# Patient Record
Sex: MALE | Race: WHITE | NOT HISPANIC OR LATINO | Employment: OTHER | URBAN - METROPOLITAN AREA
[De-identification: names, ages, dates, MRNs, and addresses within clinical notes are randomized per-mention and may not be internally consistent; named-entity substitution may affect disease eponyms.]

---

## 2017-01-01 DIAGNOSIS — I10 ESSENTIAL HYPERTENSION: ICD-10-CM

## 2017-01-03 ENCOUNTER — TRANSCRIBE ORDERS (OUTPATIENT)
Dept: CARDIOLOGY | Facility: CLINIC | Age: 72
End: 2017-01-03

## 2017-01-03 DIAGNOSIS — Z13.9 SCREENING: Primary | ICD-10-CM

## 2017-01-03 RX ORDER — AMLODIPINE BESYLATE 5 MG/1
TABLET ORAL
Qty: 15 TABLET | Refills: 2 | Status: SHIPPED | OUTPATIENT
Start: 2017-01-03 | End: 2017-03-05 | Stop reason: SDUPTHER

## 2017-01-08 ENCOUNTER — HOSPITAL ENCOUNTER (OUTPATIENT)
Dept: CARDIOLOGY | Facility: HOSPITAL | Age: 72
Discharge: HOME | End: 2017-01-08

## 2017-01-08 DIAGNOSIS — Z13.9 SCREENING: ICD-10-CM

## 2017-01-11 LAB
BH CV XLRA MEAS - PAD LEFT ARM: 130 MMHG
BH CV XLRA MEAS - PAD LEFT LEG DP: 1.07 MMHG
BH CV XLRA MEAS - PAD LEFT LEG PT: 139 MMHG
BH CV XLRA MEAS - PAD RIGHT ARM: 105 MMHG
BH CV XLRA MEAS - PAD RIGHT LEG DP: 1.04 MMHG
BH CV XLRA MEAS - PAD RIGHT LEG PT: 135 MMHG
BH CV XLRA MEAS - PROX AO DIAM: 1.7 CM
BH CV XLRA MEAS LEFT ICA/CCA RATIO: 0.9
BH CV XLRA MEAS LEFT MID CCA PSV: NORMAL CM/SEC
BH CV XLRA MEAS LEFT MID ICA PSV: NORMAL CM/SEC
BH CV XLRA MEAS LEFT PROX ECA PSV: 93 CM/SEC
BH CV XLRA MEAS RIGHT ICA/CCA RATIO: 0.48
BH CV XLRA MEAS RIGHT MID CCA PSV: NORMAL CM/SEC
BH CV XLRA MEAS RIGHT MID ICA PSV: NORMAL CM/SEC
BH CV XLRA MEAS RIGHT PROX ECA PSV: 99 CM/SEC

## 2017-02-24 ENCOUNTER — OFFICE VISIT (OUTPATIENT)
Dept: INTERNAL MEDICINE | Age: 72
End: 2017-02-24

## 2017-02-24 VITALS
SYSTOLIC BLOOD PRESSURE: 122 MMHG | HEIGHT: 70 IN | OXYGEN SATURATION: 92 % | TEMPERATURE: 97.9 F | BODY MASS INDEX: 28.77 KG/M2 | HEART RATE: 81 BPM | DIASTOLIC BLOOD PRESSURE: 70 MMHG | WEIGHT: 201 LBS

## 2017-02-24 DIAGNOSIS — M17.0 PRIMARY OSTEOARTHRITIS OF BOTH KNEES: Chronic | ICD-10-CM

## 2017-02-24 DIAGNOSIS — J43.9 PULMONARY EMPHYSEMA, UNSPECIFIED EMPHYSEMA TYPE (HCC): Chronic | ICD-10-CM

## 2017-02-24 DIAGNOSIS — I10 ESSENTIAL HYPERTENSION: Primary | Chronic | ICD-10-CM

## 2017-02-24 LAB
ALBUMIN SERPL-MCNC: 4.2 G/DL (ref 3.5–5.2)
ALBUMIN/GLOB SERPL: 1.8 G/DL
ALP SERPL-CCNC: 75 U/L (ref 39–117)
ALT SERPL-CCNC: 25 U/L (ref 1–41)
AST SERPL-CCNC: 25 U/L (ref 1–40)
BILIRUB SERPL-MCNC: 0.6 MG/DL (ref 0.1–1.2)
BUN SERPL-MCNC: 14 MG/DL (ref 8–23)
BUN/CREAT SERPL: 14.9 (ref 7–25)
CALCIUM SERPL-MCNC: 9.3 MG/DL (ref 8.6–10.5)
CHLORIDE SERPL-SCNC: 97 MMOL/L (ref 98–107)
CHOLEST SERPL-MCNC: 167 MG/DL (ref 0–200)
CHOLEST/HDLC SERPL: 3.98 {RATIO}
CO2 SERPL-SCNC: 24.3 MMOL/L (ref 22–29)
CREAT SERPL-MCNC: 0.94 MG/DL (ref 0.76–1.27)
GLOBULIN SER CALC-MCNC: 2.4 GM/DL
GLUCOSE SERPL-MCNC: 110 MG/DL (ref 65–99)
HDLC SERPL-MCNC: 42 MG/DL (ref 40–60)
LDLC SERPL CALC-MCNC: 101 MG/DL (ref 0–100)
POTASSIUM SERPL-SCNC: 4.4 MMOL/L (ref 3.5–5.2)
PROT SERPL-MCNC: 6.6 G/DL (ref 6–8.5)
SODIUM SERPL-SCNC: 135 MMOL/L (ref 136–145)
TRIGL SERPL-MCNC: 119 MG/DL (ref 0–150)
VLDLC SERPL CALC-MCNC: 23.8 MG/DL (ref 5–40)

## 2017-02-24 PROCEDURE — 99214 OFFICE O/P EST MOD 30 MIN: CPT | Performed by: INTERNAL MEDICINE

## 2017-02-24 NOTE — PROGRESS NOTES
"Aj Pattonb / 71 y.o. / male  02/24/2017    VITALS    Visit Vitals   • /70   • Pulse 81   • Temp 97.9 °F (36.6 °C)   • Ht 70\" (177.8 cm)   • Wt 201 lb (91.2 kg)   • SpO2 92%   • BMI 28.84 kg/m2     BP Readings from Last 3 Encounters:   02/24/17 122/70   12/27/16 138/62   12/08/16 110/64     Wt Readings from Last 3 Encounters:   02/24/17 201 lb (91.2 kg)   12/27/16 200 lb (90.7 kg)   12/08/16 199 lb (90.3 kg)      Body mass index is 28.84 kg/(m^2).    CC:  Main reason(s) for today's visit: Hypertension and COPD      HPI:     Chronic essential hypertension :  Home BP readings: DOES NOT CHECK BP AT HOME. Compliant with medication(s).  Denies significant problems or side effects. Most recent in-office blood pressure readings:   BP Readings from Last 3 Encounters:   02/24/17 122/70   12/27/16 138/62   12/08/16 110/64     COPD :  He Denies increased cough or shortness of breath.   Current therapy: Anoro.     OA of knees. Off meloxicam. Mild to moderate chronic pain. Not taking any medication for pain.    ____________________________________________________________________    ASSESSMENT & PLAN:    Problem List Items Addressed This Visit        High    Hypertension - Primary (Chronic)    Overview     Stable. Continue amlodipine, hctz, lisinopril.          Relevant Medications    lisinopril (PRINIVIL,ZESTRIL) 40 MG tablet    hydrochlorothiazide (MICROZIDE) 12.5 MG capsule    amLODIPine (NORVASC) 5 MG tablet    metoprolol tartrate (LOPRESSOR) 25 MG tablet    Other Relevant Orders    Lipid Panel With / Chol / HDL Ratio    Comprehensive Metabolic Panel       Medium    Chronic obstructive pulmonary disease (Chronic)    Overview     Continue Anoro inhaler daily.          Relevant Medications    levocetirizine (XYZAL) 5 MG tablet    ANORO ELLIPTA 62.5-25 MCG/INH aerosol powder        Low    Osteoarthritis (Chronic)    Overview     Knees         Current Assessment & Plan     meloxicam was dc'ed by cards. Advised tylenol as " needed.              Orders Placed This Encounter   Procedures   • Lipid Panel With / Chol / HDL Ratio   • Comprehensive Metabolic Panel       Summary/Discussion:     ·       Return in about 4 months (around 6/24/2017) for COMBINED AWV and medical f/u (20 min).    Future Appointments  Date Time Provider Department Center   4/7/2017 1:40 PM MD NIKO SilvaK [facility] None   6/23/2017 8:20 AM MD MARK Carrillo [facility] None       ____________________________________________________________________    REVIEW OF SYSTEMS    Review of Systems  As noted per HPI  Constitutional neg   Resp neg   CV neg    Other: As noted per HPI      PHYSICAL EXAMINATION    Physical Exam  Constitutional  No distress   Cardiovascular Rate  normal . Rhythm: regular . Heart sounds:  normal    Pulmonary/Chest  Effort normal. Breath sounds:  normal   Psychiatric  Alert. Judgment and thought content normal. Mood normal      REVIEWED DATA:    Labs:   Lab Results   Component Value Date     (L) 08/31/2016    K 4.3 08/31/2016    AST 17 08/31/2016    ALT 17 08/31/2016    BUN 8 08/31/2016    CREATININE 0.68 (L) 08/31/2016    CREATININE 0.66 (L) 11/06/2015    CREATININE 0.76 10/14/2015    EGFRIFNONA 116 08/31/2016    EGFRIFAFRI  08/31/2016      Comment:      <15 Indicative of kidney failure.       Lab Results   Component Value Date     (H) 04/14/2015     (H) 03/30/2015       Lab Results   Component Value Date    LDL 80 03/30/2015    HDL 47 03/30/2015    TRIG 84 03/30/2015    CHOLHDLRATIO 3.1 03/30/2015       Lab Results   Component Value Date    TSH 2.45 10/14/2015          Lab Results   Component Value Date    WBC 6.29 08/31/2016    HGB 15.7 08/31/2016    HGB 13.3 (L) 11/07/2015    HGB 14.6 11/06/2015     08/31/2016        Imaging:        Medical Tests:        Summary of old records / correspondence / consultant report:        Request outside records:          ALLERGIES:    Review of patient's allergies indicates no  "known allergies.    MEDICATIONS:  Outpatient Medications Prior to Visit   Medication Sig Dispense Refill   • amLODIPine (NORVASC) 5 MG tablet GIVE \"JENNIFER\" 1/2 TABLET BY MOUTH DAILY 15 tablet 2   • ANORO ELLIPTA 62.5-25 MCG/INH aerosol powder  INHALE 1 PUFF BY MOUTH DAILY 60 each 2   • aspirin 81 MG EC tablet Take 81 mg by mouth daily.     • hydrochlorothiazide (MICROZIDE) 12.5 MG capsule Take 1 capsule by mouth daily.     • levocetirizine (XYZAL) 5 MG tablet Take 5 mg by mouth Every Evening.     • lisinopril (PRINIVIL,ZESTRIL) 40 MG tablet Take 1 tablet by mouth daily.     • LUMIGAN 0.01 % ophthalmic drops Administer  to both eyes every night.     • metoprolol tartrate (LOPRESSOR) 25 MG tablet TAKE 1 TABLET BY MOUTH EVERY 12 HOURS 60 tablet 3   • sildenafil (VIAGRA) 100 MG tablet Take 1 tablet by mouth daily as needed.       No facility-administered medications prior to visit.        [facility]    The following portions of the patient's history were reviewed and updated as appropriate: Allergies / Current Medications / Past Medical History / Surgical History / Social History / Family History    PROBLEM LIST:    Patient Active Problem List   Diagnosis   • Chronic obstructive pulmonary disease   • Impotence of organic origin   • Hypertension   • Osteoarthritis   • History of atrial fibrillation       PAST MEDICAL HX:    Past Medical History   Diagnosis Date   • Atrial fibrillation 3/17/2016   • BPH (benign prostatic hypertrophy)      see doctors at [facility]   • COPD (chronic obstructive pulmonary disease)    • Depression    • Emphysema lung    • Hx of colonic polyps      followed by GI (Kyle)   • Hyperlipidemia    • Hypertension    • Low back pain        PAST SURGICAL HX:    Past Surgical History   Procedure Laterality Date   • Appendectomy     • Hernia repair     • Total hip arthroplasty Left 11/06/2015     Dr Ankush Sky   • Total hip arthroplasty  10/2014     Dr Sky   • Joint replacement  11/2015     left hip "       SOCIAL HX:    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[employer]     Social History Main Topics   • Smoking status: Former Smoker     Packs/day: 1.00     Quit date: 1/1/2012   • Smokeless tobacco: Never Used      Comment: after >50 yrs   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 The electronic translation of spoken language may permit erroneous, or at times, nonsensical words or phrases to be inadvertently transcribed. Although I have reviewed the note for such errors, some may still exist.

## 2017-02-27 ENCOUNTER — TELEPHONE (OUTPATIENT)
Dept: INTERNAL MEDICINE | Age: 72
End: 2017-02-27

## 2017-02-28 DIAGNOSIS — J43.9 PULMONARY EMPHYSEMA, UNSPECIFIED EMPHYSEMA TYPE (HCC): ICD-10-CM

## 2017-03-05 DIAGNOSIS — I10 ESSENTIAL HYPERTENSION: ICD-10-CM

## 2017-03-06 RX ORDER — AMLODIPINE BESYLATE 5 MG/1
TABLET ORAL
Qty: 45 TABLET | Refills: 2 | Status: SHIPPED | OUTPATIENT
Start: 2017-03-06 | End: 2017-11-26 | Stop reason: SDUPTHER

## 2017-03-21 ENCOUNTER — TELEPHONE (OUTPATIENT)
Dept: INTERNAL MEDICINE | Age: 72
End: 2017-03-21

## 2017-03-21 NOTE — TELEPHONE ENCOUNTER
Because the last visit was not for prostate cancer screening. If he is interested in having PSA checked it may be done on next visit.

## 2017-03-22 VITALS
SYSTOLIC BLOOD PRESSURE: 143 MMHG | RESPIRATION RATE: 12 BRPM | SYSTOLIC BLOOD PRESSURE: 127 MMHG | HEART RATE: 79 BPM | HEIGHT: 70 IN | SYSTOLIC BLOOD PRESSURE: 109 MMHG | RESPIRATION RATE: 16 BRPM | OXYGEN SATURATION: 92 % | SYSTOLIC BLOOD PRESSURE: 103 MMHG | HEART RATE: 96 BPM | SYSTOLIC BLOOD PRESSURE: 117 MMHG | HEART RATE: 84 BPM | HEART RATE: 89 BPM | DIASTOLIC BLOOD PRESSURE: 73 MMHG | DIASTOLIC BLOOD PRESSURE: 66 MMHG | HEART RATE: 94 BPM | RESPIRATION RATE: 11 BRPM | TEMPERATURE: 98.3 F | HEART RATE: 93 BPM | DIASTOLIC BLOOD PRESSURE: 71 MMHG | OXYGEN SATURATION: 97 % | DIASTOLIC BLOOD PRESSURE: 87 MMHG | WEIGHT: 200 LBS | DIASTOLIC BLOOD PRESSURE: 70 MMHG | OXYGEN SATURATION: 93 % | RESPIRATION RATE: 10 BRPM | OXYGEN SATURATION: 94 % | HEART RATE: 90 BPM | SYSTOLIC BLOOD PRESSURE: 104 MMHG | SYSTOLIC BLOOD PRESSURE: 142 MMHG | DIASTOLIC BLOOD PRESSURE: 54 MMHG | DIASTOLIC BLOOD PRESSURE: 75 MMHG | DIASTOLIC BLOOD PRESSURE: 80 MMHG | DIASTOLIC BLOOD PRESSURE: 62 MMHG | SYSTOLIC BLOOD PRESSURE: 120 MMHG | HEART RATE: 80 BPM | OXYGEN SATURATION: 95 % | HEART RATE: 100 BPM | HEART RATE: 103 BPM | TEMPERATURE: 97.4 F | HEART RATE: 85 BPM | DIASTOLIC BLOOD PRESSURE: 68 MMHG | DIASTOLIC BLOOD PRESSURE: 85 MMHG | SYSTOLIC BLOOD PRESSURE: 153 MMHG | SYSTOLIC BLOOD PRESSURE: 128 MMHG | SYSTOLIC BLOOD PRESSURE: 110 MMHG

## 2017-03-23 ENCOUNTER — AMBULATORY SURGICAL CENTER (AMBULATORY)
Dept: URBAN - METROPOLITAN AREA SURGERY 17 | Facility: SURGERY | Age: 72
End: 2017-03-23
Payer: COMMERCIAL

## 2017-03-23 ENCOUNTER — OFFICE (AMBULATORY)
Dept: URBAN - METROPOLITAN AREA CLINIC 64 | Facility: CLINIC | Age: 72
End: 2017-03-23
Payer: COMMERCIAL

## 2017-03-23 DIAGNOSIS — K62.1 RECTAL POLYP: ICD-10-CM

## 2017-03-23 DIAGNOSIS — D12.0 BENIGN NEOPLASM OF CECUM: ICD-10-CM

## 2017-03-23 DIAGNOSIS — Z80.0 FAMILY HISTORY OF MALIGNANT NEOPLASM OF DIGESTIVE ORGANS: ICD-10-CM

## 2017-03-23 PROBLEM — Z12.11 SURVEILLANCE DUE TO PRIOR COLONIC NEOPLASIA: Status: ACTIVE | Noted: 2017-03-23

## 2017-03-23 LAB
GI HISTOLOGY: A. UNSPECIFIED: (no result)
GI HISTOLOGY: B. SELECT: (no result)
GI HISTOLOGY: PDF REPORT: (no result)

## 2017-03-23 PROCEDURE — 88305 TISSUE EXAM BY PATHOLOGIST: CPT | Performed by: INTERNAL MEDICINE

## 2017-03-23 PROCEDURE — 45380 COLONOSCOPY AND BIOPSY: CPT | Mod: 33 | Performed by: INTERNAL MEDICINE

## 2017-03-23 RX ADMIN — PROPOFOL 25 MG: 10 INJECTION, EMULSION INTRAVENOUS at 08:22

## 2017-03-23 RX ADMIN — PROPOFOL 25 MG: 10 INJECTION, EMULSION INTRAVENOUS at 08:18

## 2017-03-23 RX ADMIN — PROPOFOL 50 MG: 10 INJECTION, EMULSION INTRAVENOUS at 08:30

## 2017-03-23 RX ADMIN — PROPOFOL 100 MG: 10 INJECTION, EMULSION INTRAVENOUS at 08:16

## 2017-03-23 RX ADMIN — LIDOCAINE HYDROCHLORIDE 50 MG: 10 INJECTION, SOLUTION EPIDURAL; INFILTRATION; INTRACAUDAL; PERINEURAL at 08:14

## 2017-04-07 ENCOUNTER — OFFICE VISIT (OUTPATIENT)
Dept: CARDIOLOGY | Facility: CLINIC | Age: 72
End: 2017-04-07

## 2017-04-07 VITALS
WEIGHT: 202 LBS | DIASTOLIC BLOOD PRESSURE: 64 MMHG | BODY MASS INDEX: 28.92 KG/M2 | HEART RATE: 74 BPM | HEIGHT: 70 IN | SYSTOLIC BLOOD PRESSURE: 122 MMHG

## 2017-04-07 DIAGNOSIS — J43.9 PULMONARY EMPHYSEMA, UNSPECIFIED EMPHYSEMA TYPE (HCC): ICD-10-CM

## 2017-04-07 DIAGNOSIS — I48.0 PAROXYSMAL ATRIAL FIBRILLATION (HCC): Primary | ICD-10-CM

## 2017-04-07 DIAGNOSIS — I10 ESSENTIAL HYPERTENSION: ICD-10-CM

## 2017-04-07 PROCEDURE — 99214 OFFICE O/P EST MOD 30 MIN: CPT | Performed by: INTERNAL MEDICINE

## 2017-04-07 PROCEDURE — 93000 ELECTROCARDIOGRAM COMPLETE: CPT | Performed by: INTERNAL MEDICINE

## 2017-04-07 NOTE — PROGRESS NOTES
Date of Office Visit: 17  Encounter Provider: Juan Colby MD  Place of Service: [facility] CARDIOLOGY  Patient Name: Aj Salazar  :1945      No chief complaint on file.    History of Present Illness  HPI Comments: The patient is a 71-year-old gentleman with no real prior history of heart disease but he  presented in 10/2014 with hip replacement surgery and a transient episode of atrial  fibrillation. As part of that evaluation he had an echocardiogram that showed normal LV  systolic function and no significant valvular disease. He also had a perfusion stress  test done that showed no evidence of ischemia. It was decided to not treat him any  further for that.  He then presented to  emergency room in 2016.  Was having atypical chest pain.  He had a perfusion stress test that was normal.  Felt that it was musculoskeletal started on ibuprofen.  We reviewed those records.  He now comes in for follow-up.  He's had no chest pain or pressure.  He can walk his pace 20-25 minutes without any problems.  If he does anything more exertional such as mowing the yard push mowing or if bending over it for increased activity get some shortness of breath but this is unchanged.  He denies orthopnea and PND.  He denies palpitations near syncope or syncope.  Denies any stroke type symptoms.  Overall he feels like he's doing well.    Atrial Fibrillation   Symptoms are negative for dizziness and weakness. Past medical history includes atrial fibrillation.         Past Medical History:   Diagnosis Date   • Atrial fibrillation 3/17/2016   • BPH (benign prostatic hypertrophy)     see doctors at [facility]   • COPD (chronic obstructive pulmonary disease)    • Depression    • Emphysema lung    • Hx of colonic polyps     followed by GI (Kyle)   • Hyperlipidemia    • Hypertension    • Low back pain          Past Surgical History:   Procedure Laterality Date   •  "APPENDECTOMY     • HERNIA REPAIR     • JOINT REPLACEMENT  11/2015    left hip   • TOTAL HIP ARTHROPLASTY Left 11/06/2015    Dr Ankush Sky   • TOTAL HIP ARTHROPLASTY  10/2014    Dr Sky         Current Outpatient Prescriptions on File Prior to Visit   Medication Sig Dispense Refill   • amLODIPine (NORVASC) 5 MG tablet GIVE \"JENNIFER\" 1/2 TABLET BY MOUTH DAILY 45 tablet 2   • aspirin 81 MG EC tablet Take 81 mg by mouth daily.     • hydrochlorothiazide (MICROZIDE) 12.5 MG capsule Take 1 capsule by mouth daily.     • levocetirizine (XYZAL) 5 MG tablet Take 5 mg by mouth Every Evening.     • lisinopril (PRINIVIL,ZESTRIL) 40 MG tablet Take 1 tablet by mouth daily.     • LUMIGAN 0.01 % ophthalmic drops Administer  to both eyes every night.     • metoprolol tartrate (LOPRESSOR) 25 MG tablet TAKE 1 TABLET BY MOUTH EVERY 12 HOURS 60 tablet 2   • sildenafil (VIAGRA) 100 MG tablet Take 1 tablet by mouth daily as needed.     • Umeclidinium-Vilanterol (ANORO ELLIPTA) 62.5-25 MCG/INH aerosol powder  Take 1 puff by mouth Daily. 60 each 2   • [DISCONTINUED] metoprolol tartrate (LOPRESSOR) 25 MG tablet TAKE 1 TABLET BY MOUTH EVERY 12 HOURS 60 tablet 3     No current facility-administered medications on file prior to visit.          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   HENT: Negative for congestion, headaches, hearing loss, nosebleeds and tinnitus.    Eyes: Negative for blurred vision, double vision, vision loss in left eye, vision loss in right eye and visual disturbance.   Cardiovascular:        As noted in HPI   Respiratory:        As noted HPI   Endocrine: Negative for cold intolerance and heat intolerance.   Hematologic/Lymphatic: Negative for bleeding problem. Does not bruise/bleed easily.   Skin: Negative for color change, flushing, itching and rash.   Musculoskeletal: Negative for arthritis, back pain, joint pain, joint swelling, muscle weakness and myalgias.   Gastrointestinal: Negative for bloating, abdominal pain, constipation, diarrhea, dysphagia, heartburn, hematemesis, hematochezia, melena, nausea and vomiting.   Genitourinary: Negative for bladder incontinence, dysuria, frequency, nocturia and urgency.   Neurological: Negative for dizziness, focal weakness, light-headedness, loss of balance, numbness, paresthesias and vertigo.   Psychiatric/Behavioral: Negative for depression, memory loss and substance abuse.       Procedures      ECG 12 Lead  Date/Time: 4/7/2017 1:57 PM  Performed by: SANDRITA BOX  Authorized by: SANDRITA BOX   Comparison: compared with previous ECG   Similar to previous ECG  Rhythm: sinus rhythm  Rate: normal  QRS axis: normal                 Objective:    /64  Pulse 74  Ht 70\" (177.8 cm)  Wt 202 lb (91.6 kg)  BMI 28.98 kg/m2       Physical Exam  Physical Exam   Constitutional: He is oriented to person, place, and time. He appears well-developed and well-nourished. No distress.   HENT:   Head: Normocephalic.   Eyes: Conjunctivae are normal. Pupils are equal, round, and reactive to light. No scleral icterus.   Neck: Normal " carotid pulses, no hepatojugular reflux and no JVD present. Carotid bruit is not present. No tracheal deviation, no edema and no erythema present. No thyromegaly present.   Cardiovascular: Normal rate, regular rhythm, S1 normal, S2 normal, normal heart sounds and intact distal pulses.   No extrasystoles are present. PMI is not displaced.  Exam reveals no gallop, no distant heart sounds and no friction rub.    No murmur heard.  Pulses:       Carotid pulses are 2+ on the right side, and 2+ on the left side.       Radial pulses are 2+ on the right side, and 2+ on the left side.        Femoral pulses are 2+ on the right side, and 2+ on the left side.       Dorsalis pedis pulses are 2+ on the right side, and 2+ on the left side.        Posterior tibial pulses are 2+ on the right side, and 2+ on the left side.   Pulmonary/Chest: Effort normal and breath sounds normal. No respiratory distress. He has no decreased breath sounds. He has no wheezes. He has no rhonchi. He has no rales. He exhibits no tenderness.   Abdominal: Soft. Bowel sounds are normal. He exhibits no distension and no mass. There is no hepatosplenomegaly. There is no tenderness. There is no rebound and no guarding.   Musculoskeletal: He exhibits no edema, tenderness or deformity.   Neurological: He is alert and oriented to person, place, and time.   Skin: Skin is warm and dry. No rash noted. He is not diaphoretic. No cyanosis or erythema. No pallor. Nails show no clubbing.   Psychiatric: He has a normal mood and affect. His speech is normal and behavior is normal. Judgment and thought content normal.           Assessment:   1. This is a 71-year-old gentleman with a history of paroxysmal atrial fibrillation but it was only after a time of stress and surgery.   Atrial Fibrillation and Atrial Flutter  Assessment  • The patient has paroxysmal atrial fibrillation  • This is non-valvular in etiology  • The patient's CHADS2-VASc score is 2  • A [id number] score  of 2 or more is considered a high risk for a thromboembolic event  • Aspirin prescribed    Plan  • Attempt to maintain sinus rhythm  • Continue aspirin for antithrombotic therapy, bleeding issues discussed  He has not had any recurrence. He is to continue the same will see us in follow-up in one year.      2. Hypertension. Stable on current medical regimen.   3.  COPD. Baseline dyspnea unchanged.          Plan:

## 2017-05-08 ENCOUNTER — TELEPHONE (OUTPATIENT)
Dept: INTERNAL MEDICINE | Age: 72
End: 2017-05-08

## 2017-05-09 DIAGNOSIS — K62.9 RECTAL ABNORMALITY: Primary | ICD-10-CM

## 2017-05-27 DIAGNOSIS — J43.9 PULMONARY EMPHYSEMA, UNSPECIFIED EMPHYSEMA TYPE (HCC): ICD-10-CM

## 2017-06-23 ENCOUNTER — OFFICE VISIT (OUTPATIENT)
Dept: INTERNAL MEDICINE | Age: 72
End: 2017-06-23

## 2017-06-23 VITALS
HEIGHT: 70 IN | SYSTOLIC BLOOD PRESSURE: 118 MMHG | HEART RATE: 80 BPM | BODY MASS INDEX: 28.77 KG/M2 | DIASTOLIC BLOOD PRESSURE: 70 MMHG | TEMPERATURE: 97.5 F | WEIGHT: 201 LBS | OXYGEN SATURATION: 93 %

## 2017-06-23 DIAGNOSIS — J43.9 PULMONARY EMPHYSEMA, UNSPECIFIED EMPHYSEMA TYPE (HCC): Chronic | ICD-10-CM

## 2017-06-23 DIAGNOSIS — Z87.891 HISTORY OF SMOKING GREATER THAN 50 PACK YEARS: ICD-10-CM

## 2017-06-23 DIAGNOSIS — I10 ESSENTIAL HYPERTENSION: Chronic | ICD-10-CM

## 2017-06-23 DIAGNOSIS — Z00.00 MEDICARE ANNUAL WELLNESS VISIT, INITIAL: Primary | ICD-10-CM

## 2017-06-23 DIAGNOSIS — Z12.2 ENCOUNTER FOR SCREENING FOR MALIGNANT NEOPLASM OF RESPIRATORY ORGANS: ICD-10-CM

## 2017-06-23 PROCEDURE — G0439 PPPS, SUBSEQ VISIT: HCPCS | Performed by: INTERNAL MEDICINE

## 2017-06-23 PROCEDURE — 99214 OFFICE O/P EST MOD 30 MIN: CPT | Performed by: INTERNAL MEDICINE

## 2017-06-23 NOTE — PATIENT INSTRUCTIONS
Medicare Wellness  Personal Prevention Plan of Service     Date of Office Visit:  2017  Encounter Provider:  Neftali Amezcua MD  Place of Service:  [facility] PR[facility]  Patient Name: Aj Salazar  :  1945    As part of the Medicare Wellness portion of your visit today, we are providing you with this personalized preventive plan of services (PPPS). This plan is based upon recommendations of the [place] Preventive Services Task Force (USPSTF) and the Advisory Committee on Immunization Practices (ACIP).    This lists the preventive care services that should be considered, and provides dates of when you are due. Items listed as completed are up-to-date and do not require any further intervention.      Age-Appropriate Screening Schedule:  (Refer to the list below for future screening recommendations based on patient's age, sex and/or medical conditions. Orders for these recommended tests are listed in the plan section. The patient has been provided with a written plan)    Health Maintenance Topics  Health Maintenance   Topic Date Due   • INFLUENZA VACCINE  2017   • LIPID PANEL  2018   • COLONOSCOPY  2022   • TDAP/TD VACCINES (2 - Td) 2027   • PNEUMOCOCCAL VACCINES (65+ LOW/MEDIUM RISK)  Completed   • ZOSTER VACCINE  Completed       Health Maintenance Topics Due or Over-Due  Health Maintenance Due   Topic Date Due   • HEPATITIS C SCREENING  2016   • AAA SCREEN (ONE-TIME)  2016           ADDITIONAL RESOURCES:    For excellent information on senior health and wellness refer to the [employer] web-site at:    WWW .NIHSENIORHEALTH.GOV

## 2017-06-23 NOTE — ASSESSMENT & PLAN NOTE
Uncontrolled. Change Anoro to Breo inhaler daily and Spiriva Respimate 2 puffs daily. (samples) call in 2 weeks.

## 2017-06-23 NOTE — PROGRESS NOTES
"06/23/2017    MEDICARE ANNUAL WELLNESS VISIT    Aj Salazar is a 71 y.o. male who presents for INITIAL AWV & F/U ON CHRONIC MEDICAL PROBLEMS     Patient's general assessment of his health since a year ago:     - Compared to one year ago, he feels his physical health is about the same without significant change.    - Compared to one year ago, he feels his mental health is about the same without significant change.      HPI for other active medical problems:   H/o copd on Anoro inhaler. Quit smoking after 80 pack yrs in 2012. C/o chronic moderate dyspnea with activities. Denies chest pain or palpitations. H/o pafib on metoprolol started by cards 1 year ago. Donates blood, feels exhausted when he does.    Chronic essential hypertension: Home BP readings: DOES NOT CHECK BP AT HOME. Compliant with medication(s).  metoprolo addition noted above by cardiology. Denies significant problems or side effects. Most recent in-office blood pressure readings:   BP Readings from Last 3 Encounters:   06/23/17 118/70   04/07/17 122/64   02/24/17 122/70          * The required components of Health Risk Assessment (HRA) that were completed by the patient and/or my staff are contained within this note and in the scanned documents titled \"Health Risk Assessment\" within the media section of the patient's chart in [employer].       HISTORY    Recent Hospitalizations:    Recent hospitalization? : No    If YES, location, date, and diagnoses:     · Location:   · Date:   · Principle Discharge Dx:   · Secondary Dx:       Patient Care Team:    Patient Care Team:  Neftali Amezcua MD as PCP - General (Internal Medicine)  Neftali Amezcua MD as PCP - Family Medicine  Juan Colby MD as Consulting Physician (Cardiology)  Ankush Sky MD as Surgeon (Orthopedic Surgery)      Allergies:  Review of patient's allergies indicates no known allergies.    Medications:  Outpatient Medications Prior to Visit   Medication Sig Dispense Refill   • amLODIPine (NORVASC) 5 " "MG tablet GIVE \"JENNIFER\" 1/2 TABLET BY MOUTH DAILY 45 tablet 2   • ANORO ELLIPTA 62.5-25 MCG/INH aerosol powder  INHALE 1 PUFF BY MOUTH DAILY 60 each 0   • aspirin 81 MG EC tablet Take 81 mg by mouth daily.     • hydrochlorothiazide (MICROZIDE) 12.5 MG capsule Take 1 capsule by mouth daily.     • lisinopril (PRINIVIL,ZESTRIL) 40 MG tablet Take 1 tablet by mouth daily.     • LUMIGAN 0.01 % ophthalmic drops Administer  to both eyes every night.     • metoprolol tartrate (LOPRESSOR) 25 MG tablet TAKE 1 TABLET BY MOUTH EVERY 12 HOURS 60 tablet 2   • sildenafil (VIAGRA) 100 MG tablet Take 1 tablet by mouth daily as needed.     • levocetirizine (XYZAL) 5 MG tablet Take 5 mg by mouth Every Evening.       No facility-administered medications prior to visit.        PFSH:     The following portions of the patient's history were reviewed and updated as appropriate: Allergies / Current Medications / Past Medical History / Surgical History / Social History / Family History    Problem List:  Patient Active Problem List   Diagnosis   • Chronic obstructive pulmonary disease   • Impotence of organic origin   • Hypertension   • Osteoarthritis   • History of atrial fibrillation   • History of smoking greater than 50 pack years       Past Medical History:  Past Medical History:   Diagnosis Date   • Atrial fibrillation 3/17/2016   • BPH (benign prostatic hypertrophy)     see doctors at [facility]   • COPD (chronic obstructive pulmonary disease)    • Depression    • Emphysema lung    • Hx of colonic polyps     followed by GI ([facility])   • Hyperlipidemia    • Hypertension    • Low back pain        Past Surgical History:  Past Surgical History:   Procedure Laterality Date   • APPENDECTOMY     • HERNIA REPAIR     • JOINT REPLACEMENT  11/2015    left hip   • TOTAL HIP ARTHROPLASTY Left 11/06/2015    Dr Ankush Sky   • TOTAL HIP ARTHROPLASTY  10/2014    Dr Sky       Social History:  Social History     Social History   • Marital status:      " "Spouse name: Ara   • Number of children: 3   • Years of education: N/A     Occupational History   • 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[employer]     Social History Main Topics   • Smoking status: Former Smoker     Packs/day: 2.00     Years: 40.00     Quit date: 1/1/2012   • Smokeless tobacco: Never Used      Comment: after >50 yrs   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   • Drug use: No   • Sexual activity: Defer     Other Topics Concern   • None     Social History Narrative       Family History:  Family History   Problem Relation Age of Onset   • Colon cancer Mother    • Colon cancer Other    • Stroke Sister      October 2016   • Stroke Brother      September, 2016         PATIENT ASSESSMENT    Vitals:  /70  Pulse 80  Temp 97.5 °F (36.4 °C)  Ht 70\" (177.8 cm)  Wt 201 lb (91.2 kg)  SpO2 93%  BMI 28.84 kg/m2  BP Readings from Last 3 Encounters:   06/23/17 118/70   04/07/17 122/64   02/24/17 122/70     Wt Readings from Last 3 Encounters:   06/23/17 201 lb (91.2 kg)   04/07/17 202 lb (91.6 kg)   02/24/17 201 lb (91.2 kg)      Body mass index is 28.84 kg/(m^2).    Pain Score    06/23/17 0816   PainSc: 0-No pain         Review of Systems:    Review of Systems  As noted per HPI  Constitutional neg   Resp neg   CV neg for chest pain, briceno  Gi neg  Neuro neg    Physical Exam:    Physical Exam  Constitutional  No distress   Cardiovascular Rate  normal . Rhythm: regular . Heart sounds:  normal    Pulmonary/Chest  Effort normal. Breath sounds:  normal   Psychiatric  Alert. Judgment and thought content normal. Mood normal      Reviewed Data:    Labs:   Lab Results   Component Value Date     (L) 02/24/2017    K 4.4 02/24/2017    AST 25 02/24/2017    ALT 25 02/24/2017    BUN 14 02/24/2017    CREATININE 0.94 02/24/2017    CREATININE 0.68 (L) 08/31/2016    CREATININE 0.66 (L) 11/06/2015    EGFRIFNONA 79 02/24/2017    EGFRIFAFRI 96 02/24/2017       Lab Results "   Component Value Date     (H) 02/24/2017     (H) 04/14/2015     (H) 03/30/2015       Lab Results   Component Value Date     (H) 02/24/2017    LDL 80 03/30/2015    HDL 42 02/24/2017    TRIG 119 02/24/2017    CHOLHDLRATIO 3.98 02/24/2017       Lab Results   Component Value Date    TSH 2.45 10/14/2015          Lab Results   Component Value Date    WBC 6.29 08/31/2016    HGB 15.7 08/31/2016    HGB 13.3 (L) 11/07/2015    HGB 14.6 11/06/2015     08/31/2016        Imaging:        Medical Tests:          Screening for Glaucoma:  Previous screening for glaucoma?: Yes  Mild glaucoma      Hearing Loss Screen:  Finger Rub Hearing Test (right ear): passed  Finger Rub Hearing Test (left ear): passed      Urinary Incontinence Screen:  Episodes of urinary incontinence? : No      Depression Screen:  PHQ-9 Depression Screening 6/23/2017   Little interest or pleasure in doing things 0   Feeling down, depressed, or hopeless 0   PHQ-9 Total Score 0        PHQ-2: 0 (Not depressed)    PHQ-9:        FUNCTIONAL, FALL RISK, & COGNITIVE SCREENING (Components below):    DATA:    Functional & Cognitive Status 6/23/2017   Do you have difficulty preparing food and eating? No   Do you have difficulty bathing yourself? No   Do you have difficulty getting dressed? No   Do you have difficulty using the toilet? No   Do you have difficulty moving around from place to place? No   In the past year have you fallen or experienced a near fall? No   Do you need help using the phone?  No   Are you deaf or do you have serious difficulty hearing?  No   Do you need help with transportation? No   Do you need help shopping? No   Do you need help preparing meals?  No   Do you need help with housework?  No   Do you need help with laundry? No   Do you need help taking your medications? No   Do you need help managing money? No       Fall Risk Assessment  Fallen in past 6 months: 0--> No  Mental Status: 0--> no mental status  change  Mobility: 0--> No mobility issues  Medications: 5--> Diuretics  Total Fall Risk Score: 7    A) Assessment of Functional Ability:  (Assessment of ability to perform ADL's (showering/bathing, using toilet, dressing, feeding self, moving self around) and IADL's (use telephone, shop, prepare food, housekeep, do laundry, transport independently, take medications independently, and handle finances)    Degree of functional impairment: NONE (based on assessment noted above)      B) Assessment of Fall Risk:     - Fall within the last year? : No   - Feel unsteady when standing or walking? : No   - Worry about falling? : No    Need for further evaluation of gait, strength, and balance? : No    Timed Up and Go (TUG):   (>= 12 seconds indicates high risk for falling)    Observable abnormalities included:        C. Assessment of Cognitive Function:    Mini-Cog Test:     1) Registration (3 objects): Yes   2) Clock Draw: Passed? : Yes   3) Number of objects recalled: 3    Further evaluation required? : No      COUNSELING    A. Identification of Health Risk Factors:    Risk factors include: cardiovascular risk factors and history of tobacco use      B. Age-Appropriate Screening Schedule:  (Refer to the list below for future screening recommendations based on patient's age, sex and/or medical conditions. Orders for these recommended tests are listed in the plan section. The patient has been provided with a written plan)    Health Maintenance Topics  Health Maintenance   Topic Date Due   • INFLUENZA VACCINE  08/01/2017   • LIPID PANEL  02/24/2018   • COLONOSCOPY  03/23/2022   • TDAP/TD VACCINES (2 - Td) 02/24/2027   • PNEUMOCOCCAL VACCINES (65+ LOW/MEDIUM RISK)  Completed   • ZOSTER VACCINE  Completed       Health Maintenance Topics Due or Over-Due  Health Maintenance Due   Topic Date Due   • HEPATITIS C SCREENING  03/17/2016   • AAA SCREEN (ONE-TIME)  03/17/2016         C. Advanced Care Planning:    power of  for  healthcare AND advanced directive are BOTH on file      D. Patient Self-Management and Personalized Health Advice:    He has been provided with personalized counseling/information (including brochures/handouts) about:     -- optimizing diet/nutrition plans, improving exercise / conditioning, weight management and reducing risk for cardiovascular disease (heart, stroke, vascular)    He has been recommended for the following preventative services which has been performed today, will be ordered today or ordered/performed on upcoming follow-up visit:     -- nutrition counseling provided, exercise counseling provided, counseling for cardiovascular disease risk reduction, fall risk assessment / plan of care completed, urinary incontinence assessment done, screening for diabetes performed (current/reviewed labs/lab ordered), screening for prostate cancer (discussed and followed/managed by urologist)      E. Miscellaneous Items:    -Aspirin use counseling: Taking ASA appropriately as indicated    -Discussed BMI with him. The BMI is above average; BMI management plan is completed (discussed plans for weight loss)    -Reviewed use of high risk medication in the elderly: YES    -Reviewed for potential of harmful drug interactions in the elderly: YES      IV. WRAP-UP    Assessment & Plan:    1) MEDICARE ANNUAL WELLNESS VISIT    2) OTHER MEDICAL CONDITIONS ADDRESSED TODAY:              Problem List Items Addressed This Visit     Chronic obstructive pulmonary disease (Chronic)    Current Assessment & Plan     Uncontrolled. Change Anoro to Breo inhaler daily and Spiriva Respimate 2 puffs daily. (samples) call in 2 weeks.           Relevant Medications    levocetirizine (XYZAL) 5 MG tablet    ANORO ELLIPTA 62.5-25 MCG/INH aerosol powder     Hypertension (Chronic)    Overview     Stable. Continue amlodipine, hctz, lisinopril. On metoprolol for afib.          Relevant Medications    lisinopril (PRINIVIL,ZESTRIL) 40 MG tablet     "hydrochlorothiazide (MICROZIDE) 12.5 MG capsule    amLODIPine (NORVASC) 5 MG tablet    metoprolol tartrate (LOPRESSOR) 25 MG tablet    History of smoking greater than 50 pack years (Chronic)    Current Assessment & Plan     Enroll for annual low CT scan for screening.   Quit smoking in 2012 after 80 pack years.          Relevant Orders    CT Chest Low Dose Wo      Other Visit Diagnoses     Medicare annual wellness visit, initial    -  Primary    Encounter for screening for malignant neoplasm of respiratory organs        Relevant Orders    CT Chest Low Dose Wo                    Orders Placed This Encounter   Procedures   • CT Chest Low Dose Wo       Discussion / Summary:  ·         Medications as of TODAY:              Current Outpatient Prescriptions   Medication Sig Dispense Refill   • amLODIPine (NORVASC) 5 MG tablet GIVE \"JENNIFER\" 1/2 TABLET BY MOUTH DAILY 45 tablet 2   • ANORO ELLIPTA 62.5-25 MCG/INH aerosol powder  INHALE 1 PUFF BY MOUTH DAILY 60 each 0   • aspirin 81 MG EC tablet Take 81 mg by mouth daily.     • hydrochlorothiazide (MICROZIDE) 12.5 MG capsule Take 1 capsule by mouth daily.     • lisinopril (PRINIVIL,ZESTRIL) 40 MG tablet Take 1 tablet by mouth daily.     • LUMIGAN 0.01 % ophthalmic drops Administer  to both eyes every night.     • metoprolol tartrate (LOPRESSOR) 25 MG tablet TAKE 1 TABLET BY MOUTH EVERY 12 HOURS 60 tablet 2   • sildenafil (VIAGRA) 100 MG tablet Take 1 tablet by mouth daily as needed.     • levocetirizine (XYZAL) 5 MG tablet Take 5 mg by mouth Every Evening.       No current facility-administered medications for this visit.          FOLLOW-UP:            Return in about 3 months (around 9/23/2017) for COPD.              Future Appointments  Date Time Provider Department Center   7/5/2017 2:20 PM Daniela Rios MD [facility] None   10/6/2017 7:40 AM Neftali Amezcua MD [facility] None   4/13/2018 9:40 AM Juan Colby MD [facility] None "         ______________________________________________________________________      A printed After Visit Summary (AVS) including the Personalized Prevention  Plan Services (PPPS) was given to the patient at check-out today.         **Dragon Disclaimer:   Much of this encounter note is an electronic transcription/translation of spoken language to printed text. The electronic translation of spoken language may permit erroneous, or at times, nonsensical words or phrases to be inadvertently transcribed. Although I have reviewed the note for such errors, some may still exist.

## 2017-07-05 ENCOUNTER — OFFICE VISIT (OUTPATIENT)
Dept: SURGERY | Facility: CLINIC | Age: 72
End: 2017-07-05

## 2017-07-05 VITALS
HEIGHT: 70 IN | HEART RATE: 91 BPM | DIASTOLIC BLOOD PRESSURE: 82 MMHG | OXYGEN SATURATION: 98 % | BODY MASS INDEX: 28.82 KG/M2 | RESPIRATION RATE: 20 BRPM | WEIGHT: 201.3 LBS | TEMPERATURE: 98.1 F | SYSTOLIC BLOOD PRESSURE: 140 MMHG

## 2017-07-05 DIAGNOSIS — K64.4 EXTERNAL HEMORRHOIDS WITHOUT COMPLICATION: ICD-10-CM

## 2017-07-05 DIAGNOSIS — K64.8 INTERNAL HEMORRHOIDS WITH COMPLICATION: Primary | ICD-10-CM

## 2017-07-05 PROCEDURE — 46600 DIAGNOSTIC ANOSCOPY SPX: CPT | Performed by: COLON & RECTAL SURGERY

## 2017-07-05 PROCEDURE — 99204 OFFICE O/P NEW MOD 45 MIN: CPT | Performed by: COLON & RECTAL SURGERY

## 2017-07-05 RX ORDER — CALCIUM POLYCARBOPHIL 625 MG 625 MG/1
625 TABLET ORAL DAILY
Qty: 200 TABLET | Refills: 2 | Status: SHIPPED | OUTPATIENT
Start: 2017-07-05 | End: 2018-04-13

## 2017-07-05 RX ORDER — CALCIUM POLYCARBOPHIL 625 MG 625 MG/1
625 TABLET ORAL DAILY
Qty: 200 TABLET | Refills: 2 | Status: SHIPPED | OUTPATIENT
Start: 2017-07-05 | End: 2017-07-05

## 2017-07-05 NOTE — PROGRESS NOTES
Aj Salazar is a 71 y.o. male who is seen as a consult at the request of Neftali Amezcua MD for Hemorrhoids.      HPI:  C/o itch, occas rb at start of 2017  Se[facility] and given hem cream but per pt, md said that he did not see any  Uses cream  intermittantly  Cream - not used internally just externally  bm - runs on the loose side  No fiber  no ss  No otc hemmorhoid remedies  Cy this Feb - ok. Polyps in the past    Past Medical History:   Diagnosis Date   • Atrial fibrillation 03/17/2016    SEES DR. BOX   • BPH (benign prostatic hypertrophy)     see doctors at [facility]   • COPD (chronic obstructive pulmonary disease)    • Depression    • DJD (degenerative joint disease) of cervical spine    • ED (erectile dysfunction)     SEES DOCTOR AT [facility]   • Emphysema lung    • Hx of colonic polyps     followed by GI ([facility])   • Hyperlipidemia    • Hypertension    • Influenza B 02/14/2013       • Low back pain    • Osteoarthritis     HIPS, HANDS, MULTIPLE SITES   • Rectal bleeding 04/26/2017   • Shoulder pain, left 12/04/2007    SEEN AT [facility] ER   • Substance abuse     HX OF ALCOHOL ABUSE-QUIT OVER 20 YEARS AGO   • Tendonitis of shoulder 11/07/2007    RIGHT SHOULDER/DELTOID INSERTION       Past Surgical History:   Procedure Laterality Date   • APPENDECTOMY     • CARDIOVASCULAR STRESS TEST N/A 10/20/2015    NML LEXISCAN CARDIOLITE PERFUSION STUDY, NO EVIDENCE OF ISCHEMIA, AREA OF HYPOPERFUSION OF THE INFERIOR WALL W/NORMAL WALL PERFUSION AND NML LEFT VENTRICULAR EJECTION FRACTION, MOST LIKELY ATTENUATION. DR.MICHAEL BOX   • COLONOSCOPY N/A 02/2017   • COLONOSCOPY N/A 02/23/2011    4 POLYPS REMOVED, RESCOPE IN 5 YRS, DR. EAGLE   • COLONOSCOPY N/A 03/08/2006    ERYTHEMOUS AND GRANULAR MUCOSA IN ILEOCECAL VALVE, NORMAL COLON, REPEAT IN 5 YRS,    • HERNIA REPAIR Bilateral     INGUINAL   • JOINT REPLACEMENT  11/2015    left hip   • TOTAL HIP ARTHROPLASTY Left 11/06/2015    Dr Ankush Sky   • TOTAL HIP  "ARTHROPLASTY Right 10/27/2014    DR.RIED GRAY   • VASECTOMY         Social History:   reports that he quit smoking about 5 years ago. He has a 80.00 pack-year smoking history. He has never used smokeless tobacco. He reports that he does not drink alcohol or use illicit drugs.      Marriage status:     Family History   Problem Relation Age of Onset   • Colon cancer Mother    • Heart disease Mother    • Stroke Sister      October 2016   • Stroke Brother      September, 2016   • Coronary artery disease Father    • Hypertension Father    • Heart disease Father    • Colon cancer Maternal Grandmother    • Coronary artery disease Brother    • Heart disease Brother    • Coronary artery disease Brother          Current Outpatient Prescriptions:   •  amLODIPine (NORVASC) 5 MG tablet, GIVE \"JENNIFER\" 1/2 TABLET BY MOUTH DAILY, Disp: 45 tablet, Rfl: 2  •  aspirin 81 MG EC tablet, Take 81 mg by mouth daily., Disp: , Rfl:   •  hydrochlorothiazide (MICROZIDE) 12.5 MG capsule, Take 1 capsule by mouth daily., Disp: , Rfl:   •  levocetirizine (XYZAL) 5 MG tablet, Take 5 mg by mouth Every Evening., Disp: , Rfl:   •  lisinopril (PRINIVIL,ZESTRIL) 40 MG tablet, Take 1 tablet by mouth daily., Disp: , Rfl:   •  LUMIGAN 0.01 % ophthalmic drops, Administer  to both eyes every night., Disp: , Rfl:   •  metoprolol tartrate (LOPRESSOR) 25 MG tablet, TAKE 1 TABLET BY MOUTH EVERY 12 HOURS, Disp: 60 tablet, Rfl: 2  •  sildenafil (VIAGRA) 100 MG tablet, Take 1 tablet by mouth daily as needed., Disp: , Rfl:   •  hydrocortisone (ANUSOL-HC) 2.5 % rectal cream, Apply rectally 3 times daily.  Include applicator., Disp: 30 g, Rfl: 1  •  polycarbophil (FIBERCON) 625 MG tablet, Take 1 tablet by mouth Daily., Disp: 200 tablet, Rfl: 2  •  Umeclidinium-Vilanterol (ANORO ELLIPTA) 62.5-25 MCG/INH aerosol powder , 1 puff daily, Disp: 60 each, Rfl: 3    Allergy  Review of patient's allergies indicates no known allergies.    Review of Systems   Constitution: " Positive for weight gain.   Respiratory: Positive for cough and shortness of breath.    Hematologic/Lymphatic: Bruises/bleeds easily.   Skin: Positive for skin cancer.   Musculoskeletal: Positive for back pain.   Gastrointestinal: Positive for change in bowel habit.   All other systems reviewed and are negative.      Vitals:    07/05/17 1430   BP: 140/82   Pulse: 91   Resp: 20   Temp: 98.1 °F (36.7 °C)   SpO2: 98%     Body mass index is 28.88 kg/(m^2).    Physical Exam   Constitutional: He is oriented to person, place, and time. He appears well-developed and well-nourished. No distress.   HENT:   Head: Normocephalic and atraumatic.   Nose: Nose normal.   Mouth/Throat: Oropharynx is clear and moist.   Eyes: Conjunctivae and EOM are normal. Pupils are equal, round, and reactive to light.   Neck: Normal range of motion. No tracheal deviation present.   Pulmonary/Chest: Effort normal and breath sounds normal. No respiratory distress.   Abdominal: Soft. Bowel sounds are normal. He exhibits no distension.   Genitourinary:   Genitourinary Comments: Perianal exam: external hem - wnl  AIMEE- good tone, no masses  Anoscopy performed:  Grade 2 x 3 internal hem     Musculoskeletal: Normal range of motion. He exhibits no edema or deformity.   Neurological: He is alert and oriented to person, place, and time. No cranial nerve deficit. Coordination and gait normal.   Skin: Skin is warm and dry.   Psychiatric: He has a normal mood and affect. His behavior is normal. Judgment normal.       Review of Medical Record:  I reviewed Dr. Amezcua's office note from 6/23/17.  I reviewed DR. Mckeon's cy report from 2/17    Assessment:  1. Internal hemorrhoids with complication    2. External hemorrhoids without complication        Plan: For the hemorrhoids, they're nonsurgical at this point.  I recommended for patient to treat conservatively with MiraLAX, fiber, and the hemorrhoid cream.  I wrote patient a prescription for hydrocortisone 2.5%  cream and gave patient instructions.  RX for fiber also.        EMR Dragon/Transcription disclaimer:   Much of this encounter note is an electronic transcription/translation of spoken language to printed text. The electronic translation of spoken language may permit erroneous, or at times, nonsensical words or phrases to be inadvertently transcribed; Although I have reviewed the note for such errors, some may still exist.

## 2017-07-06 ENCOUNTER — TELEPHONE (OUTPATIENT)
Dept: INTERNAL MEDICINE | Age: 72
End: 2017-07-06

## 2017-07-06 DIAGNOSIS — J43.9 PULMONARY EMPHYSEMA, UNSPECIFIED EMPHYSEMA TYPE (HCC): ICD-10-CM

## 2017-07-06 NOTE — TELEPHONE ENCOUNTER
Pt called stating that you recently gave him a sample of Breo and Spiriva to try he said neither one is helping him and the only one he does well with is the Anoro. He is wanting to know if we can go ahead and refill the Anoro?

## 2017-07-12 ENCOUNTER — CLINICAL SUPPORT (OUTPATIENT)
Dept: OTHER | Facility: HOSPITAL | Age: 72
End: 2017-07-12

## 2017-07-12 ENCOUNTER — HOSPITAL ENCOUNTER (OUTPATIENT)
Dept: PET IMAGING | Facility: HOSPITAL | Age: 72
Discharge: HOME | End: 2017-07-12
Admitting: CLINICAL NURSE SPECIALIST

## 2017-07-12 VITALS
RESPIRATION RATE: 20 BRPM | SYSTOLIC BLOOD PRESSURE: 150 MMHG | WEIGHT: 202.5 LBS | HEIGHT: 70 IN | DIASTOLIC BLOOD PRESSURE: 82 MMHG | TEMPERATURE: 97 F | BODY MASS INDEX: 28.99 KG/M2 | HEART RATE: 82 BPM

## 2017-07-12 DIAGNOSIS — Z87.891 HISTORY OF SMOKING GREATER THAN 50 PACK YEARS: ICD-10-CM

## 2017-07-12 DIAGNOSIS — Z12.2 ENCOUNTER FOR SCREENING FOR LUNG CANCER: Primary | ICD-10-CM

## 2017-07-12 DIAGNOSIS — Z12.2 ENCOUNTER FOR SCREENING FOR LUNG CANCER: ICD-10-CM

## 2017-07-12 PROCEDURE — G0297 LDCT FOR LUNG CA SCREEN: HCPCS

## 2017-07-12 PROCEDURE — G0296 VISIT TO DETERM LDCT ELIG: HCPCS | Performed by: CLINICAL NURSE SPECIALIST

## 2017-07-18 ENCOUNTER — TELEPHONE (OUTPATIENT)
Dept: INTERNAL MEDICINE | Age: 72
End: 2017-07-18

## 2017-07-18 NOTE — TELEPHONE ENCOUNTER
----- Message from Neftali Amezcua MD sent at 7/18/2017 12:47 PM EDT -----  Call patient with his test result(s) and mail the results to him if MyChart is NOT active.    CT chest shows no suspicious lung nodule/mass. As expected has extensive emphysema and atherosclerosis of the aorta.   Will discuss report in detail on next visit but also discuss w/ cardiologist next visit.   Continue all current medications.

## 2017-07-18 NOTE — PROGRESS NOTES
Call patient with his test result(s) and mail the results to him if MyChart is NOT active.    CT chest shows no suspicious lung nodule/mass. As expected has extensive emphysema and atherosclerosis of the aorta.   Will discuss report in detail on next visit but also discuss w/ cardiologist next visit.   Continue all current medications.

## 2017-10-06 ENCOUNTER — OFFICE VISIT (OUTPATIENT)
Dept: INTERNAL MEDICINE | Age: 72
End: 2017-10-06

## 2017-10-06 VITALS
WEIGHT: 200 LBS | TEMPERATURE: 96.9 F | OXYGEN SATURATION: 97 % | BODY MASS INDEX: 28.63 KG/M2 | HEART RATE: 71 BPM | HEIGHT: 70 IN | DIASTOLIC BLOOD PRESSURE: 74 MMHG | SYSTOLIC BLOOD PRESSURE: 122 MMHG

## 2017-10-06 DIAGNOSIS — I48.91 ATRIAL FIBRILLATION, UNSPECIFIED TYPE (HCC): Chronic | ICD-10-CM

## 2017-10-06 DIAGNOSIS — J43.9 PULMONARY EMPHYSEMA, UNSPECIFIED EMPHYSEMA TYPE (HCC): Primary | Chronic | ICD-10-CM

## 2017-10-06 DIAGNOSIS — I10 ESSENTIAL HYPERTENSION: Chronic | ICD-10-CM

## 2017-10-06 PROCEDURE — 99214 OFFICE O/P EST MOD 30 MIN: CPT | Performed by: INTERNAL MEDICINE

## 2017-10-06 NOTE — PROGRESS NOTES
"[facility] [place]  ROSA AMEZCUA M.D.      Aj ANALIA Wohlleb / 71 y.o. / male  10/06/2017    VITALS:    /74  Pulse 71  Temp 96.9 °F (36.1 °C)  Ht 69.75\" (177.2 cm)  Wt 200 lb (90.7 kg)  SpO2 97%  BMI 28.9 kg/m2    BP Readings from Last 3 Encounters:   10/06/17 122/74   07/12/17 150/82   07/05/17 140/82     Wt Readings from Last 3 Encounters:   10/06/17 200 lb (90.7 kg)   07/12/17 202 lb 8 oz (91.9 kg)   07/05/17 201 lb 4.8 oz (91.3 kg)      Body mass index is 28.9 kg/(m^2).    CC:  Main reason(s) for today's visit: COPD      HPI:     COPD:  He is a former smoker. He denies change in breathing symptoms. Current therapy: Anoro. He tried Breo w/ Spiriiva but did not notice significant difference. Had flu shot.     Chronic essential hypertension:  Since prior visit: compliant with medication(s), checks blood pressure regularly, denies significant problems with medication(s) and SBP < 130.  Most recent in-office blood pressure readings:   BP Readings from Last 3 Encounters:   10/06/17 122/74   07/12/17 150/82   07/05/17 140/82         Patient Care Team:  Neftali Amezcua MD as PCP - General (Internal Medicine)  Neftali Amezcua MD as PCP - Family Medicine  Juan Colby MD as Consulting Physician (Cardiology)  Ankush Sky MD as Surgeon (Orthopedic Surgery)  SHANIQUA Siddiqui as Nurse Practitioner (Oncology)  ____________________________________________________________________    ASSESSMENT & PLAN:    Problem List Items Addressed This Visit     Chronic obstructive pulmonary disease - Primary (Chronic)    Relevant Medications    levocetirizine (XYZAL) 5 MG tablet    Umeclidinium-Vilanterol (ANORO ELLIPTA) 62.5-25 MCG/INH aerosol powder     Hypertension (Chronic)    Overview     Stable. Continue amlodipine, hctz, lisinopril. On metoprolol for afib.          Relevant Medications    lisinopril (PRINIVIL,ZESTRIL) 40 MG tablet    hydrochlorothiazide (MICROZIDE) 12.5 MG capsule    amLODIPine (NORVASC) 5 MG " tablet    metoprolol tartrate (LOPRESSOR) 25 MG tablet    metoprolol tartrate (LOPRESSOR) 25 MG tablet        No orders of the defined types were placed in this encounter.      Summary/Discussion:     ·     No Follow-up on file.    Future Appointments  Date Time Provider Department Center   4/13/2018 9:40 AM Juan Colby MD [facility] None       ____________________________________________________________________    REVIEW OF SYSTEMS    Review of Systems   Constitutional: Negative.    Respiratory: Negative.  Negative for wheezing. Cough: not worse. Shortness of breath: not worse.    Cardiovascular: Negative.  Negative for chest pain and palpitations.   Neurological: Negative.      PHYSICAL EXAMINATION    Physical Exam  Constitutional  No distress  Cardiovascular Rate  normal . Rhythm: regular . Heart sounds:  Normal  Pulmonary/Chest  Effort normal. Breath sounds:  Decreased BS w/o wheezing  Psychiatric  Alert. Judgment and thought content normal. Mood normal    REVIEWED DATA:    Labs:     Lab Results   Component Value Date     (L) 02/24/2017    K 4.4 02/24/2017    AST 25 02/24/2017    ALT 25 02/24/2017    BUN 14 02/24/2017    CREATININE 0.94 02/24/2017    CREATININE 0.68 (L) 08/31/2016    CREATININE 0.66 (L) 11/06/2015    EGFRIFNONA 79 02/24/2017    EGFRIFAFRI 96 02/24/2017       Lab Results   Component Value Date     (H) 02/24/2017     (H) 04/14/2015     (H) 03/30/2015       Lab Results   Component Value Date     (H) 02/24/2017    LDL 80 03/30/2015    HDL 42 02/24/2017    TRIG 119 02/24/2017    CHOLHDLRATIO 3.98 02/24/2017       Lab Results   Component Value Date    TSH 2.45 10/14/2015       Lab Results   Component Value Date    WBC 6.29 08/31/2016    HGB 15.7 08/31/2016    HGB 13.3 (L) 11/07/2015    HGB 14.6 11/06/2015     08/31/2016       Imaging:   LOW-DOSE CHEST CT WITHOUT IV CONTRAST      HISTORY: 71-year-old male. Lung cancer screening.      TECHNIQUE: Low-dose  "chest CT protocol with 2 mm intervals. Compared with  chest CTA from 08/31/2016.      FINDINGS: There are no suspicious pulmonary opacities or nodules. There  are no pleural or pericardial effusions. There is no lymphadenopathy  within the chest. Calcified right hilar nodes are noted. There is a  calcified granuloma in the right lower lobe. There are fairly extensive  emphysematous changes diffusely. Moderately extensive atherosclerotic  change at the thoracic aorta without aneurysmal dilatation.      IMPRESSION:  1. There are no suspicious pulmonary opacities or nodules. LungRADS  Category 1. Annual screening low-dose chest CT is recommended in 12  months.  2. CTDI 2.1 mGy. DLP 74 mGycm.      This report was finalized on 7/14/2017       Medical Tests:         Summary of old records / correspondence / consultant report:         Request outside records:         ALLERGIES  No Known Allergies     MEDICATIONS  Current Outpatient Prescriptions   Medication Sig Dispense Refill   • amLODIPine (NORVASC) 5 MG tablet GIVE \"JENNIFER\" 1/2 TABLET BY MOUTH DAILY 45 tablet 2   • aspirin 81 MG EC tablet Take 81 mg by mouth daily.     • hydrochlorothiazide (MICROZIDE) 12.5 MG capsule Take 1 capsule by mouth daily.     • hydrocortisone (ANUSOL-HC) 2.5 % rectal cream Apply rectally 3 times daily.  Include applicator. 30 g 1   • levocetirizine (XYZAL) 5 MG tablet Take 5 mg by mouth Every Evening.     • lisinopril (PRINIVIL,ZESTRIL) 40 MG tablet Take 1 tablet by mouth daily.     • LUMIGAN 0.01 % ophthalmic drops Administer  to both eyes every night.     • metoprolol tartrate (LOPRESSOR) 25 MG tablet TAKE 1 TABLET BY MOUTH EVERY 12 HOURS 60 tablet 2   • metoprolol tartrate (LOPRESSOR) 25 MG tablet TAKE 1 TABLET BY MOUTH EVERY 12 HOURS 180 tablet 1   • polycarbophil (FIBERCON) 625 MG tablet Take 1 tablet by mouth Daily. 200 tablet 2   • sildenafil (VIAGRA) 100 MG tablet Take 1 tablet by mouth daily as needed.     • Umeclidinium-Vilanterol " (ANORO ELLIPTA) 62.5-25 MCG/INH aerosol powder  1 puff daily 60 each 3     No current facility-administered medications for this visit.        PFSH:     The following portions of the patient's history were reviewed and updated as appropriate: Allergies / Current Medications / Past Medical History / Surgical History / Social History / Family History    PROBLEM LIST   Patient Active Problem List   Diagnosis   • Chronic obstructive pulmonary disease   • Impotence of organic origin   • Hypertension   • Osteoarthritis   • History of atrial fibrillation   • History of smoking greater than 50 pack years       PAST MEDICAL HISTORY  Past Medical History:   Diagnosis Date   • Atrial fibrillation 03/17/2016    SEES DR. BOX   • BPH (benign prostatic hypertrophy)     see doctors at [facility]   • COPD (chronic obstructive pulmonary disease)    • Depression    • DJD (degenerative joint disease) of cervical spine    • ED (erectile dysfunction)     [facility]   • Emphysema lung    • Hx of colonic polyps     followed by GI (K[facility])   • Hyperlipidemia    • Hypertension    • Influenza B 02/14/2013       • Low back pain    • Osteoarthritis     HIPS, HANDS, MULTIPLE SITES   • Rectal bleeding 04/26/2017   • Shoulder pain, left 12/04/2007    SEEN AT [facility] ER   • Substance abuse     HX OF ALCOHOL ABUSE-QUIT OVER 20 YEARS AGO   • Tendonitis of shoulder 11/07/2007    RIGHT SHOULDER/DELTOID INSERTION       SURGICAL HISTORY  Past Surgical History:   Procedure Laterality Date   • APPENDECTOMY     • CARDIOVASCULAR STRESS TEST N/A 10/20/2015    NML LEXISCAN CARDIOLITE PERFUSION STUDY, NO EVIDENCE OF ISCHEMIA, AREA OF HYPOPERFUSION OF THE INFERIOR WALL W/NORMAL WALL PERFUSION AND NML LEFT VENTRICULAR EJECTION FRACTION, MOST LIKELY ATTENUATION. DR.MICHAEL BOX   • COLONOSCOPY N/A 02/2017   • COLONOSCOPY N/A 02/23/2011    4 POLYPS REMOVED, RESCOPE IN 5 YRS, DR. 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  Smoked 2 ppd for 49 years until he quit smoking 7 years ago for a 98 pack year history.   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   • Drug use: No   • Sexual activity: Defer     Other Topics Concern   • None     Social History Narrative       FAMILY HISTORY  Family History   Problem Relation Age of Onset   • Colon cancer Mother    • Heart disease Mother    • Stroke Sister      October 2016   • Stroke Brother      September, 2016   • Coronary artery disease Father    • Hypertension Father    • Heart disease Father    • Colon cancer Maternal Grandmother    • Coronary artery disease Brother    • Heart disease Brother    • Coronary artery disease Brother          **Dragon Disclaimer:   Much of this encounter note is an electronic transcription/translation of spoken language to printed text. The electronic translation of spoken language may permit erroneous, or at times, nonsensical words or phrases to be inadvertently transcribed. Although I have reviewed the note for such errors, some may still exist.

## 2017-10-27 ENCOUNTER — TELEPHONE (OUTPATIENT)
Dept: INTERNAL MEDICINE | Age: 72
End: 2017-10-27

## 2017-10-27 NOTE — TELEPHONE ENCOUNTER
"Pt asking if he is to get the \"new updated shingle injection\" or just the regular one?  Pt's # [phone]  Thanks SP  "

## 2017-11-07 DIAGNOSIS — J43.9 PULMONARY EMPHYSEMA, UNSPECIFIED EMPHYSEMA TYPE (HCC): ICD-10-CM

## 2017-11-09 ENCOUNTER — OFFICE VISIT (OUTPATIENT)
Dept: INTERNAL MEDICINE | Age: 72
End: 2017-11-09

## 2017-11-09 VITALS
HEIGHT: 69 IN | OXYGEN SATURATION: 92 % | WEIGHT: 203 LBS | DIASTOLIC BLOOD PRESSURE: 62 MMHG | TEMPERATURE: 97.9 F | SYSTOLIC BLOOD PRESSURE: 120 MMHG | BODY MASS INDEX: 30.07 KG/M2 | HEART RATE: 92 BPM

## 2017-11-09 DIAGNOSIS — J20.9 ACUTE BRONCHITIS, UNSPECIFIED ORGANISM: Primary | ICD-10-CM

## 2017-11-09 DIAGNOSIS — J43.9 PULMONARY EMPHYSEMA, UNSPECIFIED EMPHYSEMA TYPE (HCC): Chronic | ICD-10-CM

## 2017-11-09 PROCEDURE — 99213 OFFICE O/P EST LOW 20 MIN: CPT | Performed by: NURSE PRACTITIONER

## 2017-11-09 RX ORDER — GUAIFENESIN 600 MG/1
1200 TABLET, EXTENDED RELEASE ORAL 2 TIMES DAILY
COMMUNITY
Start: 2017-11-09 | End: 2018-03-27

## 2017-11-09 RX ORDER — AZITHROMYCIN 250 MG/1
TABLET, FILM COATED ORAL
Qty: 6 TABLET | Refills: 0 | Status: SHIPPED | OUTPATIENT
Start: 2017-11-09 | End: 2018-03-27

## 2017-11-09 NOTE — PROGRESS NOTES
Subjective   Aj Salazar is a 71 y.o. male.     Cough   This is a new problem. Episode onset: 4 days ago. The problem has been gradually worsening. The cough is non-productive. Associated symptoms include chills, myalgias, a sore throat, shortness of breath and sweats. Pertinent negatives include no chest pain, ear congestion, ear pain, fever, headaches, nasal congestion, postnasal drip, rhinorrhea or wheezing. Associated symptoms comments: Upper back and chest soreness from coughing. Nothing aggravates the symptoms. Treatments tried: Anoro inhaler. The treatment provided no relief. His past medical history is significant for bronchitis and COPD. There is no history of asthma or pneumonia.        The following portions of the patient's history were reviewed and updated as appropriate: allergies, current medications, past family history, past medical history, past social history, past surgical history and problem list.    Review of Systems   Constitutional: Positive for chills. Negative for fever.   HENT: Positive for sore throat. Negative for congestion, ear pain, postnasal drip and rhinorrhea.    Respiratory: Positive for cough, chest tightness and shortness of breath. Negative for wheezing.    Cardiovascular: Negative for chest pain and leg swelling.   Gastrointestinal: Negative for diarrhea, nausea and vomiting.   Musculoskeletal: Positive for arthralgias and myalgias.   Neurological: Negative for headaches.       Objective   Physical Exam   Constitutional: He is oriented to person, place, and time. Vital signs are normal. He appears well-developed and well-nourished. He is cooperative. He does not appear ill. No distress.   Cardiovascular: Normal rate, regular rhythm, S1 normal, S2 normal and normal heart sounds.    No murmur heard.  No peripheral edema noted    Pulmonary/Chest: Effort normal. He has decreased breath sounds (throughout). He has no wheezes. He has no rhonchi. He has no rales.   Neurological:  He is alert and oriented to person, place, and time.   Skin: Skin is warm, dry and intact.   Psychiatric: He has a normal mood and affect. His speech is normal and behavior is normal. Judgment and thought content normal. Cognition and memory are normal.   Nursing note and vitals reviewed.      Assessment/Plan   Problems Addressed this Visit        Respiratory    Chronic obstructive pulmonary disease (Chronic)    Relevant Medications    guaiFENesin (MUCINEX) 600 MG 12 hr tablet      Other Visit Diagnoses     Acute bronchitis, unspecified organism    -  Primary    Relevant Medications    azithromycin (ZITHROMAX Z-JEROMY) 250 MG tablet    guaiFENesin (MUCINEX) 600 MG 12 hr tablet        1. Acute bronchitis, unspecified organism  Discussed with patient that likely viral syndrome/ viral bronchitis. However, with his history of COPD and lower than baseline O2 sats, will treat for possible bacterial bronchitis. No active wheezing on exam. Discussed use of mucolytic in combination with antibiotic, follow up if no improvement Or worsening of symptoms, including productive cough, fever, worsening chest or back pain.    - azithromycin (ZITHROMAX Z-JEROMY) 250 MG tablet; Take 2 tablets the first day, then 1 tablet daily for 4 days.  Dispense: 6 tablet; Refill: 0  - guaiFENesin (MUCINEX) 600 MG 12 hr tablet; Take 2 tablets by mouth 2 (Two) Times a Day.    2. Pulmonary emphysema, unspecified emphysema type

## 2017-11-09 NOTE — PATIENT INSTRUCTIONS
Acute Bronchitis  Bronchitis is inflammation of the airways that extend from the windpipe into the lungs (bronchi). The inflammation often causes mucus to develop. This leads to a cough, which is the most common symptom of bronchitis.   In acute bronchitis, the condition usually develops suddenly and goes away over time, usually in a couple weeks. Smoking, allergies, and asthma can make bronchitis worse. Repeated episodes of bronchitis may cause further lung problems.   CAUSES  Acute bronchitis is most often caused by the same virus that causes a cold. The virus can spread from person to person (contagious) through coughing, sneezing, and touching contaminated objects.  SIGNS AND SYMPTOMS   · Cough.    · Fever.    · Coughing up mucus.    · Body aches.    · Chest congestion.    · Chills.    · Shortness of breath.    · Sore throat.    DIAGNOSIS   Acute bronchitis is usually diagnosed through a physical exam. Your health care provider will also ask you questions about your medical history. Tests, such as chest X-rays, are sometimes done to rule out other conditions.   TREATMENT   Acute bronchitis usually goes away in a couple weeks. Oftentimes, no medical treatment is necessary. Medicines are sometimes given for relief of fever or cough. Antibiotic medicines are usually not needed but may be prescribed in certain situations. In some cases, an inhaler may be recommended to help reduce shortness of breath and control the cough. A cool mist vaporizer may also be used to help thin bronchial secretions and make it easier to clear the chest.   HOME CARE INSTRUCTIONS  · Get plenty of rest.    · Drink enough fluids to keep your urine clear or pale yellow (unless you have a medical condition that requires fluid restriction). Increasing fluids may help thin your respiratory secretions (sputum) and reduce chest congestion, and it will prevent dehydration.    · Take medicines only as directed by your health care provider.  · If  you were prescribed an antibiotic medicine, finish it all even if you start to feel better.  · Avoid smoking and secondhand smoke. Exposure to cigarette smoke or irritating chemicals will make bronchitis worse. If you are a smoker, consider using nicotine gum or skin patches to help control withdrawal symptoms. Quitting smoking will help your lungs heal faster.    · Reduce the chances of another bout of acute bronchitis by washing your hands frequently, avoiding people with cold symptoms, and trying not to touch your hands to your mouth, nose, or eyes.    · Keep all follow-up visits as directed by your health care provider.    SEEK MEDICAL CARE IF:  Your symptoms do not improve after 1 week of treatment.   SEEK IMMEDIATE MEDICAL CARE IF:  · You develop an increased fever or chills.    · You have chest pain.    · You have severe shortness of breath.  · You have bloody sputum.    · You develop dehydration.  · You faint or repeatedly feel like you are going to pass out.  · You develop repeated vomiting.  · You develop a severe headache.  MAKE SURE YOU:   · Understand these instructions.  · Will watch your condition.  · Will get help right away if you are not doing well or get worse.     This information is not intended to replace advice given to you by your health care provider. Make sure you discuss any questions you have with your health care provider.     Document Released: 01/25/2006 Document Revised: 01/08/2016 Document Reviewed: 06/10/2014  [employer] Interactive Patient Education ©2017 [employer]

## 2017-11-26 DIAGNOSIS — I10 ESSENTIAL HYPERTENSION: ICD-10-CM

## 2017-11-27 RX ORDER — AMLODIPINE BESYLATE 5 MG/1
2.5 TABLET ORAL DAILY
Qty: 45 TABLET | Refills: 1 | Status: SHIPPED | OUTPATIENT
Start: 2017-11-27 | End: 2018-05-16 | Stop reason: SDUPTHER

## 2017-12-28 ENCOUNTER — TELEPHONE (OUTPATIENT)
Dept: INTERNAL MEDICINE | Age: 72
End: 2017-12-28

## 2017-12-28 DIAGNOSIS — J43.9 PULMONARY EMPHYSEMA, UNSPECIFIED EMPHYSEMA TYPE (HCC): Primary | Chronic | ICD-10-CM

## 2017-12-28 RX ORDER — ALBUTEROL SULFATE 90 UG/1
2 AEROSOL, METERED RESPIRATORY (INHALATION) EVERY 6 HOURS PRN
Qty: 18 G | Refills: 2 | Status: SHIPPED | OUTPATIENT
Start: 2017-12-28 | End: 2018-07-24 | Stop reason: ALTCHOICE

## 2017-12-28 NOTE — TELEPHONE ENCOUNTER
Pt called requesting a new script to be sent to his [facility] pharmacy for his Ventolin HFA Inhaler.  [facility] pharmacy  Pt's # [phone]  Thanks SP

## 2018-01-03 DIAGNOSIS — J43.9 PULMONARY EMPHYSEMA, UNSPECIFIED EMPHYSEMA TYPE (HCC): ICD-10-CM

## 2018-02-06 DIAGNOSIS — J43.9 PULMONARY EMPHYSEMA, UNSPECIFIED EMPHYSEMA TYPE (HCC): ICD-10-CM

## 2018-03-08 DIAGNOSIS — J43.9 PULMONARY EMPHYSEMA, UNSPECIFIED EMPHYSEMA TYPE (HCC): ICD-10-CM

## 2018-03-27 ENCOUNTER — OFFICE VISIT (OUTPATIENT)
Dept: INTERNAL MEDICINE | Age: 73
End: 2018-03-27

## 2018-03-27 VITALS
TEMPERATURE: 97.8 F | DIASTOLIC BLOOD PRESSURE: 74 MMHG | OXYGEN SATURATION: 96 % | SYSTOLIC BLOOD PRESSURE: 128 MMHG | WEIGHT: 199 LBS | BODY MASS INDEX: 29.47 KG/M2 | HEIGHT: 69 IN | HEART RATE: 64 BPM

## 2018-03-27 DIAGNOSIS — N41.9 PROSTATITIS, UNSPECIFIED PROSTATITIS TYPE: ICD-10-CM

## 2018-03-27 DIAGNOSIS — J43.9 PULMONARY EMPHYSEMA, UNSPECIFIED EMPHYSEMA TYPE (HCC): Chronic | ICD-10-CM

## 2018-03-27 DIAGNOSIS — N41.9 PROSTATITIS, UNSPECIFIED PROSTATITIS TYPE: Primary | ICD-10-CM

## 2018-03-27 DIAGNOSIS — Z12.5 ENCOUNTER FOR SCREENING FOR MALIGNANT NEOPLASM OF PROSTATE: ICD-10-CM

## 2018-03-27 PROCEDURE — 99214 OFFICE O/P EST MOD 30 MIN: CPT | Performed by: INTERNAL MEDICINE

## 2018-03-27 RX ORDER — CIPROFLOXACIN 500 MG/1
500 TABLET, FILM COATED ORAL EVERY 12 HOURS SCHEDULED
Qty: 20 TABLET | Refills: 0 | Status: SHIPPED | OUTPATIENT
Start: 2018-03-27 | End: 2018-04-06

## 2018-03-27 RX ORDER — TAMSULOSIN HYDROCHLORIDE 0.4 MG/1
1 CAPSULE ORAL NIGHTLY
Qty: 90 CAPSULE | Refills: 1 | Status: SHIPPED | OUTPATIENT
Start: 2018-03-27 | End: 2018-07-24

## 2018-03-27 RX ORDER — TAMSULOSIN HYDROCHLORIDE 0.4 MG/1
1 CAPSULE ORAL NIGHTLY
Qty: 30 CAPSULE | Refills: 1 | Status: SHIPPED | OUTPATIENT
Start: 2018-03-27 | End: 2018-03-27 | Stop reason: SDUPTHER

## 2018-03-27 NOTE — ASSESSMENT & PLAN NOTE
Check UA with culture, PSA level. Cipro 500 mg BID x 10 day, tamsulosin 0.4 mg qHS. Instructed Aj to go to the emergency department if acutely worse/cannot urinate.  He expressed understanding.

## 2018-03-27 NOTE — PROGRESS NOTES
"[facility] INTERNAL MEDICINE  ROSA LOPEZ M.D.      Aj Pattonb / 72 y.o. / male  03/27/2018      MEDICATIONS  Current Outpatient Prescriptions   Medication Sig Dispense Refill   • albuterol (PROVENTIL HFA;VENTOLIN HFA) 108 (90 Base) MCG/ACT inhaler Inhale 2 puffs Every 6 (Six) Hours As Needed for Wheezing or Shortness of Air. 18 g 2   • amLODIPine (NORVASC) 5 MG tablet Take 0.5 tablets by mouth Daily. 45 tablet 1   • ANORO ELLIPTA 62.5-25 MCG/INH aerosol powder  inhaler INHALE 1 PUFF BY MOUTH DAILY 60 each 2   • aspirin 81 MG EC tablet Take 81 mg by mouth daily.     • hydrochlorothiazide (MICROZIDE) 12.5 MG capsule Take 1 capsule by mouth daily.     • hydrocortisone (ANUSOL-HC) 2.5 % rectal cream Apply rectally 3 times daily.  Include applicator. 30 g 1   • levocetirizine (XYZAL) 5 MG tablet Take 5 mg by mouth Every Evening.     • LUMIGAN 0.01 % ophthalmic drops Administer  to both eyes every night.     • metoprolol tartrate (LOPRESSOR) 25 MG tablet TAKE 1 TABLET BY MOUTH EVERY 12 HOURS 180 tablet 0   • polycarbophil (FIBERCON) 625 MG tablet Take 1 tablet by mouth Daily. 200 tablet 2         VITALS    Visit Vitals  /74   Pulse 64   Temp 97.8 °F (36.6 °C)   Ht 175.3 cm (69.02\")   Wt 90.3 kg (199 lb)   SpO2 96%   BMI 29.37 kg/m²       BP Readings from Last 3 Encounters:   03/27/18 128/74   11/09/17 120/62   10/06/17 122/74     Wt Readings from Last 3 Encounters:   03/27/18 90.3 kg (199 lb)   11/09/17 92.1 kg (203 lb)   10/06/17 90.7 kg (200 lb)      Body mass index is 29.37 kg/m².      CC:  Main reason(s) for today's visit: COPD; Hypertension; Difficulty Urinating; and Shoulder Injury (x 3 months ago)      HPI:     Complains of 2 weeks history of significant difficulty urinating.  He has to wake up in the middle night to go to the restroom multiple times but has difficulty with hesitancy and incomplete voidance of his bladder.  Denies any dysuria or gross hematuria, fever or chills, flank pain.  Denies prior " history of urinary difficulties.    COPD: Complains of ongoing dyspnea in spite of using Anoro inhaler.  Patient is a former smoker and does not smoke currently.  He has albuterol inhaler which helps when used.  Denies increasing cough, sputum    Patient Care Team:  Neftali Amezcua MD as PCP - General (Internal Medicine)  Juan Colby MD as Consulting Physician (Cardiology)  Ankush Sky MD as Surgeon (Orthopedic Surgery)  SHANIQUA Siddiqui as Nurse Practitioner (Oncology)  ____________________________________________________________________    ASSESSMENT & PLAN:    Problem List Items Addressed This Visit        High    Prostatitis - Primary    Current Assessment & Plan     Check UA with culture, PSA level. Cipro 500 mg BID x 10 day, tamsulosin 0.4 mg qHS. Instructed Aj to go to the emergency department if acutely worse/cannot urinate.  He expressed understanding.          Relevant Medications    ciprofloxacin (CIPRO) 500 MG tablet    tamsulosin (FLOMAX) 0.4 MG capsule 24 hr capsule    Other Relevant Orders    PSA Screen    Urinalysis With / Culture If Indicated - Urine, Clean Catch       Medium    Chronic obstructive pulmonary disease (Chronic)    Current Assessment & Plan     Uncontrolled. Sample Bevespi 1 puff BID. Continue albuterol HFA inhaler prn.          Relevant Medications    levocetirizine (XYZAL) 5 MG tablet    albuterol (PROVENTIL HFA;VENTOLIN HFA) 108 (90 Base) MCG/ACT inhaler    ANORO ELLIPTA 62.5-25 MCG/INH aerosol powder  inhaler    Glycopyrrolate-Formoterol (BEVESPI AEROSPHERE) 9-4.8 MCG/ACT aerosol      Other Visit Diagnoses     Encounter for screening for malignant neoplasm of prostate         Relevant Orders    PSA Screen           Summary/Discussion:      Return in about 1 month (around 4/27/2018) for prostatitis.    Future Appointments  Date Time Provider Department Center   4/13/2018 9:40 AM MD MARK Silva CD [facility] None   4/30/2018 3:40 PM MD MARK Carrillo [facility]  None     ____________________________________________________________________    REVIEW OF SYSTEMS    Review of Systems   Constitutional: Negative for chills and fever.   Respiratory: Positive for shortness of breath. Negative for cough, chest tightness and stridor.    Gastrointestinal:        Fecal seepage (previously seen by colorectal)   Genitourinary: Positive for decreased urine volume, difficulty urinating and frequency. Negative for discharge, dysuria, flank pain and hematuria.   Musculoskeletal: Negative for back pain.         PHYSICAL EXAMINATION    Physical Exam   Constitutional: No distress.   Pulmonary/Chest: Effort normal. No respiratory distress. He has no wheezes. He has no rales.   Abdominal: He exhibits no distension.   Genitourinary: Prostate is enlarged (mildly boggy and tender).         REVIEWED DATA:    Labs:   No results found for: PSA     Imaging:        Medical Tests:        Summary of old records / correspondence / consultant report:        Request outside records:       ALLERGIES  No Known Allergies     PFSH:     The following portions of the patient's history were reviewed and updated as appropriate: Allergies / Current Medications / Past Medical History / Surgical History / Social History / Family History    PROBLEM LIST   Patient Active Problem List   Diagnosis   • Chronic obstructive pulmonary disease   • Impotence of organic origin   • Hypertension   • Osteoarthritis   • Atrial fibrillation   • History of smoking greater than 50 pack years   • Prostatitis       PAST MEDICAL HISTORY  Past Medical History:   Diagnosis Date   • Atrial fibrillation 03/17/2016    SEES DR. BOX   • BPH (benign prostatic hypertrophy)     see doctors at [facility]   • COPD (chronic obstructive pulmonary disease)    • Depression    • DJD (degenerative joint disease) of cervical spine    • ED (erectile dysfunction)     [facility]   • Emphysema lung    • Hx of colonic polyps     followed by GI ([facility])   •  Hyperlipidemia    • Hypertension    • Influenza B 02/14/2013       • Low back pain    • Osteoarthritis     HIPS, HANDS, MULTIPLE SITES   • Rectal bleeding 04/26/2017   • Shoulder pain, left 12/04/2007    SEEN AT [facility] ER   • Substance abuse     HX OF ALCOHOL ABUSE-QUIT OVER 20 YEARS AGO   • Tendonitis of shoulder 11/07/2007    RIGHT SHOULDER/DELTOID INSERTION       SURGICAL HISTORY  Past Surgical History:   Procedure Laterality Date   • APPENDECTOMY     • CARDIOVASCULAR STRESS TEST N/A 10/20/2015    NML LEXISCAN CARDIOLITE PERFUSION STUDY, NO EVIDENCE OF ISCHEMIA, AREA OF HYPOPERFUSION OF THE INFERIOR WALL W/NORMAL WALL PERFUSION AND NML LEFT VENTRICULAR EJECTION FRACTION, MOST LIKELY ATTENUATION. DR.MICHAEL BOX   • COLONOSCOPY N/A 02/2017   • COLONOSCOPY N/A 02/23/2011    4 POLYPS REMOVED, RESCOPE IN 5 YRS, DR. 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  Smoked 2 ppd for 49 years until he quit smoking 7 years ago for a 98 pack year history.   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   • Drug use: No   • Sexual activity: Defer     Other Topics Concern   • Not on file       FAMILY HISTORY  Family History   Problem Relation Age of Onset   • Colon  cancer Mother    • Heart disease Mother    • Stroke Sister      October 2016   • Stroke Brother      September, 2016   • Coronary artery disease Father    • Hypertension Father    • Heart disease Father    • Colon cancer Maternal Grandmother    • Coronary artery disease Brother    • Heart disease Brother    • Coronary artery disease Brother          **Nelsonon Disclaimer:   Much of this encounter note is an electronic transcription/translation of spoken language to printed text. The electronic translation of spoken language may permit erroneous, or at times, nonsensical words or phrases to be inadvertently transcribed. Although I have reviewed the note for such errors, some may still exist.

## 2018-03-30 LAB
APPEARANCE UR: ABNORMAL
BACTERIA #/AREA URNS HPF: ABNORMAL /HPF
BACTERIA UR CULT: ABNORMAL
BACTERIA UR CULT: ABNORMAL
BILIRUB UR QL STRIP: NEGATIVE
COLOR UR: YELLOW
EPI CELLS #/AREA URNS HPF: ABNORMAL /HPF
GLUCOSE UR QL: NEGATIVE
HGB UR QL STRIP: ABNORMAL
KETONES UR QL STRIP: NEGATIVE
LEUKOCYTE ESTERASE UR QL STRIP: ABNORMAL
MICRO URNS: ABNORMAL
NITRITE UR QL STRIP: POSITIVE
OTHER ANTIBIOTIC SUSC ISLT: ABNORMAL
PH UR STRIP: 5.5 [PH] (ref 5–7.5)
PROT UR QL STRIP: ABNORMAL
PSA SERPL-MCNC: 9.65 NG/ML (ref 0–4)
RBC #/AREA URNS HPF: ABNORMAL /HPF
SP GR UR: 1.02 (ref 1–1.03)
URINALYSIS REFLEX: ABNORMAL
UROBILINOGEN UR STRIP-MCNC: 0.2 MG/DL (ref 0.2–1)
WBC #/AREA URNS HPF: >30 /HPF

## 2018-03-31 NOTE — PROGRESS NOTES
[facility] Low-Dose Lung Cancer CT Screening Visit    Aj Salazar is a pleasant 72 y.o.  male seen today at the request of Dr. Amezcua in our Multidisciplinary Clinic, being seen for Lung Cancer Screening Counseling and a Shared Decision Making Visit prior to Low-Dose Lung Cancer Screening CT exam.    SMOKING HISTORY:   History   Smoking Status   • Former Smoker   • Packs/day: 2.00   • Years: 49.00   • Quit date: 1/1/2010   Smokeless Tobacco   • Never Used     Comment: Began Smoking age 15.  Smoked 2 ppd for 49 years until he quit smoking 7 years ago for a 98 pack year history.       Aj Salazar is a former smoker quitting 7 years ago with a 98 pack year history.  Reports no use of alternate forms of tobacco, electronic cigarettes, marijuana or other substances.  Based on the recommendation of the [place] Preventive Services Task Force, this patient is at high risk for lung cancer and a low-dose CT screening scan is recommended.     We discussed the connection between Radon and Lung Cancer and the availability of free test kits.  The patient reports NO known occupational exposure to asbestos or other chemical hazards.  No current second-hand exposures to smoke.    The patient has had no hemoptysis, unintentional weight loss or increasing shortness of breath. The patient is asymptomatic and has no signs or symptoms of lung cancer.   He does have COPD.  The patient reports NO personal history of cancer.  Additionally, he reports NO family history of lung cancer.    Together we discussed the potential benefits and potential harms of being screened for lung cancer including the potential for follow-up diagnostic testing, risk for over diagnosis, false positive rate and total radiation exposure using the [facility] standardized decision aid.  We reviewed the patient's smoking history and counseled on the importance and health benefits of maintaining cigarette smoking abstinence.       Smoking Abstinence  DISCUSSION HELD TODAY:     We discussed that there are a number of resources and tobacco cessation interventions to assist with smoking cessation including the 1-800-Quit Now line, Health Department programs, [place] [facility] resources, and use of the U.S. Department of Health and Human Services five keys for quitting and quit plan worksheet. On a scale of zero to ten, the patient rates their motivation to stay quit at a 10 out of 10 today.  The patient is confident that he will never smoke in the future.      Recommendations for continued lung cancer screening:      We discussed the NCCN guidelines for lung cancer screening and the patient verbalized understanding that annual screening is recommended until fifteen years beyond smoking as long as they have no other disease or comorbidity that would prevent them from receiving cancer treatments such as surgery should a lung cancer be detected. The [facility] Lung Cancer Screening Shared Decision-Making Tool was utilized as an aid in discussing whether or not screening was right. The patient has decided to proceed with a Low Dose Lung Cancer Screening CT today. The patient is aware that the results of his screening will be shared with the referring provider or PCP as well.       The patient verbalizes understanding that results of this low dose lung CT exam will be called and that assistance will be provided in arranging any necessary follow-up.    After review of the NCCN guidelines and recommendations for ongoing screening, the patient verbalized understanding of recommendations for follow-up. We discussed the importance of adherence to continued annual screening until 15 years beyond smoking or until other life-limiting comorbidities are present. We discussed the impact of comorbidities and ability and willing to undergo diagnosis and treatment.    Vale Del Rosario, MSN, APRN, ACNS-BC, AOCN, CHPN  Clinical Nurse  Specialist  [facility]

## 2018-04-13 ENCOUNTER — OFFICE VISIT (OUTPATIENT)
Dept: CARDIOLOGY | Facility: CLINIC | Age: 73
End: 2018-04-13

## 2018-04-13 VITALS
DIASTOLIC BLOOD PRESSURE: 74 MMHG | HEART RATE: 84 BPM | BODY MASS INDEX: 28.92 KG/M2 | SYSTOLIC BLOOD PRESSURE: 120 MMHG | WEIGHT: 202 LBS | HEIGHT: 70 IN

## 2018-04-13 DIAGNOSIS — J43.9 PULMONARY EMPHYSEMA, UNSPECIFIED EMPHYSEMA TYPE (HCC): ICD-10-CM

## 2018-04-13 DIAGNOSIS — I48.0 PAROXYSMAL ATRIAL FIBRILLATION (HCC): Primary | ICD-10-CM

## 2018-04-13 DIAGNOSIS — I10 ESSENTIAL HYPERTENSION: ICD-10-CM

## 2018-04-13 PROCEDURE — 93000 ELECTROCARDIOGRAM COMPLETE: CPT | Performed by: INTERNAL MEDICINE

## 2018-04-13 PROCEDURE — 99214 OFFICE O/P EST MOD 30 MIN: CPT | Performed by: INTERNAL MEDICINE

## 2018-04-13 RX ORDER — DABIGATRAN ETEXILATE 150 MG/1
150 CAPSULE ORAL EVERY 12 HOURS SCHEDULED
Qty: 60 CAPSULE | Refills: 11 | Status: SHIPPED | OUTPATIENT
Start: 2018-04-13 | End: 2018-10-29 | Stop reason: SDUPTHER

## 2018-04-13 NOTE — PROGRESS NOTES
Date of Office Visit: 18  Encounter Provider: Juan Colby MD  Place of Service: [facility] CARDIOLOGY  Patient Name: Aj Salazar  :1945  Referral Provider:Neftali Amezcua MD      Chief Complaint   Patient presents with   • Atrial Fibrillation     History of Present Illness  The patient is a 72-year-old gentleman with no real prior history of heart disease but he  presented in 10/2014 with hip replacement surgery and a transient episode of atrial  fibrillation. As part of that evaluation he had an echocardiogram that showed normal LV  systolic function and no significant valvular disease. He also had a perfusion stress  test done that showed no evidence of ischemia. It was decided to not treat him any  further for that.  He then presented to [facility] emergency room in 2016.  Was having atypical chest pain.  He had a perfusion stress test that was normal.  Felt that it was musculoskeletal started on ibuprofen.  We reviewed those records.  He now comes in for follow-up. The patient denies chest pain, pressure and heaviness.  He does have shortness of breath gradually getting worse with activity, however he walks 25 minutes 4-5 days a week, no othopnea or PND. No palpitations, near syncope or syncope. No stroke type symptoms like paralysis, paresthesia, abrupt vision loss and dysarthria. No bleeding like blood in the stool or dark stools.   Overall he feels like his lung disease is getting worse but he's still trying to exercise and be active.      Hypertension   This is a recurrent problem. The current episode started more than 1 year ago. The problem has been gradually improving since onset. The problem is controlled. Associated symptoms include malaise/fatigue and neck pain. Pertinent negatives include no anxiety, blurred vision, chest pain, headaches, orthopnea, palpitations, peripheral edema, PND or sweats. Agents associated with  hypertension include NSAIDs. Compliance problems include exercise.    Atrial Fibrillation   Symptoms are negative for chest pain, dizziness, palpitations and weakness. Past medical history includes atrial fibrillation.         Past Medical History:   Diagnosis Date   • Atrial fibrillation 03/17/2016    SEES DR. BOX   • BPH (benign prostatic hypertrophy)     see doctors at [facility]   • COPD (chronic obstructive pulmonary disease)    • Depression    • DJD (degenerative joint disease) of cervical spine    • ED (erectile dysfunction)     [facility]   • Emphysema lung    • Hx of colonic polyps     followed by GI ([facility])   • Hyperlipidemia    • Hypertension    • Influenza B 02/14/2013       • Low back pain    • Osteoarthritis     HIPS, HANDS, MULTIPLE SITES   • Rectal bleeding 04/26/2017   • Shoulder pain, left 12/04/2007    SEEN AT [facility] ER   • Substance abuse     HX OF ALCOHOL ABUSE-QUIT OVER 20 YEARS AGO   • Tendonitis of shoulder 11/07/2007    RIGHT SHOULDER/DELTOID INSERTION         Past Surgical History:   Procedure Laterality Date   • APPENDECTOMY     • CARDIOVASCULAR STRESS TEST N/A 10/20/2015    NML LEXISCAN CARDIOLITE PERFUSION STUDY, NO EVIDENCE OF ISCHEMIA, AREA OF HYPOPERFUSION OF THE INFERIOR WALL W/NORMAL WALL PERFUSION AND NML LEFT VENTRICULAR EJECTION FRACTION, MOST LIKELY ATTENUATION. DR.MICHAEL BOX   • COLONOSCOPY N/A 02/2017   • COLONOSCOPY N/A 02/23/2011    4 POLYPS REMOVED, RESCOPE IN 5 YRS, DR. EAGLE   • COLONOSCOPY N/A 03/08/2006    ERYTHEMOUS AND GRANULAR MUCOSA IN ILEOCECAL VALVE, NORMAL COLON, REPEAT IN 5 YRS,    • HERNIA REPAIR Bilateral     INGUINAL   • JOINT REPLACEMENT  11/2015    left hip   • TOTAL HIP ARTHROPLASTY Left 11/06/2015    Dr Ankush Sky   • TOTAL HIP ARTHROPLASTY Right 10/27/2014    DR.RIED SKY   • VASECTOMY           Current Outpatient Prescriptions on File Prior to Visit   Medication Sig Dispense Refill   • albuterol (PROVENTIL  HFA;VENTOLIN HFA) 108 (90 Base) MCG/ACT inhaler Inhale 2 puffs Every 6 (Six) Hours As Needed for Wheezing or Shortness of Air. 18 g 2   • amLODIPine (NORVASC) 5 MG tablet Take 0.5 tablets by mouth Daily. 45 tablet 1   • ANORO ELLIPTA 62.5-25 MCG/INH aerosol powder  inhaler INHALE 1 PUFF BY MOUTH DAILY 60 each 2   • aspirin 81 MG EC tablet Take 81 mg by mouth daily.     • Glycopyrrolate-Formoterol (BEVESPI AEROSPHERE) 9-4.8 MCG/ACT aerosol Inhale 1 spray 2 (Two) Times a Day. 2 inhaler 0   • hydrochlorothiazide (MICROZIDE) 12.5 MG capsule Take 1 capsule by mouth daily.     • hydrocortisone (ANUSOL-HC) 2.5 % rectal cream Apply rectally 3 times daily.  Include applicator. 30 g 1   • levocetirizine (XYZAL) 5 MG tablet Take 5 mg by mouth Every Evening.     • LUMIGAN 0.01 % ophthalmic drops Administer  to both eyes every night.     • metoprolol tartrate (LOPRESSOR) 25 MG tablet TAKE 1 TABLET BY MOUTH EVERY 12 HOURS 180 tablet 0   • polycarbophil (FIBERCON) 625 MG tablet Take 1 tablet by mouth Daily. 200 tablet 2   • tamsulosin (FLOMAX) 0.4 MG capsule 24 hr capsule TAKE 1 CAPSULE BY MOUTH EVERY NIGHT 90 capsule 1     No current facility-administered medications on file prior to visit.          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  Smoked 2 ppd for 49 years until he quit smoking 7 years ago for a 98 pack year history.   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   • Drug use: No   • Sexual activity: Defer     Other Topics Concern   • Not on file     Social History Narrative   • No narrative on file       Family  History   Problem Relation Age of Onset   • Colon cancer Mother    • Heart disease Mother    • Stroke Sister      October 2016   • Stroke Brother      September, 2016   • Coronary artery disease Father    • Hypertension Father    • Heart disease Father    • Colon cancer Maternal Grandmother    • Coronary artery disease Brother    • Heart disease Brother    • Coronary artery disease Brother    • Prostate cancer Neg Hx          Review of Systems   Constitution: Positive for malaise/fatigue. Negative for decreased appetite, diaphoresis, fever, weakness, weight gain and weight loss.   HENT: Negative for congestion, hearing loss, nosebleeds and tinnitus.    Eyes: Negative for blurred vision, double vision, vision loss in left eye, vision loss in right eye and visual disturbance.   Cardiovascular: Negative for chest pain, orthopnea, palpitations and paroxysmal nocturnal dyspnea.        As noted in HPI   Respiratory:        As noted HPI   Endocrine: Negative for cold intolerance and heat intolerance.   Hematologic/Lymphatic: Negative for bleeding problem. Does not bruise/bleed easily.   Skin: Negative for color change, flushing, itching and rash.   Musculoskeletal: Positive for neck pain. Negative for arthritis, back pain, joint pain, joint swelling, muscle weakness and myalgias.   Gastrointestinal: Negative for bloating, abdominal pain, constipation, diarrhea, dysphagia, heartburn, hematemesis, hematochezia, melena, nausea and vomiting.   Genitourinary: Negative for bladder incontinence, dysuria, frequency, nocturia and urgency.   Neurological: Negative for dizziness, focal weakness, headaches, light-headedness, loss of balance, numbness, paresthesias and vertigo.   Psychiatric/Behavioral: Negative for depression, memory loss and substance abuse.       Procedures      ECG 12 Lead  Date/Time: 4/13/2018 10:12 AM  Performed by: SANDRITA BOX  Authorized by: SANDRITA BOX   Comparison: compared with previous ECG  "  Comparison to previous ECG: Afib is new  Rhythm: atrial fibrillation  Rate: normal  QRS axis: normal                  Objective:    Ht 177.8 cm (70\")        Physical Exam  Physical Exam   Constitutional: He is oriented to person, place, and time. He appears well-developed and well-nourished. No distress.   HENT:   Head: Normocephalic.   Eyes: Conjunctivae are normal. Pupils are equal, round, and reactive to light. No scleral icterus.   Neck: Normal carotid pulses, no hepatojugular reflux and no JVD present. Carotid bruit is not present. No tracheal deviation, no edema and no erythema present. No thyromegaly present.   Cardiovascular: Normal rate, S1 normal, S2 normal, normal heart sounds and intact distal pulses.  An irregularly irregular rhythm present.  No extrasystoles are present. PMI is not displaced.  Exam reveals no gallop, no distant heart sounds and no friction rub.    No murmur heard.  Pulses:       Carotid pulses are 2+ on the right side, and 2+ on the left side.       Radial pulses are 2+ on the right side, and 2+ on the left side.        Femoral pulses are 2+ on the right side, and 2+ on the left side.       Dorsalis pedis pulses are 2+ on the right side, and 2+ on the left side.        Posterior tibial pulses are 2+ on the right side, and 2+ on the left side.   Pulmonary/Chest: Effort normal. No respiratory distress. He has decreased breath sounds in the right lower field and the left lower field. He has no wheezes. He has no rhonchi. He has no rales. He exhibits no tenderness.   Abdominal: Soft. Bowel sounds are normal. He exhibits no distension and no mass. There is no hepatosplenomegaly. There is no tenderness. There is no rebound and no guarding.   Musculoskeletal: He exhibits no edema, tenderness or deformity.   Neurological: He is alert and oriented to person, place, and time.   Skin: Skin is warm and dry. No rash noted. He is not diaphoretic. No cyanosis or erythema. No pallor. Nails show no " clubbing.   Psychiatric: He has a normal mood and affect. His speech is normal and behavior is normal. Judgment and thought content normal.           Assessment:   1. This is a 71-year-old gentleman with a history of paroxysmal atrial fibrillation but it was only after a time of stress and surgery.   Atrial Fibrillation and Atrial Flutter  Assessment  • The patient has persistent atrial fibrillation  • This is non-valvular in etiology  • The patient's CHADS2-VASc score is 2  • A MIN1UY2-LAHi score of 2 or more is considered a high risk for a thromboembolic event  • Aspirin prescribed    Plan  • Attempt to maintain sinus rhythm  • Add dabigatran for antithrombotic therapy  • Continue beta blocker for rate control  He is back in atrial fibrillation now however his rate is controlled.  He is high risk for embolic event were starting him on Pradaxa 150 mg twice a day.  He's to have a BMP performed today to make sure his renal function is okay.  He'll see our nurse practitioner in a couple months we'll see me in one year if he is stable.      2. Hypertension. Stable on current medical regimen.   3.  COPD. he still has his dyspnea which I believe is due to his lung disease or could be from his atrial fibrillation however his rate is controlled and he had no other symptoms as outlined above.       Plan:

## 2018-04-30 ENCOUNTER — OFFICE VISIT (OUTPATIENT)
Dept: INTERNAL MEDICINE | Age: 73
End: 2018-04-30

## 2018-04-30 VITALS
BODY MASS INDEX: 28.66 KG/M2 | TEMPERATURE: 98.2 F | OXYGEN SATURATION: 98 % | SYSTOLIC BLOOD PRESSURE: 130 MMHG | WEIGHT: 200.2 LBS | HEART RATE: 75 BPM | HEIGHT: 70 IN | DIASTOLIC BLOOD PRESSURE: 78 MMHG

## 2018-04-30 DIAGNOSIS — J43.9 PULMONARY EMPHYSEMA, UNSPECIFIED EMPHYSEMA TYPE (HCC): Chronic | ICD-10-CM

## 2018-04-30 DIAGNOSIS — N41.9 PROSTATITIS, UNSPECIFIED PROSTATITIS TYPE: Primary | ICD-10-CM

## 2018-04-30 DIAGNOSIS — R97.20 ELEVATED PSA: ICD-10-CM

## 2018-04-30 PROCEDURE — 99214 OFFICE O/P EST MOD 30 MIN: CPT | Performed by: INTERNAL MEDICINE

## 2018-04-30 RX ORDER — BRIMONIDINE TARTRATE 0.1 %
DROPS OPHTHALMIC (EYE)
Refills: 6 | COMMUNITY
Start: 2018-04-19 | End: 2018-05-22

## 2018-04-30 NOTE — ASSESSMENT & PLAN NOTE
Urinary symptoms have resolved/improved after antibiotic course and taking tamsulosin. Continue tamsulosin daily, check PSA levels today.

## 2018-04-30 NOTE — PROGRESS NOTES
[facility] [place] [facility]  ROSA LOPEZ M.D.      Aj HELMS Martin / 72 y.o. / male  04/30/2018      ASSESSMENT & PLAN:    Problem List Items Addressed This Visit        High    Prostatitis - Primary    Current Assessment & Plan     Urinary symptoms have resolved/improved after antibiotic course and taking tamsulosin. Continue tamsulosin daily, check PSA levels today.          Relevant Medications    tamsulosin (FLOMAX) 0.4 MG capsule 24 hr capsule    Other Relevant Orders    PSA, Total & Free       Medium    Chronic obstructive pulmonary disease (Chronic)    Current Assessment & Plan     Increase Bevespi to 2 puffs BID.          Relevant Medications    albuterol (PROVENTIL HFA;VENTOLIN HFA) 108 (90 Base) MCG/ACT inhaler    ANORO ELLIPTA 62.5-25 MCG/INH aerosol powder  inhaler    Glycopyrrolate-Formoterol (BEVESPI AEROSPHERE) 9-4.8 MCG/ACT aerosol    Other Relevant Orders    Pulmonary Function Test      Other Visit Diagnoses     Elevated PSA        Relevant Orders    PSA, Total & Free        Orders Placed This Encounter   Procedures   • PSA, Total & Free   • Pulmonary Function Test       Summary/Discussion:      Return in about 3 months (around 7/30/2018) for Reassess today's problem(s).    ____________________________________________________________________    MEDICATIONS  Current Outpatient Prescriptions   Medication Sig Dispense Refill   • albuterol (PROVENTIL HFA;VENTOLIN HFA) 108 (90 Base) MCG/ACT inhaler Inhale 2 puffs Every 6 (Six) Hours As Needed for Wheezing or Shortness of Air. 18 g 2   • ALPHAGAN P 0.1 % solution ophthalmic solution APPLY 1 DROP IN BOTH EYES BID  6   • amLODIPine (NORVASC) 5 MG tablet Take 0.5 tablets by mouth Daily. 45 tablet 1   • ANORO ELLIPTA 62.5-25 MCG/INH aerosol powder  inhaler INHALE 1 PUFF BY MOUTH DAILY 60 each 2   • dabigatran etexilate (PRADAXA) 150 MG capsu Take 1 capsule by mouth Every 12 (Twelve) Hours. 60 capsule 11   • Glycopyrrolate-Formoterol (BEVESPI AEROSPHERE) 9-4.8  "MCG/ACT aerosol Inhale 1 spray 2 (Two) Times a Day. 2 inhaler 0   • hydrochlorothiazide (MICROZIDE) 12.5 MG capsule Take 1 capsule by mouth daily.     • hydrocortisone (ANUSOL-HC) 2.5 % rectal cream Apply rectally 3 times daily.  Include applicator. 30 g 1   • LUMIGAN 0.01 % ophthalmic drops Administer  to both eyes every night.     • metoprolol tartrate (LOPRESSOR) 25 MG tablet TAKE 1 TABLET BY MOUTH EVERY 12 HOURS 180 tablet 3   • tamsulosin (FLOMAX) 0.4 MG capsule 24 hr capsule TAKE 1 CAPSULE BY MOUTH EVERY NIGHT 90 capsule 1     No current facility-administered medications for this visit.          VITALS:    Visit Vitals  /78   Pulse 75   Temp 98.2 °F (36.8 °C)   Ht 177.8 cm (70\")   Wt 90.8 kg (200 lb 3.2 oz)   SpO2 98%   BMI 28.73 kg/m²       BP Readings from Last 3 Encounters:   04/30/18 130/78   04/13/18 120/74   03/27/18 128/74     Wt Readings from Last 3 Encounters:   04/30/18 90.8 kg (200 lb 3.2 oz)   04/13/18 91.6 kg (202 lb)   03/27/18 90.3 kg (199 lb)      Body mass index is 28.73 kg/m².    CC:  Main reason(s) for today's visit: Follow-up (Prostatisis )      HPI:     After completion of antibiotic and with continuation of tamsulosin daily patient reports resolution of prior urinary tract symptoms.  PSA was noted to be greater than 9 previously.  Patient reports improved urinary flow and decreased hesitancy and nocturia.  Denies dysuria or fever.    Previously patient was started on Bevespi (changed from Anoro) 1 puff twice daily without notice of significant improvement.  Patient has extensive smoking history and prior CT scan showed severe emphysematous changes.  He has not had a pulmonary function test in many years.    Patient Care Team:  Neftali Amezcua MD as PCP - General (Internal Medicine)  Juan Colby MD as Consulting Physician (Cardiology)  Ankush Sky MD as Surgeon (Orthopedic Surgery)  SHANIQUA Siddiqui as Nurse Practitioner " (Oncology)    ____________________________________________________________________    REVIEW OF SYSTEMS    Review of Systems  Constitutional neg  Resp persistent dyspnea with physical activity  CV neg for chest pain  GI neg      PHYSICAL EXAMINATION    Physical Exam  Constitutional  No distress  Cardiovascular Rate  normal . Rhythm: regular . Heart sounds:  Normal  Pulmonary/Chest  Effort normal. Breath sounds: Diminished without wheezing  Psychiatric  Alert. Judgment and thought content normal. Mood normal    REVIEWED DATA:    Labs:     Lab Results   Component Value Date     (L) 02/24/2017    K 4.4 02/24/2017    AST 25 02/24/2017    ALT 25 02/24/2017    BUN 14 02/24/2017    CREATININE 0.94 02/24/2017    CREATININE 0.68 (L) 08/31/2016    CREATININE 0.66 (L) 11/06/2015    EGFRIFNONA 79 02/24/2017    EGFRIFAFRI 96 02/24/2017       Lab Results   Component Value Date    GLUCOSE 134 (H) 08/31/2016    GLUCOSE 112 (H) 11/06/2015    GLUCOSE 120 (H) 10/14/2015       Lab Results   Component Value Date     (H) 02/24/2017    LDL 80 03/30/2015    HDL 42 02/24/2017    HDL 47 03/30/2015    TRIG 119 02/24/2017    TRIG 84 03/30/2015    CHOLHDLRATIO 3.98 02/24/2017    CHOLHDLRATIO 3.1 03/30/2015       Lab Results   Component Value Date    TSH 2.45 10/14/2015       Lab Results   Component Value Date    WBC 6.29 08/31/2016    HGB 15.7 08/31/2016    HGB 13.3 (L) 11/07/2015    HGB 14.6 11/06/2015     08/31/2016       Imaging:   LOW-DOSE CHEST CT WITHOUT IV CONTRAST     HISTORY: 71-year-old male. Lung cancer screening.     TECHNIQUE: Low-dose chest CT protocol with 2 mm intervals. Compared with  chest CTA from 08/31/2016.     FINDINGS: There are no suspicious pulmonary opacities or nodules. There  are no pleural or pericardial effusions. There is no lymphadenopathy  within the chest. Calcified right hilar nodes are noted. There is a  calcified granuloma in the right lower lobe. There are fairly extensive  emphysematous  changes diffusely. Moderately extensive atherosclerotic  change at the thoracic aorta without aneurysmal dilatation.     IMPRESSION:  1. There are no suspicious pulmonary opacities or nodules. LungRADS  Category 1. Annual screening low-dose chest CT is recommended in 12  months.  2. CTDI 2.1 mGy. DLP 74 mGycm.     This report was finalized on 7/14/2017      Medical Tests:   Nuclear stress: 2016  · Myocardial perfusion imaging indicates a normal myocardial perfusion study with no evidence of ischemia.  · Left ventricular ejection fraction is normal (Calculated EF = 60%).  · Impressions are consistent with a low risk study.  · Diaphragmatic attenuation artifact is present.       Summary of old records / correspondence / consultant report:         Request outside records:         ALLERGIES  No Known Allergies     PFSH:     The following portions of the patient's history were reviewed and updated as appropriate: Allergies / Current Medications / Past Medical History / Surgical History / Social History / Family History    PROBLEM LIST   Patient Active Problem List   Diagnosis   • Chronic obstructive pulmonary disease   • Impotence of organic origin   • Hypertension   • Osteoarthritis   • Atrial fibrillation   • History of smoking greater than 50 pack years   • Prostatitis       PAST MEDICAL HISTORY  Past Medical History:   Diagnosis Date   • Atrial fibrillation 03/17/2016    SEES DR. BOX   • BPH (benign prostatic hypertrophy)     see doctors at [facility]   • COPD (chronic obstructive pulmonary disease)    • Depression    • DJD (degenerative joint disease) of cervical spine    • ED (erectile dysfunction)     [facility]   • Emphysema lung    • Hx of colonic polyps     followed by GI ([facility])   • Hyperlipidemia    • Hypertension    • Influenza B 02/14/2013       • Low back pain    • Osteoarthritis     HIPS, HANDS, MULTIPLE SITES   • Rectal bleeding 04/26/2017   • Shoulder pain, left 12/04/2007    SEEN  AT [facility] ER   • Substance abuse     HX OF ALCOHOL ABUSE-QUIT OVER 20 YEARS AGO   • Tendonitis of shoulder 11/07/2007    RIGHT SHOULDER/DELTOID INSERTION       SURGICAL HISTORY  Past Surgical History:   Procedure Laterality Date   • APPENDECTOMY     • CARDIOVASCULAR STRESS TEST N/A 10/20/2015    NML LEXISCAN CARDIOLITE PERFUSION STUDY, NO EVIDENCE OF ISCHEMIA, AREA OF HYPOPERFUSION OF THE INFERIOR WALL W/NORMAL WALL PERFUSION AND NML LEFT VENTRICULAR EJECTION FRACTION, MOST LIKELY ATTENUATION. DR.MICHAEL BOX   • COLONOSCOPY N/A 02/2017   • COLONOSCOPY N/A 02/23/2011    4 POLYPS REMOVED, RESCOPE IN 5 YRS, DR. 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  Smoked 2 ppd for 49 years until he quit smoking 7 years ago for a 98 pack year history.   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   • Drug use: No   • Sexual activity: Defer     Other Topics Concern   • Not on file       FAMILY HISTORY  Family History   Problem Relation Age of Onset   • Colon cancer Mother    • Heart disease Mother    • Stroke Sister      October 2016   • Stroke Brother      September, 2016   • Coronary artery disease Father    • Hypertension Father    • Heart disease Father    • Colon cancer  Maternal Grandmother    • Coronary artery disease Brother    • Heart disease Brother    • Coronary artery disease Brother    • Prostate cancer Neg Hx          **Nelsonon Disclaimer:   Much of this encounter note is an electronic transcription/translation of spoken language to printed text. The electronic translation of spoken language may permit erroneous, or at times, nonsensical words or phrases to be inadvertently transcribed. Although I have reviewed the note for such errors, some may still exist.

## 2018-05-02 ENCOUNTER — TELEPHONE (OUTPATIENT)
Dept: INTERNAL MEDICINE | Age: 73
End: 2018-05-02

## 2018-05-02 DIAGNOSIS — R97.20 ELEVATED PSA: Primary | ICD-10-CM

## 2018-05-02 LAB
PSA FREE MFR SERPL: 15.3 %
PSA FREE SERPL-MCNC: 1.19 NG/ML
PSA SERPL-MCNC: 7.8 NG/ML (ref 0–4)

## 2018-05-02 NOTE — TELEPHONE ENCOUNTER
----- Message from Neftali Amezcua MD sent at 5/2/2018  7:53 AM EDT -----  Call patient with his test result(s) and mail the results to him if MyChart is NOT active.    PSA remains elevated although little lower than last time.   Recommend seeing urologist regarding this. Place referral (dx: elevated PSA)

## 2018-05-09 ENCOUNTER — HOSPITAL ENCOUNTER (OUTPATIENT)
Dept: RESPIRATORY THERAPY | Facility: HOSPITAL | Age: 73
Discharge: HOME | End: 2018-05-09
Admitting: INTERNAL MEDICINE

## 2018-05-09 LAB
BDY SITE: NORMAL
HGB BLDA-MCNC: 19.1 G/DL

## 2018-05-09 PROCEDURE — 94726 PLETHYSMOGRAPHY LUNG VOLUMES: CPT

## 2018-05-09 PROCEDURE — 94010 BREATHING CAPACITY TEST: CPT

## 2018-05-09 PROCEDURE — 94729 DIFFUSING CAPACITY: CPT

## 2018-05-09 PROCEDURE — 82820 HEMOGLOBIN-OXYGEN AFFINITY: CPT | Performed by: INTERNAL MEDICINE

## 2018-05-16 DIAGNOSIS — I10 ESSENTIAL HYPERTENSION: ICD-10-CM

## 2018-05-16 RX ORDER — AMLODIPINE BESYLATE 5 MG/1
TABLET ORAL
Qty: 45 TABLET | Refills: 0 | Status: SHIPPED | OUTPATIENT
Start: 2018-05-16 | End: 2018-08-30 | Stop reason: SDUPTHER

## 2018-05-22 ENCOUNTER — OFFICE VISIT (OUTPATIENT)
Dept: ORTHOPEDIC SURGERY | Facility: CLINIC | Age: 73
End: 2018-05-22

## 2018-05-22 VITALS — WEIGHT: 199 LBS | BODY MASS INDEX: 28.49 KG/M2 | HEIGHT: 70 IN | TEMPERATURE: 97.8 F

## 2018-05-22 DIAGNOSIS — M70.61 TROCHANTERIC BURSITIS OF RIGHT HIP: Primary | ICD-10-CM

## 2018-05-22 PROCEDURE — 99213 OFFICE O/P EST LOW 20 MIN: CPT | Performed by: ORTHOPAEDIC SURGERY

## 2018-05-22 PROCEDURE — 73502 X-RAY EXAM HIP UNI 2-3 VIEWS: CPT | Performed by: ORTHOPAEDIC SURGERY

## 2018-05-22 NOTE — PROGRESS NOTES
Patient: Aj Salazar  YOB: 1945 72 y.o. male  Medical Record Number: [id number]    Chief Complaint:   Chief Complaint   Patient presents with   • Right Hip - Follow-up, Pain       History of Present Illness:Aj Salazar is a 72 y.o. male who presents for follow-up of  Right hip pain.  He has some lateral hip soreness which she describes as an ache.  It's worse with certain positions or activities.  I saw him back in about a year and half ago he had a hip cortisone injection into the bursa and that did work temporarily.  His pain is now back he rates it as a 4 out of 10.    Allergies: No Known Allergies    Medications:   Current Outpatient Prescriptions   Medication Sig Dispense Refill   • albuterol (PROVENTIL HFA;VENTOLIN HFA) 108 (90 Base) MCG/ACT inhaler Inhale 2 puffs Every 6 (Six) Hours As Needed for Wheezing or Shortness of Air. 18 g 2   • amLODIPine (NORVASC) 5 MG tablet TAKE 1/2 TABLET BY MOUTH DAILY 45 tablet 0   • ANORO ELLIPTA 62.5-25 MCG/INH aerosol powder  inhaler INHALE 1 PUFF BY MOUTH DAILY 60 each 2   • dabigatran etexilate (PRADAXA) 150 MG capsu Take 1 capsule by mouth Every 12 (Twelve) Hours. 60 capsule 11   • Glycopyrrolate-Formoterol (BEVESPI AEROSPHERE) 9-4.8 MCG/ACT aerosol Inhale 2 sprays 2 (Two) Times a Day. 2 inhaler 0   • hydrochlorothiazide (MICROZIDE) 12.5 MG capsule Take 1 capsule by mouth daily.     • hydrocortisone (ANUSOL-HC) 2.5 % rectal cream Apply rectally 3 times daily.  Include applicator. 30 g 1   • tamsulosin (FLOMAX) 0.4 MG capsule 24 hr capsule TAKE 1 CAPSULE BY MOUTH EVERY NIGHT 90 capsule 1     No current facility-administered medications for this visit.          The following portions of the patient's history were reviewed and updated as appropriate: allergies, current medications, past family history, past medical history, past social history, past surgical history and problem list.    Review of Systems:   A 14 point review of systems was performed.  "All systems negative except pertinent positives/negative listed in HPI above    Physical Exam:   Vitals:    05/22/18 1044   Temp: 97.8 °F (36.6 °C)   Weight: 90.3 kg (199 lb)   Height: 177.8 cm (70\")   PainSc:   5       General: A and O x 3, ASA, NAD    SCLERA:    Normal    DENTITION:   Normal  Hip:  right    LEG ALIGNMENT:     Neutral   ,    equal leg lengths    GAIT:     Nonantalgic    SKIN:     No abnormality    RANGE OF MOTION:      Full without joint irritability    STRENGTH:     5 / 5    hip flexion and abduction    DISTAL PULSES:    Paplable    DISTAL SENSATION :   Intact    LYMPHATICS:     No   lymphadenopathy    OTHER:          - Negative Stinchfeld test      - Negative log roll      +Tenderness to palpation trochanteric bursa     Radiology:    Xrays 2views (AP bilateral hips and lateral hip) were ordered and reviewed for evaluation of hip pain demonstrating a well positioned total hip without evidence of wear, loosening, or osteoarthritis  Comparison views: todays xrays were compared to previous xrays and demonstrate no change    Assessment/Plan:  Right hip trochanteric bursitis.  I went over 3 different stretching exercises that I want him to work on daily.  He is not better in a month or so he can call back and we will would consider another bursa injection.  I'm hopeful that through regular routine daily maintenance stretching exercises we can get rid of this pain for good.      Ankush Sky MD  5/22/2018  "

## 2018-06-13 ENCOUNTER — OFFICE VISIT (OUTPATIENT)
Dept: CARDIOLOGY | Facility: CLINIC | Age: 73
End: 2018-06-13

## 2018-06-13 VITALS
BODY MASS INDEX: 28.63 KG/M2 | SYSTOLIC BLOOD PRESSURE: 124 MMHG | HEART RATE: 95 BPM | DIASTOLIC BLOOD PRESSURE: 72 MMHG | HEIGHT: 70 IN | WEIGHT: 200 LBS

## 2018-06-13 DIAGNOSIS — J44.9 CHRONIC OBSTRUCTIVE PULMONARY DISEASE, UNSPECIFIED COPD TYPE (HCC): ICD-10-CM

## 2018-06-13 DIAGNOSIS — I48.19 PERSISTENT ATRIAL FIBRILLATION (HCC): Primary | ICD-10-CM

## 2018-06-13 DIAGNOSIS — I10 ESSENTIAL HYPERTENSION: ICD-10-CM

## 2018-06-13 DIAGNOSIS — R06.02 SHORTNESS OF BREATH ON EXERTION: ICD-10-CM

## 2018-06-13 PROCEDURE — 93000 ELECTROCARDIOGRAM COMPLETE: CPT | Performed by: NURSE PRACTITIONER

## 2018-06-13 PROCEDURE — 99214 OFFICE O/P EST MOD 30 MIN: CPT | Performed by: NURSE PRACTITIONER

## 2018-06-13 NOTE — PROGRESS NOTES
Date of Office Visit: 2018  Encounter Provider: SHANIQUA Benavides  Place of Service: [facility] CARDIOLOGY  Patient Name: Aj Salazar  :1945    Chief Complaint   Patient presents with   • Hypertension   • Atrial Fibrillation   :     HPI: Aj Salazar is a 72 y.o. male is a patient of Dr. Colby. I am seeing him today and have reviewed the records.     His past medical history is significant of Atrial fibrillation, hypertension, COPD and emphysema, benign prostatic hypertrophy, hypertension, hyperlipidemia, history of rectal bleeding, and history of alcohol abuse.    n 2014 he presentedfor hip replacement surgery and had a transient episode of atrial fibrillation.  He had an echocardiogram which revealed normal LV systolic function and no significant valvular disease.  He had a perfusion stress test performed which showed no evidence of ischemia.    Then in 2016 he presented with atypical chest pain.  He had a perfusion stress test which was normal.  This was felt to be musculoskeletal and he was started on ibuprofen.    He was last seen in the office on 2018. He expressed shortness of breath that was gradually worse with activity. He felt as though his lung disease is getting worse that he was continued to be active and exercise. He was noted to be back in atrial fibrillation and was started on Pradaxa 150 mg twice a day. He was instructed to stop taking aspirin once he started to Pradaxa.    He had pulmonary function testing on 2018 which revealed moderate COPD.    He presents today for 2 month follow-up.  He denies palpitations, edema, lightheadedness, chest pain, fatigue, or blood in the urine or stool.  He continues to have shortness of breath upon exertion.  He tells me since his last visit Dr. Amezcua switched his inhaler which hasn't made a difference.  He walks for 20 minutes most days of the week.  He does physical therapy for  bursitis of the hip and has no limiting symptoms with these exercises.  He tells me that he misunderstood and stopped taking aspirin and metoprolol was started Pradaxa.  He has been taking Pradaxa 150 mg twice daily.  He tells me that his wife says that he snores.  They sleep in separate rooms he is unsure if he has any apneic episodes.  He denies daytime sleepiness.    No Known Allergies    Past Medical History:   Diagnosis Date   • Atrial fibrillation 03/17/2016    SEES DR. BOX   • BPH (benign prostatic hypertrophy)     see doctors at [facility]   • COPD (chronic obstructive pulmonary disease)    • Depression    • DJD (degenerative joint disease) of cervical spine    • ED (erectile dysfunction)     [facility]   • Emphysema lung    • Hx of colonic polyps     followed by GI ([facility])   • Hyperlipidemia    • Hypertension    • Influenza B 02/14/2013       • Low back pain    • Osteoarthritis     HIPS, HANDS, MULTIPLE SITES   • Rectal bleeding 04/26/2017   • Shoulder pain, left 12/04/2007    SEEN AT [facility]   • Substance abuse     HX OF ALCOHOL ABUSE-QUIT OVER 20 YEARS AGO   • Tendonitis of shoulder 11/07/2007    RIGHT SHOULDER/DELTOID INSERTION       Past Surgical History:   Procedure Laterality Date   • APPENDECTOMY     • CARDIOVASCULAR STRESS TEST N/A 10/20/2015    NML LEXISCAN CARDIOLITE PERFUSION STUDY, NO EVIDENCE OF ISCHEMIA, AREA OF HYPOPERFUSION OF THE INFERIOR WALL W/NORMAL WALL PERFUSION AND NML LEFT VENTRICULAR EJECTION FRACTION, MOST LIKELY ATTENUATION. DR.MICHAEL BOX   • COLONOSCOPY N/A 02/2017   • COLONOSCOPY N/A 02/23/2011    4 POLYPS REMOVED, RESCOPE IN 5 YRS, DR. EAGLE   • COLONOSCOPY N/A 03/08/2006    ERYTHEMOUS AND GRANULAR MUCOSA IN ILEOCECAL VALVE, NORMAL COLON, REPEAT IN 5 YRS,    • HERNIA REPAIR Bilateral     INGUINAL   • JOINT REPLACEMENT  11/2015    left hip   • TOTAL HIP ARTHROPLASTY Left 11/06/2015    Dr Ankush Sky   • TOTAL HIP ARTHROPLASTY Right  "10/27/2014    DR.RIED GRAY   • VASECTOMY       Family and social history reviewed.     Review of Systems   Cardiovascular: Positive for dyspnea on exertion.   Respiratory: Positive for shortness of breath (COPD) and snoring.    Neurological: Negative for excessive daytime sleepiness.     All other systems were reviewed and are negative        Objective:     Vitals:    06/13/18 0954   BP: 124/72   BP Location: Right arm   Patient Position: Sitting   Pulse: 95   Weight: 90.7 kg (200 lb)   Height: 177.8 cm (70\")     Body mass index is 28.7 kg/m².    PHYSICAL EXAM:  Physical Exam   Constitutional: He is oriented to person, place, and time. He appears well-developed and well-nourished. No distress.   HENT:   Head: Normocephalic.   Eyes: Conjunctivae are normal.   glasses   Neck: Normal range of motion. No JVD present.   Cardiovascular: Normal rate, normal heart sounds and intact distal pulses.  An irregular rhythm present.   No murmur heard.  Pulses:       Carotid pulses are 2+ on the right side, and 2+ on the left side.       Radial pulses are 2+ on the right side, and 2+ on the left side.        Posterior tibial pulses are 2+ on the right side, and 2+ on the left side.   Pulmonary/Chest: Effort normal and breath sounds normal. No respiratory distress. He has no wheezes. He has no rhonchi. He has no rales. He exhibits no tenderness.   Abdominal: Soft. Bowel sounds are normal. He exhibits no distension.   Musculoskeletal: Normal range of motion. He exhibits no edema.   Neurological: He is alert and oriented to person, place, and time.   Skin: Skin is warm, dry and intact. No rash noted. He is not diaphoretic. No cyanosis.   Psychiatric: He has a normal mood and affect. His behavior is normal. Judgment and thought content normal.         ECG 12 Lead  Date/Time: 6/13/2018 10:11 AM  Performed by: LEAH SANABRIA  Authorized by: LEAH SANABRIA   Comparison: compared with previous ECG from 4/13/2018  Similar to previous " ECG  Rhythm: atrial fibrillation  Rate: normal  BPM: 95  Clinical impression: abnormal ECG            Current Outpatient Prescriptions   Medication Sig Dispense Refill   • albuterol (PROVENTIL HFA;VENTOLIN HFA) 108 (90 Base) MCG/ACT inhaler Inhale 2 puffs Every 6 (Six) Hours As Needed for Wheezing or Shortness of Air. 18 g 2   • amLODIPine (NORVASC) 5 MG tablet TAKE 1/2 TABLET BY MOUTH DAILY 45 tablet 0   • ANORO ELLIPTA 62.5-25 MCG/INH aerosol powder  inhaler INHALE 1 PUFF BY MOUTH DAILY 60 each 2   • dabigatran etexilate (PRADAXA) 150 MG capsu Take 1 capsule by mouth Every 12 (Twelve) Hours. 60 capsule 11   • Glycopyrrolate-Formoterol (BEVESPI AEROSPHERE) 9-4.8 MCG/ACT aerosol Inhale 2 sprays 2 (Two) Times a Day. 2 inhaler 0   • hydrochlorothiazide (MICROZIDE) 12.5 MG capsule Take 1 capsule by mouth daily.     • hydrocortisone (ANUSOL-HC) 2.5 % rectal cream Apply rectally 3 times daily.  Include applicator. 30 g 1   • tamsulosin (FLOMAX) 0.4 MG capsule 24 hr capsule TAKE 1 CAPSULE BY MOUTH EVERY NIGHT 90 capsule 1   • metoprolol tartrate (LOPRESSOR) 25 MG tablet Take 1 tablet by mouth 2 (Two) Times a Day. 60 tablet 11     No current facility-administered medications for this visit.      Assessment:       Diagnosis Plan   1. Persistent atrial fibrillation  ECG 12 Lead   2. Chronic obstructive pulmonary disease, unspecified COPD type  Ambulatory Referral to Pulmonary Rehab   3. Shortness of breath on exertion  Ambulatory Referral to Pulmonary Rehab   4. Essential hypertension          Orders Placed This Encounter   Procedures   • Ambulatory Referral to Pulmonary Rehab     Referral Type:   Rehabilitation - Outpatient     Referral Reason:   Specialty Services Required     Number of Visits Requested:   1   • ECG 12 Lead     This order was created via procedure documentation         Plan:    1. In 2014 he had an episode of atrial fibrillation with hip surgery.  He then presented in April 2018 and was found to be back in  atrial fibrillation and was started on Pradaxa 150 mg BID. He misunderstood and stopped taking metoprolol and aspirin when he started Pradaxa.  He is to resume metoprolol 25 mg twice daily.  He still has a BMP pending to assess renal function. Last normal GFR in 02/2017.  He was reminded to have this completed after the visit today and verbalized understanding.   Atrial Fibrillation and Atrial Flutter  Assessment  • The patient has persistent atrial fibrillation  • This is non-valvular in etiology  • The patient's CHADS2-VASc score is 2  • A [id number] score of 2 or more is considered a high risk for a thromboembolic event  • Aspirin prescribed    Plan  • Attempt to maintain sinus rhythm  • Add dabigatran for antithrombotic therapy  • Continue beta blocker for rate control      He verbalized understanding to resume metoprolol 25 mg twice a day.  His heart rate today is 95 at rest so she could be having elevated heart rate with exertion which could be exacerbating shortness of breath.     2.Hypertension-blood pressure under good control.  Continue the same    3. COPD- Folowed by Dr. Amezcua. He continues to have shortness of breath despite last change of inhaler. May need referral to pulmonologist if he continues to have issues.    4. Shortness of breath with exertion-  request Pulm rehab referral. Referral ordered.    5. Suspected sleep apnea- We also discussed testing for sleep apnea and untreated apnea risk for a fib considering the report of snoring.  He is to consider this and notify the office if he would like to be tested/treated.    Follow up in 6 months with Dr. Colby    Patient was instructed to call the office if new symptoms develop or report to nearest ER if heart attack or stroke is suspected.        It has been a pleasure to participate in this patient's care.      Thank you,  SHANIQUA Benavides      **Tomás Disclaimer:**  Much of this encounter note is an electronic transcription/translation of  spoken language to printed text. The electronic translation of spoken language may permit erroneous, or at times, nonsensical words or phrases to be inadvertently transcribed. Although I have reviewed the note for such errors, some may still exist.

## 2018-07-14 ENCOUNTER — APPOINTMENT (OUTPATIENT)
Dept: GENERAL RADIOLOGY | Facility: HOSPITAL | Age: 73
End: 2018-07-14

## 2018-07-14 ENCOUNTER — HOSPITAL ENCOUNTER (EMERGENCY)
Facility: HOSPITAL | Age: 73
Discharge: HOME | End: 2018-07-14
Attending: EMERGENCY MEDICINE | Admitting: EMERGENCY MEDICINE

## 2018-07-14 VITALS
TEMPERATURE: 97.9 F | SYSTOLIC BLOOD PRESSURE: 110 MMHG | HEART RATE: 81 BPM | DIASTOLIC BLOOD PRESSURE: 84 MMHG | OXYGEN SATURATION: 95 % | HEIGHT: 70 IN | WEIGHT: 201.28 LBS | BODY MASS INDEX: 28.82 KG/M2 | RESPIRATION RATE: 16 BRPM

## 2018-07-14 DIAGNOSIS — M54.41 ACUTE RIGHT-SIDED BACK PAIN WITH SCIATICA: Primary | ICD-10-CM

## 2018-07-14 PROCEDURE — 99284 EMERGENCY DEPT VISIT MOD MDM: CPT

## 2018-07-14 PROCEDURE — 63710000001 PREDNISONE PER 1 MG: Performed by: NURSE PRACTITIONER

## 2018-07-14 PROCEDURE — 72110 X-RAY EXAM L-2 SPINE 4/>VWS: CPT

## 2018-07-14 RX ORDER — PREDNISONE 20 MG/1
20 TABLET ORAL 2 TIMES DAILY
Qty: 10 TABLET | Refills: 0 | Status: SHIPPED | OUTPATIENT
Start: 2018-07-14 | End: 2018-07-24

## 2018-07-14 RX ORDER — BACLOFEN 10 MG/1
10 TABLET ORAL ONCE
Status: COMPLETED | OUTPATIENT
Start: 2018-07-14 | End: 2018-07-14

## 2018-07-14 RX ORDER — HYDROCODONE BITARTRATE AND ACETAMINOPHEN 5; 325 MG/1; MG/1
1 TABLET ORAL EVERY 6 HOURS PRN
Qty: 15 TABLET | Refills: 0 | Status: SHIPPED | OUTPATIENT
Start: 2018-07-14 | End: 2018-07-31

## 2018-07-14 RX ORDER — PREDNISONE 20 MG/1
60 TABLET ORAL ONCE
Status: COMPLETED | OUTPATIENT
Start: 2018-07-14 | End: 2018-07-14

## 2018-07-14 RX ORDER — HYDROCODONE BITARTRATE AND ACETAMINOPHEN 7.5; 325 MG/1; MG/1
1 TABLET ORAL ONCE
Status: COMPLETED | OUTPATIENT
Start: 2018-07-14 | End: 2018-07-14

## 2018-07-14 RX ORDER — BACLOFEN 10 MG/1
10 TABLET ORAL 3 TIMES DAILY
Qty: 21 TABLET | Refills: 0 | Status: SHIPPED | OUTPATIENT
Start: 2018-07-14 | End: 2018-07-31

## 2018-07-14 RX ADMIN — BACLOFEN 10 MG: 10 TABLET ORAL at 11:25

## 2018-07-14 RX ADMIN — HYDROCODONE BITARTRATE AND ACETAMINOPHEN 1 TABLET: 7.5; 325 TABLET ORAL at 11:24

## 2018-07-14 RX ADMIN — PREDNISONE 60 MG: 20 TABLET ORAL at 11:25

## 2018-07-14 NOTE — DISCHARGE INSTRUCTIONS
Use medications as directed and follow up with Dr. Sky as scheduled. Please return to the ED if symptoms worsen with fever or increasing pain.

## 2018-07-14 NOTE — ED PROVIDER NOTES
EMERGENCY DEPARTMENT ENCOUNTER    CHIEF COMPLAINT  Chief Complaint: R lower back pain  History given by: Patient  History limited by: N/A  Room Number: 19/19  PMD: Neftali Amezcua MD      HPI:  Pt is a 72 y.o. male who presents complaining of R lower back pain which radiates down his RLE to his R foot for the past 2 weeks.  Pt reports the pain has worsened over the past 24 hours, but denies any recent injury, trauma, or heavy lifting.  Pt describes the pain as 'dull' with occasional 'sharp' pains in his R hip.  Pt denies fever, chills, urinary sx, incontinence.  Pt reports hx of chronic back pain, but his last pain shot was a couple years ago.  Pt reports hx of bilateral hip replacement.    Duration:  2 weeks, worse 24 hours  Onset: gradual  Timing: constant  Location: R lower back  Radiation: RLE to foot  Quality: 'dull', 'sharp' in hip  Intensity/Severity: moderate  Progression: unchanged  Associated Symptoms: none  Aggravating Factors: none  Alleviating Factors: none  Previous Episodes: Pt reports hx of chronic back pain, but his last pain shot was a couple years ago.  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/2016   • Hypertension 03/17/2016   • Osteoarthritis 03/17/2016   • Atrial fibrillation (CMS/HCC) 03/17/2016   • History of smoking greater than 50 pack years 06/23/2017   • Prostatitis 03/27/2018     Resolved Ambulatory Problems     Diagnosis Date Noted   • No Resolved Ambulatory Problems     Past Medical History:   Diagnosis Date   • Atrial fibrillation (CMS/HCC) 03/17/2016   • BPH (benign prostatic hypertrophy)    • COPD (chronic obstructive pulmonary disease) (CMS/[facility])    • Depression    • DJD (degenerative joint disease) of cervical spine    • ED (erectile dysfunction)    • Emphysema lung (CMS/HCC)    • Hx of colonic polyps    •  Hyperlipidemia    • Hypertension    • Influenza B 02/14/2013   • Low back pain    • Osteoarthritis    • Rectal bleeding 04/26/2017   • Shoulder pain, left 12/04/2007   • Substance abuse    • Tendonitis of shoulder 11/07/2007       PAST SURGICAL HISTORY  Past Surgical History:   Procedure Laterality Date   • APPENDECTOMY     • CARDIOVASCULAR STRESS TEST N/A 10/20/2015    NML LEXISCAN CARDIOLITE PERFUSION STUDY, NO EVIDENCE OF ISCHEMIA, AREA OF HYPOPERFUSION OF THE INFERIOR WALL W/NORMAL WALL PERFUSION AND NML LEFT VENTRICULAR EJECTION FRACTION, MOST LIKELY ATTENUATION. DR.MICHAEL BOX   • COLONOSCOPY N/A 02/2017   • COLONOSCOPY N/A 02/23/2011    4 POLYPS REMOVED, RESCOPE IN 5 YRS, DR. EAGLE   • COLONOSCOPY N/A 03/08/2006    ERYTHEMOUS AND GRANULAR MUCOSA IN ILEOCECAL VALVE, NORMAL COLON, REPEAT IN 5 YRS,    • HERNIA REPAIR Bilateral     INGUINAL   • JOINT REPLACEMENT  11/2015    left hip   • TOTAL HIP ARTHROPLASTY Left 11/06/2015    Dr Ankush Sky   • TOTAL HIP ARTHROPLASTY Right 10/27/2014    DR.RIED SKY   • VASECTOMY         FAMILY HISTORY  Family History   Problem Relation Age of Onset   • Colon cancer Mother    • Heart disease Mother    • Stroke Sister         October 2016   • Stroke Brother         September, 2016   • Coronary artery disease Father    • Hypertension Father    • Heart disease Father    • Colon cancer Maternal Grandmother    • Coronary artery disease Brother    • Heart disease Brother    • Coronary artery disease Brother    • Prostate cancer Neg Hx        SOCIAL HISTORY  Social History     Social History   • Marital status:      Spouse name: Ara   • Number of children: 3   • Years of education: N/A     Occupational History   • 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   Smoked 2 ppd for 49 years until he quit smoking 7 years ago for a 98 pack year history.   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    REVIEW OF SYSTEMS  Review of Systems   Constitutional: Negative for activity change, appetite change and fever.   HENT: Negative for congestion and sore throat.    Eyes: Negative.    Respiratory: Negative for cough and shortness of breath.    Cardiovascular: Negative for chest pain and leg swelling.   Gastrointestinal: Negative for abdominal pain, diarrhea and vomiting.   Endocrine: Negative.    Genitourinary: Negative for decreased urine volume and dysuria.   Musculoskeletal: Positive for back pain (right sided, radiates to RLE to foot, x2 weeks, worse past 24 hours). Negative for neck pain.   Skin: Negative for rash and wound.   Allergic/Immunologic: Negative.    Neurological: Negative for weakness, numbness and headaches.   Hematological: Negative.    Psychiatric/Behavioral: Negative.    All other systems reviewed and are negative.      PHYSICAL EXAM  ED Triage Vitals   Temp Heart Rate Resp BP SpO2   07/14/18 0915 07/14/18 0915 07/14/18 0918 07/14/18 0918 07/14/18 0915   97.9 °F (36.6 °C) 113 18 103/82 94 %      Temp src Heart Rate Source Patient Position BP Location FiO2 (%)   07/14/18 0915 07/14/18 0915 -- -- --   Tympanic Monitor          Physical Exam   Constitutional: He is oriented to person, place, and time. He appears distressed (mild).   HENT:   Head: Normocephalic and atraumatic.   Eyes: EOM are normal. Pupils are equal, round, and reactive to light.   Neck: Normal range of motion. Neck supple.   Cardiovascular: Normal rate, regular rhythm and normal heart sounds.    No murmur heard.  Pulmonary/Chest: Effort normal and breath sounds normal. No  respiratory distress.   Abdominal: Soft. Bowel sounds are normal. He exhibits no distension. There is no tenderness. There is no rebound and no guarding.   Musculoskeletal: He exhibits no edema.        Lumbar back: He exhibits decreased range of motion, tenderness (R paralumbar muscles) and pain (with flexion of back).   Neurological: He is alert and oriented to person, place, and time. He has normal sensation and normal strength. He displays no weakness. No sensory deficit (intact in bilateral feet).   Reflex Scores:       Patellar reflexes are 2+ on the right side and 2+ on the left side.  Skin: Skin is warm and dry.   Psychiatric: Mood and affect normal.   Nursing note and vitals reviewed.      RADIOLOGY  XR Spine Lumbar 4+ View   Final Result       XR L Spine shows There is very slight disc space narrowing and spurring at L1-L2 and there is slight anterior spurring throughout the remainder of the lumbar spine. There is mild hypertrophy of the posterior facets throughout the lumbar spine. No acute abnormality is seen.    I ordered the above noted radiological studies. Interpreted by radiologist. Reviewed by me in PACS.       PROCEDURES  Procedures      PROGRESS AND CONSULTS  ED Course as of Jul 14 1310   Sat Jul 14, 2018   0956 Pt c/o right sided low back pain that radiates down right leg.  Pt has chronic back issue, however, this worsened yesterday.  Pt denies injury, bowel/bladder incontinence.  Exam: right sided paraspinal lumbar & SI tenderness. Normal sensation.  [MS]      ED Course User Index  [MS] Elisha Prieto, APRN     1253  Rechecked pt, who is resting comfortably.  Pt states his pain is relieved from the meds he received in the ER.  Discussed with pt his XR L spine shows NAD and plan to discharge the pt with meds for pain control until the pt can receive outpatient f/u. Pt understands and agrees with the plan, all questions answered.      MEDICAL DECISION MAKING  Results were  reviewed/discussed with the patient and they were also made aware of online access. Pt also made aware that some labs, such as cultures, will not be resulted during ER visit and follow up with PMD is necessary.     MDM  Number of Diagnoses or Management Options     Amount and/or Complexity of Data Reviewed  Tests in the radiology section of CPT®: ordered and reviewed (XR L Spine shows NAD.)  Independent visualization of images, tracings, or specimens: yes    Patient Progress  Patient progress: stable         DIAGNOSIS  Final diagnoses:   Acute right-sided back pain with sciatica       DISPOSITION  DISCHARGE    Patient discharged in stable condition.    Reviewed implications of results, diagnosis, meds, responsibility to follow up, warning signs and symptoms of possible worsening, potential complications and reasons to return to ER.    Patient/Family voiced understanding of above instructions.    Discussed plan for discharge, as there is no emergent indication for admission. Patient referred to primary care provider for BP management due to today's BP. Pt/family is agreeable and understands need for follow up and repeat testing.  Pt is aware that discharge does not mean that nothing is wrong but it indicates no emergency is present that requires admission and they must continue care with follow-up as given below or physician of their choice.     FOLLOW-UP  Neftali Amezcua MD  [address] [facility] [address]  [place] [address]  [phone]    Schedule an appointment as soon as possible for a visit       Ankush Sky MD  [address] [facility] [address]  [place] [address]  [phone]    Go to   As scheduled         Medication List      New Prescriptions    baclofen 10 MG tablet  Commonly known as:  LIORESAL  Take 1 tablet by mouth 3 (Three) Times a Day.     HYDROcodone-acetaminophen 5-325 MG per tablet  Commonly known as:  NORCO  Take 1 tablet by mouth Every 6 (Six) Hours As Needed for Moderate Pain .     predniSONE 20 MG  tablet  Commonly known as:  DELTASONE  Take 1 tablet by mouth 2 (Two) Times a Day.              Latest Documented Vital Signs:  As of 1:10 PM  BP- 103/82 HR- 113 Temp- 97.9 °F (36.6 °C) (Tympanic) O2 sat- 94%    --  Documentation assistance provided by federico Davila for Dr. Valles.  Information recorded by the scribe was done at my direction and has been verified and validated by me.     Ligia Davila  07/14/18 1311       Garrett Valles MD  07/14/18 [id number]

## 2018-07-18 ENCOUNTER — TELEPHONE (OUTPATIENT)
Dept: SOCIAL WORK | Facility: HOSPITAL | Age: 73
End: 2018-07-18

## 2018-07-24 ENCOUNTER — OFFICE VISIT (OUTPATIENT)
Dept: ORTHOPEDIC SURGERY | Facility: CLINIC | Age: 73
End: 2018-07-24

## 2018-07-24 VITALS — BODY MASS INDEX: 28.52 KG/M2 | WEIGHT: 199.2 LBS | TEMPERATURE: 98.3 F | HEIGHT: 70 IN

## 2018-07-24 DIAGNOSIS — M79.604 LEG PAIN, DIFFUSE, RIGHT: ICD-10-CM

## 2018-07-24 DIAGNOSIS — Z96.641 STATUS POST TOTAL REPLACEMENT OF RIGHT HIP: Primary | ICD-10-CM

## 2018-07-24 PROCEDURE — 99213 OFFICE O/P EST LOW 20 MIN: CPT | Performed by: ORTHOPAEDIC SURGERY

## 2018-07-24 PROCEDURE — 73502 X-RAY EXAM HIP UNI 2-3 VIEWS: CPT | Performed by: ORTHOPAEDIC SURGERY

## 2018-07-24 RX ORDER — BIMATOPROST 0.01 %
0.01 DROPS OPHTHALMIC (EYE) ONCE
Refills: 6 | COMMUNITY
Start: 2018-07-09 | End: 2020-02-13 | Stop reason: ALTCHOICE

## 2018-07-24 RX ORDER — BRIMONIDINE TARTRATE 0.1 %
1 DROPS OPHTHALMIC (EYE) 2 TIMES DAILY
Refills: 6 | COMMUNITY
Start: 2018-07-05

## 2018-07-24 NOTE — PROGRESS NOTES
Patient: Aj Salazar  YOB: 1945 72 y.o. male  Medical Record Number: [id number]    Chief Complaint: right hip[ and leg pain    History of Present Illness:Aj Salazar is a 72 y.o. male who presents for follow-up of  Right hip pain.  As a moderate ache about the posterior hip and lateral aspect of the right hip and leg radiating down to the lateral aspect of the knee.  He was recently seen in the emergency department placed on anti-inflammatories and muscle relaxant and that is helped quite a bit.  He denies any groin or anterior thigh pain.    Allergies: No Known Allergies    Medications:   Current Outpatient Prescriptions   Medication Sig Dispense Refill   • ALPHAGAN P 0.1 % solution ophthalmic solution Administer 0.1 bottles to both eyes 1 (One) Time.  6   • amLODIPine (NORVASC) 5 MG tablet TAKE 1/2 TABLET BY MOUTH DAILY 45 tablet 0   • ANORO ELLIPTA 62.5-25 MCG/INH aerosol powder  inhaler INHALE 1 PUFF BY MOUTH DAILY 60 each 2   • baclofen (LIORESAL) 10 MG tablet Take 1 tablet by mouth 3 (Three) Times a Day. 21 tablet 0   • dabigatran etexilate (PRADAXA) 150 MG capsu Take 1 capsule by mouth Every 12 (Twelve) Hours. 60 capsule 11   • Glycopyrrolate-Formoterol (BEVESPI AEROSPHERE) 9-4.8 MCG/ACT aerosol Inhale 2 sprays 2 (Two) Times a Day. 2 inhaler 0   • hydrochlorothiazide (MICROZIDE) 12.5 MG capsule Take 1 capsule by mouth daily.     • HYDROcodone-acetaminophen (NORCO) 5-325 MG per tablet Take 1 tablet by mouth Every 6 (Six) Hours As Needed for Moderate Pain . 15 tablet 0   • hydrocortisone (ANUSOL-HC) 2.5 % rectal cream Apply rectally 3 times daily.  Include applicator. 30 g 1   • LUMIGAN 0.01 % ophthalmic drops Administer 0.01 bottles to both eyes 1 (One) Time.  6   • metoprolol tartrate (LOPRESSOR) 25 MG tablet Take 1 tablet by mouth 2 (Two) Times a Day. 60 tablet 11     No current facility-administered medications for this visit.          The following portions of the patient's history  "were reviewed and updated as appropriate: allergies, current medications, past family history, past medical history, past social history, past surgical history and problem list.    Review of Systems:   A 14 point review of systems was performed. All systems negative except pertinent positives/negative listed in HPI above    Physical Exam:   Vitals:    07/24/18 1536   Temp: 98.3 °F (36.8 °C)   Weight: 90.4 kg (199 lb 3.2 oz)   Height: 177.8 cm (70\")       General: A and O x 3, ASA, NAD    SCLERA:    Normal    DENTITION:   Normal  Hip:  right    LEG ALIGNMENT:     Neutral   ,    equal leg lengths    GAIT:     Nonantalgic    SKIN:     No abnormality    RANGE OF MOTION:      Full without joint irritability    STRENGTH:     5 / 5    hip flexion and abduction    DISTAL PULSES:    Paplable    DISTAL SENSATION :   Intact    LYMPHATICS:     No   lymphadenopathy    OTHER:          - Negative Stinchfeld test      - Negative log roll      - No Tenderness to palpation trochanteric bursa      - Neg FADIR      - Neg ROSE      - No SI tenderness     Radiology:    Xrays 2views (AP bilateral hips and lateral hip) were ordered and reviewed for evaluation of hip pain demonstrating a well positioned total hip without evidence of wear, loosening, or osteoarthritis  Comparison views: todays xrays were compared to previous xrays and demonstrate no change    I reviewed x-rays of the lumbar spine from the emergency department and there is some evidence of mild diffuse lumbar degenerative change    Assessment/Plan:  Right-sided hip and leg pain it has improved since medical treatment from the ER.  He has a history of lumbar spinal stenosis and has had epidurals in the past which have reportedly help his symptoms.  Clearly the symptoms appear to be emanating from the lumbar spine.  I'm going to get an MRI he will call back for results      Ankush Sky MD  7/24/2018  "

## 2018-07-31 ENCOUNTER — TELEPHONE (OUTPATIENT)
Dept: ORTHOPEDIC SURGERY | Facility: CLINIC | Age: 73
End: 2018-07-31

## 2018-07-31 ENCOUNTER — HOSPITAL ENCOUNTER (OUTPATIENT)
Dept: MRI IMAGING | Facility: HOSPITAL | Age: 73
Discharge: HOME | End: 2018-07-31
Attending: ORTHOPAEDIC SURGERY | Admitting: ORTHOPAEDIC SURGERY

## 2018-07-31 ENCOUNTER — OFFICE VISIT (OUTPATIENT)
Dept: INTERNAL MEDICINE | Age: 73
End: 2018-07-31

## 2018-07-31 VITALS
DIASTOLIC BLOOD PRESSURE: 72 MMHG | TEMPERATURE: 98.1 F | SYSTOLIC BLOOD PRESSURE: 114 MMHG | HEART RATE: 84 BPM | HEIGHT: 70 IN | WEIGHT: 199 LBS | OXYGEN SATURATION: 98 % | BODY MASS INDEX: 28.49 KG/M2

## 2018-07-31 DIAGNOSIS — I10 ESSENTIAL HYPERTENSION: Chronic | ICD-10-CM

## 2018-07-31 DIAGNOSIS — Z11.59 NEED FOR HEPATITIS C SCREENING TEST: ICD-10-CM

## 2018-07-31 DIAGNOSIS — N40.1 BENIGN PROSTATIC HYPERPLASIA WITH URINARY OBSTRUCTION: Primary | Chronic | ICD-10-CM

## 2018-07-31 DIAGNOSIS — N13.8 BENIGN PROSTATIC HYPERPLASIA WITH URINARY OBSTRUCTION: Primary | Chronic | ICD-10-CM

## 2018-07-31 DIAGNOSIS — R73.9 HYPERGLYCEMIA: ICD-10-CM

## 2018-07-31 DIAGNOSIS — M79.604 LEG PAIN, DIFFUSE, RIGHT: ICD-10-CM

## 2018-07-31 DIAGNOSIS — Z12.2 ENCOUNTER FOR SCREENING FOR MALIGNANT NEOPLASM OF RESPIRATORY ORGANS: ICD-10-CM

## 2018-07-31 DIAGNOSIS — J43.9 PULMONARY EMPHYSEMA, UNSPECIFIED EMPHYSEMA TYPE (HCC): Chronic | ICD-10-CM

## 2018-07-31 DIAGNOSIS — Z87.891 HISTORY OF SMOKING 25-50 PACK YEARS: ICD-10-CM

## 2018-07-31 PROCEDURE — 99214 OFFICE O/P EST MOD 30 MIN: CPT | Performed by: INTERNAL MEDICINE

## 2018-07-31 PROCEDURE — 72148 MRI LUMBAR SPINE W/O DYE: CPT

## 2018-07-31 RX ORDER — TAMSULOSIN HYDROCHLORIDE 0.4 MG/1
2 CAPSULE ORAL NIGHTLY
Qty: 30 CAPSULE | COMMUNITY
Start: 2018-07-31

## 2018-07-31 NOTE — ASSESSMENT & PLAN NOTE
He went back to Anoro inhaler daily. Has pulmonary rehab appointment.   Referral to pulmonary. Schedule for annual low dose CT.

## 2018-07-31 NOTE — PROGRESS NOTES
[facility]  ROSA LOPEZ M.D.      Aj HELMS Woruib / 72 y.o. / male  07/31/2018      ASSESSMENT & PLAN:    Problem List Items Addressed This Visit        High    Benign prostatic hyperplasia with urinary obstruction - Primary (Chronic)    Current Assessment & Plan     Continue tamsulosin and follow-up with urologist.          Relevant Medications    tamsulosin (FLOMAX) 0.4 MG capsule 24 hr capsule       Medium    Chronic obstructive pulmonary disease (CMS/HCC) (Chronic)    Overview     PFT (5/2018): moderate obstruction with decreased diffusing capacity.          Current Assessment & Plan     He went back to Anoro inhaler daily. Has pulmonary rehab appointment.   Referral to pulmonary. Schedule for annual low dose CT.          Relevant Medications    ANORO ELLIPTA 62.5-25 MCG/INH aerosol powder  inhaler    Other Relevant Orders    Ambulatory Referral to Pulmonology    Hypertension (Chronic)    Overview     Stable. Continue amlodipine, hctz, lisinopril. On metoprolol for afib.          Relevant Medications    hydrochlorothiazide (MICROZIDE) 12.5 MG capsule    amLODIPine (NORVASC) 5 MG tablet    metoprolol tartrate (LOPRESSOR) 25 MG tablet    Other Relevant Orders    Lipid Panel With / Chol / HDL Ratio    Comprehensive Metabolic Panel    CBC & Differential      Other Visit Diagnoses     History of smoking 25-50 pack years        Relevant Orders    CT Chest Low Dose Wo    Encounter for screening for malignant neoplasm of respiratory organs        Relevant Orders    CT Chest Low Dose Wo    Hyperglycemia        Relevant Orders    Hemoglobin A1c    Need for hepatitis C screening test        Relevant Orders    Hepatitis C Antibody        Orders Placed This Encounter   Procedures   • CT Chest Low Dose Wo   • Lipid Panel With / Chol / HDL Ratio   • Comprehensive Metabolic Panel   • Hemoglobin A1c   • Hepatitis C Antibody   • Ambulatory Referral to Pulmonology   • CBC & Differential     No orders of the defined types  "were placed in this encounter.      Summary/Discussion:      Return in about 6 months (around 1/31/2019) for Hypertension, COPD.    ____________________________________________________________________    MEDICATIONS  Current Outpatient Prescriptions   Medication Sig Dispense Refill   • ALPHAGAN P 0.1 % solution ophthalmic solution Administer 0.1 bottles to both eyes 1 (One) Time.  6   • amLODIPine (NORVASC) 5 MG tablet TAKE 1/2 TABLET BY MOUTH DAILY 45 tablet 0   • ANORO ELLIPTA 62.5-25 MCG/INH aerosol powder  inhaler INHALE 1 PUFF BY MOUTH DAILY 60 each 2   • dabigatran etexilate (PRADAXA) 150 MG capsu Take 1 capsule by mouth Every 12 (Twelve) Hours. 60 capsule 11   • hydrochlorothiazide (MICROZIDE) 12.5 MG capsule Take 1 capsule by mouth daily.     • LUMIGAN 0.01 % ophthalmic drops Administer 0.01 bottles to both eyes 1 (One) Time.  6   • metoprolol tartrate (LOPRESSOR) 25 MG tablet Take 1 tablet by mouth 2 (Two) Times a Day. 60 tablet 11   • tamsulosin (FLOMAX) 0.4 MG capsule 24 hr capsule Take 1 capsule by mouth Every Night. 30 capsule      No current facility-administered medications for this visit.          VITALS:    Visit Vitals  /72   Pulse 84   Temp 98.1 °F (36.7 °C)   Ht 177.8 cm (70\")   Wt 90.3 kg (199 lb)   SpO2 98%   BMI 28.55 kg/m²       BP Readings from Last 3 Encounters:   07/31/18 114/72   07/14/18 110/84   06/13/18 124/72     Wt Readings from Last 3 Encounters:   07/31/18 90.3 kg (199 lb)   07/24/18 90.4 kg (199 lb 3.2 oz)   07/14/18 91.3 kg (201 lb 4.5 oz)      Body mass index is 28.55 kg/m².    CC:  Main reason(s) for today's visit: prostatitis      HPI:     COPD: did not notice significant difference with Bivespi so went back on Anoro. Still has shortness of breath with activities. Scheduled for pulmonary rehab.     Saw urologist for BPH with [facility]SEY and is still on tamsulosin without change in urination. Has follow-up with urologist.     Chronic essential hypertension:  Since prior visit: " compliant with medication(s) and denies significant problems with medication(s).  Most recent in-office blood pressure readings:   BP Readings from Last 3 Encounters:   07/31/18 114/72   07/14/18 110/84   06/13/18 124/72         Patient Care Team:  Neftali Amezcua MD as PCP - General (Internal Medicine)  Juan Colby MD as Consulting Physician (Cardiology)  Ankush Sky MD as Surgeon (Orthopedic Surgery)  SHANIQUA Siddiqui as Nurse Practitioner (Oncology)    ____________________________________________________________________    REVIEW OF SYSTEMS    Review of Systems  Constitutional neg  Resp neg  CV neg   neg for change      PHYSICAL EXAMINATION    Physical Exam  Constitutional  No distress  Cardiovascular Rate  normal . Rhythm: regular . Heart sounds:  Normal  Pulmonary/Chest  Effort normal. Breath sounds:  Decreased BS without wheezing  Psychiatric  Alert. Judgment and thought content normal. Mood normal    REVIEWED DATA:    Labs:     Lab Results   Component Value Date     (L) 02/24/2017    K 4.4 02/24/2017    AST 25 02/24/2017    ALT 25 02/24/2017    BUN 14 02/24/2017    CREATININE 0.94 02/24/2017    CREATININE 0.68 (L) 08/31/2016    CREATININE 0.66 (L) 11/06/2015    EGFRIFNONA 79 02/24/2017    EGFRIFAFRI 96 02/24/2017       Lab Results   Component Value Date    GLUCOSE 134 (H) 08/31/2016    GLUCOSE 112 (H) 11/06/2015    GLUCOSE 120 (H) 10/14/2015       Lab Results   Component Value Date     (H) 02/24/2017    LDL 80 03/30/2015    HDL 42 02/24/2017    HDL 47 03/30/2015    TRIG 119 02/24/2017    TRIG 84 03/30/2015    CHOLHDLRATIO 3.98 02/24/2017    CHOLHDLRATIO 3.1 03/30/2015       Lab Results   Component Value Date    TSH 2.45 10/14/2015       Lab Results   Component Value Date    WBC 6.29 08/31/2016    HGB 15.7 08/31/2016    HGB 13.3 (L) 11/07/2015    HGB 14.6 11/06/2015     08/31/2016       Imaging:   LOW-DOSE CHEST CT WITHOUT IV CONTRAST     HISTORY: 71-year-old male. Lung  cancer screening.     TECHNIQUE: Low-dose chest CT protocol with 2 mm intervals. Compared with  chest CTA from 08/31/2016.     FINDINGS: There are no suspicious pulmonary opacities or nodules. There  are no pleural or pericardial effusions. There is no lymphadenopathy  within the chest. Calcified right hilar nodes are noted. There is a  calcified granuloma in the right lower lobe. There are fairly extensive  emphysematous changes diffusely. Moderately extensive atherosclerotic  change at the thoracic aorta without aneurysmal dilatation.     IMPRESSION:  1. There are no suspicious pulmonary opacities or nodules. LungRADS  Category 1. Annual screening low-dose chest CT is recommended in 12  months.  2. CTDI 2.1 mGy. DLP 74 mGycm.     This report was finalized on 7/14/2017       Medical Tests:         Summary of old records / correspondence / consultant report:         Request outside records:         ALLERGIES  No Known Allergies     PFSH:     The following portions of the patient's history were reviewed and updated as appropriate: Allergies / Current Medications / Past Medical History / Surgical History / Social History / Family History    PROBLEM LIST   Patient Active Problem List   Diagnosis   • Chronic obstructive pulmonary disease (CMS/HCC)   • Impotence of organic origin   • Hypertension   • Osteoarthritis   • Atrial fibrillation (CMS/HCC)   • History of smoking greater than 50 pack years   • Benign prostatic hyperplasia with urinary obstruction       PAST MEDICAL HISTORY  Past Medical History:   Diagnosis Date   • Atrial fibrillation (CMS/HCC) 03/17/2016    SEES DR. BOX   • BPH (benign prostatic hypertrophy)     see doctors at [facility]   • COPD (chronic obstructive pulmonary disease) (CMS/HCC)    • Depression    • DJD (degenerative joint disease) of cervical spine    • ED (erectile dysfunction)     [facility]   • Emphysema lung (CMS/HCC)    • Hx of colonic polyps     followed by GI ([facility])   •  Hyperlipidemia    • Hypertension    • Influenza B 02/14/2013       • Low back pain    • Osteoarthritis     HIPS, HANDS, MULTIPLE SITES   • Rectal bleeding 04/26/2017   • Shoulder pain, left 12/04/2007    SEEN AT [facility] ER   • Substance abuse     HX OF ALCOHOL ABUSE-QUIT OVER 20 YEARS AGO   • Tendonitis of shoulder 11/07/2007    RIGHT SHOULDER/DELTOID INSERTION       SURGICAL HISTORY  Past Surgical History:   Procedure Laterality Date   • APPENDECTOMY     • CARDIOVASCULAR STRESS TEST N/A 10/20/2015    NML LEXISCAN CARDIOLITE PERFUSION STUDY, NO EVIDENCE OF ISCHEMIA, AREA OF HYPOPERFUSION OF THE INFERIOR WALL W/NORMAL WALL PERFUSION AND NML LEFT VENTRICULAR EJECTION FRACTION, MOST LIKELY ATTENUATION. DR.MICHAEL BOX   • COLONOSCOPY N/A 02/2017   • COLONOSCOPY N/A 02/23/2011    4 POLYPS REMOVED, RESCOPE IN 5 YRS, DR. 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  Smoked 2 ppd for 49 years until he quit smoking 7 years ago for a 98 pack year history.   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   • Drug use: No   • Sexual activity: Defer     Other Topics Concern   • Not on file       FAMILY HISTORY  Family History   Problem Relation Age of Onset   • Colon  cancer Mother    • Heart disease Mother    • Stroke Sister         October 2016   • Stroke Brother         September, 2016   • Coronary artery disease Father    • Hypertension Father    • Heart disease Father    • Colon cancer Maternal Grandmother    • Coronary artery disease Brother    • Heart disease Brother    • Coronary artery disease Brother    • Prostate cancer Neg Hx          **Nelsonon Disclaimer:   Much of this encounter note is an electronic transcription/translation of spoken language to printed text. The electronic translation of spoken language may permit erroneous, or at times, nonsensical words or phrases to be inadvertently transcribed. Although I have reviewed the note for such errors, some may still exist.

## 2018-08-01 LAB
ALBUMIN SERPL-MCNC: 4 G/DL (ref 3.5–5.2)
ALBUMIN/GLOB SERPL: 1.8 G/DL
ALP SERPL-CCNC: 61 U/L (ref 39–117)
ALT SERPL-CCNC: 25 U/L (ref 1–41)
AST SERPL-CCNC: 20 U/L (ref 1–40)
BASOPHILS # BLD AUTO: 0.04 10*3/MM3 (ref 0–0.2)
BASOPHILS NFR BLD AUTO: 0.5 % (ref 0–1.5)
BILIRUB SERPL-MCNC: 0.8 MG/DL (ref 0.1–1.2)
BUN SERPL-MCNC: 17 MG/DL (ref 8–23)
BUN/CREAT SERPL: 16.5 (ref 7–25)
CALCIUM SERPL-MCNC: 9.6 MG/DL (ref 8.6–10.5)
CHLORIDE SERPL-SCNC: 99 MMOL/L (ref 98–107)
CHOLEST SERPL-MCNC: 155 MG/DL (ref 0–200)
CHOLEST/HDLC SERPL: 3.88 {RATIO}
CO2 SERPL-SCNC: 25.3 MMOL/L (ref 22–29)
CREAT SERPL-MCNC: 1.03 MG/DL (ref 0.76–1.27)
EOSINOPHIL # BLD AUTO: 0.13 10*3/MM3 (ref 0–0.7)
EOSINOPHIL NFR BLD AUTO: 1.7 % (ref 0.3–6.2)
ERYTHROCYTE [DISTWIDTH] IN BLOOD BY AUTOMATED COUNT: 13.4 % (ref 11.5–14.5)
GLOBULIN SER CALC-MCNC: 2.2 GM/DL
GLUCOSE SERPL-MCNC: 128 MG/DL (ref 65–99)
HBA1C MFR BLD: 6.16 % (ref 4.8–5.6)
HCT VFR BLD AUTO: 48 % (ref 40.4–52.2)
HCV AB S/CO SERPL IA: 0.1 S/CO RATIO (ref 0–0.9)
HDLC SERPL-MCNC: 40 MG/DL (ref 40–60)
HGB BLD-MCNC: 16.2 G/DL (ref 13.7–17.6)
IMM GRANULOCYTES # BLD: 0.02 10*3/MM3 (ref 0–0.03)
IMM GRANULOCYTES NFR BLD: 0.3 % (ref 0–0.5)
LDLC SERPL CALC-MCNC: 89 MG/DL (ref 0–100)
LYMPHOCYTES # BLD AUTO: 1.83 10*3/MM3 (ref 0.9–4.8)
LYMPHOCYTES NFR BLD AUTO: 23.9 % (ref 19.6–45.3)
MCH RBC QN AUTO: 30.5 PG (ref 27–32.7)
MCHC RBC AUTO-ENTMCNC: 33.8 G/DL (ref 32.6–36.4)
MCV RBC AUTO: 90.4 FL (ref 79.8–96.2)
MONOCYTES # BLD AUTO: 0.69 10*3/MM3 (ref 0.2–1.2)
MONOCYTES NFR BLD AUTO: 9 % (ref 5–12)
NEUTROPHILS # BLD AUTO: 4.98 10*3/MM3 (ref 1.9–8.1)
NEUTROPHILS NFR BLD AUTO: 64.9 % (ref 42.7–76)
PLATELET # BLD AUTO: 235 10*3/MM3 (ref 140–500)
POTASSIUM SERPL-SCNC: 4.3 MMOL/L (ref 3.5–5.2)
PROT SERPL-MCNC: 6.2 G/DL (ref 6–8.5)
RBC # BLD AUTO: 5.31 10*6/MM3 (ref 4.6–6)
SODIUM SERPL-SCNC: 138 MMOL/L (ref 136–145)
TRIGL SERPL-MCNC: 131 MG/DL (ref 0–150)
VLDLC SERPL CALC-MCNC: 26.2 MG/DL (ref 5–40)
WBC # BLD AUTO: 7.67 10*3/MM3 (ref 4.5–10.7)

## 2018-08-02 NOTE — TELEPHONE ENCOUNTER
Patient does have arthritic changes noted as well as a couple of disks that are extruding and affecting nerves.  For that reason would recommend that he see Dr. Doherty within the next week for further evaluation and treatment.  Please schedule.

## 2018-08-03 NOTE — TELEPHONE ENCOUNTER
Called patient with MRI results per MLL and that she is wanting him to see LISSETH for further eval.

## 2018-08-06 ENCOUNTER — HOSPITAL ENCOUNTER (OUTPATIENT)
Dept: PET IMAGING | Facility: HOSPITAL | Age: 73
Discharge: HOME | End: 2018-08-06
Admitting: INTERNAL MEDICINE

## 2018-08-06 ENCOUNTER — TELEPHONE (OUTPATIENT)
Dept: CARDIOLOGY | Facility: CLINIC | Age: 73
End: 2018-08-06

## 2018-08-06 DIAGNOSIS — M54.50 LUMBAR SPINE PAIN: Primary | ICD-10-CM

## 2018-08-06 PROCEDURE — G0297 LDCT FOR LUNG CA SCREEN: HCPCS

## 2018-08-06 NOTE — TELEPHONE ENCOUNTER
PLEASE SEE PREV MESSAGES. MLL WOULD LIKE PATIENT SEEN WITHIN THE NEXT WEEK. WHEN WOULD JGW LIKE TO WORK IN?

## 2018-08-06 NOTE — TELEPHONE ENCOUNTER
Send him for epidural steroid injections ASA and tell him this is likely to help.  Make an appointment for next available date for comprehensive evaluation of his back and leg pain.

## 2018-08-06 NOTE — TELEPHONE ENCOUNTER
[phone]    Pt called leonarda he is scheduled for an epidural on 8/13 and needs orders to stop pradaxa.     LMOM for pt to call with who is doing the procedure, where, what type of epidural and length of recommended discontinue of pradaxa.  [facility]

## 2018-08-06 NOTE — TELEPHONE ENCOUNTER
Pt called back stating he is having the epidural at [facility] pain clinic and it is a lumbar epidural and they would like him to discontinue Pradaxa starting tomorrow until after the procedure on 8/14/18.  Can you advise?    Thanks, [facility] JILLIAN

## 2018-08-08 DIAGNOSIS — J43.9 PULMONARY EMPHYSEMA, UNSPECIFIED EMPHYSEMA TYPE (HCC): ICD-10-CM

## 2018-08-14 ENCOUNTER — ANESTHESIA EVENT (OUTPATIENT)
Dept: PAIN MEDICINE | Facility: HOSPITAL | Age: 73
End: 2018-08-14

## 2018-08-14 ENCOUNTER — HOSPITAL ENCOUNTER (OUTPATIENT)
Dept: GENERAL RADIOLOGY | Facility: HOSPITAL | Age: 73
Discharge: HOME | End: 2018-08-14

## 2018-08-14 ENCOUNTER — HOSPITAL ENCOUNTER (OUTPATIENT)
Dept: PAIN MEDICINE | Facility: HOSPITAL | Age: 73
Discharge: HOME | End: 2018-08-14
Attending: ORTHOPAEDIC SURGERY | Admitting: ANESTHESIOLOGY

## 2018-08-14 ENCOUNTER — ANESTHESIA (OUTPATIENT)
Dept: PAIN MEDICINE | Facility: HOSPITAL | Age: 73
End: 2018-08-14

## 2018-08-14 VITALS
HEART RATE: 82 BPM | DIASTOLIC BLOOD PRESSURE: 72 MMHG | WEIGHT: 190 LBS | OXYGEN SATURATION: 95 % | HEIGHT: 70 IN | BODY MASS INDEX: 27.2 KG/M2 | RESPIRATION RATE: 16 BRPM | TEMPERATURE: 97.8 F | SYSTOLIC BLOOD PRESSURE: 119 MMHG

## 2018-08-14 DIAGNOSIS — M54.50 LUMBAR SPINE PAIN: ICD-10-CM

## 2018-08-14 DIAGNOSIS — R52 PAIN: ICD-10-CM

## 2018-08-14 PROCEDURE — 0 IOPAMIDOL 41 % SOLUTION: Performed by: ANESTHESIOLOGY

## 2018-08-14 PROCEDURE — 77003 FLUOROGUIDE FOR SPINE INJECT: CPT

## 2018-08-14 PROCEDURE — 25010000002 METHYLPREDNISOLONE PER 80 MG: Performed by: ANESTHESIOLOGY

## 2018-08-14 PROCEDURE — C1755 CATHETER, INTRASPINAL: HCPCS

## 2018-08-14 RX ORDER — MIDAZOLAM HYDROCHLORIDE 1 MG/ML
1 INJECTION INTRAMUSCULAR; INTRAVENOUS
Status: DISCONTINUED | OUTPATIENT
Start: 2018-08-14 | End: 2018-08-15 | Stop reason: HOSPADM

## 2018-08-14 RX ORDER — METHYLPREDNISOLONE ACETATE 80 MG/ML
80 INJECTION, SUSPENSION INTRA-ARTICULAR; INTRALESIONAL; INTRAMUSCULAR; SOFT TISSUE ONCE
Status: COMPLETED | OUTPATIENT
Start: 2018-08-14 | End: 2018-08-14

## 2018-08-14 RX ORDER — FENTANYL CITRATE 50 UG/ML
50 INJECTION, SOLUTION INTRAMUSCULAR; INTRAVENOUS AS NEEDED
Status: DISCONTINUED | OUTPATIENT
Start: 2018-08-14 | End: 2018-08-15 | Stop reason: HOSPADM

## 2018-08-14 RX ORDER — LIDOCAINE HYDROCHLORIDE 10 MG/ML
1 INJECTION, SOLUTION INFILTRATION; PERINEURAL ONCE
Status: DISCONTINUED | OUTPATIENT
Start: 2018-08-14 | End: 2018-08-15 | Stop reason: HOSPADM

## 2018-08-14 RX ADMIN — IOPAMIDOL 10 ML: 408 INJECTION, SOLUTION INTRATHECAL at 10:48

## 2018-08-14 RX ADMIN — METHYLPREDNISOLONE ACETATE 80 MG: 80 INJECTION, SUSPENSION INTRA-ARTICULAR; INTRALESIONAL; INTRAMUSCULAR; SOFT TISSUE at 10:48

## 2018-08-14 NOTE — H&P
[facility]    History and Physical    Patient Name: Aj Salazar  :  1945  MRN:  [id number]  Date of Admission: 2018    Subjective     Patient is a 72 y.o. male presents with chief complaint of chronic, constant, moderate, 4-9/10, sharp, numb, ? etiology low back and hips: right pain.  Onset of symptoms was gradual starting several years ago.  Symptoms are associated/aggravated by activity. Symptoms improve with rest    The following portions of the patients history were reviewed and updated as appropriate: current medications, allergies, past medical history, past surgical history, past family history, past social history and problem list                Objective     Past Medical History:   Past Medical History:   Diagnosis Date   • Atrial fibrillation (CMS/HCC) 2016    SEES DR. BOX   • BPH (benign prostatic hypertrophy)     see doctors at [facility]   • COPD (chronic obstructive pulmonary disease) (CMS/[facility])    • Depression    • DJD (degenerative joint disease) of cervical spine    • ED (erectile dysfunction)     [facility]   • Emphysema lung (CMS/[facility])    • Hx of colonic polyps     followed by GI ([facility])   • Hyperlipidemia    • Hypertension    • Influenza B 2013       • Low back pain    • Osteoarthritis     HIPS, HANDS, MULTIPLE SITES   • Rectal bleeding 2017   • Shoulder pain, left 2007    SEEN AT [facility] ER   • Substance abuse     HX OF ALCOHOL ABUSE-QUIT OVER 20 YEARS AGO   • Tendonitis of shoulder 2007    RIGHT SHOULDER/DELTOID INSERTION     Past Surgical History:   Past Surgical History:   Procedure Laterality Date   • APPENDECTOMY     • CARDIOVASCULAR STRESS TEST N/A 10/20/2015    NML LEXISCAN CARDIOLITE PERFUSION STUDY, NO EVIDENCE OF ISCHEMIA, AREA OF HYPOPERFUSION OF THE INFERIOR WALL W/NORMAL WALL PERFUSION AND NML LEFT VENTRICULAR EJECTION FRACTION, MOST LIKELY ATTENUATION. DR.MICHAEL BOX   • COLONOSCOPY N/A 2017   •  COLONOSCOPY N/A 02/23/2011    4 POLYPS REMOVED, RESCOPE IN 5 YRS, DR. EAGLE   • COLONOSCOPY N/A 03/08/2006    ERYTHEMOUS AND GRANULAR MUCOSA IN ILEOCECAL VALVE, NORMAL COLON, REPEAT IN 5 YRS,    • HERNIA REPAIR Bilateral     INGUINAL   • JOINT REPLACEMENT  11/2015    left hip   • TOTAL HIP ARTHROPLASTY Left 11/06/2015    Dr Ankush Sky   • TOTAL HIP ARTHROPLASTY Right 10/27/2014    DR.RIED SKY   • VASECTOMY       Family History:   Family History   Problem Relation Age of Onset   • Colon cancer Mother    • Heart disease Mother    • Stroke Sister         October 2016   • Stroke Brother         September, 2016   • Coronary artery disease Father    • Hypertension Father    • Heart disease Father    • Colon cancer Maternal Grandmother    • Coronary artery disease Brother    • Heart disease Brother    • Coronary artery disease Brother    • Prostate cancer Neg Hx      Social History:   Social History   Substance Use Topics   • Smoking status: Former Smoker     Packs/day: 2.00     Years: 49.00     Quit date: 1/1/2010   • Smokeless tobacco: Never Used      Comment: Began Smoking age 15.  Smoked 2 ppd for 49 years until he quit smoking 7 years ago for a 98 pack year history.   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  6   • amLODIPine (NORVASC) 5 MG tablet TAKE 1/2 TABLET BY MOUTH DAILY 45 tablet 0   • ANORO ELLIPTA 62.5-25 MCG/INH aerosol powder  inhaler INHALE 1 PUFF BY MOUTH DAILY 14 each 1   • dabigatran etexilate (PRADAXA) 150  MG capsu Take 1 capsule by mouth Every 12 (Twelve) Hours. 60 capsule 11   • hydrochlorothiazide (MICROZIDE) 12.5 MG capsule Take 1 capsule by mouth daily.     • LUMIGAN 0.01 % ophthalmic drops Administer 0.01 bottles to both eyes 1 (One) Time.  6   • metoprolol tartrate (LOPRESSOR) 25 MG tablet Take 1 tablet by mouth 2 (Two) Times a Day. 60 tablet 11   • tamsulosin (FLOMAX) 0.4 MG capsule 24 hr capsule Take 1 capsule by mouth Every Night. 30 capsule      Current Facility-Administered Medications   Medication Dose Route Frequency Provider Last Rate Last Dose   • fentaNYL citrate (PF) (SUBLIMAZE) injection 50 mcg  50 mcg Intravenous PRN Aj Francis MD       • iopamidol (ISOVUE-M 200) injection 41%  3 mL Epidural Once in imaging Aj Francis MD       • lidocaine (XYLOCAINE) 1 % injection 1 mL  1 mL Intradermal Once Aj Francis MD       • methylPREDNISolone acetate (DEPO-medrol) injection 80 mg  80 mg Intramuscular Once Aj Francis MD       • midazolam (VERSED) injection 1 mg  1 mg Intravenous Q5 Min PRN Aj Francis MD           --------------------------------------------------------------------------------  Assessment/Plan      Anesthesia Evaluation     Patient summary reviewed and Nursing notes reviewed         Pain impairs ability to perform ADLs: Sleeping  Modalities previously tried to control pain with limited effectiveness: Rest     Airway   Dental - normal exam     Pulmonary - normal exam   (+) COPD,   Cardiovascular - normal exam    (+) hypertension, dysrhythmias,       Neuro/Psych- negative ROS and neuro exam normal  GI/Hepatic/Renal/Endo - negative ROS     Musculoskeletal (-) negative ROS and normal exam    Abdominal  - normal exam   Substance History - negative use     OB/GYN negative ob/gyn ROS         Other                 Diagnosis and Plan    Treatment Plan  ASA 3      Procedures: Lumbar Epidural Steroid Injection(LESI), With fluoroscopy,       Anesthetic plan and risks discussed  with patient.          Diagnosis     * Lumbar spinal stenosis [M48.061]     * Lumbar neuritis [M54.16]

## 2018-08-14 NOTE — ANESTHESIA PROCEDURE NOTES
PAIN Epidural block    Patient location during procedure: pain clinic  Start Time: 8/14/2018 10:42 AM  Stop Time: 8/14/2018 10:51 AM  Indication:at surgeon's request and procedure for pain  Performed By  Anesthesiologist: JENNIFER CASTILLO  Preanesthetic Checklist  Completed: patient identified, site marked, surgical consent, pre-op evaluation, timeout performed, IV checked, risks and benefits discussed and monitors and equipment checked  Additional Notes  Dx:  Post-Op Diagnosis Codes:     * Lumbar spinal stenosis (M48.061)     * Lumbar neuritis (M54.16)  80 mg depomedrol in epid    Plan : return to clinic as needed  Prep:  Pt Position:prone (prone)  Sterile Tech:cap, gloves, mask and sterile barrier  Prep:chlorhexidine gluconate and isopropyl alcohol  Monitoring:blood pressure monitoring, EKG and continuous pulse oximetry  Procedure:  Sedation: no   Approach:right paramedian  Guidance: fluoroscopy and c arm pa and lat and loss of resistance  Location:lumbar  Level:4-5 (interlaminar)  Needle Type:Concurrent Inctead  Needle Gauge:20  Aspiration:negative  Medications:  Depomedrol:80 mg  Preservative Free Saline:3mL  Isovue:2mL    Post Assessment:  Post-procedure: bandaid.  Pt Tolerance:patient tolerated the procedure well with no apparent complications  Complications:no

## 2018-08-30 DIAGNOSIS — I10 ESSENTIAL HYPERTENSION: ICD-10-CM

## 2018-08-30 RX ORDER — AMLODIPINE BESYLATE 5 MG/1
TABLET ORAL
Qty: 45 TABLET | Refills: 3 | Status: SHIPPED | OUTPATIENT
Start: 2018-08-30 | End: 2018-09-28 | Stop reason: HOSPADM

## 2018-09-11 ENCOUNTER — HOSPITAL ENCOUNTER (OUTPATIENT)
Dept: PAIN MEDICINE | Facility: HOSPITAL | Age: 73
Discharge: HOME | End: 2018-09-11
Admitting: ANESTHESIOLOGY

## 2018-09-11 ENCOUNTER — ANESTHESIA (OUTPATIENT)
Dept: PAIN MEDICINE | Facility: HOSPITAL | Age: 73
End: 2018-09-11

## 2018-09-11 ENCOUNTER — HOSPITAL ENCOUNTER (OUTPATIENT)
Dept: GENERAL RADIOLOGY | Facility: HOSPITAL | Age: 73
Discharge: HOME | End: 2018-09-11

## 2018-09-11 ENCOUNTER — ANESTHESIA EVENT (OUTPATIENT)
Dept: PAIN MEDICINE | Facility: HOSPITAL | Age: 73
End: 2018-09-11

## 2018-09-11 VITALS
HEIGHT: 70 IN | DIASTOLIC BLOOD PRESSURE: 68 MMHG | SYSTOLIC BLOOD PRESSURE: 116 MMHG | WEIGHT: 200 LBS | BODY MASS INDEX: 28.63 KG/M2 | TEMPERATURE: 97.7 F | HEART RATE: 88 BPM | RESPIRATION RATE: 16 BRPM | OXYGEN SATURATION: 93 %

## 2018-09-11 DIAGNOSIS — N13.8 BENIGN PROSTATIC HYPERPLASIA WITH URINARY OBSTRUCTION: Chronic | ICD-10-CM

## 2018-09-11 DIAGNOSIS — N40.1 BENIGN PROSTATIC HYPERPLASIA WITH URINARY OBSTRUCTION: Chronic | ICD-10-CM

## 2018-09-11 DIAGNOSIS — Z87.891 HISTORY OF SMOKING GREATER THAN 50 PACK YEARS: Chronic | ICD-10-CM

## 2018-09-11 DIAGNOSIS — N52.9 IMPOTENCE OF ORGANIC ORIGIN: ICD-10-CM

## 2018-09-11 DIAGNOSIS — R52 PAIN: ICD-10-CM

## 2018-09-11 DIAGNOSIS — M54.16 LUMBAR RADICULOPATHY: Primary | ICD-10-CM

## 2018-09-11 PROCEDURE — 25010000002 METHYLPREDNISOLONE PER 80 MG: Performed by: ANESTHESIOLOGY

## 2018-09-11 PROCEDURE — C1755 CATHETER, INTRASPINAL: HCPCS

## 2018-09-11 PROCEDURE — 77003 FLUOROGUIDE FOR SPINE INJECT: CPT

## 2018-09-11 PROCEDURE — 0 IOPAMIDOL 41 % SOLUTION: Performed by: ANESTHESIOLOGY

## 2018-09-11 RX ORDER — METHYLPREDNISOLONE ACETATE 80 MG/ML
80 INJECTION, SUSPENSION INTRA-ARTICULAR; INTRALESIONAL; INTRAMUSCULAR; SOFT TISSUE ONCE
Status: COMPLETED | OUTPATIENT
Start: 2018-09-11 | End: 2018-09-11

## 2018-09-11 RX ORDER — FENTANYL CITRATE 50 UG/ML
50 INJECTION, SOLUTION INTRAMUSCULAR; INTRAVENOUS AS NEEDED
Status: DISCONTINUED | OUTPATIENT
Start: 2018-09-11 | End: 2018-09-12 | Stop reason: HOSPADM

## 2018-09-11 RX ORDER — MIDAZOLAM HYDROCHLORIDE 1 MG/ML
1 INJECTION INTRAMUSCULAR; INTRAVENOUS
Status: DISCONTINUED | OUTPATIENT
Start: 2018-09-11 | End: 2018-09-12 | Stop reason: HOSPADM

## 2018-09-11 RX ORDER — LIDOCAINE HYDROCHLORIDE 10 MG/ML
1 INJECTION, SOLUTION INFILTRATION; PERINEURAL ONCE
Status: DISCONTINUED | OUTPATIENT
Start: 2018-09-11 | End: 2018-09-12 | Stop reason: HOSPADM

## 2018-09-11 RX ADMIN — IOPAMIDOL 10 ML: 408 INJECTION, SOLUTION INTRATHECAL at 09:54

## 2018-09-11 RX ADMIN — METHYLPREDNISOLONE ACETATE 80 MG: 80 INJECTION, SUSPENSION INTRA-ARTICULAR; INTRALESIONAL; INTRAMUSCULAR; SOFT TISSUE at 09:54

## 2018-09-11 NOTE — H&P
INTERVAL HISTORY:    The patient returns for another Lumbar epidural steroid injection 2 today.  They have received 70 % improvement since their last injection with a pain level of 4-6 /10 at its worst recently.    Conservative measures tried in the interim. Daily activities are still affected by the pain.    Radiology reports and/or previous notes have been reviewed and are consistent with their diagnosis of Post-Op Diagnosis Codes:     * Lumbar neuritis [M54.16]     * Lumbar spinal stenosis [M48.061]    Alert and oriented  MP - 2  Lungs - clear  CV - rrr    Diagnosis:  Post-Op Diagnosis Codes:     * Lumbar neuritis [M54.16]     * Lumbar spinal stenosis [M48.061]      Plan:  Lumbar epidural steroid injection under fluoroscopic guidance    I have encouraged them to continue:  1.  Physical therapy exercises at home as prescribed by physical therapy or from the pain clinic handout (given to the patient).  Continuation of these exercises every day, or multiple times per week, even when the patient has good pain relief, was stressed to the patient as a preventative measure to decrease the frequency and severity of future pain episodes.  2.  Continue pain medicines as already prescribed.  If patient not currently taking any, it is recommended to begin Acetaminophen 1000 mg po q 8 hours.  If other medicines containing Acetaminophen are currently prescribed, maintain daily dose at 3000mg.    3.  If they can tolerate NSAIDS, it is recommended to take Ibuprofen 600 mg po q 6 hours for 7 days during pain exacerbations.   Alternatively, they may substitute an NSAID of their choice (e.g. Aleve)  4.  Heat and ice to the affected area as tolerated for pain control.  It was discussed that heating pads can cause burns.  5.  Low impact exercise such as walking or water exercise was recommended to maintain overall health and aid in weight control.   6.  Follow up as needed for subsequent injections.  7.  Patient was counseled to  abstain from tobacco products.

## 2018-09-11 NOTE — ANESTHESIA PROCEDURE NOTES
PAIN Epidural block    Patient location during procedure: pain clinic  Start Time: 9/11/2018 9:49 AM  Stop Time: 9/11/2018 9:56 AM  Indication:at surgeon's request and procedure for pain  Performed By  Anesthesiologist: JENNIFER CASTILLO  Preanesthetic Checklist  Completed: patient identified, site marked, surgical consent, pre-op evaluation, timeout performed, IV checked, risks and benefits discussed and monitors and equipment checked  Additional Notes  Dx:  Post-Op Diagnosis Codes:     * Lumbar neuritis (M54.16)     * Lumbar spinal stenosis (M48.061)  80 mg depomedrol in epid    Plan : return to clinic as needed  Prep:  Pt Position:prone (prone)  Sterile Tech:cap, gloves, mask and sterile barrier  Prep:chlorhexidine gluconate and isopropyl alcohol  Monitoring:blood pressure monitoring, EKG and continuous pulse oximetry  Procedure:  Sedation: no   Approach:right paramedian  Guidance: fluoroscopy and c arm pa and lat and loss of resistance  Location:lumbar  Level:4-5 (interlaminar)  Needle Type:Blink Bookingtead  Needle Gauge:20  Aspiration:negative  Medications:  Depomedrol:80 mg  Preservative Free Saline:3mL  Isovue:2mL    Post Assessment:  Post-procedure: bandaid.  Pt Tolerance:patient tolerated the procedure well with no apparent complications  Complications:no

## 2018-09-25 ENCOUNTER — OFFICE VISIT (OUTPATIENT)
Dept: SURGERY | Facility: CLINIC | Age: 73
End: 2018-09-25

## 2018-09-25 ENCOUNTER — APPOINTMENT (OUTPATIENT)
Dept: CT IMAGING | Facility: HOSPITAL | Age: 73
End: 2018-09-25

## 2018-09-25 ENCOUNTER — APPOINTMENT (OUTPATIENT)
Dept: GENERAL RADIOLOGY | Facility: HOSPITAL | Age: 73
End: 2018-09-25

## 2018-09-25 ENCOUNTER — TELEPHONE (OUTPATIENT)
Dept: INTERNAL MEDICINE | Age: 73
End: 2018-09-25

## 2018-09-25 ENCOUNTER — HOSPITAL ENCOUNTER (INPATIENT)
Facility: HOSPITAL | Age: 73
LOS: 3 days | Discharge: HOME | End: 2018-09-28
Attending: EMERGENCY MEDICINE | Admitting: INTERNAL MEDICINE

## 2018-09-25 VITALS
DIASTOLIC BLOOD PRESSURE: 60 MMHG | TEMPERATURE: 96.7 F | OXYGEN SATURATION: 93 % | SYSTOLIC BLOOD PRESSURE: 110 MMHG | HEART RATE: 50 BPM | BODY MASS INDEX: 27.73 KG/M2 | WEIGHT: 193.7 LBS | HEIGHT: 70 IN

## 2018-09-25 DIAGNOSIS — I95.9 HYPOTENSION, UNSPECIFIED HYPOTENSION TYPE: ICD-10-CM

## 2018-09-25 DIAGNOSIS — G47.34 NOCTURNAL HYPOXIA: ICD-10-CM

## 2018-09-25 DIAGNOSIS — R10.9 ABDOMINAL PAIN, UNSPECIFIED ABDOMINAL LOCATION: ICD-10-CM

## 2018-09-25 DIAGNOSIS — K92.1 MELENA: ICD-10-CM

## 2018-09-25 DIAGNOSIS — E86.0 DEHYDRATION: ICD-10-CM

## 2018-09-25 DIAGNOSIS — K92.2 GASTROINTESTINAL HEMORRHAGE, UNSPECIFIED GASTROINTESTINAL HEMORRHAGE TYPE: Primary | ICD-10-CM

## 2018-09-25 DIAGNOSIS — N17.9 AKI (ACUTE KIDNEY INJURY) (HCC): ICD-10-CM

## 2018-09-25 DIAGNOSIS — K92.2 UGIB (UPPER GASTROINTESTINAL BLEED): ICD-10-CM

## 2018-09-25 DIAGNOSIS — R10.13 EPIGASTRIC PAIN: Primary | ICD-10-CM

## 2018-09-25 DIAGNOSIS — Z79.01 CHRONIC ANTICOAGULATION: ICD-10-CM

## 2018-09-25 DIAGNOSIS — R06.09 DOE (DYSPNEA ON EXERTION): ICD-10-CM

## 2018-09-25 PROBLEM — E87.20 LACTIC ACIDOSIS: Status: ACTIVE | Noted: 2018-09-25

## 2018-09-25 PROBLEM — E87.1 HYPONATREMIA: Status: ACTIVE | Noted: 2018-09-25

## 2018-09-25 LAB
ABO GROUP BLD: NORMAL
ALBUMIN SERPL-MCNC: 3.7 G/DL (ref 3.5–5.2)
ALBUMIN/GLOB SERPL: 1.2 G/DL
ALP SERPL-CCNC: 69 U/L (ref 39–117)
ALT SERPL W P-5'-P-CCNC: 28 U/L (ref 1–41)
ANION GAP SERPL CALCULATED.3IONS-SCNC: 13 MMOL/L
AST SERPL-CCNC: 25 U/L (ref 1–40)
BASOPHILS # BLD AUTO: 0.02 10*3/MM3 (ref 0–0.2)
BASOPHILS NFR BLD AUTO: 0.2 % (ref 0–1.5)
BILIRUB SERPL-MCNC: 1.3 MG/DL (ref 0.1–1.2)
BILIRUB UR QL STRIP: NEGATIVE
BLD GP AB SCN SERPL QL: NEGATIVE
BUN BLD-MCNC: 35 MG/DL (ref 8–23)
BUN/CREAT SERPL: 22.4 (ref 7–25)
CALCIUM SPEC-SCNC: 9 MG/DL (ref 8.6–10.5)
CHLORIDE SERPL-SCNC: 90 MMOL/L (ref 98–107)
CLARITY UR: CLEAR
CO2 SERPL-SCNC: 22 MMOL/L (ref 22–29)
COLOR UR: YELLOW
CREAT BLD-MCNC: 1.56 MG/DL (ref 0.76–1.27)
CREAT UR-MCNC: 51 MG/DL
D-LACTATE SERPL-SCNC: 1.8 MMOL/L (ref 0.5–2)
D-LACTATE SERPL-SCNC: 2.1 MMOL/L (ref 0.5–2)
DEPRECATED RDW RBC AUTO: 42.7 FL (ref 37–54)
EOSINOPHIL # BLD AUTO: 0.16 10*3/MM3 (ref 0–0.7)
EOSINOPHIL NFR BLD AUTO: 1.3 % (ref 0.3–6.2)
ERYTHROCYTE [DISTWIDTH] IN BLOOD BY AUTOMATED COUNT: 13.3 % (ref 11.5–14.5)
GFR SERPL CREATININE-BSD FRML MDRD: 44 ML/MIN/1.73
GLOBULIN UR ELPH-MCNC: 3.1 GM/DL
GLUCOSE BLD-MCNC: 142 MG/DL (ref 65–99)
GLUCOSE UR STRIP-MCNC: NEGATIVE MG/DL
HCT VFR BLD AUTO: 43.3 % (ref 40.4–52.2)
HCT VFR BLD AUTO: 45 % (ref 40.4–52.2)
HGB BLD-MCNC: 15.4 G/DL (ref 13.7–17.6)
HGB BLD-MCNC: 16 G/DL (ref 13.7–17.6)
HGB UR QL STRIP.AUTO: NEGATIVE
HOLD SPECIMEN: NORMAL
IMM GRANULOCYTES # BLD: 0.06 10*3/MM3 (ref 0–0.03)
IMM GRANULOCYTES NFR BLD: 0.5 % (ref 0–0.5)
INR PPP: 1.76 (ref 0.9–1.1)
KETONES UR QL STRIP: ABNORMAL
LEUKOCYTE ESTERASE UR QL STRIP.AUTO: NEGATIVE
LIPASE SERPL-CCNC: 23 U/L (ref 13–60)
LYMPHOCYTES # BLD AUTO: 1.33 10*3/MM3 (ref 0.9–4.8)
LYMPHOCYTES NFR BLD AUTO: 10.7 % (ref 19.6–45.3)
MAGNESIUM SERPL-MCNC: 2.2 MG/DL (ref 1.6–2.4)
MCH RBC QN AUTO: 30.9 PG (ref 27–32.7)
MCHC RBC AUTO-ENTMCNC: 35.6 G/DL (ref 32.6–36.4)
MCV RBC AUTO: 87 FL (ref 79.8–96.2)
MONOCYTES # BLD AUTO: 0.52 10*3/MM3 (ref 0.2–1.2)
MONOCYTES NFR BLD AUTO: 4.2 % (ref 5–12)
NEUTROPHILS # BLD AUTO: 10.38 10*3/MM3 (ref 1.9–8.1)
NEUTROPHILS NFR BLD AUTO: 83.6 % (ref 42.7–76)
NITRITE UR QL STRIP: NEGATIVE
NT-PROBNP SERPL-MCNC: 2899 PG/ML (ref 0–900)
PH UR STRIP.AUTO: 6.5 [PH] (ref 5–8)
PLATELET # BLD AUTO: 208 10*3/MM3 (ref 140–500)
PMV BLD AUTO: 9.5 FL (ref 6–12)
POTASSIUM BLD-SCNC: 4.7 MMOL/L (ref 3.5–5.2)
PROT SERPL-MCNC: 6.8 G/DL (ref 6–8.5)
PROT UR QL STRIP: NEGATIVE
PROTHROMBIN TIME: 20.2 SECONDS (ref 11.7–14.2)
RBC # BLD AUTO: 5.17 10*6/MM3 (ref 4.6–6)
RH BLD: NEGATIVE
SODIUM BLD-SCNC: 125 MMOL/L (ref 136–145)
SODIUM UR-SCNC: 94 MMOL/L
SP GR UR STRIP: >=1.03 (ref 1–1.03)
T&S EXPIRATION DATE: NORMAL
TROPONIN T SERPL-MCNC: <0.01 NG/ML (ref 0–0.03)
UROBILINOGEN UR QL STRIP: ABNORMAL
WBC NRBC COR # BLD: 12.41 10*3/MM3 (ref 4.5–10.7)

## 2018-09-25 PROCEDURE — 86900 BLOOD TYPING SEROLOGIC ABO: CPT | Performed by: EMERGENCY MEDICINE

## 2018-09-25 PROCEDURE — 85610 PROTHROMBIN TIME: CPT | Performed by: EMERGENCY MEDICINE

## 2018-09-25 PROCEDURE — 81003 URINALYSIS AUTO W/O SCOPE: CPT | Performed by: EMERGENCY MEDICINE

## 2018-09-25 PROCEDURE — 25010000002 IOPAMIDOL 61 % SOLUTION: Performed by: EMERGENCY MEDICINE

## 2018-09-25 PROCEDURE — 86850 RBC ANTIBODY SCREEN: CPT | Performed by: EMERGENCY MEDICINE

## 2018-09-25 PROCEDURE — 99213 OFFICE O/P EST LOW 20 MIN: CPT | Performed by: COLON & RECTAL SURGERY

## 2018-09-25 PROCEDURE — 94799 UNLISTED PULMONARY SVC/PX: CPT

## 2018-09-25 PROCEDURE — 86901 BLOOD TYPING SEROLOGIC RH(D): CPT | Performed by: EMERGENCY MEDICINE

## 2018-09-25 PROCEDURE — 85025 COMPLETE CBC W/AUTO DIFF WBC: CPT | Performed by: EMERGENCY MEDICINE

## 2018-09-25 PROCEDURE — 84300 ASSAY OF URINE SODIUM: CPT | Performed by: EMERGENCY MEDICINE

## 2018-09-25 PROCEDURE — 74177 CT ABD & PELVIS W/CONTRAST: CPT

## 2018-09-25 PROCEDURE — 94640 AIRWAY INHALATION TREATMENT: CPT

## 2018-09-25 PROCEDURE — 82570 ASSAY OF URINE CREATININE: CPT | Performed by: EMERGENCY MEDICINE

## 2018-09-25 PROCEDURE — 99284 EMERGENCY DEPT VISIT MOD MDM: CPT

## 2018-09-25 PROCEDURE — 80053 COMPREHEN METABOLIC PANEL: CPT | Performed by: EMERGENCY MEDICINE

## 2018-09-25 PROCEDURE — 85018 HEMOGLOBIN: CPT | Performed by: INTERNAL MEDICINE

## 2018-09-25 PROCEDURE — 71046 X-RAY EXAM CHEST 2 VIEWS: CPT

## 2018-09-25 PROCEDURE — 93010 ELECTROCARDIOGRAM REPORT: CPT | Performed by: INTERNAL MEDICINE

## 2018-09-25 PROCEDURE — 83735 ASSAY OF MAGNESIUM: CPT | Performed by: INTERNAL MEDICINE

## 2018-09-25 PROCEDURE — 87040 BLOOD CULTURE FOR BACTERIA: CPT | Performed by: EMERGENCY MEDICINE

## 2018-09-25 PROCEDURE — 85014 HEMATOCRIT: CPT | Performed by: INTERNAL MEDICINE

## 2018-09-25 PROCEDURE — 99222 1ST HOSP IP/OBS MODERATE 55: CPT | Performed by: INTERNAL MEDICINE

## 2018-09-25 PROCEDURE — 83880 ASSAY OF NATRIURETIC PEPTIDE: CPT | Performed by: EMERGENCY MEDICINE

## 2018-09-25 PROCEDURE — 93005 ELECTROCARDIOGRAM TRACING: CPT | Performed by: EMERGENCY MEDICINE

## 2018-09-25 PROCEDURE — 83690 ASSAY OF LIPASE: CPT | Performed by: EMERGENCY MEDICINE

## 2018-09-25 PROCEDURE — 83605 ASSAY OF LACTIC ACID: CPT | Performed by: EMERGENCY MEDICINE

## 2018-09-25 PROCEDURE — 84484 ASSAY OF TROPONIN QUANT: CPT | Performed by: EMERGENCY MEDICINE

## 2018-09-25 RX ORDER — SODIUM CHLORIDE 9 MG/ML
50 INJECTION, SOLUTION INTRAVENOUS CONTINUOUS
Status: DISCONTINUED | OUTPATIENT
Start: 2018-09-25 | End: 2018-09-28 | Stop reason: HOSPADM

## 2018-09-25 RX ORDER — SODIUM CHLORIDE 0.9 % (FLUSH) 0.9 %
10 SYRINGE (ML) INJECTION AS NEEDED
Status: DISCONTINUED | OUTPATIENT
Start: 2018-09-25 | End: 2018-09-28 | Stop reason: HOSPADM

## 2018-09-25 RX ORDER — CEFTRIAXONE SODIUM 1 G/50ML
1 INJECTION, SOLUTION INTRAVENOUS ONCE
Status: DISCONTINUED | OUTPATIENT
Start: 2018-09-25 | End: 2018-09-25

## 2018-09-25 RX ORDER — IPRATROPIUM BROMIDE AND ALBUTEROL SULFATE 2.5; .5 MG/3ML; MG/3ML
3 SOLUTION RESPIRATORY (INHALATION)
Status: DISCONTINUED | OUTPATIENT
Start: 2018-09-25 | End: 2018-09-28 | Stop reason: HOSPADM

## 2018-09-25 RX ORDER — PANTOPRAZOLE SODIUM 40 MG/10ML
80 INJECTION, POWDER, LYOPHILIZED, FOR SOLUTION INTRAVENOUS ONCE
Status: COMPLETED | OUTPATIENT
Start: 2018-09-25 | End: 2018-09-25

## 2018-09-25 RX ORDER — TAMSULOSIN HYDROCHLORIDE 0.4 MG/1
0.4 CAPSULE ORAL NIGHTLY
Status: DISCONTINUED | OUTPATIENT
Start: 2018-09-25 | End: 2018-09-28 | Stop reason: HOSPADM

## 2018-09-25 RX ORDER — SULFAMETHOXAZOLE AND TRIMETHOPRIM 800; 160 MG/1; MG/1
TABLET ORAL
Refills: 0 | COMMUNITY
Start: 2018-09-14 | End: 2018-09-28 | Stop reason: HOSPADM

## 2018-09-25 RX ORDER — ACETAMINOPHEN 325 MG/1
650 TABLET ORAL EVERY 4 HOURS PRN
Status: DISCONTINUED | OUTPATIENT
Start: 2018-09-25 | End: 2018-09-28 | Stop reason: HOSPADM

## 2018-09-25 RX ORDER — ONDANSETRON 2 MG/ML
4 INJECTION INTRAMUSCULAR; INTRAVENOUS EVERY 6 HOURS PRN
Status: DISCONTINUED | OUTPATIENT
Start: 2018-09-25 | End: 2018-09-28 | Stop reason: HOSPADM

## 2018-09-25 RX ORDER — LATANOPROST 50 UG/ML
1 SOLUTION/ DROPS OPHTHALMIC NIGHTLY
Status: DISCONTINUED | OUTPATIENT
Start: 2018-09-25 | End: 2018-09-28 | Stop reason: HOSPADM

## 2018-09-25 RX ORDER — SODIUM CHLORIDE 0.9 % (FLUSH) 0.9 %
1-10 SYRINGE (ML) INJECTION AS NEEDED
Status: DISCONTINUED | OUTPATIENT
Start: 2018-09-25 | End: 2018-09-28 | Stop reason: HOSPADM

## 2018-09-25 RX ADMIN — PANTOPRAZOLE SODIUM 80 MG: 40 INJECTION, POWDER, FOR SOLUTION INTRAVENOUS at 17:24

## 2018-09-25 RX ADMIN — SODIUM CHLORIDE 125 ML/HR: 9 INJECTION, SOLUTION INTRAVENOUS at 23:58

## 2018-09-25 RX ADMIN — SODIUM CHLORIDE 125 ML/HR: 9 INJECTION, SOLUTION INTRAVENOUS at 15:49

## 2018-09-25 RX ADMIN — SODIUM CHLORIDE, POTASSIUM CHLORIDE, SODIUM LACTATE AND CALCIUM CHLORIDE 1000 ML: 600; 310; 30; 20 INJECTION, SOLUTION INTRAVENOUS at 10:41

## 2018-09-25 RX ADMIN — IPRATROPIUM BROMIDE AND ALBUTEROL SULFATE 3 ML: .5; 3 SOLUTION RESPIRATORY (INHALATION) at 16:27

## 2018-09-25 RX ADMIN — SODIUM CHLORIDE 8 MG/HR: 900 INJECTION INTRAVENOUS at 21:26

## 2018-09-25 RX ADMIN — SODIUM CHLORIDE 8 MG/HR: 900 INJECTION INTRAVENOUS at 17:24

## 2018-09-25 RX ADMIN — TAMSULOSIN HYDROCHLORIDE 0.4 MG: 0.4 CAPSULE ORAL at 21:14

## 2018-09-25 RX ADMIN — METOPROLOL TARTRATE 12.5 MG: 25 TABLET ORAL at 16:58

## 2018-09-25 RX ADMIN — LATANOPROST 1 DROP: 50 SOLUTION OPHTHALMIC at 21:14

## 2018-09-25 RX ADMIN — IOPAMIDOL 85 ML: 612 INJECTION, SOLUTION INTRAVENOUS at 12:09

## 2018-09-25 RX ADMIN — IPRATROPIUM BROMIDE AND ALBUTEROL SULFATE 3 ML: .5; 3 SOLUTION RESPIRATORY (INHALATION) at 22:58

## 2018-09-25 RX ADMIN — BRIMONIDINE TARTRATE 1 DROP: 1 SOLUTION/ DROPS OPHTHALMIC at 17:24

## 2018-09-25 RX ADMIN — SODIUM CHLORIDE, POTASSIUM CHLORIDE, SODIUM LACTATE AND CALCIUM CHLORIDE 1000 ML: 600; 310; 30; 20 INJECTION, SOLUTION INTRAVENOUS at 13:01

## 2018-09-25 NOTE — PROGRESS NOTES
"Aj Salazar is a 72 y.o. male in for follow up of internal hemorroids    GI [facility]-last seen over yr  Last week , noticed dark stool worsened over the week  Feeling bad  Several bm loose   Dark blood  Last 2 day no blood     Have uti - ct soon with Dr. Mcgarry  On abx  Cystoscope sched for next month    Last week abd pain in epigastric    No appetite  Copd and soa worse  Using inhaler a lot without improvement      /60   Pulse 50   Temp 96.7 °F (35.9 °C) (Oral)   Ht 177.8 cm (70\")   Wt 87.9 kg (193 lb 11.2 oz)   SpO2 93%   BMI 27.79 kg/m²   Body mass index is 27.79 kg/m².      PE:  Physical Exam   Constitutional: He appears well-developed. No distress.   HENT:   Head: Normocephalic and atraumatic.   Abdominal: Soft. He exhibits no distension. There is no tenderness.   Musculoskeletal: Normal range of motion.   Neurological: He is alert.   Psychiatric: Thought content normal.         Assessment:   1. Epigastric pain    2. LAWS (dyspnea on exertion)    3. UGIB (upper gastrointestinal bleed)         Plan:  Worry that he had ulcer that bleed and now anemic.  Advised to go to ED ASAP.  Offered to wheel over in wheelchair but pt declined.        "

## 2018-09-25 NOTE — TELEPHONE ENCOUNTER
Pt called wanting to be seen today, feeling cold and hot, off and on with no appetite for couple days.  Pt's # [phone]  Thanks SP

## 2018-09-26 ENCOUNTER — ANESTHESIA EVENT (OUTPATIENT)
Dept: GASTROENTEROLOGY | Facility: HOSPITAL | Age: 73
End: 2018-09-26

## 2018-09-26 ENCOUNTER — ANESTHESIA (OUTPATIENT)
Dept: GASTROENTEROLOGY | Facility: HOSPITAL | Age: 73
End: 2018-09-26

## 2018-09-26 PROBLEM — Z88.1 DRUG ALLERGY, ANTIBIOTIC: Status: ACTIVE | Noted: 2018-09-26

## 2018-09-26 LAB
ANION GAP SERPL CALCULATED.3IONS-SCNC: 10.3 MMOL/L
BASOPHILS # BLD AUTO: 0.03 10*3/MM3 (ref 0–0.2)
BASOPHILS NFR BLD AUTO: 0.3 % (ref 0–1.5)
BUN BLD-MCNC: 20 MG/DL (ref 8–23)
BUN/CREAT SERPL: 22.2 (ref 7–25)
CALCIUM SPEC-SCNC: 7.7 MG/DL (ref 8.6–10.5)
CHLORIDE SERPL-SCNC: 94 MMOL/L (ref 98–107)
CO2 SERPL-SCNC: 19.7 MMOL/L (ref 22–29)
CREAT BLD-MCNC: 0.9 MG/DL (ref 0.76–1.27)
DEPRECATED RDW RBC AUTO: 42.7 FL (ref 37–54)
EOSINOPHIL # BLD AUTO: 0.2 10*3/MM3 (ref 0–0.7)
EOSINOPHIL NFR BLD AUTO: 2 % (ref 0.3–6.2)
ERYTHROCYTE [DISTWIDTH] IN BLOOD BY AUTOMATED COUNT: 13.4 % (ref 11.5–14.5)
GFR SERPL CREATININE-BSD FRML MDRD: 83 ML/MIN/1.73
GLUCOSE BLD-MCNC: 95 MG/DL (ref 65–99)
HCT VFR BLD AUTO: 39.7 % (ref 40.4–52.2)
HCT VFR BLD AUTO: 41.6 % (ref 40.4–52.2)
HGB BLD-MCNC: 13.4 G/DL (ref 13.7–17.6)
HGB BLD-MCNC: 14 G/DL (ref 13.7–17.6)
IMM GRANULOCYTES # BLD: 0.03 10*3/MM3 (ref 0–0.03)
IMM GRANULOCYTES NFR BLD: 0.3 % (ref 0–0.5)
LYMPHOCYTES # BLD AUTO: 0.66 10*3/MM3 (ref 0.9–4.8)
LYMPHOCYTES NFR BLD AUTO: 6.5 % (ref 19.6–45.3)
MCH RBC QN AUTO: 29.4 PG (ref 27–32.7)
MCHC RBC AUTO-ENTMCNC: 33.7 G/DL (ref 32.6–36.4)
MCV RBC AUTO: 87.2 FL (ref 79.8–96.2)
MONOCYTES # BLD AUTO: 1.19 10*3/MM3 (ref 0.2–1.2)
MONOCYTES NFR BLD AUTO: 11.6 % (ref 5–12)
NEUTROPHILS # BLD AUTO: 8.11 10*3/MM3 (ref 1.9–8.1)
NEUTROPHILS NFR BLD AUTO: 79.3 % (ref 42.7–76)
PLATELET # BLD AUTO: 132 10*3/MM3 (ref 140–500)
PMV BLD AUTO: 9.4 FL (ref 6–12)
POTASSIUM BLD-SCNC: 4.1 MMOL/L (ref 3.5–5.2)
RBC # BLD AUTO: 4.77 10*6/MM3 (ref 4.6–6)
SODIUM BLD-SCNC: 124 MMOL/L (ref 136–145)
WBC NRBC COR # BLD: 10.22 10*3/MM3 (ref 4.5–10.7)

## 2018-09-26 PROCEDURE — 36415 COLL VENOUS BLD VENIPUNCTURE: CPT | Performed by: INTERNAL MEDICINE

## 2018-09-26 PROCEDURE — 85025 COMPLETE CBC W/AUTO DIFF WBC: CPT | Performed by: INTERNAL MEDICINE

## 2018-09-26 PROCEDURE — 94799 UNLISTED PULMONARY SVC/PX: CPT

## 2018-09-26 PROCEDURE — 43235 EGD DIAGNOSTIC BRUSH WASH: CPT | Performed by: INTERNAL MEDICINE

## 2018-09-26 PROCEDURE — 85014 HEMATOCRIT: CPT | Performed by: INTERNAL MEDICINE

## 2018-09-26 PROCEDURE — 25010000002 PROPOFOL 10 MG/ML EMULSION: Performed by: ANESTHESIOLOGY

## 2018-09-26 PROCEDURE — 99222 1ST HOSP IP/OBS MODERATE 55: CPT | Performed by: INTERNAL MEDICINE

## 2018-09-26 PROCEDURE — 25010000002 HYDROCORTISONE SODIUM SUCCINATE 100 MG RECONSTITUTED SOLUTION: Performed by: INTERNAL MEDICINE

## 2018-09-26 PROCEDURE — 80048 BASIC METABOLIC PNL TOTAL CA: CPT | Performed by: INTERNAL MEDICINE

## 2018-09-26 PROCEDURE — 0DJ08ZZ INSPECTION OF UPPER INTESTINAL TRACT, VIA NATURAL OR ARTIFICIAL OPENING ENDOSCOPIC: ICD-10-PCS | Performed by: INTERNAL MEDICINE

## 2018-09-26 PROCEDURE — 85018 HEMOGLOBIN: CPT | Performed by: INTERNAL MEDICINE

## 2018-09-26 PROCEDURE — 25010000002 DIGOXIN PER 500 MCG: Performed by: INTERNAL MEDICINE

## 2018-09-26 PROCEDURE — 94664 DEMO&/EVAL PT USE INHALER: CPT

## 2018-09-26 RX ORDER — DIPHENHYDRAMINE HCL 12.5MG/5ML
12.5 LIQUID (ML) ORAL EVERY 6 HOURS PRN
Status: DISCONTINUED | OUTPATIENT
Start: 2018-09-26 | End: 2018-09-28

## 2018-09-26 RX ORDER — PROPOFOL 10 MG/ML
VIAL (ML) INTRAVENOUS AS NEEDED
Status: DISCONTINUED | OUTPATIENT
Start: 2018-09-26 | End: 2018-09-26 | Stop reason: SURG

## 2018-09-26 RX ORDER — BUDESONIDE AND FORMOTEROL FUMARATE DIHYDRATE 160; 4.5 UG/1; UG/1
2 AEROSOL RESPIRATORY (INHALATION)
Status: DISCONTINUED | OUTPATIENT
Start: 2018-09-26 | End: 2018-09-28 | Stop reason: HOSPADM

## 2018-09-26 RX ORDER — SODIUM CHLORIDE 9 MG/ML
30 INJECTION, SOLUTION INTRAVENOUS CONTINUOUS PRN
Status: DISCONTINUED | OUTPATIENT
Start: 2018-09-26 | End: 2018-09-28 | Stop reason: HOSPADM

## 2018-09-26 RX ORDER — SODIUM CHLORIDE, SODIUM LACTATE, POTASSIUM CHLORIDE, CALCIUM CHLORIDE 600; 310; 30; 20 MG/100ML; MG/100ML; MG/100ML; MG/100ML
INJECTION, SOLUTION INTRAVENOUS CONTINUOUS PRN
Status: DISCONTINUED | OUTPATIENT
Start: 2018-09-26 | End: 2018-09-26 | Stop reason: SURG

## 2018-09-26 RX ORDER — DIGOXIN 0.25 MG/ML
500 INJECTION INTRAMUSCULAR; INTRAVENOUS ONCE
Status: COMPLETED | OUTPATIENT
Start: 2018-09-26 | End: 2018-09-26

## 2018-09-26 RX ORDER — LIDOCAINE HYDROCHLORIDE 20 MG/ML
INJECTION, SOLUTION INFILTRATION; PERINEURAL AS NEEDED
Status: DISCONTINUED | OUTPATIENT
Start: 2018-09-26 | End: 2018-09-26 | Stop reason: SURG

## 2018-09-26 RX ORDER — SUCRALFATE 1 G/1
1 TABLET ORAL
Status: DISCONTINUED | OUTPATIENT
Start: 2018-09-26 | End: 2018-09-28 | Stop reason: HOSPADM

## 2018-09-26 RX ORDER — PANTOPRAZOLE SODIUM 40 MG/10ML
40 INJECTION, POWDER, LYOPHILIZED, FOR SOLUTION INTRAVENOUS 2 TIMES DAILY
Status: DISCONTINUED | OUTPATIENT
Start: 2018-09-27 | End: 2018-09-27

## 2018-09-26 RX ADMIN — LIDOCAINE HYDROCHLORIDE 100 MG: 20 INJECTION, SOLUTION INFILTRATION; PERINEURAL at 13:15

## 2018-09-26 RX ADMIN — PROPOFOL 200 MG: 10 INJECTION, EMULSION INTRAVENOUS at 13:15

## 2018-09-26 RX ADMIN — DIPHENHYDRAMINE HYDROCHLORIDE 12.5 MG: 12.5 SOLUTION ORAL at 22:20

## 2018-09-26 RX ADMIN — SODIUM CHLORIDE 125 ML/HR: 9 INJECTION, SOLUTION INTRAVENOUS at 07:03

## 2018-09-26 RX ADMIN — SODIUM CHLORIDE 125 ML/HR: 9 INJECTION, SOLUTION INTRAVENOUS at 18:40

## 2018-09-26 RX ADMIN — HYDROCORTISONE SODIUM SUCCINATE 100 MG: 100 INJECTION, POWDER, FOR SOLUTION INTRAMUSCULAR; INTRAVENOUS at 23:33

## 2018-09-26 RX ADMIN — SODIUM CHLORIDE 125 ML/HR: 9 INJECTION, SOLUTION INTRAVENOUS at 13:00

## 2018-09-26 RX ADMIN — TAMSULOSIN HYDROCHLORIDE 0.4 MG: 0.4 CAPSULE ORAL at 22:20

## 2018-09-26 RX ADMIN — IPRATROPIUM BROMIDE AND ALBUTEROL SULFATE 3 ML: .5; 3 SOLUTION RESPIRATORY (INHALATION) at 23:21

## 2018-09-26 RX ADMIN — HYDROCORTISONE SODIUM SUCCINATE 100 MG: 100 INJECTION, POWDER, FOR SOLUTION INTRAMUSCULAR; INTRAVENOUS at 11:29

## 2018-09-26 RX ADMIN — IPRATROPIUM BROMIDE AND ALBUTEROL SULFATE 3 ML: .5; 3 SOLUTION RESPIRATORY (INHALATION) at 08:17

## 2018-09-26 RX ADMIN — METOPROLOL TARTRATE 25 MG: 25 TABLET ORAL at 10:12

## 2018-09-26 RX ADMIN — LATANOPROST 1 DROP: 50 SOLUTION OPHTHALMIC at 22:20

## 2018-09-26 RX ADMIN — BUDESONIDE AND FORMOTEROL FUMARATE DIHYDRATE 2 PUFF: 160; 4.5 AEROSOL RESPIRATORY (INHALATION) at 23:22

## 2018-09-26 RX ADMIN — METOPROLOL TARTRATE 25 MG: 25 TABLET ORAL at 22:20

## 2018-09-26 RX ADMIN — SODIUM CHLORIDE 8 MG/HR: 900 INJECTION INTRAVENOUS at 03:08

## 2018-09-26 RX ADMIN — SUCRALFATE 1 G: 1 TABLET ORAL at 16:35

## 2018-09-26 RX ADMIN — SODIUM CHLORIDE 8 MG/HR: 900 INJECTION INTRAVENOUS at 07:03

## 2018-09-26 RX ADMIN — DIGOXIN 500 MCG: 0.25 INJECTION INTRAMUSCULAR; INTRAVENOUS at 17:10

## 2018-09-26 RX ADMIN — SODIUM CHLORIDE, POTASSIUM CHLORIDE, SODIUM LACTATE AND CALCIUM CHLORIDE: 600; 310; 30; 20 INJECTION, SOLUTION INTRAVENOUS at 13:15

## 2018-09-26 NOTE — ANESTHESIA PREPROCEDURE EVALUATION
Anesthesia Evaluation     Patient summary reviewed and Nursing notes reviewed   NPO Solid Status: > 8 hours  NPO Liquid Status: > 8 hours           Airway   Mallampati: II  TM distance: >3 FB  Neck ROM: full  no difficulty expected  Dental - normal exam     Pulmonary - normal exam   (+) a smoker Former, COPD,   Cardiovascular - normal exam    (+) hypertension, dysrhythmias Atrial Fib, hyperlipidemia,       Neuro/Psych  (+) numbness, psychiatric history Depression,     GI/Hepatic/Renal/Endo    (+)  GI bleeding,     Musculoskeletal     Abdominal  - normal exam   Substance History      OB/GYN          Other                        Anesthesia Plan    ASA 3     MAC     Anesthetic plan, all risks, benefits, and alternatives have been provided, discussed and informed consent has been obtained with: patient.

## 2018-09-26 NOTE — ANESTHESIA POSTPROCEDURE EVALUATION
"Patient: Aj Salazar    Procedure Summary     Date:  09/26/18 Room / Location:   [facility]    Anesthesia Start:  1319 Anesthesia Stop:  1331    Procedure:  ESOPHAGOGASTRODUODENOSCOPY (N/A Esophagus) Diagnosis:       Melena      Abdominal pain, unspecified abdominal location      (Melena [K92.1])      (Abdominal pain, unspecified abdominal location [R10.9])    Surgeon:  Noman Moseley MD Provider:  Aj Sanders MD    Anesthesia Type:  MAC ASA Status:  3          Anesthesia Type: MAC  Last vitals  BP   107/73 (09/26/18 1255)   Temp   36.9 °C (98.4 °F) (09/26/18 1255)   Pulse   118 (09/26/18 1255)   Resp   18 (09/26/18 1255)     SpO2   93 % (09/26/18 1255)     Post Anesthesia Care and Evaluation    Patient location during evaluation: bedside  Patient participation: complete - patient participated  Level of consciousness: awake and alert  Pain management: adequate  Airway patency: patent  Anesthetic complications: No anesthetic complications    Cardiovascular status: acceptable  Respiratory status: acceptable  Hydration status: acceptable    Comments: /73 (BP Location: Left arm, Patient Position: Lying)   Pulse 118   Temp 36.9 °C (98.4 °F) (Oral)   Resp 18   Ht 177.8 cm (70\")   Wt 87.5 kg (193 lb)   SpO2 93%   BMI 27.69 kg/m²       "

## 2018-09-27 PROBLEM — N17.9 AKI (ACUTE KIDNEY INJURY): Status: RESOLVED | Noted: 2018-09-25 | Resolved: 2018-09-27

## 2018-09-27 LAB
HCT VFR BLD AUTO: 39.6 % (ref 40.4–52.2)
HCT VFR BLD AUTO: 40.3 % (ref 40.4–52.2)
HGB BLD-MCNC: 13.8 G/DL (ref 13.7–17.6)
HGB BLD-MCNC: 14 G/DL (ref 13.7–17.6)

## 2018-09-27 PROCEDURE — 99233 SBSQ HOSP IP/OBS HIGH 50: CPT | Performed by: INTERNAL MEDICINE

## 2018-09-27 PROCEDURE — 85014 HEMATOCRIT: CPT | Performed by: INTERNAL MEDICINE

## 2018-09-27 PROCEDURE — 85018 HEMOGLOBIN: CPT | Performed by: INTERNAL MEDICINE

## 2018-09-27 PROCEDURE — 94799 UNLISTED PULMONARY SVC/PX: CPT

## 2018-09-27 PROCEDURE — 99232 SBSQ HOSP IP/OBS MODERATE 35: CPT | Performed by: INTERNAL MEDICINE

## 2018-09-27 PROCEDURE — 25010000002 HYDROCORTISONE SODIUM SUCCINATE 100 MG RECONSTITUTED SOLUTION: Performed by: INTERNAL MEDICINE

## 2018-09-27 PROCEDURE — 94762 N-INVAS EAR/PLS OXIMTRY CONT: CPT

## 2018-09-27 RX ORDER — PANTOPRAZOLE SODIUM 40 MG/1
40 TABLET, DELAYED RELEASE ORAL
Status: DISCONTINUED | OUTPATIENT
Start: 2018-09-27 | End: 2018-09-28 | Stop reason: HOSPADM

## 2018-09-27 RX ORDER — METOPROLOL TARTRATE 50 MG/1
50 TABLET, FILM COATED ORAL EVERY 12 HOURS SCHEDULED
Status: DISCONTINUED | OUTPATIENT
Start: 2018-09-27 | End: 2018-09-28

## 2018-09-27 RX ADMIN — BUDESONIDE AND FORMOTEROL FUMARATE DIHYDRATE 2 PUFF: 160; 4.5 AEROSOL RESPIRATORY (INHALATION) at 06:43

## 2018-09-27 RX ADMIN — HYDROCORTISONE SODIUM SUCCINATE 100 MG: 100 INJECTION, POWDER, FOR SOLUTION INTRAMUSCULAR; INTRAVENOUS at 11:43

## 2018-09-27 RX ADMIN — HYDROCORTISONE SODIUM SUCCINATE 100 MG: 100 INJECTION, POWDER, FOR SOLUTION INTRAMUSCULAR; INTRAVENOUS at 22:00

## 2018-09-27 RX ADMIN — DIPHENHYDRAMINE HYDROCHLORIDE 12.5 MG: 12.5 SOLUTION ORAL at 11:43

## 2018-09-27 RX ADMIN — SUCRALFATE 1 G: 1 TABLET ORAL at 08:18

## 2018-09-27 RX ADMIN — LATANOPROST 1 DROP: 50 SOLUTION OPHTHALMIC at 21:58

## 2018-09-27 RX ADMIN — SUCRALFATE 1 G: 1 TABLET ORAL at 17:13

## 2018-09-27 RX ADMIN — BUDESONIDE AND FORMOTEROL FUMARATE DIHYDRATE 2 PUFF: 160; 4.5 AEROSOL RESPIRATORY (INHALATION) at 20:53

## 2018-09-27 RX ADMIN — METOPROLOL TARTRATE 50 MG: 25 TABLET ORAL at 21:55

## 2018-09-27 RX ADMIN — METOPROLOL TARTRATE 50 MG: 25 TABLET ORAL at 08:18

## 2018-09-27 RX ADMIN — METOPROLOL TARTRATE 5 MG: 1 INJECTION, SOLUTION INTRAVENOUS at 08:02

## 2018-09-27 RX ADMIN — PANTOPRAZOLE SODIUM 40 MG: 40 INJECTION, POWDER, FOR SOLUTION INTRAVENOUS at 08:18

## 2018-09-27 RX ADMIN — SODIUM CHLORIDE 125 ML/HR: 9 INJECTION, SOLUTION INTRAVENOUS at 03:04

## 2018-09-27 RX ADMIN — PANTOPRAZOLE SODIUM 40 MG: 40 TABLET, DELAYED RELEASE ORAL at 17:13

## 2018-09-27 RX ADMIN — SODIUM CHLORIDE 50 ML/HR: 9 INJECTION, SOLUTION INTRAVENOUS at 14:56

## 2018-09-27 RX ADMIN — IPRATROPIUM BROMIDE AND ALBUTEROL SULFATE 3 ML: .5; 3 SOLUTION RESPIRATORY (INHALATION) at 15:00

## 2018-09-27 RX ADMIN — IPRATROPIUM BROMIDE AND ALBUTEROL SULFATE 3 ML: .5; 3 SOLUTION RESPIRATORY (INHALATION) at 20:53

## 2018-09-27 RX ADMIN — IPRATROPIUM BROMIDE AND ALBUTEROL SULFATE 3 ML: .5; 3 SOLUTION RESPIRATORY (INHALATION) at 06:43

## 2018-09-27 RX ADMIN — DIPHENHYDRAMINE HYDROCHLORIDE 12.5 MG: 12.5 SOLUTION ORAL at 04:58

## 2018-09-27 RX ADMIN — DIPHENHYDRAMINE HYDROCHLORIDE 12.5 MG: 12.5 SOLUTION ORAL at 21:55

## 2018-09-27 RX ADMIN — TAMSULOSIN HYDROCHLORIDE 0.4 MG: 0.4 CAPSULE ORAL at 21:56

## 2018-09-27 RX ADMIN — METOPROLOL TARTRATE 5 MG: 1 INJECTION, SOLUTION INTRAVENOUS at 14:56

## 2018-09-28 VITALS
RESPIRATION RATE: 20 BRPM | OXYGEN SATURATION: 96 % | SYSTOLIC BLOOD PRESSURE: 126 MMHG | DIASTOLIC BLOOD PRESSURE: 79 MMHG | WEIGHT: 213.41 LBS | BODY MASS INDEX: 30.55 KG/M2 | HEART RATE: 97 BPM | HEIGHT: 70 IN | TEMPERATURE: 97.5 F

## 2018-09-28 PROBLEM — I95.9 HYPOTENSION: Status: RESOLVED | Noted: 2018-09-25 | Resolved: 2018-09-28

## 2018-09-28 PROBLEM — E87.20 LACTIC ACIDOSIS: Status: RESOLVED | Noted: 2018-09-25 | Resolved: 2018-09-28

## 2018-09-28 PROBLEM — E86.0 DEHYDRATION: Status: RESOLVED | Noted: 2018-09-25 | Resolved: 2018-09-28

## 2018-09-28 PROBLEM — G47.34 NOCTURNAL HYPOXIA: Status: ACTIVE | Noted: 2018-09-28

## 2018-09-28 PROBLEM — E87.1 HYPONATREMIA: Status: RESOLVED | Noted: 2018-09-25 | Resolved: 2018-09-28

## 2018-09-28 PROBLEM — K92.1 MELENA: Status: RESOLVED | Noted: 2018-09-25 | Resolved: 2018-09-28

## 2018-09-28 LAB
HCT VFR BLD AUTO: 37.9 % (ref 40.4–52.2)
HGB BLD-MCNC: 12.7 G/DL (ref 13.7–17.6)

## 2018-09-28 PROCEDURE — 99233 SBSQ HOSP IP/OBS HIGH 50: CPT | Performed by: INTERNAL MEDICINE

## 2018-09-28 PROCEDURE — 99232 SBSQ HOSP IP/OBS MODERATE 35: CPT | Performed by: INTERNAL MEDICINE

## 2018-09-28 PROCEDURE — 85014 HEMATOCRIT: CPT | Performed by: INTERNAL MEDICINE

## 2018-09-28 PROCEDURE — 85018 HEMOGLOBIN: CPT | Performed by: INTERNAL MEDICINE

## 2018-09-28 PROCEDURE — 86677 HELICOBACTER PYLORI ANTIBODY: CPT | Performed by: INTERNAL MEDICINE

## 2018-09-28 PROCEDURE — 94799 UNLISTED PULMONARY SVC/PX: CPT

## 2018-09-28 RX ORDER — SUCRALFATE 1 G/1
1 TABLET ORAL
Qty: 60 TABLET | Refills: 0 | Status: SHIPPED | OUTPATIENT
Start: 2018-09-28 | End: 2018-11-19 | Stop reason: SDUPTHER

## 2018-09-28 RX ORDER — PANTOPRAZOLE SODIUM 40 MG/1
40 TABLET, DELAYED RELEASE ORAL
Qty: 60 TABLET | Refills: 1 | Status: SHIPPED | OUTPATIENT
Start: 2018-09-28 | End: 2018-11-28 | Stop reason: SDUPTHER

## 2018-09-28 RX ORDER — METOPROLOL TARTRATE 100 MG/1
100 TABLET ORAL EVERY 12 HOURS SCHEDULED
Qty: 60 TABLET | Refills: 1 | Status: SHIPPED | OUTPATIENT
Start: 2018-09-28 | End: 2018-11-28 | Stop reason: SDUPTHER

## 2018-09-28 RX ORDER — METOPROLOL TARTRATE 100 MG/1
100 TABLET ORAL EVERY 12 HOURS SCHEDULED
Status: DISCONTINUED | OUTPATIENT
Start: 2018-09-28 | End: 2018-09-28 | Stop reason: HOSPADM

## 2018-09-28 RX ORDER — ACETAMINOPHEN 325 MG/1
650 TABLET ORAL EVERY 4 HOURS PRN
COMMUNITY
Start: 2018-09-28 | End: 2020-10-22

## 2018-09-28 RX ADMIN — DIPHENHYDRAMINE HYDROCHLORIDE 12.5 MG: 12.5 SOLUTION ORAL at 10:37

## 2018-09-28 RX ADMIN — IPRATROPIUM BROMIDE AND ALBUTEROL SULFATE 3 ML: .5; 3 SOLUTION RESPIRATORY (INHALATION) at 07:57

## 2018-09-28 RX ADMIN — SUCRALFATE 1 G: 1 TABLET ORAL at 06:59

## 2018-09-28 RX ADMIN — METOPROLOL TARTRATE 100 MG: 50 TABLET, FILM COATED ORAL at 08:39

## 2018-09-28 RX ADMIN — BUDESONIDE AND FORMOTEROL FUMARATE DIHYDRATE 2 PUFF: 160; 4.5 AEROSOL RESPIRATORY (INHALATION) at 07:54

## 2018-09-28 RX ADMIN — PANTOPRAZOLE SODIUM 40 MG: 40 TABLET, DELAYED RELEASE ORAL at 06:59

## 2018-09-29 ENCOUNTER — READMISSION MANAGEMENT (OUTPATIENT)
Dept: CALL CENTER | Facility: HOSPITAL | Age: 73
End: 2018-09-29

## 2018-09-29 NOTE — OUTREACH NOTE
Prep Survey      Responses   Facility patient discharged from?  [facility]   Is patient eligible?  Yes   Discharge diagnosis  Rectal bleeding, nocturnal hypoxia, permanent AFib, abdominal pain, COPD   Does the patient have one of the following disease processes/diagnoses(primary or secondary)?  Other   Does the patient have Home health ordered?  No   Is there a DME ordered?  Yes   What DME was ordered?  Home O2 with [facility]    Comments regarding appointments  Pt. to schedule follow up appointment.    Prep survey completed?  Yes          Gloria Lopez RN

## 2018-09-30 LAB
BACTERIA SPEC AEROBE CULT: NORMAL
BACTERIA SPEC AEROBE CULT: NORMAL

## 2018-10-01 ENCOUNTER — TELEPHONE (OUTPATIENT)
Dept: INTERNAL MEDICINE | Age: 73
End: 2018-10-01

## 2018-10-01 LAB
H PYLORI IGA SER IA-ACNC: <9 UNITS (ref 0–8.9)
H PYLORI IGG SER IA-ACNC: 0.92 INDEX VALUE (ref 0–0.79)
H PYLORI IGM SER-ACNC: <9 UNITS (ref 0–8.9)

## 2018-10-01 NOTE — TELEPHONE ENCOUNTER
Patient was in [facility] for a bleeding ulcer and low blood pressure and afib.  Pt was told to call us to see if we needed him to do a hospital follow up with us or not.  Please call patient and advise [phone]

## 2018-10-01 NOTE — TELEPHONE ENCOUNTER
Follow-up next week for hospital DC follow-up.   With me if schedule allows otherwise okay with Kamini.

## 2018-10-02 ENCOUNTER — OFFICE VISIT (OUTPATIENT)
Dept: INTERNAL MEDICINE | Age: 73
End: 2018-10-02

## 2018-10-02 VITALS
TEMPERATURE: 97.9 F | BODY MASS INDEX: 28.43 KG/M2 | WEIGHT: 198.6 LBS | OXYGEN SATURATION: 93 % | HEIGHT: 70 IN | DIASTOLIC BLOOD PRESSURE: 74 MMHG | HEART RATE: 92 BPM | SYSTOLIC BLOOD PRESSURE: 120 MMHG

## 2018-10-02 DIAGNOSIS — K26.4 GASTROINTESTINAL HEMORRHAGE ASSOCIATED WITH DUODENAL ULCER: Primary | ICD-10-CM

## 2018-10-02 DIAGNOSIS — K26.9 MULTIPLE DUODENAL ULCERS: ICD-10-CM

## 2018-10-02 PROCEDURE — 99213 OFFICE O/P EST LOW 20 MIN: CPT | Performed by: NURSE PRACTITIONER

## 2018-10-02 NOTE — PROGRESS NOTES
"[facility] INTERNAL MEDICINE      Aj Pattonb / 72 y.o. / male  10/02/2018    VITALS    Visit Vitals  /74   Pulse 92   Temp 97.9 °F (36.6 °C)   Ht 177.8 cm (70\")   Wt 90.1 kg (198 lb 9.6 oz)   SpO2 93%   BMI 28.50 kg/m²       BP Readings from Last 3 Encounters:   10/02/18 120/74   09/28/18 126/79   09/25/18 110/60     Wt Readings from Last 3 Encounters:   10/02/18 90.1 kg (198 lb 9.6 oz)   09/28/18 96.8 kg (213 lb 6.5 oz)   09/25/18 87.9 kg (193 lb 11.2 oz)      Body mass index is 28.5 kg/m².    CC:  Main reason(s) for today's visit: GI Bleeding (hosp f/u 9/25/18-9/28/18 GI bleeding); Hospital Follow Up; and Atrial Fibrillation      HPI:     Date of admission/discharge: 9/25/18- 9/28/18   Hospital: [facility]  Principle Dx: Melena   Secondary Dx:   Nocturnal hypoxia [G47.34] 09/28/2018    • Drug allergy, antibiotic [Z88.1] 09/26/2018   • Permanent atrial fibrillation (CMS/HCC) [I48.2]     • Abdominal pain [R10.9] 09/25/2018   • Chronic obstructive pulmonary disease (CMS/HCC) [J44.9] 03/17/2016       Resolved Hospital Problems     Diagnosis Date Noted Date Resolved   • **Melena [K92.1] 09/25/2018 09/28/2018   • Dehydration [E86.0] 09/25/2018 09/28/2018   • Hypotension [I95.9] 09/25/2018 09/28/2018   • EMPERATRIZ (acute kidney injury) (CMS/HCC) [N17.9] 09/25/2018 09/27/2018   • Hyponatremia [E87.1] 09/25/2018 09/28/2018   • Lactic acidosis [E87.2]         History prior to hospitalization: Patient presented to ER with 3 days of decreased appetite, fatigue, and bloody stools.   Evaluation/Treatment: The patient underwent EGD during hospitalization and found to have multiple nonbleeding duodenal ulcers.  His anticoagulation for atrial fib was held at that time, and he was started on pantoprazole and Carafate.  He also had some acute kidney injury, medications were adjusted. His heart rate was increased during hospitalization and cardiology increased his Metoprolol. Also of note, he was receiving Bactrim for " chronic prostatitis per urology before his admission.  He subsequently has developed significant skin allergic reaction to medication, was given IV steroids during hospitalization and has been on by mouth Benadryl.   Course: Patient feels better, no further GI bleeding, bowel movements are normal. He reports extreme fatigue, but upon questioning has continued to take Benedryl every 6 hours since discharge.       Patient Care Team:  Neftali Amezcua MD as PCP - General (Internal Medicine)  Juan Colby MD as Consulting Physician (Cardiology)  Ankush Sky MD as Surgeon (Orthopedic Surgery)  Vale Del Rosario APRN as Nurse Practitioner (Oncology)  ____________________________________________________________________    ASSESSMENT & PLAN:    1. Gastrointestinal hemorrhage associated with duodenal ulcer    2. Multiple duodenal ulcers      Orders Placed This Encounter   Procedures   • Ambulatory Referral to Gastroenterology       Summary/Discussion:    1. Gastrointestinal hemorrhage associated with duodenal ulcer  Patient improved since discharge, no further evidence of bleeding. Will place referral to Dr. Moseley for follow up. Instructed to continue medications as prescribed at discharge, stop benedryl as this is likely contributing to his fatigue since discharge. He has follow up appointment with cardiology NP in the next couple of days. Continue regular scheduled follow up for chronic medical conditions with Dr. Amezcua in January.     2. Multiple duodenal ulcers    - Ambulatory Referral to Gastroenterology    Current outpatient and discharge medications have been reconciled for the patient.  Reviewed by: SHANIQUA Perry        No Follow-up on file.    ____________________________________________________________________    REVIEW OF SYSTEMS    Review of Systems   Gastrointestinal: Negative for abdominal pain, blood in stool, constipation and diarrhea.         PHYSICAL EXAMINATION    Physical Exam    Constitutional: He is oriented to person, place, and time. Vital signs are normal. He appears well-developed and well-nourished. He is cooperative. He does not appear ill. No distress.   Cardiovascular: Normal rate, regular rhythm, S1 normal, S2 normal and normal heart sounds.    No murmur heard.  Pulmonary/Chest: Effort normal and breath sounds normal. He has no decreased breath sounds. He has no wheezes. He has no rhonchi. He has no rales.   Neurological: He is alert and oriented to person, place, and time.   Skin: Skin is warm, dry and intact.   Psychiatric: He has a normal mood and affect. His speech is normal and behavior is normal. Judgment and thought content normal. Cognition and memory are normal.   Nursing note and vitals reviewed.        REVIEWED DATA:    Labs:   Admission on 09/25/2018, Discharged on 09/28/2018   Component Date Value Ref Range Status   • Glucose 09/25/2018 142* 65 - 99 mg/dL Final   • BUN 09/25/2018 35* 8 - 23 mg/dL Final   • Creatinine 09/25/2018 1.56* 0.76 - 1.27 mg/dL Final   • Sodium 09/25/2018 125* 136 - 145 mmol/L Final   • Potassium 09/25/2018 4.7  3.5 - 5.2 mmol/L Final   • Chloride 09/25/2018 90* 98 - 107 mmol/L Final   • CO2 09/25/2018 22.0  22.0 - 29.0 mmol/L Final   • Calcium 09/25/2018 9.0  8.6 - 10.5 mg/dL Final   • Total Protein 09/25/2018 6.8  6.0 - 8.5 g/dL Final   • Albumin 09/25/2018 3.70  3.50 - 5.20 g/dL Final   • ALT (SGPT) 09/25/2018 28  1 - 41 U/L Final   • AST (SGOT) 09/25/2018 25  1 - 40 U/L Final   • Alkaline Phosphatase 09/25/2018 69  39 - 117 U/L Final   • Total Bilirubin 09/25/2018 1.3* 0.1 - 1.2 mg/dL Final   • eGFR Non African Amer 09/25/2018 44* >60 mL/min/1.73 Final   • Globulin 09/25/2018 3.1  gm/dL Final   • A/G Ratio 09/25/2018 1.2  g/dL Final   • BUN/Creatinine Ratio 09/25/2018 22.4  7.0 - 25.0 Final   • Anion Gap 09/25/2018 13.0  mmol/L Final   • Protime 09/25/2018 20.2* 11.7 - 14.2 Seconds Final   • INR 09/25/2018 1.76* 0.90 - 1.10 Final   •  Lipase 09/25/2018 23  13 - 60 U/L Final   • Color, UA 09/25/2018 Yellow  Yellow, Straw Final   • Appearance, UA 09/25/2018 Clear  Clear Final   • pH, UA 09/25/2018 6.5  5.0 - 8.0 Final   • Specific Gravity, UA 09/25/2018 >=1.030  1.005 - 1.030 Final   • Glucose, UA 09/25/2018 Negative  Negative Final   • Ketones, UA 09/25/2018 15 mg/dL (1+)* Negative Final   • Bilirubin, UA 09/25/2018 Negative  Negative Final   • Blood, UA 09/25/2018 Negative  Negative Final   • Protein, UA 09/25/2018 Negative  Negative Final   • Leuk Esterase, UA 09/25/2018 Negative  Negative Final   • Nitrite, UA 09/25/2018 Negative  Negative Final   • Urobilinogen, UA 09/25/2018 0.2 E.U./dL  0.2 - 1.0 E.U./dL Final   • proBNP 09/25/2018 2899.0* 0.0 - 900.0 pg/mL Final   • Troponin T 09/25/2018 <0.010  0.000 - 0.030 ng/mL Final   • ABO Type 09/25/2018 A   Final   • RH type 09/25/2018 Negative   Final   • Antibody Screen 09/25/2018 Negative   Final   • T&S Expiration Date 09/25/2018 9/28/2018 11:59:59 PM   Final   • WBC 09/25/2018 12.41* 4.50 - 10.70 10*3/mm3 Final   • RBC 09/25/2018 5.17  4.60 - 6.00 10*6/mm3 Final   • Hemoglobin 09/25/2018 16.0  13.7 - 17.6 g/dL Final   • Hematocrit 09/25/2018 45.0  40.4 - 52.2 % Final   • MCV 09/25/2018 87.0  79.8 - 96.2 fL Final   • MCH 09/25/2018 30.9  27.0 - 32.7 pg Final   • MCHC 09/25/2018 35.6  32.6 - 36.4 g/dL Final   • RDW 09/25/2018 13.3  11.5 - 14.5 % Final   • RDW-SD 09/25/2018 42.7  37.0 - 54.0 fl Final   • MPV 09/25/2018 9.5  6.0 - 12.0 fL Final   • Platelets 09/25/2018 208  140 - 500 10*3/mm3 Final   • Neutrophil % 09/25/2018 83.6* 42.7 - 76.0 % Final   • Lymphocyte % 09/25/2018 10.7* 19.6 - 45.3 % Final   • Monocyte % 09/25/2018 4.2* 5.0 - 12.0 % Final   • Eosinophil % 09/25/2018 1.3  0.3 - 6.2 % Final   • Basophil % 09/25/2018 0.2  0.0 - 1.5 % Final   • Immature Grans % 09/25/2018 0.5  0.0 - 0.5 % Final   • Neutrophils, Absolute 09/25/2018 10.38* 1.90 - 8.10 10*3/mm3 Final   • Lymphocytes,  Absolute 09/25/2018 1.33  0.90 - 4.80 10*3/mm3 Final   • Monocytes, Absolute 09/25/2018 0.52  0.20 - 1.20 10*3/mm3 Final   • Eosinophils, Absolute 09/25/2018 0.16  0.00 - 0.70 10*3/mm3 Final   • Basophils, Absolute 09/25/2018 0.02  0.00 - 0.20 10*3/mm3 Final   • Immature Grans, Absolute 09/25/2018 0.06* 0.00 - 0.03 10*3/mm3 Final   • Blood Culture 09/25/2018 No growth at 5 days   Final   • Blood Culture 09/25/2018 No growth at 5 days   Final   • Lactate 09/25/2018 2.1* 0.5 - 2.0 mmol/L Final   • Extra Tube 09/25/2018 Hold for add-ons.   Final    Auto resulted.   • Sodium, Urine 09/25/2018 94  mmol/L Final   • Creatinine, Urine 09/25/2018 51.0  mg/dL Final   • Hemoglobin 09/25/2018 15.4  13.7 - 17.6 g/dL Final   • Hematocrit 09/25/2018 43.3  40.4 - 52.2 % Final   • Magnesium 09/25/2018 2.2  1.6 - 2.4 mg/dL Final   • Lactate 09/25/2018 1.8  0.5 - 2.0 mmol/L Final   • Glucose 09/26/2018 95  65 - 99 mg/dL Final   • BUN 09/26/2018 20  8 - 23 mg/dL Final   • Creatinine 09/26/2018 0.90  0.76 - 1.27 mg/dL Final   • Sodium 09/26/2018 124* 136 - 145 mmol/L Final   • Potassium 09/26/2018 4.1  3.5 - 5.2 mmol/L Final   • Chloride 09/26/2018 94* 98 - 107 mmol/L Final   • CO2 09/26/2018 19.7* 22.0 - 29.0 mmol/L Final   • Calcium 09/26/2018 7.7* 8.6 - 10.5 mg/dL Final   • eGFR Non  Amer 09/26/2018 83  >60 mL/min/1.73 Final   • BUN/Creatinine Ratio 09/26/2018 22.2  7.0 - 25.0 Final   • Anion Gap 09/26/2018 10.3  mmol/L Final   • WBC 09/26/2018 10.22  4.50 - 10.70 10*3/mm3 Final   • RBC 09/26/2018 4.77  4.60 - 6.00 10*6/mm3 Final   • Hemoglobin 09/26/2018 14.0  13.7 - 17.6 g/dL Final   • Hematocrit 09/26/2018 41.6  40.4 - 52.2 % Final   • MCV 09/26/2018 87.2  79.8 - 96.2 fL Final   • MCH 09/26/2018 29.4  27.0 - 32.7 pg Final   • MCHC 09/26/2018 33.7  32.6 - 36.4 g/dL Final   • RDW 09/26/2018 13.4  11.5 - 14.5 % Final   • RDW-SD 09/26/2018 42.7  37.0 - 54.0 fl Final   • MPV 09/26/2018 9.4  6.0 - 12.0 fL Final   • Platelets  09/26/2018 132* 140 - 500 10*3/mm3 Final   • Neutrophil % 09/26/2018 79.3* 42.7 - 76.0 % Final   • Lymphocyte % 09/26/2018 6.5* 19.6 - 45.3 % Final   • Monocyte % 09/26/2018 11.6  5.0 - 12.0 % Final   • Eosinophil % 09/26/2018 2.0  0.3 - 6.2 % Final   • Basophil % 09/26/2018 0.3  0.0 - 1.5 % Final   • Immature Grans % 09/26/2018 0.3  0.0 - 0.5 % Final   • Neutrophils, Absolute 09/26/2018 8.11* 1.90 - 8.10 10*3/mm3 Final   • Lymphocytes, Absolute 09/26/2018 0.66* 0.90 - 4.80 10*3/mm3 Final   • Monocytes, Absolute 09/26/2018 1.19  0.20 - 1.20 10*3/mm3 Final   • Eosinophils, Absolute 09/26/2018 0.20  0.00 - 0.70 10*3/mm3 Final   • Basophils, Absolute 09/26/2018 0.03  0.00 - 0.20 10*3/mm3 Final   • Immature Grans, Absolute 09/26/2018 0.03  0.00 - 0.03 10*3/mm3 Final   • Hemoglobin 09/26/2018 13.4* 13.7 - 17.6 g/dL Final   • Hematocrit 09/26/2018 39.7* 40.4 - 52.2 % Final   • Hemoglobin 09/26/2018 13.8  13.7 - 17.6 g/dL Final   • Hematocrit 09/26/2018 40.3* 40.4 - 52.2 % Final   • Hemoglobin 09/27/2018 14.0  13.7 - 17.6 g/dL Final   • Hematocrit 09/27/2018 39.6* 40.4 - 52.2 % Final   • Hemoglobin 09/28/2018 12.7* 13.7 - 17.6 g/dL Final   • Hematocrit 09/28/2018 37.9* 40.4 - 52.2 % Final   • H. pylori IgG 09/28/2018 0.92* 0.00 - 0.79 Index Value Final                                 Negative           <0.80                               Equivocal    0.80 - 0.89                               Positive           >0.89   • H. pylori, IgA ABS 09/28/2018 <9.0  0.0 - 8.9 units Final                                    Negative          <9.0                                  Equivocal   9.0 - 11.0                                  Positive         >11.0   • H. Pylori, IgM 09/28/2018 <9.0  0.0 - 8.9 units Final                                    Negative          <9.0                                  Equivocal   9.0 - 11.0                                  Positive         >11.0  This test was developed and its performance  characteristics  determined by [facility]. It has not been cleared or approved  by the Food and Drug Administration.       Imaging:   Xr Chest 2 View    Result Date: 9/25/2018  Narrative: PA AND LATERAL RADIOGRAPHIC VIEWS OF THE CHEST  HISTORY:  Rectal bleeding and weakness. COPD.  FINDINGS:  PA and lateral radiographic views of the chest demonstrate hyperinflated lungs compatible with the stated history of COPD. The cardiomediastinal silhouette is enlarged. Degenerative phenomena are appreciated within the thoracic spine.  This report was finalized on 9/25/2018 1:29 PM by Dr. Sammy Melissa M.D.      Ct Abdomen Pelvis With Contrast    Result Date: 9/25/2018  Narrative: CT ABDOMEN AND PELVIS WITH IV CONTRAST  HISTORY: 72-year-old male with right lower quadrant abdominal pain. Bloody stool 2 days ago.  TECHNIQUE CT abdomen and pelvis with intravenous contrast.  COMPARISON: There are no previous abdomen CTs for comparison.  FINDINGS: There is basilar pulmonary emphysema. Calcified granuloma is present in the right lower lobe. Mild diffuse fat infiltration is present in the liver. Hepatic and splenic calcifications are present associated with old granulomatous disease. There is a phrygian cap at the gallbladder fundus. Spleen, adrenal glands, kidneys, and pancreas appear within normal limits. There is no hydronephrosis. There is no bowel dilatation or evidence for bowel obstruction. There has been previous appendectomy. There is no ascites. Bilateral total hip arthroplasties are present. Diffuse atherosclerotic disease is present involving the abdominal aorta and iliac vasculature. There is a shared origin of the celiac and superior mesenteric arteries.      Impression: 1. No evidence for acute intraabdominal process. 2. Diffuse atherosclerotic disease. 3. Previous appendectomy, bilateral total hip arthroplasties. 4. Pulmonary emphysema.  Radiation dose reduction techniques were utilized, including automated exposure  control and exposure modulation based on body size.  This report was finalized on 9/25/2018 12:53 PM by Dr. Dante Waters M.D.      Fl Guided Pain Management Spine    Result Date: 9/11/2018  Narrative: This procedure was auto-finalized with no dictation required.       Medical Tests:        Summary of old records / correspondence / consultant report:   DC summary re: issues addressed on HPI    Request outside records:       ALLERGIES  Allergies   Allergen Reactions   • Bactrim [Sulfamethoxazole-Trimethoprim] Rash        MEDICATIONS   Current Outpatient Prescriptions   Medication Sig Dispense Refill   • acetaminophen (TYLENOL) 325 MG tablet Take 2 tablets by mouth Every 4 (Four) Hours As Needed for Mild Pain .     • ALPHAGAN P 0.1 % solution ophthalmic solution Administer 0.1 bottles to both eyes 1 (One) Time.  6   • ANORO ELLIPTA 62.5-25 MCG/INH aerosol powder  inhaler INHALE 1 PUFF BY MOUTH DAILY 14 each 1   • dabigatran etexilate (PRADAXA) 150 MG capsu Take 1 capsule by mouth Every 12 (Twelve) Hours. 60 capsule 11   • hydrochlorothiazide (MICROZIDE) 12.5 MG capsule Take 1 capsule by mouth daily.     • LUMIGAN 0.01 % ophthalmic drops Administer 0.01 bottles to both eyes 1 (One) Time.  6   • metoprolol tartrate (LOPRESSOR) 100 MG tablet Take 1 tablet by mouth Every 12 (Twelve) Hours. 60 tablet 1   • pantoprazole (PROTONIX) 40 MG EC tablet Take 1 tablet by mouth 2 (Two) Times a Day Before Meals. 60 tablet 1   • sucralfate (CARAFATE) 1 g tablet Take 1 tablet by mouth 2 (Two) Times a Day Before Meals. 60 tablet 0   • tamsulosin (FLOMAX) 0.4 MG capsule 24 hr capsule Take 1 capsule by mouth Every Night. 30 capsule    • TRELEGY ELLIPTA 100-62.5-25 MCG/INH aerosol powder         No current facility-administered medications for this visit.        Current outpatient and discharge medications have been reconciled for the patient.  Reviewed by: SHANIQUA Perry       Critical access hospital:     The following portions of the patient's  history were reviewed and updated as appropriate: Allergies / Current Medications / Past Medical History / Surgical History / Social History / Family History    PROBLEM LIST   Patient Active Problem List   Diagnosis   • Chronic obstructive pulmonary disease (CMS/HCC)   • Impotence of organic origin   • Hypertension   • Osteoarthritis   • History of smoking greater than 50 pack years   • Benign prostatic hyperplasia with urinary obstruction   • Abdominal pain   • Drug allergy, antibiotic   • Permanent atrial fibrillation (CMS/HCC)   • Nocturnal hypoxia       PAST MEDICAL HISTORY  Past Medical History:   Diagnosis Date   • EMPERATRIZ (acute kidney injury) (CMS/HCC)    • Alcohol abuse, in remission    • BPH (benign prostatic hypertrophy)     see doctors at [facility]   • COPD (chronic obstructive pulmonary disease) (CMS/HCC)    • Depression    • DJD (degenerative joint disease) of cervical spine    • ED (erectile dysfunction)     [facility]   • Hx of colonic polyps     followed by GI ([facility])   • Hyperlipidemia    • Hypertension    • Hypotension    • Influenza B 02/14/2013       • Low back pain    • Nocturnal hypoxia    • Osteoarthritis     HIPS, HANDS, MULTIPLE SITES   • Peripheral neuropathy    • Permanent atrial fibrillation (CMS/HCC)    • Rectal bleeding 04/26/2017   • Shortness of breath on exertion    • Shoulder pain, left 12/04/2007    SEEN AT [facility] ER   • Tendonitis of shoulder 11/07/2007    RIGHT SHOULDER/DELTOID INSERTION       SURGICAL HISTORY  Past Surgical History:   Procedure Laterality Date   • APPENDECTOMY     • CARDIOVASCULAR STRESS TEST N/A 10/20/2015    NML LEXISCAN CARDIOLITE PERFUSION STUDY, NO EVIDENCE OF ISCHEMIA, AREA OF HYPOPERFUSION OF THE INFERIOR WALL W/NORMAL WALL PERFUSION AND NML LEFT VENTRICULAR EJECTION FRACTION, MOST LIKELY ATTENUATION. DR.MICHAEL BOX   • COLONOSCOPY N/A 02/2017   • COLONOSCOPY N/A 02/23/2011    4 POLYPS REMOVED, RESCOPE IN 5 YRS, DR. EAGLE   •  COLONOSCOPY N/A 03/08/2006    ERYTHEMOUS AND GRANULAR MUCOSA IN ILEOCECAL VALVE, NORMAL COLON, REPEAT IN 5 YRS,    • ENDOSCOPY N/A 9/26/2018    Procedure: ESOPHAGOGASTRODUODENOSCOPY;  Surgeon: Noman Moseley MD;  Location: [facility] ENDOSCOPY;  Service: Gastroenterology   • HERNIA REPAIR Bilateral     INGUINAL   • JOINT REPLACEMENT  11/2015    left hip   • TOTAL HIP ARTHROPLASTY Left 11/06/2015    Dr Ankush Sky   • TOTAL HIP ARTHROPLASTY Right 10/27/2014    DR.RIED SKY   • VASECTOMY         SOCIAL HISTORY  Social History     Social History   • Marital status:      Spouse name: Ara   • Number of children: 3     Occupational History   • 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  Smoked 2 ppd for 49 years until he quit smoking 7 years ago for a 98 pack year history.   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   • Drug use: No   • Sexual activity: Defer     Other Topics Concern   • Not on file       FAMILY HISTORY  Family History   Problem Relation Age of Onset   • Colon cancer Mother    • Heart disease Mother    • Stroke Sister         October 2016   • Stroke Brother         September, 2016   • Coronary artery disease Father    • Hypertension Father    • Heart disease Father    • Colon cancer Maternal Grandmother    • Coronary artery disease Brother    • Heart disease Brother    • Coronary artery disease Brother    • Prostate cancer Neg Hx          **Dragon Disclaimer:   Much of this encounter note is an electronic transcription/translation of spoken language to printed text. The electronic translation of spoken language may permit erroneous, or at times, nonsensical words or phrases to be inadvertently transcribed. Although I have reviewed the note for such  errors, some may still exist.

## 2018-10-02 NOTE — PROGRESS NOTES
Call and Biaxin 500 mg by mouth twice a day for 14 days  Amoxicillin 1 g by mouth twice a day for 14 days  Continue twice a day PPI

## 2018-10-03 ENCOUNTER — READMISSION MANAGEMENT (OUTPATIENT)
Dept: CALL CENTER | Facility: HOSPITAL | Age: 73
End: 2018-10-03

## 2018-10-03 NOTE — OUTREACH NOTE
Medical Week 1 Survey      Responses   Facility patient discharged from?  [facility]   Does the patient have one of the following disease processes/diagnoses(primary or secondary)?  Other   Is there a successful TCM telephone encounter documented?  No   Week 1 attempt successful?  Yes   Call start time  1645   Call end time  1648   Discharge diagnosis  Rectal bleeding, nocturnal hypoxia, permanent AFib, abdominal pain, COPD   Is patient permission given to speak with other caregiver?  Yes   List who call center can speak with  nicole Bullock   Meds reviewed with patient/caregiver?  Yes   Is the patient having any side effects they believe may be caused by any medication additions or changes?  No   Does the patient have all medications ordered at discharge?  Yes   Is the patient taking all medications as directed (includes completed medication regime)?  Yes   Medication comments  doing well with meds   Comments regarding appointments  10/4 Cardiology   Does the patient have an appointment with their PCP within 7 days of discharge?  Yes   Comments regarding PCP  Dr. Amezcua seen 10/2   Has the patient kept scheduled appointments due by today?  Yes   Has home health visited the patient within 72 hours of discharge?  N/A   What DME was ordered?  Home O2 with [facility]    Has all DME been delivered?  Yes   Comments  oxygen at night   Did the patient receive a copy of their discharge instructions?  Yes   Nursing interventions  Reviewed instructions with patient, Educated on MyChart   What is the patient's perception of their health status since discharge?  Improving   Is the patient/caregiver able to teach back signs and symptoms related to disease process for when to call PCP?  Yes   Is the patient/caregiver able to teach back signs and symptoms related to disease process for when to call 911?  Yes   Is the patient/caregiver able to teach back the hierarchy of who to call/visit for symptoms/problems? PCP, Specialist, Home  health nurse, Urgent Care, ED, 911  Yes   Additional teach back comments  non smoker   Week 1 call completed?  Yes   Wrap up additional comments  getting better just slowly          Alejandra Corcoran, RN

## 2018-10-04 ENCOUNTER — OFFICE VISIT (OUTPATIENT)
Dept: CARDIOLOGY | Facility: CLINIC | Age: 73
End: 2018-10-04

## 2018-10-04 VITALS
WEIGHT: 196 LBS | SYSTOLIC BLOOD PRESSURE: 104 MMHG | HEIGHT: 70 IN | HEART RATE: 91 BPM | BODY MASS INDEX: 28.06 KG/M2 | DIASTOLIC BLOOD PRESSURE: 70 MMHG

## 2018-10-04 DIAGNOSIS — I48.19 PERSISTENT ATRIAL FIBRILLATION (HCC): Primary | ICD-10-CM

## 2018-10-04 DIAGNOSIS — J44.9 CHRONIC OBSTRUCTIVE PULMONARY DISEASE, UNSPECIFIED COPD TYPE (HCC): ICD-10-CM

## 2018-10-04 DIAGNOSIS — I10 ESSENTIAL HYPERTENSION: ICD-10-CM

## 2018-10-04 PROCEDURE — 99214 OFFICE O/P EST MOD 30 MIN: CPT | Performed by: NURSE PRACTITIONER

## 2018-10-04 PROCEDURE — 93000 ELECTROCARDIOGRAM COMPLETE: CPT | Performed by: NURSE PRACTITIONER

## 2018-10-04 RX ORDER — HYDROCHLOROTHIAZIDE 25 MG/1
25 TABLET ORAL DAILY
COMMUNITY
End: 2018-10-04 | Stop reason: SDUPTHER

## 2018-10-04 RX ORDER — HYDROCHLOROTHIAZIDE 25 MG/1
25 TABLET ORAL DAILY
Qty: 30 TABLET | Refills: 11
Start: 2018-10-04 | End: 2020-03-05 | Stop reason: SDUPTHER

## 2018-10-04 NOTE — PROGRESS NOTES
Date of Office Visit: 10/04/2018  Encounter Provider: SHANIQUA Benavides  Place of Service: [facility] CARDIOLOGY  Patient Name: Aj Salazar  :1945    Chief Complaint   Patient presents with   • Atrial Fibrillation   • Hypertension   • Follow-up   :     HPI: Aj Salazar is a 72 y.o. male is a patient of Dr. Colby. I am seeing him and have reviewed his record.     His past medical history is significant of Atrial fibrillation, hypertension, COPD and emphysema, benign prostatic hypertrophy, hypertension, hyperlipidemia, history of rectal bleeding, and history of alcohol abuse.     n 2014 he presentedfor hip replacement surgery and had a transient episode of atrial fibrillation.  He had an echocardiogram which revealed normal LV systolic function and no significant valvular disease.  He had a perfusion stress test performed which showed no evidence of ischemia.     Then in 2016 he presented with atypical chest pain.  He had a perfusion stress test which was normal.  This was felt to be musculoskeletal and he was started on ibuprofen.     He was last seen in the office on 2018. He expressed shortness of breath that was gradually worse with activity. He felt as though his lung disease is getting worse that he was continued to be active and exercise. He was noted to be back in atrial fibrillation and was started on Pradaxa 150 mg twice a day. He was instructed to stop taking aspirin once he started to Pradaxa.     He had pulmonary function testing on 2018 which revealed moderate COPD.    He presented in 2018 and was taken Pradaxa 150 mg twice daily without issues.  He stopped taking aspirin and metoprolol.  He was instructed to restart the metoprolol 25 mg twice daily.  His heart rate was 95 he was having some shortness of breath with exertion.  Inhaler have been switched by his PCP but he still was having issues with that.  He requested a  "pulmonary rehabilitation referral for COPD and that was ordered.  He reported snoring but denied any witnessed apnea.  He was to notify the office if he wanted to be tested.    Date and was admitted in September 2018 with gastrointestinal hemorrhage.  EGD showed evidence of ulcer disease and nonbleeding.  His hemoglobin was stable and melena resolved.  He was advised to stop anticoagulation for week and continue Protonix and Carafate.    Patient presents today for hospital follow-up.  He resumed his Pradaxa 2 days ago.  He has not had any further melena.  He denies chest pain, palpitation, dizziness, lightheadedness, edema.  He has some fatigue that is waxing and waning.  He continues to have shortness of breath with exertion which he reports is \"a little better\".  He has been seen by pulmonology and will follow-up with them.  His inhaler was changed.  He is now on nighttime oxygen due to overnight oximetry study in the hospital which revealed extensive and saturation 72-98%.  He is still in the process of starting pulmonary rehabilitation of which pulmonary will be following.  His blood pressure at home is normally 110-120/70.      Allergies   Allergen Reactions   • Bactrim [Sulfamethoxazole-Trimethoprim] Rash       Past Medical History:   Diagnosis Date   • EMPERATRIZ (acute kidney injury) (CMS/HCC)    • Alcohol abuse, in remission    • BPH (benign prostatic hypertrophy)     see doctors at [facility]   • COPD (chronic obstructive pulmonary disease) (CMS/HCC)    • Depression    • DJD (degenerative joint disease) of cervical spine    • ED (erectile dysfunction)     [facility]   • Hx of colonic polyps     followed by GI ([facility])   • Hyperlipidemia    • Hypertension    • Hypotension    • Influenza B 02/14/2013       • Low back pain    • Nocturnal hypoxia    • Osteoarthritis     HIPS, HANDS, MULTIPLE SITES   • Peripheral neuropathy    • Permanent atrial fibrillation (CMS/HCC)    • Rectal bleeding 04/26/2017 " "  • Shortness of breath on exertion    • Shoulder pain, left 12/04/2007    SEEN AT [facility] ER   • Tendonitis of shoulder 11/07/2007    RIGHT SHOULDER/DELTOID INSERTION   • Ulcer of the stomach and intestine        Past Surgical History:   Procedure Laterality Date   • APPENDECTOMY     • CARDIOVASCULAR STRESS TEST N/A 10/20/2015    NML LEXISCAN CARDIOLITE PERFUSION STUDY, NO EVIDENCE OF ISCHEMIA, AREA OF HYPOPERFUSION OF THE INFERIOR WALL W/NORMAL WALL PERFUSION AND NML LEFT VENTRICULAR EJECTION FRACTION, MOST LIKELY ATTENUATION. DR.MICHAEL BOX   • COLONOSCOPY N/A 02/2017   • COLONOSCOPY N/A 02/23/2011    4 POLYPS REMOVED, RESCOPE IN 5 YRS, DR. EAGLE   • COLONOSCOPY N/A 03/08/2006    ERYTHEMOUS AND GRANULAR MUCOSA IN ILEOCECAL VALVE, NORMAL COLON, REPEAT IN 5 YRS,    • ENDOSCOPY N/A 9/26/2018    Procedure: ESOPHAGOGASTRODUODENOSCOPY;  Surgeon: Noman Moseley MD;  Location: [facility] ENDOSCOPY;  Service: Gastroenterology   • HERNIA REPAIR Bilateral     INGUINAL   • JOINT REPLACEMENT  11/2015    left hip   • TOTAL HIP ARTHROPLASTY Left 11/06/2015    Dr Ankush Sky   • TOTAL HIP ARTHROPLASTY Right 10/27/2014    DR.RIED SKY   • VASECTOMY           Family and social history reviewed.     Review of Systems   Constitution: Positive for malaise/fatigue.   Cardiovascular: Positive for dyspnea on exertion.   Respiratory: Positive for snoring.      All other systems were reviewed and are negative          Objective:     Vitals:    10/04/18 1039   BP: 104/70   BP Location: Left arm   Patient Position: Sitting   Pulse: 91   Weight: 88.9 kg (196 lb)   Height: 177.8 cm (70\")     Body mass index is 28.12 kg/m².    PHYSICAL EXAM:  Physical Exam   Constitutional: He is oriented to person, place, and time. He appears well-developed and well-nourished. No distress.   HENT:   Head: Normocephalic.   Eyes: Conjunctivae are normal.   glasses   Neck: Normal range of motion. No JVD present.   Cardiovascular: Normal rate, " normal heart sounds and intact distal pulses.  An irregular rhythm present.   No murmur heard.  Pulses:       Carotid pulses are 2+ on the right side, and 2+ on the left side.       Radial pulses are 2+ on the right side, and 2+ on the left side.        Posterior tibial pulses are 2+ on the right side, and 2+ on the left side.   Pulmonary/Chest: Effort normal. No respiratory distress. He has decreased breath sounds in the right lower field and the left lower field. He has no wheezes. He has no rhonchi. He has no rales. He exhibits no tenderness.   Abdominal: Soft. Bowel sounds are normal. He exhibits no distension.   Musculoskeletal: Normal range of motion. He exhibits no edema.   Neurological: He is alert and oriented to person, place, and time.   Skin: Skin is warm, dry and intact. No rash noted. He is not diaphoretic. No cyanosis.   Psychiatric: He has a normal mood and affect. His behavior is normal. Judgment and thought content normal.         ECG 12 Lead  Date/Time: 10/4/2018 10:48 AM  Performed by: LEAH SANABRIA  Authorized by: LEAH SANABRIA   Comparison: compared with previous ECG from 9/25/2018  Similar to previous ECG  Rhythm: atrial fibrillation  Rate: normal  QRS axis: normal  Clinical impression: abnormal ECG            Current Outpatient Prescriptions   Medication Sig Dispense Refill   • acetaminophen (TYLENOL) 325 MG tablet Take 2 tablets by mouth Every 4 (Four) Hours As Needed for Mild Pain .     • ALPHAGAN P 0.1 % solution ophthalmic solution Administer 0.1 bottles to both eyes 1 (One) Time.  6   • ANORO ELLIPTA 62.5-25 MCG/INH aerosol powder  inhaler INHALE 1 PUFF BY MOUTH DAILY 14 each 1   • dabigatran etexilate (PRADAXA) 150 MG capsu Take 1 capsule by mouth Every 12 (Twelve) Hours. 60 capsule 11   • hydrochlorothiazide (HYDRODIURIL) 25 MG tablet Take 1 tablet by mouth Daily. 30 tablet 11   • LUMIGAN 0.01 % ophthalmic drops Administer 0.01 bottles to both eyes 1 (One) Time.  6   • metoprolol  tartrate (LOPRESSOR) 100 MG tablet Take 1 tablet by mouth Every 12 (Twelve) Hours. 60 tablet 1   • pantoprazole (PROTONIX) 40 MG EC tablet Take 1 tablet by mouth 2 (Two) Times a Day Before Meals. 60 tablet 1   • sucralfate (CARAFATE) 1 g tablet Take 1 tablet by mouth 2 (Two) Times a Day Before Meals. 60 tablet 0   • tamsulosin (FLOMAX) 0.4 MG capsule 24 hr capsule Take 1 capsule by mouth Every Night. 30 capsule      No current facility-administered medications for this visit.      Assessment:       Diagnosis Plan   1. Persistent atrial fibrillation (CMS/HCC)  ECG 12 Lead   2. Chronic obstructive pulmonary disease, unspecified COPD type (CMS/HCC)     3. Essential hypertension          Orders Placed This Encounter   Procedures   • ECG 12 Lead     This order was created via procedure documentation         Plan:         1.  Paroxysmal atrial fibrillation on metoprolol tartrate 100 BID and Pradaxa 150 BID without bleeding issues  Atrial Fibrillation and Atrial Flutter  Assessment  • The patient has persistent atrial fibrillation  • This is non-valvular in etiology  • The patient's CHADS2-VASc score is 2  • A [id number] score of 2 or more is considered a high risk for a thromboembolic event  • Dabigatran prescribed    Plan  • Attempt to maintain sinus rhythm  • Continue dabigatran for antithrombotic therapy, bleeding issues discussed  • Continue beta blocker for rate control    2. Hypertension controlled a little low but asymptomatic   3. History of GI bleed recurrent, last episode in 09/2018.  EGD showed nonbleeding ulcer disease.  He stopped Pradaxa for one week and has been on Carafate and Protonix.  4. COPD and chronic shortness of breath with exertion- some better now following with Pulmonary and planning to start pulmonary rehabilitation  5. Suspected sleep apnea- he does not wish to pursue testing      Follow up in 2 months as scheduled with Dr. Colby    Patient was instructed to call the office if new  symptoms develop or report to nearest ER if heart attack or stroke is suspected.        It has been a pleasure to participate in this patient's care.      Thank you,  SHANIQUA Benavides      **Tomás Disclaimer:**  Much of this encounter note is an electronic transcription/translation of spoken language to printed text. The electronic translation of spoken language may permit erroneous, or at times, nonsensical words or phrases to be inadvertently transcribed. Although I have reviewed the note for such errors, some may still exist.

## 2018-10-12 ENCOUNTER — READMISSION MANAGEMENT (OUTPATIENT)
Dept: CALL CENTER | Facility: HOSPITAL | Age: 73
End: 2018-10-12

## 2018-10-12 NOTE — OUTREACH NOTE
Medical Week 2 Survey      Responses   Facility patient discharged from?  [facility]   Does the patient have one of the following disease processes/diagnoses(primary or secondary)?  Other   Week 2 attempt successful?  Yes   Call start time  1433   Discharge diagnosis  Rectal bleeding, nocturnal hypoxia, permanent AFib, abdominal pain, COPD   Call end time  1442   Meds reviewed with patient/caregiver?  Yes   Is the patient having any side effects they believe may be caused by any medication additions or changes?  No   Does the patient have all medications ordered at discharge?  Yes   Is the patient taking all medications as directed (includes completed medication regime)?  Yes   Comments regarding appointments  Seen by cardiology on 10/04/2018   Does the patient have a primary care provider?   Yes   Does the patient have an appointment with their PCP within 7 days of discharge?  Yes   Comments regarding PCP  Seen by PCP on 10/02/2018    Has the patient kept scheduled appointments due by today?  Yes   Has home health visited the patient within 72 hours of discharge?  N/A   What DME was ordered?  Home O2 with [facility]    Has all DME been delivered?  Yes   Psychosocial issues?  No   Comments  oxygen at night   Did the patient receive a copy of their discharge instructions?  Yes   Nursing interventions  Reviewed instructions with patient, Educated on MyChart   What is the patient's perception of their health status since discharge?  Improving   Is the patient/caregiver able to teach back signs and symptoms related to disease process for when to call PCP?  Yes   Is the patient/caregiver able to teach back signs and symptoms related to disease process for when to call 911?  Yes   Is the patient/caregiver able to teach back the hierarchy of who to call/visit for symptoms/problems? PCP, Specialist, Home health nurse, Urgent Care, ED, 911  Yes   Week 2 Call Completed?  Yes          Sorin Singh RN

## 2018-10-15 ENCOUNTER — NURSE TRIAGE (OUTPATIENT)
Dept: CALL CENTER | Facility: HOSPITAL | Age: 73
End: 2018-10-15

## 2018-10-15 NOTE — TELEPHONE ENCOUNTER
"    Reason for Disposition  • Caller has NON-URGENT medication question about med that PCP prescribed and triager unable to answer question    Additional Information  • Negative: Drug overdose and nurse unable to answer question  • Negative: Caller requesting information not related to medicine  • Negative: Caller requesting a prescription for Strep throat and has a positive culture result  • Negative: Rash while taking a medication or within 3 days of stopping it  • Negative: Immunization reaction suspected  • Negative: [1] Asthma and [2] having symptoms of asthma (cough, wheezing, etc)  • Negative: MORE THAN A DOUBLE DOSE of a prescription or over-the-counter (OTC) drug  • Negative: [1] DOUBLE DOSE (an extra dose or lesser amount) of over-the-counter (OTC) drug AND [2] any symptoms (e.g., dizziness, nausea, pain, sleepiness)  • Negative: [1] DOUBLE DOSE (an extra dose or lesser amount) of prescription drug AND [2] any symptoms (e.g., dizziness, nausea, pain, sleepiness)  • Negative: Took another person's prescription drug  • Negative: [1] DOUBLE DOSE (an extra dose or lesser amount) of prescription drug AND [2] NO symptoms (Exception: a double dose of antibiotics)  • Negative: Diabetes drug error or overdose (e.g., insulin or extra dose)  • Negative: [1] Request for URGENT new prescription or refill of \"essential\" medication (i.e., likelihood of harm to patient if not taken) AND [2] triager unable to fill per unit policy  • Negative: [1] Prescription not at pharmacy AND [2] was prescribed today by PCP  • Negative: Pharmacy calling with prescription questions and triager unable to answer question  • Negative: Caller has URGENT medication question about med that PCP prescribed and triager unable to answer question    Answer Assessment - Initial Assessment Questions  1. SYMPTOMS: \"Do you have any symptoms?\"      no  2. SEVERITY: If symptoms are present, ask \"Are they mild, moderate or severe?\"     Information " only.    Protocols used: MEDICATION QUESTION CALL-ADULT-AH

## 2018-10-16 ENCOUNTER — TRANSCRIBE ORDERS (OUTPATIENT)
Dept: CARDIAC REHAB | Facility: HOSPITAL | Age: 73
End: 2018-10-16

## 2018-10-16 DIAGNOSIS — J44.9 COPD, MODERATE (HCC): Primary | ICD-10-CM

## 2018-10-17 ENCOUNTER — TELEPHONE (OUTPATIENT)
Dept: GASTROENTEROLOGY | Facility: CLINIC | Age: 73
End: 2018-10-17

## 2018-10-17 ENCOUNTER — OFFICE VISIT (OUTPATIENT)
Dept: CARDIAC REHAB | Facility: HOSPITAL | Age: 73
End: 2018-10-17

## 2018-10-17 VITALS
SYSTOLIC BLOOD PRESSURE: 112 MMHG | HEART RATE: 98 BPM | BODY MASS INDEX: 27.63 KG/M2 | DIASTOLIC BLOOD PRESSURE: 64 MMHG | OXYGEN SATURATION: 95 % | HEIGHT: 70 IN | WEIGHT: 193 LBS

## 2018-10-17 DIAGNOSIS — J44.9 COPD, MODERATE (HCC): Primary | ICD-10-CM

## 2018-10-17 PROCEDURE — G0424 PULMONARY REHAB W EXER: HCPCS

## 2018-10-17 NOTE — TELEPHONE ENCOUNTER
----- Message from Noman Moseley MD sent at 10/2/2018  8:24 AM EDT -----  Call and Biaxin 500 mg by mouth twice a day for 14 days  Amoxicillin 1 g by mouth twice a day for 14 days  Continue twice a day PPI

## 2018-10-17 NOTE — PROGRESS NOTES
Pulmonary Rehab Initial Assessment      Name: Aj Salazar  :1945 Allergies:Bactrim [sulfamethoxazole-trimethoprim]   MRN: [id number] 72 y.o. Physician: Neftali Amezcua MD   Primary Diagnosis:    Diagnosis Plan   1. COPD, moderate (CMS/HCC)      Event Date: 10/17/18 Specialist: Naz   Secondary Diagnosis: n/a  Note Author: Arin Adorno, CRT     Cardiovascular History: atrial fibrillation     EXERCISE AT HOME  no  n/a  N/A          Ambulatory Status:Independent  Ambulatory Fall Risk Assessed on Initial Visit: yes 6 Minute Walk Pre- Pulmonary Rehab:  Distance:1182ft      RPE:3        RPD: 3              MPH: 2.2  Max. HR: 102        SPO2:90    MET: 2.7  Resting BP: 1121/64    Peak BP: 120/64  Recovery BP: 116/62  Comments: N/A      NUTRITION  Lipids:yes If yes, labs as follows;  Total: No components found for: CHOLESTEROL  HDL:   HDL Cholesterol   Date Value Ref Range Status   2018 40 40 - 60 mg/dL Final    Lipids continued:  LDL:  LDL Cholesterol    Date Value Ref Range Status   2018 89 0 - 100 mg/dL Final     Triglyceride: No components found for: TRIGLYCERIDE   Weight Management:                 Weight: 193  Height: 70                                   BMI: There is no height or weight on file to calculate BMI.  Waist Circumference: n/a inches   Alcohol Use: n/a Diabetes:No    Last HGBA1C with date if applicable:No components found for: A1C         SOCIAL HISTORY  Social History     Social History   • Marital status:      Spouse name: Ara   • Number of children: 3     Occupational History   • 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[employer]     Social History Main Topics   • Smoking status: Former Smoker     Packs/day: 2.00     Years: 49.00     Quit date: 2010   • Smokeless tobacco: Never Used      Comment: Began Smoking age 15.  Smoked 2 ppd for 49 years until he quit smoking 7 years ago for a 98 pack year history.   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;  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   • Drug use: No      Comment: caffeine use    • Sexual activity: Defer     Other Topics Concern   • Not on file    Learning Barriers:Ready to Learn  Family Support:yes  Living Arrangement: lives with their spouse Tobacco Adjunct:not applicable  Do you live with a smoker: no     PSYCHOSOCIAL  Clinical Depression: no    Stress: no     Assess presence or absence of depression using a valid screening tool: yes      Assessment: Pt is alert and oriented.            Are you being hurt, hit, or freightened by anyone at home or in your life? no    Are you being neglected by a caregiver? N/A Shoulder flexibility/Range of motion: Average     Recommended arm activity: Any    Chair sit and reach within: 0 inches   Leg flexibility: Average    Leg Strength/Balance/Five times sit to stand: 14 seconds.   No pain or balance issues observed or reported.  Slight SOA.      Recommended stretching: Standing   Balance: Average Assessment: Pts flexibility is appropriate in both upper and lower body.      Family attends [place]: no      COMORBIDITIES  Sleep Apnea: no If yes, Choose: N/A    Cancer: no    Stroke: no   Pneumonia: no If yes, how many times n/a    Osteoporosis: no    GI Problems: no Frequent colds/allergies: no    Other illnesses, surgeries, or comments n/a   PAIN:  Are you having pain? no  If yes where is pain? n/a If yes, pain scale: n/a      PULMONARY:  Do you use a nebulizer?: no  If yes, n/a    Do you use oxygen at home?: yes  If yes, amount 2lpm    With rest: yes  With activity: no Do you have a daily cough?: yes  If yes, choose: dry    Do you every notice yourself wheezing?: yes  If yes, when: pt will notice occasionally Other pulmonary/breathing problems?: yes   OTHER:  Do you have physical limitations?: yes  If yes, walking, endurance, mopping, bringing groceries in    Do you need assistance with ADLs?: yes  If yes, refer to above Do you climb stairs at home?:no  If yes, how many times n/a    Have you  ever attended a pulmonary rehab?: no  If yes, n/a [facility] Dyspnea Scale: 0 - 4:  3 = stops for breath after walking about 100 yards or after a few minutes on the level     Patient Goals: MET goal of 3.0.  Pt wants to learn how to control breathing and build endurance.  He wants to be able to start walking the neighborhood again at least an hour daily.       DISCHARGE PLANNING:  Do you have any home exercise equipment?: no    What are you plans for continuing exercise after completion of pulmonary rehab? Exercising at home.       EDUCATION:  Pursed - lip breathing, Diaphragmatic breathing, Relaxation techniques and Program information folder       PRE-PROGRAM ASSESSMENT:  PFT Date: 08/31/2018    FEV1/FVC: 55% FEV1: 64%    FVC: 86% DLCO: N/A     Time of arrival: 13:30    Time of departure: 14:35           10/17/2018  1:27 PM  Arin Adorno, CRT

## 2018-10-23 ENCOUNTER — TREATMENT (OUTPATIENT)
Dept: CARDIAC REHAB | Facility: HOSPITAL | Age: 73
End: 2018-10-23

## 2018-10-23 DIAGNOSIS — J44.9 COPD, MODERATE (HCC): Primary | ICD-10-CM

## 2018-10-23 PROCEDURE — G0424 PULMONARY REHAB W EXER: HCPCS

## 2018-10-24 ENCOUNTER — TELEPHONE (OUTPATIENT)
Dept: GASTROENTEROLOGY | Facility: CLINIC | Age: 73
End: 2018-10-24

## 2018-10-24 RX ORDER — CLARITHROMYCIN 500 MG/1
500 TABLET, COATED ORAL 2 TIMES DAILY
Qty: 28 TABLET | Refills: 0 | Status: SHIPPED | OUTPATIENT
Start: 2018-10-24 | End: 2018-11-07

## 2018-10-24 RX ORDER — AMOXICILLIN 500 MG/1
1000 CAPSULE ORAL 2 TIMES DAILY
Qty: 56 CAPSULE | Refills: 0 | Status: SHIPPED | OUTPATIENT
Start: 2018-10-24 | End: 2018-11-07

## 2018-10-24 RX ORDER — SUCRALFATE 1 G/1
1 TABLET ORAL 2 TIMES DAILY
Qty: 60 TABLET | Refills: 5 | Status: SHIPPED | OUTPATIENT
Start: 2018-10-24 | End: 2019-11-07

## 2018-10-24 NOTE — TELEPHONE ENCOUNTER
Pt returned call per a staff message.     *see other telephone encounter dated for 10/17.     Called pt back. Pt states he had called the drug store to get his prescriptions refilled from when he was discharged from the hospital and they have a hold on the med: sucralfate. Advised will refill his med now for him. Pt verb understanding.   Med e-scribed.

## 2018-10-24 NOTE — TELEPHONE ENCOUNTER
----- Message from Bert Stevenson sent at 10/24/2018 10:30 AM EDT -----  Regarding: pt called with medication questions   Contact: [phone]  Asking for a nurse to call him back.

## 2018-10-24 NOTE — TELEPHONE ENCOUNTER
**See other task from today. Pt was not given this result note previously. Meds were not called in to pharmacy.     Called pt and advised of the note from Dr Moseley. Explained diagnosis of H pylori to pt and how/why we treat it. Advised will call his antibiotics in to this pharmacy and he should continue the twice daily pantoprazole. Pt verb understanding.   Meds e-scribed.

## 2018-10-25 ENCOUNTER — TREATMENT (OUTPATIENT)
Dept: CARDIAC REHAB | Facility: HOSPITAL | Age: 73
End: 2018-10-25

## 2018-10-25 DIAGNOSIS — J44.9 COPD, MODERATE (HCC): Primary | ICD-10-CM

## 2018-10-25 PROCEDURE — G0424 PULMONARY REHAB W EXER: HCPCS

## 2018-10-27 ENCOUNTER — TREATMENT (OUTPATIENT)
Dept: CARDIAC REHAB | Facility: HOSPITAL | Age: 73
End: 2018-10-27

## 2018-10-27 DIAGNOSIS — J44.9 COPD, MODERATE (HCC): Primary | ICD-10-CM

## 2018-10-27 PROCEDURE — G0424 PULMONARY REHAB W EXER: HCPCS

## 2018-10-29 RX ORDER — DABIGATRAN ETEXILATE 150 MG/1
150 CAPSULE ORAL EVERY 12 HOURS SCHEDULED
Qty: 60 CAPSULE | Refills: 0 | COMMUNITY
Start: 2018-10-29 | End: 2019-01-30 | Stop reason: SDUPTHER

## 2018-10-30 ENCOUNTER — TREATMENT (OUTPATIENT)
Dept: CARDIAC REHAB | Facility: HOSPITAL | Age: 73
End: 2018-10-30

## 2018-10-30 DIAGNOSIS — J44.9 COPD, MODERATE (HCC): Primary | ICD-10-CM

## 2018-10-30 PROCEDURE — G0424 PULMONARY REHAB W EXER: HCPCS

## 2018-11-01 ENCOUNTER — TREATMENT (OUTPATIENT)
Dept: CARDIAC REHAB | Facility: HOSPITAL | Age: 73
End: 2018-11-01

## 2018-11-01 DIAGNOSIS — J44.9 COPD, MODERATE (HCC): Primary | ICD-10-CM

## 2018-11-01 PROCEDURE — G0424 PULMONARY REHAB W EXER: HCPCS

## 2018-11-03 ENCOUNTER — TREATMENT (OUTPATIENT)
Dept: CARDIAC REHAB | Facility: HOSPITAL | Age: 73
End: 2018-11-03

## 2018-11-03 DIAGNOSIS — J44.9 COPD, MODERATE (HCC): Primary | ICD-10-CM

## 2018-11-03 PROCEDURE — G0424 PULMONARY REHAB W EXER: HCPCS

## 2018-11-06 ENCOUNTER — TREATMENT (OUTPATIENT)
Dept: CARDIAC REHAB | Facility: HOSPITAL | Age: 73
End: 2018-11-06

## 2018-11-06 DIAGNOSIS — J44.9 COPD, MODERATE (HCC): Primary | ICD-10-CM

## 2018-11-06 PROCEDURE — G0424 PULMONARY REHAB W EXER: HCPCS

## 2018-11-06 NOTE — PROGRESS NOTES
CARDIAC/PULMONARY REHAB NUTRITION EDUCATION/ASSESSMENT      72 y.o.         Height: 70 in    Weight: 196 lb     BMI: 28 IBW: 166 lb +/- 10%                                                           Time seen: 9:30 AM   Diet Survey Score: 55   Weight Assessment: Overweight  Weight Change:  unchanged  Usual Weight: 196 lb  Desired Weight: 180 lb     Current Diet: Regular  Appetite: good   Factors limiting PO intake: N/A  Taste/smell changes:  No Food records reviewed? Yes     Occupation: Retired  Job Activity Level: N/A    Routine Exercise: mild   Who does the patient live with: wife  Who does the cooking at home: patient  Spouse/significant other present for diet instruction today? No  Patient actively receiving lifestyle support from others at home? Yes       Pertinent Lab Values:   Total: No components found for: CHOLESTEROL  HDL:   HDL Cholesterol   Date Value Ref Range Status   07/31/2018 40 40 - 60 mg/dL Final     LDL:  LDL Cholesterol    Date Value Ref Range Status   07/31/2018 89 0 - 100 mg/dL Final     Triglyceride: No components found for: TRIGLYCERIDE  Last HGBA1C with date if applicable:No components found for: A1C  Glucose:   Glucose   Date Value Ref Range Status   09/26/2018 95 65 - 99 mg/dL Final    Nutritional Supplements: N/A             Stated Problem Areas / Concerns: Sanjeev states that he would like to lose about 15 pounds. He feels that losing weight would help his breathing. He states that he watches his salt intake.       Assessment / Recommendations: Obtained Sanjeev's usual meal pattern and food preferences. Discussed his weight loss goals, strategies, and behavior modification tips. Discussed the [employer]'s nutrition tips and food labels. Encouraged nutrient-dense, fiber-rich food choices.   Motivation level toward diet compliance: strong         Instructed on: [employer]'s nutrition tips, weight loss strategies, food labels  Written materials given: [employer]'s nutrition tips,  weight loss, food labels       Goals: Follow the [employer]'s nutrition tips             11:48 AM  11/6/2018  Marielena Cherry RD

## 2018-11-08 ENCOUNTER — TREATMENT (OUTPATIENT)
Dept: CARDIAC REHAB | Facility: HOSPITAL | Age: 73
End: 2018-11-08

## 2018-11-08 DIAGNOSIS — J44.9 COPD, MODERATE (HCC): Primary | ICD-10-CM

## 2018-11-08 PROCEDURE — G0424 PULMONARY REHAB W EXER: HCPCS

## 2018-11-10 ENCOUNTER — TREATMENT (OUTPATIENT)
Dept: CARDIAC REHAB | Facility: HOSPITAL | Age: 73
End: 2018-11-10

## 2018-11-10 DIAGNOSIS — J44.9 COPD, MODERATE (HCC): Primary | ICD-10-CM

## 2018-11-10 PROCEDURE — G0424 PULMONARY REHAB W EXER: HCPCS

## 2018-11-13 ENCOUNTER — TREATMENT (OUTPATIENT)
Dept: CARDIAC REHAB | Facility: HOSPITAL | Age: 73
End: 2018-11-13

## 2018-11-13 DIAGNOSIS — J44.9 COPD, MODERATE (HCC): Primary | ICD-10-CM

## 2018-11-13 PROCEDURE — G0424 PULMONARY REHAB W EXER: HCPCS

## 2018-11-15 ENCOUNTER — TREATMENT (OUTPATIENT)
Dept: CARDIAC REHAB | Facility: HOSPITAL | Age: 73
End: 2018-11-15

## 2018-11-15 DIAGNOSIS — J44.9 COPD, MODERATE (HCC): Primary | ICD-10-CM

## 2018-11-15 PROCEDURE — G0424 PULMONARY REHAB W EXER: HCPCS

## 2018-11-16 ENCOUNTER — HOSPITAL ENCOUNTER (EMERGENCY)
Facility: HOSPITAL | Age: 73
Discharge: HOME | End: 2018-11-16
Attending: EMERGENCY MEDICINE | Admitting: EMERGENCY MEDICINE

## 2018-11-16 VITALS
WEIGHT: 197 LBS | HEIGHT: 70 IN | SYSTOLIC BLOOD PRESSURE: 118 MMHG | TEMPERATURE: 96.1 F | OXYGEN SATURATION: 94 % | BODY MASS INDEX: 28.2 KG/M2 | DIASTOLIC BLOOD PRESSURE: 76 MMHG | RESPIRATION RATE: 16 BRPM | HEART RATE: 83 BPM

## 2018-11-16 DIAGNOSIS — M54.31 RIGHT SCIATIC NERVE PAIN: Primary | ICD-10-CM

## 2018-11-16 PROCEDURE — 96375 TX/PRO/DX INJ NEW DRUG ADDON: CPT

## 2018-11-16 PROCEDURE — 99283 EMERGENCY DEPT VISIT LOW MDM: CPT

## 2018-11-16 PROCEDURE — 96374 THER/PROPH/DIAG INJ IV PUSH: CPT

## 2018-11-16 PROCEDURE — 25010000002 MORPHINE PER 10 MG: Performed by: EMERGENCY MEDICINE

## 2018-11-16 PROCEDURE — 25010000002 ONDANSETRON PER 1 MG: Performed by: EMERGENCY MEDICINE

## 2018-11-16 RX ORDER — HYDROCODONE BITARTRATE AND ACETAMINOPHEN 7.5; 325 MG/1; MG/1
1 TABLET ORAL EVERY 6 HOURS PRN
Qty: 15 TABLET | Refills: 0 | Status: SHIPPED | OUTPATIENT
Start: 2018-11-16 | End: 2018-12-11

## 2018-11-16 RX ORDER — SODIUM CHLORIDE 0.9 % (FLUSH) 0.9 %
10 SYRINGE (ML) INJECTION AS NEEDED
Status: DISCONTINUED | OUTPATIENT
Start: 2018-11-16 | End: 2018-11-16 | Stop reason: HOSPADM

## 2018-11-16 RX ORDER — MORPHINE SULFATE 2 MG/ML
4 INJECTION, SOLUTION INTRAMUSCULAR; INTRAVENOUS ONCE
Status: COMPLETED | OUTPATIENT
Start: 2018-11-16 | End: 2018-11-16

## 2018-11-16 RX ORDER — ONDANSETRON 4 MG/1
4 TABLET, FILM COATED ORAL EVERY 8 HOURS PRN
Qty: 15 TABLET | Refills: 0 | Status: SHIPPED | OUTPATIENT
Start: 2018-11-16 | End: 2018-12-14

## 2018-11-16 RX ORDER — ONDANSETRON 2 MG/ML
4 INJECTION INTRAMUSCULAR; INTRAVENOUS ONCE
Status: COMPLETED | OUTPATIENT
Start: 2018-11-16 | End: 2018-11-16

## 2018-11-16 RX ORDER — PREDNISONE 20 MG/1
20 TABLET ORAL 2 TIMES DAILY
Qty: 10 TABLET | Refills: 0 | Status: SHIPPED | OUTPATIENT
Start: 2018-11-16 | End: 2018-12-11

## 2018-11-16 RX ADMIN — MORPHINE SULFATE 4 MG: 2 INJECTION, SOLUTION INTRAMUSCULAR; INTRAVENOUS at 11:13

## 2018-11-16 RX ADMIN — ONDANSETRON 4 MG: 2 INJECTION INTRAMUSCULAR; INTRAVENOUS at 11:13

## 2018-11-16 NOTE — ED PROVIDER NOTES
EMERGENCY DEPARTMENT ENCOUNTER    Room Number:  18/18  Date seen:  11/16/2018  Time seen: 10:44 AM  PCP: Neftali Amezcua MD  Historian: Patient/Spouse      HPI:  Chief complaint: Back pain  Context: Aj Salazar is a 73 y.o. male who presents to the ED c/o 2 day history of atraumatic right-sided low back pain, which radiates to the posterior right knee.  Patient has had similar episodes of this in the past.  He has a history of sciatica and has seen Dr. Ankush Sky.  He had an MRI from 7/31/18, which showed degenerative disc disease and nerve irritation on the right.  He has had 2 epidurals with good relief.  He denies any weakness or numbness of the right leg.  He denies any urinary incontinence.    MEDICAL RECORD REVIEW     Pt had MRI on 7/31/18 shows: At L4-5, there is a right subarticular disc extrusion which extends  inferiorly from the level of the L4-5 disc space and likely results in  some mass effect upon the descending right L5 nerve root sleeve.    Pt admitted 9/25/18 for bleeding ulcer.        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/17/2016   • Hypertension 03/17/2016   • Osteoarthritis 03/17/2016   • History of smoking greater than 50 pack years 06/23/2017   • Benign prostatic hyperplasia with urinary obstruction 03/27/2018   • Abdominal pain 09/25/2018   • Drug allergy, antibiotic 09/26/2018   • Permanent atrial fibrillation (CMS/[facility])    • Nocturnal hypoxia 09/28/2018     Resolved Ambulatory Problems     Diagnosis Date Noted   • Dehydration 09/25/2018   • Hypotension 09/25/2018   • Melena 09/25/2018   • EMPERATRIZ (acute kidney injury) (CMS/HCC) 09/25/2018   • Hyponatremia 09/25/2018   • Lactic acidosis 09/25/2018     Past Medical History:   Diagnosis Date   • EMPERATRIZ (acute kidney injury) (CMS/HCC)    • Alcohol abuse, in remission     • BPH (benign prostatic hypertrophy)    • COPD (chronic obstructive pulmonary disease) (CMS/HCC)    • Depression    • DJD (degenerative joint disease) of cervical spine    • ED (erectile dysfunction)    • Hx of colonic polyps    • Hyperlipidemia    • Hypertension    • Hypotension    • Influenza B 02/14/2013   • Low back pain    • Nocturnal hypoxia    • Osteoarthritis    • Peripheral neuropathy    • Permanent atrial fibrillation (CMS/HCC)    • Rectal bleeding 04/26/2017   • Shortness of breath on exertion    • Shoulder pain, left 12/04/2007   • Tendonitis of shoulder 11/07/2007   • Ulcer of the stomach and intestine        PAST SURGICAL HISTORY  Past Surgical History:   Procedure Laterality Date   • APPENDECTOMY     • CARDIOVASCULAR STRESS TEST N/A 10/20/2015    NML LEXISCAN CARDIOLITE PERFUSION STUDY, NO EVIDENCE OF ISCHEMIA, AREA OF HYPOPERFUSION OF THE INFERIOR WALL W/NORMAL WALL PERFUSION AND NML LEFT VENTRICULAR EJECTION FRACTION, MOST LIKELY ATTENUATION. DR.MICHAEL BOX   • COLONOSCOPY N/A 02/2017   • COLONOSCOPY N/A 02/23/2011    4 POLYPS REMOVED, RESCOPE IN 5 YRS, DR. EAGLE   • COLONOSCOPY N/A 03/08/2006    ERYTHEMOUS AND GRANULAR MUCOSA IN ILEOCECAL VALVE, NORMAL COLON, REPEAT IN 5 YRS,    • HERNIA REPAIR Bilateral     INGUINAL   • JOINT REPLACEMENT  11/2015    left hip   • TOTAL HIP ARTHROPLASTY Left 11/06/2015    Dr Ankush Sky   • TOTAL HIP ARTHROPLASTY Right 10/27/2014    DR.RIED SKY   • VASECTOMY         FAMILY HISTORY  Family History   Problem Relation Age of Onset   • Colon cancer Mother    • Stroke Sister         October 2016   • Heart disease Brother    • Coronary artery disease Father    • Hypertension Father    • Heart disease Father    • Colon cancer Maternal Grandmother    • Heart disease Brother    • Coronary artery disease Brother    • Hypertension Brother    • Stroke Brother    • Prostate cancer Neg Hx        SOCIAL HISTORY  Social History     Socioeconomic History   •  Marital status:      Spouse name: Ara   • Number of children: 3   • Years of education: Not on file   • Highest education level: Not on file   Social Needs   • Financial resource strain: Not on file   • Food insecurity - worry: Not on file   • Food insecurity - inability: Not on file   • Transportation needs - medical: Not on file   • Transportation needs - non-medical: Not on file   Occupational History   • 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  Smoked 2 ppd for 49 years until he quit smoking 7 years ago for a 98 pack year history.   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   • Drug use: No     Comment: caffeine use    • Sexual activity: Defer   Other Topics Concern   • Not on file   Social History Narrative   • Not on file           REVIEW OF SYSTEMS  Review of Systems   Constitutional: Negative for chills and fever.   Respiratory: Negative for chest tightness.    Cardiovascular: Negative for chest pain.   Gastrointestinal: Negative for abdominal pain.   Genitourinary: Negative for difficulty urinating.   Musculoskeletal: Positive for back pain and gait problem.   Neurological: Negative for weakness and numbness.           PHYSICAL EXAM  ED Triage Vitals [18 1025]   Temp Heart Rate Resp BP SpO2   96.1 °F (35.6 °C) 115 -- -- 92 %      Temp src Heart Rate Source Patient Position BP Location FiO2 (%)   Tympanic Monitor -- -- --     Physical Exam   Constitutional: He is oriented to person, place, and time and well-developed, well-nourished, and in no distress.   HENT:   Head: Normocephalic.   Eyes: EOM are normal.   Neck: Normal range of motion. Neck  supple.   Cardiovascular: Normal rate, regular rhythm and normal heart sounds.   Pulmonary/Chest: Effort normal.   Abdominal: Soft. There is no tenderness.   Musculoskeletal:   Decreased ROM of lumbar spine, no tenderness over lumbar spine.    Neurological: He is oriented to person, place, and time. He displays normal reflexes. He exhibits normal muscle tone.   Skin: Skin is warm and dry.   Psychiatric: Affect and judgment normal.   Nursing note and vitals reviewed.          RADIOLOGY  No orders to display   Not clinically indicated      MEDICATIONS GIVEN IN ER  Medications   ondansetron (ZOFRAN) injection 4 mg (not administered)   morphine injection 4 mg (not administered)   sodium chloride 0.9 % flush 10 mL (not administered)       PROCEDURES  Procedures        COURSE & MEDICAL DECISION MAKING  Pertinent Labs and Imaging studies that were ordered and reviewed are noted above.  Results were reviewed/discussed with the patient and they were also made aware of online access.  Pt also made aware that some labs, such as cultures, will not be resulted during ER visit and follow up with PMD is necessary.     Pt has no neuro deficits and has had recent MRI, will focus on controlling pain.     PROGRESS AND CONSULTS    Progress Notes:    1100 Reviewed pt's history and workup with Dr. Conway (ER physician).  After a bedside evaluation, Dr. Conway agrees with the plan of care    1149  Pt's pain is improved.  Able to sit up and ambulate well.      1200 The patient's history, physical exam, and lab findings were discussed with the physician, who also performed a face to face history and physical exam.  I discussed all results and noted any abnormalities with patient.  Discussed absoute need to recheck abnormalities with their family physician.  I answered any of the patient's questions.  Discussed plan for discharge, as there is no emergent indication for admission.  Pt is agreeable and understands need for follow up and  "repeat testing.  Pt is aware that discharge does not mean that nothing is wrong but it indicates no emergency is present and they must continue care with their family physician.  Pt is discharged with instructions to follow up with primary care doctor to have their blood pressure rechecked.     Disposition vitals:  Pulse 115   Temp 96.1 °F (35.6 °C) (Tympanic)   Ht 177.8 cm (70\")   SpO2 92%   BMI 27.69 kg/m²         DIAGNOSIS  Final diagnoses:   None         DISPOSITION  Discharged      FOLLOW UP   No follow-up provider specified.        RX     Medication List      No changes were made to your prescriptions during this visit.          Rayshawn Govea PA  11/16/18 1514    "

## 2018-11-19 ENCOUNTER — OFFICE VISIT (OUTPATIENT)
Dept: GASTROENTEROLOGY | Facility: CLINIC | Age: 73
End: 2018-11-19

## 2018-11-19 VITALS
HEIGHT: 70 IN | BODY MASS INDEX: 28.46 KG/M2 | SYSTOLIC BLOOD PRESSURE: 120 MMHG | WEIGHT: 198.8 LBS | TEMPERATURE: 97.5 F | DIASTOLIC BLOOD PRESSURE: 82 MMHG

## 2018-11-19 DIAGNOSIS — A04.8 H. PYLORI INFECTION: ICD-10-CM

## 2018-11-19 DIAGNOSIS — K92.1 MELENA: ICD-10-CM

## 2018-11-19 DIAGNOSIS — K27.9 PUD (PEPTIC ULCER DISEASE): Primary | ICD-10-CM

## 2018-11-19 LAB
BASOPHILS # BLD AUTO: 0.01 10*3/MM3 (ref 0–0.2)
BASOPHILS NFR BLD AUTO: 0.1 % (ref 0–1.5)
EOSINOPHIL # BLD AUTO: 0 10*3/MM3 (ref 0–0.7)
EOSINOPHIL NFR BLD AUTO: 0 % (ref 0.3–6.2)
ERYTHROCYTE [DISTWIDTH] IN BLOOD BY AUTOMATED COUNT: 13.4 % (ref 11.5–14.5)
HCT VFR BLD AUTO: 49.8 % (ref 40.4–52.2)
HGB BLD-MCNC: 16.1 G/DL (ref 13.7–17.6)
IMM GRANULOCYTES # BLD: 0.04 10*3/MM3 (ref 0–0.03)
IMM GRANULOCYTES NFR BLD: 0.4 % (ref 0–0.5)
LYMPHOCYTES # BLD AUTO: 1.34 10*3/MM3 (ref 0.9–4.8)
LYMPHOCYTES NFR BLD AUTO: 12.1 % (ref 19.6–45.3)
MCH RBC QN AUTO: 30.5 PG (ref 27–32.7)
MCHC RBC AUTO-ENTMCNC: 32.3 G/DL (ref 32.6–36.4)
MCV RBC AUTO: 94.3 FL (ref 79.8–96.2)
MONOCYTES # BLD AUTO: 0.59 10*3/MM3 (ref 0.2–1.2)
MONOCYTES NFR BLD AUTO: 5.3 % (ref 5–12)
NEUTROPHILS # BLD AUTO: 9.1 10*3/MM3 (ref 1.9–8.1)
NEUTROPHILS NFR BLD AUTO: 82.1 % (ref 42.7–76)
PLATELET # BLD AUTO: 235 10*3/MM3 (ref 140–500)
RBC # BLD AUTO: 5.28 10*6/MM3 (ref 4.6–6)
WBC # BLD AUTO: 11.08 10*3/MM3 (ref 4.5–10.7)

## 2018-11-19 PROCEDURE — 99213 OFFICE O/P EST LOW 20 MIN: CPT | Performed by: INTERNAL MEDICINE

## 2018-11-19 NOTE — PROGRESS NOTES
Chief Complaint   Patient presents with   • Follow-up     hospital follow up   • GI Bleeding   • Abdominal Pain       Aj Salazar is a  73 y.o. male here for a follow up visit for peptic ulcer disease, new-onset dysphagia    73-year-old on anticoagulation admitted 2 months ago for GI bleed.  EGD showed gastritis and duodenal ulcers, pigmented material, no stigmata, no treatment endoscopically  H. pylori serologies were abnormal so we treated him with Biaxin and amoxicillin for 2 weeks  He is symptom-free from bleeding at this time with no melena or other GI bleeding  He does have new-onset dysphagia for the last month to pills.  It does sound more like a transfer dysphagia.        Past Medical History:   Diagnosis Date   • EMPERATRIZ (acute kidney injury) (CMS/[facility])    • Alcohol abuse, in remission    • BPH (benign prostatic hypertrophy)     see doctors at [facility]   • COPD (chronic obstructive pulmonary disease) (CMS/[facility])    • Depression    • DJD (degenerative joint disease) of cervical spine    • ED (erectile dysfunction)     [facility]   • Hx of colonic polyps     followed by GI (Kyle)   • Hyperlipidemia    • Hypertension    • Hypotension    • Influenza B 02/14/2013       • Low back pain    • Nocturnal hypoxia    • Osteoarthritis     HIPS, HANDS, MULTIPLE SITES   • Peripheral neuropathy    • Permanent atrial fibrillation (CMS/[facility])    • Rectal bleeding 04/26/2017   • Shortness of breath on exertion    • Shoulder pain, left 12/04/2007    SEEN AT [facility] ER   • Tendonitis of shoulder 11/07/2007    RIGHT SHOULDER/DELTOID INSERTION   • Ulcer of the stomach and intestine        Past Surgical History:   Procedure Laterality Date   • APPENDECTOMY     • CARDIOVASCULAR STRESS TEST N/A 10/20/2015    NML LEXISCAN CARDIOLITE PERFUSION STUDY, NO EVIDENCE OF ISCHEMIA, AREA OF HYPOPERFUSION OF THE INFERIOR WALL W/NORMAL WALL PERFUSION AND NML LEFT VENTRICULAR EJECTION FRACTION, MOST LIKELY ATTENUATION.   CHARU   • COLONOSCOPY N/A 2017   • COLONOSCOPY N/A 2011    4 POLYPS REMOVED, RESCOPE IN 5 YRS, DR. EAGLE   • COLONOSCOPY N/A 2006    ERYTHEMOUS AND GRANULAR MUCOSA IN ILEOCECAL VALVE, NORMAL COLON, REPEAT IN 5 YRS,    • HERNIA REPAIR Bilateral     INGUINAL   • JOINT REPLACEMENT  2015    left hip   • TOTAL HIP ARTHROPLASTY Left 2015    Dr Ankush Sky   • TOTAL HIP ARTHROPLASTY Right 10/27/2014    DR.RIED SKY   • VASECTOMY         Scheduled Meds:    Continuous Infusions:  No current facility-administered medications for this visit.     PRN Meds:.    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  Smoked 2 ppd for 49 years until he quit smoking 7 years ago for a 98 pack year history.   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   • Drug use: No     Comment: caffeine use    • Sexual activity: Defer   Other Topics Concern   • Not on file   Social History Narrative   • Not on file       Family History   Problem Relation Age of Onset   • Colon cancer Mother    • Stroke Sister         2016   •  Heart disease Brother    • Coronary artery disease Father    • Hypertension Father    • Heart disease Father    • Colon cancer Maternal Grandmother    • Heart disease Brother    • Coronary artery disease Brother    • Hypertension Brother    • Stroke Brother    • Prostate cancer Neg Hx        Review of Systems   Gastrointestinal: Negative for abdominal distention, abdominal pain, anal bleeding, constipation, nausea and rectal pain.   Musculoskeletal: Negative for myalgias.   All other systems reviewed and are negative.      Vitals:    11/19/18 1059   BP: 120/82   Temp: 97.5 °F (36.4 °C)       Physical Exam   Constitutional: He is oriented to person, place, and time. He appears well-developed and well-nourished.   HENT:   Head: Normocephalic and atraumatic.   Eyes: Conjunctivae and EOM are normal.   Neck: Normal range of motion. No tracheal deviation present.   Cardiovascular: Normal rate and regular rhythm.   Pulmonary/Chest: Effort normal and breath sounds normal. No respiratory distress.   Abdominal: Soft. Bowel sounds are normal. He exhibits no distension and no mass. There is no tenderness. There is no rebound and no guarding.   Musculoskeletal: Normal range of motion.   Neurological: He is alert and oriented to person, place, and time.   Skin: Skin is warm and dry.   Psychiatric: He has a normal mood and affect. Judgment normal.   Nursing note and vitals reviewed.      No images are attached to the encounter.    Problem list    GI bleeding with melena, anemia and peptic ulcer disease  H. pylori serology positive, treated  New-onset dysphagia      Assessment/Plan    We will check a CBC to ensure that it is normalized  We will order a barium esophagram  He may need a referral to ENT or speech therapy  Stop Carafate  Continue Protonix once a day  Continue Pradaxa

## 2018-11-20 ENCOUNTER — TREATMENT (OUTPATIENT)
Dept: CARDIAC REHAB | Facility: HOSPITAL | Age: 73
End: 2018-11-20

## 2018-11-20 DIAGNOSIS — J44.9 COPD, MODERATE (HCC): Primary | ICD-10-CM

## 2018-11-20 PROCEDURE — G0424 PULMONARY REHAB W EXER: HCPCS

## 2018-11-24 ENCOUNTER — TREATMENT (OUTPATIENT)
Dept: CARDIAC REHAB | Facility: HOSPITAL | Age: 73
End: 2018-11-24

## 2018-11-24 DIAGNOSIS — J44.9 COPD, MODERATE (HCC): Primary | ICD-10-CM

## 2018-11-24 PROCEDURE — G0424 PULMONARY REHAB W EXER: HCPCS

## 2018-11-27 ENCOUNTER — TREATMENT (OUTPATIENT)
Dept: CARDIAC REHAB | Facility: HOSPITAL | Age: 73
End: 2018-11-27

## 2018-11-27 DIAGNOSIS — J44.9 COPD, MODERATE (HCC): Primary | ICD-10-CM

## 2018-11-27 PROCEDURE — G0424 PULMONARY REHAB W EXER: HCPCS

## 2018-11-28 DIAGNOSIS — R10.9 ABDOMINAL PAIN, UNSPECIFIED ABDOMINAL LOCATION: ICD-10-CM

## 2018-11-28 DIAGNOSIS — I10 ESSENTIAL HYPERTENSION: Primary | Chronic | ICD-10-CM

## 2018-11-28 RX ORDER — METOPROLOL TARTRATE 100 MG/1
100 TABLET ORAL EVERY 12 HOURS SCHEDULED
Qty: 60 TABLET | Refills: 5 | Status: SHIPPED | OUTPATIENT
Start: 2018-11-28 | End: 2019-05-19 | Stop reason: SDUPTHER

## 2018-11-28 RX ORDER — PANTOPRAZOLE SODIUM 40 MG/1
40 TABLET, DELAYED RELEASE ORAL
Qty: 60 TABLET | Refills: 5 | Status: SHIPPED | OUTPATIENT
Start: 2018-11-28 | End: 2018-12-11

## 2018-11-28 NOTE — TELEPHONE ENCOUNTER
SHANIQUA Suárez seen pt for hosp f/u on DOS 10/2/18; however Dr. Greene prescribed metoprolol tartrate 100mg & pantoprazole 40mg EC on 9/28/18.    Pt states the meds have ran out & is he still to continue taking them?    Pls advise.    If so, pls refill to [facility] #[phone].

## 2018-11-28 NOTE — TELEPHONE ENCOUNTER
Pt was advised that he is to remain on medications.    Rx's sent to pharmacy as requested.    MAURA

## 2018-11-29 ENCOUNTER — TREATMENT (OUTPATIENT)
Dept: CARDIAC REHAB | Facility: HOSPITAL | Age: 73
End: 2018-11-29

## 2018-11-29 DIAGNOSIS — J44.9 COPD, MODERATE (HCC): Primary | ICD-10-CM

## 2018-11-29 PROCEDURE — G0424 PULMONARY REHAB W EXER: HCPCS

## 2018-12-01 ENCOUNTER — TREATMENT (OUTPATIENT)
Dept: CARDIAC REHAB | Facility: HOSPITAL | Age: 73
End: 2018-12-01

## 2018-12-01 DIAGNOSIS — J44.9 COPD, MODERATE (HCC): Primary | ICD-10-CM

## 2018-12-01 PROCEDURE — G0424 PULMONARY REHAB W EXER: HCPCS

## 2018-12-04 ENCOUNTER — TREATMENT (OUTPATIENT)
Dept: CARDIAC REHAB | Facility: HOSPITAL | Age: 73
End: 2018-12-04

## 2018-12-04 ENCOUNTER — APPOINTMENT (OUTPATIENT)
Dept: PAIN MEDICINE | Facility: HOSPITAL | Age: 73
End: 2018-12-04

## 2018-12-04 DIAGNOSIS — J44.9 COPD, MODERATE (HCC): Primary | ICD-10-CM

## 2018-12-04 PROCEDURE — G0424 PULMONARY REHAB W EXER: HCPCS

## 2018-12-06 ENCOUNTER — TREATMENT (OUTPATIENT)
Dept: CARDIAC REHAB | Facility: HOSPITAL | Age: 73
End: 2018-12-06

## 2018-12-06 DIAGNOSIS — J44.9 COPD, MODERATE (HCC): Primary | ICD-10-CM

## 2018-12-06 PROCEDURE — G0424 PULMONARY REHAB W EXER: HCPCS

## 2018-12-11 ENCOUNTER — TREATMENT (OUTPATIENT)
Dept: CARDIAC REHAB | Facility: HOSPITAL | Age: 73
End: 2018-12-11

## 2018-12-11 ENCOUNTER — ANESTHESIA (OUTPATIENT)
Dept: PAIN MEDICINE | Facility: HOSPITAL | Age: 73
End: 2018-12-11

## 2018-12-11 ENCOUNTER — HOSPITAL ENCOUNTER (OUTPATIENT)
Dept: GENERAL RADIOLOGY | Facility: HOSPITAL | Age: 73
Discharge: HOME | End: 2018-12-11

## 2018-12-11 ENCOUNTER — HOSPITAL ENCOUNTER (OUTPATIENT)
Dept: PAIN MEDICINE | Facility: HOSPITAL | Age: 73
Discharge: HOME | End: 2018-12-11

## 2018-12-11 ENCOUNTER — HOSPITAL ENCOUNTER (OUTPATIENT)
Dept: GENERAL RADIOLOGY | Facility: HOSPITAL | Age: 73
Discharge: HOME | End: 2018-12-11
Attending: INTERNAL MEDICINE | Admitting: ANESTHESIOLOGY

## 2018-12-11 ENCOUNTER — ANESTHESIA EVENT (OUTPATIENT)
Dept: PAIN MEDICINE | Facility: HOSPITAL | Age: 73
End: 2018-12-11

## 2018-12-11 VITALS
SYSTOLIC BLOOD PRESSURE: 118 MMHG | RESPIRATION RATE: 16 BRPM | OXYGEN SATURATION: 95 % | TEMPERATURE: 97.7 F | HEART RATE: 105 BPM | DIASTOLIC BLOOD PRESSURE: 90 MMHG

## 2018-12-11 DIAGNOSIS — M54.16 LUMBAR NEURITIS: ICD-10-CM

## 2018-12-11 DIAGNOSIS — M51.36 DDD (DEGENERATIVE DISC DISEASE), LUMBAR: Primary | ICD-10-CM

## 2018-12-11 DIAGNOSIS — R52 PAIN: ICD-10-CM

## 2018-12-11 DIAGNOSIS — J44.9 COPD, MODERATE (HCC): Primary | ICD-10-CM

## 2018-12-11 PROCEDURE — 63710000001 SOD BICARB-CITRIC ACID-SIMETHICONE 2.21-1.53-0.04 G PACK: Performed by: INTERNAL MEDICINE

## 2018-12-11 PROCEDURE — 63710000001 BARIUM SULFATE 96 % RECONSTITUTED SUSPENSION: Performed by: INTERNAL MEDICINE

## 2018-12-11 PROCEDURE — 0 IOPAMIDOL 41 % SOLUTION: Performed by: ANESTHESIOLOGY

## 2018-12-11 PROCEDURE — A9270 NON-COVERED ITEM OR SERVICE: HCPCS | Performed by: INTERNAL MEDICINE

## 2018-12-11 PROCEDURE — 63710000001 BARIUM SULFATE 98 % RECONSTITUTED SUSPENSION: Performed by: INTERNAL MEDICINE

## 2018-12-11 PROCEDURE — G0424 PULMONARY REHAB W EXER: HCPCS

## 2018-12-11 PROCEDURE — 63710000001 BARIUM SULFATE 700 MG TABLET: Performed by: INTERNAL MEDICINE

## 2018-12-11 PROCEDURE — 25010000002 METHYLPREDNISOLONE PER 80 MG: Performed by: ANESTHESIOLOGY

## 2018-12-11 PROCEDURE — 77003 FLUOROGUIDE FOR SPINE INJECT: CPT

## 2018-12-11 PROCEDURE — C1755 CATHETER, INTRASPINAL: HCPCS

## 2018-12-11 PROCEDURE — 74220 X-RAY XM ESOPHAGUS 1CNTRST: CPT

## 2018-12-11 RX ORDER — MIDAZOLAM HYDROCHLORIDE 1 MG/ML
1 INJECTION INTRAMUSCULAR; INTRAVENOUS AS NEEDED
Status: DISCONTINUED | OUTPATIENT
Start: 2018-12-11 | End: 2018-12-12 | Stop reason: HOSPADM

## 2018-12-11 RX ORDER — METHYLPREDNISOLONE ACETATE 80 MG/ML
80 INJECTION, SUSPENSION INTRA-ARTICULAR; INTRALESIONAL; INTRAMUSCULAR; SOFT TISSUE ONCE
Status: COMPLETED | OUTPATIENT
Start: 2018-12-11 | End: 2018-12-11

## 2018-12-11 RX ORDER — LIDOCAINE HYDROCHLORIDE 10 MG/ML
1 INJECTION, SOLUTION INFILTRATION; PERINEURAL ONCE AS NEEDED
Status: DISCONTINUED | OUTPATIENT
Start: 2018-12-11 | End: 2018-12-12 | Stop reason: HOSPADM

## 2018-12-11 RX ORDER — FENTANYL CITRATE 50 UG/ML
50 INJECTION, SOLUTION INTRAMUSCULAR; INTRAVENOUS AS NEEDED
Status: DISCONTINUED | OUTPATIENT
Start: 2018-12-11 | End: 2018-12-12 | Stop reason: HOSPADM

## 2018-12-11 RX ORDER — SODIUM CHLORIDE 0.9 % (FLUSH) 0.9 %
1-10 SYRINGE (ML) INJECTION AS NEEDED
Status: DISCONTINUED | OUTPATIENT
Start: 2018-12-11 | End: 2018-12-12 | Stop reason: HOSPADM

## 2018-12-11 RX ADMIN — BARIUM SULFATE 183 ML: 960 POWDER, FOR SUSPENSION ORAL at 10:50

## 2018-12-11 RX ADMIN — ANTACID/ANTIFLATULENT 0.5 TABLET: 380; 550; 10; 10 GRANULE, EFFERVESCENT ORAL at 10:45

## 2018-12-11 RX ADMIN — IOPAMIDOL 3 ML: 408 INJECTION, SOLUTION INTRATHECAL at 12:55

## 2018-12-11 RX ADMIN — BARIUM SULFATE 700 MG: 700 TABLET ORAL at 10:48

## 2018-12-11 RX ADMIN — BARIUM SULFATE 135 ML: 980 POWDER, FOR SUSPENSION ORAL at 10:47

## 2018-12-11 RX ADMIN — METHYLPREDNISOLONE ACETATE 80 MG: 80 INJECTION, SUSPENSION INTRA-ARTICULAR; INTRALESIONAL; INTRAMUSCULAR; SOFT TISSUE at 12:55

## 2018-12-11 NOTE — H&P
[facility]    History and Physical    Patient Name: Aj Salazar  :  1945  MRN:  [id number]  Date of Admission: 2018    Subjective     Patient is a 73 y.o. male presents with chief complaint of chronic, moderate, severe low back, hips: right, buttock and leg: right pain.  Onset of symptoms was gradual starting several years ago.  Symptoms are associated/aggravated by activity. Symptoms improve with injection - 75% relief w/ last LESI.      The following portions of the patients history were reviewed and updated as appropriate: current medications, allergies, past medical history, past surgical history, past family history, past social history and problem list                Objective     Past Medical History:   Past Medical History:   Diagnosis Date   • EMPERATRIZ (acute kidney injury) (CMS/[facility])    • Alcohol abuse, in remission    • BPH (benign prostatic hypertrophy)     see doctors at [facility]   • COPD (chronic obstructive pulmonary disease) (CMS/[facility])    • Depression    • DJD (degenerative joint disease) of cervical spine    • ED (erectile dysfunction)     [facility]   • Hx of colonic polyps     followed by GI (Kyel)   • Hyperlipidemia    • Hypertension    • Hypotension    • Influenza B 2013       • Low back pain    • Nocturnal hypoxia    • Osteoarthritis     HIPS, HANDS, MULTIPLE SITES   • Peripheral neuropathy    • Permanent atrial fibrillation (CMS/[facility])    • Rectal bleeding 2017   • Shortness of breath on exertion    • Shoulder pain, left 2007    SEEN AT [facility] ER   • Tendonitis of shoulder 2007    RIGHT SHOULDER/DELTOID INSERTION   • Ulcer of the stomach and intestine      Past Surgical History:   Past Surgical History:   Procedure Laterality Date   • APPENDECTOMY     • CARDIOVASCULAR STRESS TEST N/A 10/20/2015    NML LEXISCAN CARDIOLITE PERFUSION STUDY, NO EVIDENCE OF ISCHEMIA, AREA OF HYPOPERFUSION OF THE INFERIOR WALL W/NORMAL WALL PERFUSION AND NML  LEFT VENTRICULAR EJECTION FRACTION, MOST LIKELY ATTENUATION. DR.MICHAEL BOX   • COLONOSCOPY N/A 2017   • COLONOSCOPY N/A 2011    4 POLYPS REMOVED, RESCOPE IN 5 YRS, DR. EAGLE   • COLONOSCOPY N/A 2006    ERYTHEMOUS AND GRANULAR MUCOSA IN ILEOCECAL VALVE, NORMAL COLON, REPEAT IN 5 YRS,    • HERNIA REPAIR Bilateral     INGUINAL   • JOINT REPLACEMENT  2015    left hip   • TOTAL HIP ARTHROPLASTY Left 2015    Dr Ankush Sky   • TOTAL HIP ARTHROPLASTY Right 10/27/2014    DR.RIED SKY   • VASECTOMY       Family History:   Family History   Problem Relation Age of Onset   • Colon cancer Mother    • Stroke Sister         2016   • Heart disease Brother    • Coronary artery disease Father    • Hypertension Father    • Heart disease Father    • Colon cancer Maternal Grandmother    • Heart disease Brother    • Coronary artery disease Brother    • Hypertension Brother    • Stroke Brother    • Prostate cancer Neg Hx      Social History:   Social History     Tobacco Use   • Smoking status: Former Smoker     Packs/day: 2.00     Years: 49.00     Pack years: 98.00     Last attempt to quit: 2010     Years since quittin.9   • Smokeless tobacco: Never Used   • Tobacco comment: Began Smoking age 15.  Smoked 2 ppd for 49 years until he quit smoking 7 years ago for a 98 pack year history.   Substance Use Topics   • Alcohol use: No     Comment: 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; 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   • Drug use: No     Comment: caffeine use        Vital Signs Range for the last 24 hours  Temperature: Temp:  [36.5 °C (97.7 °F)] 36.5 °C (97.7 °F)   Temp Source: Temp src: Oral   BP: BP: (141)/(92) 141/92   Pulse: Heart Rate:  [98] 98   Respirations: Resp:  [16] 16   SPO2: SpO2:  [98 %] 98 %   O2 Amount (l/min):     O2 Devices Device (Oxygen Therapy): room air   Weight:           --------------------------------------------------------------------------------    Current Outpatient  Medications   Medication Sig Dispense Refill   • acetaminophen (TYLENOL) 325 MG tablet Take 2 tablets by mouth Every 4 (Four) Hours As Needed for Mild Pain .     • ALPHAGAN P 0.1 % solution ophthalmic solution Administer 0.1 bottles to both eyes 1 (One) Time.  6   • hydrochlorothiazide (HYDRODIURIL) 25 MG tablet Take 1 tablet by mouth Daily. 30 tablet 11   • LUMIGAN 0.01 % ophthalmic drops Administer 0.01 bottles to both eyes 1 (One) Time.  6   • metoprolol tartrate (LOPRESSOR) 100 MG tablet Take 1 tablet by mouth Every 12 (Twelve) Hours. 60 tablet 5   • sucralfate (CARAFATE) 1 g tablet Take 1 tablet by mouth 2 (Two) Times a Day. 60 tablet 5   • tamsulosin (FLOMAX) 0.4 MG capsule 24 hr capsule Take 1 capsule by mouth Every Night. 30 capsule    • TRELEGY ELLIPTA 100-62.5-25 MCG/INH aerosol powder  Inhale Daily.  3   • dabigatran etexilate (PRADAXA) 150 MG capsu Take 1 capsule by mouth Every 12 (Twelve) Hours. 60 capsule 0   • ondansetron (ZOFRAN) 4 MG tablet Take 1 tablet by mouth Every 8 (Eight) Hours As Needed for Nausea or Vomiting. 15 tablet 0     No current facility-administered medications for this encounter.        --------------------------------------------------------------------------------  Assessment/Plan      Anesthesia Evaluation     Patient summary reviewed and Nursing notes reviewed   no history of anesthetic complications:               Airway   Mallampati: II  TM distance: >3 FB  Dental - normal exam     Pulmonary - normal exam   (+) a smoker, COPD, shortness of breath, wheezes,   Cardiovascular - normal exam  Exercise tolerance: good (4-7 METS)    (+) hypertension, dysrhythmias Atrial Fib, hyperlipidemia,       Neuro/Psych- neuro exam normal  (+) numbness, psychiatric history Depression,     GI/Hepatic/Renal/Endo    (+)  GI bleeding,     Musculoskeletal (-) normal exam    Abdominal  - normal exam   Substance History   (+) alcohol use,      OB/GYN negative ob/gyn ROS         Other   (+) arthritis                 Diagnosis and Plan    Treatment Plan  ASA 3   Patient has had previous injection/procedure with % improvement.   Procedures: Lumbar Epidural Steroid Injection(LESI), With fluoroscopy,       Anesthetic plan and risks discussed with patient.          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

## 2018-12-11 NOTE — ANESTHESIA PROCEDURE NOTES
PAIN Epidural block    Pre-sedation assessment completed: 12/11/2018 12:46 PM    Patient reassessed immediately prior to procedure    Patient location during procedure: pain clinic  Start Time: 12/11/2018 12:50 PM  Indication:procedure for pain  Performed By  Anesthesiologist: Pilar Rinaldi MD  Preanesthetic Checklist  Completed: patient identified, site marked, surgical consent, pre-op evaluation, timeout performed, IV checked, risks and benefits discussed and monitors and equipment checked  Additional Notes  Lumbar: degenerative disc dz, neuritis  Fluoro -- able to identify L4/5, loss of resistance techniqure w/o issues, imaging limited d/t previous barium swallow study.    Prep:  Pt Position:prone  Sterile Tech:cap, gloves, mask and sterile barrier  Prep:chlorhexidine gluconate and isopropyl alcohol  Monitoring:blood pressure monitoring, continuous pulse oximetry and EKG  Procedure:  Sedation: no   Approach:midline  Guidance: fluoroscopy  Location:lumbar  Level:4-5  Needle Type:Tuohy  Needle Gauge:20  Aspiration:negative  Medications:  Depomedrol:80  Preservative Free Saline:3mL  Isovue:3mL  Comments:Spread difficult to assess d/t previous barium swallow study  Post Assessment:  Dressing:occlusive dressing applied  Pt Tolerance:patient tolerated the procedure well with no apparent complications  Complications:no

## 2018-12-13 ENCOUNTER — TREATMENT (OUTPATIENT)
Dept: CARDIAC REHAB | Facility: HOSPITAL | Age: 73
End: 2018-12-13

## 2018-12-13 DIAGNOSIS — J44.9 COPD, MODERATE (HCC): Primary | ICD-10-CM

## 2018-12-13 PROCEDURE — G0424 PULMONARY REHAB W EXER: HCPCS

## 2018-12-14 ENCOUNTER — OFFICE VISIT (OUTPATIENT)
Dept: CARDIOLOGY | Facility: CLINIC | Age: 73
End: 2018-12-14

## 2018-12-14 VITALS
SYSTOLIC BLOOD PRESSURE: 120 MMHG | DIASTOLIC BLOOD PRESSURE: 76 MMHG | HEART RATE: 87 BPM | WEIGHT: 198 LBS | BODY MASS INDEX: 28.35 KG/M2 | HEIGHT: 70 IN

## 2018-12-14 DIAGNOSIS — J44.9 CHRONIC OBSTRUCTIVE PULMONARY DISEASE, UNSPECIFIED COPD TYPE (HCC): ICD-10-CM

## 2018-12-14 DIAGNOSIS — I48.19 PERSISTENT ATRIAL FIBRILLATION (HCC): Primary | ICD-10-CM

## 2018-12-14 DIAGNOSIS — I10 ESSENTIAL HYPERTENSION: ICD-10-CM

## 2018-12-14 PROCEDURE — 99214 OFFICE O/P EST MOD 30 MIN: CPT | Performed by: INTERNAL MEDICINE

## 2018-12-14 PROCEDURE — 93000 ELECTROCARDIOGRAM COMPLETE: CPT | Performed by: INTERNAL MEDICINE

## 2018-12-14 NOTE — PROGRESS NOTES
Date of Office Visit: 18  Encounter Provider: Juan Colby MD  Place of Service: [facility] CARDIOLOGY  Patient Name: Aj Salazar  :1945  Referral Provider:No ref. provider found      Chief Complaint   Patient presents with   • Atrial Fibrillation     History of Present Illness  The patient is a 73-year-old gentleman with no real prior history of heart disease but he  presented in 10/2014 with hip replacement surgery and a transient episode of atrial  fibrillation. As part of that evaluation he had an echocardiogram that showed normal LV  systolic function and no significant valvular disease. He also had a perfusion stress  test done that showed no evidence of ischemia. It was decided to not treat him any  further for that.  He then presented to [facility] emergency room in 2016.  Was having atypical chest pain.  He had a perfusion stress test that was normal.  Felt that it was musculoskeletal started on ibuprofen.  We reviewed those records.    He was admitted in 2018 with gastrointestinal hemorrhage.  EGD showed evidence of ulcer disease and nonbleeding.  His hemoglobin was stable and melena resolved.  He was advised to stop anticoagulation for week and continue Protonix and Carafate.  He comes in for follow-up.  He overall feels much better he's breathing better now that he's been in pulmonary rehabilitation.  He denies chest pain and pressure.  Denies any orthopnea or PND.  Denies any palpitations, near-syncope or syncope.  No blood in his stool or black tarry stools.  No abrupt loss of vision, paralysis, paresthesia, or dysarthria.      Hypertension   This is a recurrent problem. The current episode started more than 1 year ago. The problem has been gradually improving since onset. The problem is controlled. Pertinent negatives include no anxiety, blurred vision, chest pain, headaches, malaise/fatigue, orthopnea,  palpitations, peripheral edema, PND or sweats. Agents associated with hypertension include NSAIDs. Compliance problems include exercise.    Atrial Fibrillation   Symptoms are negative for chest pain, dizziness, palpitations and weakness. Past medical history includes atrial fibrillation.         Past Medical History:   Diagnosis Date   • EMPERATRIZ (acute kidney injury) (CMS/HCC)    • Alcohol abuse, in remission    • BPH (benign prostatic hypertrophy)     see doctors at [facility]   • COPD (chronic obstructive pulmonary disease) (CMS/HCC)    • Depression    • DJD (degenerative joint disease) of cervical spine    • ED (erectile dysfunction)     [facility]   • Hx of colonic polyps     followed by GI ([facility])   • Hyperlipidemia    • Hypertension    • Hypotension    • Influenza B 02/14/2013       • Low back pain    • Nocturnal hypoxia    • Osteoarthritis     HIPS, HANDS, MULTIPLE SITES   • Peripheral neuropathy    • Permanent atrial fibrillation (CMS/HCC)    • Rectal bleeding 04/26/2017   • Shortness of breath on exertion    • Shoulder pain, left 12/04/2007    SEEN AT [facility] ER   • Tendonitis of shoulder 11/07/2007    RIGHT SHOULDER/DELTOID INSERTION   • Ulcer of the stomach and intestine          Past Surgical History:   Procedure Laterality Date   • APPENDECTOMY     • CARDIOVASCULAR STRESS TEST N/A 10/20/2015    NML LEXISCAN CARDIOLITE PERFUSION STUDY, NO EVIDENCE OF ISCHEMIA, AREA OF HYPOPERFUSION OF THE INFERIOR WALL W/NORMAL WALL PERFUSION AND NML LEFT VENTRICULAR EJECTION FRACTION, MOST LIKELY ATTENUATION. DR.MICHAEL BOX   • COLONOSCOPY N/A 02/2017   • COLONOSCOPY N/A 02/23/2011    4 POLYPS REMOVED, RESCOPE IN 5 YRS, DR. EAGLE   • COLONOSCOPY N/A 03/08/2006    ERYTHEMOUS AND GRANULAR MUCOSA IN ILEOCECAL VALVE, NORMAL COLON, REPEAT IN 5 YRS,    • HERNIA REPAIR Bilateral     INGUINAL   • JOINT REPLACEMENT  11/2015    left hip   • TOTAL HIP ARTHROPLASTY Left 11/06/2015    Dr Ankush Sky    • TOTAL HIP ARTHROPLASTY Right 10/27/2014    DR.RIED GRAY   • VASECTOMY           Current Outpatient Medications on File Prior to Visit   Medication Sig Dispense Refill   • acetaminophen (TYLENOL) 325 MG tablet Take 2 tablets by mouth Every 4 (Four) Hours As Needed for Mild Pain .     • ALPHAGAN P 0.1 % solution ophthalmic solution Administer 0.1 bottles to both eyes 1 (One) Time.  6   • dabigatran etexilate (PRADAXA) 150 MG capsu Take 1 capsule by mouth Every 12 (Twelve) Hours. 60 capsule 0   • hydrochlorothiazide (HYDRODIURIL) 25 MG tablet Take 1 tablet by mouth Daily. 30 tablet 11   • LUMIGAN 0.01 % ophthalmic drops Administer 0.01 bottles to both eyes 1 (One) Time.  6   • metoprolol tartrate (LOPRESSOR) 100 MG tablet Take 1 tablet by mouth Every 12 (Twelve) Hours. 60 tablet 5   • sucralfate (CARAFATE) 1 g tablet Take 1 tablet by mouth 2 (Two) Times a Day. 60 tablet 5   • tamsulosin (FLOMAX) 0.4 MG capsule 24 hr capsule Take 1 capsule by mouth Every Night. 30 capsule    • TRELEGY ELLIPTA 100-62.5-25 MCG/INH aerosol powder  Inhale Daily.  3   • [DISCONTINUED] ondansetron (ZOFRAN) 4 MG tablet Take 1 tablet by mouth Every 8 (Eight) Hours As Needed for Nausea or Vomiting. 15 tablet 0     No current facility-administered medications on file prior to visit.          Social History     Socioeconomic History   • Marital status:      Spouse name: Ara   • Number of children: 3   • Years of education: Not on file   • Highest education level: Not on file   Social Needs   • Financial resource strain: Not on file   • Food insecurity - worry: Not on file   • Food insecurity - inability: Not on file   • Transportation needs - medical: Not on file   • Transportation needs - non-medical: Not on file   Occupational History   • Occupation: Retired     Comment: Supervisor/manager [employer]   Tobacco Use   • Smoking status: Former Smoker     Packs/day: 2.00     Years: 49.00     Pack years: 98.00     Last attempt to  quit: 2010     Years since quittin.9   • Smokeless tobacco: Never Used   • Tobacco comment: Began Smoking age 15.  Smoked 2 ppd for 49 years until he quit smoking 7 years ago for a 98 pack year history.   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   • Drug use: No     Comment: caffeine use    • Sexual activity: Defer   Other Topics Concern   • Not on file   Social History Narrative   • Not on file       Family History   Problem Relation Age of Onset   • Colon cancer Mother    • Stroke Sister         2016   • Heart disease Brother    • Coronary artery disease Father    • Hypertension Father    • Heart disease Father    • Colon cancer Maternal Grandmother    • Heart disease Brother    • Coronary artery disease Brother    • Hypertension Brother    • Stroke Brother    • Prostate cancer Neg Hx          Review of Systems   Constitution: Negative for decreased appetite, diaphoresis, fever, weakness, malaise/fatigue, weight gain and weight loss.   HENT: Negative for congestion, hearing loss, nosebleeds and tinnitus.    Eyes: Negative for blurred vision, double vision, vision loss in left eye, vision loss in right eye and visual disturbance.   Cardiovascular: Negative for chest pain, orthopnea, palpitations and paroxysmal nocturnal dyspnea.        As noted in HPI   Respiratory:        As noted HPI   Endocrine: Negative for cold intolerance and heat intolerance.   Hematologic/Lymphatic: Negative for bleeding problem. Does not bruise/bleed easily.   Skin: Negative for color change, flushing, itching and rash.   Musculoskeletal: Negative for arthritis, back pain, joint pain, joint swelling, muscle weakness and myalgias.   Gastrointestinal: Negative for bloating, abdominal pain, constipation, diarrhea, dysphagia, heartburn, hematemesis, hematochezia, melena, nausea and vomiting.   Genitourinary: Negative for bladder incontinence, dysuria,  "frequency, nocturia and urgency.   Neurological: Negative for dizziness, focal weakness, headaches, light-headedness, loss of balance, numbness, paresthesias and vertigo.   Psychiatric/Behavioral: Negative for depression, memory loss and substance abuse.       Procedures      ECG 12 Lead  Date/Time: 12/14/2018 10:35 AM  Performed by: Juan Colby MD  Authorized by: Juan Colby MD   Comparison: compared with previous ECG   Similar to previous ECG  Rhythm: atrial fibrillation  Rate: normal  QRS axis: normal                  Objective:    /76 (BP Location: Left arm)   Pulse 87   Ht 177.8 cm (70\")   Wt 89.8 kg (198 lb)   BMI 28.41 kg/m²        Physical Exam  Physical Exam   Constitutional: He is oriented to person, place, and time. He appears well-developed and well-nourished. No distress.   HENT:   Head: Normocephalic.   Eyes: Conjunctivae are normal. Pupils are equal, round, and reactive to light. No scleral icterus.   Neck: Normal carotid pulses, no hepatojugular reflux and no JVD present. Carotid bruit is not present. No tracheal deviation, no edema and no erythema present. No thyromegaly present.   Cardiovascular: Normal rate, S1 normal, S2 normal, normal heart sounds and intact distal pulses. An irregularly irregular rhythm present.  No extrasystoles are present. PMI is not displaced. Exam reveals no gallop, no distant heart sounds and no friction rub.   No murmur heard.  Pulses:       Carotid pulses are 2+ on the right side, and 2+ on the left side.       Radial pulses are 2+ on the right side, and 2+ on the left side.        Femoral pulses are 2+ on the right side, and 2+ on the left side.       Dorsalis pedis pulses are 2+ on the right side, and 2+ on the left side.        Posterior tibial pulses are 2+ on the right side, and 2+ on the left side.   Pulmonary/Chest: Effort normal. No respiratory distress. He has decreased breath sounds in the right lower field and the left lower field. He " has no wheezes. He has no rhonchi. He has no rales. He exhibits no tenderness.   Abdominal: Soft. Bowel sounds are normal. He exhibits no distension and no mass. There is no hepatosplenomegaly. There is no tenderness. There is no rebound and no guarding.   Musculoskeletal: He exhibits no edema, tenderness or deformity.   Neurological: He is alert and oriented to person, place, and time.   Skin: Skin is warm and dry. No rash noted. He is not diaphoretic. No cyanosis or erythema. No pallor. Nails show no clubbing.   Psychiatric: He has a normal mood and affect. His speech is normal and behavior is normal. Judgment and thought content normal.           Assessment:    1. This is a 73-year-old gentleman with a history of paroxysmal atrial fibrillation but it was only after a time of stress and surgery.   Atrial Fibrillation and Atrial Flutter  Assessment  • The patient has persistent atrial fibrillation  • This is non-valvular in etiology  • The patient's CHADS2-VASc score is 2  • A [id number] score of 2 or more is considered a high risk for a thromboembolic event  • Dabigatran prescribed    Plan  • Attempt to maintain sinus rhythm  • Continue dabigatran for antithrombotic therapy, bleeding issues discussed  • Continue beta blocker for rate control  Asymptomatic.  He will continue the same see us again in follow-up in a year.    2. Hypertension. Stable on current medical regimen.  Blood pressure is at goal.  3.  COPD. much improved to pulmonary rehabilitation.  4.  GI bleeding due to ulcer disease.  No recurrence continue the same.         Plan:

## 2018-12-15 ENCOUNTER — TREATMENT (OUTPATIENT)
Dept: CARDIAC REHAB | Facility: HOSPITAL | Age: 73
End: 2018-12-15

## 2018-12-15 DIAGNOSIS — J44.9 COPD, MODERATE (HCC): Primary | ICD-10-CM

## 2018-12-15 PROCEDURE — G0424 PULMONARY REHAB W EXER: HCPCS

## 2018-12-18 ENCOUNTER — TELEPHONE (OUTPATIENT)
Dept: GASTROENTEROLOGY | Facility: CLINIC | Age: 73
End: 2018-12-18

## 2018-12-18 ENCOUNTER — TREATMENT (OUTPATIENT)
Dept: CARDIAC REHAB | Facility: HOSPITAL | Age: 73
End: 2018-12-18

## 2018-12-18 DIAGNOSIS — J44.9 COPD, MODERATE (HCC): Primary | ICD-10-CM

## 2018-12-18 PROCEDURE — G0424 PULMONARY REHAB W EXER: HCPCS

## 2018-12-18 NOTE — TELEPHONE ENCOUNTER
----- Message from Noman Moseley MD sent at 12/12/2018 12:03 PM EST -----  His esophagram shows no fixed narrowing, nothing to treat endoscopically, he has GERD and spasticity  Please continue Protonix 40 mg by mouth twice a day, #60, 12 refills  Office visits with me January third or fourth to discuss other treatments for spasticity

## 2018-12-18 NOTE — TELEPHONE ENCOUNTER
Called pt and advised of the notes from Dr Moseley. Pt verb understanding. Verified pt taking patoprazole 40mg BID. Appt made for 1/3 at 10AM.

## 2018-12-20 ENCOUNTER — TREATMENT (OUTPATIENT)
Dept: CARDIAC REHAB | Facility: HOSPITAL | Age: 73
End: 2018-12-20

## 2018-12-20 DIAGNOSIS — J44.9 COPD, MODERATE (HCC): Primary | ICD-10-CM

## 2018-12-20 PROCEDURE — G0424 PULMONARY REHAB W EXER: HCPCS

## 2018-12-22 ENCOUNTER — TREATMENT (OUTPATIENT)
Dept: CARDIAC REHAB | Facility: HOSPITAL | Age: 73
End: 2018-12-22

## 2018-12-22 DIAGNOSIS — J44.9 COPD, MODERATE (HCC): Primary | ICD-10-CM

## 2018-12-22 PROCEDURE — G0424 PULMONARY REHAB W EXER: HCPCS

## 2018-12-27 ENCOUNTER — TREATMENT (OUTPATIENT)
Dept: CARDIAC REHAB | Facility: HOSPITAL | Age: 73
End: 2018-12-27

## 2018-12-27 DIAGNOSIS — J44.9 COPD, MODERATE (HCC): Primary | ICD-10-CM

## 2018-12-27 PROCEDURE — G0424 PULMONARY REHAB W EXER: HCPCS

## 2018-12-29 ENCOUNTER — TREATMENT (OUTPATIENT)
Dept: CARDIAC REHAB | Facility: HOSPITAL | Age: 73
End: 2018-12-29

## 2018-12-29 DIAGNOSIS — J44.9 COPD, MODERATE (HCC): Primary | ICD-10-CM

## 2018-12-29 PROCEDURE — G0424 PULMONARY REHAB W EXER: HCPCS

## 2019-01-03 ENCOUNTER — OFFICE VISIT (OUTPATIENT)
Dept: GASTROENTEROLOGY | Facility: CLINIC | Age: 74
End: 2019-01-03

## 2019-01-03 ENCOUNTER — TREATMENT (OUTPATIENT)
Dept: CARDIAC REHAB | Facility: HOSPITAL | Age: 74
End: 2019-01-03

## 2019-01-03 VITALS
TEMPERATURE: 97.4 F | HEIGHT: 70 IN | BODY MASS INDEX: 28.69 KG/M2 | SYSTOLIC BLOOD PRESSURE: 114 MMHG | DIASTOLIC BLOOD PRESSURE: 74 MMHG | WEIGHT: 200.4 LBS

## 2019-01-03 DIAGNOSIS — R13.19 OTHER DYSPHAGIA: ICD-10-CM

## 2019-01-03 DIAGNOSIS — K21.9 GASTROESOPHAGEAL REFLUX DISEASE WITHOUT ESOPHAGITIS: ICD-10-CM

## 2019-01-03 DIAGNOSIS — J44.9 COPD, MODERATE (HCC): Primary | ICD-10-CM

## 2019-01-03 DIAGNOSIS — K27.9 PUD (PEPTIC ULCER DISEASE): Primary | ICD-10-CM

## 2019-01-03 PROCEDURE — G0424 PULMONARY REHAB W EXER: HCPCS

## 2019-01-03 PROCEDURE — 99213 OFFICE O/P EST LOW 20 MIN: CPT | Performed by: INTERNAL MEDICINE

## 2019-01-03 NOTE — PROGRESS NOTES
Chief Complaint   Patient presents with   • Follow-up   • GI Problem     peptic ulcer disease       Aj Salazar is a  73 y.o. male here for a follow up visit for dysphagia and history of peptic ulcer disease    D1 ulcers found last year, h pylori neg, he completed PPI treatment then re-presented with occasional dysphagia, esophagram showed reflux but no fixed narrowing or malignancy.  I increased his PPI to twice a day and he's done quite well.  No dysphagia or pain with swallowing.  No weight loss.  No nausea or vomiting        Past Medical History:   Diagnosis Date   • EMPERATRIZ (acute kidney injury) (CMS/[facility])    • Alcohol abuse, in remission    • BPH (benign prostatic hypertrophy)     see doctors at [facility]   • COPD (chronic obstructive pulmonary disease) (CMS/[facility])    • Depression    • DJD (degenerative joint disease) of cervical spine    • ED (erectile dysfunction)     [facility]   • Hx of colonic polyps     followed by GI ([facility])   • Hyperlipidemia    • Hypertension    • Hypotension    • Influenza B 02/14/2013       • Low back pain    • Nocturnal hypoxia    • Osteoarthritis     HIPS, HANDS, MULTIPLE SITES   • Peripheral neuropathy    • Permanent atrial fibrillation (CMS/[facility])    • Rectal bleeding 04/26/2017   • Shortness of breath on exertion    • Shoulder pain, left 12/04/2007    SEEN AT [facility] ER   • Tendonitis of shoulder 11/07/2007    RIGHT SHOULDER/DELTOID INSERTION   • Ulcer of the stomach and intestine        Past Surgical History:   Procedure Laterality Date   • APPENDECTOMY     • CARDIOVASCULAR STRESS TEST N/A 10/20/2015    NML LEXISCAN CARDIOLITE PERFUSION STUDY, NO EVIDENCE OF ISCHEMIA, AREA OF HYPOPERFUSION OF THE INFERIOR WALL W/NORMAL WALL PERFUSION AND NML LEFT VENTRICULAR EJECTION FRACTION, MOST LIKELY ATTENUATION. DR.MICHAEL BOX   • COLONOSCOPY N/A 02/2017   • COLONOSCOPY N/A 02/23/2011    4 POLYPS REMOVED, RESCOPE IN 5 YRS, DR. EAGLE   • COLONOSCOPY N/A 03/08/2006     ERYTHEMOUS AND GRANULAR MUCOSA IN ILEOCECAL VALVE, NORMAL COLON, REPEAT IN 5 YRS,    • ENDOSCOPY N/A 2018    normal esophagus, acute gastritis, multiple non-bleeding duodenal ulcers with pigmented material, H Pylori positive   • HERNIA REPAIR Bilateral     INGUINAL   • JOINT REPLACEMENT  2015    left hip   • TOTAL HIP ARTHROPLASTY Left 2015    Dr Ankush Sky   • TOTAL HIP ARTHROPLASTY Right 10/27/2014    DR.RIED SKY   • VASECTOMY         Scheduled Meds:    Continuous Infusions:  No current facility-administered medications for this visit.     PRN Meds:.    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  Smoked 2 ppd for 49 years until he quit smoking 7 years ago for a 98 pack year history.   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   • Drug use: No     Comment: caffeine use    • Sexual activity: Defer   Other Topics Concern   • Not on file   Social History Narrative   • Not on file       Family History   Problem Relation Age of Onset   • Colon cancer Mother    • Stroke Sister         2016   • Heart  disease Brother    • Coronary artery disease Father    • Hypertension Father    • Heart disease Father    • Colon cancer Maternal Grandmother    • Heart disease Brother    • Coronary artery disease Brother    • Hypertension Brother    • Stroke Brother    • Prostate cancer Neg Hx        Review of Systems   Gastrointestinal: Negative for abdominal distention, abdominal pain, anal bleeding, blood in stool, diarrhea and nausea.   All other systems reviewed and are negative.      Vitals:    01/03/19 1009   BP: 114/74   Temp: 97.4 °F (36.3 °C)       Physical Exam   Constitutional: He is oriented to person, place, and time. He appears well-developed and well-nourished.   HENT:   Head: Normocephalic and atraumatic.   Eyes: Conjunctivae and EOM are normal.   Neck: Normal range of motion. No tracheal deviation present.   Cardiovascular: Normal rate and regular rhythm.   Pulmonary/Chest: Effort normal and breath sounds normal. No respiratory distress.   Abdominal: Soft. Bowel sounds are normal. He exhibits no distension and no mass. There is no tenderness. There is no rebound and no guarding.   Musculoskeletal: Normal range of motion.   Neurological: He is alert and oriented to person, place, and time.   Skin: Skin is warm and dry.   Psychiatric: He has a normal mood and affect. Judgment normal.   Nursing note and vitals reviewed.        Problem list    Occasional dysphagia from reflux and esophageal spasm suspected  History of peptic ulcer disease    Assessment/Plan      GERD lifestyle modifications discussed in great detail for 15 minutes  Continue twice a day PPI for 4 more months then go back to once a day PPI  Return to clinic 1 year

## 2019-01-05 ENCOUNTER — TREATMENT (OUTPATIENT)
Dept: CARDIAC REHAB | Facility: HOSPITAL | Age: 74
End: 2019-01-05

## 2019-01-05 DIAGNOSIS — J44.9 COPD, MODERATE (HCC): Primary | ICD-10-CM

## 2019-01-05 PROCEDURE — G0424 PULMONARY REHAB W EXER: HCPCS

## 2019-01-08 ENCOUNTER — TREATMENT (OUTPATIENT)
Dept: CARDIAC REHAB | Facility: HOSPITAL | Age: 74
End: 2019-01-08

## 2019-01-08 DIAGNOSIS — J44.9 COPD, MODERATE (HCC): Primary | ICD-10-CM

## 2019-01-08 PROCEDURE — G0424 PULMONARY REHAB W EXER: HCPCS

## 2019-01-12 ENCOUNTER — TREATMENT (OUTPATIENT)
Dept: CARDIAC REHAB | Facility: HOSPITAL | Age: 74
End: 2019-01-12

## 2019-01-12 DIAGNOSIS — J44.9 COPD, MODERATE (HCC): Primary | ICD-10-CM

## 2019-01-12 PROCEDURE — G0424 PULMONARY REHAB W EXER: HCPCS

## 2019-01-15 ENCOUNTER — TREATMENT (OUTPATIENT)
Dept: CARDIAC REHAB | Facility: HOSPITAL | Age: 74
End: 2019-01-15

## 2019-01-15 DIAGNOSIS — J44.9 COPD, MODERATE (HCC): Primary | ICD-10-CM

## 2019-01-15 PROCEDURE — G0424 PULMONARY REHAB W EXER: HCPCS

## 2019-01-17 ENCOUNTER — TREATMENT (OUTPATIENT)
Dept: CARDIAC REHAB | Facility: HOSPITAL | Age: 74
End: 2019-01-17

## 2019-01-17 DIAGNOSIS — J44.9 COPD, MODERATE (HCC): Primary | ICD-10-CM

## 2019-01-17 PROCEDURE — G0424 PULMONARY REHAB W EXER: HCPCS

## 2019-01-19 ENCOUNTER — TREATMENT (OUTPATIENT)
Dept: CARDIAC REHAB | Facility: HOSPITAL | Age: 74
End: 2019-01-19

## 2019-01-19 DIAGNOSIS — J44.9 COPD, MODERATE (HCC): Primary | ICD-10-CM

## 2019-01-19 PROCEDURE — G0424 PULMONARY REHAB W EXER: HCPCS

## 2019-01-22 ENCOUNTER — TREATMENT (OUTPATIENT)
Dept: CARDIAC REHAB | Facility: HOSPITAL | Age: 74
End: 2019-01-22

## 2019-01-22 DIAGNOSIS — J44.9 COPD, MODERATE (HCC): Primary | ICD-10-CM

## 2019-01-22 PROCEDURE — G0424 PULMONARY REHAB W EXER: HCPCS

## 2019-01-24 ENCOUNTER — TREATMENT (OUTPATIENT)
Dept: CARDIAC REHAB | Facility: HOSPITAL | Age: 74
End: 2019-01-24

## 2019-01-24 DIAGNOSIS — J44.9 COPD, MODERATE (HCC): Primary | ICD-10-CM

## 2019-01-24 PROCEDURE — G0424 PULMONARY REHAB W EXER: HCPCS

## 2019-01-30 RX ORDER — DABIGATRAN ETEXILATE 150 MG/1
150 CAPSULE ORAL EVERY 12 HOURS SCHEDULED
Qty: 48 CAPSULE | Refills: 0 | COMMUNITY
Start: 2019-01-30 | End: 2019-02-27 | Stop reason: SDUPTHER

## 2019-01-31 ENCOUNTER — OFFICE VISIT (OUTPATIENT)
Dept: INTERNAL MEDICINE | Age: 74
End: 2019-01-31

## 2019-01-31 VITALS
HEART RATE: 98 BPM | DIASTOLIC BLOOD PRESSURE: 64 MMHG | OXYGEN SATURATION: 96 % | TEMPERATURE: 98.2 F | WEIGHT: 198 LBS | BODY MASS INDEX: 28.35 KG/M2 | HEIGHT: 70 IN | SYSTOLIC BLOOD PRESSURE: 126 MMHG

## 2019-01-31 DIAGNOSIS — J43.9 PULMONARY EMPHYSEMA, UNSPECIFIED EMPHYSEMA TYPE (HCC): Chronic | ICD-10-CM

## 2019-01-31 DIAGNOSIS — I10 ESSENTIAL HYPERTENSION: Chronic | ICD-10-CM

## 2019-01-31 DIAGNOSIS — I48.21 PERMANENT ATRIAL FIBRILLATION (HCC): Chronic | ICD-10-CM

## 2019-01-31 DIAGNOSIS — R73.03 PREDIABETES: Primary | ICD-10-CM

## 2019-01-31 LAB
ALBUMIN SERPL-MCNC: 3.7 G/DL (ref 3.5–5.2)
ALBUMIN/GLOB SERPL: 1.3 G/DL
ALP SERPL-CCNC: 70 U/L (ref 39–117)
ALT SERPL-CCNC: 19 U/L (ref 1–41)
AST SERPL-CCNC: 21 U/L (ref 1–40)
BILIRUB SERPL-MCNC: 1.1 MG/DL (ref 0.1–1.2)
BUN SERPL-MCNC: 16 MG/DL (ref 8–23)
BUN/CREAT SERPL: 15.7 (ref 7–25)
CALCIUM SERPL-MCNC: 9.3 MG/DL (ref 8.6–10.5)
CHLORIDE SERPL-SCNC: 98 MMOL/L (ref 98–107)
CO2 SERPL-SCNC: 27.8 MMOL/L (ref 22–29)
CREAT SERPL-MCNC: 1.02 MG/DL (ref 0.76–1.27)
GLOBULIN SER CALC-MCNC: 2.9 GM/DL
GLUCOSE SERPL-MCNC: 130 MG/DL (ref 65–99)
HBA1C MFR BLD: 6.41 % (ref 4.8–5.6)
POTASSIUM SERPL-SCNC: 4.2 MMOL/L (ref 3.5–5.2)
PROT SERPL-MCNC: 6.6 G/DL (ref 6–8.5)
SODIUM SERPL-SCNC: 139 MMOL/L (ref 136–145)

## 2019-01-31 PROCEDURE — 99214 OFFICE O/P EST MOD 30 MIN: CPT | Performed by: INTERNAL MEDICINE

## 2019-01-31 RX ORDER — PANTOPRAZOLE SODIUM 40 MG/1
40 TABLET, DELAYED RELEASE ORAL DAILY
COMMUNITY
End: 2019-06-03 | Stop reason: SDUPTHER

## 2019-01-31 NOTE — PROGRESS NOTES
[facility] [place] INTERNAL MEDICINE  ROSA LOPEZ M.D.      Aj HELMS Poojab / 73 y.o. / male  01/31/2019      ASSESSMENT & PLAN:    Problem List Items Addressed This Visit        High    Prediabetes - Primary    Current Assessment & Plan     Maintain a low sugar/starch/carbohydrate diet and exercise regularly. Wt loss advised.    Educational handout given on prediabetes. Check A1c today. Consider metformin if still >6.2.          Relevant Orders    Hemoglobin A1c       Medium    Chronic obstructive pulmonary disease (CMS/HCC) (Chronic)    Overview     PFT (5/2018): moderate obstruction with decreased diffusing capacity.     Stable. Continue Trelegy inhaler.          Relevant Medications    TRELEGY ELLIPTA 100-62.5-25 MCG/INH aerosol powder     Hypertension (Chronic)    Overview     Stable. Continue metoprolol tartrate 100 mg BID and HCTZ 25 mg qd.          Relevant Medications    hydrochlorothiazide (HYDRODIURIL) 25 MG tablet    metoprolol tartrate (LOPRESSOR) 100 MG tablet    Other Relevant Orders    Comprehensive Metabolic Panel       Low    Permanent atrial fibrillation (CMS/HCC) (Chronic)    Overview     *Imburgia    Continue Pradaxa and metoprolol.          Relevant Medications    metoprolol tartrate (LOPRESSOR) 100 MG tablet        Orders Placed This Encounter   Procedures   • Hemoglobin A1c   • Comprehensive Metabolic Panel     No orders of the defined types were placed in this encounter.      Summary/Discussion:  ·     Return in about 6 months (around 7/31/2019) for Reassess chronic medical problems, Schedule AWV with Emili 3 months.    ____________________________________________________________________    MEDICATIONS  Current Outpatient Medications on File Prior to Visit   Medication Sig   • acetaminophen (TYLENOL) 325 MG tablet Take 2 tablets by mouth Every 4 (Four) Hours As Needed for Mild Pain .   • ALPHAGAN P 0.1 % solution ophthalmic solution Administer 0.1 bottles to both eyes 1 (One) Time.   • dabigatran etexilate  "(PRADAXA) 150 MG capsu Take 1 capsule by mouth Every 12 (Twelve) Hours.   • hydrochlorothiazide (HYDRODIURIL) 25 MG tablet Take 1 tablet by mouth Daily.   • LUMIGAN 0.01 % ophthalmic drops Administer 0.01 bottles to both eyes 1 (One) Time.   • metoprolol tartrate (LOPRESSOR) 100 MG tablet Take 1 tablet by mouth Every 12 (Twelve) Hours.   • pantoprazole (PROTONIX) 40 MG EC tablet Take 40 mg by mouth Daily.   • sucralfate (CARAFATE) 1 g tablet Take 1 tablet by mouth 2 (Two) Times a Day.   • tamsulosin (FLOMAX) 0.4 MG capsule 24 hr capsule Take 1 capsule by mouth Every Night.   • TRELEGY ELLIPTA 100-62.5-25 MCG/INH aerosol powder  Inhale Daily.     No current facility-administered medications on file prior to visit.           VITALS:    Visit Vitals  /64   Pulse 98   Temp 98.2 °F (36.8 °C)   Ht 177.8 cm (70\")   Wt 89.8 kg (198 lb)   SpO2 96%   BMI 28.41 kg/m²       BP Readings from Last 3 Encounters:   01/31/19 126/64   01/03/19 114/74   12/14/18 120/76     Wt Readings from Last 3 Encounters:   01/31/19 89.8 kg (198 lb)   01/03/19 90.9 kg (200 lb 6.4 oz)   12/14/18 89.8 kg (198 lb)      Body mass index is 28.41 kg/m².    CC:  Main reason(s) for today's visit: Hypertension and COPD      HPI:     Previously A1c was 6.1. Denies family history of diabetes. No alcohol.     Chronic essential hypertension:  Since prior visit: compliant with medication(s) and denies significant problems with medication(s).  Most recent in-office blood pressure readings:   BP Readings from Last 3 Encounters:   01/31/19 126/64   01/03/19 114/74   12/14/18 120/76     COPD:  He is a former smoker. He denies change in breathing symptoms. Current therapy: Trelegy.    Chronic atrial fibrillation on Pradaxa, metoprolol. Denies bleeding, palpitations, chest pain.     Patient Care Team:  Neftali Amezcua MD as PCP - General (Internal Medicine)  Juan Colby MD as Consulting Physician (Cardiology)  Ankush Sky MD as Surgeon (Orthopedic " Surgery)  Vale Del Rosario APRN as Nurse Practitioner (Oncology)  Darrion Peña MD as Consulting Physician (Urology)    ____________________________________________________________________    REVIEW OF SYSTEMS    Review of Systems  Constitutional neg  Resp neg for worsening sob  CV neg for chest pain  GI neg for bleeding, melena  Neuro neg      PHYSICAL EXAMINATION    Physical Exam  Constitutional  No distress  Cardiovascular Rate  normal . Rhythm: regular . Heart sounds:  Normal  Pulmonary/Chest  Effort normal. Breath sounds:  Normal  Psychiatric  Alert. Judgment and thought content normal. Mood normal    REVIEWED DATA:    Labs:     Lab Results   Component Value Date     (L) 09/26/2018    K 4.1 09/26/2018    AST 25 09/25/2018    ALT 28 09/25/2018    BUN 20 09/26/2018    CREATININE 0.90 09/26/2018    CREATININE 1.56 (H) 09/25/2018    CREATININE 1.03 07/31/2018    EGFRIFNONA 83 09/26/2018    EGFRIFAFRI 86 07/31/2018       Lab Results   Component Value Date    HGBA1C 6.16 (H) 07/31/2018    GLUCOSE 95 09/26/2018    GLUCOSE 142 (H) 09/25/2018    GLUCOSE 134 (H) 08/31/2016       Lab Results   Component Value Date    LDL 89 07/31/2018     (H) 02/24/2017    LDL 80 03/30/2015    HDL 40 07/31/2018    HDL 42 02/24/2017    HDL 47 03/30/2015    TRIG 131 07/31/2018    TRIG 119 02/24/2017    TRIG 84 03/30/2015    CHOLHDLRATIO 3.88 07/31/2018    CHOLHDLRATIO 3.98 02/24/2017    CHOLHDLRATIO 3.1 03/30/2015       Lab Results   Component Value Date    TSH 2.45 10/14/2015       Lab Results   Component Value Date    WBC 11.08 (H) 11/19/2018    HGB 16.1 11/19/2018    HGB 12.7 (L) 09/28/2018    HGB 14.0 09/27/2018     11/19/2018       Lab Results   Component Value Date    PROTEIN 2+ (A) 03/27/2018    GLUCOSEU Negative 09/25/2018    BLOODU Negative 09/25/2018    NITRITEU Negative 09/25/2018    LEUKOCYTESUR Negative 09/25/2018       Imaging:         Medical Tests:         Summary of old records /  correspondence / consultant report:         Request outside records:         ALLERGIES  Allergies   Allergen Reactions   • Bactrim [Sulfamethoxazole-Trimethoprim] Rash        [facility]:     The following portions of the patient's history were reviewed and updated as appropriate: Allergies / Current Medications / Past Medical History / Surgical History / Social History / Family History    PROBLEM LIST   Patient Active Problem List   Diagnosis   • Chronic obstructive pulmonary disease (CMS/HCC)   • Impotence of organic origin   • Hypertension   • Osteoarthritis   • History of smoking greater than 50 pack years   • Benign prostatic hyperplasia with urinary obstruction   • Permanent atrial fibrillation (CMS/HCC)   • Prediabetes       PAST MEDICAL HISTORY  Past Medical History:   Diagnosis Date   • EMPERATRIZ (acute kidney injury) (CMS/HCC)    • Alcohol abuse, in remission    • BPH (benign prostatic hypertrophy)     [facility] doctors [facility]   • COPD (chronic obstructive pulmonary disease) (CMS/[facility])    • Depression    • DJD (degenerative joint disease) of cervical spine    • ED (erectile dysfunction)     [facility]   • Hx of colonic polyps     followed by GI (K[facility])   • Hyperlipidemia    • Hypertension    • Hypotension    • Influenza B 02/14/2013       • Low back pain    • Nocturnal hypoxia    • Osteoarthritis     HIPS, HANDS, MULTIPLE SITES   • Peripheral neuropathy    • Permanent atrial fibrillation (CMS/HCC)    • Rectal bleeding 04/26/2017   • Shortness of breath on exertion    • Shoulder pain, left 12/04/2007    SEEN AT [facility] ER   • Tendonitis of shoulder 11/07/2007    RIGHT SHOULDER/DELTOID INSERTION   • Ulcer of the stomach and intestine        SURGICAL HISTORY  Past Surgical History:   Procedure Laterality Date   • APPENDECTOMY     • CARDIOVASCULAR STRESS TEST N/A 10/20/2015    NML LEXISCAN CARDIOLITE PERFUSION STUDY, NO EVIDENCE OF ISCHEMIA, AREA OF HYPOPERFUSION OF THE INFERIOR WALL W/NORMAL WALL PERFUSION  AND NML LEFT VENTRICULAR EJECTION FRACTION, MOST LIKELY ATTENUATION. DR.MICHAEL BOX   • COLONOSCOPY N/A 2017   • COLONOSCOPY N/A 2011    4 POLYPS REMOVED, RESCOPE IN 5 YRS, DR. EAGLE   • COLONOSCOPY N/A 2006    ERYTHEMOUS AND GRANULAR MUCOSA IN ILEOCECAL VALVE, NORMAL COLON, REPEAT IN 5 YRS,    • ENDOSCOPY N/A 2018    normal esophagus, acute gastritis, multiple non-bleeding duodenal ulcers with pigmented material, H Pylori positive   • HERNIA REPAIR Bilateral     INGUINAL   • JOINT REPLACEMENT  2015    left hip   • TOTAL HIP ARTHROPLASTY Left 2015    Dr Ankush Sky   • TOTAL HIP ARTHROPLASTY Right 10/27/2014    DR.RIED SKY   • VASECTOMY         SOCIAL HISTORY  Social History     Socioeconomic History   • Marital status:      Spouse name: Ara   • Number of children: 3   • Years of education: Not on file   • Highest education level: Not on file   Occupational History   • 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  Smoked 2 ppd for 49 years until he quit smoking 7 years ago for a 98 pack year history.   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   • Drug use: No     Comment: caffeine use    • Sexual activity: Defer       FAMILY HISTORY  Family History   Problem Relation Age of Onset   • Colon cancer Mother    • Stroke Sister         2016   • Heart disease Brother    • Coronary artery disease Father    • Hypertension Father    • Heart disease Father    • Colon cancer Maternal Grandmother    • Heart disease Brother    • Coronary artery disease Brother    • Hypertension Brother    • Stroke Brother    • Prostate  cancer Neg Hx          **Nelsonon Disclaimer:   Much of this encounter note is an electronic transcription/translation of spoken language to printed text. The electronic translation of spoken language may permit erroneous, or at times, nonsensical words or phrases to be inadvertently transcribed. Although I have reviewed the note for such errors, some may still exist.

## 2019-01-31 NOTE — ASSESSMENT & PLAN NOTE
Maintain a low sugar/starch/carbohydrate diet and exercise regularly. Wt loss advised.    Educational handout given on prediabetes. Check A1c today. Consider metformin if still >6.2.

## 2019-02-01 ENCOUNTER — TELEPHONE (OUTPATIENT)
Dept: INTERNAL MEDICINE | Age: 74
End: 2019-02-01

## 2019-02-01 NOTE — TELEPHONE ENCOUNTER
LM with pt results will call back to schedule apt for 4 mon instead of 6. Will let us know about the referral per Dr Amezcua. My chart message sent as well

## 2019-02-01 NOTE — TELEPHONE ENCOUNTER
----- Message from Neftali Amezcua MD sent at 2/1/2019  7:54 AM EST -----  Call patient with his test result(s) and mail the results to him if MyChart is NOT active.    A1c is higher at 6.41 and fasting glucose is 130 (previously 142, 128) this is consistent with type 2 diabetes.   Maintain a low sugar/starch/carbohydrate diet and exercise regularly. Wt loss advised.    Consider starting metformin if not improving.   See if he is interested in attending diabetes education (place referral if he does).     Change next follow-up to 4 months for NEW DIABETES.

## 2019-03-25 ENCOUNTER — OFFICE VISIT (OUTPATIENT)
Dept: INTERNAL MEDICINE | Age: 74
End: 2019-03-25

## 2019-03-25 VITALS
HEIGHT: 70 IN | SYSTOLIC BLOOD PRESSURE: 118 MMHG | WEIGHT: 197 LBS | HEART RATE: 100 BPM | OXYGEN SATURATION: 96 % | DIASTOLIC BLOOD PRESSURE: 60 MMHG | TEMPERATURE: 97.9 F | RESPIRATION RATE: 18 BRPM | BODY MASS INDEX: 28.2 KG/M2

## 2019-03-25 DIAGNOSIS — J44.9 CHRONIC OBSTRUCTIVE PULMONARY DISEASE, UNSPECIFIED COPD TYPE (HCC): Chronic | ICD-10-CM

## 2019-03-25 DIAGNOSIS — I48.21 PERMANENT ATRIAL FIBRILLATION (HCC): Chronic | ICD-10-CM

## 2019-03-25 DIAGNOSIS — J20.9 ACUTE BRONCHITIS, UNSPECIFIED ORGANISM: Primary | ICD-10-CM

## 2019-03-25 PROCEDURE — 99214 OFFICE O/P EST MOD 30 MIN: CPT | Performed by: INTERNAL MEDICINE

## 2019-03-25 RX ORDER — DOXYCYCLINE HYCLATE 100 MG
100 TABLET ORAL 2 TIMES DAILY
Qty: 14 TABLET | Refills: 0 | Status: SHIPPED | OUTPATIENT
Start: 2019-03-25 | End: 2019-04-01

## 2019-03-25 NOTE — PROGRESS NOTES
"[facility] [place]  ROSA LOPEZ M.D.      Aj HELMS Sidrahlleb / 73 y.o. / male  03/25/2019      ASSESSMENT & PLAN:    1. Acute bronchitis, unspecified organism    2. Chronic obstructive pulmonary disease, unspecified COPD type (CMS/HCC)    3. Permanent atrial fibrillation (CMS/HCC)      No orders of the defined types were placed in this encounter.    New Medications Ordered This Visit   Medications   • doxycycline (VIBRAMYICN) 100 MG tablet     Sig: Take 1 tablet by mouth 2 (Two) Times a Day for 7 days.     Dispense:  14 tablet     Refill:  0       Summary/Discussion:  · If not improving by tomorrow start doxy 100 mg BID x 7 days  · Continue Mucinex DM BID, maintain hydration, rest  · He was instructed to call or return if the condition is not improving or worsening. He expressed understanding and agreed to follow above instructions.    · Continue Trelegy for COPD  · Watch for rapid heart rate, lightheadedness, chest pain.     Return for worsening problem or if not improving, Next scheduled follow up.    ____________________________________________________________________    VITALS    Visit Vitals  /60   Pulse 100   Temp 97.9 °F (36.6 °C)   Resp 18   Ht 177.8 cm (70\")   Wt 89.4 kg (197 lb)   SpO2 96%   BMI 28.27 kg/m²       BP Readings from Last 3 Encounters:   03/25/19 118/60   01/31/19 126/64   01/03/19 114/74     Wt Readings from Last 3 Encounters:   03/25/19 89.4 kg (197 lb)   01/31/19 89.8 kg (198 lb)   01/03/19 90.9 kg (200 lb 6.4 oz)      Body mass index is 28.27 kg/m².    CC:  Main reason(s) for today's visit: URI (x 3 days); Cough (hurts to cough and breath); and Shortness of Breath      HPI:     Moderately Productive cough x 3 days, green  Shortness of breath, fatigued, no fever  No chest pain, palpitations; has atrial fibrillation on Pradaxa.   Lower chest wall pain with deep cough  COPD on Trelegy, denies worsening wheezing      Patient Care Team:  Neftali Lopez MD as PCP - General (Internal " Medicine)  Juan Colby MD as Consulting Physician (Cardiology)  Ankush Sky MD as Surgeon (Orthopedic Surgery)  Vale Del Rosario APRN as Nurse Practitioner (Oncology)  Darrion Peña MD as Consulting Physician (Urology)  ____________________________________________________________________    REVIEW OF SYSTEMS    Review of Systems  Constitutional neg for fever; +fatigue  Resp increased shortness of breath with cough and sputum  CV neg  GI neg nausea/vomiting/diarrhea     PHYSICAL EXAMINATION    Physical Exam  Constitutional  No distress, looks somewhat sick, not septic   Cardiovascular Rate  tachycardic. Rhythm: irregular .  Pulmonary/Chest  Effort normal. Breath sounds:  Decreased throughout without wheezing or rales/rhonchi   Psychiatric  Alert. Judgment and thought content normal. Mood normal    REVIEWED DATA:    Labs:     Lab Results   Component Value Date     01/31/2019    K 4.2 01/31/2019    AST 21 01/31/2019    ALT 19 01/31/2019    BUN 16 01/31/2019    CREATININE 1.02 01/31/2019    CREATININE 0.90 09/26/2018    CREATININE 1.56 (H) 09/25/2018    EGFRIFNONA 72 01/31/2019    EGFRIFAFRI 87 01/31/2019       Lab Results   Component Value Date    HGBA1C 6.41 (H) 01/31/2019    HGBA1C 6.16 (H) 07/31/2018    GLUCOSE 95 09/26/2018    GLUCOSE 142 (H) 09/25/2018    GLUCOSE 134 (H) 08/31/2016       Lab Results   Component Value Date    LDL 89 07/31/2018     (H) 02/24/2017    LDL 80 03/30/2015    HDL 40 07/31/2018    TRIG 131 07/31/2018    CHOLHDLRATIO 3.88 07/31/2018       Lab Results   Component Value Date    TSH 2.45 10/14/2015       Lab Results   Component Value Date    WBC 11.08 (H) 11/19/2018    HGB 16.1 11/19/2018    HGB 12.7 (L) 09/28/2018    HGB 14.0 09/27/2018     11/19/2018       Lab Results   Component Value Date    PROTEIN 2+ (A) 03/27/2018    GLUCOSEU Negative 09/25/2018    BLOODU Negative 09/25/2018    NITRITEU Negative 09/25/2018    LEUKOCYTESUR Negative 09/25/2018        Imaging:   PA AND LATERAL RADIOGRAPHIC VIEWS OF THE CHEST     HISTORY:  Rectal bleeding and weakness. COPD.     FINDINGS:  PA and lateral radiographic views of the chest demonstrate  hyperinflated lungs compatible with the stated history of COPD. The  cardiomediastinal silhouette is enlarged. Degenerative phenomena are  appreciated within the thoracic spine.     This report was finalized on 9/25/2018       Medical Tests:   ECG 12 Lead   Order: [id number]   Status:  Final result   Visible to patient:  No (Inaccessible in MyChart) Next appt:  05/02/2019 at 08:00 AM in Internal Medicine (Kamini Patel, SHANIQUA) Dx:  Persistent atrial fibrillation (CMS/HCC)      Narrative     Juan Colby MD     12/14/2018 10:43 AM    ECG 12 Lead  Date/Time: 12/14/2018 10:35 AM  Performed by: Juan Colby MD  Authorized by: Juan Colby MD   Comparison: compared with previous ECG   Similar to previous ECG  Rhythm: atrial fibrillation  Rate: normal  QRS axis: normal                 Summary of old records / correspondence / consultant report:         Request outside records:         ALLERGIES  Allergies   Allergen Reactions   • Bactrim [Sulfamethoxazole-Trimethoprim] Rash        MEDICATIONS  Current Outpatient Medications   Medication Sig Dispense Refill   • acetaminophen (TYLENOL) 325 MG tablet Take 2 tablets by mouth Every 4 (Four) Hours As Needed for Mild Pain .     • ALPHAGAN P 0.1 % solution ophthalmic solution Administer 0.1 bottles to both eyes 1 (One) Time.  6   • dabigatran etexilate (PRADAXA) 150 MG capsu Take 1 capsule by mouth Every 12 (Twelve) Hours. 48 capsule 0   • hydrochlorothiazide (HYDRODIURIL) 25 MG tablet Take 1 tablet by mouth Daily. 30 tablet 11   • LUMIGAN 0.01 % ophthalmic drops Administer 0.01 bottles to both eyes 1 (One) Time.  6   • metoprolol tartrate (LOPRESSOR) 100 MG tablet Take 1 tablet by mouth Every 12 (Twelve) Hours. 60 tablet 5   • pantoprazole (PROTONIX) 40 MG EC tablet Take 40  mg by mouth Daily.     • sucralfate (CARAFATE) 1 g tablet Take 1 tablet by mouth 2 (Two) Times a Day. 60 tablet 5   • tamsulosin (FLOMAX) 0.4 MG capsule 24 hr capsule Take 1 capsule by mouth Every Night. 30 capsule    • TRELEGY ELLIPTA 100-62.5-25 MCG/INH aerosol powder  Inhale Daily.  3     No current facility-administered medications for this visit.        PFSH:     The following portions of the patient's history were reviewed and updated as appropriate: Allergies / Current Medications / Past Medical History / Surgical History / Social History / Family History    PROBLEM LIST   Patient Active Problem List   Diagnosis   • Chronic obstructive pulmonary disease (CMS/HCC)   • Impotence of organic origin   • Hypertension   • Osteoarthritis   • History of smoking greater than 50 pack years   • Benign prostatic hyperplasia with urinary obstruction   • Permanent atrial fibrillation (CMS/HCC)   • Prediabetes       PAST MEDICAL HISTORY  Past Medical History:   Diagnosis Date   • EMPERATRIZ (acute kidney injury) (CMS/HCC)    • Alcohol abuse, in remission    • BPH (benign prostatic hypertrophy)     see doctors at [facility]   • COPD (chronic obstructive pulmonary disease) (CMS/HCC)    • Depression    • DJD (degenerative joint disease) of cervical spine    • ED (erectile dysfunction)     [facility]   • Hx of colonic polyps     followed by GI ([facility])   • Hyperlipidemia    • Hypertension    • Hypotension    • Influenza B 02/14/2013       • Low back pain    • Nocturnal hypoxia    • Osteoarthritis     HIPS, HANDS, MULTIPLE SITES   • Peripheral neuropathy    • Permanent atrial fibrillation (CMS/HCC)    • Rectal bleeding 04/26/2017   • Shortness of breath on exertion    • Shoulder pain, left 12/04/2007    SEEN AT [facility] ER   • Tendonitis of shoulder 11/07/2007    RIGHT SHOULDER/DELTOID INSERTION   • Ulcer of the stomach and intestine        SURGICAL HISTORY  Past Surgical History:   Procedure Laterality Date   •  APPENDECTOMY     • CARDIOVASCULAR STRESS TEST N/A 10/20/2015    NML LEXISCAN CARDIOLITE PERFUSION STUDY, NO EVIDENCE OF ISCHEMIA, AREA OF HYPOPERFUSION OF THE INFERIOR WALL W/NORMAL WALL PERFUSION AND NML LEFT VENTRICULAR EJECTION FRACTION, MOST LIKELY ATTENUATION. DR.MICHAEL BOX   • COLONOSCOPY N/A 2017   • COLONOSCOPY N/A 2011    4 POLYPS REMOVED, RESCOPE IN 5 YRS, DR. EAGLE   • COLONOSCOPY N/A 2006    ERYTHEMOUS AND GRANULAR MUCOSA IN ILEOCECAL VALVE, NORMAL COLON, REPEAT IN 5 YRS,    • ENDOSCOPY N/A 2018    normal esophagus, acute gastritis, multiple non-bleeding duodenal ulcers with pigmented material, H Pylori positive   • HERNIA REPAIR Bilateral     INGUINAL   • JOINT REPLACEMENT  2015    left hip   • TOTAL HIP ARTHROPLASTY Left 2015    Dr Ankush Sky   • TOTAL HIP ARTHROPLASTY Right 10/27/2014    DR.RIED SKY   • VASECTOMY         SOCIAL HISTORY  Social History     Socioeconomic History   • Marital status:      Spouse name: Ara   • Number of children: 3   • Years of education: Not on file   • Highest education level: Not on file   Occupational History   • 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  Smoked 2 ppd for 49 years until he quit smoking 7 years ago for a 98 pack year history.   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   • Drug use: No     Comment: caffeine use    • Sexual activity: Defer       FAMILY HISTORY  Family History   Problem Relation Age of Onset   • Colon cancer Mother    • Stroke Sister         2016   • Heart disease Brother    • Coronary artery disease Father    • Hypertension  Father    • Heart disease Father    • Colon cancer Maternal Grandmother    • Heart disease Brother    • Coronary artery disease Brother    • Hypertension Brother    • Stroke Brother    • Prostate cancer Neg Hx          **Dragon Disclaimer:   Much of this encounter note is an electronic transcription/translation of spoken language to printed text. The electronic translation of spoken language may permit erroneous, or at times, nonsensical words or phrases to be inadvertently transcribed. Although I have reviewed the note for such errors, some may still exist.       Template created by Adelso Amezcua MD

## 2019-04-09 RX ORDER — DABIGATRAN ETEXILATE 150 MG/1
150 CAPSULE ORAL EVERY 12 HOURS SCHEDULED
Qty: 180 CAPSULE | Refills: 2 | Status: SHIPPED | OUTPATIENT
Start: 2019-04-09 | End: 2020-01-13

## 2019-05-02 ENCOUNTER — OFFICE VISIT (OUTPATIENT)
Dept: INTERNAL MEDICINE | Age: 74
End: 2019-05-02

## 2019-05-02 VITALS
SYSTOLIC BLOOD PRESSURE: 124 MMHG | BODY MASS INDEX: 28.29 KG/M2 | DIASTOLIC BLOOD PRESSURE: 78 MMHG | HEIGHT: 70 IN | WEIGHT: 197.6 LBS | TEMPERATURE: 97.9 F | HEART RATE: 91 BPM | OXYGEN SATURATION: 96 %

## 2019-05-02 DIAGNOSIS — Z00.00 MEDICARE ANNUAL WELLNESS VISIT, SUBSEQUENT: Primary | ICD-10-CM

## 2019-05-02 PROCEDURE — G0439 PPPS, SUBSEQ VISIT: HCPCS | Performed by: NURSE PRACTITIONER

## 2019-05-02 PROCEDURE — 96160 PT-FOCUSED HLTH RISK ASSMT: CPT | Performed by: NURSE PRACTITIONER

## 2019-05-02 NOTE — PROGRESS NOTES
"05/02/2019      MEDICARE ANNUAL WELLNESS VISIT     Aj Salazar is a 73 y.o. male who presents for SUBSEQUENT AWV    Patient's general assessment of his health since a year ago:     - Compared to one year ago, he feels his physical health is about the same without significant change.    - Compared to one year ago, he feels his mental health is about the same without significant change.      HPI for other active medical problems:         * The required components of Health Risk Assessment (HRA) that were completed by the patient and/or my staff are contained within this note and in the scanned documents titled \"Health Risk Assessment\" within the media section of the patient's chart in [facility].       HISTORY    Recent Hospitalizations:    Recent hospitalization?: 9/25/18     If YES, location, date, and diagnoses:     · Location: [facility]   · Date: 9/25/18   · Principle Discharge Dx: Gastrointestinal hemorrhage  · Secondary Dx:       Patient Care Team:    Patient Care Team:  Neftali Amezcua MD as PCP - General (Internal Medicine)  Juan Colby MD as Consulting Physician (Cardiology)  Ankush Sky MD as Surgeon (Orthopedic Surgery)  Vale Del Rosario APRN as Nurse Practitioner (Oncology)  Darrion Peña MD as Consulting Physician (Urology)  Tahir Childs MD as Consulting Physician (Pulmonary Disease)  Noman Moseley MD as Consulting Physician (Gastroenterology)  Aura Perry, OD (Optometry)      Allergies:  Bactrim [sulfamethoxazole-trimethoprim]    Medications:  Current Outpatient Medications   Medication Sig Dispense Refill   • ALPHAGAN P 0.1 % solution ophthalmic solution Administer 0.1 bottles to both eyes 1 (One) Time.  6   • dabigatran etexilate (PRADAXA) 150 MG capsu Take 1 capsule by mouth Every 12 (Twelve) Hours. 180 capsule 2   • hydrochlorothiazide (HYDRODIURIL) 25 MG tablet Take 1 tablet by mouth Daily. 30 tablet 11   • LUMIGAN 0.01 % ophthalmic drops Administer 0.01 bottles to both " eyes 1 (One) Time.  6   • metoprolol tartrate (LOPRESSOR) 100 MG tablet Take 1 tablet by mouth Every 12 (Twelve) Hours. 60 tablet 5   • pantoprazole (PROTONIX) 40 MG EC tablet Take 40 mg by mouth Daily.     • sucralfate (CARAFATE) 1 g tablet Take 1 tablet by mouth 2 (Two) Times a Day. 60 tablet 5   • tamsulosin (FLOMAX) 0.4 MG capsule 24 hr capsule Take 1 capsule by mouth Every Night. 30 capsule    • TRELEGY ELLIPTA 100-62.5-25 MCG/INH aerosol powder  Inhale Daily.  3   • acetaminophen (TYLENOL) 325 MG tablet Take 2 tablets by mouth Every 4 (Four) Hours As Needed for Mild Pain .       No current facility-administered medications for this visit.         PFSH:     The following portions of the patient's history were reviewed and updated as appropriate: Allergies / Current Medications / Past Medical History / Surgical History / Social History / Family History    Problem List:  Patient Active Problem List   Diagnosis   • Chronic obstructive pulmonary disease (CMS/HCC)   • Impotence of organic origin   • Hypertension   • Osteoarthritis   • History of smoking greater than 50 pack years   • Benign prostatic hyperplasia with urinary obstruction   • Permanent atrial fibrillation (CMS/HCC)   • Prediabetes       Past Medical History:  Past Medical History:   Diagnosis Date   • EMPERATRIZ (acute kidney injury) (CMS/HCC)    • Alcohol abuse, in remission    • BPH (benign prostatic hypertrophy)     see doctors at [facility]   • COPD (chronic obstructive pulmonary disease) (CMS/HCC)    • Depression    • DJD (degenerative joint disease) of cervical spine    • ED (erectile dysfunction)     [facility]   • Hx of colonic polyps     followed by GI ([facility])   • Hyperlipidemia    • Hypertension    • Hypotension    • Influenza B 02/14/2013       • Low back pain    • Nocturnal hypoxia    • Osteoarthritis     HIPS, HANDS, MULTIPLE SITES   • Peripheral neuropathy    • Permanent atrial fibrillation (CMS/HCC)    • Rectal bleeding  2017   • Shortness of breath on exertion    • Shoulder pain, left 2007    SEEN AT [facility] ER   • Tendonitis of shoulder 2007    RIGHT SHOULDER/DELTOID INSERTION   • Ulcer of the stomach and intestine        Past Surgical History:  Past Surgical History:   Procedure Laterality Date   • APPENDECTOMY     • CARDIOVASCULAR STRESS TEST N/A 10/20/2015    NML LEXISCAN CARDIOLITE PERFUSION STUDY, NO EVIDENCE OF ISCHEMIA, AREA OF HYPOPERFUSION OF THE INFERIOR WALL W/NORMAL WALL PERFUSION AND NML LEFT VENTRICULAR EJECTION FRACTION, MOST LIKELY ATTENUATION. DR.MICHAEL BOX   • COLONOSCOPY N/A 2017   • COLONOSCOPY N/A 2011    4 POLYPS REMOVED, RESCOPE IN 5 YRS, DR. EAGLE   • COLONOSCOPY N/A 2006    ERYTHEMOUS AND GRANULAR MUCOSA IN ILEOCECAL VALVE, NORMAL COLON, REPEAT IN 5 YRS,    • ENDOSCOPY N/A 2018    normal esophagus, acute gastritis, multiple non-bleeding duodenal ulcers with pigmented material, H Pylori positive   • HERNIA REPAIR Bilateral     INGUINAL   • JOINT REPLACEMENT  2015    left hip   • TOTAL HIP ARTHROPLASTY Left 2015    Dr Ankush Sky   • TOTAL HIP ARTHROPLASTY Right 10/27/2014    DR.RIED SKY   • VASECTOMY         Social History:  Social History     Socioeconomic History   • Marital status:      Spouse name: Ara   • Number of children: 3   • Years of education: Not on file   • Highest education level: Not on file   Occupational History   • Occupation: Retired     Comment: Supervisor/manager [employer]   Tobacco Use   • Smoking status: Former Smoker     Packs/day: 2.00     Years: 49.00     Pack years: 98.00     Last attempt to quit: 2010     Years since quittin.3   • Smokeless tobacco: Never Used   • Tobacco comment: Began Smoking age 15.  Smoked 2 ppd for 49 years until he quit smoking 7 years ago for a 98 pack year history.   Substance and Sexual Activity   • Alcohol use: No     Comment: 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;  "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   • Drug use: No     Comment: caffeine use    • Sexual activity: Defer       Family History:  Family History   Problem Relation Age of Onset   • Colon cancer Mother    • Ovarian cancer Mother    • Dementia Mother    • Stroke Sister         October 2016   • Dementia Sister    • Heart disease Brother    • Alcohol abuse Brother    • Coronary artery disease Father    • Hypertension Father    • Heart disease Father    • Colon cancer Maternal Grandmother    • Heart disease Brother    • Coronary artery disease Brother    • Hypertension Brother    • Stroke Brother    • Arthritis Brother    • Prostate cancer Neg Hx          PATIENT ASSESSMENT    Vitals:  /78   Pulse 91   Temp 97.9 °F (36.6 °C)   Ht 177.8 cm (70\")   Wt 89.6 kg (197 lb 9.6 oz)   SpO2 96%   BMI 28.35 kg/m²   BP Readings from Last 3 Encounters:   05/02/19 124/78   03/25/19 118/60   01/31/19 126/64     Wt Readings from Last 3 Encounters:   05/02/19 89.6 kg (197 lb 9.6 oz)   03/25/19 89.4 kg (197 lb)   01/31/19 89.8 kg (198 lb)      Body mass index is 28.35 kg/m².    Pain Score    05/02/19 0746   PainSc: 0-No pain         Review of Systems:    Review of Systems   Constitutional: Negative for activity change and appetite change.   HENT: Negative for hearing loss.    Respiratory: Negative for shortness of breath.    Cardiovascular: Negative for chest pain.       Physical Exam:    Physical Exam   Constitutional: He is oriented to person, place, and time. Vital signs are normal. He appears well-developed and well-nourished. He is cooperative. He does not appear ill. No distress.   HENT:   Right Ear: Hearing, tympanic membrane, external ear and ear canal normal.   Left Ear: Hearing, tympanic membrane, external ear and ear canal normal.   Cardiovascular: Normal rate, regular rhythm, S1 normal, S2 normal and normal heart sounds.   No murmur heard.  Pulmonary/Chest: Effort normal and breath sounds normal. He has no decreased breath " sounds. He has no wheezes. He has no rhonchi. He has no rales.   Neurological: He is alert and oriented to person, place, and time.   Skin: Skin is warm, dry and intact.   Psychiatric: He has a normal mood and affect. His speech is normal and behavior is normal. Judgment and thought content normal. Cognition and memory are normal.   Nursing note and vitals reviewed.      Reviewed Data:    Labs:   Lab Results   Component Value Date     01/31/2019    K 4.2 01/31/2019    CALCIUM 9.3 01/31/2019    AST 21 01/31/2019    ALT 19 01/31/2019    BUN 16 01/31/2019    CREATININE 1.02 01/31/2019    CREATININE 0.90 09/26/2018    CREATININE 1.56 (H) 09/25/2018    EGFRIFNONA 72 01/31/2019    EGFRIFAFRI 87 01/31/2019       Lab Results   Component Value Date     (H) 01/31/2019     (H) 07/31/2018     (H) 02/24/2017    HGBA1C 6.41 (H) 01/31/2019    HGBA1C 6.16 (H) 07/31/2018       Lab Results   Component Value Date    LDL 89 07/31/2018     (H) 02/24/2017    LDL 80 03/30/2015    HDL 40 07/31/2018    TRIG 131 07/31/2018    CHOLHDLRATIO 3.88 07/31/2018       Lab Results   Component Value Date    TSH 2.45 10/14/2015          Lab Results   Component Value Date    WBC 11.08 (H) 11/19/2018    HGB 16.1 11/19/2018    HGB 12.7 (L) 09/28/2018    HGB 14.0 09/27/2018     11/19/2018                   Lab Results   Component Value Date    PSA 7.8 (H) 04/30/2018    PSA 9.650 (H) 03/27/2018       Imaging:          Medical Tests:          Screening for Glaucoma:  Previous screening for glaucoma?: Yes      Hearing Loss Screen:  Finger Rub Hearing Test (right ear): passed  Finger Rub Hearing Test (left ear): passed      Urinary Incontinence Screen:  Episodes of urinary incontinence? : No      Depression Screen:  PHQ-2/PHQ-9 Depression Screening 5/2/2019   Little interest or pleasure in doing things 0   Feeling down, depressed, or hopeless 0   Total Score 0        PHQ-2: 0 (Not depressed)    PHQ-9: 0 (Negative  screening for depression)       FUNCTIONAL, FALL RISK, & COGNITIVE SCREENING (Components below):    DATA:    Functional & Cognitive Status 5/1/2019   Do you have difficulty preparing food and eating? No   Do you have difficulty bathing yourself, getting dressed or grooming yourself? No   Do you have difficulty using the toilet? No   Do you have difficulty moving around from place to place? No   Do you have trouble with steps or getting out of a bed or a chair? No   In the past year have you fallen or experienced a near fall? No   Current Diet Limited Junk Food   Dental Exam Up to date   Eye Exam Up to date   Exercise (times per week) 3 times per week   Current Exercise Activities Include Walking   Do you need help using the phone?  No   Are you deaf or do you have serious difficulty hearing?  No   Do you need help with transportation? No   Do you need help shopping? No   Do you need help preparing meals?  No   Do you need help with housework?  No   Do you need help with laundry? No   Do you need help taking your medications? No   Do you need help managing money? No   Do you ever drive or ride in a car without wearing a seat belt? No   Do you have difficulty concentrating, remembering or making decisions? No         A) Assessment of Functional Ability:  (Assessment of ability to perform ADL's (showering/bathing, using toilet, dressing, feeding self, moving self around) and IADL's (use telephone, shop, prepare food, housekeep, do laundry, transport independently, take medications independently, and handle finances)    Degree of functional impairment: NONE (based on assessment noted above)      B) Assessment of Fall Risk:       AMB Fall Risk Assessment 1/31/2019   Fallen in past 6 months 0--> No   Mental Status 0--> no mental status change   Mobility 0--> No mobility issues   Medications 0--> No meds   Total Fall Risk Score 2       Need for further evaluation of gait, strength, and balance? : No    Timed Up and Go  (TUG):   (>= 12 seconds indicates high risk for falling)    Observable abnormalities included: Normal gait pattern       C. Assessment of Cognitive Function:    Mini-Cog Test:     1) Registration (3 objects): N/A   2) Number of objects recalled: 1   3) Clock Draw: Passed? : Yes       Further evaluation required? : No, but will continue to monitor for any noticable acute changes. recommended wife watch for acute or new changes in memory or cognition.       COUNSELING    A. Identification of Health Risk Factors:    Risk factors include: cardiovascular risk factors, polypharmacy, chronic pain and history of tobacco use      B. Age-Appropriate Screening Schedule:  (Refer to the list below for future screening recommendations based on patient's age, sex and/or medical conditions. Orders for these recommended tests are listed in the plan section. The patient has been provided with a written plan)    Health Maintenance Topics  Health Maintenance   Topic Date Due   • ZOSTER VACCINE (2 of 3) 01/25/2015   • LIPID PANEL  07/31/2019   • INFLUENZA VACCINE  08/01/2019   • COLONOSCOPY  03/23/2022   • TDAP/TD VACCINES (2 - Td) 02/24/2027   • PNEUMOCOCCAL VACCINES (65+ LOW/MEDIUM RISK)  Completed       Health Maintenance Topics Due or Over-Due  Health Maintenance Due   Topic Date Due   • ZOSTER VACCINE (2 of 3) 01/25/2015         C. Advanced Care Planning:    has an advanced directive which is on file and has a medical power of       D. Patient Self-Management and Personalized Health Advice:    He has been provided with personalized counseling/information (including brochures/handouts) about:     -- possible evidence of cognitive problems and need for ongoing surveillance for progressive changes      He has been recommended for the following preventative services which has been performed today, will be ordered today or ordered/performed on upcoming follow-up visit:     -- exercise counseling provided, vaccination for  Shingrix administered  (or recommended at pharmacy)      E. Miscellaneous Items:    -Aspirin use counseling: Does not need ASA (and currently is not on it)    -Discussed BMI with him. The BMI is above average; BMI management plan will be discussed on follow-up visit    -Reviewed use of high risk medication in the elderly: YES    -Reviewed for potential of harmful drug interactions in the elderly: YES      IV. WRAP-UP    Assessment & Plan:    1) MEDICARE ANNUAL WELLNESS VISIT    2) OTHER MEDICAL CONDITIONS ADDRESSED TODAY:              Problem List Items Addressed This Visit     None      Visit Diagnoses     Medicare annual wellness visit, subsequent    -  Primary                  No orders of the defined types were placed in this encounter.      Discussion / Summary:    1. Medicare annual wellness visit, subsequent      Medications as of TODAY:              Current Outpatient Medications   Medication Sig Dispense Refill   • ALPHAGAN P 0.1 % solution ophthalmic solution Administer 0.1 bottles to both eyes 1 (One) Time.  6   • dabigatran etexilate (PRADAXA) 150 MG capsu Take 1 capsule by mouth Every 12 (Twelve) Hours. 180 capsule 2   • hydrochlorothiazide (HYDRODIURIL) 25 MG tablet Take 1 tablet by mouth Daily. 30 tablet 11   • LUMIGAN 0.01 % ophthalmic drops Administer 0.01 bottles to both eyes 1 (One) Time.  6   • metoprolol tartrate (LOPRESSOR) 100 MG tablet Take 1 tablet by mouth Every 12 (Twelve) Hours. 60 tablet 5   • pantoprazole (PROTONIX) 40 MG EC tablet Take 40 mg by mouth Daily.     • sucralfate (CARAFATE) 1 g tablet Take 1 tablet by mouth 2 (Two) Times a Day. 60 tablet 5   • tamsulosin (FLOMAX) 0.4 MG capsule 24 hr capsule Take 1 capsule by mouth Every Night. 30 capsule    • TRELEGY ELLIPTA 100-62.5-25 MCG/INH aerosol powder  Inhale Daily.  3   • acetaminophen (TYLENOL) 325 MG tablet Take 2 tablets by mouth Every 4 (Four) Hours As Needed for Mild Pain .       No current facility-administered medications  for this visit.          FOLLOW-UP:            No Follow-up on file.              Future Appointments   Date Time Provider Department Center   8/2/2019  7:45 AM Neftali Amezcua MD [facility] None   12/16/2019 12:40 PM Juan Colby MD [facility] None         ______________________________________________________________________      A printed After Visit Summary (AVS) including the Personalized Prevention  Plan Services (PPPS) was given to the patient at check-out today.     Educational Handouts    Strong & Stable / Balance / Physical Activity / Pain / Depression /  Forgetfulness / Healthy Eating / Alcohol / Smoking /  Medicine / Sexuality / Scams     **Dragon Disclaimer:   Much of this encounter note is an electronic transcription/translation of spoken language to printed text. The electronic translation of spoken language may permit erroneous, or at times, nonsensical words or phrases to be inadvertently transcribed. Although I have reviewed the note for such errors, some may still exist.     This template was created by Adelso Amezcua MD.

## 2019-05-02 NOTE — PATIENT INSTRUCTIONS
Medicare Wellness  Personal Prevention Plan of Service     Date of Office Visit:  2019  Encounter Provider:  SHANIQUA Perry  Place of Service:  [facility]  Patient Name: Aj Salazar  :  1945    As part of the Medicare Wellness portion of your visit today, we are providing you with this personalized preventive plan of services (PPPS). This plan is based upon recommendations of the [place] Preventive Services Task Force (USPSTF) and the Advisory Committee on Immunization Practices (ACIP).    This lists the preventive care services that should be considered, and provides dates of when you are due. Items listed as completed are up-to-date and do not require any further intervention.      Age-Appropriate Screening Schedule:  (Refer to the list below for future screening recommendations based on patient's age, sex and/or medical conditions. Orders for these recommended tests are listed in the plan section. The patient has been provided with a written plan)    Health Maintenance Topics  Health Maintenance   Topic Date Due   • ZOSTER VACCINE (2 of 3) 2015   • LIPID PANEL  2019   • INFLUENZA VACCINE  2019   • LUNG CANCER SCREENING  2019   • MEDICARE ANNUAL WELLNESS  2020   • COLONOSCOPY  2022   • TDAP/TD VACCINES (2 - Td) 2027   • HEPATITIS C SCREENING  Completed   • PNEUMOCOCCAL VACCINES (65+ LOW/MEDIUM RISK)  Completed   • AAA SCREEN (ONE-TIME)  Completed       Health Maintenance Topics Due or Over-Due  Health Maintenance Due   Topic Date Due   • ZOSTER VACCINE (2 of 3) 2015         ADDITIONAL RESOURCES:    For excellent information on senior health and wellness refer to the [employer] [place] [employer] web-site at:    WWW.NIHSENIORHEALTH.GOV

## 2019-05-19 DIAGNOSIS — I10 ESSENTIAL HYPERTENSION: Chronic | ICD-10-CM

## 2019-05-20 RX ORDER — METOPROLOL TARTRATE 100 MG/1
TABLET ORAL
Qty: 60 TABLET | Refills: 5 | Status: SHIPPED | OUTPATIENT
Start: 2019-05-20 | End: 2019-11-14 | Stop reason: SDUPTHER

## 2019-05-29 DIAGNOSIS — R10.9 ABDOMINAL PAIN, UNSPECIFIED ABDOMINAL LOCATION: ICD-10-CM

## 2019-05-30 RX ORDER — PANTOPRAZOLE SODIUM 40 MG/1
TABLET, DELAYED RELEASE ORAL
Qty: 60 TABLET | Refills: 0 | OUTPATIENT
Start: 2019-05-30

## 2019-06-03 ENCOUNTER — TELEPHONE (OUTPATIENT)
Dept: INTERNAL MEDICINE | Age: 74
End: 2019-06-03

## 2019-06-03 RX ORDER — PANTOPRAZOLE SODIUM 40 MG/1
40 TABLET, DELAYED RELEASE ORAL DAILY
Qty: 90 TABLET | Refills: 1 | Status: SHIPPED | OUTPATIENT
Start: 2019-06-03 | End: 2019-12-13 | Stop reason: SDUPTHER

## 2019-07-25 ENCOUNTER — TELEPHONE (OUTPATIENT)
Dept: CARDIOLOGY | Facility: CLINIC | Age: 74
End: 2019-07-25

## 2019-07-25 NOTE — TELEPHONE ENCOUNTER
Mr. Valentejacindasamuelta called.  He needs to have a prostate biopsy.  Are you okay with him holding his Pradaxa for one week?  Please advise/[facility]    # [phone]

## 2019-08-02 ENCOUNTER — APPOINTMENT (OUTPATIENT)
Dept: LAB | Facility: HOSPITAL | Age: 74
End: 2019-08-02

## 2019-08-02 ENCOUNTER — OFFICE VISIT (OUTPATIENT)
Dept: INTERNAL MEDICINE | Age: 74
End: 2019-08-02

## 2019-08-02 VITALS
HEIGHT: 70 IN | SYSTOLIC BLOOD PRESSURE: 128 MMHG | BODY MASS INDEX: 28 KG/M2 | DIASTOLIC BLOOD PRESSURE: 82 MMHG | OXYGEN SATURATION: 97 % | WEIGHT: 195.6 LBS | TEMPERATURE: 98.1 F | HEART RATE: 94 BPM

## 2019-08-02 DIAGNOSIS — N40.1 BENIGN PROSTATIC HYPERPLASIA WITH URINARY OBSTRUCTION: Chronic | ICD-10-CM

## 2019-08-02 DIAGNOSIS — I10 ESSENTIAL HYPERTENSION: Chronic | ICD-10-CM

## 2019-08-02 DIAGNOSIS — J43.9 PULMONARY EMPHYSEMA, UNSPECIFIED EMPHYSEMA TYPE (HCC): Chronic | ICD-10-CM

## 2019-08-02 DIAGNOSIS — N13.8 BENIGN PROSTATIC HYPERPLASIA WITH URINARY OBSTRUCTION: Chronic | ICD-10-CM

## 2019-08-02 DIAGNOSIS — R73.03 PREDIABETES: Primary | ICD-10-CM

## 2019-08-02 DIAGNOSIS — I48.21 PERMANENT ATRIAL FIBRILLATION (HCC): Chronic | ICD-10-CM

## 2019-08-02 LAB
ALBUMIN SERPL-MCNC: 4.1 G/DL (ref 3.5–5.2)
ALBUMIN/GLOB SERPL: 1.4 G/DL
ALP SERPL-CCNC: 78 U/L (ref 39–117)
ALT SERPL W P-5'-P-CCNC: 20 U/L (ref 1–41)
ANION GAP SERPL CALCULATED.3IONS-SCNC: 12.5 MMOL/L (ref 5–15)
AST SERPL-CCNC: 24 U/L (ref 1–40)
BILIRUB SERPL-MCNC: 0.8 MG/DL (ref 0.2–1.2)
BUN BLD-MCNC: 12 MG/DL (ref 8–23)
BUN/CREAT SERPL: 12.4 (ref 7–25)
CALCIUM SPEC-SCNC: 9.2 MG/DL (ref 8.6–10.5)
CHLORIDE SERPL-SCNC: 103 MMOL/L (ref 98–107)
CHOLEST SERPL-MCNC: 154 MG/DL (ref 0–200)
CO2 SERPL-SCNC: 27.5 MMOL/L (ref 22–29)
CREAT BLD-MCNC: 0.97 MG/DL (ref 0.76–1.27)
GFR SERPL CREATININE-BSD FRML MDRD: 76 ML/MIN/1.73
GLOBULIN UR ELPH-MCNC: 2.9 GM/DL
GLUCOSE BLD-MCNC: 131 MG/DL (ref 65–99)
HBA1C MFR BLD: 6.2 % (ref 4.8–5.6)
HDLC SERPL QL: 3.58
HDLC SERPL-MCNC: 43 MG/DL (ref 40–60)
LDLC SERPL CALC-MCNC: 84 MG/DL (ref 0–100)
POTASSIUM BLD-SCNC: 3.6 MMOL/L (ref 3.5–5.2)
PROT SERPL-MCNC: 7 G/DL (ref 6–8.5)
SODIUM BLD-SCNC: 143 MMOL/L (ref 136–145)
TRIGL SERPL-MCNC: 136 MG/DL (ref 0–150)
VLDLC SERPL-MCNC: 27.2 MG/DL (ref 5–40)

## 2019-08-02 PROCEDURE — 83036 HEMOGLOBIN GLYCOSYLATED A1C: CPT | Performed by: INTERNAL MEDICINE

## 2019-08-02 PROCEDURE — 80061 LIPID PANEL: CPT | Performed by: INTERNAL MEDICINE

## 2019-08-02 PROCEDURE — 36415 COLL VENOUS BLD VENIPUNCTURE: CPT | Performed by: INTERNAL MEDICINE

## 2019-08-02 PROCEDURE — 80053 COMPREHEN METABOLIC PANEL: CPT | Performed by: INTERNAL MEDICINE

## 2019-08-02 PROCEDURE — 99214 OFFICE O/P EST MOD 30 MIN: CPT | Performed by: INTERNAL MEDICINE

## 2019-08-02 NOTE — PROGRESS NOTES
[place]:    Aj, here are the result(s) of your test(s):     1. A1c for average glucose level is little lower. Maintain a low sugar/starch/carbohydrate diet and exercise regularly. Wt loss advised. Will follow closely for now.   2. Cholesterol is stable.     Please do not hesitate to contact me if you have questions.

## 2019-08-02 NOTE — PROGRESS NOTES
[facility] – [place] INTERNAL MEDICINE  ROSA LOPEZ M.D.      Aj HELMS Wohlleb / 73 y.o. / male  08/02/2019      CHIEF COMPLAINT     Prediabetes (6 mo f/u) and Hypertension      ASSESSMENT & PLAN     Problem List Items Addressed This Visit        High    Prediabetes - Primary (Chronic)    Current Assessment & Plan     Lab Results   Component Value Date    HGBA1C 6.41 (H) 01/31/2019    HGBA1C 6.16 (H) 07/31/2018      Check A1c. Will likely need to start medication. Maintain a low sugar/starch/carbohydrate diet and exercise regularly. Wt loss advised.  Dietary information provided.          Relevant Orders    Hemoglobin A1c    Comprehensive Metabolic Panel       Medium    Hypertension (Chronic)    Overview     Stable. Continue metoprolol tartrate 100 mg BID and HCTZ 25 mg qd.          Relevant Medications    hydrochlorothiazide (HYDRODIURIL) 25 MG tablet    metoprolol tartrate (LOPRESSOR) 100 MG tablet    Other Relevant Orders    Lipid Panel With / Chol / HDL Ratio       Low    Chronic obstructive pulmonary disease (CMS/HCC) (Chronic)    Overview     PFT (5/2018): moderate obstruction with decreased diffusing capacity.     Stable. Continue Trelegy inhaler.          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         Relevant Medications    tamsulosin (FLOMAX) 0.4 MG capsule 24 hr capsule    Permanent atrial fibrillation (CMS/HCC) (Chronic)    Overview     *Imburgia    Continue Pradaxa and metoprolol.          Relevant Medications    metoprolol tartrate (LOPRESSOR) 100 MG tablet        Orders Placed This Encounter   Procedures   • Hemoglobin A1c   • Lipid Panel With / Chol / HDL Ratio   • Comprehensive Metabolic Panel     No orders of the defined types were placed in this encounter.      Summary/Discussion:  ·       Next Appointment with me: Visit date not found    Return  "in about 3 months (around 11/2/2019) for Prediabetes.      MEDICATIONS     Current Outpatient Medications   Medication Sig Dispense Refill   • dabigatran etexilate (PRADAXA) 150 MG capsu Take 1 capsule by mouth Every 12 (Twelve) Hours. 180 capsule 2   • hydrochlorothiazide (HYDRODIURIL) 25 MG tablet Take 1 tablet by mouth Daily. 30 tablet 11   • LUMIGAN 0.01 % ophthalmic drops Administer 0.01 bottles to both eyes 1 (One) Time.  6   • metoprolol tartrate (LOPRESSOR) 100 MG tablet TAKE 1 TABLET BY MOUTH EVERY 12 HOURS 60 tablet 5   • pantoprazole (PROTONIX) 40 MG EC tablet Take 1 tablet by mouth Daily. 90 tablet 1   • tamsulosin (FLOMAX) 0.4 MG capsule 24 hr capsule Take 1 capsule by mouth Every Night. 30 capsule    • TRELEGY ELLIPTA 100-62.5-25 MCG/INH aerosol powder  Inhale Daily.  3   • acetaminophen (TYLENOL) 325 MG tablet Take 2 tablets by mouth Every 4 (Four) Hours As Needed for Mild Pain .     • ALPHAGAN P 0.1 % solution ophthalmic solution Administer 0.1 bottles to both eyes 1 (One) Time.  6   • sucralfate (CARAFATE) 1 g tablet Take 1 tablet by mouth 2 (Two) Times a Day. 60 tablet 5     No current facility-administered medications for this visit.           VITAL SIGNS     Visit Vitals  /82   Pulse 94   Temp 98.1 °F (36.7 °C)   Ht 177.8 cm (70\")   Wt 88.7 kg (195 lb 9.6 oz)   SpO2 97%   BMI 28.07 kg/m²       BP Readings from Last 3 Encounters:   08/02/19 128/82   05/02/19 124/78   03/25/19 118/60     Wt Readings from Last 3 Encounters:   08/02/19 88.7 kg (195 lb 9.6 oz)   05/02/19 89.6 kg (197 lb 9.6 oz)   03/25/19 89.4 kg (197 lb)      Body mass index is 28.07 kg/m².      HISTORY OF PRESENT ILLNESS     Prediabetes:  Previously A1c level was increasing, weight has not changed significantly, not on metformin and has not been compliant with low carb diet. Most recent A1c level(s):   Lab Results   Component Value Date    HGBA1C 6.41 (H) 01/31/2019    HGBA1C 6.16 (H) 07/31/2018     Chronic essential " hypertension:  Since prior visit: compliant with medication(s) and denies significant problems with medication(s).  Most recent in-office blood pressure readings:   BP Readings from Last 3 Encounters:   08/02/19 128/82   05/02/19 124/78   03/25/19 118/60     Chronic atrial fibrillation without palpitaitons or chest pain. On Pradaxa without bleeding problems.     BPH: to undergo prostate biopsy next week.       Patient Care Team:  Neftali Amezcua MD as PCP - General (Internal Medicine)  Juan Colby MD as Consulting Physician (Cardiology)  Ankush Sky MD as Surgeon (Orthopedic Surgery)  Vale Del Rosario APRN as Nurse Practitioner (Oncology)  Darrion Peña MD as Consulting Physician (Urology)  Tahir Childs MD as Consulting Physician (Pulmonary Disease)  Noman Moseley MD as Consulting Physician (Gastroenterology)  Aura Perry, OD (Optometry)      REVIEW OF SYSTEMS     Review of Systems  Constitutional neg  Resp neg  CV neg  GI neg   BPH   Neuro neg      PHYSICAL EXAMINATION     Physical Exam  Constitutional  No distress  Cardiovascular Rate  normal . Rhythm: Irregular . Heart sounds:  Normal  Pulmonary/Chest: Effort normal and breath sounds normal.    Psychiatric  Alert. Judgment and thought content normal. Mood normal      REVIEWED DATA     Labs:     Lab Results   Component Value Date     01/31/2019    K 4.2 01/31/2019    CALCIUM 9.3 01/31/2019    AST 21 01/31/2019    ALT 19 01/31/2019    BUN 16 01/31/2019    CREATININE 1.02 01/31/2019    CREATININE 0.90 09/26/2018    CREATININE 1.56 (H) 09/25/2018    EGFRIFNONA 72 01/31/2019    EGFRIFAFRI 87 01/31/2019       Lab Results   Component Value Date    HGBA1C 6.41 (H) 01/31/2019    HGBA1C 6.16 (H) 07/31/2018     (H) 01/31/2019     (H) 07/31/2018     (H) 02/24/2017       Lab Results   Component Value Date    LDL 89 07/31/2018     (H) 02/24/2017    LDL 80 03/30/2015    HDL 40 07/31/2018    HDL 42  02/24/2017    HDL 47 03/30/2015    TRIG 131 07/31/2018    TRIG 119 02/24/2017    TRIG 84 03/30/2015    CHOLHDLRATIO 3.88 07/31/2018    CHOLHDLRATIO 3.98 02/24/2017    CHOLHDLRATIO 3.1 03/30/2015       Lab Results   Component Value Date    TSH 2.45 10/14/2015       Lab Results   Component Value Date    WBC 11.08 (H) 11/19/2018    HGB 16.1 11/19/2018    HGB 12.7 (L) 09/28/2018    HGB 14.0 09/27/2018     11/19/2018       Lab Results   Component Value Date    PROTEIN 2+ (A) 03/27/2018    GLUCOSEU Negative 09/25/2018    BLOODU Negative 09/25/2018    NITRITEU Negative 09/25/2018    LEUKOCYTESUR Negative 09/25/2018       Imaging:         Medical Tests:         Summary of old records / correspondence / consultant report:         Request outside records:         ALLERGIES  Allergies   Allergen Reactions   • Bactrim [Sulfamethoxazole-Trimethoprim] Rash        PFSH:     The following portions of the patient's history were reviewed and updated as appropriate: Allergies / Current Medications / Past Medical History / Surgical History / Social History / Family History    PROBLEM LIST   Patient Active Problem List   Diagnosis   • Chronic obstructive pulmonary disease (CMS/HCC)   • Impotence of organic origin   • Hypertension   • Osteoarthritis   • History of smoking greater than 50 pack years   • Benign prostatic hyperplasia with urinary obstruction   • Permanent atrial fibrillation (CMS/HCC)   • Prediabetes       PAST MEDICAL HISTORY  Past Medical History:   Diagnosis Date   • EMPERATRIZ (acute kidney injury) (CMS/HCC)    • Alcohol abuse, in remission    • BPH (benign prostatic hypertrophy)     see doctors at [facility]   • COPD (chronic obstructive pulmonary disease) (CMS/HCC)    • Depression    • DJD (degenerative joint disease) of cervical spine    • ED (erectile dysfunction)     [facility]   • Hx of colonic polyps     followed by GI ([facility])   • Hyperlipidemia    • Hypertension    • Hypotension    • Influenza B 02/14/2013        • Low back pain    • Nocturnal hypoxia    • Osteoarthritis     HIPS, HANDS, MULTIPLE SITES   • Peripheral neuropathy    • Permanent atrial fibrillation (CMS/HCC)    • Rectal bleeding 2017   • Shortness of breath on exertion    • Shoulder pain, left 2007    SEEN AT [facility] ER   • Tendonitis of shoulder 2007    RIGHT SHOULDER/DELTOID INSERTION   • Ulcer of the stomach and intestine        SURGICAL HISTORY  Past Surgical History:   Procedure Laterality Date   • APPENDECTOMY     • CARDIOVASCULAR STRESS TEST N/A 10/20/2015    NML LEXISCAN CARDIOLITE PERFUSION STUDY, NO EVIDENCE OF ISCHEMIA, AREA OF HYPOPERFUSION OF THE INFERIOR WALL W/NORMAL WALL PERFUSION AND NML LEFT VENTRICULAR EJECTION FRACTION, MOST LIKELY ATTENUATION. DR.MICHAEL BOX   • COLONOSCOPY N/A 2017   • COLONOSCOPY N/A 2011    4 POLYPS REMOVED, RESCOPE IN 5 YRS, DR. EAGLE   • COLONOSCOPY N/A 2006    ERYTHEMOUS AND GRANULAR MUCOSA IN ILEOCECAL VALVE, NORMAL COLON, REPEAT IN 5 YRS,    • ENDOSCOPY N/A 2018    normal esophagus, acute gastritis, multiple non-bleeding duodenal ulcers with pigmented material, H Pylori positive   • HERNIA REPAIR Bilateral     INGUINAL   • JOINT REPLACEMENT  2015    left hip   • TOTAL HIP ARTHROPLASTY Left 2015    Dr Ankush Sky   • TOTAL HIP ARTHROPLASTY Right 10/27/2014    DR.RIED SKY   • VASECTOMY         SOCIAL HISTORY  Social History     Socioeconomic History   • Marital status:      Spouse name: Ara   • Number of children: 3   • Years of education: Not on file   • Highest education level: Not on file   Occupational History   • 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  Smoked 2 ppd for 49 years until he quit smoking 7 years ago for a 98 pack year history.   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   • Drug use: No     Comment: caffeine use    • Sexual activity: Defer       FAMILY HISTORY  Family History   Problem Relation Age of Onset   • Colon cancer Mother    • Ovarian cancer Mother    • Dementia Mother    • Stroke Sister         October 2016   • Dementia Sister    • Heart disease Brother    • Alcohol abuse Brother    • Coronary artery disease Father    • Hypertension Father    • Heart disease Father    • Colon cancer Maternal Grandmother    • Heart disease Brother    • Coronary artery disease Brother    • Hypertension Brother    • Stroke Brother    • Arthritis Brother    • Prostate cancer Neg Hx          **Dragon Disclaimer:   Much of this encounter note is an electronic transcription/translation of spoken language to printed text. The electronic translation of spoken language may permit erroneous, or at times, nonsensical words or phrases to be inadvertently transcribed. Although I have reviewed the note for such errors, some may still exist.       Template created by Adelso Amezcua MD

## 2019-08-02 NOTE — ASSESSMENT & PLAN NOTE
Lab Results   Component Value Date    HGBA1C 6.41 (H) 01/31/2019    HGBA1C 6.16 (H) 07/31/2018      Check A1c. Will likely need to start medication. Maintain a low sugar/starch/carbohydrate diet and exercise regularly. Wt loss advised.  Dietary information provided.

## 2019-10-19 DIAGNOSIS — J43.9 PULMONARY EMPHYSEMA, UNSPECIFIED EMPHYSEMA TYPE (HCC): ICD-10-CM

## 2019-11-07 ENCOUNTER — OFFICE VISIT (OUTPATIENT)
Dept: INTERNAL MEDICINE | Age: 74
End: 2019-11-07

## 2019-11-07 VITALS
TEMPERATURE: 97.7 F | OXYGEN SATURATION: 98 % | SYSTOLIC BLOOD PRESSURE: 110 MMHG | HEART RATE: 79 BPM | HEIGHT: 70 IN | WEIGHT: 197 LBS | BODY MASS INDEX: 28.2 KG/M2 | DIASTOLIC BLOOD PRESSURE: 68 MMHG

## 2019-11-07 DIAGNOSIS — R73.03 PREDIABETES: Primary | Chronic | ICD-10-CM

## 2019-11-07 DIAGNOSIS — I10 ESSENTIAL HYPERTENSION: Chronic | ICD-10-CM

## 2019-11-07 PROCEDURE — 99213 OFFICE O/P EST LOW 20 MIN: CPT | Performed by: INTERNAL MEDICINE

## 2019-11-07 RX ORDER — NETARSUDIL 0.2 MG/ML
SOLUTION/ DROPS OPHTHALMIC; TOPICAL
Refills: 6 | COMMUNITY
Start: 2019-10-02 | End: 2020-02-13

## 2019-11-07 NOTE — ASSESSMENT & PLAN NOTE
Lab Results   Component Value Date    HGBA1C 6.20 (H) 08/02/2019    HGBA1C 6.41 (H) 01/31/2019    HGBA1C 6.16 (H) 07/31/2018     BMI Readings from Last 3 Encounters:   11/07/19 28.27 kg/m²   08/02/19 28.07 kg/m²   05/02/19 28.35 kg/m²      Wt Readings from Last 3 Encounters:   11/07/19 89.4 kg (197 lb)   08/02/19 88.7 kg (195 lb 9.6 oz)   05/02/19 89.6 kg (197 lb 9.6 oz)

## 2019-11-07 NOTE — PROGRESS NOTES
[facility] – [place] INTERNAL MEDICINE  ROSA LOPEZ M.D.      Aj HELMS Wohlleb / 73 y.o. / male  11/07/2019      CHIEF COMPLAINT     Prediabetes (3 month f/u)      ASSESSMENT & PLAN     Problem List Items Addressed This Visit        High    Prediabetes - Primary (Chronic)    Current Assessment & Plan     Lab Results   Component Value Date    HGBA1C 6.20 (H) 08/02/2019    HGBA1C 6.41 (H) 01/31/2019    HGBA1C 6.16 (H) 07/31/2018     BMI Readings from Last 3 Encounters:   11/07/19 28.27 kg/m²   08/02/19 28.07 kg/m²   05/02/19 28.35 kg/m²      Wt Readings from Last 3 Encounters:   11/07/19 89.4 kg (197 lb)   08/02/19 88.7 kg (195 lb 9.6 oz)   05/02/19 89.6 kg (197 lb 9.6 oz)                Medium    Hypertension (Chronic)    Overview     Stable. Continue metoprolol tartrate 100 mg BID and HCTZ 25 mg qd.          Current Assessment & Plan     BP Readings from Last 3 Encounters:   11/07/19 110/68   08/02/19 128/82   05/02/19 124/78             Relevant Medications    hydrochlorothiazide (HYDRODIURIL) 25 MG tablet    metoprolol tartrate (LOPRESSOR) 100 MG tablet        No orders of the defined types were placed in this encounter.    No orders of the defined types were placed in this encounter.      Summary/Discussion:  ·       Next Appointment with me: Visit date not found    Return in about 6 months (around 5/7/2020) for PRE-DIABETES, HYPERTENSION.      MEDICATIONS     Current Outpatient Medications   Medication Sig Dispense Refill   • ALPHAGAN P 0.1 % solution ophthalmic solution Administer 0.1 bottles to both eyes 1 (One) Time.  6   • dabigatran etexilate (PRADAXA) 150 MG capsu Take 1 capsule by mouth Every 12 (Twelve) Hours. 180 capsule 2   • hydrochlorothiazide (HYDRODIURIL) 25 MG tablet Take 1 tablet by mouth Daily. 30 tablet 11   • LUMIGAN 0.01 % ophthalmic drops Administer 0.01 bottles to both eyes 1 (One) Time.  6   • metoprolol tartrate (LOPRESSOR) 100 MG tablet TAKE 1 TABLET BY MOUTH EVERY 12 HOURS 60 tablet 5   • pantoprazole  "(PROTONIX) 40 MG EC tablet Take 1 tablet by mouth Daily. 90 tablet 1   • RHOPRESSA 0.02 % solution INT 1 GTT IN OU QPM  6   • tamsulosin (FLOMAX) 0.4 MG capsule 24 hr capsule Take 1 capsule by mouth Every Night. 30 capsule    • TRELEGY ELLIPTA 100-62.5-25 MCG/INH aerosol powder  Inhale Daily.  3   • acetaminophen (TYLENOL) 325 MG tablet Take 2 tablets by mouth Every 4 (Four) Hours As Needed for Mild Pain .       No current facility-administered medications for this visit.           VITAL SIGNS     Visit Vitals  /68   Pulse 79   Temp 97.7 °F (36.5 °C)   Ht 177.8 cm (70\")   Wt 89.4 kg (197 lb)   SpO2 98%   BMI 28.27 kg/m²       BP Readings from Last 3 Encounters:   11/07/19 110/68   08/02/19 128/82   05/02/19 124/78     Wt Readings from Last 3 Encounters:   11/07/19 89.4 kg (197 lb)   08/02/19 88.7 kg (195 lb 9.6 oz)   05/02/19 89.6 kg (197 lb 9.6 oz)      Body mass index is 28.27 kg/m².      HISTORY OF PRESENT ILLNESS     Hypertension remains controlled with metoprolol and hctz.     Prediabetes:  Previously A1c level was decreasing, weight has not changed significantly and not on metformin. Most recent A1c level(s):   Lab Results   Component Value Date    HGBA1C 6.20 (H) 08/02/2019    HGBA1C 6.41 (H) 01/31/2019    HGBA1C 6.16 (H) 07/31/2018          Patient Care Team:  Neftali Amezcua MD as PCP - General (Internal Medicine)  Juan Colby MD as Consulting Physician (Cardiology)  Ankush Sky MD as Surgeon (Orthopedic Surgery)  Vale Del Rosario APRN as Nurse Practitioner (Oncology)  Darrion Peña MD as Consulting Physician (Urology)  Tahir Childs MD as Consulting Physician (Pulmonary Disease)  Noman Moseley MD as Consulting Physician (Gastroenterology)  Aura Perry, FERNY (Optometry)      REVIEW OF SYSTEMS     Review of Systems  Constitutional neg  Resp neg  CV neg      PHYSICAL EXAMINATION     Physical Exam  Constitutional  No distress  Cardiovascular Rate  normal . Rhythm: " regular . Heart sounds:  Normal  Psychiatric  Alert. Judgment intact. Thought content normal. Mood normal      REVIEWED DATA     Labs:     Lab Results   Component Value Date     08/02/2019    K 3.6 08/02/2019    CALCIUM 9.2 08/02/2019    AST 24 08/02/2019    ALT 20 08/02/2019    BUN 12 08/02/2019    CREATININE 0.97 08/02/2019    CREATININE 1.02 01/31/2019    CREATININE 0.90 09/26/2018    EGFRIFNONA 76 08/02/2019    EGFRIFAFRI 87 01/31/2019       Lab Results   Component Value Date    HGBA1C 6.20 (H) 08/02/2019    HGBA1C 6.41 (H) 01/31/2019    HGBA1C 6.16 (H) 07/31/2018     (H) 01/31/2019     (H) 07/31/2018     (H) 02/24/2017       Lab Results   Component Value Date    LDL 84 08/02/2019    LDL 89 07/31/2018     (H) 02/24/2017    HDL 43 08/02/2019    HDL 40 07/31/2018    HDL 42 02/24/2017    TRIG 136 08/02/2019    TRIG 131 07/31/2018    TRIG 119 02/24/2017    CHOLHDLRATIO 3.58 08/02/2019    CHOLHDLRATIO 3.88 07/31/2018    CHOLHDLRATIO 3.98 02/24/2017       Lab Results   Component Value Date    TSH 2.45 10/14/2015       Lab Results   Component Value Date    WBC 11.08 (H) 11/19/2018    HGB 16.1 11/19/2018    HGB 12.7 (L) 09/28/2018    HGB 14.0 09/27/2018     11/19/2018       Lab Results   Component Value Date    PROTEIN 2+ (A) 03/27/2018    GLUCOSEU Negative 09/25/2018    BLOODU Negative 09/25/2018    NITRITEU Negative 09/25/2018    LEUKOCYTESUR Negative 09/25/2018       Imaging:         Medical Tests:         Summary of old records / correspondence / consultant report:         Request outside records:         ALLERGIES  Allergies   Allergen Reactions   • Bactrim [Sulfamethoxazole-Trimethoprim] Rash        PFSH:     The following portions of the patient's history were reviewed and updated as appropriate: Allergies / Current Medications / Past Medical History / Surgical History / Social History / Family History    PROBLEM LIST   Patient Active Problem List   Diagnosis   • Chronic  obstructive pulmonary disease (CMS/HCC)   • Impotence of organic origin   • Hypertension   • Osteoarthritis   • History of smoking greater than 50 pack years   • Benign prostatic hyperplasia with urinary obstruction   • Permanent atrial fibrillation   • Prediabetes       PAST MEDICAL HISTORY  Past Medical History:   Diagnosis Date   • EMPERATRIZ (acute kidney injury) (CMS/HCC)    • Alcohol abuse, in remission    • BPH (benign prostatic hypertrophy)     see doctors at [facility]   • COPD (chronic obstructive pulmonary disease) (CMS/HCC)    • Depression    • DJD (degenerative joint disease) of cervical spine    • ED (erectile dysfunction)     [facility]   • Hx of colonic polyps     followed by GI ([facility])   • Hyperlipidemia    • Hypertension    • Hypotension    • Influenza B 02/14/2013       • Low back pain    • Nocturnal hypoxia    • Osteoarthritis     HIPS, HANDS, MULTIPLE SITES   • Peripheral neuropathy    • Permanent atrial fibrillation    • Rectal bleeding 04/26/2017   • Shortness of breath on exertion    • Shoulder pain, left 12/04/2007    SEEN AT [facility] ER   • Tendonitis of shoulder 11/07/2007    RIGHT SHOULDER/DELTOID INSERTION   • Ulcer of the stomach and intestine        SURGICAL HISTORY  Past Surgical History:   Procedure Laterality Date   • APPENDECTOMY     • CARDIOVASCULAR STRESS TEST N/A 10/20/2015    NML LEXISCAN CARDIOLITE PERFUSION STUDY, NO EVIDENCE OF ISCHEMIA, AREA OF HYPOPERFUSION OF THE INFERIOR WALL W/NORMAL WALL PERFUSION AND NML LEFT VENTRICULAR EJECTION FRACTION, MOST LIKELY ATTENUATION. DR.MICHAEL BOX   • COLONOSCOPY N/A 02/2017   • COLONOSCOPY N/A 02/23/2011    4 POLYPS REMOVED, RESCOPE IN 5 YRS, DR. EAGLE   • COLONOSCOPY N/A 03/08/2006    ERYTHEMOUS AND GRANULAR MUCOSA IN ILEOCECAL VALVE, NORMAL COLON, REPEAT IN 5 YRS,    • ENDOSCOPY N/A 9/26/2018    normal esophagus, acute gastritis, multiple non-bleeding duodenal ulcers with pigmented material, H Pylori  positive   • HERNIA REPAIR Bilateral     INGUINAL   • JOINT REPLACEMENT  2015    left hip   • TOTAL HIP ARTHROPLASTY Left 2015    Dr Ankush Sky   • TOTAL HIP ARTHROPLASTY Right 10/27/2014    DR.RIED SKY   • VASECTOMY         SOCIAL HISTORY  Social History     Socioeconomic History   • Marital status:      Spouse name: Ara   • Number of children: 3   • Years of education: Not on file   • Highest education level: Not on file   Occupational History   • 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  Smoked 2 ppd for 49 years until he quit smoking 7 years ago for a 98 pack year history.   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   • Drug use: No     Comment: caffeine use    • Sexual activity: Defer       FAMILY HISTORY  Family History   Problem Relation Age of Onset   • Colon cancer Mother    • Ovarian cancer Mother    • Dementia Mother    • Stroke Sister         2016   • Dementia Sister    • Heart disease Brother    • Alcohol abuse Brother    • Coronary artery disease Father    • Hypertension Father    • Heart disease Father    • Colon cancer Maternal Grandmother    • Heart disease Brother    • Coronary artery disease Brother    • Hypertension Brother    • Stroke Brother    • Arthritis Brother    • Prostate cancer Neg Hx          **Dragon Disclaimer:   Much of this encounter note is an electronic transcription/translation of spoken language to printed text. The electronic translation of spoken language may permit erroneous, or at times, nonsensical words or phrases to be inadvertently transcribed. Although I have reviewed the note for such errors, some  may still exist.       Template created by Adelso Amezcua MD

## 2019-11-07 NOTE — ASSESSMENT & PLAN NOTE
S/p biopsy earlier this year with PSA > 20, negative, thought to be related to infection, PSA normalized < 7.    Continue follow-up with urology.

## 2019-11-14 DIAGNOSIS — I10 ESSENTIAL HYPERTENSION: Chronic | ICD-10-CM

## 2019-11-14 RX ORDER — METOPROLOL TARTRATE 100 MG/1
TABLET ORAL
Qty: 60 TABLET | Refills: 11 | Status: SHIPPED | OUTPATIENT
Start: 2019-11-14 | End: 2019-12-16 | Stop reason: DRUGHIGH

## 2019-12-13 RX ORDER — PANTOPRAZOLE SODIUM 40 MG/1
40 TABLET, DELAYED RELEASE ORAL DAILY
Qty: 90 TABLET | Refills: 3 | Status: SHIPPED | OUTPATIENT
Start: 2019-12-13 | End: 2020-12-04

## 2019-12-14 DIAGNOSIS — I10 ESSENTIAL HYPERTENSION: Chronic | ICD-10-CM

## 2019-12-16 ENCOUNTER — OFFICE VISIT (OUTPATIENT)
Dept: CARDIOLOGY | Facility: CLINIC | Age: 74
End: 2019-12-16

## 2019-12-16 VITALS
HEIGHT: 70 IN | WEIGHT: 196.6 LBS | HEART RATE: 102 BPM | SYSTOLIC BLOOD PRESSURE: 118 MMHG | DIASTOLIC BLOOD PRESSURE: 80 MMHG | BODY MASS INDEX: 28.15 KG/M2

## 2019-12-16 DIAGNOSIS — J44.9 CHRONIC OBSTRUCTIVE PULMONARY DISEASE, UNSPECIFIED COPD TYPE (HCC): ICD-10-CM

## 2019-12-16 DIAGNOSIS — J43.9 PULMONARY EMPHYSEMA, UNSPECIFIED EMPHYSEMA TYPE (HCC): ICD-10-CM

## 2019-12-16 DIAGNOSIS — I48.19 PERSISTENT ATRIAL FIBRILLATION (HCC): Primary | ICD-10-CM

## 2019-12-16 DIAGNOSIS — I10 ESSENTIAL HYPERTENSION: ICD-10-CM

## 2019-12-16 PROCEDURE — 93000 ELECTROCARDIOGRAM COMPLETE: CPT | Performed by: INTERNAL MEDICINE

## 2019-12-16 PROCEDURE — 99214 OFFICE O/P EST MOD 30 MIN: CPT | Performed by: INTERNAL MEDICINE

## 2019-12-16 RX ORDER — METOPROLOL TARTRATE 100 MG/1
TABLET ORAL
Qty: 60 TABLET | Refills: 0 | OUTPATIENT
Start: 2019-12-16

## 2019-12-16 NOTE — PROGRESS NOTES
Date of Office Visit: 19  Encounter Provider: Juan Colby MD  Place of Service: [facility] CARDIOLOGY  Patient Name: Aj Salazar  :1945  Referral Provider:Juan Colby MD      No chief complaint on file.    History of Present Illness  The patient is a 74-year-old gentleman with no real prior history of heart disease but he  presented in 10/2014 with hip replacement surgery and a transient episode of atrial  fibrillation. As part of that evaluation he had an echocardiogram that showed normal LV  systolic function and no significant valvular disease. He also had a perfusion stress  test done that showed no evidence of ischemia. It was decided to not treat him any  further for that.  He then presented to [facility] emergency room in 2016.  Was having atypical chest pain.  He had a perfusion stress test that was normal.  Felt that it was musculoskeletal started on ibuprofen.  We reviewed those records.    He was admitted in 2018 with gastrointestinal hemorrhage.  EGD showed evidence of ulcer disease and nonbleeding.  His hemoglobin was stable and melena resolved.  He was advised to stop anticoagulation for week and continue Protonix and Carafate.  He comes in for follow-up. The patient denies chest pain, pressure and heaviness.  He does have shortness of breath after walking 15 to 20 minutes from his lung disease which is unchanged he uses oxygen at night.  No othopnea or PND. No palpitations, near syncope or syncope. No stroke type symptoms like paralysis, paresthesia, abrupt vision loss and dysarthria. No bleeding like blood in the stool or dark stools.  The biggest problem he has had is recurrent urinary tract infections he has been treated with a couple transurethral procedures to try to help with that but still having some difficulty.    Hypertension   This is a recurrent problem. The current episode started more than 1  year ago. The problem has been gradually improving since onset. The problem is controlled. Pertinent negatives include no anxiety, blurred vision, chest pain, headaches, malaise/fatigue, orthopnea, palpitations, peripheral edema, PND or sweats. Agents associated with hypertension include NSAIDs. Compliance problems include exercise.    Atrial Fibrillation   Symptoms are negative for chest pain, dizziness, palpitations and weakness. Past medical history includes atrial fibrillation.         Past Medical History:   Diagnosis Date   • EMPERATRIZ (acute kidney injury) (CMS/[facility])    • Alcohol abuse, in remission    • BPH (benign prostatic hypertrophy)     see doctors at [facility]   • COPD (chronic obstructive pulmonary disease) (CMS/[facility])    • Depression    • DJD (degenerative joint disease) of cervical spine    • ED (erectile dysfunction)     [facility]   • Hx of colonic polyps     followed by GI (Kyle)   • Hyperlipidemia    • Hypertension    • Hypotension    • Influenza B 02/14/2013       • Low back pain    • Nocturnal hypoxia    • Osteoarthritis     HIPS, HANDS, MULTIPLE SITES   • Peripheral neuropathy    • Permanent atrial fibrillation    • Rectal bleeding 04/26/2017   • Shortness of breath on exertion    • Shoulder pain, left 12/04/2007    SEEN AT [facility] ER   • Tendonitis of shoulder 11/07/2007    RIGHT SHOULDER/DELTOID INSERTION   • Ulcer of the stomach and intestine          Past Surgical History:   Procedure Laterality Date   • APPENDECTOMY     • CARDIOVASCULAR STRESS TEST N/A 10/20/2015    NML LEXISCAN CARDIOLITE PERFUSION STUDY, NO EVIDENCE OF ISCHEMIA, AREA OF HYPOPERFUSION OF THE INFERIOR WALL W/NORMAL WALL PERFUSION AND NML LEFT VENTRICULAR EJECTION FRACTION, MOST LIKELY ATTENUATION. DR.MICHAEL BOX   • COLONOSCOPY N/A 02/2017   • COLONOSCOPY N/A 02/23/2011    4 POLYPS REMOVED, RESCOPE IN 5 YRS, DR. EAGLE   • COLONOSCOPY N/A 03/08/2006    ERYTHEMOUS AND GRANULAR MUCOSA IN ILEOCECAL VALVE,  NORMAL COLON, REPEAT IN 5 YRS,    • ENDOSCOPY N/A 9/26/2018    normal esophagus, acute gastritis, multiple non-bleeding duodenal ulcers with pigmented material, H Pylori positive   • HERNIA REPAIR Bilateral     INGUINAL   • JOINT REPLACEMENT  11/2015    left hip   • TOTAL HIP ARTHROPLASTY Left 11/06/2015    Dr Ankush Sky   • TOTAL HIP ARTHROPLASTY Right 10/27/2014    DR.RIED SKY   • VASECTOMY           Current Outpatient Medications on File Prior to Visit   Medication Sig Dispense Refill   • acetaminophen (TYLENOL) 325 MG tablet Take 2 tablets by mouth Every 4 (Four) Hours As Needed for Mild Pain .     • ALPHAGAN P 0.1 % solution ophthalmic solution Administer 0.1 bottles to both eyes 1 (One) Time.  6   • dabigatran etexilate (PRADAXA) 150 MG capsu Take 1 capsule by mouth Every 12 (Twelve) Hours. 180 capsule 2   • hydrochlorothiazide (HYDRODIURIL) 25 MG tablet Take 1 tablet by mouth Daily. 30 tablet 11   • LUMIGAN 0.01 % ophthalmic drops Administer 0.01 bottles to both eyes 1 (One) Time.  6   • metoprolol tartrate (LOPRESSOR) 100 MG tablet TAKE 1 TABLET BY MOUTH EVERY 12 HOURS 60 tablet 11   • pantoprazole (PROTONIX) 40 MG EC tablet TAKE 1 TABLET BY MOUTH DAILY 90 tablet 3   • RHOPRESSA 0.02 % solution INT 1 GTT IN OU QPM  6   • tamsulosin (FLOMAX) 0.4 MG capsule 24 hr capsule Take 1 capsule by mouth Every Night. 30 capsule    • TRELEGY ELLIPTA 100-62.5-25 MCG/INH aerosol powder  Inhale Daily.  3     No current facility-administered medications on file prior to visit.          Social History     Socioeconomic History   • Marital status:      Spouse name: Ara   • Number of children: 3   • Years of education: Not on file   • Highest education level: Not on file   Occupational History   • Occupation: Retired     Comment: Supervisor/manager [employer]   Tobacco Use   • Smoking status: Former Smoker     Packs/day: 2.00     Years: 49.00     Pack years: 98.00     Last attempt to quit: 1/1/2010      Years since quittin.9   • Smokeless tobacco: Never Used   • Tobacco comment: Began Smoking age 15.  Smoked 2 ppd for 49 years until he quit smoking 7 years ago for a 98 pack year history.   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   • Drug use: No     Comment: caffeine use    • Sexual activity: Defer       Family History   Problem Relation Age of Onset   • Colon cancer Mother    • Ovarian cancer Mother    • Dementia Mother    • Stroke Sister         2016   • Dementia Sister    • Heart disease Brother    • Alcohol abuse Brother    • Coronary artery disease Father    • Hypertension Father    • Heart disease Father    • Colon cancer Maternal Grandmother    • Heart disease Brother    • Coronary artery disease Brother    • Hypertension Brother    • Stroke Brother    • Arthritis Brother    • Prostate cancer Neg Hx          Review of Systems   Constitution: Negative for decreased appetite, diaphoresis, fever, malaise/fatigue, weight gain and weight loss.   HENT: Negative for congestion, hearing loss, nosebleeds and tinnitus.    Eyes: Negative for blurred vision, double vision, vision loss in left eye, vision loss in right eye and visual disturbance.   Cardiovascular: Negative for chest pain, orthopnea, palpitations and paroxysmal nocturnal dyspnea.        As noted in HPI   Respiratory:        As noted HPI   Endocrine: Negative for cold intolerance and heat intolerance.   Hematologic/Lymphatic: Negative for bleeding problem. Does not bruise/bleed easily.   Skin: Negative for color change, flushing, itching and rash.   Musculoskeletal: Negative for arthritis, back pain, joint pain, joint swelling, muscle weakness and myalgias.   Gastrointestinal: Negative for bloating, abdominal pain, constipation, diarrhea, dysphagia, heartburn, hematemesis, hematochezia, melena, nausea and vomiting.   Genitourinary: Positive for hesitancy. Negative  for bladder incontinence, dysuria, frequency, nocturia and urgency.   Neurological: Negative for dizziness, focal weakness, headaches, light-headedness, loss of balance, numbness, paresthesias, vertigo and weakness.   Psychiatric/Behavioral: Negative for depression, memory loss and substance abuse.       Procedures      ECG 12 Lead  Date/Time: 12/16/2019 1:12 PM  Performed by: Juan Colby MD  Authorized by: Juan Colby MD   Comparison: compared with previous ECG   Similar to previous ECG  Rhythm: atrial fibrillation  Rate: normal  QRS axis: normal                  Objective:    There were no vitals taken for this visit.       Physical Exam  Physical Exam   Constitutional: He is oriented to person, place, and time. He appears well-developed and well-nourished. No distress.   HENT:   Head: Normocephalic.   Eyes: Pupils are equal, round, and reactive to light. Conjunctivae are normal. No scleral icterus.   Neck: Normal carotid pulses, no hepatojugular reflux and no JVD present. Carotid bruit is not present. No tracheal deviation, no edema and no erythema present. No thyromegaly present.   Cardiovascular: Normal rate, S1 normal, S2 normal, normal heart sounds and intact distal pulses. An irregularly irregular rhythm present.  No extrasystoles are present. PMI is not displaced. Exam reveals no gallop, no distant heart sounds and no friction rub.   No murmur heard.  Pulses:       Carotid pulses are 2+ on the right side, and 2+ on the left side.       Radial pulses are 2+ on the right side, and 2+ on the left side.        Femoral pulses are 2+ on the right side, and 2+ on the left side.       Dorsalis pedis pulses are 2+ on the right side, and 2+ on the left side.        Posterior tibial pulses are 2+ on the right side, and 2+ on the left side.   Pulmonary/Chest: Effort normal. No respiratory distress. He has decreased breath sounds in the right lower field and the left lower field. He has no wheezes. He has  no rhonchi. He has no rales. He exhibits no tenderness.   Abdominal: Soft. Bowel sounds are normal. He exhibits no distension and no mass. There is no hepatosplenomegaly. There is no tenderness. There is no rebound and no guarding.   Musculoskeletal: He exhibits no edema, tenderness or deformity.   Neurological: He is alert and oriented to person, place, and time.   Skin: Skin is warm and dry. No rash noted. He is not diaphoretic. No cyanosis or erythema. No pallor. Nails show no clubbing.   Psychiatric: He has a normal mood and affect. His speech is normal and behavior is normal. Judgment and thought content normal.           Assessment:    1. This is a 73-year-old gentleman with a history of paroxysmal atrial fibrillation but it was only after a time of stress and surgery. The patient's CHADS2-VASc score is 2.  He is stable on Pradaxa and rate controlled.  He will continue the same she is getting follow-up in a year.  2. Hypertension. Stable on current medical regimen.  Blood pressure is at goal.  3.  COPD.   Is the most frustrating thing for him.  We suggested that maybe step up his exercise as he was when he was in pulmonary rehab and that would probably help.  4.  GI bleeding due to ulcer disease.  No recurrence continue the same.   5.  Recurrent urinary tract infection being treated by urology.       Plan:

## 2020-01-13 RX ORDER — ATENOLOL 100 MG/1
TABLET ORAL
Qty: 180 CAPSULE | Refills: 2 | Status: SHIPPED | OUTPATIENT
Start: 2020-01-13 | End: 2020-10-09

## 2020-02-06 ENCOUNTER — OFFICE VISIT (OUTPATIENT)
Dept: INTERNAL MEDICINE | Age: 75
End: 2020-02-06

## 2020-02-06 ENCOUNTER — HOSPITAL ENCOUNTER (OUTPATIENT)
Dept: GENERAL RADIOLOGY | Facility: HOSPITAL | Age: 75
Discharge: HOME | End: 2020-02-06
Admitting: NURSE PRACTITIONER

## 2020-02-06 VITALS
DIASTOLIC BLOOD PRESSURE: 64 MMHG | HEIGHT: 70 IN | HEART RATE: 105 BPM | WEIGHT: 196.2 LBS | BODY MASS INDEX: 28.09 KG/M2 | TEMPERATURE: 97.9 F | SYSTOLIC BLOOD PRESSURE: 114 MMHG | OXYGEN SATURATION: 98 %

## 2020-02-06 DIAGNOSIS — R52 BODY ACHES: ICD-10-CM

## 2020-02-06 DIAGNOSIS — R05.9 COUGH: ICD-10-CM

## 2020-02-06 DIAGNOSIS — J11.1 INFLUENZA-LIKE ILLNESS: Primary | ICD-10-CM

## 2020-02-06 DIAGNOSIS — J44.9 CHRONIC OBSTRUCTIVE PULMONARY DISEASE, UNSPECIFIED COPD TYPE (HCC): ICD-10-CM

## 2020-02-06 LAB
EXPIRATION DATE: NORMAL
FLUAV AG NPH QL: NEGATIVE
FLUBV AG NPH QL: NEGATIVE
INTERNAL CONTROL: NORMAL
Lab: NORMAL

## 2020-02-06 PROCEDURE — 99214 OFFICE O/P EST MOD 30 MIN: CPT | Performed by: NURSE PRACTITIONER

## 2020-02-06 PROCEDURE — 87804 INFLUENZA ASSAY W/OPTIC: CPT | Performed by: NURSE PRACTITIONER

## 2020-02-06 PROCEDURE — 71046 X-RAY EXAM CHEST 2 VIEWS: CPT

## 2020-02-06 RX ORDER — SULFAMETHOXAZOLE AND TRIMETHOPRIM 800; 160 MG/1; MG/1
1 TABLET ORAL 2 TIMES DAILY
COMMUNITY
End: 2020-02-13

## 2020-02-06 RX ORDER — AMOXICILLIN AND CLAVULANATE POTASSIUM 875; 125 MG/1; MG/1
1 TABLET, FILM COATED ORAL 2 TIMES DAILY
COMMUNITY
End: 2020-02-13

## 2020-02-06 RX ORDER — GUAIFENESIN AND DEXTROMETHORPHAN HYDROBROMIDE 600; 30 MG/1; MG/1
1 TABLET, EXTENDED RELEASE ORAL 2 TIMES DAILY
COMMUNITY
Start: 2020-02-06 | End: 2020-06-16

## 2020-02-06 RX ORDER — OSELTAMIVIR PHOSPHATE 75 MG/1
75 CAPSULE ORAL 2 TIMES DAILY
Qty: 10 CAPSULE | Refills: 0 | Status: SHIPPED | OUTPATIENT
Start: 2020-02-06 | End: 2020-02-11

## 2020-02-06 NOTE — PROGRESS NOTES
Subjective   Aj Salazar is a 74 y.o. male.     URI    This is a new problem. Episode onset: 4-5 days ago. The problem has been waxing and waning. The maximum temperature recorded prior to his arrival was 101 - 101.9 F. The fever has been present for 1 to 2 days. Associated symptoms include headaches, joint pain and a sore throat. Pertinent negatives include no coughing or diarrhea. Associated symptoms comments: Shortness of breath. He has tried nothing for the symptoms.   He is also currently being treated for acute UTI per urology with Augmentin and Bactrim, started yesterday.    He has had flu vaccination this season. He has had no known sick contacts.     The following portions of the patient's history were reviewed and updated as appropriate: allergies, current medications, past family history, past medical history, past social history, past surgical history and problem list.    Review of Systems   Constitutional: Positive for chills, fatigue and fever. Negative for activity change and appetite change.   HENT: Positive for sore throat.    Respiratory: Positive for shortness of breath. Negative for cough and chest tightness.    Gastrointestinal: Negative for diarrhea.   Musculoskeletal: Positive for joint pain.       Objective   Physical Exam   Constitutional: He is oriented to person, place, and time. Vital signs are normal. He appears well-developed and well-nourished. He is active. He does not appear ill. No distress.   HENT:   Head: Normocephalic and atraumatic.   Right Ear: Hearing, tympanic membrane, external ear and ear canal normal.   Left Ear: Hearing, tympanic membrane, external ear and ear canal normal.   Nose: Nose normal. Right sinus exhibits no maxillary sinus tenderness and no frontal sinus tenderness. Left sinus exhibits no maxillary sinus tenderness and no frontal sinus tenderness.   Mouth/Throat: Uvula is midline, oropharynx is clear and moist and mucous membranes are normal. No tonsillar  exudate.   Cardiovascular: Normal rate, regular rhythm and normal heart sounds.   No murmur heard.  Pulmonary/Chest: He has rales in the left lower field.   Lymphadenopathy:        Head (right side): No submental, no submandibular, no tonsillar, no preauricular, no posterior auricular and no occipital adenopathy present.        Head (left side): No submental, no submandibular, no tonsillar, no preauricular, no posterior auricular and no occipital adenopathy present.     He has no cervical adenopathy.   Neurological: He is alert and oriented to person, place, and time.   Psychiatric: He has a normal mood and affect. His speech is normal and behavior is normal. Thought content normal.   Nursing note and vitals reviewed.        Assessment/Plan   Problems Addressed this Visit        Respiratory    Chronic obstructive pulmonary disease (CMS/HCC) (Chronic)    Relevant Orders    XR Chest PA & Lateral (Completed)      Other Visit Diagnoses     Influenza-like illness    -  Primary    Cough        Relevant Orders    XR Chest PA & Lateral (Completed)    POC Influenza A / B (Completed)    Body aches        Relevant Orders    XR Chest PA & Lateral (Completed)    POC Influenza A / B (Completed)        1. Influenza-like illness  Patient sent for stat chest x-ray at time of office visit.  Chest x-ray was negative for any acute process.  Lorenzo with patient and family member suspect likely false negative flu testing as has classic symptoms of flu.   Recommend that due to comorbid COPD, recommend treatment with Tamiflu although he is outside of 48 hour optimal window for treatment.   Recommend Mucinex DM for congestion.   Given strict instructions to continue use of albuterol every 4-6 hours, continue antibiotics as prescribed by urologist.   If no better or worse in the next 24 hours, please follow up/ contact office for further recommendations. Reluctant to treat with steroids at this time as no active wheezing but may need to  consider if patient worsening.     2. Chronic obstructive pulmonary disease, unspecified COPD type (CMS/HCC)    - XR Chest PA & Lateral    3. Cough    - XR Chest PA & Lateral  - POC Influenza A / B    4. Body aches    - XR Chest PA & Lateral  - POC Influenza A / B

## 2020-02-06 NOTE — NURSING NOTE
CXR Impression report read by Dr. Waters called to SHANIQUA Perez. She spoke with patient and then ambulated out with wife.

## 2020-02-08 ENCOUNTER — APPOINTMENT (OUTPATIENT)
Dept: GENERAL RADIOLOGY | Facility: HOSPITAL | Age: 75
End: 2020-02-08

## 2020-02-08 ENCOUNTER — HOSPITAL ENCOUNTER (EMERGENCY)
Facility: HOSPITAL | Age: 75
Discharge: HOME | End: 2020-02-08
Attending: EMERGENCY MEDICINE | Admitting: EMERGENCY MEDICINE

## 2020-02-08 ENCOUNTER — APPOINTMENT (OUTPATIENT)
Dept: CT IMAGING | Facility: HOSPITAL | Age: 75
End: 2020-02-08

## 2020-02-08 VITALS
OXYGEN SATURATION: 91 % | HEART RATE: 100 BPM | WEIGHT: 195.8 LBS | TEMPERATURE: 96.8 F | RESPIRATION RATE: 18 BRPM | SYSTOLIC BLOOD PRESSURE: 110 MMHG | BODY MASS INDEX: 28.03 KG/M2 | DIASTOLIC BLOOD PRESSURE: 78 MMHG | HEIGHT: 70 IN

## 2020-02-08 DIAGNOSIS — M62.838 NECK MUSCLE SPASM: ICD-10-CM

## 2020-02-08 DIAGNOSIS — M10.9 ACUTE GOUTY ARTHRITIS: Primary | ICD-10-CM

## 2020-02-08 DIAGNOSIS — R51.9 NONINTRACTABLE HEADACHE, UNSPECIFIED CHRONICITY PATTERN, UNSPECIFIED HEADACHE TYPE: ICD-10-CM

## 2020-02-08 LAB
ALBUMIN SERPL-MCNC: 3.3 G/DL (ref 3.5–5.2)
ALBUMIN/GLOB SERPL: 0.9 G/DL
ALP SERPL-CCNC: 66 U/L (ref 39–117)
ALT SERPL W P-5'-P-CCNC: 26 U/L (ref 1–41)
ANION GAP SERPL CALCULATED.3IONS-SCNC: 14.4 MMOL/L (ref 5–15)
AST SERPL-CCNC: 24 U/L (ref 1–40)
BASOPHILS # BLD AUTO: 0.04 10*3/MM3 (ref 0–0.2)
BASOPHILS NFR BLD AUTO: 0.3 % (ref 0–1.5)
BILIRUB SERPL-MCNC: 0.6 MG/DL (ref 0.2–1.2)
BUN BLD-MCNC: 17 MG/DL (ref 8–23)
BUN/CREAT SERPL: 16.7 (ref 7–25)
CALCIUM SPEC-SCNC: 8.9 MG/DL (ref 8.6–10.5)
CHLORIDE SERPL-SCNC: 98 MMOL/L (ref 98–107)
CO2 SERPL-SCNC: 21.6 MMOL/L (ref 22–29)
CREAT BLD-MCNC: 1.02 MG/DL (ref 0.76–1.27)
DEPRECATED RDW RBC AUTO: 41.4 FL (ref 37–54)
EOSINOPHIL # BLD AUTO: 0.66 10*3/MM3 (ref 0–0.4)
EOSINOPHIL NFR BLD AUTO: 5.5 % (ref 0.3–6.2)
ERYTHROCYTE [DISTWIDTH] IN BLOOD BY AUTOMATED COUNT: 13 % (ref 12.3–15.4)
GFR SERPL CREATININE-BSD FRML MDRD: 72 ML/MIN/1.73
GLOBULIN UR ELPH-MCNC: 3.6 GM/DL
GLUCOSE BLD-MCNC: 150 MG/DL (ref 65–99)
HCT VFR BLD AUTO: 46.7 % (ref 37.5–51)
HGB BLD-MCNC: 15.8 G/DL (ref 13–17.7)
IMM GRANULOCYTES # BLD AUTO: 0.2 10*3/MM3 (ref 0–0.05)
IMM GRANULOCYTES NFR BLD AUTO: 1.7 % (ref 0–0.5)
LYMPHOCYTES # BLD AUTO: 1.44 10*3/MM3 (ref 0.7–3.1)
LYMPHOCYTES NFR BLD AUTO: 11.9 % (ref 19.6–45.3)
MCH RBC QN AUTO: 29.6 PG (ref 26.6–33)
MCHC RBC AUTO-ENTMCNC: 33.8 G/DL (ref 31.5–35.7)
MCV RBC AUTO: 87.6 FL (ref 79–97)
MONOCYTES # BLD AUTO: 1.03 10*3/MM3 (ref 0.1–0.9)
MONOCYTES NFR BLD AUTO: 8.5 % (ref 5–12)
NEUTROPHILS # BLD AUTO: 8.74 10*3/MM3 (ref 1.7–7)
NEUTROPHILS NFR BLD AUTO: 72.1 % (ref 42.7–76)
NRBC BLD AUTO-RTO: 0 /100 WBC (ref 0–0.2)
PLATELET # BLD AUTO: 228 10*3/MM3 (ref 140–450)
PMV BLD AUTO: 9.5 FL (ref 6–12)
POTASSIUM BLD-SCNC: 3.3 MMOL/L (ref 3.5–5.2)
PROT SERPL-MCNC: 6.9 G/DL (ref 6–8.5)
RBC # BLD AUTO: 5.33 10*6/MM3 (ref 4.14–5.8)
SODIUM BLD-SCNC: 134 MMOL/L (ref 136–145)
TROPONIN T SERPL-MCNC: <0.01 NG/ML (ref 0–0.03)
TROPONIN T SERPL-MCNC: <0.01 NG/ML (ref 0–0.03)
URATE SERPL-MCNC: 5.1 MG/DL (ref 3.4–7)
WBC NRBC COR # BLD: 12.11 10*3/MM3 (ref 3.4–10.8)

## 2020-02-08 PROCEDURE — 80053 COMPREHEN METABOLIC PANEL: CPT | Performed by: PHYSICIAN ASSISTANT

## 2020-02-08 PROCEDURE — 85025 COMPLETE CBC W/AUTO DIFF WBC: CPT | Performed by: PHYSICIAN ASSISTANT

## 2020-02-08 PROCEDURE — 93010 ELECTROCARDIOGRAM REPORT: CPT | Performed by: INTERNAL MEDICINE

## 2020-02-08 PROCEDURE — 71045 X-RAY EXAM CHEST 1 VIEW: CPT

## 2020-02-08 PROCEDURE — 84550 ASSAY OF BLOOD/URIC ACID: CPT | Performed by: PHYSICIAN ASSISTANT

## 2020-02-08 PROCEDURE — 70450 CT HEAD/BRAIN W/O DYE: CPT

## 2020-02-08 PROCEDURE — 73130 X-RAY EXAM OF HAND: CPT

## 2020-02-08 PROCEDURE — 84484 ASSAY OF TROPONIN QUANT: CPT | Performed by: PHYSICIAN ASSISTANT

## 2020-02-08 PROCEDURE — 93005 ELECTROCARDIOGRAM TRACING: CPT | Performed by: PHYSICIAN ASSISTANT

## 2020-02-08 PROCEDURE — 99284 EMERGENCY DEPT VISIT MOD MDM: CPT

## 2020-02-08 RX ORDER — HYDROCODONE BITARTRATE AND ACETAMINOPHEN 5; 325 MG/1; MG/1
1 TABLET ORAL ONCE
Status: COMPLETED | OUTPATIENT
Start: 2020-02-08 | End: 2020-02-08

## 2020-02-08 RX ORDER — INDOMETHACIN 25 MG/1
25 CAPSULE ORAL
Qty: 21 CAPSULE | Refills: 0 | Status: SHIPPED | OUTPATIENT
Start: 2020-02-08 | End: 2020-02-13

## 2020-02-08 RX ADMIN — HYDROCODONE BITARTRATE AND ACETAMINOPHEN 1 TABLET: 5; 325 TABLET ORAL at 04:31

## 2020-02-08 NOTE — DISCHARGE INSTRUCTIONS
Rest, stay hydrated.  Activities as tolerated.  Be sure to eat with the indocin. If you have black or bloody stool or stomach pain, stop the indocin immediately. You do not have to finish the prescription, stop the medicine 1-2 days after symptom resolution.   Return to the ER as needed.

## 2020-02-08 NOTE — ED TRIAGE NOTES
To ER via [facility].  C/o pain in left arm all day, pain in neck started earlier today as well and headache.  Denies CP.

## 2020-02-08 NOTE — ED PROVIDER NOTES
"Pt is a 74 y.o. male who presents to the ED complaining of L wrist and hand pain that started earlier today with associated R neck pain. Pt states pain is not aggravated with movement but \"hurts worse to squeeze his hand.\" Pt denies cp. No hx of arthritis. No hx of gout. Family at bedside.        On exam,  Constitutional: awake, NAD  HENT: unremarkable  Cardiovascular: RRR  Pulmonary: CTAB  Abdomen: benign  Musculoskeletal: moderate erythema and tenderness localized to the dorsum of left hand at the base of thumb, FROM of L hand/wrist, weakness of L hand  with increased pain, neck is supple with FROM, no deformity  Neurological: alert, oriented    Labs (WBC-12.11, troponin<0.010, potassium-3.3) reviewed.     EKG          EKG time: 0105  Rhythm/Rate: aFib with  BPM  P waves and MT: absent  QRS, axis: no IVCD   ST and T waves: no acute ischemic changes     Interpreted Contemporaneously by me, independently viewed  unchanged compared to prior 12/16/19    Plan: Review CT head, XR L hand, and CXR      MD ATTESTATION NOTE    The [facility] and I have discussed this patient's history, physical exam, and treatment plan.  I have reviewed the documentation and personally had a face to face interaction with the patient. I affirm the documentation and agree with the treatment and plan.  The attached note describes my personal findings.      Documentation assistance provided by federico Arevalo for MD Gus. Information recorded by the scribe was done at my direction and has been verified and validated by me.             Mary nAn Arevalo  02/08/20 0154       Tahir Sofia MD  02/08/20 0524    "

## 2020-02-08 NOTE — ED PROVIDER NOTES
EMERGENCY DEPARTMENT ENCOUNTER    Room Number:  18/18  Date seen:  2/8/2020  Time seen: 1:02 AM  PCP: Neftali Amezcua MD    HPI:  Chief complaint: Wrist pain  Context:Aj Salazar is a 74 y.o. male who presents to the ED with c/o constant, progressively worsening left wrist pain and weakness that began earlier yesterday morning. He reports numbness in his left hand, right-sided neck pain, and an 8/10 headache. Patient was diagnosed with influenza yesterday, and he states he has been running a fever and he has been fatigued. However, he denies fevers or chills today.    Onset: gradual  Location: left arm  Radiation: none  Duration: yesterday  Timing: constant  Character: weakness  Aggravating Factors: none  Alleviating Factors: none  Severity: moderate    ALLERGIES  Bactrim [sulfamethoxazole-trimethoprim]    PAST MEDICAL HISTORY  Active Ambulatory Problems     Diagnosis Date Noted   • Chronic obstructive pulmonary disease (CMS/Formerly [facility]) 03/17/2016   • Impotence of organic origin 03/17/2016   • Hypertension 03/17/2016   • Osteoarthritis 03/17/2016   • History of smoking greater than 50 pack years 06/23/2017   • Benign prostatic hyperplasia with urinary obstruction 03/27/2018   • Permanent atrial fibrillation    • Prediabetes 01/31/2019     Resolved Ambulatory Problems     Diagnosis Date Noted   • Dehydration 09/25/2018   • Hypotension 09/25/2018   • Melena 09/25/2018   • EMPERATRIZ (acute kidney injury) (CMS/[facility]ly [facility]) 09/25/2018   • Hyponatremia 09/25/2018   • Lactic acidosis 09/25/2018     Past Medical History:   Diagnosis Date   • Alcohol abuse, in remission    • BPH (benign prostatic hypertrophy)    • COPD (chronic obstructive pulmonary disease) (CMS/[facility])    • Depression    • DJD (degenerative joint disease) of cervical spine    • ED (erectile dysfunction)    • Hx of colonic polyps    • Hyperlipidemia    • Influenza B 02/14/2013   • Low back pain    • Nocturnal hypoxia    • Peripheral neuropathy    • Rectal bleeding 04/26/2017   •  Shortness of breath on exertion    • Shoulder pain, left 12/04/2007   • Tendonitis of shoulder 11/07/2007   • Ulcer of the stomach and intestine        PAST SURGICAL HISTORY  Past Surgical History:   Procedure Laterality Date   • APPENDECTOMY     • CARDIOVASCULAR STRESS TEST N/A 10/20/2015    NML LEXISCAN CARDIOLITE PERFUSION STUDY, NO EVIDENCE OF ISCHEMIA, AREA OF HYPOPERFUSION OF THE INFERIOR WALL W/NORMAL WALL PERFUSION AND NML LEFT VENTRICULAR EJECTION FRACTION, MOST LIKELY ATTENUATION. DR.MICHAEL BOX   • COLONOSCOPY N/A 02/2017   • COLONOSCOPY N/A 02/23/2011    4 POLYPS REMOVED, RESCOPE IN 5 YRS, DR. EAGLE   • COLONOSCOPY N/A 03/08/2006    ERYTHEMOUS AND GRANULAR MUCOSA IN ILEOCECAL VALVE, NORMAL COLON, REPEAT IN 5 YRS,    • ENDOSCOPY N/A 9/26/2018    normal esophagus, acute gastritis, multiple non-bleeding duodenal ulcers with pigmented material, H Pylori positive   • HERNIA REPAIR Bilateral     INGUINAL   • JOINT REPLACEMENT  11/2015    left hip   • TOTAL HIP ARTHROPLASTY Left 11/06/2015    Dr Ankush Sky   • TOTAL HIP ARTHROPLASTY Right 10/27/2014    DR.RIED SKY   • VASECTOMY         FAMILY HISTORY  Family History   Problem Relation Age of Onset   • Colon cancer Mother    • Ovarian cancer Mother    • Dementia Mother    • Stroke Sister         October 2016   • Dementia Sister    • Heart disease Brother    • Alcohol abuse Brother    • Coronary artery disease Father    • Hypertension Father    • Heart disease Father    • Colon cancer Maternal Grandmother    • Heart disease Brother    • Coronary artery disease Brother    • Hypertension Brother    • Stroke Brother    • Arthritis Brother    • Prostate cancer Neg Hx        SOCIAL HISTORY  Social History     Socioeconomic History   • Marital status:      Spouse name: Ara   • Number of children: 3   • Years of education: Not on file   • Highest education level: Not on file   Occupational History   • Occupation: Retired     Comment:  Supervisor/manager [employer]   Tobacco Use   • Smoking status: Former Smoker     Packs/day: 2.00     Years: 49.00     Pack years: 98.00     Last attempt to quit: 1/1/2010     Years since quitting: 10.1   • Smokeless tobacco: Never Used   • Tobacco comment: Began Smoking age 15.  Smoked 2 ppd for 49 years until he quit smoking 7 years ago for a 98 pack year history.   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   • Drug use: No     Comment: caffeine use    • Sexual activity: Defer   Lifestyle   • Physical activity:     Days per week: 2 days     Minutes per session: 20 min   • Stress: Not on file       REVIEW OF SYSTEMS  Review of Systems   Constitutional: Positive for fatigue and fever. Negative for chills and diaphoresis.   HENT: Negative.    Eyes: Negative.    Respiratory: Positive for cough. Negative for shortness of breath.    Cardiovascular: Negative for chest pain and palpitations.   Gastrointestinal: Negative for abdominal pain, nausea and vomiting.   Genitourinary: Negative.    Musculoskeletal: Positive for neck pain.        + left wrist pain   Skin: Negative.    Neurological: Positive for weakness and numbness.   Psychiatric/Behavioral: Negative.        PHYSICAL EXAM  ED Triage Vitals [02/08/20 0059]   Temp Heart Rate Resp BP SpO2   96.8 °F (36 °C) (!) 126 18 -- 93 %      Temp src Heart Rate Source Patient Position BP Location FiO2 (%)   Tympanic Monitor -- -- --     Physical Exam   Constitutional: He is oriented to person, place, and time and well-developed, well-nourished, and in no distress.   HENT:   Head: Normocephalic and atraumatic.   Right Ear: Tympanic membrane and external ear normal.   Left Ear: Tympanic membrane and external ear normal.   Nose: Nose normal.   Eyes: Pupils are equal, round, and reactive to light. Conjunctivae and EOM are normal.   Neck: Normal range of motion. Neck supple.   Tenderness along the right  SCM and pain with right forward rotation of the head. No meningismus   Cardiovascular: An irregularly irregular rhythm present. Tachycardia present.   Pulmonary/Chest: Effort normal and breath sounds normal. He has no wheezes. He has no rhonchi. He has no rales.   Abdominal: Soft. There is no tenderness.   Musculoskeletal: Normal range of motion. He exhibits no edema.   Mild edema and tenderness to the left first MCP and base of the first metacarpal, but sensation and motor function are intact.    Neurological: He is alert and oriented to person, place, and time.   GCS is 15  Cranial nerves II-XII are intact.  No facial droop. Uvula is midline. No tongue deviation. PERRL and EOMI. There is no dysarthria, aphasia or slurred speech.  Sensation is intact to light touch bilaterally and is symmetrical in the face, BUE and BLE.  Strength is 5/5 and symmetrical in the BUE and BLE.  Finger to nose, heel to shin, and rapid alternating movements are intact.     Skin: Skin is warm and dry.   Psychiatric: Affect normal.   Nursing note and vitals reviewed.      LAB RESULTS  Recent Results (from the past 24 hour(s))   Comprehensive Metabolic Panel    Collection Time: 02/08/20  1:16 AM   Result Value Ref Range    Glucose 150 (H) 65 - 99 mg/dL    BUN 17 8 - 23 mg/dL    Creatinine 1.02 0.76 - 1.27 mg/dL    Sodium 134 (L) 136 - 145 mmol/L    Potassium 3.3 (L) 3.5 - 5.2 mmol/L    Chloride 98 98 - 107 mmol/L    CO2 21.6 (L) 22.0 - 29.0 mmol/L    Calcium 8.9 8.6 - 10.5 mg/dL    Total Protein 6.9 6.0 - 8.5 g/dL    Albumin 3.30 (L) 3.50 - 5.20 g/dL    ALT (SGPT) 26 1 - 41 U/L    AST (SGOT) 24 1 - 40 U/L    Alkaline Phosphatase 66 39 - 117 U/L    Total Bilirubin 0.6 0.2 - 1.2 mg/dL    eGFR Non African Amer 72 >60 mL/min/1.73    Globulin 3.6 gm/dL    A/G Ratio 0.9 g/dL    BUN/Creatinine Ratio 16.7 7.0 - 25.0    Anion Gap 14.4 5.0 - 15.0 mmol/L   Troponin    Collection Time: 02/08/20  1:16 AM   Result Value Ref Range    Troponin T <0.010  0.000 - 0.030 ng/mL   CBC Auto Differential    Collection Time: 02/08/20  1:16 AM   Result Value Ref Range    WBC 12.11 (H) 3.40 - 10.80 10*3/mm3    RBC 5.33 4.14 - 5.80 10*6/mm3    Hemoglobin 15.8 13.0 - 17.7 g/dL    Hematocrit 46.7 37.5 - 51.0 %    MCV 87.6 79.0 - 97.0 fL    MCH 29.6 26.6 - 33.0 pg    MCHC 33.8 31.5 - 35.7 g/dL    RDW 13.0 12.3 - 15.4 %    RDW-SD 41.4 37.0 - 54.0 fl    MPV 9.5 6.0 - 12.0 fL    Platelets 228 140 - 450 10*3/mm3    Neutrophil % 72.1 42.7 - 76.0 %    Lymphocyte % 11.9 (L) 19.6 - 45.3 %    Monocyte % 8.5 5.0 - 12.0 %    Eosinophil % 5.5 0.3 - 6.2 %    Basophil % 0.3 0.0 - 1.5 %    Immature Grans % 1.7 (H) 0.0 - 0.5 %    Neutrophils, Absolute 8.74 (H) 1.70 - 7.00 10*3/mm3    Lymphocytes, Absolute 1.44 0.70 - 3.10 10*3/mm3    Monocytes, Absolute 1.03 (H) 0.10 - 0.90 10*3/mm3    Eosinophils, Absolute 0.66 (H) 0.00 - 0.40 10*3/mm3    Basophils, Absolute 0.04 0.00 - 0.20 10*3/mm3    Immature Grans, Absolute 0.20 (H) 0.00 - 0.05 10*3/mm3    nRBC 0.0 0.0 - 0.2 /100 WBC   Uric Acid    Collection Time: 02/08/20  1:17 AM   Result Value Ref Range    Uric Acid 5.1 3.4 - 7.0 mg/dL   Troponin    Collection Time: 02/08/20  3:10 AM   Result Value Ref Range    Troponin T <0.010 0.000 - 0.030 ng/mL       I ordered the above labs and reviewed the results    RADIOLOGY  XR Hand 3+ View Left   Preliminary Result   1. No acute osseous abnormality.              CT Head Without Contrast   Final Result   1. No acute intracranial abnormality.                       This report was finalized on 2/8/2020 3:14 AM by Pasha Doyle M.D.          XR Chest 1 View   Preliminary Result       Stable appearance of the chest by portable radiography.                          I ordered the above noted radiological studies and reviewed the images on the PACS system.    MEDICATIONS GIVEN IN ER  Medications   HYDROcodone-acetaminophen (NORCO) 5-325 MG per tablet 1 tablet (1 tablet Oral Given 2/8/20 0431)       EKG  Interpreted  "by ED Physician    PROCEDURES  Procedures      PROGRESS AND CONSULTS    Progress Notes:    ED Course as of Feb 08 0443   Sat Feb 08, 2020   0354 Potassium(!): 3.3 [KA]      ED Course User Index  [KA] Micaela Gaytan PA     0140 Reviewed pt's history and workup with Dr. Sofia.  After a bedside evaluation; Dr Sofia agrees with the plan of care.    Patient denies history of gout though it does appear that he likely has acute gouty arthritis in the left MCP.  Indocin prescribed.  His neck pain appears unrelated, he has localized tenderness to the right SCM and pain with range of motion that engages his muscle, likely from acute spasm or strain.  Additionally, he has a headache with an unremarkable head CT.  He appears comfortable.  Headache improved while in the ER he has no neurologic symptoms or deficit.  He is to follow-up with his PCP in a couple days for recheck and is agreeable with this plan.      Disposition vitals:  /78   Pulse 100   Temp 96.8 °F (36 °C) (Tympanic)   Resp 18   Ht 177.8 cm (70\")   Wt 88.8 kg (195 lb 12.8 oz)   SpO2 91%   BMI 28.09 kg/m²       DIAGNOSIS  Final diagnoses:   Acute gouty arthritis   Neck muscle spasm   Nonintractable headache, unspecified chronicity pattern, unspecified headache type       FOLLOW UP   Neftali Amezcua MD  [address] [address]  [phone]    Schedule an appointment as soon as possible for a visit in 2 days        RX     Medication List      New Prescriptions    indomethacin 25 MG capsule  Commonly known as:  INDOCIN  Take 1 capsule by mouth 3 (Three) Times a Day With Meals. Stop taking 2   days after resolution of symptoms              Documentation assistance provided by [employer] for Micaela Gaytan PA-C.  Information recorded by the [employer] was done at my direction and has been verified and validated by me.                Miriam Eagle  02/08/20 0444       Micaela Gaytan PA  02/11/20 0701    "

## 2020-02-10 ENCOUNTER — TELEPHONE (OUTPATIENT)
Dept: INTERNAL MEDICINE | Age: 75
End: 2020-02-10

## 2020-02-10 NOTE — TELEPHONE ENCOUNTER
Pt was in 2/6/20 and Dx with the flu. Pt hasn't had no  Fever and feels better. Can he be around people now. Per  pt is ok

## 2020-02-13 ENCOUNTER — OFFICE VISIT (OUTPATIENT)
Dept: INTERNAL MEDICINE | Age: 75
End: 2020-02-13

## 2020-02-13 VITALS
TEMPERATURE: 96.9 F | WEIGHT: 197 LBS | HEIGHT: 70 IN | SYSTOLIC BLOOD PRESSURE: 122 MMHG | BODY MASS INDEX: 28.2 KG/M2 | HEART RATE: 86 BPM | OXYGEN SATURATION: 94 % | DIASTOLIC BLOOD PRESSURE: 70 MMHG

## 2020-02-13 DIAGNOSIS — I48.21 PERMANENT ATRIAL FIBRILLATION (HCC): Chronic | ICD-10-CM

## 2020-02-13 DIAGNOSIS — M18.12 ARTHRITIS OF CARPOMETACARPAL (CMC) JOINT OF LEFT THUMB: ICD-10-CM

## 2020-02-13 DIAGNOSIS — J44.1 CHRONIC OBSTRUCTIVE PULMONARY DISEASE WITH ACUTE EXACERBATION (HCC): Primary | Chronic | ICD-10-CM

## 2020-02-13 DIAGNOSIS — R06.02 SHORTNESS OF BREATH: ICD-10-CM

## 2020-02-13 PROCEDURE — 99215 OFFICE O/P EST HI 40 MIN: CPT | Performed by: INTERNAL MEDICINE

## 2020-02-13 RX ORDER — METHYLPREDNISOLONE 4 MG/1
TABLET ORAL
Qty: 1 EACH | Refills: 0 | Status: SHIPPED | OUTPATIENT
Start: 2020-02-13 | End: 2020-03-03

## 2020-02-13 NOTE — PATIENT INSTRUCTIONS
** IMPORTANT MESSAGE FROM DR. LOPEZ **    In our office, your satisfaction is VERY important to us.     You may receive a survey from [facility] by mail or E-mail for you to provide feedback about your visit. This information is invaluable for me to know what we can do to improve our services.     I ask that you please take a few minutes to complete the survey and let us know how we are doing in serving your needs. (You may receive the survey more than once for multiple visits)    Thank You !    Dr. Lopez & Staff    _________________________________________________________________________________________________________________________      ** ADDITIONAL INSTRUCTION / REMINDERS FROM DR. LOPEZ **    Discuss with pulmonologist re: Xopenex for nebulizer  Get a home pulse oximetry machine  Use O2 2L/min during the day

## 2020-02-13 NOTE — PROGRESS NOTES
[facility] INTERNAL MEDICINE  ROSA AMEZCUA M.D.      Aj HELMS Wohlleb / 74 y.o. / male  02/13/2020      CC:  Main reason(s) for today's visit: Shortness of Breath      HPI:     Was treated for possible flu earlier this month 2/6/20 by APRN. He then ended up in the ED 2/8/20 for shortness of breath and left 1st MCP joints joint pain/swelling and dx'ed with gout and placed on indomethacin which has reduced the swelling and pain. Xray showed primarily degenerative arthritis of the 1st MCP joint. Denies prior history of gout. Uric acid was 5.1. However he complains of ongoing dyspnea that has not returned to baseline. He does have moderately severe COPD and uses Trelegy everyday. Using albuterol HFA inhaler does help with shortness of breath. Denies worsening cough. He has atrial fibrillation but denies palpitations, chest pain, PND, orthopnea, or lower extremity edema. No melena or bleeding but takes Pradaxa. Uses O2 at night but not during day. He does see a pulmonologist but does not have a nebulizer.       Patient Care Team:  Neftali Amezcua MD as PCP - General (Internal Medicine)  Juan Colby MD as Consulting Physician (Cardiology)  Ankush Sky MD as Surgeon (Orthopedic Surgery)  Vale Del Rosario APRN as Nurse Practitioner (Oncology)  Darrion Peña MD as Consulting Physician (Urology)  Tahir Childs MD as Consulting Physician (Pulmonary Disease)  Noman Moseley MD as Consulting Physician (Gastroenterology)  Aura Perry, FERNY (Optometry)  Gus Lubin RN as Ambulatory  (Population Health)    ASSESSMENT & PLAN:    1. Chronic obstructive pulmonary disease with acute exacerbation (CMS/[facility])    2. Arthritis of carpometacarpal (CMC) joint of left thumb    3. Shortness of breath    4. Permanent atrial fibrillation      Orders Placed This Encounter   Procedures   • Uric Acid     New Medications Ordered This Visit   Medications   • methylPREDNISolone (MEDROL) 4 MG tablet     Sig: Take  as directed on package instructions.     Dispense:  1 each     Refill:  0        Summary/Discussion:  · Start Medrol Eb for COPD exacerbation (related to recent upper respiratory tract infection)  · Use O2 at 2 L/min continuously for now.   · Get a home pulse oximeter  · Discuss with pulmonologist about nebulizer (use levalbuterol due to atrial fibrillation)  · Follow-up in 2 weeks with me for COPD   · Discontinue indomethacin (high risk drug)  · Recheck uric acid level and start allopurinol if high      Next Appointment with me: 2/26/2020    Return in about 2 weeks (around 2/27/2020) for Reassess today's problem(s).    ____________________________________________________________________    MEDICATIONS  Current Outpatient Medications   Medication Sig Dispense Refill   • acetaminophen (TYLENOL) 325 MG tablet Take 2 tablets by mouth Every 4 (Four) Hours As Needed for Mild Pain .     • ALPHAGAN P 0.1 % solution ophthalmic solution Administer 0.1 bottles to both eyes 1 (One) Time.  6   • hydrochlorothiazide (HYDRODIURIL) 25 MG tablet Take 1 tablet by mouth Daily. 30 tablet 11   • metoprolol tartrate (LOPRESSOR) 25 MG tablet Take 1 tablet by mouth 2 (Two) Times a Day. 180 tablet 3   • pantoprazole (PROTONIX) 40 MG EC tablet TAKE 1 TABLET BY MOUTH DAILY 90 tablet 3   • PRADAXA 150 MG capsu TAKE 1 CAPSULE BY MOUTH EVERY 12 HOURS 180 capsule 2   • tamsulosin (FLOMAX) 0.4 MG capsule 24 hr capsule Take 1 capsule by mouth Every Night. 30 capsule    • TRELEGY ELLIPTA 100-62.5-25 MCG/INH aerosol powder  Inhale Daily.  3   • guaifenesin-dextromethorphan (MUCINEX DM)  MG tablet sustained-release 12 hour tablet Take 1 tablet by mouth 2 (Two) Times a Day.         ____________________________________________________________________      REVIEW OF SYSTEMS    Review of Systems   Constitutional: Positive for fatigue. Negative for chills, fever and unexpected weight change.   Respiratory: Positive for shortness of breath.   "  Cardiovascular: Negative for chest pain, palpitations and leg swelling.   Gastrointestinal: Negative for blood in stool.   Hematological: Does not bruise/bleed easily.   Psychiatric/Behavioral: Positive for dysphoric mood.   All other systems reviewed and are negative.        VITALS    Visit Vitals  /70   Pulse 86   Temp 96.9 °F (36.1 °C)   Ht 177.8 cm (70\")   Wt 89.4 kg (197 lb)   SpO2 94%   BMI 28.27 kg/m²       BP Readings from Last 3 Encounters:   02/13/20 122/70   02/08/20 110/78   02/06/20 114/64     Wt Readings from Last 3 Encounters:   02/13/20 89.4 kg (197 lb)   02/08/20 88.8 kg (195 lb 12.8 oz)   02/06/20 89 kg (196 lb 3.2 oz)      Body mass index is 28.27 kg/m².    PHYSICAL EXAMINATION    Physical Exam   Constitutional:  Non-toxic appearance. No distress.   Cardiovascular: Normal rate.   Pulmonary/Chest: No stridor. Respiratory distress: mildly labored breathing. He has no wheezes (coarse bs bilaterally). He has no rhonchi. He has no rales.   Neurological: He is alert.         REVIEWED DATA:    Labs:   Lab Results   Component Value Date    URICACID 5.1 02/08/2020      Lab Results   Component Value Date    WBC 12.11 (H) 02/08/2020    HGB 15.8 02/08/2020     02/08/2020     Lab Results   Component Value Date     (L) 02/08/2020    K 3.3 (L) 02/08/2020    AST 24 02/08/2020    ALT 26 02/08/2020    BUN 17 02/08/2020    CREATININE 1.02 02/08/2020    CREATININE 0.97 08/02/2019    CREATININE 1.02 01/31/2019          Imaging:   Xr Hand 3+ View Left    Result Date: 2/8/2020  Narrative: THREE-VIEW LEFT HAND  HISTORY: left hand pain/swelling, first mcp  FINDINGS: Three views of the left hand were submitted. There is no fracture or dislocation. There are scattered arthritic changes. These are particularly pronounced in the first CMC and second DIP. There are some faint vascular type calculi.        Impression: 1. No acute osseous abnormality.  This report was finalized on 2/8/2020 5:38 AM by Pasha" LAURA Doyle      Ct Head Without Contrast    Result Date: 2/8/2020  Narrative: CRANIAL CT SCAN WITHOUT CONTRAST  CLINICAL HISTORY: headache  COMPARISON: None.  TECHNIQUE: Radiation dose reduction techniques were utilized, including automated exposure control and exposure modulation based on body size. Multiple axial images of the head were obtained without contrast.  FINDINGS:  There are no abnormal areas of increased density or mass effect. There are moderately extensive scattered areas of decreased density in the white matter likely related to chronic ischemic gliotic changes.    Ventricles, sulci, and cisterns appear normal. Bone window images are unremarkable.         Impression: 1. No acute intracranial abnormality.      This report was finalized on 2/8/2020 3:14 AM by Pasha Doyle M.D.      Xr Chest 1 View    Result Date: 2/8/2020  Narrative: PORTABLE CHEST X-RAY  CLINICAL HISTORY: left arm and neck pain  COMPARISON: 02/06/2020.  FINDINGS: Portable AP view of the chest was obtained with overlying monitor leads in place. Lungs well inflated. Azygous fissure again noted. Stable biapical pleural thickening which is likely postinfectious. There are calcified residua of granulomatous disease present. No active airspace disease, edema, or pleural fluid. Stable cardiomegaly.        Impression:  Stable appearance of the chest by portable radiography.   This report was finalized on 2/8/2020 5:38 AM by Pasha Doyle M.D.      Xr Chest Pa & Lateral    Result Date: 2/6/2020  Narrative: PA AND LATERAL CHEST  HISTORY: COPD. Influenza and cough.  COMPARISON: Two-view chest 09/25/2018.  FINDINGS: The heart size is enlarged, similar to the previous exam. Aortic vascular calcifications are present. There are increased interstitial markings consistent with pulmonary emphysema. There is a calcified granuloma in the right lung base. Lungs appear clear of focal airspace disease and there is no evidence of pulmonary edema or  pleural effusion. An azygos fissure is noted.      Impression: Cardiomegaly. Pulmonary emphysema. No interval change or evidence for active disease in the chest.  This report was finalized on 2/6/2020 2:19 PM by Dr. Dante Waters M.D.           Medical Tests:         Summary of old records / correspondence / consultant report:          Request outside records:         ALLERGIES  Allergies   Allergen Reactions   • Bactrim [Sulfamethoxazole-Trimethoprim] Rash        PFSH:     The following portions of the patient's history were reviewed and updated as appropriate: Allergies / Current Medications / Past Medical History / Surgical History / Social History / Family History    PROBLEM LIST   Patient Active Problem List   Diagnosis   • Chronic obstructive pulmonary disease with acute exacerbation (CMS/HCC)   • Impotence of organic origin   • Hypertension   • Osteoarthritis   • History of smoking greater than 50 pack years   • Benign prostatic hyperplasia with urinary obstruction   • Permanent atrial fibrillation   • Prediabetes       PAST MEDICAL HISTORY  Past Medical History:   Diagnosis Date   • Alcohol abuse, in remission    • BPH (benign prostatic hypertrophy)     see doctors at [facility]   • COPD (chronic obstructive pulmonary disease) (CMS/HCC)    • Depression    • DJD (degenerative joint disease) of cervical spine    • ED (erectile dysfunction)     [facility]   • Hx of colonic polyps     followed by GI (Kyle)   • Hyperlipidemia    • Hypertension    • Influenza B 02/14/2013       • Nocturnal hypoxia    • Osteoarthritis     HIPS, HANDS, MULTIPLE SITES   • Peripheral neuropathy    • Permanent atrial fibrillation    • Rectal bleeding 04/26/2017   • Tendonitis of shoulder 11/07/2007    RIGHT SHOULDER/DELTOID INSERTION   • Ulcer of the stomach and intestine        SURGICAL HISTORY  Past Surgical History:   Procedure Laterality Date   • APPENDECTOMY     • CARDIOVASCULAR STRESS TEST N/A 10/20/2015     NML LEXISCAN CARDIOLITE PERFUSION STUDY, NO EVIDENCE OF ISCHEMIA, AREA OF HYPOPERFUSION OF THE INFERIOR WALL W/NORMAL WALL PERFUSION AND NML LEFT VENTRICULAR EJECTION FRACTION, MOST LIKELY ATTENUATION. DR.MICHAEL BOX   • COLONOSCOPY N/A 02/2017   • COLONOSCOPY N/A 02/23/2011    4 POLYPS REMOVED, RESCOPE IN 5 YRS, DR. EAGLE   • COLONOSCOPY N/A 03/08/2006    ERYTHEMOUS AND GRANULAR MUCOSA IN ILEOCECAL VALVE, NORMAL COLON, REPEAT IN 5 YRS,    • ENDOSCOPY N/A 9/26/2018    normal esophagus, acute gastritis, multiple non-bleeding duodenal ulcers with pigmented material, H Pylori positive   • HERNIA REPAIR Bilateral     INGUINAL   • JOINT REPLACEMENT  11/2015    left hip   • TOTAL HIP ARTHROPLASTY Left 11/06/2015    Dr Ankush Sky   • TOTAL HIP ARTHROPLASTY Right 10/27/2014    DR.RIED SKY   • VASECTOMY         SOCIAL HISTORY  Social History     Socioeconomic History   • Marital status:      Spouse name: Ara   • Number of children: 3   • Years of education: Not on file   • Highest education level: Not on file   Occupational History   • Occupation: Retired     Comment: Supervisor/manager [employer]   Tobacco Use   • Smoking status: Former Smoker     Packs/day: 2.00     Years: 49.00     Pack years: 98.00     Last attempt to quit: 1/1/2010     Years since quitting: 10.1   • Smokeless tobacco: Never Used   • Tobacco comment: Began Smoking age 15.  Smoked 2 ppd for 49 years until he quit smoking 7 years ago for a 98 pack year history.   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   • Drug use: No     Comment: caffeine use    • Sexual activity: Defer   Lifestyle   • Physical activity:     Days per week: 2 days     Minutes per session: 20 min   • Stress: Not on file       FAMILY HISTORY  Family History   Problem Relation Age of Onset   • Colon cancer Mother    • Ovarian cancer Mother    • Dementia Mother    • Stroke Sister          October 2016   • Dementia Sister    • Heart disease Brother    • Alcohol abuse Brother    • Coronary artery disease Father    • Hypertension Father    • Heart disease Father    • Colon cancer Maternal Grandmother    • Heart disease Brother    • Coronary artery disease Brother    • Hypertension Brother    • Stroke Brother    • Arthritis Brother    • Prostate cancer Neg Hx          **Dragon Disclaimer:   Much of this encounter note is an electronic transcription/translation of spoken language to printed text. The electronic translation of spoken language may permit erroneous, or at times, nonsensical words or phrases to be inadvertently transcribed. Although I have reviewed the note for such errors, some may still exist.       Template created by Adelso Amezcua MD

## 2020-02-14 ENCOUNTER — PATIENT OUTREACH (OUTPATIENT)
Dept: CASE MANAGEMENT | Facility: OTHER | Age: 75
End: 2020-02-14

## 2020-02-14 LAB — URATE SERPL-MCNC: 6.9 MG/DL (ref 3.7–8.6)

## 2020-02-14 NOTE — PROGRESS NOTES
Mail lab result along with handout for gout diet.     Uric acid level is 6.9. Maintain low purine diet for gout (handout included). Will consider allopurinol if we determine later that is definitely gout.

## 2020-02-14 NOTE — OUTREACH NOTE
Care Plan Note      Responses   Annual Wellness Visit:   Patient Has Completed   Care Gaps Addressed  Colon Cancer Screening, Flu Shot, Pneumonia Vaccine   Colon Cancer Screening Type  Colonoscopy   Colonoscopy Status  Up to Date (< 10 yrs)   Flu Shot Status  Up to Date   Pneumonia Vaccine Status  Up to Date   Specific Disease Process Teaching  COPD   Other Patient Education/Resources   24/7 [facility] Nurse Call Line   24/7 Nurse Call Line Education Method  Verbal   Does patient have depression diagnosis?  No   Advanced Directives:  Patient Has [pt states he has at home]   Medication Adherence  Medications understood        The main concerns and/or symptoms the patient would like to address are: pt discharged from [facility] ED on 2/8/20, seen for left wrist, hand, and neck pain. RN-ACM outreach call made to pt. Pt states he is feeling better. Pt reports he started and completed the indocin prescription. Pt reports minimal pain to left thumb, denies pain elsewhere. Pt reports his PCP started him on medrol dose pack for his breathing and states it will also help with thumb pain. Pt has history of COPD. Pt states PCP recommended he follow up with pulmonologist and he plans on calling today to schedule an appointment. Pt complains of shortness of breath with activity, denies chest pain. Pt reports to be compliant with medications, states he takes trelegy daily and has rescue inhaler if needed. Pt lives with spouse, is independent with ADL's. Pt does not use any assistive devices with ambulation, denies falls. Pt confirmed follow up appointment with PCP scheduled on 2/26/20. Pt reports to be compliant with medical appointments and blood pressure monitoring.     Education/instruction provided by Care Coordinator: reviewed with pt: ED AVS; disease education regarding COPD; importance of competing MDP, when to seek medical attention, pt reports he has educational handouts for gout; medical appointments; RN-ACM contact  information; [facility] 24 hour nurse line number; [facility] Care Coordination Services; health maintenance gaps; and advance directive. Pt is up to date on AWV, colonoscopy, and vaccines. Pt reports he is up to date on shingles vaccine. Pt had lung CA screening in 2018. Pt reports he has advance directive at home, states he needs to bring a copy to PCP. No questions, concerns, or needs per pt. Pt thanks RN-ACM for the call. Advised pt to call RN-ACM with any needs. No future outreach scheduled at this time.     Follow Up Outreach Due: as needed    Gus Lubin RN  Ambulatory     2/14/2020, 1:47 PM

## 2020-03-03 ENCOUNTER — OFFICE VISIT (OUTPATIENT)
Dept: INTERNAL MEDICINE | Age: 75
End: 2020-03-03

## 2020-03-03 VITALS
DIASTOLIC BLOOD PRESSURE: 62 MMHG | OXYGEN SATURATION: 97 % | TEMPERATURE: 97.4 F | BODY MASS INDEX: 27.63 KG/M2 | SYSTOLIC BLOOD PRESSURE: 122 MMHG | HEART RATE: 70 BPM | WEIGHT: 193 LBS | HEIGHT: 70 IN

## 2020-03-03 DIAGNOSIS — R73.03 PREDIABETES: Chronic | ICD-10-CM

## 2020-03-03 DIAGNOSIS — J44.1 CHRONIC OBSTRUCTIVE PULMONARY DISEASE WITH ACUTE EXACERBATION (HCC): Primary | Chronic | ICD-10-CM

## 2020-03-03 DIAGNOSIS — M18.12 ARTHRITIS OF CARPOMETACARPAL (CMC) JOINT OF LEFT THUMB: ICD-10-CM

## 2020-03-03 DIAGNOSIS — I10 ESSENTIAL HYPERTENSION: Chronic | ICD-10-CM

## 2020-03-03 LAB
ALBUMIN SERPL-MCNC: 3.7 G/DL (ref 3.5–5.2)
ALBUMIN/GLOB SERPL: 1.4 G/DL
ALP SERPL-CCNC: 85 U/L (ref 39–117)
ALT SERPL-CCNC: 20 U/L (ref 1–41)
AST SERPL-CCNC: 20 U/L (ref 1–40)
BILIRUB SERPL-MCNC: 0.6 MG/DL (ref 0.2–1.2)
BUN SERPL-MCNC: 12 MG/DL (ref 8–23)
BUN/CREAT SERPL: 12.4 (ref 7–25)
CALCIUM SERPL-MCNC: 9.2 MG/DL (ref 8.6–10.5)
CHLORIDE SERPL-SCNC: 102 MMOL/L (ref 98–107)
CO2 SERPL-SCNC: 27.5 MMOL/L (ref 22–29)
CREAT SERPL-MCNC: 0.97 MG/DL (ref 0.76–1.27)
GLOBULIN SER CALC-MCNC: 2.7 GM/DL
GLUCOSE SERPL-MCNC: 120 MG/DL (ref 65–99)
HBA1C MFR BLD: 6.5 % (ref 4.8–5.6)
POTASSIUM SERPL-SCNC: 4.1 MMOL/L (ref 3.5–5.2)
PROT SERPL-MCNC: 6.4 G/DL (ref 6–8.5)
SODIUM SERPL-SCNC: 142 MMOL/L (ref 136–145)
URATE SERPL-MCNC: 6.9 MG/DL (ref 3.4–7)

## 2020-03-03 PROCEDURE — 99214 OFFICE O/P EST MOD 30 MIN: CPT | Performed by: INTERNAL MEDICINE

## 2020-03-03 NOTE — ASSESSMENT & PLAN NOTE
Lab Results   Component Value Date    HGBA1C 6.20 (H) 08/02/2019    HGBA1C 6.41 (H) 01/31/2019    HGBA1C 6.16 (H) 07/31/2018      Check A1c today. Maintain a low sugar/starch/carbohydrate diet and exercise regularly. Wt loss advised.

## 2020-03-03 NOTE — PROGRESS NOTES
[facility] [place] [facility]  ROSA LOPEZ M.D.      Aj HELMS Wohlleb / 74 y.o. / male  03/03/2020      CHIEF COMPLAINT     Chronic obstructive pulmonary disease with acute exacerbatio and Arthritis of left 1st MCP joint      ASSESSMENT & PLAN     Problem List Items Addressed This Visit        High    Chronic obstructive pulmonary disease with acute exacerbation (CMS/HCC) - Primary (Chronic)    Overview     *Esterle    PFT (5/2018): moderate obstruction with decreased diffusing capacity.          Current Assessment & Plan     Has nebulizer at home now. Breathing is stable currently. Continue Trelegy.          Relevant Medications    TRELEGY ELLIPTA 100-62.5-25 MCG/INH aerosol powder     guaifenesin-dextromethorphan (MUCINEX DM)  MG tablet sustained-release 12 hour tablet       Medium    Hypertension (Chronic)    Current Assessment & Plan     Hypertension is stable. However, he should discuss with his cardiologist regarding metoprolol in relation to his severe COPD.          Relevant Medications    hydrochlorothiazide (HYDRODIURIL) 25 MG tablet    metoprolol tartrate (LOPRESSOR) 25 MG tablet    Other Relevant Orders    Comprehensive Metabolic Panel    Prediabetes (Chronic)    Current Assessment & Plan     Lab Results   Component Value Date    HGBA1C 6.20 (H) 08/02/2019    HGBA1C 6.41 (H) 01/31/2019    HGBA1C 6.16 (H) 07/31/2018      Check A1c today. Maintain a low sugar/starch/carbohydrate diet and exercise regularly. Wt loss advised.           Relevant Orders    Hemoglobin A1c    Arthritis of carpometacarpal (CMC) joint of left thumb (Chronic)    Current Assessment & Plan     Referral to hand specialist. Recheck uric acid. If high will switch him off HCTZ.          Relevant Orders    Ambulatory Referral to Hand Surgery    Uric Acid        Orders Placed This Encounter   Procedures   • Uric Acid   • Hemoglobin A1c   • Comprehensive Metabolic Panel   • Ambulatory Referral to Hand Surgery     No orders of the defined types  "were placed in this encounter.      Summary/Discussion:  Advance Care Planning   ACP discussion was held with the patient during this visit. Patient has an advance directive (not in EMR), copy requested.        Next Appointment with me: 5/7/2020    Return in about 4 months (around 7/3/2020) for Reassess chronic medical problems.      MEDICATIONS     Current Outpatient Medications   Medication Sig Dispense Refill   • acetaminophen (TYLENOL) 325 MG tablet Take 2 tablets by mouth Every 4 (Four) Hours As Needed for Mild Pain .     • ALPHAGAN P 0.1 % solution ophthalmic solution Administer 0.1 bottles to both eyes 1 (One) Time.  6   • guaifenesin-dextromethorphan (MUCINEX DM)  MG tablet sustained-release 12 hour tablet Take 1 tablet by mouth 2 (Two) Times a Day.     • hydrochlorothiazide (HYDRODIURIL) 25 MG tablet Take 1 tablet by mouth Daily. 30 tablet 11   • metoprolol tartrate (LOPRESSOR) 25 MG tablet Take 1 tablet by mouth 2 (Two) Times a Day. 180 tablet 3   • pantoprazole (PROTONIX) 40 MG EC tablet TAKE 1 TABLET BY MOUTH DAILY 90 tablet 3   • PRADAXA 150 MG capsu TAKE 1 CAPSULE BY MOUTH EVERY 12 HOURS 180 capsule 2   • tamsulosin (FLOMAX) 0.4 MG capsule 24 hr capsule Take 1 capsule by mouth Every Night. 30 capsule    • TRELEGY ELLIPTA 100-62.5-25 MCG/INH aerosol powder  Inhale Daily.  3     No current facility-administered medications for this visit.           VITAL SIGNS     Visit Vitals  /62   Pulse 70   Temp 97.4 °F (36.3 °C)   Ht 177.8 cm (70\")   Wt 87.5 kg (193 lb)   SpO2 97%   BMI 27.69 kg/m²       BP Readings from Last 3 Encounters:   03/03/20 122/62   02/13/20 122/70   02/08/20 110/78     Wt Readings from Last 3 Encounters:   03/03/20 87.5 kg (193 lb)   02/13/20 89.4 kg (197 lb)   02/08/20 88.8 kg (195 lb 12.8 oz)      Body mass index is 27.69 kg/m².      HISTORY OF PRESENT ILLNESS     COPD: has nebulizer available now. On Trelegy. Previous round of steroids helped with COPD exacerbation.   He is " on metoprolol for atrial fibrillation and hypertension.     Blood pressure remains stable.     Left 1st MCP joint arthritis still bothers him and is interested in seeing a hand specialist. Takes acetaminophen as needed.     Prediabetes: mild weight loss noted, watches diet.   Lab Results   Component Value Date    HGBA1C 6.20 (H) 08/02/2019    HGBA1C 6.41 (H) 01/31/2019    HGBA1C 6.16 (H) 07/31/2018          Patient Care Team:  Neftali Amezcua MD as PCP - General (Internal Medicine)  Juan Colby MD as Consulting Physician (Cardiology)  Ankush Sky MD as Surgeon (Orthopedic Surgery)  Vale Del Rosario APRN as Nurse Practitioner (Oncology)  Darrion Peña MD as Consulting Physician (Urology)  Tahir Childs MD as Consulting Physician (Pulmonary Disease)  Noman Moseley MD as Consulting Physician (Gastroenterology)  Aura Perry, FERNY (Optometry)      REVIEW OF SYSTEMS     Review of Systems  Constitutional neg  Resp neg for worsening cough, wheezing or shortness of breath   CV neg for chest pain, palpitations; has chronic atrial fibrillation   GI: Neg abdominal pain, melena, blood in stool, or change in bowels   Neuro neg     PHYSICAL EXAMINATION     Physical Exam  Constitutional  No distress  Cardiovascular Rate  normal . Rhythm: Irregular . Heart sounds:  Normal  Pulmonary/Chest: Effort normal and breath sounds clear without wheezing     Psychiatric  Alert. Judgment intact. Thought content normal. Mood normal    REVIEWED DATA     Labs:     Lab Results   Component Value Date     (L) 02/08/2020    K 3.3 (L) 02/08/2020    CALCIUM 8.9 02/08/2020    AST 24 02/08/2020    ALT 26 02/08/2020    BUN 17 02/08/2020    CREATININE 1.02 02/08/2020    CREATININE 0.97 08/02/2019    CREATININE 1.02 01/31/2019    EGFRIFNONA 72 02/08/2020    EGFRIFAFRI 87 01/31/2019       Lab Results   Component Value Date    HGBA1C 6.20 (H) 08/02/2019    HGBA1C 6.41 (H) 01/31/2019    HGBA1C 6.16 (H) 07/31/2018    GLU  130 (H) 01/31/2019     (H) 07/31/2018     (H) 02/24/2017       Lab Results   Component Value Date    LDL 84 08/02/2019    LDL 89 07/31/2018     (H) 02/24/2017    HDL 43 08/02/2019    HDL 40 07/31/2018    HDL 42 02/24/2017    TRIG 136 08/02/2019    TRIG 131 07/31/2018    TRIG 119 02/24/2017    CHOLHDLRATIO 3.58 08/02/2019    CHOLHDLRATIO 3.88 07/31/2018    CHOLHDLRATIO 3.98 02/24/2017       Lab Results   Component Value Date    TSH 2.45 10/14/2015       Lab Results   Component Value Date    WBC 12.11 (H) 02/08/2020    HGB 15.8 02/08/2020    HGB 16.1 11/19/2018    HGB 12.7 (L) 09/28/2018     02/08/2020       Lab Results   Component Value Date    PROTEIN 2+ (A) 03/27/2018    GLUCOSEU Negative 09/25/2018    BLOODU Negative 09/25/2018    NITRITEU Negative 09/25/2018    LEUKOCYTESUR Negative 09/25/2018       Imaging:         Medical Tests:         Summary of old records / correspondence / consultant report:         Request outside records:         ALLERGIES  Allergies   Allergen Reactions   • Bactrim [Sulfamethoxazole-Trimethoprim] Rash        PFSH:     The following portions of the patient's history were reviewed and updated as appropriate: Allergies / Current Medications / Past Medical History / Surgical History / Social History / Family History    PROBLEM LIST   Patient Active Problem List   Diagnosis   • Chronic obstructive pulmonary disease with acute exacerbation (CMS/HCC)   • Impotence of organic origin   • Hypertension   • Osteoarthritis   • History of smoking greater than 50 pack years   • Benign prostatic hyperplasia with urinary obstruction   • Permanent atrial fibrillation   • Prediabetes   • Arthritis of carpometacarpal (CMC) joint of left thumb       PAST MEDICAL HISTORY  Past Medical History:   Diagnosis Date   • Alcohol abuse, in remission    • BPH (benign prostatic hypertrophy)     see doctors at [facility]   • COPD (chronic obstructive pulmonary disease) (CMS/HCC)    • Depression     • DJD (degenerative joint disease) of cervical spine    • ED (erectile dysfunction)     [facility]   • Hx of colonic polyps     followed by GI ([facility])   • Hyperlipidemia    • Hypertension    • Influenza B 02/14/2013       • Nocturnal hypoxia    • Osteoarthritis     HIPS, HANDS, MULTIPLE SITES   • Peripheral neuropathy    • Permanent atrial fibrillation    • Rectal bleeding 04/26/2017   • Tendonitis of shoulder 11/07/2007    RIGHT SHOULDER/DELTOID INSERTION   • Ulcer of the stomach and intestine        SURGICAL HISTORY  Past Surgical History:   Procedure Laterality Date   • APPENDECTOMY     • CARDIOVASCULAR STRESS TEST N/A 10/20/2015    NML LEXISCAN CARDIOLITE PERFUSION STUDY, NO EVIDENCE OF ISCHEMIA, AREA OF HYPOPERFUSION OF THE INFERIOR WALL W/NORMAL WALL PERFUSION AND NML LEFT VENTRICULAR EJECTION FRACTION, MOST LIKELY ATTENUATION. DR.MICHAEL OBX   • COLONOSCOPY N/A 02/2017   • COLONOSCOPY N/A 02/23/2011    4 POLYPS REMOVED, RESCOPE IN 5 YRS, DR. EAGLE   • COLONOSCOPY N/A 03/08/2006    ERYTHEMOUS AND GRANULAR MUCOSA IN ILEOCECAL VALVE, NORMAL COLON, REPEAT IN 5 YRS,    • ENDOSCOPY N/A 9/26/2018    normal esophagus, acute gastritis, multiple non-bleeding duodenal ulcers with pigmented material, H Pylori positive   • HERNIA REPAIR Bilateral     INGUINAL   • JOINT REPLACEMENT  11/2015    left hip   • TOTAL HIP ARTHROPLASTY Left 11/06/2015    Dr Ankush Sky   • TOTAL HIP ARTHROPLASTY Right 10/27/2014    DR.RIED SKY   • VASECTOMY         SOCIAL HISTORY  Social History     Socioeconomic History   • Marital status:      Spouse name: Ara*   • Number of children: 3   • Years of education: Not on file   • Highest education level: Not on file   Occupational History   • 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  Smoked 2 ppd for 49 years until he quit smoking 7 years ago for a 98 pack year history.   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   • Drug use: No     Comment: caffeine use    • Sexual activity: Defer   Lifestyle   • Physical activity:     Days per week: 2 days     Minutes per session: 20 min   • Stress: Not on file       FAMILY HISTORY  Family History   Problem Relation Age of Onset   • Colon cancer Mother    • Ovarian cancer Mother    • Dementia Mother    • Stroke Sister         October 2016   • Dementia Sister    • Heart disease Brother    • Alcohol abuse Brother    • Coronary artery disease Father    • Hypertension Father    • Heart disease Father    • Colon cancer Maternal Grandmother    • Heart disease Brother    • Coronary artery disease Brother    • Hypertension Brother    • Stroke Brother    • Arthritis Brother    • Prostate cancer Neg Hx          **Dragon Disclaimer:   Much of this encounter note is an electronic transcription/translation of spoken language to printed text. The electronic translation of spoken language may permit erroneous, or at times, nonsensical words or phrases to be inadvertently transcribed. Although I have reviewed the note for such errors, some may still exist.       Template created by Adelso Amezcua MD

## 2020-03-03 NOTE — ASSESSMENT & PLAN NOTE
Hypertension is stable. However, he should discuss with his cardiologist regarding metoprolol in relation to his severe COPD.

## 2020-03-04 ENCOUNTER — TELEPHONE (OUTPATIENT)
Dept: INTERNAL MEDICINE | Age: 75
End: 2020-03-04

## 2020-03-04 NOTE — PROGRESS NOTES
Call with results:    1. A1c level for average blood sugar level is higher now consistent with type 2 diabetes.   - start metformin  mg daily with dinner (dm 2)  - send order for glucometer. Check fasting glucose every other day.   - see me in 3 months for diabetes     2. Uric acid is above 6. Decrease HCTZ to 12.5 mg daily. Monitor blood pressure.

## 2020-03-04 NOTE — TELEPHONE ENCOUNTER
----- Message from Neftali Amezcua MD sent at 3/4/2020  7:00 AM EST -----  Call with results:    1. A1c level for average blood sugar level is higher now consistent with type 2 diabetes.   - start metformin  mg daily with dinner (dm 2)  - send order for glucometer. Check fasting glucose every other day.   - see me in 3 months for diabetes     2. Uric acid is above 6. Decrease HCTZ to 12.5 mg daily. Monitor blood pressure.

## 2020-03-05 DIAGNOSIS — I10 ESSENTIAL HYPERTENSION: Chronic | ICD-10-CM

## 2020-03-05 DIAGNOSIS — E11.9 TYPE 2 DIABETES MELLITUS WITHOUT COMPLICATION, WITHOUT LONG-TERM CURRENT USE OF INSULIN (HCC): Primary | ICD-10-CM

## 2020-03-09 RX ORDER — HYDROCHLOROTHIAZIDE 25 MG/1
12.5 TABLET ORAL DAILY
Qty: 45 TABLET | Refills: 1 | Status: SHIPPED | OUTPATIENT
Start: 2020-03-09 | End: 2020-09-02

## 2020-03-09 RX ORDER — BLOOD-GLUCOSE METER
EACH MISCELLANEOUS
Qty: 1 EACH | Refills: 0 | Status: SHIPPED | OUTPATIENT
Start: 2020-03-09 | End: 2020-10-22

## 2020-03-09 RX ORDER — LANCETS
EACH MISCELLANEOUS
Qty: 100 EACH | Refills: 11 | Status: SHIPPED | OUTPATIENT
Start: 2020-03-09 | End: 2020-10-22

## 2020-03-09 RX ORDER — METFORMIN HYDROCHLORIDE 500 MG/1
500 TABLET, EXTENDED RELEASE ORAL
Qty: 30 TABLET | Refills: 3 | Status: SHIPPED | OUTPATIENT
Start: 2020-03-09 | End: 2020-07-06

## 2020-05-07 ENCOUNTER — TELEMEDICINE (OUTPATIENT)
Dept: INTERNAL MEDICINE | Age: 75
End: 2020-05-07

## 2020-05-07 DIAGNOSIS — J44.1 CHRONIC OBSTRUCTIVE PULMONARY DISEASE WITH ACUTE EXACERBATION (HCC): Primary | Chronic | ICD-10-CM

## 2020-05-07 DIAGNOSIS — I10 ESSENTIAL HYPERTENSION: Chronic | ICD-10-CM

## 2020-05-07 PROBLEM — E11.65 TYPE 2 DIABETES MELLITUS WITH HYPERGLYCEMIA, WITHOUT LONG-TERM CURRENT USE OF INSULIN: Chronic | Status: ACTIVE | Noted: 2019-01-31

## 2020-05-07 PROCEDURE — 99214 OFFICE O/P EST MOD 30 MIN: CPT | Performed by: INTERNAL MEDICINE

## 2020-05-07 RX ORDER — LISINOPRIL 5 MG/1
5 TABLET ORAL DAILY
Qty: 30 TABLET | Refills: 3 | Status: SHIPPED | OUTPATIENT
Start: 2020-05-07 | End: 2020-06-16

## 2020-05-07 NOTE — ASSESSMENT & PLAN NOTE
Telehealth  Home blood pressure > 140's. On HCTZ 12.5 mg and metoprolol tartrate 25 mg BID.   Start lisinopril 5 mg qd.   Send blood pressure readings 1 month.   Follow-up 3 months.

## 2020-05-07 NOTE — ASSESSMENT & PLAN NOTE
New onset DM 2. Previously started metformin  mg one daily.   Start monitoring sugars at home. Has glucometer.   Send readings 1 month.   Check A1c on follow-up 3 months.     Lab Results   Component Value Date     (H) 03/03/2020     (H) 01/31/2019     (H) 07/31/2018     Lab Results   Component Value Date    HGBA1C 6.50 (H) 03/03/2020    HGBA1C 6.20 (H) 08/02/2019    HGBA1C 6.41 (H) 01/31/2019

## 2020-05-07 NOTE — PROGRESS NOTES
[facility] [place] [facility]  ROSA AMEZCUA M.D.      Aj HELMS Wohlleb / 74 y.o. / male  05/07/2020      CC:  Main reason(s) for today's visit: TELEHEALTH ENCOUNTER: Diabetes (New onset ) and Hypertension      HPI:     THIS IS A TELEHEALTH ENCOUNTER. DUE TO TECHNICAL DIFFICULTIES THE PATIENT WAS NOT ABLE TO INTERFACE THROUGH [facility]. THIS ENCOUNTER WAS VIA FACETIME AS NECESSITATED BY CURRENT COVID-19 CRISIS.      You have chosen to receive care through a telehealth visit.  Do you consent to use a video/audio connection for your medical care today? Yes      DM 2: new onset with A1c 6.5 started metformin without any side effects. Not checking sugars but has glucometer.     Hypertension: SBP > 140's consistently at home. On HCTZ and metoprolol tartrate.    COPD stable on Trelegy. Followed by pulmonologist.      Patient Care Team:  Neftali Amezcua MD as PCP - General (Internal Medicine)  Juan Colby MD as Consulting Physician (Cardiology)  Ankush Sky MD as Surgeon (Orthopedic Surgery)  Vale Del Rosario APRN as Nurse Practitioner (Oncology)  Darrion Peña MD as Consulting Physician (Urology)  Tahir Childs MD as Consulting Physician (Pulmonary Disease)  Noman Moseley MD as Consulting Physician (Gastroenterology)  Aura Perry OD (Optometry)    ASSESSMENT & PLAN:    Problem List Items Addressed This Visit        Medium    Chronic obstructive pulmonary disease with acute exacerbation (CMS/HCC) - Primary (Chronic)    Overview     *Naz    PFT (5/2018): moderate obstruction with decreased diffusing capacity.          Current Assessment & Plan     Telehealth   Stable. Continue Trelegy. Follow-up with pulmonologist as instructed.            Relevant Medications    TRELEGY ELLIPTA 100-62.5-25 MCG/INH aerosol powder     guaifenesin-dextromethorphan (MUCINEX DM)  MG tablet sustained-release 12 hour tablet    Hypertension (Chronic)    Current Assessment & Plan     Telehealth  Home blood pressure  > 140's. On HCTZ 12.5 mg and metoprolol tartrate 25 mg BID.   Start lisinopril 5 mg qd.   Send blood pressure readings 1 month.   Follow-up 3 months.          Relevant Medications    metoprolol tartrate (LOPRESSOR) 25 MG tablet    hydroCHLOROthiazide (HYDRODIURIL) 25 MG tablet    lisinopril (PRINIVIL,ZESTRIL) 5 MG tablet           Summary/Discussion:  •       Time spent: 15 minutes    Next Appointment with me: 6/15/2020    Return in about 3 months (around 8/7/2020) for Diabetes, Hypertension.    ____________________________________________________________________    MEDICATIONS  Current Outpatient Medications   Medication Sig Dispense Refill   • hydroCHLOROthiazide (HYDRODIURIL) 25 MG tablet Take 0.5 tablets by mouth Daily. 45 tablet 1   • metFORMIN ER (GLUCOPHAGE-XR) 500 MG 24 hr tablet Take 1 tablet by mouth Daily With Dinner. 30 tablet 3   • metoprolol tartrate (LOPRESSOR) 25 MG tablet Take 1 tablet by mouth 2 (Two) Times a Day. 180 tablet 3   • TRELEGY ELLIPTA 100-62.5-25 MCG/INH aerosol powder  Inhale Daily.  3   • acetaminophen (TYLENOL) 325 MG tablet Take 2 tablets by mouth Every 4 (Four) Hours As Needed for Mild Pain .     • ALPHAGAN P 0.1 % solution ophthalmic solution Administer 0.1 bottles to both eyes 1 (One) Time.  6   • Blood Glucose Monitoring Suppl (ONE TOUCH ULTRA 2) w/Device kit Use to check glucose every other day. dm2 /e 11.9 1 each 0   • glucose blood test strip Use to check glucose every other day . DM 2/E11.9 100 each 11   • guaifenesin-dextromethorphan (MUCINEX DM)  MG tablet sustained-release 12 hour tablet Take 1 tablet by mouth 2 (Two) Times a Day.     • Lancets (ONETOUCH ULTRASOFT) lancets Use to test glucose every other day.  Dx DM2  E11.9 100 each 11   • lisinopril (PRINIVIL,ZESTRIL) 5 MG tablet Take 1 tablet by mouth Daily. 30 tablet 3   • pantoprazole (PROTONIX) 40 MG EC tablet TAKE 1 TABLET BY MOUTH DAILY 90 tablet 3   • PRADAXA 150 MG capsu TAKE 1 CAPSULE BY MOUTH EVERY 12  HOURS 180 capsule 2   • tamsulosin (FLOMAX) 0.4 MG capsule 24 hr capsule Take 1 capsule by mouth Every Night. 30 capsule      No current facility-administered medications for this visit.           ____________________________________________________________________      REVIEW OF SYSTEMS    Review of Systems  ROS   Constitutional neg  Resp neg for worsening shortness of breath   CV neg   Neuro neg     PHYSICAL EXAMINATION  There were no vitals taken for this visit.   No acute distress.  Alert with normal thought and judgment.       REVIEWED DATA:    Labs:   Lab Results   Component Value Date    HGBA1C 6.50 (H) 03/03/2020    HGBA1C 6.20 (H) 08/02/2019    HGBA1C 6.41 (H) 01/31/2019    CREATININE 0.97 03/03/2020    LDL 84 08/02/2019          Imaging:         Medical Tests:         Summary of old records / correspondence / consultant report:          Request outside records:         ALLERGIES  Allergies   Allergen Reactions   • Bactrim [Sulfamethoxazole-Trimethoprim] Rash        PFSH:     The following portions of the patient's history were reviewed and updated as appropriate: Allergies / Current Medications / Past Medical History / Surgical History / Social History / Family History    PROBLEM LIST   Patient Active Problem List   Diagnosis   • Chronic obstructive pulmonary disease with acute exacerbation (CMS/HCC)   • Impotence of organic origin   • Hypertension   • Osteoarthritis   • History of smoking greater than 50 pack years   • Benign prostatic hyperplasia with urinary obstruction   • Permanent atrial fibrillation   • Type 2 diabetes mellitus with hyperglycemia, without long-term current use of insulin (CMS/HCC)   • Arthritis of carpometacarpal (CMC) joint of left thumb       PAST MEDICAL HISTORY  Past Medical History:   Diagnosis Date   • Alcohol abuse, in remission    • BPH (benign prostatic hypertrophy)     see doctors at [facility]   • COPD (chronic obstructive pulmonary disease) (CMS/HCC)    • Depression    • DJD  (degenerative joint disease) of cervical spine    • ED (erectile dysfunction)     [facility]   • Hx of colonic polyps     followed by GI ([facility])   • Hyperlipidemia    • Hypertension    • Influenza B 02/14/2013       • Nocturnal hypoxia    • Osteoarthritis     HIPS, HANDS, MULTIPLE SITES   • Peripheral neuropathy    • Permanent atrial fibrillation    • Rectal bleeding 04/26/2017   • Tendonitis of shoulder 11/07/2007    RIGHT SHOULDER/DELTOID INSERTION   • Ulcer of the stomach and intestine        SURGICAL HISTORY  Past Surgical History:   Procedure Laterality Date   • APPENDECTOMY     • CARDIOVASCULAR STRESS TEST N/A 10/20/2015    NML LEXISCAN CARDIOLITE PERFUSION STUDY, NO EVIDENCE OF ISCHEMIA, AREA OF HYPOPERFUSION OF THE INFERIOR WALL W/NORMAL WALL PERFUSION AND NML LEFT VENTRICULAR EJECTION FRACTION, MOST LIKELY ATTENUATION. DR.MICHAEL BOX   • COLONOSCOPY N/A 02/2017   • COLONOSCOPY N/A 02/23/2011    4 POLYPS REMOVED, RESCOPE IN 5 YRS, DR. EAGLE   • COLONOSCOPY N/A 03/08/2006    ERYTHEMOUS AND GRANULAR MUCOSA IN ILEOCECAL VALVE, NORMAL COLON, REPEAT IN 5 YRS,    • ENDOSCOPY N/A 9/26/2018    normal esophagus, acute gastritis, multiple non-bleeding duodenal ulcers with pigmented material, H Pylori positive   • HERNIA REPAIR Bilateral     INGUINAL   • JOINT REPLACEMENT  11/2015    left hip   • TOTAL HIP ARTHROPLASTY Left 11/06/2015    Dr Ankush Sky   • TOTAL HIP ARTHROPLASTY Right 10/27/2014    DR.RIED SKY   • VASECTOMY         SOCIAL HISTORY  Social History     Socioeconomic History   • Marital status:      Spouse name: Ara*   • Number of children: 3   • Years of education: Not on file   • Highest education level: Not on file   Occupational History   • 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  Smoked 2 ppd for 49 years until he quit smoking 7 years ago for a 98 pack year history.   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   • Drug use: No     Comment: caffeine use    • Sexual activity: Defer   Lifestyle   • Physical activity:     Days per week: 2 days     Minutes per session: 20 min   • Stress: Not on file           **Dragon Disclaimer:   Much of this encounter note is an electronic transcription/translation of spoken language to printed text. The electronic translation of spoken language may permit erroneous, or at times, nonsensical words or phrases to be inadvertently transcribed. Although I have reviewed the note for such errors, some may still exist.     Template created by Adelso Amezcua MD

## 2020-06-16 ENCOUNTER — OFFICE VISIT (OUTPATIENT)
Dept: INTERNAL MEDICINE | Age: 75
End: 2020-06-16

## 2020-06-16 VITALS
DIASTOLIC BLOOD PRESSURE: 70 MMHG | HEART RATE: 105 BPM | OXYGEN SATURATION: 98 % | WEIGHT: 196 LBS | HEIGHT: 70 IN | SYSTOLIC BLOOD PRESSURE: 120 MMHG | TEMPERATURE: 97.4 F | BODY MASS INDEX: 28.06 KG/M2

## 2020-06-16 DIAGNOSIS — L85.3 DRY SKIN DERMATITIS: Primary | ICD-10-CM

## 2020-06-16 DIAGNOSIS — I10 ESSENTIAL HYPERTENSION: Chronic | ICD-10-CM

## 2020-06-16 PROCEDURE — 99214 OFFICE O/P EST MOD 30 MIN: CPT | Performed by: INTERNAL MEDICINE

## 2020-06-16 RX ORDER — CLOBETASOL PROPIONATE 0.46 MG/ML
SOLUTION TOPICAL
COMMUNITY
Start: 2020-05-13 | End: 2020-10-22

## 2020-06-16 RX ORDER — HYDROXYZINE HYDROCHLORIDE 25 MG/1
25 TABLET, FILM COATED ORAL EVERY 8 HOURS PRN
Qty: 42 TABLET | Refills: 0 | Status: SHIPPED | OUTPATIENT
Start: 2020-06-16 | End: 2020-10-22

## 2020-06-16 RX ORDER — CICLOPIROX 1 G/100ML
SHAMPOO TOPICAL
COMMUNITY
Start: 2020-06-05 | End: 2020-10-22

## 2020-06-16 RX ORDER — DESONIDE 0.5 MG/G
CREAM TOPICAL
COMMUNITY
Start: 2020-05-06 | End: 2020-09-02

## 2020-06-16 RX ORDER — LATANOPROST 50 UG/ML
SOLUTION/ DROPS OPHTHALMIC
COMMUNITY
Start: 2020-04-07 | End: 2020-10-22

## 2020-06-16 RX ORDER — NETARSUDIL 0.2 MG/ML
SOLUTION/ DROPS OPHTHALMIC; TOPICAL
COMMUNITY
Start: 2020-04-08 | End: 2020-09-02

## 2020-06-16 NOTE — PATIENT INSTRUCTIONS
** IMPORTANT MESSAGE FROM DR. LOPEZ **    In our office, your satisfaction is VERY important to us.     You may receive a survey from Verónica Nguyen by mail or E-mail for you to provide feedback about your visit. This information is invaluable for me to know what we can do to improve our services.     I ask that you please take a few minutes to complete the survey and let us know how we are doing in serving your needs. (You may receive the survey more than once for multiple visits)    Thank You !    Dr. Lopez & Staff    _________________________________________________________________________________________________________________________      ** ADDITIONAL INSTRUCTION / REMINDERS FROM DR. LOPEZ **    Gold Lund skin lotion for diabetes (after shower and before bedtime)

## 2020-06-17 NOTE — PROGRESS NOTES
[facility] [place] INTERNAL MEDICINE  ROSA AMEZCUA M.D.      Aj HELMS Wohlleb / 74 y.o. / male  06/16/2020      CC:  Main reason(s) for today's visit: Rash (ITCHING ,more than 5 days)      HPI:     Persistent pruritic rash all over. Sees dermatology and steroid creams have not been helpful.   Started lisinopril about 1 month ago and has a dry cough.   No fever or shortness of breath.   Has very dry skin.   Receiving light treatment on his arms for another skin condition.       Patient Care Team:  Neftali Amezcua MD as PCP - General (Internal Medicine)  Juan Colby MD as Consulting Physician (Cardiology)  Ankush Sky MD as Surgeon (Orthopedic Surgery)  Vale Del Rosario APRN as Nurse Practitioner (Oncology)  Darrion Peña MD as Consulting Physician (Urology)  Tahir Childs MD as Consulting Physician (Pulmonary Disease)  Noman Moseley MD as Consulting Physician (Gastroenterology)  Aura Perry, FERNY (Optometry)    ASSESSMENT & PLAN:    1. Dry skin dermatitis    2. Essential hypertension      No orders of the defined types were placed in this encounter.    New Medications Ordered This Visit   Medications   • hydrOXYzine (ATARAX) 25 MG tablet     Sig: Take 1 tablet by mouth Every 8 (Eight) Hours As Needed for Itching.     Dispense:  42 tablet     Refill:  0   • Crisaborole (Eucrisa) 2 % ointment     Sig: Apply 1 application topically Daily.     Dispense:  100 g     Refill:  0        Summary/Discussion:  Recommended using Gold Bond lotion for diabetes. Use along with Eucrisa.   Hydroxyzine PRN for pruritus.   Discontinue lisinopril and monitor blood pressure carefully. Call if > 130/80.     Next Appointment with me: 9/2/2020    Return for worsening or lack of improvement, Next scheduled follow up.    ____________________________________________________________________    MEDICATIONS  Current Outpatient Medications   Medication Sig Dispense Refill   • acetaminophen (TYLENOL) 325 MG tablet Take 2 tablets by  mouth Every 4 (Four) Hours As Needed for Mild Pain .     • ALPHAGAN P 0.1 % solution ophthalmic solution Administer 0.1 bottles to both eyes 1 (One) Time.  6   • betamethasone valerate (VALISONE) 0.1 % cream      • Blood Glucose Monitoring Suppl (ONE TOUCH ULTRA 2) w/Device kit Use to check glucose every other day. dm2 /e 11.9 1 each 0   • ciclopirox (LOPROX) 1 % shampoo      • clobetasol (TEMOVATE) 0.05 % external solution APPLY 3 TO 5 DROPS TO SCALP QD PRF ITCH     • desonide (DESOWEN) 0.05 % cream APPLY TOPICALLY AA OF FACE DAILY PRN     • glucose blood test strip Use to check glucose every other day . DM 2/E11.9 100 each 11   • hydroCHLOROthiazide (HYDRODIURIL) 25 MG tablet Take 0.5 tablets by mouth Daily. 45 tablet 1   • Lancets (ONETOUCH ULTRASOFT) lancets Use to test glucose every other day.  Dx DM2  E11.9 100 each 11   • latanoprost (XALATAN) 0.005 % ophthalmic solution INT 1 GTT IN OU QPM     • metFORMIN ER (GLUCOPHAGE-XR) 500 MG 24 hr tablet Take 1 tablet by mouth Daily With Dinner. 30 tablet 3   • metoprolol tartrate (LOPRESSOR) 25 MG tablet Take 1 tablet by mouth 2 (Two) Times a Day. 180 tablet 3   • mupirocin (BACTROBAN) 2 % ointment      • pantoprazole (PROTONIX) 40 MG EC tablet TAKE 1 TABLET BY MOUTH DAILY 90 tablet 3   • PRADAXA 150 MG capsu TAKE 1 CAPSULE BY MOUTH EVERY 12 HOURS 180 capsule 2   • RHOPRESSA 0.02 % solution INT 1 GTT IN OU QPM     • tamsulosin (FLOMAX) 0.4 MG capsule 24 hr capsule Take 1 capsule by mouth Every Night. 30 capsule    • TRELEGY ELLIPTA 100-62.5-25 MCG/INH aerosol powder  Inhale Daily.  3   • Crisaborole (Eucrisa) 2 % ointment Apply 1 application topically Daily. 100 g 0     Lisinopril 5 mg qd    No current facility-administered medications for this visit.           ____________________________________________________________________      REVIEW OF SYSTEMS    Review of Systems  No fever  Dry skin with pruritus   Dry cough without shortness of breath     VITALS    Visit  "Vitals  /70   Pulse 105   Temp 97.4 °F (36.3 °C)   Ht 177.8 cm (70\")   Wt 88.9 kg (196 lb)   SpO2 98%   BMI 28.12 kg/m²       BP Readings from Last 3 Encounters:   06/16/20 120/70   03/03/20 122/62   02/13/20 122/70     Wt Readings from Last 3 Encounters:   06/16/20 88.9 kg (196 lb)   03/03/20 87.5 kg (193 lb)   02/13/20 89.4 kg (197 lb)      Body mass index is 28.12 kg/m².    PHYSICAL EXAMINATION    Physical Exam  No acute distress  Dry skin dermatitis, rash on upper arms/back; exfoliation of bilateral forearms from light treatment    REVIEWED DATA:    Labs:         Imaging:         Medical Tests:         Summary of old records / correspondence / consultant report:          Request outside records:         ALLERGIES  Allergies   Allergen Reactions   • Bactrim [Sulfamethoxazole-Trimethoprim] Rash        PFSH:     The following portions of the patient's history were reviewed and updated as appropriate: Allergies / Current Medications / Past Medical History / Surgical History / Social History / Family History    PROBLEM LIST   Patient Active Problem List   Diagnosis   • Chronic obstructive pulmonary disease with acute exacerbation (CMS/HCC)   • Impotence of organic origin   • Hypertension   • Osteoarthritis   • History of smoking greater than 50 pack years   • Benign prostatic hyperplasia with urinary obstruction   • Permanent atrial fibrillation (CMS/HCC)   • Type 2 diabetes mellitus with hyperglycemia, without long-term current use of insulin (CMS/HCC)   • Arthritis of carpometacarpal (CMC) joint of left thumb       PAST MEDICAL HISTORY  Past Medical History:   Diagnosis Date   • Alcohol abuse, in remission    • BPH (benign prostatic hypertrophy)     see doctors at [facility]   • COPD (chronic obstructive pulmonary disease) (CMS/HCC)    • Depression    • DJD (degenerative joint disease) of cervical spine    • ED (erectile dysfunction)     [facility]   • Hx of colonic polyps     followed by GI ([facility])   • " Hyperlipidemia    • Hypertension    • Influenza B 02/14/2013       • Nocturnal hypoxia    • Osteoarthritis     HIPS, HANDS, MULTIPLE SITES   • Peripheral neuropathy    • Permanent atrial fibrillation    • Rectal bleeding 04/26/2017   • Tendonitis of shoulder 11/07/2007    RIGHT SHOULDER/DELTOID INSERTION   • Ulcer of the stomach and intestine        SURGICAL HISTORY  Past Surgical History:   Procedure Laterality Date   • APPENDECTOMY     • CARDIOVASCULAR STRESS TEST N/A 10/20/2015    NML LEXISCAN CARDIOLITE PERFUSION STUDY, NO EVIDENCE OF ISCHEMIA, AREA OF HYPOPERFUSION OF THE INFERIOR WALL W/NORMAL WALL PERFUSION AND NML LEFT VENTRICULAR EJECTION FRACTION, MOST LIKELY ATTENUATION. DR.MICHAEL BOX   • COLONOSCOPY N/A 02/2017   • COLONOSCOPY N/A 02/23/2011    4 POLYPS REMOVED, RESCOPE IN 5 YRS, DR. EAGLE   • COLONOSCOPY N/A 03/08/2006    ERYTHEMOUS AND GRANULAR MUCOSA IN ILEOCECAL VALVE, NORMAL COLON, REPEAT IN 5 YRS,    • ENDOSCOPY N/A 9/26/2018    normal esophagus, acute gastritis, multiple non-bleeding duodenal ulcers with pigmented material, H Pylori positive   • HERNIA REPAIR Bilateral     INGUINAL   • JOINT REPLACEMENT  11/2015    left hip   • TOTAL HIP ARTHROPLASTY Left 11/06/2015    Dr Ankush Sky   • TOTAL HIP ARTHROPLASTY Right 10/27/2014    DR.RIED SKY   • VASECTOMY         SOCIAL HISTORY  Social History     Socioeconomic History   • Marital status:      Spouse name: Ara*   • Number of children: 3   • Years of education: Not on file   • Highest education level: Not on file   Occupational History   • 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  Smoked 2 ppd for 49 years until he quit smoking 7  years ago for a 98 pack year history.   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   • Drug use: No     Comment: caffeine use    • Sexual activity: Defer   Lifestyle   • Physical activity:     Days per week: 2 days     Minutes per session: 20 min   • Stress: Not on file       FAMILY HISTORY  Family History   Problem Relation Age of Onset   • Colon cancer Mother    • Ovarian cancer Mother    • Dementia Mother    • Stroke Sister         October 2016   • Dementia Sister    • Heart disease Brother    • Alcohol abuse Brother    • Coronary artery disease Father    • Hypertension Father    • Heart disease Father    • Colon cancer Maternal Grandmother    • Heart disease Brother    • Coronary artery disease Brother    • Hypertension Brother    • Stroke Brother    • Arthritis Brother    • Prostate cancer Neg Hx          **Dragon Disclaimer:   Much of this encounter note is an electronic transcription/translation of spoken language to printed text. The electronic translation of spoken language may permit erroneous, or at times, nonsensical words or phrases to be inadvertently transcribed. Although I have reviewed the note for such errors, some may still exist.     Template created by Adelso Amezcua MD

## 2020-06-17 NOTE — ASSESSMENT & PLAN NOTE
Due to new rash, cough hold lisinopril. Monitor blood pressure at home. Call if > 130/80. Continue HCTZ 25 mg 1/2 daily.

## 2020-06-27 NOTE — TELEPHONE ENCOUNTER
Pt's pharm sent over via escribe on Pantoprazole 40mg on 5/29/19. The rx refill was refused.    Pt would like a call back explaining why the rx was refused.    Pt can be reached #[phone].  
Refill for Pantoprazole was filled. Unsure why it was refused. Unable to reach patient when called.   
atenolol 50 mg oral tablet: 1 tab(s) orally once a day  atorvastatin 10 mg oral tablet: 1 tab(s) orally once a day (at bedtime)  Bactrim  mg-160 mg oral tablet: 1 tab(s) orally every 12 hours MDD:2  lisinopril 20 mg oral tablet: 1 tab(s) orally once a day

## 2020-06-28 DIAGNOSIS — I10 ESSENTIAL HYPERTENSION: Primary | Chronic | ICD-10-CM

## 2020-07-03 DIAGNOSIS — E11.9 TYPE 2 DIABETES MELLITUS WITHOUT COMPLICATION, WITHOUT LONG-TERM CURRENT USE OF INSULIN (HCC): ICD-10-CM

## 2020-07-06 RX ORDER — METFORMIN HYDROCHLORIDE 500 MG/1
500 TABLET, EXTENDED RELEASE ORAL
Qty: 30 TABLET | Refills: 11 | Status: SHIPPED | OUTPATIENT
Start: 2020-07-06 | End: 2021-07-01

## 2020-09-02 ENCOUNTER — OFFICE VISIT (OUTPATIENT)
Dept: INTERNAL MEDICINE | Age: 75
End: 2020-09-02

## 2020-09-02 VITALS
BODY MASS INDEX: 27.92 KG/M2 | TEMPERATURE: 97.5 F | HEIGHT: 70 IN | WEIGHT: 195 LBS | SYSTOLIC BLOOD PRESSURE: 130 MMHG | OXYGEN SATURATION: 94 % | HEART RATE: 69 BPM | DIASTOLIC BLOOD PRESSURE: 80 MMHG

## 2020-09-02 DIAGNOSIS — Z12.2 ENCOUNTER FOR SCREENING FOR MALIGNANT NEOPLASM OF RESPIRATORY ORGANS: ICD-10-CM

## 2020-09-02 DIAGNOSIS — E11.65 TYPE 2 DIABETES MELLITUS WITH HYPERGLYCEMIA, WITHOUT LONG-TERM CURRENT USE OF INSULIN (HCC): Primary | Chronic | ICD-10-CM

## 2020-09-02 DIAGNOSIS — R21 RASH AND NONSPECIFIC SKIN ERUPTION: ICD-10-CM

## 2020-09-02 DIAGNOSIS — Z87.891 HISTORY OF SMOKING 25-50 PACK YEARS: ICD-10-CM

## 2020-09-02 DIAGNOSIS — Z00.00 MEDICARE ANNUAL WELLNESS VISIT, SUBSEQUENT: ICD-10-CM

## 2020-09-02 DIAGNOSIS — Z00.00 MEDICARE ANNUAL WELLNESS VISIT, SUBSEQUENT: Primary | ICD-10-CM

## 2020-09-02 DIAGNOSIS — I10 ESSENTIAL HYPERTENSION: Chronic | ICD-10-CM

## 2020-09-02 PROCEDURE — G0008 ADMIN INFLUENZA VIRUS VAC: HCPCS | Performed by: INTERNAL MEDICINE

## 2020-09-02 PROCEDURE — 96160 PT-FOCUSED HLTH RISK ASSMT: CPT | Performed by: INTERNAL MEDICINE

## 2020-09-02 PROCEDURE — 90653 IIV ADJUVANT VACCINE IM: CPT | Performed by: INTERNAL MEDICINE

## 2020-09-02 PROCEDURE — G0439 PPPS, SUBSEQ VISIT: HCPCS | Performed by: INTERNAL MEDICINE

## 2020-09-02 PROCEDURE — 99214 OFFICE O/P EST MOD 30 MIN: CPT | Performed by: INTERNAL MEDICINE

## 2020-09-02 RX ORDER — LISINOPRIL 5 MG/1
5 TABLET ORAL DAILY
COMMUNITY
Start: 2020-08-08 | End: 2020-09-02

## 2020-09-02 NOTE — PATIENT INSTRUCTIONS
** IMPORTANT MESSAGE FROM DR. LOPEZ **    In our office, your satisfaction is VERY important to us.     You may receive a survey from [employer] by mail or E-mail for you to provide feedback about your visit. This information is invaluable for me to know what we can do to improve our services.     I ask that you please take a few minutes to complete the survey and let us know how we are doing in serving your needs. (You may receive the survey more than once for multiple visits)    Thank You !    Dr. Lopez & Staff    _________________________________________________________________________________________________________________________      ** ADDITIONAL INSTRUCTION / REMINDERS FROM DR. LOPEZ **      Medicare Wellness  Personal Prevention Plan of Service     Date of Office Visit:  2020  Encounter Provider:  Neftali Lopez MD  Place of Service:  [facility] PRIMARY CARE  Patient Name: Aj Salazar  :  1945    As part of the Medicare Wellness portion of your visit today, we are providing you with this personalized preventive plan of services (PPPS). This plan is based upon recommendations of the [place] Preventive Services Task Force (USPSTF) and the Advisory Committee on Immunization Practices (ACIP).    This lists the preventive care services that should be considered, and provides dates of when you are due. Items listed as completed are up-to-date and do not require any further intervention.      Age-Appropriate Screening Schedule:  (Refer to the list below for future screening recommendations based on patient's age, sex and/or medical conditions. Orders for these recommended tests are listed in the plan section. The patient has been provided with a written plan)    Health Maintenance Topics  Health Maintenance   Topic Date Due   • URINE MICROALBUMIN  1945   • DIABETIC EYE EXAM  2016   • LUNG CANCER SCREENING  2019   • ZOSTER VACCINE (3 of 3) 2020   • LIPID  PANEL  08/02/2020   • HEMOGLOBIN A1C  09/03/2020   • MEDICARE ANNUAL WELLNESS  09/02/2021   • DIABETIC FOOT EXAM  09/02/2021   • COLONOSCOPY  03/23/2022   • TDAP/TD VACCINES (2 - Td) 02/24/2027   • HEPATITIS C SCREENING  Completed   • Pneumococcal Vaccine Once at 65 Years Old  Completed   • INFLUENZA VACCINE  Completed   • AAA SCREEN (ONE-TIME)  Completed       Health Maintenance Topics Due or Over-Due  Health Maintenance Due   Topic Date Due   • DIABETIC EYE EXAM  03/17/2016   • LUNG CANCER SCREENING  08/06/2019         ADDITIONAL RESOURCES:    For excellent information on senior health and wellness refer to the National Grant of Health web-site at:    WWW.[email or web address].GOV

## 2020-09-02 NOTE — PROGRESS NOTES
"[facility] [place] INTERNAL MEDICINE  ROSA LOPEZ M.D.      Aj Valentehlleb / 74 y.o. / male  09/02/2020      VITAL SIGNS     Visit Vitals  /80   Pulse 69   Temp 97.5 °F (36.4 °C)   Ht 177.8 cm (70\")   Wt 88.5 kg (195 lb)   SpO2 94%   BMI 27.98 kg/m²       BP Readings from Last 3 Encounters:   09/02/20 130/80   06/16/20 120/70   03/03/20 122/62     Wt Readings from Last 3 Encounters:   09/02/20 88.5 kg (195 lb)   06/16/20 88.9 kg (196 lb)   03/03/20 87.5 kg (193 lb)     Body mass index is 27.98 kg/m².      MEDICATIONS     Current Outpatient Medications   Medication Sig Dispense Refill   • acetaminophen (TYLENOL) 325 MG tablet Take 2 tablets by mouth Every 4 (Four) Hours As Needed for Mild Pain .     • ALPHAGAN P 0.1 % solution ophthalmic solution Administer 0.1 bottles to both eyes 1 (One) Time.  6   • Blood Glucose Monitoring Suppl (ONE TOUCH ULTRA 2) w/Device kit Use to check glucose every other day. dm2 /e 11.9 1 each 0   • ciclopirox (LOPROX) 1 % shampoo      • clobetasol (TEMOVATE) 0.05 % external solution APPLY 3 TO 5 DROPS TO SCALP QD PRF ITCH     • glucose blood test strip Use to check glucose every other day . DM 2/E11.9 100 each 11   • hydrOXYzine (ATARAX) 25 MG tablet Take 1 tablet by mouth Every 8 (Eight) Hours As Needed for Itching. 42 tablet 0   • Lancets (ONETOUCH ULTRASOFT) lancets Use to test glucose every other day.  Dx DM2  E11.9 100 each 11   • latanoprost (XALATAN) 0.005 % ophthalmic solution INT 1 GTT IN OU QPM     • metFORMIN ER (GLUCOPHAGE-XR) 500 MG 24 hr tablet TAKE 1 TABLET BY MOUTH DAILY WITH DINNER 30 tablet 11   • metoprolol tartrate (LOPRESSOR) 25 MG tablet Take 1 tablet by mouth 2 (Two) Times a Day. 180 tablet 3   • mupirocin (BACTROBAN) 2 % ointment      • pantoprazole (PROTONIX) 40 MG EC tablet TAKE 1 TABLET BY MOUTH DAILY 90 tablet 3   • PRADAXA 150 MG capsu TAKE 1 CAPSULE BY MOUTH EVERY 12 HOURS 180 capsule 2   • tamsulosin (FLOMAX) 0.4 MG capsule 24 hr capsule Take 1 capsule by mouth Every " Night. 30 capsule    • TRELEGY ELLIPTA 100-62.5-25 MCG/INH aerosol powder  Inhale Daily.  3   • betamethasone valerate (VALISONE) 0.1 % cream        No current facility-administered medications for this visit.        CHIEF COMPLAINT     Diabetes and Hypertension      ASSESSMENT & PLAN     Problem List Items Addressed This Visit        High    Type 2 diabetes mellitus with hyperglycemia, without long-term current use of insulin (CMS/[facility]) - Primary (Chronic)    Current Assessment & Plan     Check A1c today. Home sugars are in good range.   Continue metformin  mg daily.          Relevant Medications    metFORMIN ER (GLUCOPHAGE-XR) 500 MG 24 hr tablet    Other Relevant Orders    Hemoglobin A1c    Lipid Panel With / Chol / HDL Ratio    Urinalysis With Microscopic If Indicated (No Culture) - Urine, Clean Catch    Microalbumin / Creatinine Urine Ratio - Urine, Clean Catch       Medium    Hypertension (Chronic)    Current Assessment & Plan     Has been off lisinopril and HCTZ due to skin problems but without significant change.   Blood pressure remains stable on metoprolol at this time continue same.   BP Readings from Last 3 Encounters:   09/02/20 130/80   06/16/20 120/70   03/03/20 122/62             Relevant Medications    metoprolol tartrate (LOPRESSOR) 25 MG tablet    Rash and nonspecific skin eruption (Chronic)    Current Assessment & Plan     No difference off lisinopril and HCTZ. Follow-up dermatologist          Relevant Medications    betamethasone valerate (VALISONE) 0.1 % cream    ciclopirox (LOPROX) 1 % shampoo    clobetasol (TEMOVATE) 0.05 % external solution    mupirocin (BACTROBAN) 2 % ointment        Orders Placed This Encounter   Procedures   • Fluad Quad 65+ yrs (3797-8785)   • Hemoglobin A1c   • Lipid Panel With / Chol / HDL Ratio   • Urinalysis With Microscopic If Indicated (No Culture) - Urine, Clean Catch   • Microalbumin / Creatinine Urine Ratio - Urine, Clean Catch     No orders of the defined  types were placed in this encounter.      Summary/Discussion:  •     Next Appointment with me: Visit date not found    Return in about 6 months (around 3/2/2021) for Reassess chronic medical problems.    _____________________________________________________________________________________    HISTORY OF PRESENT ILLNESS     Persistent nonspecific rash, followed by dermatology.  No significant change after holding lisinopril and hydrochlorothiazide.  Hypertension remains relatively stable on metoprolol.  Previously diagnosed with new onset type 2 diabetes on metformin  mg daily.  Fasting blood sugars are generally less than 120 without significant side effects from medication.      Patient Care Team:  Neftali Amezcua MD as PCP - General (Internal Medicine)  Juan Colby MD as Consulting Physician (Cardiology)  Ankush Sky MD as Surgeon (Orthopedic Surgery)  Vale Del Rosario APRN as Nurse Practitioner (Oncology)  Darrion Peña MD as Consulting Physician (Urology)  Tahir Childs MD as Consulting Physician (Pulmonary Disease)  Nmoan Mosleey MD as Consulting Physician (Gastroenterology)  Aura Perry OD (Optometry)  Juwan Diane MD as Consulting Physician (Ophthalmology)      REVIEW OF SYSTEMS     Review of Systems  Constitutional neg except per HPI   Resp neg for worsening shortness of breath   CV neg   GI neg      PHYSICAL EXAMINATION     Physical Exam  Alert with normal thought and judgment    Rash on arms  Cardiovascular: Normal rate, regular rhythm and normal heart sounds.   Pulmonary/Chest: Effort normal and breath sounds normal.     REVIEWED DATA     Labs:     Lab Results   Component Value Date     03/03/2020    K 4.1 03/03/2020    CALCIUM 9.2 03/03/2020    AST 20 03/03/2020    ALT 20 03/03/2020    BUN 12 03/03/2020    CREATININE 0.97 03/03/2020    CREATININE 1.02 02/08/2020    CREATININE 0.97 08/02/2019    EGFRIFNONA 76 03/03/2020    EGFRIFAFRI 92 03/03/2020        Lab Results   Component Value Date    HGBA1C 6.50 (H) 03/03/2020    HGBA1C 6.20 (H) 08/02/2019    HGBA1C 6.41 (H) 01/31/2019     (H) 03/03/2020     (H) 01/31/2019     (H) 07/31/2018       Lab Results   Component Value Date    LDL 84 08/02/2019    LDL 89 07/31/2018     (H) 02/24/2017    HDL 43 08/02/2019    HDL 40 07/31/2018    HDL 42 02/24/2017    TRIG 136 08/02/2019    TRIG 131 07/31/2018    TRIG 119 02/24/2017    CHOLHDLRATIO 3.58 08/02/2019    CHOLHDLRATIO 3.88 07/31/2018    CHOLHDLRATIO 3.98 02/24/2017       Lab Results   Component Value Date    TSH 2.45 10/14/2015       Lab Results   Component Value Date    WBC 12.11 (H) 02/08/2020    HGB 15.8 02/08/2020    HGB 16.1 11/19/2018    HGB 12.7 (L) 09/28/2018     02/08/2020       Lab Results   Component Value Date    PROTEIN 2+ (A) 03/27/2018    GLUCOSEU Negative 09/25/2018    BLOODU Negative 09/25/2018    NITRITEU Negative 09/25/2018    LEUKOCYTESUR Negative 09/25/2018       Imaging:         Medical Tests:         Summary of old records / correspondence / consultant report:         Request outside records:         ALLERGIES  Allergies   Allergen Reactions   • Bactrim [Sulfamethoxazole-Trimethoprim] Rash        PFSH:     The following portions of the patient's history were reviewed and updated as appropriate: Allergies / Current Medications / Past Medical History / Surgical History / Social History / Family History    PROBLEM LIST   Patient Active Problem List   Diagnosis   • Chronic obstructive pulmonary disease with acute exacerbation (CMS/HCC)   • Impotence of organic origin   • Hypertension   • Osteoarthritis   • History of smoking greater than 50 pack years   • Benign prostatic hyperplasia with urinary obstruction   • Permanent atrial fibrillation (CMS/HCC)   • Type 2 diabetes mellitus with hyperglycemia, without long-term current use of insulin (CMS/HCC)   • Arthritis of carpometacarpal (CMC) joint of left thumb   • Rash  and nonspecific skin eruption       PAST MEDICAL HISTORY  Past Medical History:   Diagnosis Date   • Alcohol abuse, in remission    • BPH (benign prostatic hypertrophy)     see doctors at [facility]   • COPD (chronic obstructive pulmonary disease) (CMS/HCC)    • Depression    • DJD (degenerative joint disease) of cervical spine    • ED (erectile dysfunction)     [facility]   • Hx of colonic polyps     followed by GI ([facility])   • Hyperlipidemia    • Hypertension    • Influenza B 02/14/2013       • Nocturnal hypoxia    • Osteoarthritis     HIPS, HANDS, MULTIPLE SITES   • Peripheral neuropathy    • Permanent atrial fibrillation (CMS/HCC)    • Rectal bleeding 04/26/2017   • Tendonitis of shoulder 11/07/2007    RIGHT SHOULDER/DELTOID INSERTION   • Ulcer of the stomach and intestine        SURGICAL HISTORY  Past Surgical History:   Procedure Laterality Date   • APPENDECTOMY     • CARDIOVASCULAR STRESS TEST N/A 10/20/2015    NML LEXISCAN CARDIOLITE PERFUSION STUDY, NO EVIDENCE OF ISCHEMIA, AREA OF HYPOPERFUSION OF THE INFERIOR WALL W/NORMAL WALL PERFUSION AND NML LEFT VENTRICULAR EJECTION FRACTION, MOST LIKELY ATTENUATION. DR.MICHAEL BOX   • COLONOSCOPY N/A 02/2017   • COLONOSCOPY N/A 02/23/2011    4 POLYPS REMOVED, RESCOPE IN 5 YRS, DR. EAGLE   • COLONOSCOPY N/A 03/08/2006    ERYTHEMOUS AND GRANULAR MUCOSA IN ILEOCECAL VALVE, NORMAL COLON, REPEAT IN 5 YRS,    • ENDOSCOPY N/A 9/26/2018    normal esophagus, acute gastritis, multiple non-bleeding duodenal ulcers with pigmented material, H Pylori positive   • HERNIA REPAIR Bilateral     INGUINAL   • JOINT REPLACEMENT  11/2015    left hip   • TOTAL HIP ARTHROPLASTY Left 11/06/2015    Dr Ankush Sky   • TOTAL HIP ARTHROPLASTY Right 10/27/2014    DR.RIED SKY   • VASECTOMY         SOCIAL HISTORY  Social History     Socioeconomic History   • Marital status:      Spouse name: Ara*   • Number of children: 3   • Years of education: Not  on file   • Highest education level: Not on file   Occupational History   • 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  Smoked 2 ppd for 49 years until he quit smoking 7 years ago for a 98 pack year history.   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   • Drug use: No     Comment: caffeine use    • Sexual activity: Defer   Lifestyle   • Physical activity:     Days per week: 2 days     Minutes per session: 20 min   • Stress: Not on file       FAMILY HISTORY  Family History   Problem Relation Age of Onset   • Colon cancer Mother    • Ovarian cancer Mother    • Dementia Mother    • Stroke Sister         October 2016   • Dementia Sister    • Heart disease Brother    • Alcohol abuse Brother    • Coronary artery disease Father    • Hypertension Father    • Heart disease Father    • Colon cancer Maternal Grandmother    • Heart disease Brother    • Coronary artery disease Brother    • Hypertension Brother    • Stroke Brother    • Arthritis Brother    • Prostate cancer Neg Hx          **Dragon Disclaimer:   Much of this encounter note is an electronic transcription/translation of spoken language to printed text. The electronic translation of spoken language may permit erroneous, or at times, nonsensical words or phrases to be inadvertently transcribed. Although I have reviewed the note for such errors, some may still exist.     Template created by Adelso Amezcua MD

## 2020-09-02 NOTE — PROGRESS NOTES
"09/02/2020      MEDICARE ANNUAL WELLNESS VISIT     Aj Salazar is a 74 y.o. male who presents for No chief complaint on file.      Patient's general assessment of his health since a year ago:     - Compared to one year ago, he feels his physical health is about the same without significant change.    - Compared to one year ago, he feels his mental health is about the same without significant change.      HPI for other active medical problems:           * The required components of Health Risk Assessment (HRA) that were completed by the patient and/or my staff are contained within this note and in the scanned documents titled \"Health Risk Assessment\" within the media section of the patient's chart in [employer].       HISTORY    Recent Hospitalizations:    Recent hospitalization?:     If YES, location, date, and diagnoses:     · Location:   · Date:   · Principle Discharge Dx:   · Secondary Dx:       Patient Care Team:    Patient Care Team:  Neftali Amezcua MD as PCP - General (Internal Medicine)  Juan Colby MD as Consulting Physician (Cardiology)  Ankush Sky MD as Surgeon (Orthopedic Surgery)  Vale Del Rosario APRN as Nurse Practitioner (Oncology)  Darrion Peña MD as Consulting Physician (Urology)  Tahir Childs MD as Consulting Physician (Pulmonary Disease)  Noman Moseley MD as Consulting Physician (Gastroenterology)  Aura Perry OD (Optometry)  Juwan Diane MD as Consulting Physician (Ophthalmology)      Allergies:  Bactrim [sulfamethoxazole-trimethoprim]    Medications:  Current Outpatient Medications   Medication Sig Dispense Refill   • acetaminophen (TYLENOL) 325 MG tablet Take 2 tablets by mouth Every 4 (Four) Hours As Needed for Mild Pain .     • ALPHAGAN P 0.1 % solution ophthalmic solution Administer 0.1 bottles to both eyes 1 (One) Time.  6   • betamethasone valerate (VALISONE) 0.1 % cream      • Blood Glucose Monitoring Suppl (ONE TOUCH ULTRA 2) w/Device kit Use to " check glucose every other day. dm2 /e 11.9 1 each 0   • ciclopirox (LOPROX) 1 % shampoo      • clobetasol (TEMOVATE) 0.05 % external solution APPLY 3 TO 5 DROPS TO SCALP QD PRF ITCH     • glucose blood test strip Use to check glucose every other day . DM 2/E11.9 100 each 11   • hydrOXYzine (ATARAX) 25 MG tablet Take 1 tablet by mouth Every 8 (Eight) Hours As Needed for Itching. 42 tablet 0   • Lancets (ONETOUCH ULTRASOFT) lancets Use to test glucose every other day.  Dx DM2  E11.9 100 each 11   • latanoprost (XALATAN) 0.005 % ophthalmic solution INT 1 GTT IN OU QPM     • metFORMIN ER (GLUCOPHAGE-XR) 500 MG 24 hr tablet TAKE 1 TABLET BY MOUTH DAILY WITH DINNER 30 tablet 11   • metoprolol tartrate (LOPRESSOR) 25 MG tablet Take 1 tablet by mouth 2 (Two) Times a Day. 180 tablet 3   • mupirocin (BACTROBAN) 2 % ointment      • pantoprazole (PROTONIX) 40 MG EC tablet TAKE 1 TABLET BY MOUTH DAILY 90 tablet 3   • PRADAXA 150 MG capsu TAKE 1 CAPSULE BY MOUTH EVERY 12 HOURS 180 capsule 2   • tamsulosin (FLOMAX) 0.4 MG capsule 24 hr capsule Take 1 capsule by mouth Every Night. 30 capsule    • TRELEGY ELLIPTA 100-62.5-25 MCG/INH aerosol powder  Inhale Daily.  3     No current facility-administered medications for this visit.         PFSH:     The following portions of the patient's history were reviewed and updated as appropriate: Allergies / Current Medications / Past Medical History / Surgical History / Social History / Family History    Problem List:  Patient Active Problem List   Diagnosis   • Chronic obstructive pulmonary disease with acute exacerbation (CMS/HCC)   • Impotence of organic origin   • Hypertension   • Osteoarthritis   • History of smoking greater than 50 pack years   • Benign prostatic hyperplasia with urinary obstruction   • Permanent atrial fibrillation (CMS/HCC)   • Type 2 diabetes mellitus with hyperglycemia, without long-term current use of insulin (CMS/HCC)   • Arthritis of carpometacarpal (CMC) joint of  left thumb   • Rash and nonspecific skin eruption       Past Medical History:  Past Medical History:   Diagnosis Date   • Alcohol abuse, in remission    • BPH (benign prostatic hypertrophy)     see doctors at [facility]   • COPD (chronic obstructive pulmonary disease) (CMS/HCC)    • Depression    • DJD (degenerative joint disease) of cervical spine    • ED (erectile dysfunction)     [facility]   • Hx of colonic polyps     followed by GI ([facility])   • Hyperlipidemia    • Hypertension    • Influenza B 02/14/2013       • Nocturnal hypoxia    • Osteoarthritis     HIPS, HANDS, MULTIPLE SITES   • Peripheral neuropathy    • Permanent atrial fibrillation (CMS/HCC)    • Rectal bleeding 04/26/2017   • Tendonitis of shoulder 11/07/2007    RIGHT SHOULDER/DELTOID INSERTION   • Ulcer of the stomach and intestine        Past Surgical History:  Past Surgical History:   Procedure Laterality Date   • APPENDECTOMY     • CARDIOVASCULAR STRESS TEST N/A 10/20/2015    NML LEXISCAN CARDIOLITE PERFUSION STUDY, NO EVIDENCE OF ISCHEMIA, AREA OF HYPOPERFUSION OF THE INFERIOR WALL W/NORMAL WALL PERFUSION AND NML LEFT VENTRICULAR EJECTION FRACTION, MOST LIKELY ATTENUATION. DR.MICHAEL BOX   • COLONOSCOPY N/A 02/2017   • COLONOSCOPY N/A 02/23/2011    4 POLYPS REMOVED, RESCOPE IN 5 YRS, DR. EAGLE   • COLONOSCOPY N/A 03/08/2006    ERYTHEMOUS AND GRANULAR MUCOSA IN ILEOCECAL VALVE, NORMAL COLON, REPEAT IN 5 YRS,    • ENDOSCOPY N/A 9/26/2018    normal esophagus, acute gastritis, multiple non-bleeding duodenal ulcers with pigmented material, H Pylori positive   • HERNIA REPAIR Bilateral     INGUINAL   • JOINT REPLACEMENT  11/2015    left hip   • TOTAL HIP ARTHROPLASTY Left 11/06/2015    Dr Ankush Sky   • TOTAL HIP ARTHROPLASTY Right 10/27/2014    DR.RIED SKY   • VASECTOMY         Social History:  Social History     Socioeconomic History   • Marital status:      Spouse name: Ara*   • Number of children: 3    • Years of education: Not on file   • Highest education level: Not on file   Occupational History   • Occupation: Retired     Comment: Supervisor/manager [employer]   Tobacco Use   • Smoking status: Former Smoker     Packs/day: 2.00     Years: 49.00     Pack years: 98.00     Last attempt to quit: 1/1/2010     Years since quitting: 10.6   • Smokeless tobacco: Never Used   • Tobacco comment: Began Smoking age 15.  Smoked 2 ppd for 49 years until he quit smoking 7 years ago for a 98 pack year history.   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   • Drug use: No     Comment: caffeine use    • Sexual activity: Defer   Lifestyle   • Physical activity:     Days per week: 2 days     Minutes per session: 20 min   • Stress: Not on file       Family History:  Family History   Problem Relation Age of Onset   • Colon cancer Mother    • Ovarian cancer Mother    • Alzheimer's disease Mother 70   • Stroke Sister         October 2016   • Dementia Sister    • Heart disease Brother    • Alcohol abuse Brother    • Coronary artery disease Father    • Hypertension Father    • Colon cancer Maternal Grandmother    • Heart disease Brother    • Coronary artery disease Brother    • Hypertension Brother    • Stroke Brother    • Arthritis Brother    • Prostate cancer Neg Hx          PATIENT ASSESSMENT    Vitals:  There were no vitals taken for this visit.  BP Readings from Last 3 Encounters:   09/02/20 130/80   06/16/20 120/70   03/03/20 122/62     Wt Readings from Last 3 Encounters:   09/02/20 88.5 kg (195 lb)   06/16/20 88.9 kg (196 lb)   03/03/20 87.5 kg (193 lb)      There is no height or weight on file to calculate BMI.    There were no vitals filed for this visit.      Review of Systems:    Review of Systems      Physical Exam:    Physical Exam      Reviewed Data:    Labs:   Lab Results   Component Value Date     03/03/2020    K 4.1 03/03/2020     CALCIUM 9.2 03/03/2020    AST 20 03/03/2020    ALT 20 03/03/2020    BUN 12 03/03/2020    CREATININE 0.97 03/03/2020    CREATININE 1.02 02/08/2020    CREATININE 0.97 08/02/2019    EGFRIFNONA 76 03/03/2020    EGFRIFAFRI 92 03/03/2020       Lab Results   Component Value Date     (H) 03/03/2020     (H) 01/31/2019     (H) 07/31/2018    HGBA1C 6.50 (H) 03/03/2020    HGBA1C 6.20 (H) 08/02/2019    HGBA1C 6.41 (H) 01/31/2019       Lab Results   Component Value Date    LDL 84 08/02/2019    LDL 89 07/31/2018     (H) 02/24/2017    HDL 43 08/02/2019    TRIG 136 08/02/2019    CHOLHDLRATIO 3.58 08/02/2019       Lab Results   Component Value Date    TSH 2.45 10/14/2015          Lab Results   Component Value Date    WBC 12.11 (H) 02/08/2020    HGB 15.8 02/08/2020    HGB 16.1 11/19/2018    HGB 12.7 (L) 09/28/2018     02/08/2020                   Lab Results   Component Value Date    PSA 7.8 (H) 04/30/2018    PSA 9.650 (H) 03/27/2018       Imaging:          Medical Tests:          Screening for Glaucoma:  Previous screening for glaucoma?: Yes      Hearing Loss Screen:  Finger Rub Hearing Test (right ear): passed  Finger Rub Hearing Test (left ear): passed      Urinary Incontinence Screen:  Episodes of urinary incontinence? : No      Depression Screen:  PHQ-2/PHQ-9 Depression Screening 9/2/2020   Little interest or pleasure in doing things 0   Feeling down, depressed, or hopeless 1   Total Score 1        PHQ-2: 0 (Not depressed)    PHQ-9:        FUNCTIONAL, FALL RISK, & COGNITIVE SCREENING (Components below):    DATA:    Functional & Cognitive Status 9/2/2020   Do you have difficulty preparing food and eating? No   Do you have difficulty bathing yourself, getting dressed or grooming yourself? No   Do you have difficulty using the toilet? No   Do you have difficulty moving around from place to place? No   Do you have trouble with steps or getting out of a bed or a chair? -   Current Diet Diabetic Diet    Dental Exam Up to date   Eye Exam Up to date   Exercise (times per week) 7 times per week   Current Exercise Activities Include Walking   Do you need help using the phone?  No   Are you deaf or do you have serious difficulty hearing?  No   Do you need help with transportation? No   Do you need help shopping? No   Do you need help preparing meals?  No   Do you need help with housework?  No   Do you need help with laundry? No   Do you need help taking your medications? No   Do you need help managing money? No   Do you ever drive or ride in a car without wearing a seat belt? No   Have you felt unusual stress, anger or loneliness in the last month? No   Who do you live with? Spouse   If you need help, do you have trouble finding someone available to you? No   Have you been bothered in the last four weeks by sexual problems? No   Do you have difficulty concentrating, remembering or making decisions? No         A) Assessment of Functional Ability:  (Assessment of ability to perform ADL's (showering/bathing, using toilet, dressing, feeding self, moving self around) and IADL's (use telephone, shop, prepare food, housekeep, do laundry, transport independently, take medications independently, and handle finances)    Degree of functional impairment: MILD (based on assessment noted above)      B) Assessment of Fall Risk:  Fall Risk Assessment was completed, and patient is at low risk for falls.       Need for further evaluation of gait, strength, and balance? : No    Timed Up and Go (TUG):   (>= 12 seconds indicates high risk for falling)    Observable abnormalities included: Normal gait pattern       C. Assessment of Cognitive Function:    Mini-Cog Test:     1) Registration (3 objects): Yes   2) Number of objects recalled: 3   3) Clock Draw: Passed? : N/A       Further evaluation required? : No      COUNSELING    A. Identification of Health Risk Factors:    Risk factors include: cardiovascular risk factors and history of  tobacco use      B. Age-Appropriate Screening Schedule:  (Refer to the list below for future screening recommendations based on patient's age, sex and/or medical conditions. Orders for these recommended tests are listed in the plan section. The patient has been provided with a written plan)    Health Maintenance Topics  Health Maintenance   Topic Date Due   • URINE MICROALBUMIN  1945   • DIABETIC EYE EXAM  03/17/2016   • LIPID PANEL  08/02/2020   • HEMOGLOBIN A1C  09/03/2020   • DIABETIC FOOT EXAM  09/02/2021   • COLONOSCOPY  03/23/2022   • TDAP/TD VACCINES (2 - Td) 02/24/2027   • INFLUENZA VACCINE  Completed   • ZOSTER VACCINE  Completed       Health Maintenance Topics Due or Over-Due  Health Maintenance Due   Topic Date Due   • URINE MICROALBUMIN  1945   • DIABETIC EYE EXAM  03/17/2016   • LUNG CANCER SCREENING  08/06/2019   • LIPID PANEL  08/02/2020         C. Advanced Care Planning:    Advance Care Planning   ACP discussion was held with the patient during this visit. Patient has an advance directive (not in EMR), copy requested.       D. Patient Self-Management and Personalized Health Advice:    He has been provided with personalized counseling/information (including brochures/handouts) about:     -- optimizing diet/nutrition plans, improving exercise / conditioning, weight management and reducing risk for cardiac/vascular events with pre-existing disease      He has been recommended for the following preventative services which has been performed today, will be ordered today or ordered/performed on upcoming follow-up visit:     -- nutrition counseling provided, exercise counseling provided, recommended eye exam for glaucoma screening, counseling for cardiovascular disease risk reduction, fall risk assessment / plan of care completed, urinary incontinence assessment done, screening for prostate cancer (discussed and followed/managed by urologist),lung cancer screening, vaccination for influenza  administered/recommended      E. Miscellaneous Items:    -Aspirin use counseling: Does not need ASA (and currently is not on it)    -Discussed BMI with him. The BMI is above average; BMI management plan is completed (discussed plans for weight loss)    -Reviewed use of high risk medication in the elderly: YES    -Reviewed for potential of harmful drug interactions in the elderly: YES      IV. WRAP-UP    Assessment & Plan:    1) MEDICARE ANNUAL WELLNESS VISIT    2) OTHER MEDICAL CONDITIONS ADDRESSED TODAY:            Problem List Items Addressed This Visit     None                  No orders of the defined types were placed in this encounter.      Discussion / Summary:        Medications as of TODAY:              Current Outpatient Medications   Medication Sig Dispense Refill   • acetaminophen (TYLENOL) 325 MG tablet Take 2 tablets by mouth Every 4 (Four) Hours As Needed for Mild Pain .     • ALPHAGAN P 0.1 % solution ophthalmic solution Administer 0.1 bottles to both eyes 1 (One) Time.  6   • betamethasone valerate (VALISONE) 0.1 % cream      • Blood Glucose Monitoring Suppl (ONE TOUCH ULTRA 2) w/Device kit Use to check glucose every other day. dm2 /e 11.9 1 each 0   • ciclopirox (LOPROX) 1 % shampoo      • clobetasol (TEMOVATE) 0.05 % external solution APPLY 3 TO 5 DROPS TO SCALP QD PRF ITCH     • glucose blood test strip Use to check glucose every other day . DM 2/E11.9 100 each 11   • hydrOXYzine (ATARAX) 25 MG tablet Take 1 tablet by mouth Every 8 (Eight) Hours As Needed for Itching. 42 tablet 0   • Lancets (ONETOUCH ULTRASOFT) lancets Use to test glucose every other day.  Dx DM2  E11.9 100 each 11   • latanoprost (XALATAN) 0.005 % ophthalmic solution INT 1 GTT IN OU QPM     • metFORMIN ER (GLUCOPHAGE-XR) 500 MG 24 hr tablet TAKE 1 TABLET BY MOUTH DAILY WITH DINNER 30 tablet 11   • metoprolol tartrate (LOPRESSOR) 25 MG tablet Take 1 tablet by mouth 2 (Two) Times a Day. 180 tablet 3   • mupirocin (BACTROBAN) 2 %  ointment      • pantoprazole (PROTONIX) 40 MG EC tablet TAKE 1 TABLET BY MOUTH DAILY 90 tablet 3   • PRADAXA 150 MG capsu TAKE 1 CAPSULE BY MOUTH EVERY 12 HOURS 180 capsule 2   • tamsulosin (FLOMAX) 0.4 MG capsule 24 hr capsule Take 1 capsule by mouth Every Night. 30 capsule    • TRELEGY ELLIPTA 100-62.5-25 MCG/INH aerosol powder  Inhale Daily.  3     No current facility-administered medications for this visit.          FOLLOW-UP:            Return in about 6 months (around 3/2/2021) for Reassess chronic medical problems.                 Future Appointments   Date Time Provider Department Center   12/16/2020 12:20 PM Juan Colby MD [facility] None   3/2/2021  9:15 AM Neftali Amezcua MD [facility] None         ______________________________________________________________________      A printed After Visit Summary (AVS) including the Personalized Prevention  Plan Services (PPPS) was given to the patient at check-out today.     Educational Handouts    Strong & Stable / Balance / Physical Activity / Pain / Depression /  Forgetfulness / Healthy Eating / Alcohol / Smoking /  Medicine / Sexuality / Scams     **Dragon Disclaimer:   Much of this encounter note is an electronic transcription/translation of spoken language to printed text. The electronic translation of spoken language may permit erroneous, or at times, nonsensical words or phrases to be inadvertently transcribed. Although I have reviewed the note for such errors, some may still exist.     This template was created by Adelso Amezcua MD.

## 2020-09-02 NOTE — ASSESSMENT & PLAN NOTE
Has been off lisinopril and HCTZ due to skin problems but without significant change.   Blood pressure remains stable on metoprolol at this time continue same.   BP Readings from Last 3 Encounters:   09/02/20 130/80   06/16/20 120/70   03/03/20 122/62

## 2020-09-03 DIAGNOSIS — N39.0 URINARY TRACT INFECTION WITHOUT HEMATURIA, SITE UNSPECIFIED: Primary | ICD-10-CM

## 2020-09-03 LAB
ALBUMIN/CREAT UR: 123 MG/G CREAT (ref 0–29)
APPEARANCE UR: ABNORMAL
BACTERIA #/AREA URNS HPF: ABNORMAL /HPF
BILIRUB UR QL STRIP: NEGATIVE
CHOLEST SERPL-MCNC: 153 MG/DL (ref 0–200)
CHOLEST/HDLC SERPL: 3.56 {RATIO}
COLOR UR: YELLOW
CREAT UR-MCNC: 118 MG/DL
EPI CELLS #/AREA URNS HPF: ABNORMAL /HPF
GLUCOSE UR QL: NEGATIVE
HBA1C MFR BLD: 6 % (ref 4.8–5.6)
HDLC SERPL-MCNC: 43 MG/DL (ref 40–60)
HGB UR QL STRIP: ABNORMAL
KETONES UR QL STRIP: NEGATIVE
LDLC SERPL CALC-MCNC: 90 MG/DL (ref 0–100)
LEUKOCYTE ESTERASE UR QL STRIP: ABNORMAL
MICROALBUMIN UR-MCNC: 145 UG/ML
NITRITE UR QL STRIP: NEGATIVE
PH UR STRIP: 5.5 [PH] (ref 5–8)
PROT UR QL STRIP: ABNORMAL
RBC #/AREA URNS HPF: ABNORMAL /HPF
SP GR UR: 1.02 (ref 1–1.03)
TRIGL SERPL-MCNC: 101 MG/DL (ref 0–150)
UROBILINOGEN UR STRIP-MCNC: ABNORMAL MG/DL
VLDLC SERPL CALC-MCNC: 20.2 MG/DL
WBC #/AREA URNS HPF: ABNORMAL /HPF

## 2020-09-03 RX ORDER — CIPROFLOXACIN 250 MG/1
250 TABLET, FILM COATED ORAL 2 TIMES DAILY
Qty: 14 TABLET | Refills: 0 | Status: SHIPPED | OUTPATIENT
Start: 2020-09-03 | End: 2020-10-22

## 2020-09-03 NOTE — PROGRESS NOTES
Call patient with his test result(s) and mail the results to him if MyChart is NOT active.    1. A1c is stable for diabetes.   2. Cholesterol is stable.   3. Urinalysis shows possible infection. Start Cipro 250 mg BID x 7 days. Repeat urinalysis with culture in 2 weeks.

## 2020-09-08 ENCOUNTER — HOSPITAL ENCOUNTER (OUTPATIENT)
Dept: PET IMAGING | Facility: HOSPITAL | Age: 75
Discharge: HOME | End: 2020-09-08
Admitting: INTERNAL MEDICINE

## 2020-09-08 PROCEDURE — G0297 LDCT FOR LUNG CA SCREEN: HCPCS

## 2020-09-09 NOTE — PROGRESS NOTES
MyChart:    Aj, here are the result(s) of your test(s):     Lung cancer screening CT was negative. Recommend annual screening.    Incidentally multiple coronary artery calcification is noted indicative for coronary artery disease.   Will discuss in detail on follow-up.     Please do not hesitate to contact me if you have questions.

## 2020-09-16 ENCOUNTER — LAB (OUTPATIENT)
Dept: INTERNAL MEDICINE | Age: 75
End: 2020-09-16

## 2020-09-19 LAB
APPEARANCE UR: CLEAR
BACTERIA #/AREA URNS HPF: ABNORMAL /HPF
BACTERIA UR CULT: ABNORMAL
BILIRUB UR QL STRIP: NEGATIVE
COLOR UR: YELLOW
EPI CELLS #/AREA URNS HPF: ABNORMAL /HPF (ref 0–10)
GLUCOSE UR QL: NEGATIVE
HGB UR QL STRIP: ABNORMAL
KETONES UR QL STRIP: NEGATIVE
LEUKOCYTE ESTERASE UR QL STRIP: NEGATIVE
MICRO URNS: ABNORMAL
NITRITE UR QL STRIP: NEGATIVE
OTHER ANTIBIOTIC SUSC ISLT: ABNORMAL
PH UR STRIP: 5 [PH] (ref 5–7.5)
PROT UR QL STRIP: ABNORMAL
RBC #/AREA URNS HPF: ABNORMAL /HPF (ref 0–2)
SP GR UR: 1.02 (ref 1–1.03)
URINALYSIS REFLEX: ABNORMAL
UROBILINOGEN UR STRIP-MCNC: 0.2 MG/DL (ref 0.2–1)
WBC #/AREA URNS HPF: ABNORMAL /HPF (ref 0–5)

## 2020-09-21 DIAGNOSIS — R80.9 PROTEINURIA, UNSPECIFIED TYPE: Primary | ICD-10-CM

## 2020-09-21 DIAGNOSIS — R31.9 HEMATURIA, UNSPECIFIED TYPE: ICD-10-CM

## 2020-09-21 NOTE — PROGRESS NOTES
Call patient with his test result(s) and mail the results to him if MyChart is NOT active.    There is persistent protein and/or blood in urine. Recheck urinalysis with micro in 1 month.

## 2020-09-22 ENCOUNTER — TELEPHONE (OUTPATIENT)
Dept: INTERNAL MEDICINE | Age: 75
End: 2020-09-22

## 2020-09-22 NOTE — TELEPHONE ENCOUNTER
There was [facility] culture [facility] . They stated they didn't know what to do with the order .     Rep contact info is [phone]

## 2020-10-09 RX ORDER — ATENOLOL 100 MG/1
TABLET ORAL
Qty: 180 CAPSULE | Refills: 3 | Status: SHIPPED | OUTPATIENT
Start: 2020-10-09 | End: 2020-10-22

## 2020-10-19 ENCOUNTER — RESULTS ENCOUNTER (OUTPATIENT)
Dept: INTERNAL MEDICINE | Age: 75
End: 2020-10-19

## 2020-10-19 DIAGNOSIS — R80.9 PROTEINURIA, UNSPECIFIED TYPE: ICD-10-CM

## 2020-10-19 DIAGNOSIS — R31.9 HEMATURIA, UNSPECIFIED TYPE: ICD-10-CM

## 2020-10-21 LAB
APPEARANCE UR: CLEAR
BACTERIA #/AREA URNS HPF: ABNORMAL /HPF
BILIRUB UR QL STRIP: NEGATIVE
CASTS URNS MICRO: ABNORMAL
COLOR UR: YELLOW
EPI CELLS #/AREA URNS HPF: ABNORMAL /HPF
GLUCOSE UR QL: NEGATIVE
HGB UR QL STRIP: ABNORMAL
KETONES UR QL STRIP: NEGATIVE
LEUKOCYTE ESTERASE UR QL STRIP: ABNORMAL
NITRITE UR QL STRIP: NEGATIVE
PH UR STRIP: <=5 [PH] (ref 5–8)
PROT UR QL STRIP: ABNORMAL
RBC #/AREA URNS HPF: ABNORMAL /HPF
SP GR UR: 1.02 (ref 1–1.03)
UROBILINOGEN UR STRIP-MCNC: ABNORMAL MG/DL
WBC #/AREA URNS HPF: ABNORMAL /HPF

## 2020-10-22 ENCOUNTER — APPOINTMENT (OUTPATIENT)
Dept: MRI IMAGING | Facility: HOSPITAL | Age: 75
End: 2020-10-22

## 2020-10-22 ENCOUNTER — HOSPITAL ENCOUNTER (OUTPATIENT)
Facility: HOSPITAL | Age: 75
Setting detail: OBSERVATION
Discharge: HOME | End: 2020-10-23
Attending: EMERGENCY MEDICINE | Admitting: INTERNAL MEDICINE

## 2020-10-22 ENCOUNTER — APPOINTMENT (OUTPATIENT)
Dept: CT IMAGING | Facility: HOSPITAL | Age: 75
End: 2020-10-22

## 2020-10-22 ENCOUNTER — APPOINTMENT (OUTPATIENT)
Dept: GENERAL RADIOLOGY | Facility: HOSPITAL | Age: 75
End: 2020-10-22

## 2020-10-22 DIAGNOSIS — I63.9 CEREBROVASCULAR ACCIDENT (CVA), UNSPECIFIED MECHANISM (HCC): Primary | ICD-10-CM

## 2020-10-22 DIAGNOSIS — I48.91 ATRIAL FIBRILLATION, UNSPECIFIED TYPE (HCC): ICD-10-CM

## 2020-10-22 LAB
ABO GROUP BLD: NORMAL
ALBUMIN SERPL-MCNC: 3.8 G/DL (ref 3.5–5.2)
ALBUMIN/GLOB SERPL: 1.7 G/DL
ALP SERPL-CCNC: 84 U/L (ref 39–117)
ALT SERPL W P-5'-P-CCNC: 22 U/L (ref 1–41)
ANION GAP SERPL CALCULATED.3IONS-SCNC: 6.2 MMOL/L (ref 5–15)
APTT PPP: 44 SECONDS (ref 22.7–35.4)
AST SERPL-CCNC: 21 U/L (ref 1–40)
B PARAPERT DNA SPEC QL NAA+PROBE: NOT DETECTED
B PERT DNA SPEC QL NAA+PROBE: NOT DETECTED
BASOPHILS # BLD AUTO: 0.05 10*3/MM3 (ref 0–0.2)
BASOPHILS NFR BLD AUTO: 0.7 % (ref 0–1.5)
BILIRUB SERPL-MCNC: 0.8 MG/DL (ref 0–1.2)
BLD GP AB SCN SERPL QL: NEGATIVE
BUN SERPL-MCNC: 12 MG/DL (ref 8–23)
BUN/CREAT SERPL: 12.5 (ref 7–25)
C PNEUM DNA NPH QL NAA+NON-PROBE: NOT DETECTED
CALCIUM SPEC-SCNC: 8.5 MG/DL (ref 8.6–10.5)
CHLORIDE SERPL-SCNC: 106 MMOL/L (ref 98–107)
CO2 SERPL-SCNC: 27.8 MMOL/L (ref 22–29)
CREAT SERPL-MCNC: 0.96 MG/DL (ref 0.76–1.27)
DEPRECATED RDW RBC AUTO: 41.2 FL (ref 37–54)
EOSINOPHIL # BLD AUTO: 0.13 10*3/MM3 (ref 0–0.4)
EOSINOPHIL NFR BLD AUTO: 1.8 % (ref 0.3–6.2)
ERYTHROCYTE [DISTWIDTH] IN BLOOD BY AUTOMATED COUNT: 12.8 % (ref 12.3–15.4)
FLUAV SUBTYP SPEC NAA+PROBE: NOT DETECTED
FLUBV RNA ISLT QL NAA+PROBE: NOT DETECTED
GFR SERPL CREATININE-BSD FRML MDRD: 77 ML/MIN/1.73
GLOBULIN UR ELPH-MCNC: 2.2 GM/DL
GLUCOSE BLDC GLUCOMTR-MCNC: 106 MG/DL (ref 70–130)
GLUCOSE BLDC GLUCOMTR-MCNC: 94 MG/DL (ref 70–130)
GLUCOSE SERPL-MCNC: 104 MG/DL (ref 65–99)
HADV DNA SPEC NAA+PROBE: NOT DETECTED
HCOV 229E RNA SPEC QL NAA+PROBE: NOT DETECTED
HCOV HKU1 RNA SPEC QL NAA+PROBE: NOT DETECTED
HCOV NL63 RNA SPEC QL NAA+PROBE: NOT DETECTED
HCOV OC43 RNA SPEC QL NAA+PROBE: NOT DETECTED
HCT VFR BLD AUTO: 44.9 % (ref 37.5–51)
HGB BLD-MCNC: 15.4 G/DL (ref 13–17.7)
HMPV RNA NPH QL NAA+NON-PROBE: NOT DETECTED
HOLD SPECIMEN: NORMAL
HOLD SPECIMEN: NORMAL
HPIV1 RNA SPEC QL NAA+PROBE: NOT DETECTED
HPIV2 RNA SPEC QL NAA+PROBE: NOT DETECTED
HPIV3 RNA NPH QL NAA+PROBE: NOT DETECTED
HPIV4 P GENE NPH QL NAA+PROBE: NOT DETECTED
IMM GRANULOCYTES # BLD AUTO: 0.04 10*3/MM3 (ref 0–0.05)
IMM GRANULOCYTES NFR BLD AUTO: 0.6 % (ref 0–0.5)
INR PPP: 1.41 (ref 0.9–1.1)
LYMPHOCYTES # BLD AUTO: 1.64 10*3/MM3 (ref 0.7–3.1)
LYMPHOCYTES NFR BLD AUTO: 23.3 % (ref 19.6–45.3)
M PNEUMO IGG SER IA-ACNC: NOT DETECTED
MCH RBC QN AUTO: 30.1 PG (ref 26.6–33)
MCHC RBC AUTO-ENTMCNC: 34.3 G/DL (ref 31.5–35.7)
MCV RBC AUTO: 87.9 FL (ref 79–97)
MONOCYTES # BLD AUTO: 0.64 10*3/MM3 (ref 0.1–0.9)
MONOCYTES NFR BLD AUTO: 9.1 % (ref 5–12)
NEUTROPHILS NFR BLD AUTO: 4.54 10*3/MM3 (ref 1.7–7)
NEUTROPHILS NFR BLD AUTO: 64.5 % (ref 42.7–76)
NRBC BLD AUTO-RTO: 0 /100 WBC (ref 0–0.2)
PLATELET # BLD AUTO: 200 10*3/MM3 (ref 140–450)
PMV BLD AUTO: 9.3 FL (ref 6–12)
POTASSIUM SERPL-SCNC: 4.1 MMOL/L (ref 3.5–5.2)
PROT SERPL-MCNC: 6 G/DL (ref 6–8.5)
PROTHROMBIN TIME: 17 SECONDS (ref 11.7–14.2)
RBC # BLD AUTO: 5.11 10*6/MM3 (ref 4.14–5.8)
RH BLD: NEGATIVE
RHINOVIRUS RNA SPEC NAA+PROBE: NOT DETECTED
RSV RNA NPH QL NAA+NON-PROBE: NOT DETECTED
SARS-COV-2 RNA NPH QL NAA+NON-PROBE: NOT DETECTED
SODIUM SERPL-SCNC: 140 MMOL/L (ref 136–145)
T&S EXPIRATION DATE: NORMAL
TROPONIN T SERPL-MCNC: <0.01 NG/ML (ref 0–0.03)
WBC # BLD AUTO: 7.04 10*3/MM3 (ref 3.4–10.8)
WHOLE BLOOD HOLD SPECIMEN: NORMAL
WHOLE BLOOD HOLD SPECIMEN: NORMAL

## 2020-10-22 PROCEDURE — G0378 HOSPITAL OBSERVATION PER HR: HCPCS

## 2020-10-22 PROCEDURE — 70551 MRI BRAIN STEM W/O DYE: CPT

## 2020-10-22 PROCEDURE — 86900 BLOOD TYPING SEROLOGIC ABO: CPT | Performed by: EMERGENCY MEDICINE

## 2020-10-22 PROCEDURE — 85610 PROTHROMBIN TIME: CPT | Performed by: EMERGENCY MEDICINE

## 2020-10-22 PROCEDURE — 85025 COMPLETE CBC W/AUTO DIFF WBC: CPT | Performed by: EMERGENCY MEDICINE

## 2020-10-22 PROCEDURE — 70450 CT HEAD/BRAIN W/O DYE: CPT

## 2020-10-22 PROCEDURE — 86901 BLOOD TYPING SEROLOGIC RH(D): CPT | Performed by: EMERGENCY MEDICINE

## 2020-10-22 PROCEDURE — 82962 GLUCOSE BLOOD TEST: CPT

## 2020-10-22 PROCEDURE — 99285 EMERGENCY DEPT VISIT HI MDM: CPT

## 2020-10-22 PROCEDURE — 86850 RBC ANTIBODY SCREEN: CPT | Performed by: EMERGENCY MEDICINE

## 2020-10-22 PROCEDURE — 85730 THROMBOPLASTIN TIME PARTIAL: CPT | Performed by: EMERGENCY MEDICINE

## 2020-10-22 PROCEDURE — 0202U NFCT DS 22 TRGT SARS-COV-2: CPT | Performed by: EMERGENCY MEDICINE

## 2020-10-22 PROCEDURE — 80053 COMPREHEN METABOLIC PANEL: CPT | Performed by: EMERGENCY MEDICINE

## 2020-10-22 PROCEDURE — 82565 ASSAY OF CREATININE: CPT

## 2020-10-22 PROCEDURE — 93005 ELECTROCARDIOGRAM TRACING: CPT | Performed by: EMERGENCY MEDICINE

## 2020-10-22 PROCEDURE — 71045 X-RAY EXAM CHEST 1 VIEW: CPT

## 2020-10-22 PROCEDURE — 93010 ELECTROCARDIOGRAM REPORT: CPT | Performed by: INTERNAL MEDICINE

## 2020-10-22 PROCEDURE — 84484 ASSAY OF TROPONIN QUANT: CPT | Performed by: EMERGENCY MEDICINE

## 2020-10-22 RX ORDER — SODIUM CHLORIDE 0.9 % (FLUSH) 0.9 %
10 SYRINGE (ML) INJECTION AS NEEDED
Status: DISCONTINUED | OUTPATIENT
Start: 2020-10-22 | End: 2020-10-23 | Stop reason: HOSPADM

## 2020-10-22 RX ORDER — ASPIRIN 300 MG/1
300 SUPPOSITORY RECTAL ONCE
Status: COMPLETED | OUTPATIENT
Start: 2020-10-22 | End: 2020-10-22

## 2020-10-22 RX ORDER — SODIUM CHLORIDE 0.9 % (FLUSH) 0.9 %
10 SYRINGE (ML) INJECTION EVERY 12 HOURS SCHEDULED
Status: DISCONTINUED | OUTPATIENT
Start: 2020-10-22 | End: 2020-10-23 | Stop reason: HOSPADM

## 2020-10-22 RX ORDER — ASPIRIN 300 MG/1
300 SUPPOSITORY RECTAL DAILY
Status: DISCONTINUED | OUTPATIENT
Start: 2020-10-22 | End: 2020-10-23

## 2020-10-22 RX ORDER — DABIGATRAN ETEXILATE 150 MG/1
150 CAPSULE ORAL 2 TIMES DAILY
COMMUNITY
End: 2020-11-02 | Stop reason: SDUPTHER

## 2020-10-22 RX ORDER — LATANOPROST 50 UG/ML
1 SOLUTION/ DROPS OPHTHALMIC NIGHTLY
Status: DISCONTINUED | OUTPATIENT
Start: 2020-10-22 | End: 2020-10-23 | Stop reason: HOSPADM

## 2020-10-22 RX ORDER — PANTOPRAZOLE SODIUM 40 MG/1
40 TABLET, DELAYED RELEASE ORAL
Status: DISCONTINUED | OUTPATIENT
Start: 2020-10-23 | End: 2020-10-23 | Stop reason: HOSPADM

## 2020-10-22 RX ORDER — TAMSULOSIN HYDROCHLORIDE 0.4 MG/1
0.8 CAPSULE ORAL NIGHTLY
Status: DISCONTINUED | OUTPATIENT
Start: 2020-10-22 | End: 2020-10-23 | Stop reason: HOSPADM

## 2020-10-22 RX ORDER — NICOTINE POLACRILEX 4 MG
15 LOZENGE BUCCAL
Status: DISCONTINUED | OUTPATIENT
Start: 2020-10-22 | End: 2020-10-23 | Stop reason: HOSPADM

## 2020-10-22 RX ORDER — ENALAPRILAT 2.5 MG/2ML
0.62 INJECTION INTRAVENOUS EVERY 6 HOURS PRN
Status: DISCONTINUED | OUTPATIENT
Start: 2020-10-22 | End: 2020-10-23 | Stop reason: HOSPADM

## 2020-10-22 RX ORDER — ACETAMINOPHEN 160 MG/5ML
650 SOLUTION ORAL EVERY 4 HOURS PRN
Status: DISCONTINUED | OUTPATIENT
Start: 2020-10-22 | End: 2020-10-23 | Stop reason: HOSPADM

## 2020-10-22 RX ORDER — TAFLUPROST OPTHALMIC 0 MG/.3ML
1 SOLUTION/ DROPS OPHTHALMIC NIGHTLY
COMMUNITY

## 2020-10-22 RX ORDER — ONDANSETRON 2 MG/ML
4 INJECTION INTRAMUSCULAR; INTRAVENOUS EVERY 6 HOURS PRN
Status: DISCONTINUED | OUTPATIENT
Start: 2020-10-22 | End: 2020-10-23 | Stop reason: HOSPADM

## 2020-10-22 RX ORDER — ATORVASTATIN CALCIUM 80 MG/1
80 TABLET, FILM COATED ORAL NIGHTLY
Status: DISCONTINUED | OUTPATIENT
Start: 2020-10-22 | End: 2020-10-23 | Stop reason: HOSPADM

## 2020-10-22 RX ORDER — DEXTROSE MONOHYDRATE 25 G/50ML
25 INJECTION, SOLUTION INTRAVENOUS
Status: DISCONTINUED | OUTPATIENT
Start: 2020-10-22 | End: 2020-10-23 | Stop reason: HOSPADM

## 2020-10-22 RX ORDER — ACETAMINOPHEN 325 MG/1
650 TABLET ORAL EVERY 4 HOURS PRN
Status: DISCONTINUED | OUTPATIENT
Start: 2020-10-22 | End: 2020-10-23 | Stop reason: HOSPADM

## 2020-10-22 RX ORDER — ACETAMINOPHEN 650 MG/1
650 SUPPOSITORY RECTAL EVERY 4 HOURS PRN
Status: DISCONTINUED | OUTPATIENT
Start: 2020-10-22 | End: 2020-10-23 | Stop reason: HOSPADM

## 2020-10-22 RX ORDER — LABETALOL HYDROCHLORIDE 5 MG/ML
10 INJECTION, SOLUTION INTRAVENOUS
Status: DISCONTINUED | OUTPATIENT
Start: 2020-10-22 | End: 2020-10-22

## 2020-10-22 RX ORDER — ASPIRIN 81 MG/1
324 TABLET, CHEWABLE ORAL ONCE
Status: DISCONTINUED | OUTPATIENT
Start: 2020-10-22 | End: 2020-10-22

## 2020-10-22 RX ORDER — DABIGATRAN ETEXILATE 150 MG/1
150 CAPSULE ORAL EVERY 12 HOURS SCHEDULED
Status: DISCONTINUED | OUTPATIENT
Start: 2020-10-22 | End: 2020-10-23 | Stop reason: HOSPADM

## 2020-10-22 RX ORDER — ASPIRIN 81 MG/1
81 TABLET, CHEWABLE ORAL DAILY
Status: DISCONTINUED | OUTPATIENT
Start: 2020-10-22 | End: 2020-10-23

## 2020-10-22 RX ADMIN — SODIUM CHLORIDE, PRESERVATIVE FREE 10 ML: 5 INJECTION INTRAVENOUS at 21:59

## 2020-10-22 RX ADMIN — BRIMONIDINE TARTRATE 1 DROP: 1 SOLUTION/ DROPS OPHTHALMIC at 21:59

## 2020-10-22 RX ADMIN — ASPIRIN 300 MG: 300 SUPPOSITORY RECTAL at 13:36

## 2020-10-22 NOTE — ED PROVIDER NOTES
EMERGENCY DEPARTMENT ENCOUNTER    Room Number:  34/34  Date of encounter:  10/22/2020  PCP: Neftali Amezcua MD  Historian: Patient and spouse      HPI:  Chief Complaint: Concerned arms having a stroke  A complete HPI/ROS/PMH/PSH/SH/FH are unobtainable due to: Not applicable  Context: Aj Salazar is a 74 y.o. male who presents to the ED c/o 10:30 AM had left facial droop and slurred speech with left hand not working appropriately and abnormal sensation.  The symptoms have essentially 100% resolved.  He had no pain with the symptoms.  He has never had a history of stroke in the past.  He had no visual change, any weakness to lower extremities.  Again at this time he has no motor or sensory changes to any of his extremities and his speech is returned to normal.  He does have a history of atrial fibrillation and he has been compliant with his Pradaxa.  Has not had any fevers chills, coughs or colds.        Previous Episodes: No  Current Symptoms: Currently asymptomatic    MEDICAL HISTORY REVIEWED        PAST MEDICAL HISTORY  Active Ambulatory Problems     Diagnosis Date Noted   • Chronic obstructive pulmonary disease with acute exacerbation (CMS/[facility]) 03/17/2016   • Impotence of organic origin 03/17/2016   • Hypertension 03/17/2016   • Osteoarthritis 03/17/2016   • History of smoking greater than 50 pack years 06/23/2017   • Benign prostatic hyperplasia with urinary obstruction 03/27/2018   • Permanent atrial fibrillation (CMS/[facility])    • Type 2 diabetes mellitus with hyperglycemia, without long-term current use of insulin (CMS/[facility]) 01/31/2019   • Arthritis of carpometacarpal (CMC) joint of left thumb 03/03/2020   • Rash and nonspecific skin eruption 09/02/2020     Resolved Ambulatory Problems     Diagnosis Date Noted   • Dehydration 09/25/2018   • Hypotension 09/25/2018   • Melena 09/25/2018   • EMPERATRIZ (acute kidney injury) (CMS/[facility]) 09/25/2018   • Hyponatremia 09/25/2018   • Lactic acidosis 09/25/2018     Past Medical  History:   Diagnosis Date   • Alcohol abuse, in remission    • BPH (benign prostatic hypertrophy)    • COPD (chronic obstructive pulmonary disease) (CMS/HCC)    • Depression    • DJD (degenerative joint disease) of cervical spine    • ED (erectile dysfunction)    • Hx of colonic polyps    • Hyperlipidemia    • Influenza B 02/14/2013   • Nocturnal hypoxia    • Peripheral neuropathy    • Rectal bleeding 04/26/2017   • Tendonitis of shoulder 11/07/2007   • Ulcer of the stomach and intestine          PAST SURGICAL HISTORY  Past Surgical History:   Procedure Laterality Date   • APPENDECTOMY     • CARDIOVASCULAR STRESS TEST N/A 10/20/2015    NML LEXISCAN CARDIOLITE PERFUSION STUDY, NO EVIDENCE OF ISCHEMIA, AREA OF HYPOPERFUSION OF THE INFERIOR WALL W/NORMAL WALL PERFUSION AND NML LEFT VENTRICULAR EJECTION FRACTION, MOST LIKELY ATTENUATION. DR.MICHAEL BOX   • COLONOSCOPY N/A 02/2017   • COLONOSCOPY N/A 02/23/2011    4 POLYPS REMOVED, RESCOPE IN 5 YRS, DR. EAGLE   • COLONOSCOPY N/A 03/08/2006    ERYTHEMOUS AND GRANULAR MUCOSA IN ILEOCECAL VALVE, NORMAL COLON, REPEAT IN 5 YRS,    • ENDOSCOPY N/A 9/26/2018    normal esophagus, acute gastritis, multiple non-bleeding duodenal ulcers with pigmented material, H Pylori positive   • HERNIA REPAIR Bilateral     INGUINAL   • JOINT REPLACEMENT  11/2015    left hip   • TOTAL HIP ARTHROPLASTY Left 11/06/2015    Dr Ankush Sky   • TOTAL HIP ARTHROPLASTY Right 10/27/2014    DR.RIED SKY   • VASECTOMY           FAMILY HISTORY  Family History   Problem Relation Age of Onset   • Colon cancer Mother    • Ovarian cancer Mother    • Alzheimer's disease Mother 70   • Stroke Sister         October 2016   • Dementia Sister    • Heart disease Brother    • Alcohol abuse Brother    • Coronary artery disease Father    • Hypertension Father    • Colon cancer Maternal Grandmother    • Heart disease Brother    • Coronary artery disease Brother    • Hypertension Brother    • Stroke  Brother    • Arthritis Brother    • Prostate cancer Neg Hx          SOCIAL HISTORY  Social History     Socioeconomic History   • Marital status:      Spouse name: Ara*   • Number of children: 3   • Years of education: Not on file   • Highest education level: Not on file   Occupational History   • Occupation: Retired     Comment: Supervisor/manager [employer]   Tobacco Use   • Smoking status: Former Smoker     Packs/day: 2.00     Years: 49.00     Pack years: 98.00     Quit date: 1/1/2010     Years since quitting: 10.8   • Smokeless tobacco: Never Used   • Tobacco comment: Began Smoking age 15.  Smoked 2 ppd for 49 years until he quit smoking 7 years ago for a 98 pack year history.   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   • Drug use: No     Comment: caffeine use    • Sexual activity: Defer   Lifestyle   • Physical activity     Days per week: 2 days     Minutes per session: 20 min   • Stress: Not on file         ALLERGIES  Bactrim [sulfamethoxazole-trimethoprim]        REVIEW OF SYSTEMS  Review of Systems     All systems reviewed and negative except for those discussed in HPI.       PHYSICAL EXAM    I have reviewed the triage vital signs and nursing notes.    ED Triage Vitals [10/22/20 1206]   Temp Heart Rate Resp BP SpO2   96.9 °F (36.1 °C) 100 14 170/89 98 %      Temp src Heart Rate Source Patient Position BP Location FiO2 (%)   Tympanic -- -- -- --       GENERAL: Elderly male no acute distress.Vital signs on my initial evaluation unremarkable.  Patient has atrial fibrillation on the monitor  HENT: nares patent  Head/neck/ face are symmetric without gross deformity, signs of trauma, or swelling  EYES: no scleral icterus, no conjunctival pallor.  NECK: Supple, no meningismus  CV: Irregularly irregular rhythm  regular rate with intact distal pulses.  No murmur or rub  RESPIRATORY: normal effort and no respiratory distress.  To  auscultation patient's lungs are clear ABDOMEN: soft and non-tender.  MUSCULOSKELETAL: no deformity.  NEURO: alert and appropriate, moves all extremities, follows commands.  Alert and oriented x3.  Patient's NIH stroke scale is 1 for a very mild facial droop to the left side of his face.  He has no drift to the upper extremities.  He has no weakness to upper extremities.  He has no drift to lower extremities.  SKIN: warm, dry    Vital signs and nursing notes reviewed.        LAB RESULTS  Recent Results (from the past 24 hour(s))   POC Glucose Once    Collection Time: 10/22/20 12:18 PM    Specimen: Blood   Result Value Ref Range    Glucose 106 70 - 130 mg/dL   Light Blue Top    Collection Time: 10/22/20 12:36 PM   Result Value Ref Range    Extra Tube hold for add-on    Green Top (Gel)    Collection Time: 10/22/20 12:36 PM   Result Value Ref Range    Extra Tube Hold for add-ons.    Lavender Top    Collection Time: 10/22/20 12:36 PM   Result Value Ref Range    Extra Tube hold for add-on    Gold Top - SST    Collection Time: 10/22/20 12:36 PM   Result Value Ref Range    Extra Tube Hold for add-ons.    Comprehensive Metabolic Panel    Collection Time: 10/22/20 12:36 PM    Specimen: Blood   Result Value Ref Range    Glucose 104 (H) 65 - 99 mg/dL    BUN 12 8 - 23 mg/dL    Creatinine 0.96 0.76 - 1.27 mg/dL    Sodium 140 136 - 145 mmol/L    Potassium 4.1 3.5 - 5.2 mmol/L    Chloride 106 98 - 107 mmol/L    CO2 27.8 22.0 - 29.0 mmol/L    Calcium 8.5 (L) 8.6 - 10.5 mg/dL    Total Protein 6.0 6.0 - 8.5 g/dL    Albumin 3.80 3.50 - 5.20 g/dL    ALT (SGPT) 22 1 - 41 U/L    AST (SGOT) 21 1 - 40 U/L    Alkaline Phosphatase 84 39 - 117 U/L    Total Bilirubin 0.8 0.0 - 1.2 mg/dL    eGFR Non African Amer 77 >60 mL/min/1.73    Globulin 2.2 gm/dL    A/G Ratio 1.7 g/dL    BUN/Creatinine Ratio 12.5 7.0 - 25.0    Anion Gap 6.2 5.0 - 15.0 mmol/L   Protime-INR    Collection Time: 10/22/20 12:36 PM    Specimen: Blood   Result Value Ref Range     Protime 17.0 (H) 11.7 - 14.2 Seconds    INR 1.41 (H) 0.90 - 1.10   aPTT    Collection Time: 10/22/20 12:36 PM    Specimen: Blood   Result Value Ref Range    PTT 44.0 (H) 22.7 - 35.4 seconds   Troponin    Collection Time: 10/22/20 12:36 PM    Specimen: Blood   Result Value Ref Range    Troponin T <0.010 0.000 - 0.030 ng/mL   CBC Auto Differential    Collection Time: 10/22/20 12:36 PM    Specimen: Blood   Result Value Ref Range    WBC 7.04 3.40 - 10.80 10*3/mm3    RBC 5.11 4.14 - 5.80 10*6/mm3    Hemoglobin 15.4 13.0 - 17.7 g/dL    Hematocrit 44.9 37.5 - 51.0 %    MCV 87.9 79.0 - 97.0 fL    MCH 30.1 26.6 - 33.0 pg    MCHC 34.3 31.5 - 35.7 g/dL    RDW 12.8 12.3 - 15.4 %    RDW-SD 41.2 37.0 - 54.0 fl    MPV 9.3 6.0 - 12.0 fL    Platelets 200 140 - 450 10*3/mm3    Neutrophil % 64.5 42.7 - 76.0 %    Lymphocyte % 23.3 19.6 - 45.3 %    Monocyte % 9.1 5.0 - 12.0 %    Eosinophil % 1.8 0.3 - 6.2 %    Basophil % 0.7 0.0 - 1.5 %    Immature Grans % 0.6 (H) 0.0 - 0.5 %    Neutrophils, Absolute 4.54 1.70 - 7.00 10*3/mm3    Lymphocytes, Absolute 1.64 0.70 - 3.10 10*3/mm3    Monocytes, Absolute 0.64 0.10 - 0.90 10*3/mm3    Eosinophils, Absolute 0.13 0.00 - 0.40 10*3/mm3    Basophils, Absolute 0.05 0.00 - 0.20 10*3/mm3    Immature Grans, Absolute 0.04 0.00 - 0.05 10*3/mm3    nRBC 0.0 0.0 - 0.2 /100 WBC   Type & Screen    Collection Time: 10/22/20  1:00 PM    Specimen: Blood   Result Value Ref Range    ABO Type A     RH type Negative     Antibody Screen Negative     T&S Expiration Date 10/25/2020 11:59:59 PM        Ordered the above labs and independently reviewed the results.        RADIOLOGY  Xr Chest 1 View    Result Date: 10/22/2020  XR CHEST 1 VW-  HISTORY: Male who is 74 years-old,  stroke  TECHNIQUE: Frontal view of the chest  COMPARISON: 02/08/2020  FINDINGS: The heart is enlarged. Aorta is calcified. Pulmonary vasculature is unremarkable. No focal pulmonary consolidation, pleural effusion, or pneumothorax. No acute osseous  process.      No focal pulmonary consolidation. Cardiomegaly. Follow-up as clinical indications persist.  This report was finalized on 10/22/2020 2:01 PM by Dr. Uzair Dawkins M.D.      Ct Head Without Contrast Stroke Protocol    Result Date: 10/22/2020  CT HEAD WO CONTRAST STROKE PROTOCOL-  INDICATIONS: Stroke  TECHNIQUE: Radiation dose reduction techniques were utilized, including automated exposure control and exposure modulation based on body size. Noncontrast head CT  COMPARISON: 02/08/2020  FINDINGS:    No acute intracranial hemorrhage, midline shift or mass effect. No acute territorial infarct is identified.  Moderate periventricular hypodensities suggest chronic small vessel ischemic change in a patient this age.  Arterial calcifications are seen at the base of the brain.  Ventricles, cisterns, cerebral sulci are unremarkable for patient age.  The visualized paranasal sinuses, orbits, mastoid air cells are unremarkable.           No acute intracranial hemorrhage or hydrocephalus. Chronic changes of the brain. If there is further clinical concern, MRI could be considered for further evaluation.  Discussed by telephone with Dr. Roberts at 1314, and Dr. Claire at 1315, 10/20/2020.  This report was finalized on 10/22/2020 1:17 PM by Dr. Uzair Dawkins M.D.        I ordered the above noted radiological studies. Reviewed by me and discussed with radiologist.  See dictation for official radiology interpretation.      PROCEDURES    Procedures      MEDICATIONS GIVEN IN ER    Medications   sodium chloride 0.9 % flush 10 mL (has no administration in time range)   aspirin suppository 300 mg (300 mg Rectal Given 10/22/20 1336)         PROGRESS, DATA ANALYSIS, CONSULTS, AND MEDICAL DECISION MAKING    This patient symptoms very consistent with a resolving CVA.  Currently he is asymptomatic.  He is not a TPA candidate.    All labs have been independently reviewed by me.  All radiology studies have been reviewed  by me and discussed with radiologist dictating the report.   EKG's independently viewed and interpreted by me.  Discussion below represents my analysis of pertinent findings related to patient's condition, differential diagnosis, treatment plan and final disposition.      ED Course as of Oct 22 1500   Thu Oct 22, 2020   1254 I spoke with the stroke neurologist, Dr. Roberts.  No CT angiogram at this point.  Patient stroke scale is 1.  Not a TPA candidate as well.    [MM]   1254 We are currently under a pandemic from the COVID19 infection.  The patient presented to the emergency department by ambulance or personal vehicle. I followed the current protocols required by In[facility] Control at [facility] in my evaluation and treatment of the patient. The patient was wearing a face mask during my evaluation and throughout my encounter. During my whole encounter with this patient I used appropriate personal protective equipment.  This equipment consisted of eye protection, facemask, gown, and gloves.  I applied this equipment before entering the room.        [MM]   1315 I discussed the CT scan of the head with the radiologist, Dr. Dawkins and no acute abnormality seen at this time.    [MM]   1417 Reevaluated this patient.  Patient symptoms have remained the same.  Has no recurrent episodes of speech change or weakness of his left upper extremity.  Informed him and his spouse that he very likely has had a resolving stroke.  Explained to him that we will admit him and then he will need a stroke work-up.  All questions answered.    [MM]   1459 EKG readingEKG was done at 1255Rate of 82 and is atrial fibrillationSome intraventricular conduction delayNormal axisSome nonspecific T wave changesQT looks normalAppears similar to a previous EKG February 8, 2020.    [MM]   1500 Discussed the case with Dr. Madison for [facility].  She agrees to admit.    [MM]      ED Course User Index  [MM] Tahir Claire MD       AS OF 15:00 EDT  VITALS:    BP - (!) 159/118  HR - 74  TEMP - 96.9 °F (36.1 °C) (Tympanic)  02 SATS - 97%        DIAGNOSIS  Final diagnoses:   Cerebrovascular accident (CVA), unspecified mechanism (CMS/HCC): Resolving   Atrial fibrillation, unspecified type (CMS/HCC)         DISPOSITION  I have reviewed the test results with my patient and explained the current treatment plan.  I answered all of the patient's questions.  The patient will be admitted to monitor bed at this time.  The patient is not hypotensive and is tolerating their current disease condition well enough for a monitored bed at this time.  The patient's current condition does not require intensive care treatment at this time.             Tahir Claire MD  10/22/20 9013

## 2020-10-22 NOTE — PROGRESS NOTES
Clinical Pharmacy Services: Medication History    Aj aSlazar is a 74 y.o. male presenting to [facility] for   Chief Complaint   Patient presents with   • Neuro Deficit(s)     PT REPORTS HAVING AN EPISODE OF LEFT SIDED FACIAL NUMBNESS AND SOME EXPRESSIVE APHASIA AND LEFT SIDED BOY CONTRACTURE STARTING APPROX. 1030, SYMPTOMS NOW RESOLVED; PT WEARING FACE MASK       He  has a past medical history of Alcohol abuse, in remission, BPH (benign prostatic hypertrophy), COPD (chronic obstructive pulmonary disease) (CMS/[facility]), Depression, DJD (degenerative joint disease) of cervical spine, ED (erectile dysfunction), colonic polyps, Hyperlipidemia, Hypertension, Influenza B (02/14/2013), Nocturnal hypoxia, Osteoarthritis, Peripheral neuropathy, Permanent atrial fibrillation (CMS/[facility]), Rectal bleeding (04/26/2017), Tendonitis of shoulder (11/07/2007), and Ulcer of the stomach and intestine.    Allergies as of 10/22/2020 - Reviewed 10/22/2020   Allergen Reaction Noted   • Bactrim [sulfamethoxazole-trimethoprim] Rash 09/26/2018       Medication information was obtained from: patient and pharmacy  Pharmacy and Phone Number:   [id number] - [place], [place] - [address] AT [facility] &  - [phone]  - [phone]   [address] [place] [id number]  Phone: [phone] Fax: [phone]    [facility]  [address]  [facility] [address]  Phone: [phone] Fax: [phone]        Prior to Admission Medications     Prescriptions Last Dose Informant Patient Reported? Taking?    ALPHAGAN P 0.1 % solution ophthalmic solution  Pharmacy Yes Yes    Administer 1 drop to both eyes 2 (two) times a day.    dabigatran etexilate (PRADAXA) 150 MG capsu  Pharmacy Yes Yes    Take 150 mg by mouth 2 (Two) Times a Day.    metFORMIN ER (GLUCOPHAGE-XR) 500 MG 24 hr tablet  Pharmacy No Yes    TAKE 1 TABLET BY MOUTH DAILY WITH DINNER    metoprolol tartrate  (LOPRESSOR) 25 MG tablet  Pharmacy No Yes    Take 1 tablet by mouth 2 (Two) Times a Day.    pantoprazole (PROTONIX) 40 MG EC tablet  Pharmacy No Yes    TAKE 1 TABLET BY MOUTH DAILY    Tafluprost, PF, (ZIOPTAN) 0.0015 % solution ophthalmic solution  Pharmacy Yes Yes    Administer 1 drop to both eyes Every Night.    tamsulosin (FLOMAX) 0.4 MG capsule 24 hr capsule  Pharmacy Yes Yes    Take 2 capsules by mouth Every Night.    TRELEGY ELLIPTA 100-62.5-25 MCG/INH aerosol powder   Pharmacy Yes Yes    Inhale 1 puff Daily.            Medication notes:     This medication list is complete to the best of my knowledge as of 10/22/2020    Please call if questions.  Ade Shields [facility]  Medication History Technician  [phone]    10/22/2020 15:10 EDT

## 2020-10-22 NOTE — H&P
HISTORY AND PHYSICAL   [facility]        Patient Identification:  Name: Aj Salazar  Age: 74 y.o.  Sex: male  :  1945  MRN: [id number]                     Primary Care Physician: Neftali Amezcua MD    Chief Complaint:  74 year old gentleman who was in his yard and noted that his face did not feel right this morning; he developed a droop and slurred speech and noted that his left hand was not working like it should; he is better now except for the facial droop; he has not history of cva but has a history of hypertension, diabetes, hyperlipidemia and copd;     History of Present Illness:   As above    Past Medical History:  Past Medical History:   Diagnosis Date   • Alcohol abuse, in remission    • BPH (benign prostatic hypertrophy)     see doctors at [facility]   • COPD (chronic obstructive pulmonary disease) (CMS/HCC)    • Depression    • DJD (degenerative joint disease) of cervical spine    • ED (erectile dysfunction)     [facility]   • Hx of colonic polyps     followed by GI (Kyle)   • Hyperlipidemia    • Hypertension    • Influenza B 2013       • Nocturnal hypoxia    • Osteoarthritis     HIPS, HANDS, MULTIPLE SITES   • Peripheral neuropathy    • Permanent atrial fibrillation (CMS/HCC)    • Rectal bleeding 2017   • Tendonitis of shoulder 2007    RIGHT SHOULDER/DELTOID INSERTION   • Ulcer of the stomach and intestine      Past Surgical History:  Past Surgical History:   Procedure Laterality Date   • APPENDECTOMY     • CARDIOVASCULAR STRESS TEST N/A 10/20/2015    NML LEXISCAN CARDIOLITE PERFUSION STUDY, NO EVIDENCE OF ISCHEMIA, AREA OF HYPOPERFUSION OF THE INFERIOR WALL W/NORMAL WALL PERFUSION AND NML LEFT VENTRICULAR EJECTION FRACTION, MOST LIKELY ATTENUATION. DR.MICHAEL BOX   • COLONOSCOPY N/A 2017   • COLONOSCOPY N/A 2011    4 POLYPS REMOVED, RESCOPE IN 5 YRS, DR. EAGLE   • COLONOSCOPY N/A 2006    ERYTHEMOUS AND GRANULAR MUCOSA  IN ILEOCECAL VALVE, NORMAL COLON, REPEAT IN 5 YRS,    • ENDOSCOPY N/A 9/26/2018    normal esophagus, acute gastritis, multiple non-bleeding duodenal ulcers with pigmented material, H Pylori positive   • HERNIA REPAIR Bilateral     INGUINAL   • JOINT REPLACEMENT  11/2015    left hip   • TOTAL HIP ARTHROPLASTY Left 11/06/2015    Dr Ankush Sky   • TOTAL HIP ARTHROPLASTY Right 10/27/2014    DR.RIED SKY   • VASECTOMY        Home Meds:  Medications Prior to Admission   Medication Sig Dispense Refill Last Dose   • ALPHAGAN P 0.1 % solution ophthalmic solution Administer 1 drop to both eyes 2 (two) times a day.  6 10/22/2020 at Unknown time   • dabigatran etexilate (PRADAXA) 150 MG capsu Take 150 mg by mouth 2 (Two) Times a Day.   10/22/2020 at Unknown time   • metFORMIN ER (GLUCOPHAGE-XR) 500 MG 24 hr tablet TAKE 1 TABLET BY MOUTH DAILY WITH DINNER 30 tablet 11 10/21/2020 at Unknown time   • metoprolol tartrate (LOPRESSOR) 25 MG tablet Take 1 tablet by mouth 2 (Two) Times a Day. 180 tablet 3 10/22/2020 at Unknown time   • pantoprazole (PROTONIX) 40 MG EC tablet TAKE 1 TABLET BY MOUTH DAILY 90 tablet 3 10/22/2020 at Unknown time   • Tafluprost, PF, (ZIOPTAN) 0.0015 % solution ophthalmic solution Administer 1 drop to both eyes Every Night.   10/21/2020 at Unknown time   • tamsulosin (FLOMAX) 0.4 MG capsule 24 hr capsule Take 2 capsules by mouth Every Night. 30 capsule  10/21/2020 at Unknown time   • TRELEGY ELLIPTA 100-62.5-25 MCG/INH aerosol powder  Inhale 1 puff Daily.  3 10/22/2020 at Unknown time       Allergies:  Allergies   Allergen Reactions   • Bactrim [Sulfamethoxazole-Trimethoprim] Rash     Immunizations:  Immunization History   Administered Date(s) Administered   • Flu Mist 09/27/2017   • Fluad Quad 65+ 09/02/2020   • Fluzone High Dose =>65 Years ([employer] ONLY) 09/14/2018, 09/04/2019   • Hepatitis A 02/06/2019, 11/30/2019   • Influenza LAIV (Nasal) 10/21/2015   • Influenza TIV (IM) 09/18/2016   •  Pneumococcal Conjugate 13-Valent (PCV13) 01/30/2015   • Pneumococcal Polysaccharide (PPSV23) 03/30/2013   • Shingrix 11/30/2019, 02/24/2020   • Td 03/30/2012   • Zostavax 11/30/2014     Social History:   Social History     Social History Narrative   • Not on file     Social History     Socioeconomic History   • Marital status:      Spouse name: Ara*   • Number of children: 3   • Years of education: Not on file   • Highest education level: Not on file   Occupational History   • Occupation: Retired     Comment: Supervisor/manager [employer]   Tobacco Use   • Smoking status: Former Smoker     Packs/day: 2.00     Years: 49.00     Pack years: 98.00     Quit date: 1/1/2010     Years since quitting: 10.8   • Smokeless tobacco: Never Used   • Tobacco comment: Began Smoking age 15.  Smoked 2 ppd for 49 years until he quit smoking 7 years ago for a 98 pack year history.   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   • Drug use: No     Comment: caffeine use    • Sexual activity: Defer   Lifestyle   • Physical activity     Days per week: 2 days     Minutes per session: 20 min   • Stress: Not on file       Family History:  Family History   Problem Relation Age of Onset   • Colon cancer Mother    • Ovarian cancer Mother    • Alzheimer's disease Mother 70   • Stroke Sister         October 2016   • Dementia Sister    • Heart disease Brother    • Alcohol abuse Brother    • Coronary artery disease Father    • Hypertension Father    • Colon cancer Maternal Grandmother    • Heart disease Brother    • Coronary artery disease Brother    • Hypertension Brother    • Stroke Brother    • Arthritis Brother    • Prostate cancer Neg Hx         Review of Systems  See history of present illness and past medical history.  Patient denies headache, dizziness, syncope, falls, trauma, change in vision, change in hearing, change in taste, changes in weight, changes  "in appetite   Patient denies chest pain, palpitations, dyspnea, orthopnea, PND, cough, sinus pressure, rhinorrhea, epistaxis, hemoptysis, nausea, vomiting,hematemesis, diarrhea, constipation or hematchezia.  Denies cold or heat intolerance, polydipsia, polyuria, polyphagia. Denies hematuria, pyuria, dysuria, hesitancy, frequency or urgency. Denies consumption of raw and under cooked meats foods or change in water source.  Denies fever, chills, sweats, night sweats.  Denies missing any routine medications. Remainder of ROS is negative.    Objective:  T Max 24 hrs: Temp (24hrs), Av.4 °F (36.3 °C), Min:96.9 °F (36.1 °C), Max:97.8 °F (36.6 °C)    Vitals Ranges:   Temp:  [96.9 °F (36.1 °C)-97.8 °F (36.6 °C)] 97.8 °F (36.6 °C)  Heart Rate:  [] 97  Resp:  [14-18] 18  BP: (111-187)/() 187/114      Exam:  BP (!) 187/114 (BP Location: Right arm, Patient Position: Lying)   Pulse 97   Temp 97.8 °F (36.6 °C) (Oral)   Resp 18   Ht 177.8 cm (70\")   Wt 86.2 kg (190 lb)   SpO2 96%   BMI 27.26 kg/m²     General Appearance:    Alert, cooperative, no distress, appears stated age   Head:    Normocephalic, without obvious abnormality, atraumatic   Eyes:    PERRL, conjunctivae/corneas clear, EOM's intact, both eyes   Ears:    Normal external ear canals, both ears   Nose:   Nares normal, septum midline, mucosa normal, no drainage    or sinus tenderness   Throat:   Lips, mucosa, and tongue normal   Neck:   Supple, symmetrical, trachea midline, no adenopathy;     thyroid:  no enlargement/tenderness/nodules; no carotid    bruit or JVD   Back:     Symmetric, no curvature, ROM normal, no CVA tenderness   Lungs:     Decreased breath sounds bilaterally, respirations unlabored   Chest Wall:    No tenderness or deformity    Heart:    Regular rate and rhythm, S1 and S2 normal, no murmur, rub   or gallop   Abdomen:     Soft, nontender, bowel sounds active all four quadrants,     no masses, no hepatomegaly, no splenomegaly "   Extremities:   Extremities normal, atraumatic, no cyanosis or edema   Pulses:   2+ and symmetric all extremities   Skin:   Skin color, texture, turgor normal, no rashes or lesions   Lymph nodes:   Cervical, supraclavicular, and axillary nodes normal   Neurologic:   CNII-XII intact,left facial droop. normal strength, sensation intact throughout      .    Data Review:  Labs in chart were reviewed.  WBC   Date Value Ref Range Status   10/22/2020 7.04 3.40 - 10.80 10*3/mm3 Final     Hemoglobin   Date Value Ref Range Status   10/22/2020 15.4 13.0 - 17.7 g/dL Final     Hematocrit   Date Value Ref Range Status   10/22/2020 44.9 37.5 - 51.0 % Final     Platelets   Date Value Ref Range Status   10/22/2020 200 140 - 450 10*3/mm3 Final     Sodium   Date Value Ref Range Status   10/22/2020 140 136 - 145 mmol/L Final     Potassium   Date Value Ref Range Status   10/22/2020 4.1 3.5 - 5.2 mmol/L Final     Chloride   Date Value Ref Range Status   10/22/2020 106 98 - 107 mmol/L Final     CO2   Date Value Ref Range Status   10/22/2020 27.8 22.0 - 29.0 mmol/L Final     BUN   Date Value Ref Range Status   10/22/2020 12 8 - 23 mg/dL Final     Creatinine   Date Value Ref Range Status   10/22/2020 0.96 0.76 - 1.27 mg/dL Final     Glucose   Date Value Ref Range Status   10/22/2020 104 (H) 65 - 99 mg/dL Final     Calcium   Date Value Ref Range Status   10/22/2020 8.5 (L) 8.6 - 10.5 mg/dL Final     AST (SGOT)   Date Value Ref Range Status   10/22/2020 21 1 - 40 U/L Final     ALT (SGPT)   Date Value Ref Range Status   10/22/2020 22 1 - 41 U/L Final     Alkaline Phosphatase   Date Value Ref Range Status   10/22/2020 84 39 - 117 U/L Final     INR   Date Value Ref Range Status   10/22/2020 1.41 (H) 0.90 - 1.10 Final                Imaging Results (All)     Procedure Component Value Units Date/Time    XR Chest 1 View [[id number]] Collected: 10/22/20 1357     Updated: 10/22/20 6303    Narrative:      XR CHEST 1 VW-     HISTORY: Male who is 74  years-old,  stroke     TECHNIQUE: Frontal view of the chest     COMPARISON: 02/08/2020     FINDINGS: The heart is enlarged. Aorta is calcified. Pulmonary  vasculature is unremarkable. No focal pulmonary consolidation, pleural  effusion, or pneumothorax. No acute osseous process.       Impression:      No focal pulmonary consolidation. Cardiomegaly. Follow-up as  clinical indications persist.     This report was finalized on 10/22/2020 2:01 PM by Dr. Uzair Dawkins M.D.       CT Head Without Contrast Stroke Protocol [[id number]] Collected: 10/22/20 1316     Updated: 10/22/20 1321    Narrative:      CT HEAD WO CONTRAST STROKE PROTOCOL-     INDICATIONS: Stroke     TECHNIQUE: Radiation dose reduction techniques were utilized, including  automated exposure control and exposure modulation based on body size.  Noncontrast head CT     COMPARISON: 02/08/2020     FINDINGS:           No acute intracranial hemorrhage, midline shift or mass effect. No acute  territorial infarct is identified.     Moderate periventricular hypodensities suggest chronic small vessel  ischemic change in a patient this age.     Arterial calcifications are seen at the base of the brain.     Ventricles, cisterns, cerebral sulci are unremarkable for patient age.     The visualized paranasal sinuses, orbits, mastoid air cells are  unremarkable.                   Impression:         No acute intracranial hemorrhage or hydrocephalus. Chronic changes of  the brain. If there is further clinical concern, MRI could be considered  for further evaluation.     Discussed by telephone with Dr. Roberts at 1314, and Dr. Claire at  1315, 10/20/2020.     This report was finalized on 10/22/2020 1:17 PM by Dr. Uzair Dawkins M.D.           Patient Active Problem List   Diagnosis Code   • Chronic obstructive pulmonary disease with acute exacerbation (CMS/[facility]) J44.1   • Impotence of organic origin N52.9   • Hypertension I10   • Osteoarthritis M19.90   • History  of smoking greater than 50 pack years Z87.891   • Benign prostatic hyperplasia with urinary obstruction N40.1, N13.8   • Permanent atrial fibrillation (CMS/HCC) I48.21   • Type 2 diabetes mellitus with hyperglycemia, without long-term current use of insulin (CMS/[facility]) E11.65   • Arthritis of carpometacarpal (CMC) joint of left thumb M18.12   • Rash and nonspecific skin eruption R21   • Cerebrovascular accident (CVA) (CMS/HCC) I63.9       Assessment:    Cerebrovascular accident (CVA) (CMS/HCC)  diabetes  Hypertensive urgency  Hyperlipidemia  Atrial fibrillation      Plan:  Will check mri brain, echo, carotid doppler  Ask neurology to see him  Start aspirin and continue pradaxa for now  Lower blood pressure carefully due to suspected cva  Monitor and control blood sugar  Anna patient and nurse    Jo Presley MD  10/22/2020  19:48 EDT

## 2020-10-22 NOTE — ED NOTES
"\"  Nursing report ED to floor  Aj Salazar  74 y.o.  male    HPI (triage note):   Chief Complaint   Patient presents with   • Neuro Deficit(s)     PT REPORTS HAVING AN EPISODE OF LEFT SIDED FACIAL NUMBNESS AND SOME EXPRESSIVE APHASIA AND LEFT SIDED BOY CONTRACTURE STARTING APPROX. 1030, SYMPTOMS NOW RESOLVED; PT WEARING FACE MASK       Admitting doctor:   Jo Presley MD    Admitting diagnosis:   The primary encounter diagnosis was Cerebrovascular accident (CVA), unspecified mechanism (CMS/HCC): Resolving. A diagnosis of Atrial fibrillation, unspecified type (CMS/HCC) was also pertinent to this visit.    Code status:   Current Code Status     Date Active Code Status Order ID Comments User Context       Prior    Advance Care Planning Activity          Allergies:   Bactrim [sulfamethoxazole-trimethoprim]    Weight:       10/22/20  1300   Weight: 86.2 kg (190 lb)       Most recent vitals:   Vitals:    10/22/20 1330 10/22/20 1400 10/22/20 1430 10/22/20 1544   BP: (!) 139/101 111/85 (!) 159/118 (!) 167/112   Pulse: 81 76 74 66   Resp:    15   Temp:       TempSrc:       SpO2: 95% 94% 97% 95%   Weight:       Height:           Active LDAs/IV Access:   Lines, Drains & Airways    Active LDAs     Name:   Placement date:   Placement time:   Site:   Days:    Peripheral IV 10/22/20 Left Antecubital   10/22/20    --    Antecubital   less than 1                Labs (abnormal labs have a star):   Labs Reviewed   COMPREHENSIVE METABOLIC PANEL - Abnormal; Notable for the following components:       Result Value    Glucose 104 (*)     Calcium 8.5 (*)     All other components within normal limits    Narrative:     GFR Normal >60  Chronic Kidney Disease <60  Kidney Failure <15     PROTIME-INR - Abnormal; Notable for the following components:    Protime 17.0 (*)     INR 1.41 (*)     All other components within normal limits   APTT - Abnormal; Notable for the following components:    PTT 44.0 (*)     All other components within " normal limits   CBC WITH AUTO DIFFERENTIAL - Abnormal; Notable for the following components:    Immature Grans % 0.6 (*)     All other components within normal limits   TROPONIN (IN-HOUSE) - Normal    Narrative:     Troponin T Reference Range:  <= 0.03 ng/mL-   Negative for AMI  >0.03 ng/mL-     Abnormal for myocardial necrosis.  Clinicians would have to utilize clinical acumen, EKG, Troponin and serial changes to determine if it is an Acute Myocardial Infarction or myocardial injury due to an underlying chronic condition.       Results may be falsely decreased if patient taking Biotin.     POCT GLUCOSE FINGERSTICK - Normal   COVID PRE-OP / PRE-PROCEDURE SCREENING ORDER (NO ISOLATION)    Narrative:     The following orders were created for panel order COVID PRE-OP / PRE-PROCEDURE SCREENING ORDER (NO ISOLATION) - Swab, Nasopharynx.  Procedure                               Abnormality         Status                     ---------                               -----------         ------                     Respiratory Panel PCR w/...[[id number]]                      In process                   Please view results for these tests on the individual orders.   RESPIRATORY PANEL PCR W/ COVID-19 (SARS-COV-2) LAURO/BON/MAN/PAD/COR/MAD/DARLYN IN-HOUSE, NP SWAB IN [facility], 3-4 HR TAT   RAINBOW DRAW    Narrative:     The following orders were created for panel order [place] Draw.  Procedure                               Abnormality         Status                     ---------                               -----------         ------                     Light Blue Top[[id number]]                                   Final result               Green Top (Gel)[[id number]]                                  Final result               Lavender Top[[id number]]                                     Final result               Gold Top - SST[[id number]]                                   Final result                 Please view results for these tests on  the individual orders.   POCT GLUCOSE FINGERSTICK   TYPE AND SCREEN   LIGHT BLUE TOP   GREEN TOP   LAVENDER TOP   GOLD TOP - SST   CBC AND DIFFERENTIAL    Narrative:     The following orders were created for panel order CBC & Differential.  Procedure                               Abnormality         Status                     ---------                               -----------         ------                     CBC Auto Differential[[id number]]        Abnormal            Final result                 Please view results for these tests on the individual orders.       EKG:   ECG 12 Lead   Preliminary Result   HEART RATE= 82  bpm   RR Interval= 734  ms   MD Interval=   ms   P Horizontal Axis=   deg   P Front Axis=   deg   QRSD Interval= 75  ms   QT Interval= 368  ms   QRS Axis= 56  deg   T Wave Axis= 20  deg   - ABNORMAL ECG -   Atrial fibrillation   Electronically Signed By:    Date and Time of Study: 2020-10-22 12:55:25          Meds given in ED:   Medications   sodium chloride 0.9 % flush 10 mL (has no administration in time range)   aspirin suppository 300 mg (300 mg Rectal Given 10/22/20 1336)       Imaging results:  Xr Chest 1 View    Result Date: 10/22/2020  No focal pulmonary consolidation. Cardiomegaly. Follow-up as clinical indications persist.  This report was finalized on 10/22/2020 2:01 PM by Dr. Uzair Dawkins M.D.      Ct Head Without Contrast Stroke Protocol    Result Date: 10/22/2020   No acute intracranial hemorrhage or hydrocephalus. Chronic changes of the brain. If there is further clinical concern, MRI could be considered for further evaluation.  Discussed by telephone with Dr. Roberts at 1314, and Dr. Claire at 1315, 10/20/2020.  This report was finalized on 10/22/2020 1:17 PM by Dr. Uzair Dawkins M.D.        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  Smoked 2 ppd for 49 years until he quit smoking 7 years ago for a 98 pack year history.   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  male    HPI (triage note):   Chief Complaint   Patient presents with   • Neuro Deficit(s)     PT REPORTS HAVING AN EPISODE OF LEFT SIDED FACIAL NUMBNESS AND SOME EXPRESSIVE APHASIA AND LEFT SIDED BOY CONTRACTURE STARTING APPROX. 1030, SYMPTOMS NOW RESOLVED; PT WEARING FACE MASK       Admitting doctor:   Jo Presley MD    Admitting diagnosis:   The primary encounter diagnosis was Cerebrovascular accident (CVA), unspecified mechanism (CMS/HCC): Resolving. A diagnosis of Atrial fibrillation, unspecified type (CMS/HCC) was also pertinent to this visit.    Code status:   Current Code Status     Date Active Code Status Order ID Comments User Context       Prior    Advance Care Planning Activity          Allergies:   Bactrim [sulfamethoxazole-trimethoprim]    Weight:       10/22/20  1300   Weight: 86.2 kg (190 lb)       Most recent vitals:   Vitals:    10/22/20 1330 10/22/20 1400 10/22/20 1430 10/22/20 1544   BP: (!) 139/101 111/85 (!) 159/118 (!) 167/112   Pulse: 81 76 74 66   Resp:    15   Temp:       TempSrc:       SpO2: 95% 94% 97% 95%   Weight:       Height:           Active  LDAs/IV Access:   Lines, Drains & Airways    Active LDAs     Name:   Placement date:   Placement time:   Site:   Days:    Peripheral IV 10/22/20 Left Antecubital   10/22/20    --    Antecubital   less than 1                Labs (abnormal labs have a star):   Labs Reviewed   COMPREHENSIVE METABOLIC PANEL - Abnormal; Notable for the following components:       Result Value    Glucose 104 (*)     Calcium 8.5 (*)     All other components within normal limits    Narrative:     GFR Normal >60  Chronic Kidney Disease <60  Kidney Failure <15     PROTIME-INR - Abnormal; Notable for the following components:    Protime 17.0 (*)     INR 1.41 (*)     All other components within normal limits   APTT - Abnormal; Notable for the following components:    PTT 44.0 (*)     All other components within normal limits   CBC WITH AUTO DIFFERENTIAL - Abnormal; Notable for the following components:    Immature Grans % 0.6 (*)     All other components within normal limits   TROPONIN (IN-HOUSE) - Normal    Narrative:     Troponin T Reference Range:  <= 0.03 ng/mL-   Negative for AMI  >0.03 ng/mL-     Abnormal for myocardial necrosis.  Clinicians would have to utilize clinical acumen, EKG, Troponin and serial changes to determine if it is an Acute Myocardial Infarction or myocardial injury due to an underlying chronic condition.       Results may be falsely decreased if patient taking Biotin.     POCT GLUCOSE FINGERSTICK - Normal   COVID PRE-OP / PRE-PROCEDURE SCREENING ORDER (NO ISOLATION)    Narrative:     The following orders were created for panel order COVID PRE-OP / PRE-PROCEDURE SCREENING ORDER (NO ISOLATION) - Swab, Nasopharynx.  Procedure                               Abnormality         Status                     ---------                               -----------         ------                     Respiratory Panel PCR w/...[[id number]]                      In process                   Please view results for these tests on the  individual orders.   RESPIRATORY PANEL PCR W/ COVID-19 (SARS-COV-2) LAURO/BON/MAN/PAD/COR/MAD/DARLYN IN-HOUSE, NP SWAB IN [facility], 3-4 HR TAT   RAINBOW DRAW    Narrative:     The following orders were created for panel order [place] Draw.  Procedure                               Abnormality         Status                     ---------                               -----------         ------                     Light Blue Top[[id number]]                                   Final result               Green Top (Gel)[[id number]]                                  Final result               Lavender Top[[id number]]                                     Final result               Gold Top - SST[[id number]]                                   Final result                 Please view results for these tests on the individual orders.   POCT GLUCOSE FINGERSTICK   TYPE AND SCREEN   LIGHT BLUE TOP   GREEN TOP   LAVENDER TOP   GOLD TOP - SST   CBC AND DIFFERENTIAL    Narrative:     The following orders were created for panel order CBC & Differential.  Procedure                               Abnormality         Status                     ---------                               -----------         ------                     CBC Auto Differential[[id number]]        Abnormal            Final result                 Please view results for these tests on the individual orders.       EKG:   ECG 12 Lead   Preliminary Result   HEART RATE= 82  bpm   RR Interval= 734  ms   KS Interval=   ms   P Horizontal Axis=   deg   P Front Axis=   deg   QRSD Interval= 75  ms   QT Interval= 368  ms   QRS Axis= 56  deg   T Wave Axis= 20  deg   - ABNORMAL ECG -   Atrial fibrillation   Electronically Signed By:    Date and Time of Study: 2020-10-22 12:55:25          Meds given in ED:   Medications   sodium chloride 0.9 % flush 10 mL (has no administration in time range)   aspirin suppository 300 mg (300 mg Rectal Given 10/22/20 1336)       Imaging results:  Xr Chest 1  View    Result Date: 10/22/2020  No focal pulmonary consolidation. Cardiomegaly. Follow-up as clinical indications persist.  This report was finalized on 10/22/2020 2:01 PM by Dr. Uzair Dawkins M.D.      Ct Head Without Contrast Stroke Protocol    Result Date: 10/22/2020   No acute intracranial hemorrhage or hydrocephalus. Chronic changes of the brain. If there is further clinical concern, MRI could be considered for further evaluation.  Discussed by telephone with Dr. Roberts at 1314, and Dr. Claire at 1315, 10/20/2020.  This report was finalized on 10/22/2020 1:17 PM by Dr. Uzair Dawkins M.D.        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  Smoked 2 ppd for 49 years until he quit smoking 7 years ago for a 98 pack year history.   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

## 2020-10-22 NOTE — PLAN OF CARE
Goal Outcome Evaluation:  Plan of Care Reviewed With: patient     Outcome Summary: Up from ED 17:11.  NIH=1 for facial droop.  ST to see tomorrow.  Patient unhappy about not being able to eat and  spoke w/ patient.  BP elevated.  Dr. Presley aware.  Ux1 to restroom.  Continue to monitor and progress towards goals as tolerated.

## 2020-10-23 ENCOUNTER — APPOINTMENT (OUTPATIENT)
Dept: CARDIOLOGY | Facility: HOSPITAL | Age: 75
End: 2020-10-23

## 2020-10-23 ENCOUNTER — READMISSION MANAGEMENT (OUTPATIENT)
Dept: CALL CENTER | Facility: HOSPITAL | Age: 75
End: 2020-10-23

## 2020-10-23 VITALS
TEMPERATURE: 98.2 F | BODY MASS INDEX: 27.2 KG/M2 | SYSTOLIC BLOOD PRESSURE: 135 MMHG | DIASTOLIC BLOOD PRESSURE: 88 MMHG | HEIGHT: 70 IN | RESPIRATION RATE: 18 BRPM | OXYGEN SATURATION: 96 % | HEART RATE: 82 BPM | WEIGHT: 190 LBS

## 2020-10-23 PROBLEM — R29.810 FACIAL DROOP: Status: ACTIVE | Noted: 2020-10-23

## 2020-10-23 PROBLEM — G45.9 TIA (TRANSIENT ISCHEMIC ATTACK): Status: ACTIVE | Noted: 2020-10-22

## 2020-10-23 PROBLEM — R29.810 FACIAL DROOP: Status: RESOLVED | Noted: 2020-10-23 | Resolved: 2020-10-23

## 2020-10-23 LAB
ANION GAP SERPL CALCULATED.3IONS-SCNC: 9.2 MMOL/L (ref 5–15)
AORTIC DIMENSIONLESS INDEX: 0.8 (DI)
BASOPHILS # BLD AUTO: 0.06 10*3/MM3 (ref 0–0.2)
BASOPHILS NFR BLD AUTO: 0.9 % (ref 0–1.5)
BH CV ECHO MEAS - ACS: 1.9 CM
BH CV ECHO MEAS - AO MAX PG (FULL): 1.1 MMHG
BH CV ECHO MEAS - AO MAX PG: 2.7 MMHG
BH CV ECHO MEAS - AO MEAN PG (FULL): 1 MMHG
BH CV ECHO MEAS - AO MEAN PG: 2 MMHG
BH CV ECHO MEAS - AO ROOT AREA (BSA CORRECTED): 1.9
BH CV ECHO MEAS - AO ROOT AREA: 11.3 CM^2
BH CV ECHO MEAS - AO ROOT DIAM: 3.8 CM
BH CV ECHO MEAS - AO V2 MAX: 82.5 CM/SEC
BH CV ECHO MEAS - AO V2 MEAN: 58.9 CM/SEC
BH CV ECHO MEAS - AO V2 VTI: 15.5 CM
BH CV ECHO MEAS - AVA(I,A): 2.9 CM^2
BH CV ECHO MEAS - AVA(I,D): 2.9 CM^2
BH CV ECHO MEAS - AVA(V,A): 2.9 CM^2
BH CV ECHO MEAS - AVA(V,D): 2.9 CM^2
BH CV ECHO MEAS - BSA(HAYCOCK): 2.1 M^2
BH CV ECHO MEAS - BSA: 2 M^2
BH CV ECHO MEAS - BZI_BMI: 27.3 KILOGRAMS/M^2
BH CV ECHO MEAS - BZI_METRIC_HEIGHT: 177.8 CM
BH CV ECHO MEAS - BZI_METRIC_WEIGHT: 86.2 KG
BH CV ECHO MEAS - EDV(CUBED): 103.8 ML
BH CV ECHO MEAS - EDV(MOD-SP2): 129 ML
BH CV ECHO MEAS - EDV(MOD-SP4): 122 ML
BH CV ECHO MEAS - EDV(TEICH): 102.4 ML
BH CV ECHO MEAS - EF(CUBED): 65.4 %
BH CV ECHO MEAS - EF(MOD-BP): 44.3 %
BH CV ECHO MEAS - EF(MOD-SP2): 39.5 %
BH CV ECHO MEAS - EF(MOD-SP4): 46.7 %
BH CV ECHO MEAS - EF(TEICH): 56.9 %
BH CV ECHO MEAS - ESV(CUBED): 35.9 ML
BH CV ECHO MEAS - ESV(MOD-SP2): 78 ML
BH CV ECHO MEAS - ESV(MOD-SP4): 65 ML
BH CV ECHO MEAS - ESV(TEICH): 44.1 ML
BH CV ECHO MEAS - FS: 29.8 %
BH CV ECHO MEAS - IVS/LVPW: 1
BH CV ECHO MEAS - IVSD: 1.1 CM
BH CV ECHO MEAS - LAT PEAK E' VEL: 8.9 CM/SEC
BH CV ECHO MEAS - LV DIASTOLIC VOL/BSA (35-75): 59.7 ML/M^2
BH CV ECHO MEAS - LV MASS(C)D: 187.5 GRAMS
BH CV ECHO MEAS - LV MASS(C)DI: 91.8 GRAMS/M^2
BH CV ECHO MEAS - LV MAX PG: 1.6 MMHG
BH CV ECHO MEAS - LV MEAN PG: 1 MMHG
BH CV ECHO MEAS - LV SYSTOLIC VOL/BSA (12-30): 31.8 ML/M^2
BH CV ECHO MEAS - LV V1 MAX: 63.7 CM/SEC
BH CV ECHO MEAS - LV V1 MEAN: 44 CM/SEC
BH CV ECHO MEAS - LV V1 VTI: 11.7 CM
BH CV ECHO MEAS - LVIDD: 4.7 CM
BH CV ECHO MEAS - LVIDS: 3.3 CM
BH CV ECHO MEAS - LVLD AP2: 7.7 CM
BH CV ECHO MEAS - LVLD AP4: 8.1 CM
BH CV ECHO MEAS - LVLS AP2: 7.1 CM
BH CV ECHO MEAS - LVLS AP4: 7.3 CM
BH CV ECHO MEAS - LVOT AREA (M): 3.8 CM^2
BH CV ECHO MEAS - LVOT AREA: 3.8 CM^2
BH CV ECHO MEAS - LVOT DIAM: 2.2 CM
BH CV ECHO MEAS - LVPWD: 1.1 CM
BH CV ECHO MEAS - MED PEAK E' VEL: 7.2 CM/SEC
BH CV ECHO MEAS - MV DEC SLOPE: 435.5 CM/SEC^2
BH CV ECHO MEAS - MV DEC TIME: 194 SEC
BH CV ECHO MEAS - MV E MAX VEL: 68.9 CM/SEC
BH CV ECHO MEAS - MV MAX PG: 2.7 MMHG
BH CV ECHO MEAS - MV MEAN PG: 1 MMHG
BH CV ECHO MEAS - MV P1/2T MAX VEL: 85.7 CM/SEC
BH CV ECHO MEAS - MV P1/2T: 57.6 MSEC
BH CV ECHO MEAS - MV V2 MAX: 81.9 CM/SEC
BH CV ECHO MEAS - MV V2 MEAN: 46 CM/SEC
BH CV ECHO MEAS - MV V2 VTI: 15.1 CM
BH CV ECHO MEAS - MVA P1/2T LCG: 2.6 CM^2
BH CV ECHO MEAS - MVA(P1/2T): 3.8 CM^2
BH CV ECHO MEAS - MVA(VTI): 2.9 CM^2
BH CV ECHO MEAS - PA MAX PG (FULL): 1.1 MMHG
BH CV ECHO MEAS - PA MAX PG: 2.6 MMHG
BH CV ECHO MEAS - PA V2 MAX: 80.5 CM/SEC
BH CV ECHO MEAS - PI END-D VEL: 75.6 CM/SEC
BH CV ECHO MEAS - PULM DIAS VEL: 56.9 CM/SEC
BH CV ECHO MEAS - PULM S/D: 0.61
BH CV ECHO MEAS - PULM SYS VEL: 34.6 CM/SEC
BH CV ECHO MEAS - RV MAX PG: 1.5 MMHG
BH CV ECHO MEAS - RV MEAN PG: 1 MMHG
BH CV ECHO MEAS - RV V1 MAX: 61.1 CM/SEC
BH CV ECHO MEAS - RV V1 MEAN: 38.6 CM/SEC
BH CV ECHO MEAS - RV V1 VTI: 11.2 CM
BH CV ECHO MEAS - SI(AO): 86.1 ML/M^2
BH CV ECHO MEAS - SI(CUBED): 33.2 ML/M^2
BH CV ECHO MEAS - SI(LVOT): 21.8 ML/M^2
BH CV ECHO MEAS - SI(MOD-SP2): 25 ML/M^2
BH CV ECHO MEAS - SI(MOD-SP4): 27.9 ML/M^2
BH CV ECHO MEAS - SI(TEICH): 28.5 ML/M^2
BH CV ECHO MEAS - SV(AO): 175.8 ML
BH CV ECHO MEAS - SV(CUBED): 67.9 ML
BH CV ECHO MEAS - SV(LVOT): 44.5 ML
BH CV ECHO MEAS - SV(MOD-SP2): 51 ML
BH CV ECHO MEAS - SV(MOD-SP4): 57 ML
BH CV ECHO MEAS - SV(TEICH): 58.2 ML
BH CV ECHO MEAS - TAPSE (>1.6): 1.9 CM
BH CV ECHO MEASUREMENTS AVERAGE E/E' RATIO: 8.56
BH CV XLRA - RV BASE: 3.7 CM
BH CV XLRA - RV LENGTH: 6.1 CM
BH CV XLRA - RV MID: 2.7 CM
BH CV XLRA - TDI S': 10.3 CM/SEC
BH CV XLRA MEAS LEFT DIST CCA EDV: -16.4 CM/SEC
BH CV XLRA MEAS LEFT DIST CCA PSV: -57.5 CM/SEC
BH CV XLRA MEAS LEFT DIST ICA EDV: -29.9 CM/SEC
BH CV XLRA MEAS LEFT DIST ICA PSV: -88.5 CM/SEC
BH CV XLRA MEAS LEFT MID ICA EDV: -14.7 CM/SEC
BH CV XLRA MEAS LEFT MID ICA PSV: -46.6 CM/SEC
BH CV XLRA MEAS LEFT PROX CCA EDV: 12.3 CM/SEC
BH CV XLRA MEAS LEFT PROX CCA PSV: 68.6 CM/SEC
BH CV XLRA MEAS LEFT PROX ECA EDV: -11.1 CM/SEC
BH CV XLRA MEAS LEFT PROX ECA PSV: -54.5 CM/SEC
BH CV XLRA MEAS LEFT PROX ICA EDV: -20.5 CM/SEC
BH CV XLRA MEAS LEFT PROX ICA PSV: -81.5 CM/SEC
BH CV XLRA MEAS LEFT PROX SCLA PSV: 134 CM/SEC
BH CV XLRA MEAS LEFT VERTEBRAL A EDV: 10 CM/SEC
BH CV XLRA MEAS LEFT VERTEBRAL A PSV: 44.6 CM/SEC
BH CV XLRA MEAS RIGHT DIST CCA EDV: 14.7 CM/SEC
BH CV XLRA MEAS RIGHT DIST CCA PSV: 56.3 CM/SEC
BH CV XLRA MEAS RIGHT DIST ICA EDV: -19.1 CM/SEC
BH CV XLRA MEAS RIGHT DIST ICA PSV: -81.9 CM/SEC
BH CV XLRA MEAS RIGHT MID ICA EDV: -11.1 CM/SEC
BH CV XLRA MEAS RIGHT MID ICA PSV: -56.9 CM/SEC
BH CV XLRA MEAS RIGHT PROX CCA EDV: 12.3 CM/SEC
BH CV XLRA MEAS RIGHT PROX CCA PSV: 70.4 CM/SEC
BH CV XLRA MEAS RIGHT PROX ECA EDV: -14.7 CM/SEC
BH CV XLRA MEAS RIGHT PROX ECA PSV: -69.2 CM/SEC
BH CV XLRA MEAS RIGHT PROX ICA EDV: -11.7 CM/SEC
BH CV XLRA MEAS RIGHT PROX ICA PSV: -41 CM/SEC
BH CV XLRA MEAS RIGHT PROX SCLA PSV: 71.5 CM/SEC
BH CV XLRA MEAS RIGHT VERTEBRAL A EDV: -16.4 CM/SEC
BH CV XLRA MEAS RIGHT VERTEBRAL A PSV: -59.8 CM/SEC
BUN SERPL-MCNC: 11 MG/DL (ref 8–23)
BUN/CREAT SERPL: 12.9 (ref 7–25)
CALCIUM SPEC-SCNC: 8.6 MG/DL (ref 8.6–10.5)
CHLORIDE SERPL-SCNC: 106 MMOL/L (ref 98–107)
CHOLEST SERPL-MCNC: 134 MG/DL (ref 0–200)
CO2 SERPL-SCNC: 24.8 MMOL/L (ref 22–29)
CREAT SERPL-MCNC: 0.85 MG/DL (ref 0.76–1.27)
DEPRECATED RDW RBC AUTO: 41.7 FL (ref 37–54)
EOSINOPHIL # BLD AUTO: 0.2 10*3/MM3 (ref 0–0.4)
EOSINOPHIL NFR BLD AUTO: 3.1 % (ref 0.3–6.2)
ERYTHROCYTE [DISTWIDTH] IN BLOOD BY AUTOMATED COUNT: 12.9 % (ref 12.3–15.4)
GFR SERPL CREATININE-BSD FRML MDRD: 88 ML/MIN/1.73
GLUCOSE BLDC GLUCOMTR-MCNC: 103 MG/DL (ref 70–130)
GLUCOSE BLDC GLUCOMTR-MCNC: 88 MG/DL (ref 70–130)
GLUCOSE SERPL-MCNC: 91 MG/DL (ref 65–99)
HBA1C MFR BLD: 6 % (ref 4.8–5.6)
HCT VFR BLD AUTO: 46.1 % (ref 37.5–51)
HDLC SERPL-MCNC: 37 MG/DL (ref 40–60)
HGB BLD-MCNC: 15.7 G/DL (ref 13–17.7)
IMM GRANULOCYTES # BLD AUTO: 0.04 10*3/MM3 (ref 0–0.05)
IMM GRANULOCYTES NFR BLD AUTO: 0.6 % (ref 0–0.5)
LDLC SERPL CALC-MCNC: 79 MG/DL (ref 0–100)
LDLC/HDLC SERPL: 2.11 {RATIO}
LEFT ARM BP: NORMAL MMHG
LEFT ATRIUM VOLUME INDEX: 26.6 ML/M2
LYMPHOCYTES # BLD AUTO: 1.69 10*3/MM3 (ref 0.7–3.1)
LYMPHOCYTES NFR BLD AUTO: 25.9 % (ref 19.6–45.3)
MAGNESIUM SERPL-MCNC: 2.1 MG/DL (ref 1.6–2.4)
MCH RBC QN AUTO: 30.3 PG (ref 26.6–33)
MCHC RBC AUTO-ENTMCNC: 34.1 G/DL (ref 31.5–35.7)
MCV RBC AUTO: 88.8 FL (ref 79–97)
MONOCYTES # BLD AUTO: 0.62 10*3/MM3 (ref 0.1–0.9)
MONOCYTES NFR BLD AUTO: 9.5 % (ref 5–12)
NEUTROPHILS NFR BLD AUTO: 3.92 10*3/MM3 (ref 1.7–7)
NEUTROPHILS NFR BLD AUTO: 60 % (ref 42.7–76)
NRBC BLD AUTO-RTO: 0.2 /100 WBC (ref 0–0.2)
PLATELET # BLD AUTO: 212 10*3/MM3 (ref 140–450)
PMV BLD AUTO: 9.5 FL (ref 6–12)
POTASSIUM SERPL-SCNC: 3.9 MMOL/L (ref 3.5–5.2)
RBC # BLD AUTO: 5.19 10*6/MM3 (ref 4.14–5.8)
RIGHT ARM BP: NORMAL MMHG
SODIUM SERPL-SCNC: 140 MMOL/L (ref 136–145)
TRIGL SERPL-MCNC: 94 MG/DL (ref 0–150)
VLDLC SERPL-MCNC: 18 MG/DL (ref 5–40)
WBC # BLD AUTO: 6.53 10*3/MM3 (ref 3.4–10.8)

## 2020-10-23 PROCEDURE — 80048 BASIC METABOLIC PNL TOTAL CA: CPT | Performed by: INTERNAL MEDICINE

## 2020-10-23 PROCEDURE — 93306 TTE W/DOPPLER COMPLETE: CPT | Performed by: INTERNAL MEDICINE

## 2020-10-23 PROCEDURE — 92610 EVALUATE SWALLOWING FUNCTION: CPT

## 2020-10-23 PROCEDURE — G0378 HOSPITAL OBSERVATION PER HR: HCPCS

## 2020-10-23 PROCEDURE — 99203 OFFICE O/P NEW LOW 30 MIN: CPT | Performed by: PSYCHIATRY & NEUROLOGY

## 2020-10-23 PROCEDURE — 83735 ASSAY OF MAGNESIUM: CPT | Performed by: INTERNAL MEDICINE

## 2020-10-23 PROCEDURE — 85025 COMPLETE CBC W/AUTO DIFF WBC: CPT | Performed by: INTERNAL MEDICINE

## 2020-10-23 PROCEDURE — 25010000002 PERFLUTREN (DEFINITY) 8.476 MG IN SODIUM CHLORIDE 0.9 % 10 ML INJECTION: Performed by: INTERNAL MEDICINE

## 2020-10-23 PROCEDURE — 93880 EXTRACRANIAL BILAT STUDY: CPT

## 2020-10-23 PROCEDURE — 80061 LIPID PANEL: CPT | Performed by: INTERNAL MEDICINE

## 2020-10-23 PROCEDURE — 82962 GLUCOSE BLOOD TEST: CPT

## 2020-10-23 PROCEDURE — 83036 HEMOGLOBIN GLYCOSYLATED A1C: CPT | Performed by: INTERNAL MEDICINE

## 2020-10-23 PROCEDURE — 93306 TTE W/DOPPLER COMPLETE: CPT

## 2020-10-23 RX ORDER — ATORVASTATIN CALCIUM 80 MG/1
80 TABLET, FILM COATED ORAL NIGHTLY
Qty: 30 TABLET | Refills: 0 | Status: SHIPPED | OUTPATIENT
Start: 2020-10-23 | End: 2020-11-16 | Stop reason: SDUPTHER

## 2020-10-23 RX ORDER — ASPIRIN 81 MG/1
81 TABLET ORAL DAILY
Start: 2020-10-24 | End: 2020-10-24

## 2020-10-23 RX ORDER — AMLODIPINE BESYLATE 5 MG/1
2.5 TABLET ORAL DAILY
Qty: 30 TABLET | Refills: 0 | Status: SHIPPED | OUTPATIENT
Start: 2020-10-23 | End: 2020-11-02

## 2020-10-23 RX ORDER — ASPIRIN 81 MG/1
81 TABLET ORAL DAILY
Status: DISCONTINUED | OUTPATIENT
Start: 2020-10-23 | End: 2020-10-23 | Stop reason: HOSPADM

## 2020-10-23 RX ORDER — ACETAMINOPHEN 325 MG/1
650 TABLET ORAL EVERY 4 HOURS PRN
Start: 2020-10-23

## 2020-10-23 RX ADMIN — BRIMONIDINE TARTRATE 1 DROP: 1 SOLUTION/ DROPS OPHTHALMIC at 10:06

## 2020-10-23 RX ADMIN — ASPIRIN 81 MG: 81 TABLET, CHEWABLE ORAL at 11:05

## 2020-10-23 RX ADMIN — PERFLUTREN 2 ML: 6.52 INJECTION, SUSPENSION INTRAVENOUS at 09:30

## 2020-10-23 RX ADMIN — METOPROLOL TARTRATE 25 MG: 25 TABLET, FILM COATED ORAL at 11:05

## 2020-10-23 RX ADMIN — DABIGATRAN ETEXILATE MESYLATE 150 MG: 150 CAPSULE ORAL at 11:05

## 2020-10-23 RX ADMIN — SODIUM CHLORIDE, PRESERVATIVE FREE 10 ML: 5 INJECTION INTRAVENOUS at 10:07

## 2020-10-23 NOTE — THERAPY DISCHARGE NOTE
Acute Care - Speech Language Pathology   Swallow Initial Evaluation/Discharge Psychiatric     Patient Name: Aj Salazar  : 1945  MRN: [id number]  Today's Date: 10/23/2020               Admit Date: 10/22/2020    Visit Dx:    ICD-10-CM ICD-9-CM   1. Cerebrovascular accident (CVA), unspecified mechanism (CMS/HCC): Resolving  I63.9 434.91   2. Atrial fibrillation, unspecified type (CMS/HCC)  I48.91 427.31     Patient Active Problem List   Diagnosis   • Chronic obstructive pulmonary disease with acute exacerbation (CMS/HCC)   • Impotence of organic origin   • Hypertension   • Osteoarthritis   • History of smoking greater than 50 pack years   • Benign prostatic hyperplasia with urinary obstruction   • Permanent atrial fibrillation (CMS/HCC)   • Type 2 diabetes mellitus with hyperglycemia, without long-term current use of insulin (CMS/HCC)   • Arthritis of carpometacarpal (CMC) joint of left thumb   • Rash and nonspecific skin eruption   • Cerebrovascular accident (CVA) (CMS/HCC)     Past Medical History:   Diagnosis Date   • Alcohol abuse, in remission    • BPH (benign prostatic hypertrophy)     see doctors at [facility]   • COPD (chronic obstructive pulmonary disease) (CMS/HCC)    • Depression    • DJD (degenerative joint disease) of cervical spine    • ED (erectile dysfunction)     [facility]   • Hx of colonic polyps     followed by GI ([facility])   • Hyperlipidemia    • Hypertension    • Influenza B 2013       • Nocturnal hypoxia    • Osteoarthritis     HIPS, HANDS, MULTIPLE SITES   • Peripheral neuropathy    • Permanent atrial fibrillation (CMS/HCC)    • Rectal bleeding 2017   • Tendonitis of shoulder 2007    RIGHT SHOULDER/DELTOID INSERTION   • Ulcer of the stomach and intestine      Past Surgical History:   Procedure Laterality Date   • APPENDECTOMY     • CARDIOVASCULAR STRESS TEST N/A 10/20/2015    NML LEXISCAN CARDIOLITE PERFUSION STUDY, NO EVIDENCE OF ISCHEMIA, AREA  OF HYPOPERFUSION OF THE INFERIOR WALL W/NORMAL WALL PERFUSION AND NML LEFT VENTRICULAR EJECTION FRACTION, MOST LIKELY ATTENUATION. DR.MICHAEL BOX   • COLONOSCOPY N/A 02/2017   • COLONOSCOPY N/A 02/23/2011    4 POLYPS REMOVED, RESCOPE IN 5 YRS, DR. EAGLE   • COLONOSCOPY N/A 03/08/2006    ERYTHEMOUS AND GRANULAR MUCOSA IN ILEOCECAL VALVE, NORMAL COLON, REPEAT IN 5 YRS,    • ENDOSCOPY N/A 9/26/2018    normal esophagus, acute gastritis, multiple non-bleeding duodenal ulcers with pigmented material, H Pylori positive   • HERNIA REPAIR Bilateral     INGUINAL   • JOINT REPLACEMENT  11/2015    left hip   • TOTAL HIP ARTHROPLASTY Left 11/06/2015    Dr Ankush Sky   • TOTAL HIP ARTHROPLASTY Right 10/27/2014    DR.RIED SKY   • VASECTOMY       Patient was not wearing a face mask during this therapy encounter. Therapist used appropriate personal protective equipment including mask, eye protection and gloves.  Mask used was standard procedure mask. Appropriate PPE was worn during the entire therapy session. Hand hygiene was completed before and after therapy session. Patient is not in enhanced droplet precautions.          SWALLOW EVALUATION (last 72 hours)      SLP Adult Swallow Evaluation     Row Name 10/23/20 1000                   Rehab Evaluation    Document Type  evaluation  -HS        Subjective Information  no complaints  -HS        Patient Observations  alert;cooperative  -HS        Patient/Family/Caregiver Comments/Observations  No family present during evaluation.   -HS        Care Plan Review  evaluation/treatment results reviewed;risks/benefits reviewed;patient/other agree to care plan  -HS        Patient Effort  excellent  -HS        Symptoms Noted During/After Treatment  none  -HS           General Information    Patient Profile Reviewed  yes  -HS        Pertinent History Of Current Problem  stroke work-up, left facial droop  -HS        Current Method of Nutrition  NPO failed RN swallow screen   -HS        Prior Level of Function-Communication  WFL  -HS        Prior Level of Function-Swallowing  no diet consistency restrictions;safe, efficient swallowing in all situations  -HS        Plans/Goals Discussed with  patient  -HS        Barriers to Rehab  none identified  -HS        Patient's Goals for Discharge  return to PO diet;return home  -HS           Pain    Additional Documentation  Pain Scale: Numbers Pre/Post-Treatment (Group)  -HS           Pain Scale: Numbers Pre/Post-Treatment    Pretreatment Pain Rating  0/10 - no pain  -HS        Posttreatment Pain Rating  0/10 - no pain  -HS           Oral Motor Structure and Function    Dentition Assessment  upper dentures/partial in place;lower dentures/partial in place  -HS        Secretion Management  WNL/WFL  -HS        Mucosal Quality  moist, healthy  -HS        Volitional Swallow  WFL  -HS           Oral Musculature and Cranial Nerve Assessment    Oral Motor General Assessment  oral labial or buccal impairment  -HS        Oral Labial or Buccal Impairment, Detail, Cranial Nerve VII (Facial):  left labial droop slight  -HS        Oral Motor, Comment  Initially presented with slurred speech, however speech has resolved. Patient feels he has returned to baseline. No motor speech deficits, language deficits, or cognitive deficits upon screening.   -HS           General Eating/Swallowing Observations    Respiratory Support Currently in Use  room air  -HS        Eating/Swallowing Skills  self-fed;appropriate self-feeding skills observed  -HS        Positioning During Eating  upright in bed  -HS        Utensils Used  spoon;cup  -HS        Consistencies Trialed  regular textures;soft textures;pureed;thin liquids  -HS           Respiratory    Respiratory Status  WFL  -HS           Clinical Swallow Eval    Oral Prep Phase  WFL  -HS        Oral Transit  WFL  -HS        Oral Residue  WFL  -HS        Pharyngeal Phase  no overt signs/symptoms of pharyngeal impairment   -HS        Esophageal Phase  unremarkable  -HS        Clinical Swallow Evaluation Summary  No overt s/s of aspiration with trials of thin liquids, puree, mechanical soft, mixed consistencies, or regular. Patient does report globus sensation that he feels is due to post nasal drip.   -HS           Clinical Impression    SLP Swallowing Diagnosis  functional oral phase;functional pharyngeal phase  -HS        Functional Impact  no impact on function  -HS        Swallow Criteria for Skilled Therapeutic Interventions Met  no problems identified which require skilled intervention  -HS           Recommendations    SLP Diet Recommendation  regular textures;thin liquids  -HS        Recommended Diagnostics  No further SLP services recommended  -HS        Recommended Precautions and Strategies  upright posture during/after eating;small bites of food and sips of liquid  -HS        Oral Care Recommendations  Oral Care BID/PRN  -HS        SLP Rec. for Method of Medication Administration  meds whole;with thin liquids;with pudding or applesauce;as tolerated  -HS        Anticipated Discharge Disposition (SLP)  home  -          User Key  (r) = Recorded By, (t) = Taken By, (c) = Cosigned By    Initials Name Effective Dates     Kamini Caruso MS CCC-SLP 06/08/18 -           EDUCATION  The patient has been educated in the following areas:   Dysphagia (Swallowing Impairment).    SLP Recommendation and Plan  SLP Swallowing Diagnosis: functional oral phase, functional pharyngeal phase  SLP Diet Recommendation: regular textures, thin liquids   Recommended Diagnostics: No further SLP services recommended  Swallow Criteria for Skilled Therapeutic Interventions Met: no problems identified which require skilled intervention  Anticipated Discharge Disposition (SLP): home        Plan of Care Reviewed With: patient  Outcome Summary: Clinical swallow evaluation completed. Functional oropharyngeal swallow. Minimal left facial droop. Rec:  regular diet with thin liquids. Meds as tolerated. Upright for PO intake with small bites/sips. ST to sign off at this time.           SLP Outcome Measures (last 72 hours)      SLP Outcome Measures     Row Name 10/23/20 1000             SLP Outcome Measures    Outcome Measure Used?  Adult NOMS  -HS         Adult FCM Scores    FCM Chosen  Swallowing  -      Swallowing FCM Score  7  -HS        User Key  (r) = Recorded By, (t) = Taken By, (c) = Cosigned By    Initials Name Effective Dates     Kamini Caruso MS CCC-SLP 06/08/18 -            Time Calculation:   Time Calculation- SLP     Row Name 10/23/20 1057             Time Calculation- SLP    SLP Start Time  1015  -      SLP Stop Time  1100  -      SLP Time Calculation (min)  45 min  -      SLP Received On  10/23/20  -        User Key  (r) = Recorded By, (t) = Taken By, (c) = Cosigned By    Initials Name Provider Type     Kamini Caruso MS CCC-SLP Speech and Language Pathologist          Therapy Charges for Today     Code Description Service Date Service Provider Modifiers Qty    [id number]  ST EVAL ORAL PHARYNG SWALLOW 3 10/23/2020 Kamini Caruso MS CCC-SLP GN 1               SLP Discharge Summary  Anticipated Discharge Disposition (SLP): home    MS HEATHER Astudillo  10/23/2020

## 2020-10-23 NOTE — OUTREACH NOTE
Prep Survey      Responses   [facility] patient discharged from?  [place]   Is LACE score < 7 ?  No   Eligibility  [facility]   Date of Admission  10/22/20   Date of Discharge  10/23/20   Discharge Disposition  Home or Self Care   Discharge diagnosis  TIA, T2DM, A-fib, HTN   Does the patient have one of the following disease processes/diagnoses(primary or secondary)?  Stroke (TIA)   Does the patient have Home health ordered?  No   Is there a DME ordered?  No   Prep survey completed?  Yes          Nicolasa Toribio RN

## 2020-10-23 NOTE — CONSULTS
Acute rehab referral received via stroke order set. H&P indicates that except for facial droop symptoms have resolved. Will sign off.     Thank you!    Wei Cox RN  p

## 2020-10-23 NOTE — PLAN OF CARE
Problem: Adult Inpatient Plan of Care  Goal: Plan of Care Review  Flowsheets (Taken 10/23/2020 7226)  Plan of Care Reviewed With: patient  Outcome Summary: Clinical swallow evaluation completed. Functional oropharyngeal swallow. Minimal left facial droop. Rec: regular diet with thin liquids. Meds as tolerated. Upright for PO intake with small bites/sips. ST to sign off at this time.

## 2020-10-23 NOTE — DISCHARGE SUMMARY
NAME: Aj Salazar ADMIT: 10/22/2020   : 1945  PCP: Neftali Amezcua MD    MRN: [id number] LOS: 1 days   AGE/SEX: 74 y.o. male  ROOM: 771     Date of Admission:  10/22/2020  Date of Discharge:  10/23/2020    PCP: Neftali Amezcua MD    CHIEF COMPLAINT  Neuro Deficit(s) (PT REPORTS HAVING AN EPISODE OF LEFT SIDED FACIAL NUMBNESS AND SOME EXPRESSIVE APHASIA AND LEFT SIDED BODY CONTRACTURES STARTING APPROX. 1030, SYMPTOMS NOW RESOLVED; PT WEARING FACE MASK)      DISCHARGE DIAGNOSIS  Active Hospital Problems    Diagnosis  POA   • **TIA (transient ischemic attack) [G45.9]  Yes   • Type 2 diabetes mellitus with hyperglycemia, without long-term current use of insulin (CMS/[facility], located within [facility]) [E11.65]  Yes   • Permanent atrial fibrillation (CMS/HCC) [I48.21]  Yes   • Hypertension [I10]  Yes      Resolved Hospital Problems    Diagnosis Date Resolved POA   • Facial droop [R29.810] 10/23/2020 Yes       SECONDARY DIAGNOSES  Past Medical History:   Diagnosis Date   • Alcohol abuse, in remission    • BPH (benign prostatic hypertrophy)     see doctors at [facility]   • COPD (chronic obstructive pulmonary disease) (CMS/HCC)    • Depression    • DJD (degenerative joint disease) of cervical spine    • ED (erectile dysfunction)     [facility]   • Hx of colonic polyps     followed by GI (Kyle)   • Hyperlipidemia    • Hypertension    • Influenza B 2013       • Nocturnal hypoxia    • Osteoarthritis     HIPS, HANDS, MULTIPLE SITES   • Peripheral neuropathy    • Permanent atrial fibrillation (CMS/HCC)    • Rectal bleeding 2017   • Tendonitis of shoulder 2007    RIGHT SHOULDER/DELTOID INSERTION   • Ulcer of the stomach and intestine        CONSULTS   Neurology    HOSPITAL COURSE  Patient is a 74 y.o. male with history of hypertension, permanent atrial fibrillation on Pradaxa, COPD, diabetes who presented with acute onset of facial droop and slurred speech as well as difficulty of using his left  hand.    Patient was admitted as observation.  He had resolution of his symptoms.  Seen by neurology who felt that he likely had a TIA but it is more secondary to threatened small vessel thrombosis than to his A. Fib.    He feels well and will be discharged.  Neurology is recommending aspirin to be added to his Pradaxa as well as a statin.  His blood pressure was quite elevated on admission but it is improved today.  I will add a low-dose of amlodipine to try to improve blood pressure control but this may need to be titrated additionally as an outpatient with his cardiologist and primary care physician.      DIAGNOSTICS    Bilateral Carotid Duplex [[id number]] Tahir as Reviewed   Order Status: Completed Collected: 10/23/20 0813    Updated: 10/23/20 0937    Prox CCA PSV 68.6 cm/sec     Prox CCA PSV 70.4 cm/sec     Prox CCA EDV 12.3 cm/sec     Prox CCA EDV 12.3 cm/sec     Dist CCA PSV -57.5 cm/sec     Dist CCA PSV 56.3 cm/sec     Dist CCA EDV -16.4 cm/sec     Dist CCA EDV 14.7 cm/sec     Prox ECA PSV -54.5 cm/sec     Prox ECA PSV -69.2 cm/sec     Prox ECA EDV -11.1 cm/sec     Prox ECA EDV -14.7 cm/sec     Prox ICA PSV -81.5 cm/sec     Prox ICA PSV -41.0 cm/sec     Prox ICA EDV -20.5 cm/sec     Prox ICA EDV -11.7 cm/sec     Mid ICA PSV -46.6 cm/sec     Mid ICA PSV -56.9 cm/sec     Mid ICA EDV -14.7 cm/sec     Mid ICA EDV -11.1 cm/sec     Dist ICA PSV -88.5 cm/sec     Dist ICA PSV -81.9 cm/sec     Dist ICA EDV -29.9 cm/sec     Dist ICA EDV -19.1 cm/sec     Vertebral A PSV 44.6 cm/sec     Vertebral A PSV -59.8 cm/sec     Vertebral A EDV 10.0 cm/sec     Vertebral A EDV -16.4 cm/sec     Prox SCLA .0 cm/sec     Prox SCLA PSV 71.5 cm/sec     Left arm /99 mmHg     Right arm /98 mmHg    Narrative:     · Proximal right internal carotid artery mild stenosis.   · Proximal left internal carotid artery mild stenosis.       MRI Brain Without Contrast [[id number]] Tahir as Reviewed   Order Status: Completed Collected:  10/23/20 0041    Updated: 10/23/20 0050   Narrative:     BRAIN MRI WITHOUT CONTRAST       HISTORY: Stroke, follow up; I63.9-Cerebral infarction, unspecified;   I48.91-Unspecified atrial fibrillation       COMPARISON: 10/22/2020.       FINDINGS:  Multiplanar images of the head were obtained without and with   gadolinium. No areas of restricted diffusion are seen to suggest acute   infarct. There is diffuse atrophy. There is periventricular and deep   white matter microangiopathic change. There is no midline shift or mass   effect. There is no evidence of hemorrhage on gradient echo imaging. The   intracranial flow voids appear intact. Mucosal thickening is noted   within the ethmoid sinuses. There is also some mild mucosal thickening   noted within the maxillary sinuses. There is some minimal opacification   of the right mastoid air cells.       Impression:     1. No acute intracranial abnormality.       This report was finalized on 10/23/2020 12:47 AM by Dr. Mona Nazario M.D.       XR Chest 1 View [[id number]] Tahir as Reviewed   Order Status: Completed Collected: 10/22/20 1357    Updated: 10/22/20 1404   Narrative:     XR CHEST 1 VW-       HISTORY: Male who is 74 years-old,  stroke       TECHNIQUE: Frontal view of the chest       COMPARISON: 02/08/2020       FINDINGS: The heart is enlarged. Aorta is calcified. Pulmonary   vasculature is unremarkable. No focal pulmonary consolidation, pleural   effusion, or pneumothorax. No acute osseous process.       Impression:     No focal pulmonary consolidation. Cardiomegaly. Follow-up as   clinical indications persist.       This report was finalized on 10/22/2020 2:01 PM by Dr. Uzair Dawkins M.D.       CT Head Without Contrast Stroke Protocol [[id number]] Tahir as Reviewed   Order Status: Completed Collected: 10/22/20 1316    Updated: 10/22/20 1321   Narrative:     CT HEAD WO CONTRAST STROKE PROTOCOL-       INDICATIONS: Stroke       TECHNIQUE: Radiation dose  reduction techniques were utilized, including   automated exposure control and exposure modulation based on body size.   Noncontrast head CT       COMPARISON: 02/08/2020       FINDINGS:               No acute intracranial hemorrhage, midline shift or mass effect. No acute   territorial infarct is identified.       Moderate periventricular hypodensities suggest chronic small vessel   ischemic change in a patient this age.       Arterial calcifications are seen at the base of the brain.       Ventricles, cisterns, cerebral sulci are unremarkable for patient age.       The visualized paranasal sinuses, orbits, mastoid air cells are   unremarkable.                       Impression:         No acute intracranial hemorrhage or hydrocephalus. Chronic changes of   the brain. If there is further clinical concern, MRI could be considered   for further evaluation.          23/2020 0610 10/23/2020 0723 Basic Metabolic Panel [[id number]]    Blood from Arm, Left    Final result Component Value Units   Glucose 91 mg/dL   BUN 11 mg/dL   Creatinine 0.85 mg/dL   Sodium 140 mmol/L   Potassium 3.9  mmol/L   Chloride 106 mmol/L   CO2 24.8 mmol/L   Calcium 8.6 mg/dL   eGFR Non African Am 88 mL/min/1.73   BUN/Creatinine Ratio 12.9    Anion Gap 9.2 mmol/L           10/23/2020 0610 10/23/2020 0715 CBC Auto Differential [[id number]]   (Abnormal)   Blood    Final result Component Value Units   WBC 6.53 10*3/mm3   RBC 5.19 10*6/mm3   Hemoglobin 15.7 g/dL   Hematocrit 46.1 %   MCV 88.8 fL   MCH 30.3 pg   MCHC 34.1 g/dL   RDW 12.9 %   RDW-SD 41.7 fl   MPV 9.5 fL   Platelets 212 10*3/mm3   Neutrophil Rel % 60.0 %   Lymphocyte Rel % 25.9 %   Monocyte Rel % 9.5 %   Eosinophil Rel % 3.1 %   Basophil Rel % 0.9 %   Immature Granulocyte Rel % 0.6High  %   Neutrophils Absolute 3.92 10*3/mm3   Lymphocytes Absolute 1.69 10*3/mm3   Monocytes Absolute 0.62 10*3/mm3   Eosinophils Absolute 0.20 10*3/mm3   Basophils Absolute 0.06 10*3/mm3   Immature Grans,  Absolute 0.04 10*3/mm3   nRBC 0.2 /100 WBC           10/23/2020 0610 10/23/2020 0723 Magnesium [[id number]]   Blood from Arm, Left    Final result Component Value Units   Magnesium 2.1 mg/dL           10/23/2020 0610 10/23/2020 0704 Hemoglobin A1c [[id number]]    (Abnormal)   Blood    Final result Component Value Units   Hemoglobin A1C 6.00High  %           10/23/2020 0610 10/23/2020 0723 Lipid Panel [[id number]]    (Abnormal)   Blood from Arm, Left    Final result Component Value Units   Total Cholesterol 134 mg/dL   Triglycerides 94 mg/dL   HDL Cholesterol 37Low  mg/dL   LDL Cholesterol  79 mg/dL   VLDL Cholesterol 18 mg/dL   LDL/HDL Ratio 2.11                    PHYSICAL EXAM  Objective    Alert  nad  No resp distress  CN grossly intact    CONDITION ON DISCHARGE  Stable.      DISCHARGE DISPOSITION   Home or Self Care      DISCHARGE MEDICATIONS       Your medication list      START taking these medications      Instructions Last Dose Given Next Dose Due   acetaminophen 325 MG tablet  Commonly known as: TYLENOL      Take 2 tablets by mouth Every 4 (Four) Hours As Needed for Mild Pain .       amLODIPine 5 MG tablet  Commonly known as: NORVASC      Take 0.5 tablets by mouth Daily.       aspirin 81 MG EC tablet  Start taking on: October 24, 2020      Take 1 tablet by mouth Daily.       atorvastatin 80 MG tablet  Commonly known as: LIPITOR      Take 1 tablet by mouth Every Night.          CONTINUE taking these medications      Instructions Last Dose Given Next Dose Due   Alphagan P 0.1 % solution ophthalmic solution  Generic drug: brimonidine      Administer 1 drop to both eyes 2 (two) times a day.       dabigatran etexilate 150 MG capsu  Commonly known as: PRADAXA      Take 150 mg by mouth 2 (Two) Times a Day.       metFORMIN  MG 24 hr tablet  Commonly known as: GLUCOPHAGE-XR      TAKE 1 TABLET BY MOUTH DAILY WITH DINNER       metoprolol tartrate 25 MG tablet  Commonly known as: LOPRESSOR      Take 1 tablet by  mouth 2 (Two) Times a Day.       pantoprazole 40 MG EC tablet  Commonly known as: PROTONIX      TAKE 1 TABLET BY MOUTH DAILY       Tafluprost (PF) 0.0015 % solution ophthalmic solution  Commonly known as: ZIOPTAN      Administer 1 drop to both eyes Every Night.       tamsulosin 0.4 MG capsule 24 hr capsule  Commonly known as: FLOMAX      Take 2 capsules by mouth Every Night.       Trelegy Ellipta 100-62.5-25 MCG/INH aerosol powder   Generic drug: Fluticasone-Umeclidin-Vilant      Inhale 1 puff Daily.             Where to Get Your Medications      These medications were sent to [id number] - [place], [place] - [address] AT [facility] &  - [phone] [facility] [phone]   [address], [facility] [address]    Phone: [phone]   · amLODIPine 5 MG tablet  · atorvastatin 80 MG tablet     Information about where to get these medications is not yet available    Ask your nurse or doctor about these medications  · acetaminophen 325 MG tablet  · aspirin 81 MG EC tablet          Future Appointments   Date Time Provider Department Center   12/16/2020 12:20 PM Juan Colby MD [facility] None   3/2/2021  9:15 AM Neftali Amezcua MD [facility] None     Additional Instructions for the Follow-ups that You Need to Schedule     Discharge Follow-up with Specialty: PCP in 1-2 weeks, Cardiologist in 4 weeks   As directed      Specialty: PCP in 1-2 weeks, Cardiologist in 4 weeks           Follow-up Information     Neftali Amezcua MD .    Specialty: Internal Medicine  Contact information:  [facility] [place] [id number] [phone]                   TEST  RESULTS PENDING AT DISCHARGE         Joe Blake MD  [facility]  10/23/20  15:51 EDT      Time: greater than 32 minutes on discharge  It was a pleasure taking care of this patient while in the hospital.

## 2020-10-23 NOTE — CONSULTS
Neurology Note    Patient:  Aj Salazar    YOB: 1945    REFERRING PHYSICIAN:  Jo Presley MD    CHIEF COMPLAINT:    Left facial droop and arm weakness    HISTORY OF PRESENT ILLNESS:   The patient is a 74 y.o. male with chronic A.fib, on Pradaxa that he reports taking regularly, HTN, HLP, presented to ED yday c/o an episode of left facial droop, slurred speech, left arm weakness and numbness last am that mostly resolved by the time he was seen in ED 2 hours later, where BP was 170/89, in AF, R100, T96.7, exam with minimal left facial droop. He denies prior stroke symptoms. CT head with moderate chronic white matter changes. He received  mg pr. He has since been cleared for po diet, has returned to baseline, able to get up w/o help.    Past Medical History:  Past Medical History:   Diagnosis Date   • Alcohol abuse, in remission    • BPH (benign prostatic hypertrophy)     see doctors at [facility]   • COPD (chronic obstructive pulmonary disease) (CMS/HCC)    • Depression    • DJD (degenerative joint disease) of cervical spine    • ED (erectile dysfunction)     [facility]   • Hx of colonic polyps     followed by GI (K[facility])   • Hyperlipidemia    • Hypertension    • Influenza B 02/14/2013       • Nocturnal hypoxia    • Osteoarthritis     HIPS, HANDS, MULTIPLE SITES   • Peripheral neuropathy    • Permanent atrial fibrillation (CMS/HCC)    • Rectal bleeding 04/26/2017   • Tendonitis of shoulder 11/07/2007    RIGHT SHOULDER/DELTOID INSERTION   • Ulcer of the stomach and intestine        Past Surgical History:  Past Surgical History:   Procedure Laterality Date   • APPENDECTOMY     • CARDIOVASCULAR STRESS TEST N/A 10/20/2015    NML LEXISCAN CARDIOLITE PERFUSION STUDY, NO EVIDENCE OF ISCHEMIA, AREA OF HYPOPERFUSION OF THE INFERIOR WALL W/NORMAL WALL PERFUSION AND NML LEFT VENTRICULAR EJECTION FRACTION, MOST LIKELY ATTENUATION. DR.MICHAEL BOX   • COLONOSCOPY N/A  02/2017   • COLONOSCOPY N/A 02/23/2011    4 POLYPS REMOVED, RESCOPE IN 5 YRS, DR. EAGLE   • COLONOSCOPY N/A 03/08/2006    ERYTHEMOUS AND GRANULAR MUCOSA IN ILEOCECAL VALVE, NORMAL COLON, REPEAT IN 5 YRS,    • ENDOSCOPY N/A 9/26/2018    normal esophagus, acute gastritis, multiple non-bleeding duodenal ulcers with pigmented material, H Pylori positive   • HERNIA REPAIR Bilateral     INGUINAL   • JOINT REPLACEMENT  11/2015    left hip   • TOTAL HIP ARTHROPLASTY Left 11/06/2015    Dr Ankush Sky   • TOTAL HIP ARTHROPLASTY Right 10/27/2014    DR.RIED SKY   • VASECTOMY         Social History:   Social History     Socioeconomic History   • Marital status:      Spouse name: Ara*   • Number of children: 3   • Years of education: Not on file   • Highest education level: Not on file   Occupational History   • Occupation: Retired     Comment: Supervisor/manager [employer]   Tobacco Use   • Smoking status: Former Smoker     Packs/day: 2.00     Years: 49.00     Pack years: 98.00     Quit date: 1/1/2010     Years since quitting: 10.8   • Smokeless tobacco: Never Used   • Tobacco comment: Began Smoking age 15.  Smoked 2 ppd for 49 years until he quit smoking 7 years ago for a 98 pack year history.   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   • Drug use: No     Comment: caffeine use    • Sexual activity: Defer   Lifestyle   • Physical activity     Days per week: 2 days     Minutes per session: 20 min   • Stress: Not on file        Family History:   Family History   Problem Relation Age of Onset   • Colon cancer Mother    • Ovarian cancer Mother    • Alzheimer's disease Mother 70   • Stroke Sister         October 2016   • Dementia Sister    • Heart disease Brother    • Alcohol abuse Brother    • Coronary artery disease Father    • Hypertension Father    • Colon cancer Maternal Grandmother    • Heart disease Brother    • Coronary  artery disease Brother    • Hypertension Brother    • Stroke Brother    • Arthritis Brother    • Prostate cancer Neg Hx        Medications Prior to Admission:    Prior to Admission medications    Medication Sig Start Date End Date Taking? Authorizing Provider   ALPHAGAN P 0.1 % solution ophthalmic solution Administer 1 drop to both eyes 2 (two) times a day. 7/5/18  Yes Pietro Parham MD   dabigatran etexilate (PRADAXA) 150 MG capsu Take 150 mg by mouth 2 (Two) Times a Day.   Yes Pietro Parham MD   metFORMIN ER (GLUCOPHAGE-XR) 500 MG 24 hr tablet TAKE 1 TABLET BY MOUTH DAILY WITH DINNER 7/6/20  Yes Neftali Amezcua MD   metoprolol tartrate (LOPRESSOR) 25 MG tablet Take 1 tablet by mouth 2 (Two) Times a Day. 1/2/20  Yes Neftali Amezcua MD   pantoprazole (PROTONIX) 40 MG EC tablet TAKE 1 TABLET BY MOUTH DAILY 12/13/19  Yes Neftali Amezcua MD   Tafluprost, PF, (ZIOPTAN) 0.0015 % solution ophthalmic solution Administer 1 drop to both eyes Every Night.   Yes Pietro Parham MD   tamsulosin (FLOMAX) 0.4 MG capsule 24 hr capsule Take 2 capsules by mouth Every Night. 7/31/18  Yes Netfali Amezcua MD   TRELEGY ELLIPTA 100-62.5-25 MCG/INH aerosol powder  Inhale 1 puff Daily. 10/29/18  Yes Pietro Parham MD       Allergies:  Bactrim [sulfamethoxazole-trimethoprim]      Review of system  Review of Systems   Neurological: Positive for facial asymmetry, speech difficulty, weakness and numbness.   All other systems reviewed and are negative.      Vitals:    10/23/20 1250   BP: 135/88   Pulse: 82   Resp: 18   Temp: 98.2 °F (36.8 °C)   SpO2: 96%       Physical exam  Physical Exam  Constitutional:       Appearance: He is well-developed.   HENT:      Head: Normocephalic and atraumatic.   Eyes:      Extraocular Movements: Extraocular movements intact.      Pupils: Pupils are equal, round, and reactive to light.   Cardiovascular:      Rate and Rhythm: Normal rate and regular rhythm.   Pulmonary:      Effort: Pulmonary  effort is normal.   Neurological:      General: No focal deficit present.      Mental Status: He is alert and oriented to person, place, and time.      Cranial Nerves: No cranial nerve deficit.      Sensory: No sensory deficit.      Motor: No weakness.      Coordination: Coordination normal.      Deep Tendon Reflexes: Reflexes are normal and symmetric. Reflexes normal. Babinski sign absent on the right side. Babinski sign absent on the left side.      Comments: Speech clear, VFF, no facial droop, no limb drift.   Psychiatric:         Behavior: Behavior normal.         Thought Content: Thought content normal.           Lab Results   Component Value Date    WBC 6.53 10/23/2020    HGB 15.7 10/23/2020    HCT 46.1 10/23/2020    MCV 88.8 10/23/2020     10/23/2020     Lab Results   Component Value Date    GLUCOSE 91 10/23/2020    BUN 11 10/23/2020    CREATININE 0.85 10/23/2020    EGFRIFNONA 88 10/23/2020    EGFRIFAFRI 92 03/03/2020    BCR 12.9 10/23/2020    CO2 24.8 10/23/2020    CALCIUM 8.6 10/23/2020    PROTENTOTREF 6.4 03/03/2020    ALBUMIN 3.80 10/22/2020    LABIL2 1.4 03/03/2020    AST 21 10/22/2020    ALT 22 10/22/2020   Contains abnormal data Lipid Panel  Order: [id number]  Status:  Final result   Visible to patient:  No (not released) Next appt:  12/16/2020 at 12:20 PM in Cardiology (Juan Colby MD)  Specimen Information: Arm, Left; Blood        Component   Ref Range & Units 06:10 1mo ago 1yr ago 2yr ago 3yr ago   Total Cholesterol   0 - 200 mg/dL 134  153  154  155  167    Triglycerides   0 - 150 mg/dL 94  101  136  131  119    HDL Cholesterol   40 - 60 mg/dL 37Low   43  43  40  42    LDL Cholesterol    0 - 100 mg/dL 79  90  84  89  101High     VLDL Cholesterol   5 - 40 mg/dL 18   27.2  26.2  23.8    LDL/HDL Ratio  2.11                Hemoglobin A1C   4.80 - 5.60 % 6.00High      Respiratory Panel PCR w/COVID-19(SARS-CoV-2) LAURO/BON/MAN/PAD/COR/MAD/DARLYN In-House, NP Swab in UTM/VTM, 3-4 HR TAT - Swab,  Nasopharynx  Order: [id number] - Part of Panel Order [id number]  Status:  Final result   Visible to patient:  No (not released) Next appt:  2020 at 12:20 PM in Cardiology (Juan Colby MD)  Specimen Information: Nasopharynx; Swab        Component   Ref Range & Units    ADENOVIRUS, PCR   Not Detected Not Detected    Coronavirus 229E   Not Detected Not Detected    Coronavirus HKU1   Not Detected Not Detected    Coronavirus NL63   Not Detected Not Detected    Coronavirus OC43   Not Detected Not Detected    COVID19   Not Detected - Ref. Range Not Detected    Human Metapneumovirus   Not Detected Not Detected    Human Rhinovirus/Enterovirus   Not Detected Not Detected    Influenza A PCR   Not Detected Not Detected    Influenza B PCR   Not Detected Not Detected    Parainfluenza Virus 1   Not Detected Not Detected    Parainfluenza Virus 2   Not Detected Not Detected    Parainfluenza Virus 3   Not Detected Not Detected    Parainfluenza Virus 4   Not Detected Not Detected    RSV, PCR   Not Detected Not Detected    Bordetella pertussis pcr   Not Detected Not Detected    Bordetella parapertussis PCR   Not Detected Not Detected    Chlamydophila pneumoniae PCR   Not Detected Not Detected    Mycoplasma pneumo by PCR   Not Detected Not Detected                Duplex scan of extracranial arteries using B-mode, color flow, and/or spectral Doppler; bilateral  Order# [id number]  Reading physician: Eren Dunne Jr., MD Ordering physician: Jo Presley MD Study date: 10/23/20   Patient Information    Patient Name   Aj Salazar MRN   [id number] Sex   Male  (Age)   1945 (74 y.o.)   Clinical Indication    speech impairment; motor impairment; slurred speech   Admission Information    Admission Date/Time Discharge Date/Time Room/Bed   10/22/20  12:08 PM  P577/1   Interpretation Summary    · Proximal right internal carotid artery mild stenosis.  · Proximal left internal carotid artery mild stenosis.     "    ECG 12 Lead  Order: [id number]  Status:  Final result   Visible to patient:  No (not released) Next appt:  2020 at 12:20 PM in Cardiology (Juan Colby MD)     Narrative & Impression    HEART RATE= 82  bpm  RR Interval= 734  ms  SC Interval=   ms  P Horizontal Axis=   deg  P Front Axis=   deg  QRSD Interval= 75  ms  QT Interval= 368  ms  QRS Axis= 56  deg  T Wave Axis= 20  deg  - ABNORMAL ECG -  Atrial fibrillation  Rate has improved  Electronically Signed By: Mariella Kaur ([facility]) 22-Oct-2020 18:39:49  Date and Time of Study: 2020-10-22 12:55:25      Specimen Collected: 10/22/20 12:55           Echo Complete w/ Doppler, Color Flow and Contrast  Order# [id number]  Reading physician: Erika Anderson MD Ordering physician: Jo Presley MD Study date: 10/23/20   Patient Information    Patient Name   Aj Salazar MRN   [id number] Sex   Male  (Age)   1945 (74 y.o.)   Admission Information    Admission Date/Time Discharge Date/Time Room/Bed   10/22/20  12:08 PM  P577/1   Sedation Narrator Report    Sedation Narrator Report   Interpretation Summary    · Calculated left ventricular EF = 44.3% Estimated left ventricular EF was in agreement with the calculated left ventricular EF.  · Saline study was poorly visualized. No obvious bubbles crossed.  · The aortic valve exhibits sclerosis. The aortic valve appears trileaflet.  · There is mild, bileaflet mitral valve thickening present.  · Trace aortic valve regurgitation is present  · Mild mitral valve regurgitation is present.  · Mild dilation of the aortic root is present.      Patient Hx Of Height, Weight, and Vitals    Height Weight BSA (Calculated - sq m) BMI (kg/m2) Pulse BP   177.8 cm (70\") 86.2 kg (190 lb) 2.04 sq meters 27.32 113 142/78   Reason for Exam    Other Reasons - Stroke; Lightheadedness, Presyncope, or Syncope; Syncope - Stroke   Cardiac History    Diagnosis Date Comment Source   Hyperlipidemia   Provider   Hypertension   " Provider   Study Description    A complete transthoracic echocardiogram with complete Doppler and color flow was performed. The study is technically difficult for diagnosis. Verbal consent was obtained from the patient to use Definity and saline image enhancer in order to optimize the study. The use of Definity was indicated as two or more contiguous segments of the left ventricular endocardial border were unable to be adequately visualized by standard imaging methods. 1.3 mL of mechanically activated Definity was mixed with 8.7 mL of normal saline.  A total of 2 mL of the resulting Definity solution was administered, and the remaining contrast was wasted and discarded. A total of 20 mL of agitated saline was administered to assess for cardiac shunting. Negative saline study  No adverse reaction to image enhancer was noted. [id number] 07/01/2021.   Echocardiogram Findings    Left Ventricle Calculated left ventricular EF = 44.3% Estimated left ventricular EF was in agreement with the calculated left ventricular EF. Normal left ventricular cavity size and wall thickness noted. There is left ventricular global hypokinesis noted.   Right Ventricle Normal right ventricular cavity size and systolic function noted.   Left Atrium Normal left atrial size and volume noted. No interatrial septal aneurysm present. No lipomatous hypertrophy of the interatrial septum present.  Saline study was poorly visualized.  No obvious bubbles crossed.   Right Atrium Normal right atrial cavity size noted. The inferior vena cava is normally sized.   Aortic Valve The aortic valve is abnormal in structure. The aortic valve exhibits sclerosis. The aortic valve appears trileaflet. Trace aortic valve regurgitation is present. No aortic valve stenosis is present.   Mitral Valve There is mild, bileaflet mitral valve thickening present. Mild mitral valve regurgitation is present.   Tricuspid Valve Trace to mild tricuspid valve regurgitation is present.    Pulmonic Valve The pulmonic valve is grossly normal in structure. There is trace to mild pulmonic valve regurgitation present.   Greater Vessels Mild dilation of the aortic root is present.   Pericardium There is no evidence of pericardial effusion. .         Radiological Studies:  Mri Brain Without Contrast    Result Date: 10/23/2020  BRAIN MRI WITHOUT CONTRAST  HISTORY: Stroke, follow up; I63.9-Cerebral infarction, unspecified; I48.91-Unspecified atrial fibrillation  COMPARISON: 10/22/2020.  FINDINGS:  Multiplanar images of the head were obtained without and with gadolinium. No areas of restricted diffusion are seen to suggest acute infarct. There is diffuse atrophy. There is periventricular and deep white matter microangiopathic change. There is no midline shift or mass effect. There is no evidence of hemorrhage on gradient echo imaging. The intracranial flow voids appear intact. Mucosal thickening is noted within the ethmoid sinuses. There is also some mild mucosal thickening noted within the maxillary sinuses. There is some minimal opacification of the right mastoid air cells.      1. No acute intracranial abnormality.  This report was finalized on 10/23/2020 12:47 AM by Dr. Mona Nazario M.D.      Xr Chest 1 View    Result Date: 10/22/2020  XR CHEST 1 VW-  HISTORY: Male who is 74 years-old,  stroke  TECHNIQUE: Frontal view of the chest  COMPARISON: 02/08/2020  FINDINGS: The heart is enlarged. Aorta is calcified. Pulmonary vasculature is unremarkable. No focal pulmonary consolidation, pleural effusion, or pneumothorax. No acute osseous process.      No focal pulmonary consolidation. Cardiomegaly. Follow-up as clinical indications persist.  This report was finalized on 10/22/2020 2:01 PM by Dr. Uzair Dawkins M.D.      Ct Head Without Contrast Stroke Protocol    Result Date: 10/22/2020  CT HEAD WO CONTRAST STROKE PROTOCOL-  INDICATIONS: Stroke  TECHNIQUE: Radiation dose reduction techniques were  utilized, including automated exposure control and exposure modulation based on body size. Noncontrast head CT  COMPARISON: 02/08/2020  FINDINGS:    No acute intracranial hemorrhage, midline shift or mass effect. No acute territorial infarct is identified.  Moderate periventricular hypodensities suggest chronic small vessel ischemic change in a patient this age.  Arterial calcifications are seen at the base of the brain.  Ventricles, cisterns, cerebral sulci are unremarkable for patient age.  The visualized paranasal sinuses, orbits, mastoid air cells are unremarkable.           No acute intracranial hemorrhage or hydrocephalus. Chronic changes of the brain. If there is further clinical concern, MRI could be considered for further evaluation.  Discussed by telephone with Dr. Roberts at 1314, and Dr. Claire at 1315, 10/20/2020.  This report was finalized on 10/22/2020 1:17 PM by Dr. Uzair Dawkins M.D.        During this visit the following were done:  Labs Reviewed [x]    Labs Ordered []    Radiology Reports Reviewed [x]    Radiology Ordered []    EKG, echo, and/or stress test reviewed [x]    EEG results reviewed  []    EEG reviewed and interpreted per myself   []    Discussed case with neurointerventionalist or neuroradiologist []    Referring Provider Records Reviewed []    ER Records Reviewed []    Hospital Records Reviewed []    History Obtained From Family []    Radiological images view and Interpreted per myself [x]    Case Discussed with referring provider []     Decision to obtain and request outside records  []        Assessment and Plan     TIA, favor small vessel, threatened small vessel thrombosis 22 HTN over embolism 22 A.fib, MRI brain with advanced small vessel disease, no evidence of an acute stroke, personally reviewed.    - Home on aspirin 81 mg, Pradaxa, statin.   - BP<130/80.   - F/U with cardiology, PCP, outpatient neurology follow-up optional.    Thanks,                 Electronically  signed by Jacinto Roberts MD on 10/23/2020 at 13:12 EDT

## 2020-10-23 NOTE — PLAN OF CARE
Goal Outcome Evaluation:  Plan of Care Reviewed With: patient  Progress: improving  Outcome Summary: NIH remains 1 for facial droop, kept NPO pending swallow eval, BP elevated, has prn med ordered, will continue to monitor.

## 2020-10-23 NOTE — PROGRESS NOTES
Discharge Planning Assessment  [facility]     Patient Name: Aj Salazar  MRN: [id number]  Today's Date: 10/23/2020    Admit Date: 10/22/2020    Discharge Needs Assessment     Row Name 10/23/20 1238       Living Environment    Lives With  spouse    Current Living Arrangements  home/apartment/condo    Primary Care Provided by  self    Provides Primary Care For  no one    Family Caregiver if Needed  spouse    Family Caregiver Names  Wife Ara    Quality of Family Relationships  helpful;involved;supportive    Able to Return to Prior Arrangements  yes       Resource/Environmental Concerns    Resource/Environmental Concerns  none    Transportation Concerns  car, none       Transition Planning    Patient/Family Anticipates Transition to  home with family    Patient/Family Anticipated Services at Transition  none    Transportation Anticipated  family or friend will provide       Discharge Needs Assessment    Readmission Within the Last 30 Days  no previous admission in last 30 days    Equipment Currently Used at Home  oxygen    Concerns to be Addressed  denies needs/concerns at this time    Anticipated Changes Related to Illness  none    Equipment Needed After Discharge  none        Discharge Plan     Row Name 10/23/20 1241       Plan    Plan  home with spouse, denies needs    Patient/Family in Agreement with Plan  yes    Plan Comments  Met with patient at the bedside. Explained CCP role and verified face sheet. Patient lives at home with his wife Ara. There are 3 steps to enter the home. He denies difficulty navigating steps. IADLs and only has home oxygen from [place]. He is current with PCP and pharmacy. He denies HH or SNF use. Plans to return home and wife will provide transport. Tanesha Styles RN        Continued Care and Services - Admitted Since 10/22/2020    Coordination has not been started for this encounter.         Demographic Summary     Row Name 10/23/20 1236       General Information    Admission Type   inpatient    Arrived From  emergency department    Referral Source  admission list    Reason for Consult  discharge planning    Preferred Language  English        Functional Status     Row Name 10/23/20 1236       Functional Status    Usual Activity Tolerance  good    Current Activity Tolerance  good       Functional Status, IADL    Medications  independent    Meal Preparation  independent    Housekeeping  independent    Laundry  independent    Shopping  independent       Mental Status    General Appearance WDL  WDL       Mental Status Summary    Recent Changes in Mental Status/Cognitive Functioning  no changes        Psychosocial     Row Name 10/23/20 1237       Behavior WDL    Behavior WDL  WDL       Emotion Mood WDL    Emotion/Mood/Affect WDL  WDL       Speech WDL    Speech WDL  WDL       Perceptual State WDL    Perceptual State WDL  WDL       Thought Process WDL    Thought Process WDL  WDL       Intellectual Performance WDL    Intellectual Performance WDL  WDL    Level of Consciousness  Alert       Coping/Stress    Major Change/Loss/Stressor  none    Patient Personal Strengths  able to adapt    Sources of Support  spouse    Reaction to Health Status  accepting    Understanding of Condition and Treatment  adequate understanding of medical condition;adequate understanding of treatment       Developmental Stage (Eriksson's)    Developmental Stage  Stage 8 (66 years-death/Late Adulthood) Integrity vs. Despair        Abuse/Neglect     Row Name 10/23/20 1237       Personal Safety    Feels Unsafe at Home or Work/School  no    Feels Threatened by Someone  no    Does Anyone Try to Keep You From Having Contact with Others or Doing Things Outside Your Home?  no    Physical Signs of Abuse Present  no        Legal    No documentation.       Substance Abuse    No documentation.       Patient Forms    No documentation.           Tanesha Styles RN

## 2020-10-24 ENCOUNTER — NURSE TRIAGE (OUTPATIENT)
Dept: CALL CENTER | Facility: HOSPITAL | Age: 75
End: 2020-10-24

## 2020-10-24 ENCOUNTER — APPOINTMENT (OUTPATIENT)
Dept: CT IMAGING | Facility: HOSPITAL | Age: 75
End: 2020-10-24

## 2020-10-24 ENCOUNTER — HOSPITAL ENCOUNTER (EMERGENCY)
Facility: HOSPITAL | Age: 75
Discharge: HOME | End: 2020-10-24
Attending: EMERGENCY MEDICINE | Admitting: EMERGENCY MEDICINE

## 2020-10-24 VITALS
SYSTOLIC BLOOD PRESSURE: 133 MMHG | WEIGHT: 198.3 LBS | BODY MASS INDEX: 28.39 KG/M2 | OXYGEN SATURATION: 93 % | HEIGHT: 70 IN | DIASTOLIC BLOOD PRESSURE: 79 MMHG | RESPIRATION RATE: 20 BRPM | HEART RATE: 94 BPM

## 2020-10-24 DIAGNOSIS — G45.9 TRANSIENT ISCHEMIC ATTACK (TIA): Primary | ICD-10-CM

## 2020-10-24 LAB
HOLD SPECIMEN: NORMAL
HOLD SPECIMEN: NORMAL
WHOLE BLOOD HOLD SPECIMEN: NORMAL
WHOLE BLOOD HOLD SPECIMEN: NORMAL

## 2020-10-24 PROCEDURE — 70450 CT HEAD/BRAIN W/O DYE: CPT

## 2020-10-24 PROCEDURE — 99284 EMERGENCY DEPT VISIT MOD MDM: CPT

## 2020-10-24 RX ORDER — SODIUM CHLORIDE 0.9 % (FLUSH) 0.9 %
10 SYRINGE (ML) INJECTION AS NEEDED
Status: DISCONTINUED | OUTPATIENT
Start: 2020-10-24 | End: 2020-10-24 | Stop reason: HOSPADM

## 2020-10-24 RX ORDER — CLOPIDOGREL BISULFATE 75 MG/1
75 TABLET ORAL DAILY
Qty: 30 TABLET | Refills: 0 | Status: SHIPPED | OUTPATIENT
Start: 2020-10-24 | End: 2020-11-16 | Stop reason: SDUPTHER

## 2020-10-24 RX ORDER — CLOPIDOGREL BISULFATE 75 MG/1
150 TABLET ORAL ONCE
Status: DISCONTINUED | OUTPATIENT
Start: 2020-10-24 | End: 2020-10-24 | Stop reason: HOSPADM

## 2020-10-24 NOTE — TELEPHONE ENCOUNTER
"Questions should he be taking Plavix, answer is yes from his AVS    Reason for Disposition  • Caller has medication question only, adult not sick, and triager answers question    Additional Information  • Negative: Drug overdose and triager unable to answer question  • Negative: Caller requesting information unrelated to medicine  • Negative: Caller requesting a prescription for Strep throat and has a positive culture result  • Negative: Rash while taking a medication or within 3 days of stopping it  • Negative: Immunization reaction suspected  • Negative: [1] Asthma and [2] having symptoms of asthma (cough, wheezing, etc.)  • Negative: [1] Influenza symptoms AND [2] anti-viral med prescription request, such as Tamiflu  • Negative: [1] Symptom of illness (e.g., headache, abdominal pain, earache, vomiting) AND [2] more than mild  • Negative: MORE THAN A DOUBLE DOSE of a prescription or over-the-counter (OTC) drug  • Negative: [1] DOUBLE DOSE (an extra dose or lesser amount) of over-the-counter (OTC) drug AND [2] any symptoms (e.g., dizziness, nausea, pain, sleepiness)  • Negative: [1] DOUBLE DOSE (an extra dose or lesser amount) of prescription drug AND [2] any symptoms (e.g., dizziness, nausea, pain, sleepiness)  • Negative: Took another person's prescription drug  • Negative: [1] DOUBLE DOSE (an extra dose or lesser amount) of prescription drug AND [2] NO symptoms (Exception: a double dose of antibiotics)  • Negative: Diabetes drug error or overdose (e.g., took wrong type of insulin or took extra dose)  • Negative: [1] Request for URGENT new prescription or refill of \"essential\" medication (i.e., likelihood of harm to patient if not taken) AND [2] triager unable to fill per unit policy  • Negative: [1] Prescription not at pharmacy AND [2] was prescribed by PCP recently  • Negative: [1] Pharmacy calling with prescription questions AND [2] triager unable to answer question  • Negative: [1] Caller has URGENT " "medication question about med that PCP or specialist prescribed AND [2] triager unable to answer question  • Negative: [1] Caller has NON-URGENT medication question about med that PCP prescribed AND [2] triager unable to answer question  • Negative: [1] Caller requesting a NON-URGENT new prescription or refill AND [2] triager unable to refill per unit policy  • Negative: [1] Caller has medication question about med not prescribed by PCP AND [2] triager unable to answer question (e.g., compatibility with other med, storage)  • Negative: Caller requesting a CONTROLLED substance prescription refill (e.g., narcotics, ADHD medicines)  • Negative: Caller wants to use a complementary or alternative medicine  • Negative: [1] Prescription prescribed recently is not at pharmacy AND [2] triager has access to patient's EMR AND [3] prescription is recorded in the EMR  • Negative: [1] DOUBLE DOSE (an extra dose or lesser amount) of over-the-counter (OTC) drug AND [2] NO symptoms  • Negative: [1] DOUBLE DOSE (an extra dose or lesser amount) of antibiotic drug AND [2] NO symptoms    Answer Assessment - Initial Assessment Questions  1.   NAME of MEDICATION: \"What medicine are you calling about?\"      plavix  2.   QUESTION: \"What is your question?\"     Should he be taking   3.   PRESCRIBING HCP: \"Who prescribed it?\" Reason: if prescribed by specialist, call should be referred to that group.     ER/ neurologist  4. SYMPTOMS: \"Do you have any symptoms?\"      TIA, facial drooping and numbness  5. SEVERITY: If symptoms are present, ask \"Are they mild, moderate or severe?\"      Moderated to severe  6.  PREGNANCY:  \"Is there any chance that you are pregnant?\" \"When was your last menstrual period?\"      na    Protocols used: MEDICATION QUESTION CALL-ADULT-      "

## 2020-10-24 NOTE — ED NOTES
Pt presents to ED via EMS from home. Pt reports to EMS that he was admitted on Thursday with TIA's and was discharged yesterday. Pt reports at 1100 today he noticed a L sided facial droop and L arm weakness. Pt states symptoms resolved prior to EMS arrival. Pt is on Pradaxa. Pt is A&OX4, able to ambulate, and in a mask at this time.      Lee Ann Alarcon, RN  10/24/20 [id number]

## 2020-10-24 NOTE — ED NOTES
Pt stated that he was recently here for TIA's and during his admission he had a lot of leg cramps.     Pt stated that today he experienced a 20 minute episode of lip numbness and the inability to use his L hand.     Pt in a surgical mask.    This nurse in gloves, goggles and surgical mask.      Luci Redmond, RN  10/24/20 [id number]

## 2020-10-24 NOTE — ED PROVIDER NOTES
EMERGENCY DEPARTMENT ENCOUNTER    Room Number:  38/38  Date of encounter:  10/24/2020  PCP: Neftali Amezcua MD  Historian: Patient      HPI:  Chief Complaint: Left-sided facial droop, left arm weakness  A complete HPI/ROS/PMH/PSH/SH/FH are unobtainable due to: N/A    Context: Aj Salazar is a 74 y.o. male who presents to the ED c/o of onset of left-sided facial tingling, facial droop and weakness in his left arm with associated numbness which started today at about 11 AM.  He was making his bed when he noticed the symptoms.  Symptoms lasted about 20 minutes and have currently resolved.  He was recently admitted and discharged yesterday for TIA.  MRI of the brain performed on 1022 showed no acute abnormalities.      The patient was placed in a mask in triage, hand hygiene was performed before and after my interaction with the patient.  I wore a mask, safety glasses and gloves during my entire interaction with the patient.    PAST MEDICAL HISTORY  Active Ambulatory Problems     Diagnosis Date Noted   • Chronic obstructive pulmonary disease with acute exacerbation (CMS/Formerly [facility]) 03/17/2016   • Impotence of organic origin 03/17/2016   • Hypertension 03/17/2016   • Osteoarthritis 03/17/2016   • History of smoking greater than 50 pack years 06/23/2017   • Benign prostatic hyperplasia with urinary obstruction 03/27/2018   • Permanent atrial fibrillation (CMS/[facility])    • Type 2 diabetes mellitus with hyperglycemia, without long-term current use of insulin (CMS/[facility]) 01/31/2019   • Arthritis of carpometacarpal (CMC) joint of left thumb 03/03/2020   • Rash and nonspecific skin eruption 09/02/2020   • TIA (transient ischemic attack) 10/22/2020     Resolved Ambulatory Problems     Diagnosis Date Noted   • Dehydration 09/25/2018   • Hypotension 09/25/2018   • Melena 09/25/2018   • EMPERATRIZ (acute kidney injury) (CMS/[facility]) 09/25/2018   • Hyponatremia 09/25/2018   • Lactic acidosis 09/25/2018   • Facial droop 10/23/2020     Past Medical  History:   Diagnosis Date   • Alcohol abuse, in remission    • BPH (benign prostatic hypertrophy)    • COPD (chronic obstructive pulmonary disease) (CMS/HCC)    • Depression    • DJD (degenerative joint disease) of cervical spine    • ED (erectile dysfunction)    • Hx of colonic polyps    • Hyperlipidemia    • Influenza B 02/14/2013   • Nocturnal hypoxia    • Peripheral neuropathy    • Rectal bleeding 04/26/2017   • Tendonitis of shoulder 11/07/2007   • Ulcer of the stomach and intestine          PAST SURGICAL HISTORY  Past Surgical History:   Procedure Laterality Date   • APPENDECTOMY     • CARDIOVASCULAR STRESS TEST N/A 10/20/2015    NML LEXISCAN CARDIOLITE PERFUSION STUDY, NO EVIDENCE OF ISCHEMIA, AREA OF HYPOPERFUSION OF THE INFERIOR WALL W/NORMAL WALL PERFUSION AND NML LEFT VENTRICULAR EJECTION FRACTION, MOST LIKELY ATTENUATION. DR.MICHAEL BOX   • COLONOSCOPY N/A 02/2017   • COLONOSCOPY N/A 02/23/2011    4 POLYPS REMOVED, RESCOPE IN 5 YRS, DR. EAGLE   • COLONOSCOPY N/A 03/08/2006    ERYTHEMOUS AND GRANULAR MUCOSA IN ILEOCECAL VALVE, NORMAL COLON, REPEAT IN 5 YRS,    • ENDOSCOPY N/A 9/26/2018    normal esophagus, acute gastritis, multiple non-bleeding duodenal ulcers with pigmented material, H Pylori positive   • HERNIA REPAIR Bilateral     INGUINAL   • JOINT REPLACEMENT  11/2015    left hip   • TOTAL HIP ARTHROPLASTY Left 11/06/2015    Dr Ankush Sky   • TOTAL HIP ARTHROPLASTY Right 10/27/2014    DR.RIED SKY   • VASECTOMY           FAMILY HISTORY  Family History   Problem Relation Age of Onset   • Colon cancer Mother    • Ovarian cancer Mother    • Alzheimer's disease Mother 70   • Stroke Sister         October 2016   • Dementia Sister    • Heart disease Brother    • Alcohol abuse Brother    • Coronary artery disease Father    • Hypertension Father    • Colon cancer Maternal Grandmother    • Heart disease Brother    • Coronary artery disease Brother    • Hypertension Brother    • Stroke  Brother    • Arthritis Brother    • Prostate cancer Neg Hx          SOCIAL HISTORY  Social History     Socioeconomic History   • Marital status:      Spouse name: Ara*   • Number of children: 3   • Years of education: Not on file   • Highest education level: Not on file   Occupational History   • Occupation: Retired     Comment: Supervisor/manager [employer]   Tobacco Use   • Smoking status: Former Smoker     Packs/day: 2.00     Years: 49.00     Pack years: 98.00     Quit date: 1/1/2010     Years since quitting: 10.8   • Smokeless tobacco: Never Used   • Tobacco comment: Began Smoking age 15.  Smoked 2 ppd for 49 years until he quit smoking 7 years ago for a 98 pack year history.   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   • Drug use: No     Comment: caffeine use    • Sexual activity: Defer   Lifestyle   • Physical activity     Days per week: 2 days     Minutes per session: 20 min   • Stress: Not on file         ALLERGIES  Bactrim [sulfamethoxazole-trimethoprim]        REVIEW OF SYSTEMS  Review of Systems   Constitutional: Negative for activity change, appetite change and fever.   HENT: Negative for congestion and sore throat.    Eyes: Negative.    Respiratory: Negative for cough and shortness of breath.    Cardiovascular: Negative for chest pain and leg swelling.   Gastrointestinal: Negative for abdominal pain, diarrhea and vomiting.   Endocrine: Negative.    Genitourinary: Negative for decreased urine volume and dysuria.   Musculoskeletal: Negative for neck pain.   Skin: Negative for rash and wound.   Allergic/Immunologic: Negative.    Neurological: Positive for facial asymmetry (Resolved), weakness (Resolved) and numbness (Resolved). Negative for speech difficulty and headaches.   Hematological: Negative.    Psychiatric/Behavioral: Negative.    All other systems reviewed and are negative.       All systems reviewed and  negative except for those discussed in HPI.       PHYSICAL EXAM    I have reviewed the triage vital signs and nursing notes.    ED Triage Vitals [10/24/20 1230]   Temp Heart Rate Resp BP SpO2   -- 94 18 140/90 98 %      Temp src Heart Rate Source Patient Position BP Location FiO2 (%)   -- Monitor Sitting Right arm --       Physical Exam   Constitutional: Pt. is oriented to person, place, and time and well-developed, well-nourished, and in no distress. No distress.   HENT: Normocephalic and atraumatic,  EOM are normal. Pupils are equal, round, and reactive to light. Oropharynx moist/nonerythematous.  Neck: Normal range of motion. Neck supple. No JVD present. No tracheal deviation present. No thyromegaly present.   Cardiovascular: Normal rate, regular rhythm and normal heart sounds. Exam reveals no gallop and no friction rub.   No murmur heard.  Pulmonary/Chest: Effort normal and breath sounds normal. No stridor. No respiratory distress. No wheezes, no rales.   Abdominal: Soft. Bowel sounds are normal. No distension. There is no tenderness. There is no rebound and no guarding.   Musculoskeletal: Normal range of motion. No edema, tenderness or deformity.   Neurological: Pt. is alert and oriented to person, place, and time. Pt. has normal sensation and normal strength. No cranial nerve deficit. GCS score is 15.   Skin: Skin is warm and dry. No rash noted. Pt. is not diaphoretic. No erythema.   Psychiatric: Mood, affect and judgment normal.   Nursing note and vitals reviewed.        LAB RESULTS  No results found for this or any previous visit (from the past 24 hour(s)).    Ordered the above labs and independently reviewed the results.        RADIOLOGY  No Radiology Exams Resulted Within Past 24 Hours    I ordered the above noted radiological studies. Reviewed by me and discussed with radiologist.  See dictation for official radiology interpretation.      PROCEDURES    Procedures      MEDICATIONS GIVEN IN  ER    Medications   sodium chloride 0.9 % flush 10 mL (has no administration in time range)   clopidogrel (PLAVIX) tablet 150 mg (has no administration in time range)         PROGRESS, DATA ANALYSIS, CONSULTS, AND MEDICAL DECISION MAKING    Any/all labs have been independently reviewed by me.  Any/all radiology studies have been reviewed by me and discussed with radiologist dictating the report.   EKG's independently viewed and interpreted by me.  Discussion below represents my analysis of pertinent findings related to patient's condition, differential diagnosis, treatment plan and final disposition.      ED Course as of Oct 24 1331   Sat Oct 24, 2020   1227 Prior record review: The patient was discharged after being admitted overnight for observation due to a TIA.  Baby aspirin was to be added to Pradaxa as well as a statin.    [WC]   1251 She has not a TPA candidate-his NIH stroke scale is 0    [WC]   1255 Discussed with Dr. Roberts (stroke neurology)-he suggests adding a noncontrasted head CT.  If this is normal, he recommends stopping the baby aspirin and starting the patient on Plavix in addition to his currently prescribed Pradaxa.    [WC]   1328 CT of the brain was independently viewed by me and discussed with Dr. Escamilla (neuroradiology)-there are no acute processes identified and no change from his MRI 2 days prior.    [WC]   1331 His exam remains unchanged-he has no acute neurologic deficits.  I discussed with the patient the plan to stop the baby aspirin and start Plavix.  He will be given a loading dose of 150 mg of Plavix here per my prior discussion with Dr. Roberts.  Prescriptions were called into his pharmacy.    [WC]      ED Course User Index  [WC] Eb Johnson MD       AS OF 13:31 EDT VITALS:    BP - 149/91  HR - 89  TEMP -    02 SATS - 97%        DIAGNOSIS  Final diagnoses:   Transient ischemic attack (TIA)         DISPOSITION  Discharged           Eb Johnson MD  10/24/20 1332

## 2020-10-25 DIAGNOSIS — R80.1 PERSISTENT PROTEINURIA: Primary | ICD-10-CM

## 2020-10-25 DIAGNOSIS — I10 ESSENTIAL HYPERTENSION: Chronic | ICD-10-CM

## 2020-10-25 DIAGNOSIS — E11.65 TYPE 2 DIABETES MELLITUS WITH HYPERGLYCEMIA, WITHOUT LONG-TERM CURRENT USE OF INSULIN (HCC): Chronic | ICD-10-CM

## 2020-10-26 ENCOUNTER — TRANSITIONAL CARE MANAGEMENT TELEPHONE ENCOUNTER (OUTPATIENT)
Dept: CALL CENTER | Facility: HOSPITAL | Age: 75
End: 2020-10-26

## 2020-10-26 NOTE — OUTREACH NOTE
Call Center TCM Note      Responses   [facility] patient discharged from?  [place]   Does the patient have one of the following disease processes/diagnoses(primary or secondary)?  Stroke (TIA)   TCM attempt successful?  Yes   Call start time  1406   Call end time  1409   Discharge diagnosis  TIA, T2DM, A-fib, HTN   Meds reviewed with patient/caregiver?  Yes   Is the patient having any side effects they believe may be caused by any medication additions or changes?  No   Does the patient have all medications ordered at discharge?  Yes   Is the patient taking all medications as directed (includes completed medication regime)?  Yes   Does the patient have a primary care provider?   Yes   Does the patient have an appointment with their PCP within 7 days of discharge?  N/A   Comments regarding PCP  hospital f/u scheduled with Dr. Amezcua for 11/2   Has the patient kept scheduled appointments due by today?  N/A   Psychosocial issues?  No   Did the patient receive a copy of their discharge instructions?  Yes   Nursing interventions  Reviewed instructions with patient   What is the patient's perception of their health status since discharge?  Improving   Nursing interventions  Nurse provided patient education   Is the patient able to teach back FAST for Stroke?  Yes   Is the patient/caregiver able to teach back the hierarchy of who to call/visit for symptoms/problems? PCP, Specialist, Home health nurse, Urgent Care, ED, 911  Yes   TCM call completed?  Yes   Wrap up additional comments  Patient says he is doing great, no questions or concerns at this time, f/u scheduled with Dr. Amezcua during call.          Smiley Jerome RN    10/26/2020, 14:09 EDT

## 2020-10-26 NOTE — PROGRESS NOTES
Case Management Discharge Note      Final Note: Patient DC'd home         Selected Continued Care - Discharged on 10/23/2020 Admission date: 10/22/2020 - Discharge disposition: Home or Self Care    Destination    No services have been selected for the patient.              Durable Medical Equipment    No services have been selected for the patient.              Dialysis/Infusion    No services have been selected for the patient.              Home Medical Care    No services have been selected for the patient.              Therapy    No services have been selected for the patient.              Community Resources    No services have been selected for the patient.                  Transportation Services  Private: Car    Final Discharge Disposition Code: 01 - home or self-care

## 2020-10-27 LAB — CREAT BLDA-MCNC: 0.9 MG/DL (ref 0.6–1.3)

## 2020-10-29 ENCOUNTER — OFFICE VISIT (OUTPATIENT)
Dept: CARDIOLOGY | Facility: CLINIC | Age: 75
End: 2020-10-29

## 2020-10-29 VITALS
HEIGHT: 70 IN | HEART RATE: 105 BPM | BODY MASS INDEX: 28.06 KG/M2 | WEIGHT: 196 LBS | SYSTOLIC BLOOD PRESSURE: 140 MMHG | DIASTOLIC BLOOD PRESSURE: 90 MMHG

## 2020-10-29 DIAGNOSIS — J44.9 CHRONIC OBSTRUCTIVE PULMONARY DISEASE, UNSPECIFIED COPD TYPE (HCC): ICD-10-CM

## 2020-10-29 DIAGNOSIS — E78.2 MIXED HYPERLIPIDEMIA: ICD-10-CM

## 2020-10-29 DIAGNOSIS — I10 ESSENTIAL HYPERTENSION: ICD-10-CM

## 2020-10-29 DIAGNOSIS — I48.19 PERSISTENT ATRIAL FIBRILLATION (HCC): Primary | ICD-10-CM

## 2020-10-29 PROCEDURE — 93000 ELECTROCARDIOGRAM COMPLETE: CPT | Performed by: INTERNAL MEDICINE

## 2020-10-29 PROCEDURE — 99214 OFFICE O/P EST MOD 30 MIN: CPT | Performed by: INTERNAL MEDICINE

## 2020-10-29 RX ORDER — DABIGATRAN ETEXILATE 150 MG/1
150 CAPSULE ORAL 2 TIMES DAILY
Qty: 60 CAPSULE | Refills: 6 | Status: SHIPPED | OUTPATIENT
Start: 2020-10-29

## 2020-10-29 NOTE — PROGRESS NOTES
Date of Office Visit: 10/29/20  Encounter Provider: Juan Colby MD  Place of Service: [facility] CARDIOLOGY  Patient Name: Aj Salazar  :1945  Referral Provider:No ref. provider found      No chief complaint on file.    History of Present Illness  The patient is a 74-year-old gentleman with no real prior history of heart disease but he  presented in 10/2014 with hip replacement surgery and a transient episode of atrial  fibrillation. As part of that evaluation he had an echocardiogram that showed normal LV  systolic function and no significant valvular disease. He also had a perfusion stress  test done that showed no evidence of ischemia. It was decided to not treat him any  further for that.  He then presented to [facility] emergency room in 2016.  Was having atypical chest pain.  He had a perfusion stress test that was normal.  Felt that it was musculoskeletal started on ibuprofen.  We reviewed those records.    He was admitted in 2018 with gastrointestinal hemorrhage.  EGD showed evidence of ulcer disease and nonbleeding.  His hemoglobin was stable and melena resolved.  He was advised to stop anticoagulation for week and continue Protonix and Carafate.  He presented 2020 with left facial droop slurred speech left arm weakness.  MRI showed advanced small vessel disease he was seen by neurology not by cardiology and felt that it was either from his A. fib or the small vessel disease he was to start aspirin in addition to the Pradaxa.  He comes in for follow-up.  Unfortunately he is worried that this could happen again.  He does have his chronic dyspnea which is just on mopping or sweeping but he is walking a mile a day without a problem.  No orthopnea or PND no chest pain or pressure no palpitations no near syncope or syncope.    Hypertension  This is a recurrent problem. The current episode started more than 1 year ago. The  problem has been gradually improving since onset. The problem is controlled. Pertinent negatives include no anxiety, blurred vision, chest pain, headaches, malaise/fatigue, orthopnea, palpitations, peripheral edema, PND or sweats. Agents associated with hypertension include NSAIDs. Compliance problems include exercise.    Atrial Fibrillation  Symptoms are negative for chest pain, dizziness, palpitations and weakness. Past medical history includes atrial fibrillation.         Past Medical History:   Diagnosis Date   • Alcohol abuse, in remission    • BPH (benign prostatic hypertrophy)     see doctors at [facility]   • COPD (chronic obstructive pulmonary disease) (CMS/HCC)    • Depression    • DJD (degenerative joint disease) of cervical spine    • ED (erectile dysfunction)     SE[facility]   • Hx of colonic polyps     followed by GI ([facility])   • Hyperlipidemia    • Hypertension    • Influenza B 02/14/2013       • Nocturnal hypoxia    • Osteoarthritis     HIPS, HANDS, MULTIPLE SITES   • Peripheral neuropathy    • Permanent atrial fibrillation (CMS/HCC)    • Rectal bleeding 04/26/2017   • Tendonitis of shoulder 11/07/2007    RIGHT SHOULDER/DELTOID INSERTION   • TIA (transient ischemic attack) 10/2020   • Ulcer of the stomach and intestine          Past Surgical History:   Procedure Laterality Date   • APPENDECTOMY     • CARDIOVASCULAR STRESS TEST N/A 10/20/2015    NML LEXISCAN CARDIOLITE PERFUSION STUDY, NO EVIDENCE OF ISCHEMIA, AREA OF HYPOPERFUSION OF THE INFERIOR WALL W/NORMAL WALL PERFUSION AND NML LEFT VENTRICULAR EJECTION FRACTION, MOST LIKELY ATTENUATION. DR.MICHAEL BOX   • COLONOSCOPY N/A 02/2017   • COLONOSCOPY N/A 02/23/2011    4 POLYPS REMOVED, RESCOPE IN 5 YRS, DR. EAGLE   • COLONOSCOPY N/A 03/08/2006    ERYTHEMOUS AND GRANULAR MUCOSA IN ILEOCECAL VALVE, NORMAL COLON, REPEAT IN 5 YRS,    • ENDOSCOPY N/A 9/26/2018    normal esophagus, acute gastritis, multiple  non-bleeding duodenal ulcers with pigmented material, H Pylori positive   • HERNIA REPAIR Bilateral     INGUINAL   • JOINT REPLACEMENT  11/2015    left hip   • TOTAL HIP ARTHROPLASTY Left 11/06/2015    Dr Ankush Sky   • TOTAL HIP ARTHROPLASTY Right 10/27/2014    DR.RIED SKY   • VASECTOMY           Current Outpatient Medications on File Prior to Visit   Medication Sig Dispense Refill   • acetaminophen (TYLENOL) 325 MG tablet Take 2 tablets by mouth Every 4 (Four) Hours As Needed for Mild Pain .     • ALPHAGAN P 0.1 % solution ophthalmic solution Administer 1 drop to both eyes 2 (two) times a day.  6   • amLODIPine (NORVASC) 5 MG tablet Take 0.5 tablets by mouth Daily. 30 tablet 0   • atorvastatin (LIPITOR) 80 MG tablet Take 1 tablet by mouth Every Night. 30 tablet 0   • clopidogrel (Plavix) 75 MG tablet Take 1 tablet by mouth Daily. 30 tablet 0   • dabigatran etexilate (PRADAXA) 150 MG capsu Take 150 mg by mouth 2 (Two) Times a Day.     • metFORMIN ER (GLUCOPHAGE-XR) 500 MG 24 hr tablet TAKE 1 TABLET BY MOUTH DAILY WITH DINNER 30 tablet 11   • metoprolol tartrate (LOPRESSOR) 25 MG tablet Take 1 tablet by mouth 2 (Two) Times a Day. 180 tablet 3   • pantoprazole (PROTONIX) 40 MG EC tablet TAKE 1 TABLET BY MOUTH DAILY 90 tablet 3   • Tafluprost, PF, (ZIOPTAN) 0.0015 % solution ophthalmic solution Administer 1 drop to both eyes Every Night.     • tamsulosin (FLOMAX) 0.4 MG capsule 24 hr capsule Take 2 capsules by mouth Every Night. 30 capsule    • TRELEGY ELLIPTA 100-62.5-25 MCG/INH aerosol powder  Inhale 1 puff Daily.  3     No current facility-administered medications on file prior to visit.          Social History     Socioeconomic History   • Marital status:      Spouse name: Ara*   • Number of children: 3   • Years of education: Not on file   • Highest education level: Not on file   Occupational History   • Occupation: Retired     Comment: Supervisor/manager [employer]   Tobacco Use   • Smoking  status: Former Smoker     Packs/day: 2.00     Years: 49.00     Pack years: 98.00     Quit date: 1/1/2010     Years since quitting: 10.8   • Smokeless tobacco: Never Used   • Tobacco comment: Began Smoking age 15.  Smoked 2 ppd for 49 years until he quit smoking 7 years ago for a 98 pack year history.   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   • Drug use: No     Comment: caffeine use    • Sexual activity: Defer   Lifestyle   • Physical activity     Days per week: 2 days     Minutes per session: 20 min   • Stress: Not on file       Family History   Problem Relation Age of Onset   • Colon cancer Mother    • Ovarian cancer Mother    • Alzheimer's disease Mother 70   • Stroke Sister         October 2016   • Dementia Sister    • Heart disease Brother    • Alcohol abuse Brother    • Coronary artery disease Father    • Hypertension Father    • Colon cancer Maternal Grandmother    • Heart disease Brother    • Coronary artery disease Brother    • Hypertension Brother    • Stroke Brother    • Arthritis Brother    • Prostate cancer Neg Hx          Review of Systems   Constitution: Negative for decreased appetite, diaphoresis, fever, malaise/fatigue, weight gain and weight loss.   HENT: Negative for congestion, hearing loss, nosebleeds and tinnitus.    Eyes: Negative for blurred vision, double vision, vision loss in left eye, vision loss in right eye and visual disturbance.   Cardiovascular: Negative for chest pain, orthopnea, palpitations and paroxysmal nocturnal dyspnea.        As noted in HPI   Respiratory:        As noted HPI   Endocrine: Negative for cold intolerance and heat intolerance.   Hematologic/Lymphatic: Negative for bleeding problem. Does not bruise/bleed easily.   Skin: Negative for color change, flushing, itching and rash.   Musculoskeletal: Negative for arthritis, back pain, joint pain, joint swelling, muscle weakness and myalgias.    Gastrointestinal: Negative for bloating, abdominal pain, constipation, diarrhea, dysphagia, heartburn, hematemesis, hematochezia, melena, nausea and vomiting.   Genitourinary: Negative for bladder incontinence, dysuria, frequency, nocturia and urgency.   Neurological: Negative for dizziness, focal weakness, headaches, light-headedness, loss of balance, numbness, paresthesias, vertigo and weakness.   Psychiatric/Behavioral: Negative for depression, memory loss and substance abuse.       Procedures      ECG 12 Lead    Date/Time: 10/29/2020 8:30 AM  Performed by: Juan Colby MD  Authorized by: Juan Colby MD   Comparison: compared with previous ECG   Similar to previous ECG  Rhythm: atrial fibrillation  Rate: normal  QRS axis: normal                  Objective:    There were no vitals taken for this visit.       Physical Exam  Vitals signs reviewed.   Constitutional:       General: Not in acute distress.     Appearance: Well-developed. Not diaphoretic.   Eyes:      General: No scleral icterus.     Conjunctiva/sclera: Conjunctivae normal.      Pupils: Pupils are equal, round, and reactive to light.   HENT:      Head: Normocephalic.   Neck:      Musculoskeletal: No edema or erythema.      Thyroid: No thyromegaly.      Vascular: Normal carotid pulses. No carotid bruit, hepatojugular reflux or JVD.      Trachea: No tracheal deviation.   Pulmonary:      Effort: Pulmonary effort is normal. No respiratory distress.      Breath sounds: Normal breath sounds. No decreased breath sounds. No wheezing. No rhonchi. No rales.   Chest:      Chest wall: Not tender to palpatation.   Cardiovascular:      PMI at left midclavicular line. Normal rate. Irregularly irregular rhythm. S1 with normal intensity. S2 with normal intensity.      Murmurs: There is no murmur.      No gallop. No click. No rub.   Pulses:     Intact distal pulses.   Edema:     Peripheral edema absent.   Abdominal:      General: Bowel sounds are normal.  There is no distension.      Palpations: Abdomen is soft. There is no abdominal mass.      Tenderness: There is no abdominal tenderness. There is no guarding or rebound.   Musculoskeletal:         General: No tenderness or deformity.      Extremities: No clubbing present.  Skin:     General: Skin is warm and dry. There is no cyanosis.      Coloration: Skin is not pale.      Findings: No erythema or rash.   Neurological:      Mental Status: Alert and oriented to person, place, and time.   Psychiatric:         Speech: Speech normal.         Behavior: Behavior normal.         Thought Content: Thought content normal.         Judgment: Judgment normal.             Assessment:    1. This is a 74-year-old gentleman with a history of paroxysmal atrial fibrillation but it was only after a time of stress and surgery. The patient's CHADS2-VASc score is 4.  His rate is controlled.  Neurology has recommended adding antiplatelet agent.  Initially recommended aspirin however given his history of gastric ulcers and bleeding would prefer Plavix he will start Plavix in addition to Pradaxa he is going to see us again in follow-up in 3 months and call if problems.    2. Hypertension.  His blood pressures been a little bit elevated but it is coming back down he is going to continue to follow this at home and call this if it not at goal.  3.  COPD.  Dyspnea seems to be at his baseline.  4.  GI bleeding due to ulcer disease.  No recurrence continue the same.   5.  Recurrent urinary tract infection being treated by urology.  6.  Cerebrovascular accident may have been secondary to A. fib or small vessel disease.  Treatment as outlined above.         Plan:

## 2020-11-02 ENCOUNTER — OFFICE VISIT (OUTPATIENT)
Dept: INTERNAL MEDICINE | Age: 75
End: 2020-11-02

## 2020-11-02 VITALS
WEIGHT: 194 LBS | DIASTOLIC BLOOD PRESSURE: 82 MMHG | HEIGHT: 70 IN | SYSTOLIC BLOOD PRESSURE: 142 MMHG | BODY MASS INDEX: 27.77 KG/M2 | TEMPERATURE: 97.1 F | OXYGEN SATURATION: 94 % | HEART RATE: 50 BPM

## 2020-11-02 DIAGNOSIS — E11.65 TYPE 2 DIABETES MELLITUS WITH HYPERGLYCEMIA, WITHOUT LONG-TERM CURRENT USE OF INSULIN (HCC): Chronic | ICD-10-CM

## 2020-11-02 DIAGNOSIS — I10 ESSENTIAL HYPERTENSION: Chronic | ICD-10-CM

## 2020-11-02 DIAGNOSIS — I48.21 PERMANENT ATRIAL FIBRILLATION (HCC): Chronic | ICD-10-CM

## 2020-11-02 DIAGNOSIS — G45.9 TIA (TRANSIENT ISCHEMIC ATTACK): Primary | ICD-10-CM

## 2020-11-02 PROCEDURE — 99214 OFFICE O/P EST MOD 30 MIN: CPT | Performed by: INTERNAL MEDICINE

## 2020-11-02 RX ORDER — AMLODIPINE BESYLATE 5 MG/1
5 TABLET ORAL DAILY
Qty: 30 TABLET | Refills: 5 | Status: SHIPPED | OUTPATIENT
Start: 2020-11-02 | End: 2021-03-10 | Stop reason: HOSPADM

## 2020-11-02 NOTE — ASSESSMENT & PLAN NOTE
10/22/20: TIA (left hand weakness, facial droop) - started on ASA, atorvastatin 80 mg and amlodipine 5 mg 1/2 tab daily.   10/24/20: Returned to ED for recurrent symptoms and ASA changed to clopidogrel 75 mg.       Blood pressure remains high. Increase amlodipine to 5 mg qd. Send BP readings in 2 weeks.

## 2020-11-02 NOTE — ASSESSMENT & PLAN NOTE
S/p TIA 10/22/20: amlodipine 2.5 mg added    Blood pressure is high at home/here. Increase amlodipine to 5 mg qd.

## 2020-11-02 NOTE — PROGRESS NOTES
"[facility] INTERNAL MEDICINE  ROSA AMEZCUA M.D.    Aj HELMS Wohlleb / 74 y.o. / male  11/02/2020      CC:   TIA (hospital f/u) and subsequent ED f/u (10/22/20, 10/24/20)      VITALS    Visit Vitals  /82   Pulse 50   Temp 97.1 °F (36.2 °C)   Ht 177.8 cm (70\")   Wt 88 kg (194 lb)   SpO2 94%   BMI 27.84 kg/m²       BP Readings from Last 3 Encounters:   11/02/20 142/82   10/29/20 140/90   10/24/20 133/79     Wt Readings from Last 3 Encounters:   11/02/20 88 kg (194 lb)   10/29/20 88.9 kg (196 lb)   10/24/20 89.9 kg (198 lb 4.8 oz)      Body mass index is 27.84 kg/m².    HPI:     Date of admission/discharge: Admitted on 10/22/20 for TIA, ED 10/24/20  Hospital: [facility]  Principle Dx: TIA   Secondary Dx: AFIB, HTN, DM 2  History prior to hospitalization: left facial / lip droop and left hand weakness   Evaluation/Treatment: workup was neg for stroke, seen by neuro, started ASA, atorvastatin 80 and amlodipine 2.5 mg.  Course: returned to ED 10/24 with similar symptoms, CT negative for acute change, ASA changed to clopidogrel. Blood pressure at home reviewed and > 140-150's. No new/recurrent neuro symptoms. Has mild headache.     Patient Care Team:  Neftali Amezcua MD as PCP - General (Internal Medicine)  Juan Colby MD as Consulting Physician (Cardiology)  Ankush Sky MD as Surgeon (Orthopedic Surgery)  Vale Del Rosario APRN as Nurse Practitioner (Oncology)  Darrion Peña MD as Consulting Physician (Urology)  Tahir Childs MD as Consulting Physician (Pulmonary Disease)  Noman Moseley MD as Consulting Physician (Gastroenterology)  Aura Perry, FERNY (Optometry)  Juwan Diane MD as Consulting Physician (Ophthalmology)  ____________________________________________________________________    ASSESSMENT & PLAN:    Problem List Items Addressed This Visit        High    TIA (transient ischemic attack) - Primary    Overview     10/22/20: TIA with left facial droop and left hand " weakness          Current Assessment & Plan     10/22/20: TIA (left hand weakness, facial droop) - started on ASA, atorvastatin 80 mg and amlodipine 5 mg 1/2 tab daily.   10/24/20: Returned to ED for recurrent symptoms and ASA changed to clopidogrel 75 mg.       Blood pressure remains high. Increase amlodipine to 5 mg qd. Send BP readings in 2 weeks.             Medium    Hypertension (Chronic)    Current Assessment & Plan     S/p TIA 10/22/20: amlodipine 2.5 mg added    Blood pressure is high at home/here. Increase amlodipine to 5 mg qd.          Relevant Medications    metoprolol tartrate (LOPRESSOR) 25 MG tablet    amLODIPine (NORVASC) 5 MG tablet    Permanent atrial fibrillation (CMS/HCC) (Chronic)    Overview     *Imburgia    Continue Pradaxa and metoprolol.          Relevant Medications    metoprolol tartrate (LOPRESSOR) 25 MG tablet    clopidogrel (Plavix) 75 MG tablet    amLODIPine (NORVASC) 5 MG tablet    Type 2 diabetes mellitus with hyperglycemia, without long-term current use of insulin (CMS/HCC) (Chronic)    Current Assessment & Plan     Continue metformin  mg qPM.         Relevant Medications    metFORMIN ER (GLUCOPHAGE-XR) 500 MG 24 hr tablet         No orders of the defined types were placed in this encounter.      Summary/Discussion:      Return in about 1 month (around 12/2/2020) for Reassess today's problem(s).    ____________________________________________________________________    REVIEW OF SYSTEMS    Review of Systems  No fever  No chest pain or palpitations  No shortness of breath  GI neg for melena or bleeding  Neuro mild headache, no new changes     PHYSICAL EXAMINATION    Physical Exam  Constitutional:       Appearance: Normal appearance.   Cardiovascular:      Rate and Rhythm: Normal rate. Rhythm irregular.      Heart sounds: No murmur.   Pulmonary:      Effort: Pulmonary effort is normal.      Breath sounds: Normal breath sounds.   Neurological:      Mental Status: He is alert.  Mental status is at baseline.      Cranial Nerves: No cranial nerve deficit.      Gait: Gait normal.   Psychiatric:         Mood and Affect: Mood normal.         Thought Content: Thought content normal.         Judgment: Judgment normal.           REVIEWED DATA:    Labs:   Lab Results   Component Value Date     10/23/2020    K 3.9 10/23/2020    CALCIUM 8.6 10/23/2020    AST 21 10/22/2020    ALT 22 10/22/2020    BUN 11 10/23/2020    CREATININE 0.85 10/23/2020    CREATININE 0.90 10/22/2020    CREATININE 0.96 10/22/2020    EGFRIFNONA 88 10/23/2020    EGFRIFAFRI 92 03/03/2020       Lab Results   Component Value Date    WBC 6.53 10/23/2020    HGB 15.7 10/23/2020    HGB 15.4 10/22/2020    HGB 15.8 02/08/2020     10/23/2020       Lab Results   Component Value Date    PROTEIN See below: (A) 10/21/2020    GLUCOSEU Negative 10/21/2020    BLOODU See below: (A) 10/21/2020    NITRITEU Negative 10/21/2020    LEUKOCYTESUR See below: (A) 10/21/2020       Imaging:   Ct Head Without Contrast    Result Date: 10/24/2020  Narrative: EMERGENCY NONCONTRAST HEAD CT 10/24/2020  CLINICAL HISTORY: Acute stroke like symptoms, left-sided weakness, left facial droop.  TECHNIQUE: Spiral CT images were obtained from the base of skull to the vertex without intravenous contrast. Images were reformatted and submitted in 3 mm thick axial CT section with brain algorithm and 2 mm thick sagittal and coronal reconstructions were performed and submitted in brain algorithm.  This is correlated to recent MRI of the brain just 2 days ago on 10/22/2020.  FINDINGS: There is patchy and confluent areas of low-density in the periventricular extending to the subcortical white matter of the cerebral hemispheres consistent with moderate small vessel disease. The remainder of the brain parenchyma is normal in attenuation. The ventricles are normal in size. I see no focal mass effect. There is no midline shift. No extra-axial fluid collections are  identified. There is no evidence of acute intracranial hemorrhage. There are prominent calcified atherosclerotic plaques in the intracranial segments of distal vertebral arteries and cavernous and supracavernous segments of the internal carotid arteries bilaterally. The paranasal sinuses and mastoid air cells and middle ear cavities are clear. The calvarium and skull base are normal in appearance.      Impression: 1. No significant change when compared to an MRI of the brain from [facility] just 2 days ago on 10/22/2020. 2. There is moderate small vessel disease in the cerebral white matter, prominent calcified atherosclerotic plaques and the intracranial segments of distal vertebral arteries and cavernous and supracavernous segments of the internal carotid arteries bilaterally. The remainder of the head CT is within normal limits and the etiology of left-sided weakness with left facial droop is not established on this exam.  If there remains clinical suspicion of an acute infarct, an MRI of the brain could be obtained for a more comprehensive assessment and could be correlated to the recent MRI of the brain 2 days ago on 10/22/2020.  Radiation dose reduction techniques were utilized, including automated exposure control and exposure modulation based on body size.  This report was finalized on 10/24/2020 5:56 PM by Dr. Darrion Villarreal M.D.      Mri Brain Without Contrast    Result Date: 10/23/2020  Narrative: BRAIN MRI WITHOUT CONTRAST  HISTORY: Stroke, follow up; I63.9-Cerebral infarction, unspecified; I48.91-Unspecified atrial fibrillation  COMPARISON: 10/22/2020.  FINDINGS:  Multiplanar images of the head were obtained without and with gadolinium. No areas of restricted diffusion are seen to suggest acute infarct. There is diffuse atrophy. There is periventricular and deep white matter microangiopathic change. There is no midline shift or mass effect. There is no evidence of hemorrhage on gradient  echo imaging. The intracranial flow voids appear intact. Mucosal thickening is noted within the ethmoid sinuses. There is also some mild mucosal thickening noted within the maxillary sinuses. There is some minimal opacification of the right mastoid air cells.      Impression: 1. No acute intracranial abnormality.  This report was finalized on 10/23/2020 12:47 AM by Dr. Mona Nazario M.D.      Xr Chest 1 View    Result Date: 10/22/2020  Narrative: XR CHEST 1 VW-  HISTORY: Male who is 74 years-old,  stroke  TECHNIQUE: Frontal view of the chest  COMPARISON: 02/08/2020  FINDINGS: The heart is enlarged. Aorta is calcified. Pulmonary vasculature is unremarkable. No focal pulmonary consolidation, pleural effusion, or pneumothorax. No acute osseous process.      Impression: No focal pulmonary consolidation. Cardiomegaly. Follow-up as clinical indications persist.  This report was finalized on 10/22/2020 2:01 PM by Dr. Uzair Dawkins M.D.      Ct Head Without Contrast Stroke Protocol    Result Date: 10/22/2020  Narrative: CT HEAD WO CONTRAST STROKE PROTOCOL-  INDICATIONS: Stroke  TECHNIQUE: Radiation dose reduction techniques were utilized, including automated exposure control and exposure modulation based on body size. Noncontrast head CT  COMPARISON: 02/08/2020  FINDINGS:    No acute intracranial hemorrhage, midline shift or mass effect. No acute territorial infarct is identified.  Moderate periventricular hypodensities suggest chronic small vessel ischemic change in a patient this age.  Arterial calcifications are seen at the base of the brain.  Ventricles, cisterns, cerebral sulci are unremarkable for patient age.  The visualized paranasal sinuses, orbits, mastoid air cells are unremarkable.          Impression:  No acute intracranial hemorrhage or hydrocephalus. Chronic changes of the brain. If there is further clinical concern, MRI could be considered for further evaluation.  Discussed by telephone with   Alejandra at 1314, and Dr. Claire at 1315, 10/20/2020.  This report was finalized on 10/22/2020 1:17 PM by Dr. Uzair Dawkins M.D.         Medical Tests:   Echocardiogram:    · Calculated left ventricular EF = 44.3% Estimated left ventricular EF was in agreement with the calculated left ventricular EF.  · Saline study was poorly visualized. No obvious bubbles crossed.  · The aortic valve exhibits sclerosis. The aortic valve appears trileaflet.  · There is mild, bileaflet mitral valve thickening present.  · Trace aortic valve regurgitation is present  · Mild mitral valve regurgitation is present.  · Mild dilation of the aortic root is present.     Carotid duplex:   · Proximal right internal carotid artery mild stenosis.  · Proximal left internal carotid artery mild stenosis.    Summary of old records / correspondence / consultant report:   DC summary re: issues addressed on HPI and ED encounter note re: issues addressed on HPI    Request outside records:       ALLERGIES  Allergies   Allergen Reactions   • Bactrim [Sulfamethoxazole-Trimethoprim] Rash        MEDICATIONS   Current Outpatient Medications   Medication Sig Dispense Refill   • ALPHAGAN P 0.1 % solution ophthalmic solution Administer 1 drop to both eyes 2 (two) times a day.  6   • amLODIPine (NORVASC) 5 MG tablet Take 1 tablet by mouth Daily. 30 tablet 5   • atorvastatin (LIPITOR) 80 MG tablet Take 1 tablet by mouth Every Night. 30 tablet 0   • clopidogrel (Plavix) 75 MG tablet Take 1 tablet by mouth Daily. 30 tablet 0   • dabigatran etexilate (Pradaxa) 150 MG capsu Take 1 capsule by mouth 2 (Two) Times a Day. 60 capsule 6   • metFORMIN ER (GLUCOPHAGE-XR) 500 MG 24 hr tablet TAKE 1 TABLET BY MOUTH DAILY WITH DINNER 30 tablet 11   • metoprolol tartrate (LOPRESSOR) 25 MG tablet Take 1 tablet by mouth 2 (Two) Times a Day. 180 tablet 3   • pantoprazole (PROTONIX) 40 MG EC tablet TAKE 1 TABLET BY MOUTH DAILY 90 tablet 3   • Tafluprost, PF, (ZIOPTAN) 0.0015 %  solution ophthalmic solution Administer 1 drop to both eyes Every Night.     • tamsulosin (FLOMAX) 0.4 MG capsule 24 hr capsule Take 2 capsules by mouth Every Night. 30 capsule    • TRELEGY ELLIPTA 100-62.5-25 MCG/INH aerosol powder  Inhale 1 puff Daily.  3   • acetaminophen (TYLENOL) 325 MG tablet Take 2 tablets by mouth Every 4 (Four) Hours As Needed for Mild Pain .       No current facility-administered medications for this visit.        Current outpatient and discharge medications have been reconciled for the patient.  Reviewed by: Neftali Amezcua MD       [facility]:     The following portions of the patient's history were reviewed and updated as appropriate: Allergies / Current Medications / Past Medical History / Surgical History / Social History / Family History    PROBLEM LIST   Patient Active Problem List   Diagnosis   • Chronic obstructive pulmonary disease with acute exacerbation (CMS/HCC)   • Impotence of organic origin   • Hypertension   • Osteoarthritis   • History of smoking greater than 50 pack years   • Benign prostatic hyperplasia with urinary obstruction   • Permanent atrial fibrillation (CMS/HCC)   • Type 2 diabetes mellitus with hyperglycemia, without long-term current use of insulin (CMS/HCC)   • Arthritis of carpometacarpal (CMC) joint of left thumb   • Rash and nonspecific skin eruption   • TIA (transient ischemic attack)       PAST MEDICAL HISTORY  Past Medical History:   Diagnosis Date   • Alcohol abuse, in remission    • BPH (benign prostatic hypertrophy)     see doctors at [facility]   • COPD (chronic obstructive pulmonary disease) (CMS/HCC)    • Depression    • DJD (degenerative joint disease) of cervical spine    • ED (erectile dysfunction)     [facility]   • Hx of colonic polyps     followed by GI ([facility])   • Hyperlipidemia    • Hypertension    • Influenza B 02/14/2013       • Nocturnal hypoxia    • Osteoarthritis     HIPS, HANDS, MULTIPLE SITES   • Peripheral neuropathy    •  Permanent atrial fibrillation (CMS/HCC)    • Rectal bleeding 04/26/2017   • Tendonitis of shoulder 11/07/2007    RIGHT SHOULDER/DELTOID INSERTION   • TIA (transient ischemic attack) 10/2020   • Ulcer of the stomach and intestine        SURGICAL HISTORY  Past Surgical History:   Procedure Laterality Date   • APPENDECTOMY     • CARDIOVASCULAR STRESS TEST N/A 10/20/2015    NML LEXISCAN CARDIOLITE PERFUSION STUDY, NO EVIDENCE OF ISCHEMIA, AREA OF HYPOPERFUSION OF THE INFERIOR WALL W/NORMAL WALL PERFUSION AND NML LEFT VENTRICULAR EJECTION FRACTION, MOST LIKELY ATTENUATION. DR.MICHAEL BOX   • COLONOSCOPY N/A 02/2017   • COLONOSCOPY N/A 02/23/2011    4 POLYPS REMOVED, RESCOPE IN 5 YRS, DR. EAGLE   • COLONOSCOPY N/A 03/08/2006    ERYTHEMOUS AND GRANULAR MUCOSA IN ILEOCECAL VALVE, NORMAL COLON, REPEAT IN 5 YRS,    • ENDOSCOPY N/A 9/26/2018    normal esophagus, acute gastritis, multiple non-bleeding duodenal ulcers with pigmented material, H Pylori positive   • HERNIA REPAIR Bilateral     INGUINAL   • JOINT REPLACEMENT  11/2015    left hip   • TOTAL HIP ARTHROPLASTY Left 11/06/2015    Dr Ankush Sky   • TOTAL HIP ARTHROPLASTY Right 10/27/2014    DR.RIED SKY   • VASECTOMY         SOCIAL HISTORY  Social History     Socioeconomic History   • Marital status:      Spouse name: Ara*   • Number of children: 3   • Years of education: Not on file   • Highest education level: Not on file   Occupational History   • Occupation: Retired     Comment: Supervisor/manager [employer]   Tobacco Use   • Smoking status: Former Smoker     Packs/day: 2.00     Years: 49.00     Pack years: 98.00     Quit date: 1/1/2010     Years since quitting: 10.8   • Smokeless tobacco: Never Used   • Tobacco comment: Began Smoking age 15.  Smoked 2 ppd for 49 years until he quit smoking 7 years ago for a 98 pack year history.   Substance and Sexual Activity   • Alcohol use: No     Comment: 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;  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   • Drug use: No     Comment: caffeine use    • Sexual activity: Defer   Lifestyle   • Physical activity     Days per week: 7 days     Minutes per session: 20 min   • Stress: To some extent       FAMILY HISTORY  Family History   Problem Relation Age of Onset   • Colon cancer Mother    • Ovarian cancer Mother    • Alzheimer's disease Mother 70   • Stroke Sister         October 2016   • Dementia Sister    • Heart disease Brother    • Alcohol abuse Brother    • Coronary artery disease Father    • Hypertension Father    • Colon cancer Maternal Grandmother    • Heart disease Brother    • Coronary artery disease Brother    • Hypertension Brother    • Stroke Brother    • Arthritis Brother    • Prostate cancer Neg Hx        Examiner was wearing KN95 mask, face shield and exam gloves during the entire duration of the visit. Patient was masked the entire time.   Minimum social distance of 6 ft maintained entire visit except if physical contact was necessary as documented.     **Dragon Disclaimer:   Much of this encounter note is an electronic transcription/translation of spoken language to printed text. The electronic translation of spoken language may permit erroneous, or at times, nonsensical words or phrases to be inadvertently transcribed. Although I have reviewed the note for such errors, some may still exist.       Template created by Adelso Amezcua MD

## 2020-11-03 ENCOUNTER — READMISSION MANAGEMENT (OUTPATIENT)
Dept: CALL CENTER | Facility: HOSPITAL | Age: 75
End: 2020-11-03

## 2020-11-03 NOTE — OUTREACH NOTE
Stroke Week 2 Survey      Responses   [facility] patient discharged from?  [place]   Does the patient have one of the following disease processes/diagnoses(primary or secondary)?  Stroke (TIA)   Week 2 attempt successful?  Yes   Call start time  1517   Call end time  1520   Discharge diagnosis  TIA, T2DM, A-fib, HTN   Meds reviewed with patient/caregiver?  Yes   Is the patient taking all medications as directed (includes completed medication regime)?  Yes   Medication comments  amlodipine 1 pill instead of 1/2   Has the patient kept scheduled appointments due by today?  Yes   Does the patient require any assistance with activities of daily living such as eating, bathing, dressing, walking, etc.?  No   Does the patient have any residual symptoms from stroke/TIA?  No   Does the patient understand the diet ordered at discharge?  Yes   What is the patient's perception of their health status since discharge?  Improving   Is the patient able to teach back FAST for Stroke?  Yes   Is the patient/caregiver able to teach back the risk factors for a stroke?  High Cholesterol   Is the patient/caregiver able to teach back signs and symptoms related to disease process for when to call PCP?  Yes   Is the patient/caregiver able to teach back signs and symptoms related to disease process for when to call 911?  Yes   If the patient is a current smoker, are they able to teach back resources for cessation?  Not a smoker   Is the patient/caregiver able to teach back the hierarchy of who to call/visit for symptoms/problems? PCP, Specialist, Home health nurse, Urgent Care, ED, 911  Yes   Week 2 call completed?  Yes          Theodora Ramsay RN

## 2020-11-11 ENCOUNTER — READMISSION MANAGEMENT (OUTPATIENT)
Dept: CALL CENTER | Facility: HOSPITAL | Age: 75
End: 2020-11-11

## 2020-11-11 NOTE — OUTREACH NOTE
Stroke Week 3 Survey      Responses   [facility] patient discharged from?  [place]   Does the patient have one of the following disease processes/diagnoses(primary or secondary)?  Stroke (TIA)   Week 3 attempt successful?  Yes   Call start time  1712   Call end time  1714   Discharge diagnosis  TIA, T2DM, A-fib, HTN   Is patient permission given to speak with other caregiver?  Yes   Person spoke with today (if not patient) and relationship  Spouse   Meds reviewed with patient/caregiver?  Yes   Is the patient having any side effects they believe may be caused by any medication additions or changes?  No   Does the patient have all medications ordered at discharge?  Yes   Is the patient taking all medications as directed (includes completed medication regime)?  Yes   Does the patient have a primary care provider?   Yes   Does the patient have an appointment with their PCP within 7 days of discharge?  Yes   Has the patient kept scheduled appointments due by today?  Yes   Has home health visited the patient within 72 hours of discharge?  N/A   Psychosocial issues?  No   Does the patient require any assistance with activities of daily living such as eating, bathing, dressing, walking, etc.?  No   Does the patient have any residual symptoms from stroke/TIA?  No   Does the patient understand the diet ordered at discharge?  Yes   Did the patient receive a copy of their discharge instructions?  Yes   Nursing interventions  Reviewed instructions with patient   What is the patient's perception of their health status since discharge?  Returned to baseline/stable   Nursing interventions  Nurse provided patient education   Is the patient able to teach back FAST for Stroke?  Yes   Is the patient/caregiver able to teach back the risk factors for a stroke?  High Cholesterol, High blood pressure-goal below 120/80, Physical inactivity and obesity   Is the patient/caregiver able to teach back signs and symptoms related to  disease process for when to call PCP?  Yes   Is the patient/caregiver able to teach back signs and symptoms related to disease process for when to call 911?  Yes   If the patient is a current smoker, are they able to teach back resources for cessation?  Not a smoker   Is the patient/caregiver able to teach back the hierarchy of who to call/visit for symptoms/problems? PCP, Specialist, Home health nurse, Urgent Care, ED, 911  Yes   Week 3 call completed?  Yes   Revoked  No further contact(revokes)-requires comment          Sanjeev Armas RN

## 2020-11-16 RX ORDER — ATORVASTATIN CALCIUM 80 MG/1
80 TABLET, FILM COATED ORAL NIGHTLY
Qty: 30 TABLET | Refills: 11 | Status: SHIPPED | OUTPATIENT
Start: 2020-11-16 | End: 2021-11-23 | Stop reason: SDUPTHER

## 2020-11-16 RX ORDER — CLOPIDOGREL BISULFATE 75 MG/1
75 TABLET ORAL DAILY
Qty: 30 TABLET | Refills: 11 | Status: SHIPPED | OUTPATIENT
Start: 2020-11-16 | End: 2021-11-23 | Stop reason: SDUPTHER

## 2020-11-20 DIAGNOSIS — I10 ESSENTIAL HYPERTENSION: Primary | Chronic | ICD-10-CM

## 2020-12-02 ENCOUNTER — OFFICE VISIT (OUTPATIENT)
Dept: INTERNAL MEDICINE | Age: 75
End: 2020-12-02

## 2020-12-02 VITALS
HEIGHT: 70 IN | BODY MASS INDEX: 27.77 KG/M2 | WEIGHT: 194 LBS | OXYGEN SATURATION: 98 % | HEART RATE: 93 BPM | DIASTOLIC BLOOD PRESSURE: 78 MMHG | TEMPERATURE: 97.3 F | SYSTOLIC BLOOD PRESSURE: 120 MMHG

## 2020-12-02 DIAGNOSIS — I10 ESSENTIAL HYPERTENSION: Primary | Chronic | ICD-10-CM

## 2020-12-02 DIAGNOSIS — I48.21 PERMANENT ATRIAL FIBRILLATION (HCC): Chronic | ICD-10-CM

## 2020-12-02 DIAGNOSIS — G45.9 TIA (TRANSIENT ISCHEMIC ATTACK): ICD-10-CM

## 2020-12-02 PROCEDURE — 99213 OFFICE O/P EST LOW 20 MIN: CPT | Performed by: INTERNAL MEDICINE

## 2020-12-02 NOTE — PATIENT INSTRUCTIONS
"Influenza, Adult  Influenza, more commonly known as \"the flu,\" is a viral infection that mainly affects the respiratory tract. The respiratory tract includes organs that help you breathe, such as the lungs, nose, and throat. The flu causes many symptoms similar to the common cold along with high fever and body aches.  The flu spreads easily from person to person (is contagious). Getting a flu shot (influenza vaccination) every year is the best way to prevent the flu.  What are the causes?  This condition is caused by the influenza virus. You can get the virus by:  · Breathing in droplets that are in the air from an infected person's cough or sneeze.  · Touching something that has been exposed to the virus (has been contaminated) and then touching your mouth, nose, or eyes.  What increases the risk?  The following factors may make you more likely to get the flu:  · Not washing or sanitizing your hands often.  · Having close contact with many people during cold and flu season.  · Touching your mouth, eyes, or nose without first washing or sanitizing your hands.  · Not getting a yearly (annual) flu shot.  You may have a higher risk for the flu, including serious problems such as a lung infection (pneumonia), if you:  · Are older than 65.  · Are pregnant.  · Have a weakened disease-fighting system (immune system). You may have a weakened immune system if you:  ? Have HIV or AIDS.  ? Are undergoing chemotherapy.  ? Are taking medicines that reduce (suppress) the activity of your immune system.  · Have a long-term (chronic) illness, such as heart disease, kidney disease, diabetes, or lung disease.  · Have a liver disorder.  · Are severely overweight (morbidly obese).  · Have anemia. This is a condition that affects your red blood cells.  · Have asthma.  What are the signs or symptoms?  Symptoms of this condition usually begin suddenly and last 4-14 days. They may include:  · Fever and chills.  · Headaches, body aches, or " muscle aches.  · Sore throat.  · Cough.  · Runny or stuffy (congested) nose.  · Chest discomfort.  · Poor appetite.  · Weakness or fatigue.  · Dizziness.  · Nausea or vomiting.  How is this diagnosed?  This condition may be diagnosed based on:  · Your symptoms and medical history.  · A physical exam.  · Swabbing your nose or throat and testing the fluid for the influenza virus.  How is this treated?  If the flu is diagnosed early, you can be treated with medicine that can help reduce how severe the illness is and how long it lasts (antiviral medicine). This may be given by mouth (orally) or through an IV.  Taking care of yourself at home can help relieve symptoms. Your health care provider may recommend:  · Taking over-the-counter medicines.  · Drinking plenty of fluids.  In many cases, the flu goes away on its own. If you have severe symptoms or complications, you may be treated in a hospital.  Follow these instructions at home:  Activity  · Rest as needed and get plenty of sleep.  · Stay home from work or school as told by your health care provider. Unless you are visiting your health care provider, avoid leaving home until your fever has been gone for 24 hours without taking medicine.  Eating and drinking  · Take an oral rehydration solution (ORS). This is a drink that is sold at pharmacies and retail stores.  · Drink enough fluid to keep your urine pale yellow.  · Drink clear fluids in small amounts as you are able. Clear fluids include water, ice chips, diluted fruit juice, and low-calorie sports drinks.  · Eat bland, easy-to-digest foods in small amounts as you are able. These foods include bananas, applesauce, rice, lean meats, toast, and crackers.  · Avoid drinking fluids that contain a lot of sugar or caffeine, such as energy drinks, regular sports drinks, and soda.  · Avoid alcohol.  · Avoid spicy or fatty foods.  General instructions         · Take over-the-counter and prescription medicines only as told  "by your health care provider.  · Use a cool mist humidifier to add humidity to the air in your home. This can make it easier to breathe.  · Cover your mouth and nose when you cough or sneeze.  · Wash your hands with soap and water often, especially after you cough or sneeze. If soap and water are not available, use alcohol-based hand .  · Keep all follow-up visits as told by your health care provider. This is important.  How is this prevented?    · Get an annual flu shot. You may get the flu shot in late summer, fall, or winter. Ask your health care provider when you should get your flu shot.  · Avoid contact with people who are sick during cold and flu season. This is generally fall and winter.  Contact a health care provider if:  · You develop new symptoms.  · You have:  ? Chest pain.  ? Diarrhea.  ? A fever.  · Your cough gets worse.  · You produce more mucus.  · You feel nauseous or you vomit.  Get help right away if:  · You develop shortness of breath or difficulty breathing.  · Your skin or nails turn a bluish color.  · You have severe pain or stiffness in your neck.  · You develop a sudden headache or sudden pain in your face or ear.  · You cannot eat or drink without vomiting.  Summary  · Influenza, more commonly known as \"the flu,\" is a viral infection that primarily affects your respiratory tract.  · Symptoms of the flu usually begin suddenly and last 4-14 days.  · Getting an annual flu shot is the best way to prevent getting the flu.  · Stay home from work or school as told by your health care provider. Unless you are visiting your health care provider, avoid leaving home until your fever has been gone for 24 hours without taking medicine.  · Keep all follow-up visits as told by your health care provider. This is important.  This information is not intended to replace advice given to you by your health care provider. Make sure you discuss any questions you have with your health care " provider.  Document Revised: 03/19/2020 Document Reviewed: 06/05/2019  Elsevier Patient Education © 2020 [employer]    ** IMPORTANT MESSAGE FROM DR. LOPEZ **    In our office, your satisfaction is VERY important to us.     You may receive a survey from Verónica Nguyen by mail or E-mail for you to provide feedback about your visit. This information is invaluable for me to know what we can do to improve our services.     I ask that you please take a few minutes to complete the survey and let us know how we are doing in serving your needs. (You may receive the survey more than once for multiple visits)    Thank You !    Dr. Lopez & Staff    _________________________________________________________________________________________________________________________      ** ADDITIONAL INSTRUCTION / REMINDERS FROM DR. LOPEZ **

## 2020-12-02 NOTE — ASSESSMENT & PLAN NOTE
Blood pressure is improved. Continue amlodipine 5 mg qd, metoprolol tartrate 25 mg BID.   BP Readings from Last 3 Encounters:   12/02/20 120/78   11/02/20 142/82   10/29/20 140/90

## 2020-12-02 NOTE — PROGRESS NOTES
"[facility]  ROSA LOPEZ M.D.      Aj HELMS Wohlleb / 75 y.o. / male  12/02/2020    CHIEF COMPLAINT     Transient Ischemic Attack, Hypertension, and Diabetes      ASSESSMENT & PLAN     Problem List Items Addressed This Visit        Medium    Hypertension - Primary (Chronic)    Current Assessment & Plan     Blood pressure is improved. Continue amlodipine 5 mg qd, metoprolol tartrate 25 mg BID.   BP Readings from Last 3 Encounters:   12/02/20 120/78   11/02/20 142/82   10/29/20 140/90             Relevant Medications    metoprolol tartrate (LOPRESSOR) 25 MG tablet    amLODIPine (NORVASC) 5 MG tablet    Permanent atrial fibrillation (CMS/HCC) (Chronic)    Overview     *Imburgia    Continue Pradaxa and metoprolol.          Relevant Medications    metoprolol tartrate (LOPRESSOR) 25 MG tablet    amLODIPine (NORVASC) 5 MG tablet    clopidogrel (Plavix) 75 MG tablet    TIA (transient ischemic attack)    Overview     10/22/20: TIA with left facial droop and left hand weakness              No orders of the defined types were placed in this encounter.    No orders of the defined types were placed in this encounter.      Summary/Discussion:  •     Next Appointment with me: 3/2/2021    Return for Next scheduled follow up.    _____________________________________________________________________________________    VITAL SIGNS     Visit Vitals  /78   Pulse 93   Temp 97.3 °F (36.3 °C)   Ht 177.8 cm (70\")   Wt 88 kg (194 lb)   SpO2 98%   BMI 27.84 kg/m²       BP Readings from Last 3 Encounters:   12/02/20 120/78   11/02/20 142/82   10/29/20 140/90     Wt Readings from Last 3 Encounters:   12/02/20 88 kg (194 lb)   11/02/20 88 kg (194 lb)   10/29/20 88.9 kg (196 lb)     Body mass index is 27.84 kg/m².      MEDICATIONS     Current Outpatient Medications   Medication Sig Dispense Refill   • acetaminophen (TYLENOL) 325 MG tablet Take 2 tablets by mouth Every 4 (Four) Hours As Needed for Mild Pain .     • ALPHAGAN P 0.1 % " solution ophthalmic solution Administer 1 drop to both eyes 2 (two) times a day.  6   • amLODIPine (NORVASC) 5 MG tablet Take 1 tablet by mouth Daily. 30 tablet 5   • atorvastatin (LIPITOR) 80 MG tablet Take 1 tablet by mouth Every Night. 30 tablet 11   • clopidogrel (Plavix) 75 MG tablet Take 1 tablet by mouth Daily. 30 tablet 11   • dabigatran etexilate (Pradaxa) 150 MG capsu Take 1 capsule by mouth 2 (Two) Times a Day. 60 capsule 6   • metFORMIN ER (GLUCOPHAGE-XR) 500 MG 24 hr tablet TAKE 1 TABLET BY MOUTH DAILY WITH DINNER 30 tablet 11   • metoprolol tartrate (LOPRESSOR) 25 MG tablet Take 1 tablet by mouth 2 (Two) Times a Day. 180 tablet 3   • pantoprazole (PROTONIX) 40 MG EC tablet TAKE 1 TABLET BY MOUTH DAILY 90 tablet 3   • Tafluprost, PF, (ZIOPTAN) 0.0015 % solution ophthalmic solution Administer 1 drop to both eyes Every Night.     • tamsulosin (FLOMAX) 0.4 MG capsule 24 hr capsule Take 2 capsules by mouth Every Night. 30 capsule    • TRELEGY ELLIPTA 100-62.5-25 MCG/INH aerosol powder  Inhale 1 puff Daily.  3   • mupirocin (BACTROBAN) 2 % ointment NICCI EXT AA TID UNTIL HEALED       No current facility-administered medications for this visit.        _____________________________________________________________________________________    HISTORY OF PRESENT ILLNESS     Hypertension improved with increased amlodipine 5 mg. Denies edema, lightheadedness, fatigue.   S/p TIA without acute changes.   On Pradaxa for atrial fibrillation.   On statin therapy.       Patient Care Team:  Neftali Amezcua MD as PCP - General (Internal Medicine)  Juan Colby MD as Consulting Physician (Cardiology)  Ankush Sky MD as Surgeon (Orthopedic Surgery)  Vale Del Rosario APRN as Nurse Practitioner (Oncology)  Darrion Peña MD as Consulting Physician (Urology)  Tahir Childs MD as Consulting Physician (Pulmonary Disease)  Noman Moseley MD as Consulting Physician (Gastroenterology)  Aura Perry, OD  (Optometry)  Juwan Diane MD as Consulting Physician (Ophthalmology)      REVIEW OF SYSTEMS     Review of Systems  No headaches or neuro change  No chest pain or palpitations        PHYSICAL EXAMINATION     Physical Exam  Heart rhythm is irregularly irregular, normal rate     REVIEWED DATA     Labs:     Lab Results   Component Value Date     10/23/2020    K 3.9 10/23/2020    CALCIUM 8.6 10/23/2020    AST 21 10/22/2020    ALT 22 10/22/2020    BUN 11 10/23/2020    CREATININE 0.85 10/23/2020    CREATININE 0.90 10/22/2020    CREATININE 0.96 10/22/2020    EGFRIFNONA 88 10/23/2020    EGFRIFAFRI 92 03/03/2020       Lab Results   Component Value Date    HGBA1C 6.00 (H) 10/23/2020    HGBA1C 6.00 (H) 09/02/2020    HGBA1C 6.50 (H) 03/03/2020     (H) 03/03/2020     (H) 01/31/2019     (H) 07/31/2018    MICROALBUR 145.0 09/02/2020       Lab Results   Component Value Date    LDL 79 10/23/2020    LDL 90 09/02/2020    LDL 84 08/02/2019    HDL 37 (L) 10/23/2020    HDL 43 09/02/2020    HDL 43 08/02/2019    TRIG 94 10/23/2020    TRIG 101 09/02/2020    TRIG 136 08/02/2019    CHOLHDLRATIO 3.56 09/02/2020    CHOLHDLRATIO 3.58 08/02/2019    CHOLHDLRATIO 3.88 07/31/2018       Lab Results   Component Value Date    TSH 2.45 10/14/2015       Lab Results   Component Value Date    WBC 6.53 10/23/2020    HGB 15.7 10/23/2020    HGB 15.4 10/22/2020    HGB 15.8 02/08/2020     10/23/2020       Lab Results   Component Value Date    PROTEIN See below: (A) 10/21/2020    GLUCOSEU Negative 10/21/2020    BLOODU See below: (A) 10/21/2020    NITRITEU Negative 10/21/2020    LEUKOCYTESUR See below: (A) 10/21/2020       Imaging:           Medical Tests:           Summary of old records / correspondence / consultant report:           Request outside records:           ALLERGIES  Allergies   Allergen Reactions   • Bactrim [Sulfamethoxazole-Trimethoprim] Rash        PFSH:     The following portions of the patient's history  were reviewed and updated as appropriate: Allergies / Current Medications / Past Medical History / Surgical History / Social History / Family History    PROBLEM LIST   Patient Active Problem List   Diagnosis   • Chronic obstructive pulmonary disease with acute exacerbation (CMS/HCC)   • Impotence of organic origin   • Hypertension   • Osteoarthritis   • History of smoking greater than 50 pack years   • Benign prostatic hyperplasia with urinary obstruction   • Permanent atrial fibrillation (CMS/HCC)   • Type 2 diabetes mellitus with hyperglycemia, without long-term current use of insulin (CMS/HCC)   • Arthritis of carpometacarpal (CMC) joint of left thumb   • Rash and nonspecific skin eruption   • TIA (transient ischemic attack)       PAST MEDICAL HISTORY  Past Medical History:   Diagnosis Date   • Alcohol abuse, in remission    • BPH (benign prostatic hypertrophy)     see doctors at [facility]   • COPD (chronic obstructive pulmonary disease) (CMS/HCC)    • Depression    • DJD (degenerative joint disease) of cervical spine    • ED (erectile dysfunction)     [facility]   • Hx of colonic polyps     followed by GI ([facility])   • Hyperlipidemia    • Hypertension    • Influenza B 02/14/2013       • Nocturnal hypoxia    • Osteoarthritis     HIPS, HANDS, MULTIPLE SITES   • Peripheral neuropathy    • Permanent atrial fibrillation (CMS/HCC)    • Rectal bleeding 04/26/2017   • Tendonitis of shoulder 11/07/2007    RIGHT SHOULDER/DELTOID INSERTION   • TIA (transient ischemic attack) 10/2020   • Ulcer of the stomach and intestine        SURGICAL HISTORY  Past Surgical History:   Procedure Laterality Date   • APPENDECTOMY     • CARDIOVASCULAR STRESS TEST N/A 10/20/2015    NML LEXISCAN CARDIOLITE PERFUSION STUDY, NO EVIDENCE OF ISCHEMIA, AREA OF HYPOPERFUSION OF THE INFERIOR WALL W/NORMAL WALL PERFUSION AND NML LEFT VENTRICULAR EJECTION FRACTION, MOST LIKELY ATTENUATION. DR.MICHAEL BOX   • COLONOSCOPY N/A  02/2017   • COLONOSCOPY N/A 02/23/2011    4 POLYPS REMOVED, RESCOPE IN 5 YRS, DR. EAGLE   • COLONOSCOPY N/A 03/08/2006    ERYTHEMOUS AND GRANULAR MUCOSA IN ILEOCECAL VALVE, NORMAL COLON, REPEAT IN 5 YRS,    • ENDOSCOPY N/A 9/26/2018    normal esophagus, acute gastritis, multiple non-bleeding duodenal ulcers with pigmented material, H Pylori positive   • HERNIA REPAIR Bilateral     INGUINAL   • JOINT REPLACEMENT  11/2015    left hip   • TOTAL HIP ARTHROPLASTY Left 11/06/2015    Dr Ankush Sky   • TOTAL HIP ARTHROPLASTY Right 10/27/2014    DR.RIED SKY   • VASECTOMY         SOCIAL HISTORY  Social History     Socioeconomic History   • Marital status:      Spouse name: Ara*   • Number of children: 3   • Years of education: Not on file   • Highest education level: Not on file   Occupational History   • Occupation: Retired     Comment: Supervisor/manager [employer]   Tobacco Use   • Smoking status: Former Smoker     Packs/day: 2.00     Years: 49.00     Pack years: 98.00     Quit date: 1/1/2010     Years since quitting: 10.9   • Smokeless tobacco: Never Used   • Tobacco comment: Began Smoking age 15.  Smoked 2 ppd for 49 years until he quit smoking 7 years ago for a 98 pack year history.   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   • Drug use: No     Comment: caffeine use    • Sexual activity: Defer   Lifestyle   • Physical activity     Days per week: 7 days     Minutes per session: 20 min   • Stress: To some extent       FAMILY HISTORY  Family History   Problem Relation Age of Onset   • Colon cancer Mother    • Ovarian cancer Mother    • Alzheimer's disease Mother 70   • Stroke Sister         October 2016   • Dementia Sister    • Heart disease Brother    • Alcohol abuse Brother    • Coronary artery disease Father    • Hypertension Father    • Colon cancer Maternal Grandmother    • Heart disease Brother    • Coronary  artery disease Brother    • Hypertension Brother    • Stroke Brother    • Arthritis Brother    • Prostate cancer Neg Hx          Examiner was wearing KN95 mask, face shield and exam gloves during the entire duration of the visit. Patient was masked the entire time.   Minimum social distance of 6 ft maintained entire visit except if physical contact was necessary as documented.     **Dragon Disclaimer:   Much of this encounter note is an electronic transcription/translation of spoken language to printed text. The electronic translation of spoken language may permit erroneous, or at times, nonsensical words or phrases to be inadvertently transcribed. Although I have reviewed the note for such errors, some may still exist.     Template created by Adelso Amezcua MD

## 2020-12-04 RX ORDER — PANTOPRAZOLE SODIUM 40 MG/1
40 TABLET, DELAYED RELEASE ORAL DAILY
Qty: 90 TABLET | Refills: 3 | Status: SHIPPED | OUTPATIENT
Start: 2020-12-04 | End: 2021-03-15

## 2021-03-02 ENCOUNTER — OFFICE VISIT (OUTPATIENT)
Dept: INTERNAL MEDICINE | Age: 76
End: 2021-03-02

## 2021-03-02 VITALS
HEIGHT: 70 IN | DIASTOLIC BLOOD PRESSURE: 80 MMHG | WEIGHT: 197 LBS | HEART RATE: 95 BPM | BODY MASS INDEX: 28.2 KG/M2 | SYSTOLIC BLOOD PRESSURE: 144 MMHG | OXYGEN SATURATION: 97 % | TEMPERATURE: 96.6 F

## 2021-03-02 DIAGNOSIS — I48.21 PERMANENT ATRIAL FIBRILLATION (HCC): Chronic | ICD-10-CM

## 2021-03-02 DIAGNOSIS — I10 ESSENTIAL HYPERTENSION: Primary | Chronic | ICD-10-CM

## 2021-03-02 DIAGNOSIS — Z23 IMMUNIZATION DUE: ICD-10-CM

## 2021-03-02 DIAGNOSIS — J44.1 CHRONIC OBSTRUCTIVE PULMONARY DISEASE WITH ACUTE EXACERBATION (HCC): Chronic | ICD-10-CM

## 2021-03-02 DIAGNOSIS — E11.65 TYPE 2 DIABETES MELLITUS WITH HYPERGLYCEMIA, WITHOUT LONG-TERM CURRENT USE OF INSULIN (HCC): Chronic | ICD-10-CM

## 2021-03-02 PROCEDURE — 99214 OFFICE O/P EST MOD 30 MIN: CPT | Performed by: INTERNAL MEDICINE

## 2021-03-02 NOTE — ASSESSMENT & PLAN NOTE
Rate in 90's and blood pressure is high.   Increase metoprolol tartrate to 25 mg TWO tablets qHS, continue 1 tab in AM.

## 2021-03-02 NOTE — PROGRESS NOTES
I N T E R N A L  M E D I C I N E  J U N O H  K I M,  M D      ENCOUNTER DATE:  03/02/2021    Aj Pattonb / 75 y.o. / male      CHIEF COMPLAINT / REASON FOR OFFICE VISIT     Hypertension, Diabetes, Permanent atrial fibrillation, and Chronic obstructive pulmonary disease with acute exacerbatio      ASSESSMENT & PLAN     Problem List Items Addressed This Visit     Chronic obstructive pulmonary disease with acute exacerbation (CMS/HCC) (Chronic)    Overview     *Naz    PFT (5/2018): moderate obstruction with decreased diffusing capacity.          Current Assessment & Plan     Increased dyspnea. Discuss with Dr. Childs about increasing dose of Trelegy. Discussed Breztri.          Relevant Medications    TRELEGY ELLIPTA 100-62.5-25 MCG/INH aerosol powder     Hypertension - Primary (Chronic)    Current Assessment & Plan     Blood pressure are HR are elevated. Home SBP > 135.   Goal blood pressure < 130/80.   · Increase metoprolol tartrate to 2 tablets qPM and continue 1 tablet in the morning.     BP Readings from Last 3 Encounters:   03/02/21 144/80   12/02/20 120/78   11/02/20 142/82         Pulse Readings from Last 3 Encounters:   03/02/21 95   12/02/20 93   11/02/20 50             Relevant Medications    amLODIPine (NORVASC) 5 MG tablet    metoprolol tartrate (LOPRESSOR) 25 MG tablet    Permanent atrial fibrillation (CMS/HCC) (Chronic)    Overview     *Imburgia    Continue Pradaxa and metoprolol.          Current Assessment & Plan     Rate in 90's and blood pressure is high.   Increase metoprolol tartrate to 25 mg TWO tablets qHS, continue 1 tab in AM.          Relevant Medications    amLODIPine (NORVASC) 5 MG tablet    clopidogrel (Plavix) 75 MG tablet    metoprolol tartrate (LOPRESSOR) 25 MG tablet    Type 2 diabetes mellitus with hyperglycemia, without long-term current use of insulin (CMS/HCC) (Chronic)    Overview     Continue metformin  mg daily with dinner.          Relevant Medications     "metFORMIN ER (GLUCOPHAGE-XR) 500 MG 24 hr tablet        No orders of the defined types were placed in this encounter.    New Medications Ordered This Visit   Medications   • metoprolol tartrate (LOPRESSOR) 25 MG tablet     Sig: Take 1 tablet in AM and 2 tablets in PM       SUMMARY/DISCUSSION  •       Next Appointment with me: Visit date not found    Return in about 3 months (around 6/2/2021) for Reassess chronic medical problems.      VITAL SIGNS     Visit Vitals  /80   Pulse 95   Temp 96.6 °F (35.9 °C)   Ht 177.8 cm (70\")   Wt 89.4 kg (197 lb)   SpO2 97%   BMI 28.27 kg/m²       BP Readings from Last 3 Encounters:   03/02/21 144/80   12/02/20 120/78   11/02/20 142/82     Wt Readings from Last 3 Encounters:   03/02/21 89.4 kg (197 lb)   12/02/20 88 kg (194 lb)   11/02/20 88 kg (194 lb)     Body mass index is 28.27 kg/m².      MEDICATIONS AT THE TIME OF OFFICE VISIT     Current Outpatient Medications on File Prior to Visit   Medication Sig   • acetaminophen (TYLENOL) 325 MG tablet Take 2 tablets by mouth Every 4 (Four) Hours As Needed for Mild Pain .   • ALPHAGAN P 0.1 % solution ophthalmic solution Administer 1 drop to both eyes 2 (two) times a day.   • amLODIPine (NORVASC) 5 MG tablet Take 1 tablet by mouth Daily.   • atorvastatin (LIPITOR) 80 MG tablet Take 1 tablet by mouth Every Night.   • clopidogrel (Plavix) 75 MG tablet Take 1 tablet by mouth Daily.   • dabigatran etexilate (Pradaxa) 150 MG capsule Take 1 capsule by mouth 2 (Two) Times a Day.   • metFORMIN ER (GLUCOPHAGE-XR) 500 MG 24 hr tablet TAKE 1 TABLET BY MOUTH DAILY WITH DINNER   • pantoprazole (PROTONIX) 40 MG EC tablet TAKE 1 TABLET BY MOUTH DAILY   • Tafluprost, PF, (ZIOPTAN) 0.0015 % solution ophthalmic solution Administer 1 drop to both eyes Every Night.   • tamsulosin (FLOMAX) 0.4 MG capsule 24 hr capsule Take 2 capsules by mouth Every Night.   • TRELEGY ELLIPTA 100-62.5-25 MCG/INH aerosol powder  Inhale 1 puff Daily.   • metoprolol tartrate " (LOPRESSOR) 25 MG tablet TAKE 1 TABLET BY MOUTH TWICE DAILY       HISTORY OF PRESENT ILLNESS     Complains of increased shortness of breath especially with physical activity. Denies angina, PND/orthopnea/edema, melena or bleeding. On Trelegy 100 for COPD/emphysema. Using albuterol provides some delayed response. Diabetes remains stable. Home blood pressure > 140's and HR 90's. Sees Dr. Colby for atrial fibrillation.         REVIEW OF SYSTEMS           PHYSICAL EXAMINATION     Physical Exam  Appears somewhat dyspneic, not toxic  Alert with normal thought and judgment   Heart: irregular, rate 90's, no edema of legs   Lungs: no rales or wheezing, effort little shallow, mild tachypnea       REVIEWED DATA     Labs:     Lab Results   Component Value Date     10/23/2020    K 3.9 10/23/2020    AST 21 10/22/2020    ALT 22 10/22/2020    BUN 11 10/23/2020    CREATININE 0.85 10/23/2020    CREATININE 0.90 10/22/2020    CREATININE 0.96 10/22/2020    EGFRIFNONA 88 10/23/2020    EGFRIFAFRI 92 03/03/2020       Lab Results   Component Value Date    HGBA1C 6.00 (H) 10/23/2020    HGBA1C 6.00 (H) 09/02/2020    HGBA1C 6.50 (H) 03/03/2020       Lab Results   Component Value Date    LDL 79 10/23/2020    LDL 90 09/02/2020    HDL 37 (L) 10/23/2020    TRIG 94 10/23/2020       Lab Results   Component Value Date    TSH 2.45 10/14/2015       Lab Results   Component Value Date    WBC 6.53 10/23/2020    HGB 15.7 10/23/2020     10/23/2020         Imaging:     LOW-DOSE CHEST CT WITHOUT IV CONTRAST     HISTORY: 74-year-old male. Lung cancer screening.     TECHNIQUE: Radiation dose reduction techniques were utilized, including  automated exposure control and exposure modulation based on body size.  Low-dose chest CT protocol with 2 mm intervals. Compared with chest CT  from 08/06/2018.     FINDINGS: There are no suspicious pulmonary opacities or nodules. There  are no pleural or pericardial effusions. There is a stable dense 3 mm  left  upper lobe nodule. There is no change in the shotty mediastinal  nodes. Multiple calcified right hilar and subcarinal nodes are noted.  Advanced emphysematous changes are noted. There are multiple coronary  artery calcifications.     IMPRESSION:  1. There are no suspicious pulmonary opacities or nodules. LungRADS  category 2. Screening low-dose chest CT is recommended in 12 months.  2. CTDI 2.1 mGy. DLP 77.8 mGycm.     This report was finalized on 9/9/2020      Medical Tests:     Echocardiogram 10/23/20:  · Calculated left ventricular EF = 44.3% Estimated left ventricular EF was in agreement with the calculated left ventricular EF.  · Saline study was poorly visualized. No obvious bubbles crossed.  · The aortic valve exhibits sclerosis. The aortic valve appears trileaflet.  · There is mild, bileaflet mitral valve thickening present.  · Trace aortic valve regurgitation is present  · Mild mitral valve regurgitation is present.  · Mild dilation of the aortic root is present.           Summary of old records / correspondence / consultant report:           Request outside records:             *Examiner was wearing KN95 mask, face shield and exam gloves during the entire duration of the visit. Patient was masked the entire time.   Minimum social distance of 6 ft maintained entire visit except if physical contact was necessary as documented.     **Dragon Disclaimer:   Much of this encounter note is an electronic transcription/translation of spoken language to printed text. The electronic translation of spoken language may permit erroneous, or at times, nonsensical words or phrases to be inadvertently transcribed. Although I have reviewed the note for such errors, some may still exist.     Template created by Adelso Amezcua MD

## 2021-03-02 NOTE — ASSESSMENT & PLAN NOTE
Blood pressure are HR are elevated. Home SBP > 135.   Goal blood pressure < 130/80.   · Increase metoprolol tartrate to 2 tablets qPM and continue 1 tablet in the morning.     BP Readings from Last 3 Encounters:   03/02/21 144/80   12/02/20 120/78   11/02/20 142/82         Pulse Readings from Last 3 Encounters:   03/02/21 95   12/02/20 93   11/02/20 50

## 2021-03-07 ENCOUNTER — APPOINTMENT (OUTPATIENT)
Dept: GENERAL RADIOLOGY | Facility: HOSPITAL | Age: 76
End: 2021-03-07

## 2021-03-07 ENCOUNTER — APPOINTMENT (OUTPATIENT)
Dept: CT IMAGING | Facility: HOSPITAL | Age: 76
End: 2021-03-07

## 2021-03-07 ENCOUNTER — HOSPITAL ENCOUNTER (INPATIENT)
Facility: HOSPITAL | Age: 76
LOS: 3 days | Discharge: HOME | End: 2021-03-10
Attending: EMERGENCY MEDICINE | Admitting: INTERNAL MEDICINE

## 2021-03-07 DIAGNOSIS — I48.91 ATRIAL FIBRILLATION WITH RVR (HCC): Primary | ICD-10-CM

## 2021-03-07 DIAGNOSIS — E87.3 ACUTE RESPIRATORY ALKALOSIS: ICD-10-CM

## 2021-03-07 LAB
ALBUMIN SERPL-MCNC: 4.3 G/DL (ref 3.5–5.2)
ALBUMIN/GLOB SERPL: 1.3 G/DL
ALP SERPL-CCNC: 156 U/L (ref 39–117)
ALT SERPL W P-5'-P-CCNC: 22 U/L (ref 1–41)
ANION GAP SERPL CALCULATED.3IONS-SCNC: 10.6 MMOL/L (ref 5–15)
ARTERIAL PATENCY WRIST A: POSITIVE
AST SERPL-CCNC: 24 U/L (ref 1–40)
ATMOSPHERIC PRESS: 762.8 MMHG
B PARAPERT DNA SPEC QL NAA+PROBE: NOT DETECTED
B PERT DNA SPEC QL NAA+PROBE: NOT DETECTED
BASE EXCESS BLDA CALC-SCNC: -0.2 MMOL/L (ref 0–2)
BASOPHILS # BLD AUTO: 0.07 10*3/MM3 (ref 0–0.2)
BASOPHILS NFR BLD AUTO: 0.4 % (ref 0–1.5)
BDY SITE: ABNORMAL
BILIRUB SERPL-MCNC: 1.6 MG/DL (ref 0–1.2)
BUN SERPL-MCNC: 13 MG/DL (ref 8–23)
BUN/CREAT SERPL: 14.6 (ref 7–25)
C PNEUM DNA NPH QL NAA+NON-PROBE: NOT DETECTED
CALCIUM SPEC-SCNC: 8.9 MG/DL (ref 8.6–10.5)
CHLORIDE SERPL-SCNC: 106 MMOL/L (ref 98–107)
CO2 SERPL-SCNC: 25.4 MMOL/L (ref 22–29)
CREAT SERPL-MCNC: 0.89 MG/DL (ref 0.76–1.27)
D-LACTATE SERPL-SCNC: 1.4 MMOL/L (ref 0.5–2)
DEPRECATED RDW RBC AUTO: 39.5 FL (ref 37–54)
EOSINOPHIL # BLD AUTO: 0.19 10*3/MM3 (ref 0–0.4)
EOSINOPHIL NFR BLD AUTO: 1.2 % (ref 0.3–6.2)
ERYTHROCYTE [DISTWIDTH] IN BLOOD BY AUTOMATED COUNT: 12.4 % (ref 12.3–15.4)
FLUAV SUBTYP SPEC NAA+PROBE: NOT DETECTED
FLUBV RNA ISLT QL NAA+PROBE: NOT DETECTED
GAS FLOW AIRWAY: 4 LPM
GFR SERPL CREATININE-BSD FRML MDRD: 84 ML/MIN/1.73
GLOBULIN UR ELPH-MCNC: 3.2 GM/DL
GLUCOSE BLDC GLUCOMTR-MCNC: 115 MG/DL (ref 70–130)
GLUCOSE SERPL-MCNC: 125 MG/DL (ref 65–99)
HADV DNA SPEC NAA+PROBE: NOT DETECTED
HCO3 BLDA-SCNC: 22.2 MMOL/L (ref 22–28)
HCOV 229E RNA SPEC QL NAA+PROBE: NOT DETECTED
HCOV HKU1 RNA SPEC QL NAA+PROBE: NOT DETECTED
HCOV NL63 RNA SPEC QL NAA+PROBE: NOT DETECTED
HCOV OC43 RNA SPEC QL NAA+PROBE: NOT DETECTED
HCT VFR BLD AUTO: 44.4 % (ref 37.5–51)
HGB BLD-MCNC: 15.4 G/DL (ref 13–17.7)
HMPV RNA NPH QL NAA+NON-PROBE: NOT DETECTED
HOLD SPECIMEN: NORMAL
HPIV1 RNA SPEC QL NAA+PROBE: NOT DETECTED
HPIV2 RNA SPEC QL NAA+PROBE: NOT DETECTED
HPIV3 RNA NPH QL NAA+PROBE: NOT DETECTED
HPIV4 P GENE NPH QL NAA+PROBE: NOT DETECTED
IMM GRANULOCYTES # BLD AUTO: 0.09 10*3/MM3 (ref 0–0.05)
IMM GRANULOCYTES NFR BLD AUTO: 0.6 % (ref 0–0.5)
LYMPHOCYTES # BLD AUTO: 1.16 10*3/MM3 (ref 0.7–3.1)
LYMPHOCYTES NFR BLD AUTO: 7.4 % (ref 19.6–45.3)
M PNEUMO IGG SER IA-ACNC: NOT DETECTED
MCH RBC QN AUTO: 30.7 PG (ref 26.6–33)
MCHC RBC AUTO-ENTMCNC: 34.7 G/DL (ref 31.5–35.7)
MCV RBC AUTO: 88.4 FL (ref 79–97)
MODALITY: ABNORMAL
MONOCYTES # BLD AUTO: 1.02 10*3/MM3 (ref 0.1–0.9)
MONOCYTES NFR BLD AUTO: 6.5 % (ref 5–12)
NEUTROPHILS NFR BLD AUTO: 13.11 10*3/MM3 (ref 1.7–7)
NEUTROPHILS NFR BLD AUTO: 83.9 % (ref 42.7–76)
NRBC BLD AUTO-RTO: 0 /100 WBC (ref 0–0.2)
NT-PROBNP SERPL-MCNC: 1104 PG/ML (ref 0–900)
PCO2 BLDA: 30 MM HG (ref 35–45)
PH BLDA: 7.48 PH UNITS (ref 7.35–7.45)
PLATELET # BLD AUTO: 255 10*3/MM3 (ref 140–450)
PMV BLD AUTO: 9.3 FL (ref 6–12)
PO2 BLDA: 71.3 MM HG (ref 80–100)
POTASSIUM SERPL-SCNC: 4.4 MMOL/L (ref 3.5–5.2)
PROT SERPL-MCNC: 7.5 G/DL (ref 6–8.5)
QT INTERVAL: 292 MS
RBC # BLD AUTO: 5.02 10*6/MM3 (ref 4.14–5.8)
RHINOVIRUS RNA SPEC NAA+PROBE: NOT DETECTED
RSV RNA NPH QL NAA+NON-PROBE: NOT DETECTED
SAO2 % BLDCOA: 95.5 % (ref 92–99)
SARS-COV-2 RNA NPH QL NAA+NON-PROBE: NOT DETECTED
SET MECH RESP RATE: 22
SODIUM SERPL-SCNC: 142 MMOL/L (ref 136–145)
TROPONIN T SERPL-MCNC: <0.01 NG/ML (ref 0–0.03)
WBC # BLD AUTO: 15.64 10*3/MM3 (ref 3.4–10.8)
WHOLE BLOOD HOLD SPECIMEN: NORMAL
WHOLE BLOOD HOLD SPECIMEN: NORMAL

## 2021-03-07 PROCEDURE — 83880 ASSAY OF NATRIURETIC PEPTIDE: CPT | Performed by: EMERGENCY MEDICINE

## 2021-03-07 PROCEDURE — 87040 BLOOD CULTURE FOR BACTERIA: CPT | Performed by: EMERGENCY MEDICINE

## 2021-03-07 PROCEDURE — 93005 ELECTROCARDIOGRAM TRACING: CPT

## 2021-03-07 PROCEDURE — 25010000002 CEFTRIAXONE PER 250 MG: Performed by: EMERGENCY MEDICINE

## 2021-03-07 PROCEDURE — 85025 COMPLETE CBC W/AUTO DIFF WBC: CPT | Performed by: EMERGENCY MEDICINE

## 2021-03-07 PROCEDURE — 71045 X-RAY EXAM CHEST 1 VIEW: CPT

## 2021-03-07 PROCEDURE — 82803 BLOOD GASES ANY COMBINATION: CPT

## 2021-03-07 PROCEDURE — 93005 ELECTROCARDIOGRAM TRACING: CPT | Performed by: EMERGENCY MEDICINE

## 2021-03-07 PROCEDURE — 25010000002 FUROSEMIDE PER 20 MG: Performed by: EMERGENCY MEDICINE

## 2021-03-07 PROCEDURE — 93010 ELECTROCARDIOGRAM REPORT: CPT | Performed by: INTERNAL MEDICINE

## 2021-03-07 PROCEDURE — 83605 ASSAY OF LACTIC ACID: CPT | Performed by: EMERGENCY MEDICINE

## 2021-03-07 PROCEDURE — 36600 WITHDRAWAL OF ARTERIAL BLOOD: CPT

## 2021-03-07 PROCEDURE — 71250 CT THORAX DX C-: CPT

## 2021-03-07 PROCEDURE — 99285 EMERGENCY DEPT VISIT HI MDM: CPT

## 2021-03-07 PROCEDURE — 80053 COMPREHEN METABOLIC PANEL: CPT | Performed by: EMERGENCY MEDICINE

## 2021-03-07 PROCEDURE — 25010000002 MAGNESIUM SULFATE 2 GM/50ML SOLUTION: Performed by: EMERGENCY MEDICINE

## 2021-03-07 PROCEDURE — 82962 GLUCOSE BLOOD TEST: CPT

## 2021-03-07 PROCEDURE — 84484 ASSAY OF TROPONIN QUANT: CPT | Performed by: EMERGENCY MEDICINE

## 2021-03-07 PROCEDURE — 0202U NFCT DS 22 TRGT SARS-COV-2: CPT | Performed by: EMERGENCY MEDICINE

## 2021-03-07 RX ORDER — CLOPIDOGREL BISULFATE 75 MG/1
75 TABLET ORAL DAILY
Status: DISCONTINUED | OUTPATIENT
Start: 2021-03-08 | End: 2021-03-10 | Stop reason: HOSPADM

## 2021-03-07 RX ORDER — FUROSEMIDE 40 MG/1
40 TABLET ORAL DAILY
Status: DISCONTINUED | OUTPATIENT
Start: 2021-03-08 | End: 2021-03-08

## 2021-03-07 RX ORDER — DEXTROSE MONOHYDRATE 25 G/50ML
25 INJECTION, SOLUTION INTRAVENOUS
Status: DISCONTINUED | OUTPATIENT
Start: 2021-03-07 | End: 2021-03-10 | Stop reason: HOSPADM

## 2021-03-07 RX ORDER — FUROSEMIDE 10 MG/ML
80 INJECTION INTRAMUSCULAR; INTRAVENOUS ONCE
Status: COMPLETED | OUTPATIENT
Start: 2021-03-07 | End: 2021-03-07

## 2021-03-07 RX ORDER — SODIUM CHLORIDE 0.9 % (FLUSH) 0.9 %
10 SYRINGE (ML) INJECTION AS NEEDED
Status: DISCONTINUED | OUTPATIENT
Start: 2021-03-07 | End: 2021-03-10 | Stop reason: HOSPADM

## 2021-03-07 RX ORDER — ACETAMINOPHEN 650 MG/1
650 SUPPOSITORY RECTAL EVERY 4 HOURS PRN
Status: DISCONTINUED | OUTPATIENT
Start: 2021-03-07 | End: 2021-03-10 | Stop reason: HOSPADM

## 2021-03-07 RX ORDER — ACETAMINOPHEN 325 MG/1
650 TABLET ORAL EVERY 4 HOURS PRN
Status: DISCONTINUED | OUTPATIENT
Start: 2021-03-07 | End: 2021-03-10 | Stop reason: HOSPADM

## 2021-03-07 RX ORDER — NITROGLYCERIN 0.4 MG/1
0.4 TABLET SUBLINGUAL
Status: DISCONTINUED | OUTPATIENT
Start: 2021-03-07 | End: 2021-03-10 | Stop reason: HOSPADM

## 2021-03-07 RX ORDER — ATORVASTATIN CALCIUM 80 MG/1
80 TABLET, FILM COATED ORAL NIGHTLY
Status: DISCONTINUED | OUTPATIENT
Start: 2021-03-07 | End: 2021-03-10 | Stop reason: HOSPADM

## 2021-03-07 RX ORDER — DILTIAZEM HCL IN NACL,ISO-OSM 125 MG/125
5-15 PLASTIC BAG, INJECTION (ML) INTRAVENOUS
Status: DISCONTINUED | OUTPATIENT
Start: 2021-03-07 | End: 2021-03-08

## 2021-03-07 RX ORDER — NICOTINE POLACRILEX 4 MG
15 LOZENGE BUCCAL
Status: DISCONTINUED | OUTPATIENT
Start: 2021-03-07 | End: 2021-03-10 | Stop reason: HOSPADM

## 2021-03-07 RX ORDER — ONDANSETRON 2 MG/ML
4 INJECTION INTRAMUSCULAR; INTRAVENOUS EVERY 6 HOURS PRN
Status: DISCONTINUED | OUTPATIENT
Start: 2021-03-07 | End: 2021-03-10 | Stop reason: HOSPADM

## 2021-03-07 RX ORDER — PANTOPRAZOLE SODIUM 40 MG/1
40 TABLET, DELAYED RELEASE ORAL DAILY
Status: DISCONTINUED | OUTPATIENT
Start: 2021-03-08 | End: 2021-03-10 | Stop reason: HOSPADM

## 2021-03-07 RX ORDER — DABIGATRAN ETEXILATE 150 MG/1
150 CAPSULE ORAL 2 TIMES DAILY
Status: DISCONTINUED | OUTPATIENT
Start: 2021-03-07 | End: 2021-03-10 | Stop reason: HOSPADM

## 2021-03-07 RX ORDER — ACETAMINOPHEN 160 MG/5ML
650 SOLUTION ORAL EVERY 4 HOURS PRN
Status: DISCONTINUED | OUTPATIENT
Start: 2021-03-07 | End: 2021-03-10 | Stop reason: HOSPADM

## 2021-03-07 RX ORDER — INSULIN LISPRO 100 [IU]/ML
0-9 INJECTION, SOLUTION INTRAVENOUS; SUBCUTANEOUS
Status: DISCONTINUED | OUTPATIENT
Start: 2021-03-08 | End: 2021-03-10 | Stop reason: HOSPADM

## 2021-03-07 RX ORDER — AMLODIPINE BESYLATE 5 MG/1
5 TABLET ORAL DAILY
Status: DISCONTINUED | OUTPATIENT
Start: 2021-03-08 | End: 2021-03-08

## 2021-03-07 RX ORDER — ACETAMINOPHEN 325 MG/1
650 TABLET ORAL EVERY 4 HOURS PRN
Status: DISCONTINUED | OUTPATIENT
Start: 2021-03-07 | End: 2021-03-07 | Stop reason: SDUPTHER

## 2021-03-07 RX ORDER — SODIUM CHLORIDE 0.9 % (FLUSH) 0.9 %
10 SYRINGE (ML) INJECTION EVERY 12 HOURS SCHEDULED
Status: DISCONTINUED | OUTPATIENT
Start: 2021-03-07 | End: 2021-03-10 | Stop reason: HOSPADM

## 2021-03-07 RX ORDER — METFORMIN HYDROCHLORIDE 500 MG/1
500 TABLET, EXTENDED RELEASE ORAL
Status: DISCONTINUED | OUTPATIENT
Start: 2021-03-07 | End: 2021-03-10 | Stop reason: HOSPADM

## 2021-03-07 RX ORDER — MAGNESIUM SULFATE HEPTAHYDRATE 40 MG/ML
2 INJECTION, SOLUTION INTRAVENOUS ONCE
Status: COMPLETED | OUTPATIENT
Start: 2021-03-07 | End: 2021-03-07

## 2021-03-07 RX ORDER — METOPROLOL TARTRATE 50 MG/1
50 TABLET, FILM COATED ORAL NIGHTLY
Status: DISCONTINUED | OUTPATIENT
Start: 2021-03-07 | End: 2021-03-08

## 2021-03-07 RX ORDER — CEFTRIAXONE SODIUM 1 G/50ML
1 INJECTION, SOLUTION INTRAVENOUS ONCE
Status: COMPLETED | OUTPATIENT
Start: 2021-03-07 | End: 2021-03-07

## 2021-03-07 RX ORDER — TAMSULOSIN HYDROCHLORIDE 0.4 MG/1
0.8 CAPSULE ORAL NIGHTLY
Status: DISCONTINUED | OUTPATIENT
Start: 2021-03-07 | End: 2021-03-10 | Stop reason: HOSPADM

## 2021-03-07 RX ORDER — LATANOPROST 50 UG/ML
1 SOLUTION/ DROPS OPHTHALMIC NIGHTLY
Status: DISCONTINUED | OUTPATIENT
Start: 2021-03-07 | End: 2021-03-10 | Stop reason: HOSPADM

## 2021-03-07 RX ADMIN — Medication 10 MG/HR: at 16:44

## 2021-03-07 RX ADMIN — DABIGATRAN ETEXILATE MESYLATE 150 MG: 150 CAPSULE ORAL at 20:41

## 2021-03-07 RX ADMIN — METOROPROLOL TARTRATE 5 MG: 5 INJECTION, SOLUTION INTRAVENOUS at 12:57

## 2021-03-07 RX ADMIN — METOROPROLOL TARTRATE 5 MG: 5 INJECTION, SOLUTION INTRAVENOUS at 12:20

## 2021-03-07 RX ADMIN — MAGNESIUM SULFATE HEPTAHYDRATE 2 G: 2 INJECTION, SOLUTION INTRAVENOUS at 12:22

## 2021-03-07 RX ADMIN — METOPROLOL TARTRATE 50 MG: 50 TABLET, FILM COATED ORAL at 20:40

## 2021-03-07 RX ADMIN — METOROPROLOL TARTRATE 5 MG: 5 INJECTION, SOLUTION INTRAVENOUS at 14:56

## 2021-03-07 RX ADMIN — BRIMONIDINE TARTRATE 1 DROP: 1 SOLUTION/ DROPS OPHTHALMIC at 20:39

## 2021-03-07 RX ADMIN — Medication 5 MG/HR: at 15:48

## 2021-03-07 RX ADMIN — METOPROLOL TARTRATE 25 MG: 25 TABLET, FILM COATED ORAL at 12:18

## 2021-03-07 RX ADMIN — ATORVASTATIN CALCIUM 80 MG: 80 TABLET, FILM COATED ORAL at 20:40

## 2021-03-07 RX ADMIN — CEFTRIAXONE SODIUM 1 G: 1 INJECTION, SOLUTION INTRAVENOUS at 16:48

## 2021-03-07 RX ADMIN — SODIUM CHLORIDE, PRESERVATIVE FREE 10 ML: 5 INJECTION INTRAVENOUS at 20:40

## 2021-03-07 RX ADMIN — ACETAMINOPHEN 650 MG: 325 TABLET ORAL at 21:32

## 2021-03-07 RX ADMIN — FUROSEMIDE 80 MG: 10 INJECTION, SOLUTION INTRAMUSCULAR; INTRAVENOUS at 14:02

## 2021-03-07 RX ADMIN — TAMSULOSIN HYDROCHLORIDE 0.8 MG: 0.4 CAPSULE ORAL at 20:39

## 2021-03-07 NOTE — ED PROVIDER NOTES
EMERGENCY DEPARTMENT ENCOUNTER    Room Number:  44/44  Date of encounter:  3/7/2021  PCP: Neftali Amezcua MD  Historian: Patient      HPI:  Chief Complaint: shortness of breath  A complete HPI/ROS/PMH/PSH/SH/FH are unobtainable due to: None    Context: Aj Salazar is a 75 y.o. male who presents to the ED c/o shortness of breath.  Onset 2 hours ago.  Symptoms are constant.  Worse with exertion.  It is rather severe at this time.  He is on Pradaxa.  He denies having any precordial chest pain but does note having some pain to shoulder blades bilaterally.  He has a history of COPD but does not wear oxygen during the daytime only CPAP at night.  He has had no fevers.  He has received both COVID-19 vaccinations.      PAST MEDICAL HISTORY  Active Ambulatory Problems     Diagnosis Date Noted   • Chronic obstructive pulmonary disease with acute exacerbation (CMS/[facility]) 03/17/2016   • Impotence of organic origin 03/17/2016   • Hypertension 03/17/2016   • Osteoarthritis 03/17/2016   • History of smoking greater than 50 pack years 06/23/2017   • Benign prostatic hyperplasia with urinary obstruction 03/27/2018   • Permanent atrial fibrillation (CMS/[facility])    • Type 2 diabetes mellitus with hyperglycemia, without long-term current use of insulin (CMS/[facility]) 01/31/2019   • Arthritis of carpometacarpal (CMC) joint of left thumb 03/03/2020   • Rash and nonspecific skin eruption 09/02/2020   • TIA (transient ischemic attack) 10/22/2020     Resolved Ambulatory Problems     Diagnosis Date Noted   • Dehydration 09/25/2018   • Hypotension 09/25/2018   • Melena 09/25/2018   • EMPERATRIZ (acute kidney injury) (CMS/[facility]) 09/25/2018   • Hyponatremia 09/25/2018   • Lactic acidosis 09/25/2018   • Facial droop 10/23/2020     Past Medical History:   Diagnosis Date   • Alcohol abuse, in remission    • BPH (benign prostatic hypertrophy)    • COPD (chronic obstructive pulmonary disease) (CMS/[facility])    • Depression    • DJD (degenerative joint disease) of cervical  spine    • ED (erectile dysfunction)    • Hx of colonic polyps    • Hyperlipidemia    • Influenza B 02/14/2013   • Nocturnal hypoxia    • Peripheral neuropathy    • Rectal bleeding 04/26/2017   • Tendonitis of shoulder 11/07/2007   • Ulcer of the stomach and intestine          PAST SURGICAL HISTORY  Past Surgical History:   Procedure Laterality Date   • APPENDECTOMY     • CARDIOVASCULAR STRESS TEST N/A 10/20/2015    NML LEXISCAN CARDIOLITE PERFUSION STUDY, NO EVIDENCE OF ISCHEMIA, AREA OF HYPOPERFUSION OF THE INFERIOR WALL W/NORMAL WALL PERFUSION AND NML LEFT VENTRICULAR EJECTION FRACTION, MOST LIKELY ATTENUATION. DR.MICHAEL BOX   • COLONOSCOPY N/A 02/2017   • COLONOSCOPY N/A 02/23/2011    4 POLYPS REMOVED, RESCOPE IN 5 YRS, DR. EAGLE   • COLONOSCOPY N/A 03/08/2006    ERYTHEMOUS AND GRANULAR MUCOSA IN ILEOCECAL VALVE, NORMAL COLON, REPEAT IN 5 YRS,    • ENDOSCOPY N/A 9/26/2018    normal esophagus, acute gastritis, multiple non-bleeding duodenal ulcers with pigmented material, H Pylori positive   • HERNIA REPAIR Bilateral     INGUINAL   • JOINT REPLACEMENT  11/2015    left hip   • TOTAL HIP ARTHROPLASTY Left 11/06/2015    Dr Ankush Sky   • TOTAL HIP ARTHROPLASTY Right 10/27/2014    DR.RIED SKY   • VASECTOMY           FAMILY HISTORY  Family History   Problem Relation Age of Onset   • Colon cancer Mother    • Ovarian cancer Mother    • Alzheimer's disease Mother 70   • Stroke Sister         October 2016   • Dementia Sister    • Heart disease Brother    • Alcohol abuse Brother    • Coronary artery disease Father    • Hypertension Father    • Colon cancer Maternal Grandmother    • Heart disease Brother    • Coronary artery disease Brother    • Hypertension Brother    • Stroke Brother    • Arthritis Brother    • Prostate cancer Neg Hx          SOCIAL HISTORY  Social History     Socioeconomic History   • Marital status:      Spouse name: Ara*   • Number of children: 3   • Years of education:  Not on file   • Highest education level: Not on file   Tobacco Use   • Smoking status: Former Smoker     Packs/day: 2.00     Years: 49.00     Pack years: 98.00     Quit date: 2010     Years since quittin.1   • Smokeless tobacco: Never Used   • Tobacco comment: Began Smoking age 15.  Smoked 2 ppd for 49 years until he quit smoking 7 years ago for a 98 pack year history.   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   • Drug use: No     Comment: caffeine use    • Sexual activity: Defer         ALLERGIES  Bactrim [sulfamethoxazole-trimethoprim]        REVIEW OF SYSTEMS  Review of Systems     All systems reviewed and negative except for those discussed in HPI.       PHYSICAL EXAM    I have reviewed the triage vital signs and nursing notes.    ED Triage Vitals   Temp Heart Rate Resp BP SpO2   21 1133 21 1133 21 1133 21 1159 21 1133   100 °F (37.8 °C) (!) 121 (!) 30 166/89 94 %      Temp src Heart Rate Source Patient Position BP Location FiO2 (%)   21 1133 21 1133 -- 21 1159 --   Tympanic Monitor  Right arm        Physical Exam  GENERAL: distressed  HENT: nares patent  EYES: no scleral icterus  CV: Irregular rhythm, tachycardic, no murmur  RESPIRATORY: tachypneic, clear to auscultation bilaterally  ABDOMEN: soft, nontender  MUSCULOSKELETAL: no deformity, no lower extremity edema or tenderness  NEURO: alert, moves all extremities, follows commands  SKIN: warm, dry        LAB RESULTS  Recent Results (from the past 24 hour(s))   ECG 12 Lead    Collection Time: 21 11:44 AM   Result Value Ref Range    QT Interval 292 ms   Light Blue Top    Collection Time: 21 11:53 AM   Result Value Ref Range    Extra Tube hold for add-on    Green Top (Gel)    Collection Time: 21 11:53 AM   Result Value Ref Range    Extra Tube Hold for add-ons.    Lavender Top    Collection Time: 21 11:53 AM    Result Value Ref Range    Extra Tube hold for add-on    Comprehensive Metabolic Panel    Collection Time: 03/07/21 11:53 AM    Specimen: Blood   Result Value Ref Range    Glucose 125 (H) 65 - 99 mg/dL    BUN 13 8 - 23 mg/dL    Creatinine 0.89 0.76 - 1.27 mg/dL    Sodium 142 136 - 145 mmol/L    Potassium 4.4 3.5 - 5.2 mmol/L    Chloride 106 98 - 107 mmol/L    CO2 25.4 22.0 - 29.0 mmol/L    Calcium 8.9 8.6 - 10.5 mg/dL    Total Protein 7.5 6.0 - 8.5 g/dL    Albumin 4.30 3.50 - 5.20 g/dL    ALT (SGPT) 22 1 - 41 U/L    AST (SGOT) 24 1 - 40 U/L    Alkaline Phosphatase 156 (H) 39 - 117 U/L    Total Bilirubin 1.6 (H) 0.0 - 1.2 mg/dL    eGFR Non African Amer 84 >60 mL/min/1.73    Globulin 3.2 gm/dL    A/G Ratio 1.3 g/dL    BUN/Creatinine Ratio 14.6 7.0 - 25.0    Anion Gap 10.6 5.0 - 15.0 mmol/L   BNP    Collection Time: 03/07/21 11:53 AM    Specimen: Blood   Result Value Ref Range    proBNP 1,104.0 (H) 0.0 - 900.0 pg/mL   Troponin    Collection Time: 03/07/21 11:53 AM    Specimen: Blood   Result Value Ref Range    Troponin T <0.010 0.000 - 0.030 ng/mL   CBC Auto Differential    Collection Time: 03/07/21 11:53 AM    Specimen: Blood   Result Value Ref Range    WBC 15.64 (H) 3.40 - 10.80 10*3/mm3    RBC 5.02 4.14 - 5.80 10*6/mm3    Hemoglobin 15.4 13.0 - 17.7 g/dL    Hematocrit 44.4 37.5 - 51.0 %    MCV 88.4 79.0 - 97.0 fL    MCH 30.7 26.6 - 33.0 pg    MCHC 34.7 31.5 - 35.7 g/dL    RDW 12.4 12.3 - 15.4 %    RDW-SD 39.5 37.0 - 54.0 fl    MPV 9.3 6.0 - 12.0 fL    Platelets 255 140 - 450 10*3/mm3    Neutrophil % 83.9 (H) 42.7 - 76.0 %    Lymphocyte % 7.4 (L) 19.6 - 45.3 %    Monocyte % 6.5 5.0 - 12.0 %    Eosinophil % 1.2 0.3 - 6.2 %    Basophil % 0.4 0.0 - 1.5 %    Immature Grans % 0.6 (H) 0.0 - 0.5 %    Neutrophils, Absolute 13.11 (H) 1.70 - 7.00 10*3/mm3    Lymphocytes, Absolute 1.16 0.70 - 3.10 10*3/mm3    Monocytes, Absolute 1.02 (H) 0.10 - 0.90 10*3/mm3    Eosinophils, Absolute 0.19 0.00 - 0.40 10*3/mm3    Basophils,  Absolute 0.07 0.00 - 0.20 10*3/mm3    Immature Grans, Absolute 0.09 (H) 0.00 - 0.05 10*3/mm3    nRBC 0.0 0.0 - 0.2 /100 WBC   Lactic Acid, Plasma    Collection Time: 03/07/21  2:40 PM    Specimen: Arm, Right; Blood   Result Value Ref Range    Lactate 1.4 0.5 - 2.0 mmol/L   Blood Gas, Arterial -    Collection Time: 03/07/21  3:47 PM    Specimen: Arterial Blood   Result Value Ref Range    Site Arterial: left radial     Gabriel's Test Positive     pH, Arterial 7.478 (H) 7.350 - 7.450 pH units    pCO2, Arterial 30.0 (L) 35.0 - 45.0 mm Hg    pO2, Arterial 71.3 (L) 80.0 - 100.0 mm Hg    HCO3, Arterial 22.2 22.0 - 28.0 mmol/L    Base Excess, Arterial -0.2 (L) 0.0 - 2.0 mmol/L    O2 Saturation Calculated 95.5 92.0 - 99.0 %    Barometric Pressure for Blood Gas 762.8 mmHg    Modality Cannula     Flow Rate 4 lpm    Set [facility] Resp Rate 22        Ordered the above labs and independently reviewed the results.        RADIOLOGY  CT Chest Without Contrast Diagnostic    Result Date: 3/7/2021  CT SCAN OF THE CHEST WITHOUT CONTRAST  HISTORY: Recent onset of shortness of breath.  FINDINGS: The CT scan was performed as an emergency procedure through the chest without contrast. The following findings are present: 1. There are changes of COPD and there is some minimal faint groundglass opacity in the lungs which is nonspecific but could relate to CHF as suggested on today's chest x-ray. There are no associated pleural effusions and there is no focal pneumonia. 2. There is no mediastinal or hilar or axillary adenopathy. There is a very small pericardial effusion measuring 6 mm. The CT images through the upper liver, spleen, and both adrenal glands are unremarkable.      Radiation dose reduction techniques were utilized, including automated exposure control and exposure modulation based on body size.       XR Chest 1 View    Result Date: 3/7/2021  ONE VIEW PORTABLE CHEST  HISTORY: Shortness of breath. COPD  FINDINGS: There is moderate  cardiomegaly similar to the study of 10/22/2020. There is mild vascular congestion appearing slightly more prominent with some interstitial prominence at the bases and raising the concern of an element of CHF.  This report was finalized on 3/7/2021 1:29 PM by Dr. Dg Sunshine M.D.        I ordered the above noted radiological studies. Reviewed by me and discussed with radiologist.  See dictation for official radiology interpretation.      PROCEDURES    Procedures      MEDICATIONS GIVEN IN ER    Medications   sodium chloride 0.9 % flush 10 mL (has no administration in time range)   cefTRIAXone (ROCEPHIN) IVPB 1 g (has no administration in time range)   dilTIAZem (CARDIZEM) 125 mg in 125 mL 0.7% sodium chloride  infusion (5 mg/hr Intravenous New Bag 3/7/21 1548)   metoprolol tartrate (LOPRESSOR) injection 5 mg (5 mg Intravenous Given 3/7/21 1456)   metoprolol tartrate (LOPRESSOR) tablet 25 mg (25 mg Oral Given 3/7/21 1218)   magnesium sulfate 2g/50 mL (PREMIX) infusion (0 g Intravenous Stopped 3/7/21 1256)   furosemide (LASIX) injection 80 mg (80 mg Intravenous Given 3/7/21 1402)         PROGRESS, DATA ANALYSIS, CONSULTS, AND MEDICAL DECISION MAKING    All labs have been independently reviewed by me.  All radiology studies have been reviewed by me and discussed with radiologist dictating the report.   EKG's independently viewed and interpreted by me.  Discussion below represents my analysis of pertinent findings related to patient's condition, differential diagnosis, treatment plan and final disposition.    Differential diagnosis includes but not limited to CHF, acute coronary syndrome, pulmonary embolism, pneumothorax, pneumonia, asthma/COPD, pulmonary hypertension, deconditioning, anemia, other hematologic etiologies such as CO poisoning, anxiety, A. fib with RVR, COVID-19.         ED Course as of Mar 07 1631   Sun Mar 07, 2021   1200 Temp: 100 °F (37.8 °C) [RC]   1200 Heart Rate(!): 121 [RC]   1200 Resp(!): 30  [RC]   1200 SpO2: 94 % [RC]   1200 Device (Oxygen Therapy): room air [RC]   1208 On medical chart review, patient was discharged from the hospital on 10/23/2020.  He was admitted for TIA.  Has history of permanent atrial fibrillation and is on Pradaxa.    [TD]   1210 EKG interpreted by myself.  Time 11:44 AM.  Atrial fibrillation.  Heart rate 136.  Normal axis.  Normal intervals.  Low voltage in the limb leads.  Prolonged R wave progression.  No acute ST abnormality.    [TD]   1304 proBNP(!): 1,104.0 [TD]   1304 WBC(!): 15.64 [TD]   1550 pH, Arterial(!): 7.478 [TD]   1550 pCO2, Arterial(!): 30.0 [TD]   1550 O2 Saturation Calculated: 95.5 [TD]   1550 Patient has a respiratory alkalosis.    [TD]   1550 CT scan of the chest interpreted myself.  He has emphysematous changes.  No evidence of pneumonia.    [TD]      ED Course User Index  [RC] Eze Castro III, PA  [TD] Pedro Alarcon II, MD       I discussed the case with Dr. [facility].  We reviewed the patient's labs and imaging.  He will admit.    PPE: Both the patient and I wore a surgical mask throughout the entire patient encounter. I wore protective goggles.     AS OF 16:31 EST VITALS:    BP - (!) 162/101  HR - 115  TEMP - 100 °F (37.8 °C) (Tympanic)  O2 SATS - 94%        DIAGNOSIS  Final diagnoses:   Atrial fibrillation with RVR (CMS/HCC)   Acute respiratory alkalosis         DISPOSITION  Admit           Pedro Alarcon II, MD  03/07/21 1631

## 2021-03-07 NOTE — ED TRIAGE NOTES
"Pt has copd and has had increasing soa that got worse this am.  \"I can't catch my breath.\"  Pt c/o pain across his shoulders and right hip    Patient was placed in face mask during first look triage.  Patient was wearing a face mask throughout encounter.  I wore personal protective equipment throughout the encounter.  Hand hygiene was performed before and after patient encounter.     "

## 2021-03-07 NOTE — PLAN OF CARE
Goal Outcome Evaluation:  Plan of Care Reviewed With: patient     Pt admitted from ER breathing better after diuresis.  Cardizem running.  Call placed to Geoff for orders.  VSS  Will cont to monitor.

## 2021-03-08 ENCOUNTER — APPOINTMENT (OUTPATIENT)
Dept: NUCLEAR MEDICINE | Facility: HOSPITAL | Age: 76
End: 2021-03-08

## 2021-03-08 ENCOUNTER — DOCUMENTATION (OUTPATIENT)
Dept: CARDIOLOGY | Facility: CLINIC | Age: 76
End: 2021-03-08

## 2021-03-08 PROBLEM — J15.9 BACTERIAL PNEUMONIA: Status: ACTIVE | Noted: 2021-03-08

## 2021-03-08 PROBLEM — J96.01 ACUTE RESPIRATORY FAILURE WITH HYPOXIA (HCC): Status: ACTIVE | Noted: 2021-03-08

## 2021-03-08 PROBLEM — I50.23 ACUTE ON CHRONIC SYSTOLIC CHF (CONGESTIVE HEART FAILURE): Status: ACTIVE | Noted: 2021-03-08

## 2021-03-08 LAB
ALBUMIN SERPL-MCNC: 3.5 G/DL (ref 3.5–5.2)
ALBUMIN/GLOB SERPL: 1.3 G/DL
ALP SERPL-CCNC: 127 U/L (ref 39–117)
ALT SERPL W P-5'-P-CCNC: 20 U/L (ref 1–41)
ANION GAP SERPL CALCULATED.3IONS-SCNC: 12.6 MMOL/L (ref 5–15)
AST SERPL-CCNC: 24 U/L (ref 1–40)
BACTERIA UR QL AUTO: ABNORMAL /HPF
BASOPHILS # BLD AUTO: 0.05 10*3/MM3 (ref 0–0.2)
BASOPHILS NFR BLD AUTO: 0.2 % (ref 0–1.5)
BH CV REST NUCLEAR ISOTOPE DOSE: 10.8 MCI
BH CV STRESS COMMENTS STAGE 1: NORMAL
BH CV STRESS DOSE REGADENOSON STAGE 1: 0.4
BH CV STRESS DURATION MIN STAGE 1: 0
BH CV STRESS DURATION SEC STAGE 1: 10
BH CV STRESS NUCLEAR ISOTOPE DOSE: 33.9 MCI
BH CV STRESS PROTOCOL 1: NORMAL
BH CV STRESS RECOVERY BP: NORMAL MMHG
BH CV STRESS RECOVERY HR: 98 BPM
BH CV STRESS STAGE 1: 1
BILIRUB SERPL-MCNC: 2.6 MG/DL (ref 0–1.2)
BILIRUB UR QL STRIP: NEGATIVE
BUN SERPL-MCNC: 21 MG/DL (ref 8–23)
BUN/CREAT SERPL: 20.8 (ref 7–25)
CALCIUM SPEC-SCNC: 8.1 MG/DL (ref 8.6–10.5)
CHLORIDE SERPL-SCNC: 103 MMOL/L (ref 98–107)
CLARITY UR: ABNORMAL
CO2 SERPL-SCNC: 23.4 MMOL/L (ref 22–29)
COLOR UR: ABNORMAL
CREAT SERPL-MCNC: 1.01 MG/DL (ref 0.76–1.27)
DEPRECATED RDW RBC AUTO: 38.9 FL (ref 37–54)
EOSINOPHIL # BLD AUTO: 0 10*3/MM3 (ref 0–0.4)
EOSINOPHIL NFR BLD AUTO: 0 % (ref 0.3–6.2)
ERYTHROCYTE [DISTWIDTH] IN BLOOD BY AUTOMATED COUNT: 12.2 % (ref 12.3–15.4)
GFR SERPL CREATININE-BSD FRML MDRD: 72 ML/MIN/1.73
GLOBULIN UR ELPH-MCNC: 2.8 GM/DL
GLUCOSE BLDC GLUCOMTR-MCNC: 115 MG/DL (ref 70–130)
GLUCOSE BLDC GLUCOMTR-MCNC: 138 MG/DL (ref 70–130)
GLUCOSE BLDC GLUCOMTR-MCNC: 139 MG/DL (ref 70–130)
GLUCOSE BLDC GLUCOMTR-MCNC: 189 MG/DL (ref 70–130)
GLUCOSE SERPL-MCNC: 129 MG/DL (ref 65–99)
GLUCOSE UR STRIP-MCNC: NEGATIVE MG/DL
HBA1C MFR BLD: 6.2 % (ref 4.8–5.6)
HCT VFR BLD AUTO: 41.7 % (ref 37.5–51)
HGB BLD-MCNC: 14.5 G/DL (ref 13–17.7)
HGB UR QL STRIP.AUTO: ABNORMAL
HYALINE CASTS UR QL AUTO: ABNORMAL /LPF
IMM GRANULOCYTES # BLD AUTO: 0.2 10*3/MM3 (ref 0–0.05)
IMM GRANULOCYTES NFR BLD AUTO: 0.8 % (ref 0–0.5)
KETONES UR QL STRIP: NEGATIVE
LEUKOCYTE ESTERASE UR QL STRIP.AUTO: ABNORMAL
LV EF NUC BP: 60 %
LYMPHOCYTES # BLD AUTO: 1.64 10*3/MM3 (ref 0.7–3.1)
LYMPHOCYTES NFR BLD AUTO: 6.6 % (ref 19.6–45.3)
MAGNESIUM SERPL-MCNC: 2 MG/DL (ref 1.6–2.4)
MAXIMAL PREDICTED HEART RATE: 146 BPM
MCH RBC QN AUTO: 30.8 PG (ref 26.6–33)
MCHC RBC AUTO-ENTMCNC: 34.8 G/DL (ref 31.5–35.7)
MCV RBC AUTO: 88.5 FL (ref 79–97)
MONOCYTES # BLD AUTO: 2.08 10*3/MM3 (ref 0.1–0.9)
MONOCYTES NFR BLD AUTO: 8.4 % (ref 5–12)
NEUTROPHILS NFR BLD AUTO: 20.77 10*3/MM3 (ref 1.7–7)
NEUTROPHILS NFR BLD AUTO: 84 % (ref 42.7–76)
NITRITE UR QL STRIP: NEGATIVE
NRBC BLD AUTO-RTO: 0 /100 WBC (ref 0–0.2)
PERCENT MAX PREDICTED HR: 72.6 %
PH UR STRIP.AUTO: 6 [PH] (ref 5–8)
PLATELET # BLD AUTO: 205 10*3/MM3 (ref 140–450)
PMV BLD AUTO: 9.2 FL (ref 6–12)
POTASSIUM SERPL-SCNC: 3.2 MMOL/L (ref 3.5–5.2)
POTASSIUM SERPL-SCNC: 3.9 MMOL/L (ref 3.5–5.2)
PROCALCITONIN SERPL-MCNC: 0.78 NG/ML (ref 0–0.25)
PROT SERPL-MCNC: 6.3 G/DL (ref 6–8.5)
PROT UR QL STRIP: NEGATIVE
RBC # BLD AUTO: 4.71 10*6/MM3 (ref 4.14–5.8)
RBC # UR: ABNORMAL /HPF
REF LAB TEST METHOD: ABNORMAL
SODIUM SERPL-SCNC: 139 MMOL/L (ref 136–145)
SP GR UR STRIP: 1.02 (ref 1–1.03)
SQUAMOUS #/AREA URNS HPF: ABNORMAL /HPF
STRESS BASELINE BP: NORMAL MMHG
STRESS BASELINE HR: 89 BPM
STRESS PERCENT HR: 85 %
STRESS POST PEAK BP: NORMAL MMHG
STRESS POST PEAK HR: 106 BPM
STRESS TARGET HR: 124 BPM
TSH SERPL DL<=0.05 MIU/L-ACNC: 1.88 UIU/ML (ref 0.27–4.2)
UROBILINOGEN UR QL STRIP: ABNORMAL
WBC # BLD AUTO: 24.74 10*3/MM3 (ref 3.4–10.8)
WBC UR QL AUTO: ABNORMAL /HPF

## 2021-03-08 PROCEDURE — 99222 1ST HOSP IP/OBS MODERATE 55: CPT | Performed by: INTERNAL MEDICINE

## 2021-03-08 PROCEDURE — 84443 ASSAY THYROID STIM HORMONE: CPT | Performed by: INTERNAL MEDICINE

## 2021-03-08 PROCEDURE — 82962 GLUCOSE BLOOD TEST: CPT

## 2021-03-08 PROCEDURE — 85025 COMPLETE CBC W/AUTO DIFF WBC: CPT | Performed by: INTERNAL MEDICINE

## 2021-03-08 PROCEDURE — 25010000002 FUROSEMIDE PER 20 MG: Performed by: INTERNAL MEDICINE

## 2021-03-08 PROCEDURE — 25010000002 REGADENOSON 0.4 MG/5ML SOLUTION: Performed by: INTERNAL MEDICINE

## 2021-03-08 PROCEDURE — 78452 HT MUSCLE IMAGE SPECT MULT: CPT | Performed by: INTERNAL MEDICINE

## 2021-03-08 PROCEDURE — 25010000003 CEFTRIAXONE PER 250 MG: Performed by: INTERNAL MEDICINE

## 2021-03-08 PROCEDURE — 84145 PROCALCITONIN (PCT): CPT | Performed by: INTERNAL MEDICINE

## 2021-03-08 PROCEDURE — 0 TECHNETIUM SESTAMIBI: Performed by: INTERNAL MEDICINE

## 2021-03-08 PROCEDURE — 84132 ASSAY OF SERUM POTASSIUM: CPT | Performed by: INTERNAL MEDICINE

## 2021-03-08 PROCEDURE — 83735 ASSAY OF MAGNESIUM: CPT | Performed by: INTERNAL MEDICINE

## 2021-03-08 PROCEDURE — 81001 URINALYSIS AUTO W/SCOPE: CPT | Performed by: INTERNAL MEDICINE

## 2021-03-08 PROCEDURE — A9500 TC99M SESTAMIBI: HCPCS | Performed by: INTERNAL MEDICINE

## 2021-03-08 PROCEDURE — 80053 COMPREHEN METABOLIC PANEL: CPT | Performed by: INTERNAL MEDICINE

## 2021-03-08 PROCEDURE — 93016 CV STRESS TEST SUPVJ ONLY: CPT | Performed by: INTERNAL MEDICINE

## 2021-03-08 PROCEDURE — 93018 CV STRESS TEST I&R ONLY: CPT | Performed by: INTERNAL MEDICINE

## 2021-03-08 PROCEDURE — 83036 HEMOGLOBIN GLYCOSYLATED A1C: CPT | Performed by: INTERNAL MEDICINE

## 2021-03-08 PROCEDURE — 78452 HT MUSCLE IMAGE SPECT MULT: CPT

## 2021-03-08 PROCEDURE — 63710000001 INSULIN LISPRO (HUMAN) PER 5 UNITS: Performed by: INTERNAL MEDICINE

## 2021-03-08 PROCEDURE — 93017 CV STRESS TEST TRACING ONLY: CPT

## 2021-03-08 PROCEDURE — 87086 URINE CULTURE/COLONY COUNT: CPT | Performed by: INTERNAL MEDICINE

## 2021-03-08 RX ORDER — MAGNESIUM SULFATE HEPTAHYDRATE 40 MG/ML
2 INJECTION, SOLUTION INTRAVENOUS AS NEEDED
Status: DISCONTINUED | OUTPATIENT
Start: 2021-03-08 | End: 2021-03-10 | Stop reason: HOSPADM

## 2021-03-08 RX ORDER — POTASSIUM CHLORIDE 750 MG/1
40 TABLET, FILM COATED, EXTENDED RELEASE ORAL AS NEEDED
Status: DISCONTINUED | OUTPATIENT
Start: 2021-03-08 | End: 2021-03-10 | Stop reason: HOSPADM

## 2021-03-08 RX ORDER — METOPROLOL TARTRATE 50 MG/1
50 TABLET, FILM COATED ORAL EVERY 12 HOURS SCHEDULED
Status: DISCONTINUED | OUTPATIENT
Start: 2021-03-08 | End: 2021-03-09

## 2021-03-08 RX ORDER — POTASSIUM CHLORIDE 1.5 G/1.77G
40 POWDER, FOR SOLUTION ORAL AS NEEDED
Status: DISCONTINUED | OUTPATIENT
Start: 2021-03-08 | End: 2021-03-10 | Stop reason: HOSPADM

## 2021-03-08 RX ORDER — SPIRONOLACTONE 25 MG/1
25 TABLET ORAL DAILY
Status: DISCONTINUED | OUTPATIENT
Start: 2021-03-08 | End: 2021-03-08

## 2021-03-08 RX ORDER — FUROSEMIDE 10 MG/ML
20 INJECTION INTRAMUSCULAR; INTRAVENOUS ONCE
Status: COMPLETED | OUTPATIENT
Start: 2021-03-08 | End: 2021-03-08

## 2021-03-08 RX ORDER — CEFTRIAXONE SODIUM 2 G/50ML
2 INJECTION, SOLUTION INTRAVENOUS EVERY 24 HOURS
Status: DISCONTINUED | OUTPATIENT
Start: 2021-03-08 | End: 2021-03-10 | Stop reason: HOSPADM

## 2021-03-08 RX ORDER — SPIRONOLACTONE 25 MG/1
25 TABLET ORAL DAILY
Status: DISCONTINUED | OUTPATIENT
Start: 2021-03-09 | End: 2021-03-10 | Stop reason: HOSPADM

## 2021-03-08 RX ORDER — MAGNESIUM SULFATE HEPTAHYDRATE 40 MG/ML
4 INJECTION, SOLUTION INTRAVENOUS AS NEEDED
Status: DISCONTINUED | OUTPATIENT
Start: 2021-03-08 | End: 2021-03-10 | Stop reason: HOSPADM

## 2021-03-08 RX ADMIN — TECHNETIUM TC 99M SESTAMIBI 1 DOSE: 1 INJECTION INTRAVENOUS at 14:12

## 2021-03-08 RX ADMIN — INSULIN LISPRO 2 UNITS: 100 INJECTION, SOLUTION INTRAVENOUS; SUBCUTANEOUS at 14:46

## 2021-03-08 RX ADMIN — METOPROLOL TARTRATE 50 MG: 50 TABLET, FILM COATED ORAL at 20:45

## 2021-03-08 RX ADMIN — CEFTRIAXONE SODIUM 2 G: 2 INJECTION, SOLUTION INTRAVENOUS at 16:23

## 2021-03-08 RX ADMIN — BRIMONIDINE TARTRATE 1 DROP: 1 SOLUTION/ DROPS OPHTHALMIC at 20:42

## 2021-03-08 RX ADMIN — METFORMIN HYDROCHLORIDE 500 MG: 500 TABLET, EXTENDED RELEASE ORAL at 17:50

## 2021-03-08 RX ADMIN — REGADENOSON 0.4 MG: 0.08 INJECTION, SOLUTION INTRAVENOUS at 14:12

## 2021-03-08 RX ADMIN — TECHNETIUM TC 99M SESTAMIBI 1 DOSE: 1 INJECTION INTRAVENOUS at 12:05

## 2021-03-08 RX ADMIN — SODIUM CHLORIDE, PRESERVATIVE FREE 10 ML: 5 INJECTION INTRAVENOUS at 16:23

## 2021-03-08 RX ADMIN — FUROSEMIDE 20 MG: 10 INJECTION, SOLUTION INTRAMUSCULAR; INTRAVENOUS at 14:45

## 2021-03-08 RX ADMIN — SODIUM CHLORIDE, PRESERVATIVE FREE 10 ML: 5 INJECTION INTRAVENOUS at 08:42

## 2021-03-08 RX ADMIN — DABIGATRAN ETEXILATE MESYLATE 150 MG: 150 CAPSULE ORAL at 20:40

## 2021-03-08 RX ADMIN — BRIMONIDINE TARTRATE 1 DROP: 1 SOLUTION/ DROPS OPHTHALMIC at 08:43

## 2021-03-08 RX ADMIN — DABIGATRAN ETEXILATE MESYLATE 150 MG: 150 CAPSULE ORAL at 08:43

## 2021-03-08 RX ADMIN — PANTOPRAZOLE SODIUM 40 MG: 40 TABLET, DELAYED RELEASE ORAL at 08:43

## 2021-03-08 RX ADMIN — METOPROLOL TARTRATE 25 MG: 25 TABLET, FILM COATED ORAL at 10:57

## 2021-03-08 RX ADMIN — POTASSIUM CHLORIDE 40 MEQ: 750 TABLET, EXTENDED RELEASE ORAL at 18:27

## 2021-03-08 RX ADMIN — SODIUM CHLORIDE, PRESERVATIVE FREE 10 ML: 5 INJECTION INTRAVENOUS at 14:47

## 2021-03-08 RX ADMIN — METOPROLOL TARTRATE 25 MG: 25 TABLET, FILM COATED ORAL at 08:43

## 2021-03-08 RX ADMIN — TAMSULOSIN HYDROCHLORIDE 0.8 MG: 0.4 CAPSULE ORAL at 20:43

## 2021-03-08 RX ADMIN — SODIUM CHLORIDE, PRESERVATIVE FREE 10 ML: 5 INJECTION INTRAVENOUS at 20:42

## 2021-03-08 RX ADMIN — Medication 5 MG/HR: at 05:20

## 2021-03-08 RX ADMIN — ATORVASTATIN CALCIUM 80 MG: 80 TABLET, FILM COATED ORAL at 20:45

## 2021-03-08 RX ADMIN — POTASSIUM CHLORIDE 40 MEQ: 750 TABLET, EXTENDED RELEASE ORAL at 14:46

## 2021-03-08 RX ADMIN — CLOPIDOGREL 75 MG: 75 TABLET, FILM COATED ORAL at 08:45

## 2021-03-08 NOTE — PLAN OF CARE
"Goal Outcome Evaluation:  Plan of Care Reviewed With: patient  Progress: improving  Outcome Summary: Medicated for right hip pain, with relief. A&Ox4. Up with assist to BSC, BM this shift. Urinal within reach, monitoring output. Self-turning in bed. Cardizem drip continued at 5mg/h, HR stable and BP stable. 3.5L O2 at this time, pt states that his breathing \"is better\" compared to when he was admitted. VSS. No s/s of distress at this time. Will continue to monitor.  "

## 2021-03-08 NOTE — CONSULTS
Patient Name: Aj Salazar  :1945  75 y.o.    Date of Admission: 3/7/2021  Encounter Provider: Ade Bryan MD  Date of Encounter Visit: 21  Place of Service: [facility] CARDIOLOGY  Referring Provider: Bill Tellez MD  Patient Care Team:  eNftali Amezcua MD as PCP - General (Internal Medicine)  Juan Colby MD as Consulting Physician (Cardiology)  Ankush Sky MD as Surgeon (Orthopedic Surgery)  Vale Del Rosario APRN as Nurse Practitioner (Oncology)  Darrion Peña MD as Consulting Physician (Urology)  Tahir Childs MD as Consulting Physician (Pulmonary Disease)  Noman Moseley MD as Consulting Physician (Gastroenterology)  Aura Perry OD (Optometry)  Juwan Diane MD as Consulting Physician (Ophthalmology)      Chief complaint: SOA      History of Present Illness:    This is a patient of Dr. Colby.  He came in with progressive shortness of breath.  He has a known cardiomyopathy with an ejection fraction of approximately 44%.  See echo findings below.  He has not had a recent ischemic evaluation.  BNP in the emergency room yesterday of 1104.  Troponin negative.    He reports feeling well this morning.  He denies chest pain.  His breathing is better.  He is not having any palpitations.    Previous Cardiac Testing:     Echocardiogram 10/23/2020:  Interpretation Summary    · Calculated left ventricular EF = 44.3% Estimated left ventricular EF was in agreement with the calculated left ventricular EF.  · Saline study was poorly visualized. No obvious bubbles crossed.  · The aortic valve exhibits sclerosis. The aortic valve appears trileaflet.  · There is mild, bileaflet mitral valve thickening present.  · Trace aortic valve regurgitation is present  · Mild mitral valve regurgitation is present.  · Mild dilation of the aortic root is present.     Stress Test 2016:  Interpretation Summary    · Myocardial perfusion imaging  indicates a normal myocardial perfusion study with no evidence of ischemia.  · Left ventricular ejection fraction is normal (Calculated EF = 60%).  · Impressions are consistent with a low risk study.  · Diaphragmatic attenuation artifact is present.         Past Medical History:   Diagnosis Date   • Alcohol abuse, in remission    • BPH (benign prostatic hypertrophy)     see doctors at [facility]   • COPD (chronic obstructive pulmonary disease) (CMS/HCC)    • Depression    • DJD (degenerative joint disease) of cervical spine    • ED (erectile dysfunction)     [facility]   • Hx of colonic polyps     followed by GI ([facility])   • Hyperlipidemia    • Hypertension    • Influenza B 02/14/2013       • Nocturnal hypoxia    • Osteoarthritis     HIPS, HANDS, MULTIPLE SITES   • Peripheral neuropathy    • Permanent atrial fibrillation (CMS/HCC)    • Rectal bleeding 04/26/2017   • Tendonitis of shoulder 11/07/2007    RIGHT SHOULDER/DELTOID INSERTION   • TIA (transient ischemic attack) 10/2020   • Ulcer of the stomach and intestine        Past Surgical History:   Procedure Laterality Date   • APPENDECTOMY     • CARDIOVASCULAR STRESS TEST N/A 10/20/2015    NML LEXISCAN CARDIOLITE PERFUSION STUDY, NO EVIDENCE OF ISCHEMIA, AREA OF HYPOPERFUSION OF THE INFERIOR WALL W/NORMAL WALL PERFUSION AND NML LEFT VENTRICULAR EJECTION FRACTION, MOST LIKELY ATTENUATION. DR.MICHAEL BOX   • COLONOSCOPY N/A 02/2017   • COLONOSCOPY N/A 02/23/2011    4 POLYPS REMOVED, RESCOPE IN 5 YRS, DR. EAGLE   • COLONOSCOPY N/A 03/08/2006    ERYTHEMOUS AND GRANULAR MUCOSA IN ILEOCECAL VALVE, NORMAL COLON, REPEAT IN 5 YRS,    • ENDOSCOPY N/A 9/26/2018    normal esophagus, acute gastritis, multiple non-bleeding duodenal ulcers with pigmented material, H Pylori positive   • HERNIA REPAIR Bilateral     INGUINAL   • JOINT REPLACEMENT  11/2015    left hip   • TOTAL HIP ARTHROPLASTY Left 11/06/2015    Dr Ankush Sky   • TOTAL HIP  ARTHROPLASTY Right 10/27/2014    DR.RIED GRAY   • VASECTOMY           Prior to Admission medications    Medication Sig Start Date End Date Taking? Authorizing Provider   acetaminophen (TYLENOL) 325 MG tablet Take 2 tablets by mouth Every 4 (Four) Hours As Needed for Mild Pain . 10/23/20  Yes Joe Blake MD   ALPHAGAN P 0.1 % solution ophthalmic solution Administer 1 drop to both eyes 2 (two) times a day. 7/5/18  Yes Pietro Parham MD   amLODIPine (NORVASC) 5 MG tablet Take 1 tablet by mouth Daily. 11/2/20  Yes Neftali Amezcua MD   atorvastatin (LIPITOR) 80 MG tablet Take 1 tablet by mouth Every Night. 11/16/20  Yes Neftali Amezcua MD   clopidogrel (Plavix) 75 MG tablet Take 1 tablet by mouth Daily. 11/16/20  Yes Neftali Amezcua MD   dabigatran etexilate (Pradaxa) 150 MG capsu Take 1 capsule by mouth 2 (Two) Times a Day. 10/29/20  Yes Juan Colby MD   metFORMIN ER (GLUCOPHAGE-XR) 500 MG 24 hr tablet TAKE 1 TABLET BY MOUTH DAILY WITH DINNER 7/6/20  Yes Neftali Amezcua MD   metoprolol tartrate (LOPRESSOR) 25 MG tablet Take 1 tablet in AM and 2 tablets in PM 3/2/21  Yes Neftali Amezcua MD   pantoprazole (PROTONIX) 40 MG EC tablet TAKE 1 TABLET BY MOUTH DAILY 12/4/20  Yes Neftali Amezcua MD   Tafluprost, PF, (ZIOPTAN) 0.0015 % solution ophthalmic solution Administer 1 drop to both eyes Every Night.   Yes Pietro Parham MD   tamsulosin (FLOMAX) 0.4 MG capsule 24 hr capsule Take 2 capsules by mouth Every Night. 7/31/18  Yes Neftali Amezcua MD   TRELEDAVID ELLIPTA 100-62.5-25 MCG/INH aerosol powder  Inhale 1 puff Daily. 10/29/18  Yes Pietro Parham MD       Allergies   Allergen Reactions   • Bactrim [Sulfamethoxazole-Trimethoprim] Rash       Social History     Socioeconomic History   • Marital status:      Spouse name: Ara*   • Number of children: 3   • Years of education: Not on file   • Highest education level: Not on file   Tobacco Use   • Smoking status: Former Smoker     Packs/day: 2.00     Years:  49.00     Pack years: 98.00     Quit date: 2010     Years since quittin.1   • Smokeless tobacco: Never Used   • Tobacco comment: Began Smoking age 15.  Smoked 2 ppd for 49 years until he quit smoking 7 years ago for a 98 pack year history.   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   • Drug use: No     Comment: caffeine use    • Sexual activity: Defer       Family History   Problem Relation Age of Onset   • Colon cancer Mother    • Ovarian cancer Mother    • Alzheimer's disease Mother 70   • Stroke Sister         2016   • Dementia Sister    • Heart disease Brother    • Alcohol abuse Brother    • Coronary artery disease Father    • Hypertension Father    • Colon cancer Maternal Grandmother    • Heart disease Brother    • Coronary artery disease Brother    • Hypertension Brother    • Stroke Brother    • Arthritis Brother    • Prostate cancer Neg Hx        REVIEW OF SYSTEMS:   All other systems reviewed and negative.        Objective:     Vitals:    21 0258 21 0513 21 0700 21 0731   BP: 117/79 138/70 (!) 85/70 112/72   BP Location: Right arm Right arm Left arm Left arm   Patient Position: Lying Lying Lying Lying   Pulse: 82 86  93   Resp: 20  16    Temp: 98.1 °F (36.7 °C)  98.5 °F (36.9 °C)    TempSrc: Oral  Oral    SpO2: 97% 100%  97%   Weight:  83.7 kg (184 lb 8 oz)     Height:         Body mass index is 27.25 kg/m².    Intake/Output Summary (Last 24 hours) at 3/8/2021 0840  Last data filed at 3/8/2021 0519  Gross per 24 hour   Intake 100 ml   Output 2525 ml   Net -2425 ml     Constitutional: He is oriented to person, place, and time. He appears well-developed. He does not appear ill.   HENT:   Head: Normocephalic and atraumatic. Head is without contusion.   Right Ear: Hearing normal. No drainage.   Left Ear: Hearing normal. No drainage.   Nose: No nasal deformity. No epistaxis.   Eyes: Lids are normal.  Right eye exhibits no exudate. Left eye exhibits no exudate.  Neck: No JVD present. Carotid bruit is not present. No tracheal deviation present. No thyroid mass and no thyromegaly present.   Cardiovascular irregularly irregular.  Pulmonary/Chest: Effort normal and breath sounds normal.   Abdominal: Soft. Normal appearance and bowel sounds are normal. There is no tenderness.   Musculoskeletal: Normal range of motion.        Right shoulder: He exhibits no deformity.        Left shoulder: He exhibits no deformity.   Neurological: He is alert and oriented to person, place, and time. He has normal strength.   Skin: Skin is warm, dry and intact. No rash noted.   Psychiatric: He has a normal mood and affect. His behavior is normal. Thought content normal.   Vitals reviewed        Lab Review:     Results from last 7 days   Lab Units 03/08/21  0544   SODIUM mmol/L 139   POTASSIUM mmol/L 3.2*   CHLORIDE mmol/L 103   CO2 mmol/L 23.4   BUN mg/dL 21   CREATININE mg/dL 1.01   CALCIUM mg/dL 8.1*   BILIRUBIN mg/dL 2.6*   ALK PHOS U/L 127*   ALT (SGPT) U/L 20   AST (SGOT) U/L 24   GLUCOSE mg/dL 129*     Results from last 7 days   Lab Units 03/07/21  1153   TROPONIN T ng/mL <0.010     Results from last 7 days   Lab Units 03/08/21  0544   WBC 10*3/mm3 24.74*   HEMOGLOBIN g/dL 14.5   HEMATOCRIT % 41.7   PLATELETS 10*3/mm3 205                  Admission EKG 03/07/2021:      Baseline EKG 10/29/2020:    I personally viewed and interpreted the patient's EKG/Telemetry data.        Assessment and Plan:       1.  Acute on chronic systolic congestive heart failure.  No need to repeat an echo as he just had one done in October.  Ejection fraction known to be diminished at approximately 44%.  No recent ischemic evaluation.  I recommend a Lexiscan nuclear stress test today.  He had an episode of hypotension.  Antihypertensive medications held.  He had good diuresis after 80 of IV Lasix yesterday.  Weight is down several pounds.  2.  Atrial  fibrillation with controlled ventricular response.  On Pradaxa.  Continue.  3.  Hypokalemia  4.  Diabetes.  On oral medications.    -Discontinue IV diltiazem and transition to oral metoprolol.    Ade Bryan MD  03/08/21  08:40 EST

## 2021-03-08 NOTE — PROGRESS NOTES
Discharge Planning Assessment  [facility]     Patient Name: Aj Salazar  MRN: [id number]  Today's Date: 3/8/2021    Admit Date: 3/7/2021    Discharge Needs Assessment     Row Name 03/08/21 1033       Living Environment    Lives With  spouse    Name(s) of Who Lives With Patient  Ara Salazar    Current Living Arrangements  home/apartment/condo    Potentially Unsafe Housing Conditions  unable to assess    Primary Care Provided by  self    Provides Primary Care For  no one    Able to Return to Prior Arrangements  yes       Resource/Environmental Concerns    Resource/Environmental Concerns  none    Transportation Concerns  car, none       Transition Planning    Patient/Family Anticipates Transition to  home with family    Patient/Family Anticipated Services at Transition  none    Transportation Anticipated  family or friend will provide       Discharge Needs Assessment    Equipment Currently Used at Home  cpap    Concerns to be Addressed  no discharge needs identified    Anticipated Changes Related to Illness  none    Equipment Needed After Discharge  none    Provided Post Acute Provider List?  N/A    Provided Post Acute Provider Quality & Resource List?  N/A        Discharge Plan     Row Name 03/08/21 1036       Plan    Plan  Discharge home with spouse.    Provided Post Acute Provider List?  N/A    Provided Post Acute Provider Quality & Resource List?  N/A    Patient/Family in Agreement with Plan  unable to assess    Plan Comments  Spoke with patient at the bedside, identified self & role, verified address, PCP (Dr. Neftali Amezcua) & pharmacy ([facility] on [address] [facility]) denies issues obtaining or paying for prescriptions. Patient lives with his spouse in a two-story home however the patient & spouse live exclusively on the first floor. The home has 4 steps to enter/exit the home, patient denies issues ambulating within the home or entering/exiting the home. Patient is independent in all areas of living.  [facility] use in the [facility]. Patient admits to previous use of home health after hip replacement surgery however could not recall the name of the agency. Patient denies past use of SNF. Patient does not have preference of home health agency, SNF or oxygen supply company. Spouse can provide transportation at discharge. CCP to follow. Arianna Herrera RN.        Continued Care and Services - Admitted Since 3/7/2021    Coordination has not been started for this encounter.         Demographic Summary     Row Name 03/08/21 1032       General Information    Admission Type  inpatient    Arrived From  emergency department    Referral Source  admission list    Reason for Consult  discharge planning    Preferred Language  English     Used During This Interaction  no        Functional Status     Row Name 03/08/21 1032       Functional Status    Usual Activity Tolerance  good    Current Activity Tolerance  good       Functional Status, IADL    Medications  independent    Meal Preparation  independent    Housekeeping  independent    Laundry  independent    Shopping  independent       Employment/    Employment Status  retired;, previous service        Psychosocial    No documentation.       Abuse/Neglect    No documentation.       Legal    No documentation.       Substance Abuse    No documentation.       Patient Forms    No documentation.           Shannan Herrera RN

## 2021-03-08 NOTE — NURSING NOTE
Spoke with Dr. Tellez at bedside. Dr. Tellez is aware of pt's breathing, stating that he does not hear any crackles or signs of CHF in his lungs. Dr. Tellez stated to monitor pt's HR and if pt's HR drops below 100 after administering pt's metoprolol then to follow instructions for the Cardizem drip. Dr. Tellez stated to consult pt's Cardiologist, Dr. Colby, and to receive a call back to let him know what is going on. RN called Dr. Colby's office 3 times ([phone], [phone], [phone]) and left a voicemail stating that there was a new consult and was needing a call back. Will continue to monitor.

## 2021-03-08 NOTE — PROGRESS NOTES
[facility] Medicine Services  PROGRESS NOTE    Patient Name: Aj Salazar  : 1945  MRN: [id number]    Date of Admission: 3/7/2021  Primary Care Physician: Neftali Amezcua MD    Subjective   Subjective     CC:  Follow-up shortness of breath and cardiac issues    HPI:  Patient says his breathing is drastically better since his admission.  Notes good urine output.  Denies any lightheadedness.  Notes he does not typically follow a low-sodium diet.    Review of Systems  No current fevers or chills  No current headache or lightheadedness  No current nausea, vomiting, or diarrhea  No current chest pain or palpitations    Objective   Objective     Vital Signs:   Temp:  [97.8 °F (36.6 °C)-99.2 °F (37.3 °C)] 97.8 °F (36.6 °C)  Heart Rate:  [] 87  Resp:  [16-20] 16  BP: ()/() 132/86        Physical Exam:  Constitutional:Awake, alert  HENT: NCAT, mucous membranes moist, neck supple  Respiratory: Rales at the bases bilaterally, currently on supplemental oxygen, borderline tachypnea, breathing slightly labored  Cardiovascular: Irregular but not currently tachycardic, normal radial pulses  Gastrointestinal: Positive bowel sounds, soft, nontender, nondistended  Musculoskeletal: Somewhat debilitated appearance, mild bilateral lower extremity edema, BMI is 27  Psychiatric: Appropriate affect, cooperative, conversational  Neurologic: No slurred speech or facial droop, follows commands  Skin: No rashes or jaundice, warm      Results Reviewed:  Results from last 7 days   Lab Units 21  0544 21  1153   WBC 10*3/mm3 24.74* 15.64*   HEMOGLOBIN g/dL 14.5 15.4   HEMATOCRIT % 41.7 44.4   PLATELETS 10*3/mm3 205 255   PROCALCITONIN ng/mL 0.78*  --      Results from last 7 days   Lab Units 21  0544 21  1153   SODIUM mmol/L 139 142   POTASSIUM mmol/L 3.2* 4.4   CHLORIDE mmol/L 103 106   CO2 mmol/L 23.4 25.4   BUN mg/dL 21 13   CREATININE mg/dL 1.01 0.89   GLUCOSE mg/dL 129* 125*   CALCIUM  mg/dL 8.1* 8.9   ALT (SGPT) U/L 20 22   AST (SGOT) U/L 24 24   TROPONIN T ng/mL  --  <0.010   PROBNP pg/mL  --  1,104.0*     Estimated Creatinine Clearance: 76 mL/min (by C-G formula based on SCr of 1.01 mg/dL).    Microbiology Results Abnormal     Procedure Component Value - Date/Time    Respiratory Panel PCR w/COVID-19(SARS-CoV-2) LAURO/BON/MAN/PAD/COR/MAD/DARLYN In-House, NP Swab in UTM/VTM, 3-4 HR TAT - Swab, Nasopharynx [[id number]]  (Normal) Collected: 03/07/21 1358    Lab Status: Final result Specimen: Swab from Nasopharynx Updated: 03/07/21 1838     ADENOVIRUS, PCR Not Detected     Coronavirus 229E Not Detected     Coronavirus HKU1 Not Detected     Coronavirus NL63 Not Detected     Coronavirus OC43 Not Detected     COVID19 Not Detected     Human Metapneumovirus Not Detected     Human Rhinovirus/Enterovirus Not Detected     Influenza A PCR Not Detected     Influenza B PCR Not Detected     Parainfluenza Virus 1 Not Detected     Parainfluenza Virus 2 Not Detected     Parainfluenza Virus 3 Not Detected     Parainfluenza Virus 4 Not Detected     RSV, PCR Not Detected     Bordetella pertussis pcr Not Detected     Bordetella parapertussis PCR Not Detected     Chlamydophila pneumoniae PCR Not Detected     Mycoplasma pneumo by PCR Not Detected    Narrative:      Fact sheet for providers: https://docs.[employer]/wp-content/uploads/IZH2931-9877-HL3.1-EUA-Provider-Fact-Sheet-3.pdf    Fact sheet for patients: https://docs.[employer]/wp-content/uploads/UZI0467-7589-DL9.1-EUA-Patient-Fact-Sheet-1.pdf    Test performed by PCR.          Imaging Results (Last 24 Hours)     ** No results found for the last 24 hours. **          Results for orders placed during the hospital encounter of 10/22/20    Adult Transthoracic Echo Complete W/ Cont if Necessary Per Protocol (With Agitated Saline)    Interpretation Summary  · Calculated left ventricular EF = 44.3% Estimated left ventricular EF was in agreement with the calculated left  ventricular EF.  · Saline study was poorly visualized. No obvious bubbles crossed.  · The aortic valve exhibits sclerosis. The aortic valve appears trileaflet.  · There is mild, bileaflet mitral valve thickening present.  · Trace aortic valve regurgitation is present  · Mild mitral valve regurgitation is present.  · Mild dilation of the aortic root is present.      I have reviewed the medications:  Scheduled Meds:atorvastatin, 80 mg, Oral, Nightly  brimonidine, 1 drop, Both Eyes, BID  cefTRIAXone, 2 g, Intravenous, Q24H  clopidogrel, 75 mg, Oral, Daily  dabigatran etexilate, 150 mg, Oral, BID  insulin lispro, 0-9 Units, Subcutaneous, TID AC  latanoprost, 1 drop, Both Eyes, Nightly  metFORMIN ER, 500 mg, Oral, Daily With Dinner  metoprolol tartrate, 50 mg, Oral, Q12H  pantoprazole, 40 mg, Oral, Daily  sodium chloride, 10 mL, Intravenous, Q12H  [START ON 3/9/2021] spironolactone, 25 mg, Oral, Daily  tamsulosin, 0.8 mg, Oral, Nightly      Continuous Infusions:   PRN Meds:.•  acetaminophen **OR** acetaminophen **OR** acetaminophen  •  dextrose  •  dextrose  •  glucagon (human recombinant)  •  magnesium sulfate **OR** magnesium sulfate **OR** magnesium sulfate  •  nitroglycerin  •  ondansetron  •  potassium chloride  •  potassium chloride  •  [COMPLETED] Insert peripheral IV **AND** sodium chloride  •  sodium chloride    Assessment/Plan   Assessment & Plan     Active Hospital Problems    Diagnosis  POA   • **Atrial fibrillation with RVR (CMS/[facility], located within [facility]) [I48.91]  Yes   • Acute respiratory failure with hypoxia (CMS/[facility], located within [facility]) [J96.01]  Yes   • Acute on chronic systolic CHF (congestive heart failure) (CMS/[facility], located within [facility]) [I50.23]  Yes   • Bacterial pneumonia [J15.9]  Yes   • COPD (chronic obstructive pulmonary disease) (CMS/[facility], located within [facility]) [J44.9]  Yes   • Type 2 diabetes mellitus with hyperglycemia, without long-term current use of insulin (CMS/[facility], located [facility]) [E11.65]  Yes   • History of smoking greater than 50 pack years [Z87.891]  Not Applicable      Resolved  Hospital Problems   No resolved problems to display.        Brief Hospital Course to date:  Aj Salazar is a 75 y.o. male who presents the hospital with atrial fibrillation with RVR and acute on chronic systolic heart failure.    Discussion/plan for today:  Hold amlodipine for borderline low blood pressure earlier.  Subsequently has normalized.  One-time low-dose Lasix today.  Start Aldactone tomorrow morning to augment diuresis if cardiology in agreement.  Check procalcitonin and consider continuing antibiotics.  Check urinalysis.  Monitor glucose.  Repeat laboratory studies tomorrow.    Atrial fibrillation:  Initially with RVR.  Rate control with beta-blocker.  Anticoagulation with Pradaxa.  Telemetry monitor.  Correct electrolytes as needed.    Acute on chronic systolic congestive heart failure:  Stress test per cardiology.  Responded well to 80 mg IV Lasix initially.  Amlodipine held during diuresis.  Likely need to discontinue amlodipine long-term as he would likely be better served with Aldactone for heart failure management.  Likely needs systolic heart failure beta-blocker at discharge.  Concern for medication and dietary noncompliance.  Consult nutrition to educate low-sodium diet.    Hypokalemia: Replete per protocol.  Monitor.    Diabetes:  Monitor glucose.  Oral medicines continued at admission.  Adjust insulin as needed daily.    Leukocytosis:  Worsened leukocytosis today.  Blood cultures pending.  Check urinalysis.  CT images reviewed and groundglass opacity noted.  Check procalcitonin and if elevated can treat for possible pneumonia.  Monitor for hemodynamic stability.  Addendum: Procalcitonin returned elevated.  I am now concerned of pneumonia and in the setting of infiltrates cannot rule it out.  Plan to continue to treat with ceftriaxone for bacterial pneumonia that was started yesterday in the ER.    DVT Prophylaxis: Anticoagulated    CODE STATUS:   Code Status and Medical Interventions:    Ordered at: 03/07/21 1940     Code Status:    CPR     Medical Interventions (Level of Support Prior to Arrest):    Full       Yeyo Valladares MD  03/08/21

## 2021-03-08 NOTE — PLAN OF CARE
Goal Outcome Evaluation:         Patient alert and oriented, denies pain with SOA with activity. Patient up to the bathroom with stand by assistance. Patient on 3 L/NC and AFib on monitor. Stress test completed today. Memo valerio dc'd per Dr Bryan. Wife at bedside. NO acute distress noted, will continue to monitor.

## 2021-03-08 NOTE — H&P
Internal medicine history and physical  INTERNAL MEDICINE   [facility]       Patient Identification:  Name: Aj Salazar  Age: 75 y.o.  Sex: male  :  1945  MRN: [id number]                   Primary Care Physician: Neftali Amezcua MD                               Date of admission:3/7/2021    Chief Complaint: Progressive shortness of breath for the last couple hours worse over the course of last couple weeks.    History of Present Illness:   Patient is a 75-year-old male who has complicated past medical history as noted below was in his usual state of his health until 2 weeks ago having difficulty carrying out physical activities including walking.  He recalls seeing his primary care physician and cardiologist while having the symptoms but did not bring it up to their attention.  Patient has been declining consistently and not taking his medications properly.      Past Medical History:  Past Medical History:   Diagnosis Date   • Alcohol abuse, in remission    • BPH (benign prostatic hypertrophy)     see doctors at [facility]   • COPD (chronic obstructive pulmonary disease) (CMS/HCC)    • Depression    • DJD (degenerative joint disease) of cervical spine    • ED (erectile dysfunction)     [facility]   • Hx of colonic polyps     followed by GI (Kyle)   • Hyperlipidemia    • Hypertension    • Influenza B 2013       • Nocturnal hypoxia    • Osteoarthritis     HIPS, HANDS, MULTIPLE SITES   • Peripheral neuropathy    • Permanent atrial fibrillation (CMS/HCC)    • Rectal bleeding 2017   • Tendonitis of shoulder 2007    RIGHT SHOULDER/DELTOID INSERTION   • TIA (transient ischemic attack) 10/2020   • Ulcer of the stomach and intestine      Past Surgical History:  Past Surgical History:   Procedure Laterality Date   • APPENDECTOMY     • CARDIOVASCULAR STRESS TEST N/A 10/20/2015    NML LEXISCAN CARDIOLITE PERFUSION STUDY, NO EVIDENCE OF ISCHEMIA, AREA OF  HYPOPERFUSION OF THE INFERIOR WALL W/NORMAL WALL PERFUSION AND NML LEFT VENTRICULAR EJECTION FRACTION, MOST LIKELY ATTENUATION. DR.MICHAEL BOX   • COLONOSCOPY N/A 02/2017   • COLONOSCOPY N/A 02/23/2011    4 POLYPS REMOVED, RESCOPE IN 5 YRS, DR. EAGLE   • COLONOSCOPY N/A 03/08/2006    ERYTHEMOUS AND GRANULAR MUCOSA IN ILEOCECAL VALVE, NORMAL COLON, REPEAT IN 5 YRS,    • ENDOSCOPY N/A 9/26/2018    normal esophagus, acute gastritis, multiple non-bleeding duodenal ulcers with pigmented material, H Pylori positive   • HERNIA REPAIR Bilateral     INGUINAL   • JOINT REPLACEMENT  11/2015    left hip   • TOTAL HIP ARTHROPLASTY Left 11/06/2015    Dr Ankush Sky   • TOTAL HIP ARTHROPLASTY Right 10/27/2014    DR.RIED SKY   • VASECTOMY        Home Meds:  Medications Prior to Admission   Medication Sig Dispense Refill Last Dose   • acetaminophen (TYLENOL) 325 MG tablet Take 2 tablets by mouth Every 4 (Four) Hours As Needed for Mild Pain .   Past Month at Unknown time   • ALPHAGAN P 0.1 % solution ophthalmic solution Administer 1 drop to both eyes 2 (two) times a day.  6 3/7/2021 at Unknown time   • amLODIPine (NORVASC) 5 MG tablet Take 1 tablet by mouth Daily. 30 tablet 5 3/7/2021 at Unknown time   • atorvastatin (LIPITOR) 80 MG tablet Take 1 tablet by mouth Every Night. 30 tablet 11 3/7/2021 at Unknown time   • clopidogrel (Plavix) 75 MG tablet Take 1 tablet by mouth Daily. 30 tablet 11 3/7/2021 at Unknown time   • dabigatran etexilate (Pradaxa) 150 MG capsu Take 1 capsule by mouth 2 (Two) Times a Day. 60 capsule 6 3/7/2021 at Unknown time   • metFORMIN ER (GLUCOPHAGE-XR) 500 MG 24 hr tablet TAKE 1 TABLET BY MOUTH DAILY WITH DINNER 30 tablet 11 3/7/2021 at Unknown time   • metoprolol tartrate (LOPRESSOR) 25 MG tablet Take 1 tablet in AM and 2 tablets in PM   3/7/2021 at Unknown time   • pantoprazole (PROTONIX) 40 MG EC tablet TAKE 1 TABLET BY MOUTH DAILY 90 tablet 3 3/7/2021 at Unknown time   • Tafluprost,  PF, (ZIOPTAN) 0.0015 % solution ophthalmic solution Administer 1 drop to both eyes Every Night.   3/7/2021 at Unknown time   • tamsulosin (FLOMAX) 0.4 MG capsule 24 hr capsule Take 2 capsules by mouth Every Night. 30 capsule  3/7/2021 at Unknown time   • TRELEGY ELLIPTA 100-62.5-25 MCG/INH aerosol powder  Inhale 1 puff Daily.  3 3/7/2021 at Unknown time     Current Meds:     Current Facility-Administered Medications:   •  dilTIAZem (CARDIZEM) 125 mg in 125 mL 0.7% sodium chloride  infusion, 5-15 mg/hr, Intravenous, Titrated, Pedro Alarcon II, MD, Last Rate: 10 mL/hr at 21, 10 mg/hr at 21  •  [COMPLETED] Insert peripheral IV, , , Once **AND** sodium chloride 0.9 % flush 10 mL, 10 mL, Intravenous, PRN, Pedro Alarcon II, MD  Allergies:  Allergies   Allergen Reactions   • Bactrim [Sulfamethoxazole-Trimethoprim] Rash     Social History:   Social History     Tobacco Use   • Smoking status: Former Smoker     Packs/day: 2.00     Years: 49.00     Pack years: 98.00     Quit date: 2010     Years since quittin.1   • Smokeless tobacco: Never Used   • Tobacco comment: Began Smoking age 15.  Smoked 2 ppd for 49 years until he quit smoking 7 years ago for a 98 pack year history.   Substance Use Topics   • Alcohol use: No     Comment: 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; 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      Family History:  Family History   Problem Relation Age of Onset   • Colon cancer Mother    • Ovarian cancer Mother    • Alzheimer's disease Mother 70   • Stroke Sister         2016   • Dementia Sister    • Heart disease Brother    • Alcohol abuse Brother    • Coronary artery disease Father    • Hypertension Father    • Colon cancer Maternal Grandmother    • Heart disease Brother    • Coronary artery disease Brother    • Hypertension Brother    • Stroke Brother    • Arthritis Brother    • Prostate cancer Neg Hx           Review of Systems  See history of present illness and past medical  "history.   Constitutional: Remarkable for no fever or chills  Cardiovascular: Remarkable for no chest pain progressive shortness of breath  GI: Remarkable for preserved appetite no nausea or vomiting  : Remarkable for decreased urinary output    Vitals:   /87 (BP Location: Right arm, Patient Position: Lying)   Pulse 103   Temp 99.2 °F (37.3 °C) (Oral)   Resp 19   Ht 175.3 cm (69\")   Wt 84.9 kg (187 lb 3.2 oz)   SpO2 98%   BMI 27.64 kg/m²   I/O:     Intake/Output Summary (Last 24 hours) at 3/7/2021 1935  Last data filed at 3/7/2021 1828  Gross per 24 hour   Intake 100 ml   Output 2200 ml   Net -2100 ml     Exam:  Patient is examined using the personal protective equipment as per guidelines from infection control for this particular patient as enacted.  Hand washing was performed before and after patient interaction.  General Appearance:    Alert, cooperative, no distress, appears stated age   Head:    Normocephalic, without obvious abnormality, atraumatic   Eyes:    PERRL, conjunctiva/corneas clear, EOM's intact, both eyes   Ears:    Normal external ear canals, both ears   Nose:   Nares normal, septum midline, mucosa normal, no drainage    or sinus tenderness   Throat:   Lips, tongue, gums normal; oral mucosa pink and moist   Neck:   Supple, symmetrical, trachea midline, no adenopathy;     thyroid:  no enlargement/tenderness/nodules; no carotid    bruit or JVD   Back:     Symmetric, no curvature, ROM normal, no CVA tenderness   Lungs:     Clear to auscultation bilaterally, respirations unlabored   Chest Wall:    No tenderness or deformity    Heart:    Regular rate and rhythm, S1 and S2 normal, no murmur, rub   or gallop   Abdomen:     Soft, non-tender, bowel sounds active all four quadrants,     no masses, no hepatomegaly, no splenomegaly   Extremities:   Extremities normal, atraumatic, no cyanosis or edema   Pulses:   Pulses palpable in all extremities; symmetric all extremities   Skin:   Skin color " normal, Skin is warm and dry,  no rashes or palpable lesions   Neurologic:   CNII-XII intact, motor strength grossly intact, sensation grossly intact to light touch, no focal deficits noted       Data Review:      I reviewed the patient's new clinical results.  Results from last 7 days   Lab Units 03/07/21  1153   WBC 10*3/mm3 15.64*   HEMOGLOBIN g/dL 15.4   PLATELETS 10*3/mm3 255     Results from last 7 days   Lab Units 03/07/21  1153   SODIUM mmol/L 142   POTASSIUM mmol/L 4.4   CHLORIDE mmol/L 106   CO2 mmol/L 25.4   BUN mg/dL 13   CREATININE mg/dL 0.89   CALCIUM mg/dL 8.9   GLUCOSE mg/dL 125*     Microbiology Results (last 10 days)     Procedure Component Value - Date/Time    Respiratory Panel PCR w/COVID-19(SARS-CoV-2) LAURO/BON/MAN/PAD/COR/MAD/DARLYN In-House, NP Swab in UTM/VTM, 3-4 HR TAT - Swab, Nasopharynx [[id number]]  (Normal) Collected: 03/07/21 1358    Lab Status: Final result Specimen: Swab from Nasopharynx Updated: 03/07/21 1834     ADENOVIRUS, PCR Not Detected     Coronavirus 229E Not Detected     Coronavirus HKU1 Not Detected     Coronavirus NL63 Not Detected     Coronavirus OC43 Not Detected     COVID19 Not Detected     Human Metapneumovirus Not Detected     Human Rhinovirus/Enterovirus Not Detected     Influenza A PCR Not Detected     Influenza B PCR Not Detected     Parainfluenza Virus 1 Not Detected     Parainfluenza Virus 2 Not Detected     Parainfluenza Virus 3 Not Detected     Parainfluenza Virus 4 Not Detected     RSV, PCR Not Detected     Bordetella pertussis pcr Not Detected     Bordetella parapertussis PCR Not Detected     Chlamydophila pneumoniae PCR Not Detected     Mycoplasma pneumo by PCR Not Detected    Narrative:      Fact sheet for providers: https://docs.[employer]/wp-content/uploads/QMV2068-4305-QS6.1-EUA-Provider-Fact-Sheet-3.pdf    Fact sheet for patients: https://docs.[employer]/wp-content/uploads/YIR0743-9123-DC2.1-EUA-Patient-Fact-Sheet-1.pdf    Test performed by PCR.           Assessment:  Active Hospital Problems    Diagnosis  POA   • **Atrial fibrillation with RVR (CMS/[facility]) [I48.91]  Yes   • COPD (chronic obstructive pulmonary disease) (CMS/[facility]) [J44.9]  Unknown   • Type 2 diabetes mellitus with hyperglycemia, without long-term current use of insulin (CMS/[facility]) [E11.65]  Yes     Continue metformin  mg daily with dinner.      • History of smoking greater than 50 pack years [Z87.891]  Not Applicable       Medical decision making:  · Acute exacerbation of congestive heart failure with elevated BNP COPD and atrial fibrillation with RVR.  Provide with aggressive hydration and control the atrial fibrillation related tachycardia.  · COPD-continue with continuous pulse ox monitoring  · Type 2 diabetes with hyperglycemia-provide with Accu-Cheks and sliding scale coverage.  · Atrial fibrillation with rapid ventricular response-continue present treatment.  Bill Tellez MD   3/7/2021  19:35 EST  Much of this encounter note is an electronic transcription/translation of spoken language to printed text. The electronic translation of spoken language may permit erroneous, or at times, nonsensical words or phrases to be inadvertently transcribed; Although I have reviewed the note for such errors, some may still exist'

## 2021-03-08 NOTE — NURSING NOTE
Called to verify consult for Dr Colby- spoke with Noris and she said Dr Colby retired from [facility] last December 2020. We need to call [facility]. Nurse called [facility] and spoke with Isabel in triage. She thinks Dr Bryan will be seeing patient today.

## 2021-03-09 PROBLEM — A41.9 SEPSIS (HCC): Status: ACTIVE | Noted: 2021-03-09

## 2021-03-09 PROBLEM — N30.00 ACUTE CYSTITIS WITHOUT HEMATURIA: Status: ACTIVE | Noted: 2021-03-09

## 2021-03-09 LAB
ANION GAP SERPL CALCULATED.3IONS-SCNC: 8.9 MMOL/L (ref 5–15)
BACTERIA SPEC AEROBE CULT: NO GROWTH
BUN SERPL-MCNC: 24 MG/DL (ref 8–23)
BUN/CREAT SERPL: 29.3 (ref 7–25)
CALCIUM SPEC-SCNC: 8.4 MG/DL (ref 8.6–10.5)
CHLORIDE SERPL-SCNC: 106 MMOL/L (ref 98–107)
CO2 SERPL-SCNC: 24.1 MMOL/L (ref 22–29)
CREAT SERPL-MCNC: 0.82 MG/DL (ref 0.76–1.27)
DEPRECATED RDW RBC AUTO: 40.5 FL (ref 37–54)
ERYTHROCYTE [DISTWIDTH] IN BLOOD BY AUTOMATED COUNT: 12.5 % (ref 12.3–15.4)
GFR SERPL CREATININE-BSD FRML MDRD: 92 ML/MIN/1.73
GLUCOSE BLDC GLUCOMTR-MCNC: 107 MG/DL (ref 70–130)
GLUCOSE BLDC GLUCOMTR-MCNC: 129 MG/DL (ref 70–130)
GLUCOSE BLDC GLUCOMTR-MCNC: 130 MG/DL (ref 70–130)
GLUCOSE BLDC GLUCOMTR-MCNC: 140 MG/DL (ref 70–130)
GLUCOSE SERPL-MCNC: 109 MG/DL (ref 65–99)
HCT VFR BLD AUTO: 42 % (ref 37.5–51)
HGB BLD-MCNC: 14.3 G/DL (ref 13–17.7)
MAGNESIUM SERPL-MCNC: 2.2 MG/DL (ref 1.6–2.4)
MCH RBC QN AUTO: 30.2 PG (ref 26.6–33)
MCHC RBC AUTO-ENTMCNC: 34 G/DL (ref 31.5–35.7)
MCV RBC AUTO: 88.6 FL (ref 79–97)
PLATELET # BLD AUTO: 221 10*3/MM3 (ref 140–450)
PMV BLD AUTO: 9.4 FL (ref 6–12)
POTASSIUM SERPL-SCNC: 3.7 MMOL/L (ref 3.5–5.2)
RBC # BLD AUTO: 4.74 10*6/MM3 (ref 4.14–5.8)
SODIUM SERPL-SCNC: 139 MMOL/L (ref 136–145)
WBC # BLD AUTO: 16.55 10*3/MM3 (ref 3.4–10.8)

## 2021-03-09 PROCEDURE — 82962 GLUCOSE BLOOD TEST: CPT

## 2021-03-09 PROCEDURE — 80048 BASIC METABOLIC PNL TOTAL CA: CPT | Performed by: INTERNAL MEDICINE

## 2021-03-09 PROCEDURE — 25010000003 CEFTRIAXONE PER 250 MG: Performed by: INTERNAL MEDICINE

## 2021-03-09 PROCEDURE — 83735 ASSAY OF MAGNESIUM: CPT | Performed by: INTERNAL MEDICINE

## 2021-03-09 PROCEDURE — 99232 SBSQ HOSP IP/OBS MODERATE 35: CPT | Performed by: INTERNAL MEDICINE

## 2021-03-09 PROCEDURE — 85027 COMPLETE CBC AUTOMATED: CPT | Performed by: INTERNAL MEDICINE

## 2021-03-09 RX ORDER — POTASSIUM CHLORIDE 750 MG/1
20 TABLET, FILM COATED, EXTENDED RELEASE ORAL DAILY
Status: DISCONTINUED | OUTPATIENT
Start: 2021-03-09 | End: 2021-03-10 | Stop reason: HOSPADM

## 2021-03-09 RX ORDER — METOPROLOL SUCCINATE 100 MG/1
100 TABLET, EXTENDED RELEASE ORAL NIGHTLY
Status: DISCONTINUED | OUTPATIENT
Start: 2021-03-09 | End: 2021-03-10 | Stop reason: HOSPADM

## 2021-03-09 RX ORDER — ALBUTEROL SULFATE 90 UG/1
2 AEROSOL, METERED RESPIRATORY (INHALATION) EVERY 4 HOURS PRN
COMMUNITY
End: 2021-06-07 | Stop reason: ALTCHOICE

## 2021-03-09 RX ORDER — FUROSEMIDE 40 MG/1
40 TABLET ORAL DAILY
Status: DISCONTINUED | OUTPATIENT
Start: 2021-03-09 | End: 2021-03-10 | Stop reason: HOSPADM

## 2021-03-09 RX ADMIN — TAMSULOSIN HYDROCHLORIDE 0.8 MG: 0.4 CAPSULE ORAL at 20:57

## 2021-03-09 RX ADMIN — SPIRONOLACTONE 25 MG: 25 TABLET ORAL at 08:41

## 2021-03-09 RX ADMIN — FUROSEMIDE 40 MG: 40 TABLET ORAL at 10:56

## 2021-03-09 RX ADMIN — POTASSIUM CHLORIDE 20 MEQ: 750 TABLET, EXTENDED RELEASE ORAL at 10:36

## 2021-03-09 RX ADMIN — METOPROLOL SUCCINATE 100 MG: 100 TABLET, EXTENDED RELEASE ORAL at 20:57

## 2021-03-09 RX ADMIN — METOPROLOL TARTRATE 50 MG: 50 TABLET, FILM COATED ORAL at 08:42

## 2021-03-09 RX ADMIN — DABIGATRAN ETEXILATE MESYLATE 150 MG: 150 CAPSULE ORAL at 20:57

## 2021-03-09 RX ADMIN — CEFTRIAXONE SODIUM 2 G: 2 INJECTION, SOLUTION INTRAVENOUS at 16:21

## 2021-03-09 RX ADMIN — BRIMONIDINE TARTRATE 1 DROP: 1 SOLUTION/ DROPS OPHTHALMIC at 08:42

## 2021-03-09 RX ADMIN — CLOPIDOGREL 75 MG: 75 TABLET, FILM COATED ORAL at 08:42

## 2021-03-09 RX ADMIN — PANTOPRAZOLE SODIUM 40 MG: 40 TABLET, DELAYED RELEASE ORAL at 08:42

## 2021-03-09 RX ADMIN — SODIUM CHLORIDE, PRESERVATIVE FREE 10 ML: 5 INJECTION INTRAVENOUS at 20:57

## 2021-03-09 RX ADMIN — METFORMIN HYDROCHLORIDE 500 MG: 500 TABLET, EXTENDED RELEASE ORAL at 18:03

## 2021-03-09 RX ADMIN — SODIUM CHLORIDE, PRESERVATIVE FREE 10 ML: 5 INJECTION INTRAVENOUS at 08:42

## 2021-03-09 RX ADMIN — DABIGATRAN ETEXILATE MESYLATE 150 MG: 150 CAPSULE ORAL at 08:41

## 2021-03-09 RX ADMIN — BRIMONIDINE TARTRATE 1 DROP: 1 SOLUTION/ DROPS OPHTHALMIC at 20:59

## 2021-03-09 NOTE — CONSULTS
"Adult Nutrition  Assessment/PES    Patient Name:  Aj Salazar  YOB: 1945  MRN: [id number]  Admit Date:  3/7/2021    Assessment Date:  3/9/2021    Comments:  Nutrition consult for diet education for heart healthy, low sodium diet.  Patient reports he isn't following a low sodium diet at home and is not familiar with it.  Discussed low sodium food options with patient and wife at bedside.  Talked about food to choose and avoid.  Discussed heart healthy cooking methods.  Discussed low sodium seasoning options.  Gave handouts for home use.    RD to continue to follow.    Reason for Assessment     Row Name 03/09/21 1454          Reason for Assessment    Reason For Assessment  physician consult     Diagnosis  substance use/abuse;pulmonary disease;other (see comments);psychosocial;gastrointestinal disease;cardiac disease;neurologic conditions ETOH abuse hx, COPD, depression, DJD, colon polyps, HLD, HTN, OA, PAF, rectal bleeding, TIA, stomach & intestine ulcer; adm with Afib with RVR     Identified At Risk by Screening Criteria  need for education         Nutrition/Diet History     Row Name 03/09/21 1455          Nutrition/Diet History    Typical Food/Fluid Intake  eating well, % at meals         Anthropometrics     Row Name 03/09/21 1455          Anthropometrics    Height  175.3 cm (69.02\")        Admit Weight    Admit Weight  -- 184# 3/9        Ideal Body Weight (IBW)    Ideal Body Weight (IBW) (kg)  73.73        Body Mass Index (BMI)    BMI Assessment  BMI 25-29.9: overweight 27.22         Labs/Tests/Procedures/Meds     Row Name 03/09/21 1456          Labs/Procedures/Meds    Lab Results Reviewed  reviewed, pertinent     Lab Results Comments  Gluc, BUN, Ca, WBC        Diagnostic Tests/Procedures    Diagnostic Test/Procedure Reviewed  reviewed, pertinent     Diagnostic Test/Procedures Comments  s/p stress test yesterday        Medications    Pertinent Medications Reviewed  reviewed, pertinent     " "Pertinent Medications Comments  lasix, metformin, protonix, KCl         Physical Findings     Row Name 03/09/21 1500          Physical Findings    Overall Physical Appearance  overweight;on oxygen therapy     Skin  other (see comments) B=20, redness/blanchable         Estimated/Assessed Needs     Row Name 03/09/21 1455          Calculation Measurements    Height  175.3 cm (69.02\")         Nutrition Prescription Ordered     Row Name 03/09/21 1502          Nutrition Prescription PO    Current PO Diet  Regular     Common Modifiers  Cardiac;Consistent Carbohydrate         Evaluation of Received Nutrient/Fluid Intake     Row Name 03/09/21 1503          PO Evaluation    Number of Meals  3     % PO Intake           Problem/Interventions:  Problem 1     Row Name 03/09/21 1504          Nutrition Diagnoses Problem 1    Problem 1  Knowledge Deficit     Etiology (related to)  Medical Diagnosis     Cardiac  PAF;CHF;HTN     Pulmonary/Critical Care  COPD     Signs/Symptoms (evidenced by)  Reported  Information Deficit         Intervention Goal     Row Name 03/09/21 1505          Intervention Goal    General  Maintain nutrition;Disease management/therapy;Meet nutritional needs for age/condition     PO  Maintain intake     Weight  Appropriate weight loss         Nutrition Intervention     Row Name 03/09/21 1505          Nutrition Intervention    RD/Tech Action  Follow Tx progress;Care plan reviewd         Education/Evaluation     Row Name 03/09/21 1505          Education    Education  Provided education regarding     Education Topics  CHF;Na+        Monitor/Evaluation    Monitor  Per protocol;PO intake;Pertinent labs;Weight;Skin status;Symptoms           Electronically signed by:  Aura Sanchez RD  03/09/21 15:06 EST  "

## 2021-03-09 NOTE — PROGRESS NOTES
"Patient Name: Aj Salazar  :1945  75 y.o.      Patient Care Team:  Neftali Amezcua MD as PCP - General (Internal Medicine)  Juan Colby MD as Consulting Physician (Cardiology)  Ankush Sky MD as Surgeon (Orthopedic Surgery)  Vale Del Rosario APRN as Nurse Practitioner (Oncology)  Darrion Peña MD as Consulting Physician (Urology)  Tahir Childs MD as Consulting Physician (Pulmonary Disease)  Noman Moseley MD as Consulting Physician (Gastroenterology)  Aura Perry OD (Optometry)  Juwan Diane MD as Consulting Physician (Ophthalmology)    Interval History:   Status post stress test.    Subjective:  Following for heart failure    Objective   Vital Signs  Temp:  [97.5 °F (36.4 °C)-98.2 °F (36.8 °C)] 97.5 °F (36.4 °C)  Heart Rate:  [] 107  Resp:  [16-20] 20  BP: (132-145)/(81-89) 145/81    Intake/Output Summary (Last 24 hours) at 3/9/2021 1057  Last data filed at 3/9/2021 0900  Gross per 24 hour   Intake 890 ml   Output 800 ml   Net 90 ml     Flowsheet Rows      First Filed Value   Admission Height  175.3 cm (69\") Documented at 2021 1159   Admission Weight  88 kg (194 lb) Documented at 2021 1159          Physical Exam:   General Appearance:    Alert, cooperative, in no acute distress   Lungs:     Clear to auscultation.  Normal respiratory effort and rate.      Heart:    Regular rhythm and normal rate, normal S1 and S2, no murmurs, gallops or rubs.     Chest Wall:    No abnormalities observed   Abdomen:     Soft, nontender, positive bowel sounds.     Extremities:   no cyanosis, clubbing or edema.  No marked joint deformities.  Adequate musculoskeletal strength.       Results Review:    Results from last 7 days   Lab Units 21  0619   SODIUM mmol/L 139   POTASSIUM mmol/L 3.7   CHLORIDE mmol/L 106   CO2 mmol/L 24.1   BUN mg/dL 24*   CREATININE mg/dL 0.82   GLUCOSE mg/dL 109*   CALCIUM mg/dL 8.4*     Results from last 7 days   Lab Units " 03/07/21  1153   TROPONIN T ng/mL <0.010     Results from last 7 days   Lab Units 03/09/21  0619   WBC 10*3/mm3 16.55*   HEMOGLOBIN g/dL 14.3   HEMATOCRIT % 42.0   PLATELETS 10*3/mm3 221             Results from last 7 days   Lab Units 03/09/21  0619   MAGNESIUM mg/dL 2.2             Medication Review:   atorvastatin, 80 mg, Oral, Nightly  brimonidine, 1 drop, Both Eyes, BID  cefTRIAXone, 2 g, Intravenous, Q24H  clopidogrel, 75 mg, Oral, Daily  dabigatran etexilate, 150 mg, Oral, BID  furosemide, 40 mg, Oral, Daily  insulin lispro, 0-9 Units, Subcutaneous, TID AC  latanoprost, 1 drop, Both Eyes, Nightly  metFORMIN ER, 500 mg, Oral, Daily With Dinner  metoprolol succinate XL, 100 mg, Oral, Nightly  pantoprazole, 40 mg, Oral, Daily  potassium chloride, 20 mEq, Oral, Daily  sodium chloride, 10 mL, Intravenous, Q12H  spironolactone, 25 mg, Oral, Daily  tamsulosin, 0.8 mg, Oral, Nightly              Assessment/Plan     1.  Acute on chronic systolic congestive heart failure.  No need to repeat an echo as he just had one done in October.  Ejection fraction known to be diminished at approximately 44%.  Nuclear stress test on March 8 did not show any evidence of ischemia or infarction.  Oral diuretics today.  2.  Atrial fibrillation with controlled ventricular response.  On Pradaxa.  Continue.  A little bit tachycardic this morning.  Blood pressure looks fine to increase metoprolol.  I will transition to metoprolol succinate given his LV dysfunction.  3.  Hypokalemia.  Replaced.  4.  Diabetes.  On oral medications.     Okay to come off telemetry for shower.  Hopefully home soon.    Ade Bryan MD, [facility]  03/09/21  10:57 EST

## 2021-03-09 NOTE — PROGRESS NOTES
Clinical Pharmacy Services: Congestive Heart Failure Education     Mr Dickinson and his wife were counseled over congestive heart failure prior to discharge.  Patient and wife very receptive to counseling and medication history interview.     Additional counseling points included but were not limited to:  1. Weigh yourself every morning after emptying your bladder and compare your weight to the day before  2. Keep the total amount of fluids you drink to only 6-8 glasses each day (48-64 ounces) or as otherwise directed by your physician  3. Take your medicine exactly as prescribed  4. Check for swelling in your feet, ankles, legs, and stomach.   5. Eat foods that are low in salt or salt-free. Limit your sodium intake to <2000mg/day.   6. Balance activity and rest periods  7. Do not smoke, and limit alcohol intake (No more than 2 drinks a day for men and 1 drink a day for women)     He was also instructed to contact her heart doctor immediately if she notices any of the following signs:  1. Weight gain of >2 pounds in 1 day or 5 pounds in 1 week  2. Vomiting and/or diarrhea that lasts more than 2 days  3. Feeling more shortness of breath than usual  4. Increased swelling in your feet, ankles, legs, or stomach  5. Development of a dry, hacking cough OR a productive cough with frothy sputum  6. Feeling more tired and having less energy to complete daily tasks  7. Feeling light-headed or dizzy  8. Having difficulty breathing when lying down flat      The patient was provided with educational materials for the above counseling points we reviewed to keep as a reference. Plans to write doctor's number for quick reference if needed.      All other questions from the patient were answered at this time. Completed motivational interviewing on adherence to medication, when to call the doctor versus when to go to the emergency room, and low sodium diet. During this session.   ?  Lily Dawkins, Pharm.D., [facility]  3/9/2021  14:12  EST

## 2021-03-09 NOTE — PLAN OF CARE
Goal Outcome Evaluation:         Patient alert and oriented on 2 L/NC and A Fib on monitor. Patient showered this am. No acute distress noted, will continue to monitor.

## 2021-03-09 NOTE — PROGRESS NOTES
[facility] Medicine Services  PROGRESS NOTE    Patient Name: Aj Salazar  : 1945  MRN: [id number]    Date of Admission: 3/7/2021  Primary Care Physician: Neftali Amezcua MD    Subjective   Subjective     CC:  Follow-up shortness of breath and cardiac issues    HPI:  Continued improvement in breathing.  Notes peripheral edema has improved.  Denies any lightheadedness.  Denies any chest pain.  Very pleased with his progress.    Review of Systems  No current fevers or chills  No current headache or lightheadedness  No current nausea, vomiting, or diarrhea  No current chest pain or palpitations    Objective   Objective     Vital Signs:   Temp:  [97.5 °F (36.4 °C)-98.2 °F (36.8 °C)] 97.6 °F (36.4 °C)  Heart Rate:  [] 107  Resp:  [20] 20  BP: (132-145)/(75-89) 133/75        Physical Exam:  Constitutional:Awake, alert  HENT: NCAT, mucous membranes moist, neck supple  Respiratory: Decreasing rales at the bases bilaterally, more clear at the apex, currently on supplemental oxygen, tachypnea noted,  Cardiovascular: Irregular, not tachycardic during my examination, normal radial pulses  Gastrointestinal: Positive bowel sounds, soft, nontender, nondistended  Musculoskeletal: Somewhat debilitated appearance, mild bilateral lower extremity edema, BMI is 27  Psychiatric: Appropriate affect, cooperative, conversational  Neurologic: No slurred speech or facial droop, follows commands  Skin: No rashes or jaundice, warm      Results Reviewed:  Results from last 7 days   Lab Units 21  1153   WBC 10*3/mm3 16.55* 24.74* 15.64*   HEMOGLOBIN g/dL 14.3 14.5 15.4   HEMATOCRIT % 42.0 41.7 44.4   PLATELETS 10*3/mm3 221 205 255   PROCALCITONIN ng/mL  --  0.78*  --      Results from last 7 days   Lab Units 21  0544 21  1153   SODIUM mmol/L 139  --  139 142   POTASSIUM mmol/L 3.7 3.9 3.2* 4.4   CHLORIDE mmol/L 106  --  103 106   CO2 mmol/L 24.1  --   23.4 25.4   BUN mg/dL 24*  --  21 13   CREATININE mg/dL 0.82  --  1.01 0.89   GLUCOSE mg/dL 109*  --  129* 125*   CALCIUM mg/dL 8.4*  --  8.1* 8.9   ALT (SGPT) U/L  --   --  20 22   AST (SGOT) U/L  --   --  24 24   TROPONIN T ng/mL  --   --   --  <0.010   PROBNP pg/mL  --   --   --  1,104.0*     Estimated Creatinine Clearance: 93.7 mL/min (by C-G formula based on SCr of 0.82 mg/dL).    Microbiology Results Abnormal     Procedure Component Value - Date/Time    Urine Culture - Urine, Urine, Random Void [[id number]]  (Normal) Collected: 03/08/21 1508    Lab Status: Preliminary result Specimen: Urine, Random Void Updated: 03/09/21 0822     Urine Culture No growth    Blood Culture - Blood, Arm, Left [[id number]] Collected: 03/07/21 1537    Lab Status: Preliminary result Specimen: Blood from Arm, Left Updated: 03/08/21 1545     Blood Culture No growth at 24 hours    Blood Culture - Blood, Arm, Right [[id number]] Collected: 03/07/21 1440    Lab Status: Preliminary result Specimen: Blood from Arm, Right Updated: 03/08/21 1500     Blood Culture No growth at 24 hours    Respiratory Panel PCR w/COVID-19(SARS-CoV-2) LAURO/BON/MAN/PAD/COR/MAD/DARLYN In-House, NP Swab in UTM/VTM, 3-4 HR TAT - Swab, Nasopharynx [[id number]]  (Normal) Collected: 03/07/21 1358    Lab Status: Final result Specimen: Swab from Nasopharynx Updated: 03/07/21 1834     ADENOVIRUS, PCR Not Detected     Coronavirus 229E Not Detected     Coronavirus HKU1 Not Detected     Coronavirus NL63 Not Detected     Coronavirus OC43 Not Detected     COVID19 Not Detected     Human Metapneumovirus Not Detected     Human Rhinovirus/Enterovirus Not Detected     Influenza A PCR Not Detected     Influenza B PCR Not Detected     Parainfluenza Virus 1 Not Detected     Parainfluenza Virus 2 Not Detected     Parainfluenza Virus 3 Not Detected     Parainfluenza Virus 4 Not Detected     RSV, PCR Not Detected     Bordetella pertussis pcr Not Detected     Bordetella parapertussis PCR Not  Detected     Chlamydophila pneumoniae PCR Not Detected     Mycoplasma pneumo by PCR Not Detected    Narrative:      Fact sheet for providers: https://docs.[employer]/wp-content/uploads/VEH9398-9450-IV9.1-EUA-Provider-Fact-Sheet-3.pdf    Fact sheet for patients: https://docs.[employer]/wp-content/uploads/CPP4247-9384-VW4.1-EUA-Patient-Fact-Sheet-1.pdf    Test performed by PCR.          Imaging Results (Last 24 Hours)     ** No results found for the last 24 hours. **          Results for orders placed during the hospital encounter of 10/22/20    Adult Transthoracic Echo Complete W/ Cont if Necessary Per Protocol (With Agitated Saline)    Interpretation Summary  · Calculated left ventricular EF = 44.3% Estimated left ventricular EF was in agreement with the calculated left ventricular EF.  · Saline study was poorly visualized. No obvious bubbles crossed.  · The aortic valve exhibits sclerosis. The aortic valve appears trileaflet.  · There is mild, bileaflet mitral valve thickening present.  · Trace aortic valve regurgitation is present  · Mild mitral valve regurgitation is present.  · Mild dilation of the aortic root is present.      I have reviewed the medications:  Scheduled Meds:atorvastatin, 80 mg, Oral, Nightly  brimonidine, 1 drop, Both Eyes, BID  cefTRIAXone, 2 g, Intravenous, Q24H  clopidogrel, 75 mg, Oral, Daily  dabigatran etexilate, 150 mg, Oral, BID  furosemide, 40 mg, Oral, Daily  insulin lispro, 0-9 Units, Subcutaneous, TID AC  latanoprost, 1 drop, Both Eyes, Nightly  metFORMIN ER, 500 mg, Oral, Daily With Dinner  metoprolol succinate XL, 100 mg, Oral, Nightly  pantoprazole, 40 mg, Oral, Daily  potassium chloride, 20 mEq, Oral, Daily  sodium chloride, 10 mL, Intravenous, Q12H  spironolactone, 25 mg, Oral, Daily  tamsulosin, 0.8 mg, Oral, Nightly      Continuous Infusions:   PRN Meds:.•  acetaminophen **OR** acetaminophen **OR** acetaminophen  •  dextrose  •  dextrose  •  glucagon (human  recombinant)  •  magnesium sulfate **OR** magnesium sulfate **OR** magnesium sulfate  •  nitroglycerin  •  ondansetron  •  potassium chloride  •  potassium chloride  •  [COMPLETED] Insert peripheral IV **AND** sodium chloride  •  sodium chloride    Assessment/Plan   Assessment & Plan     Active Hospital Problems    Diagnosis  POA   • **Atrial fibrillation with RVR (CMS/[facility]) [I48.91]  Yes   • Acute cystitis without hematuria [N30.00]  Yes   • Sepsis (CMS/[facility]) [A41.9]  Yes   • Acute respiratory failure with hypoxia (CMS/[facility]) [J96.01]  Yes   • Acute on chronic systolic CHF (congestive heart failure) (CMS/[facility]) [I50.23]  Yes   • Bacterial pneumonia [J15.9]  Yes   • COPD (chronic obstructive pulmonary disease) (CMS/[facility]) [J44.9]  Yes   • Type 2 diabetes mellitus with hyperglycemia, without long-term current use of insulin (CMS/[facility]) [E11.65]  Yes   • History of smoking greater than 50 pack years [Z87.891]  Not Applicable      Resolved Hospital Problems   No resolved problems to display.        Brief Hospital Course to date:  Aj Salazar is a 75 y.o. male who presents the hospital with atrial fibrillation with RVR and acute on chronic systolic heart failure.    Discussion/plan for today:  Discontinue amlodipine permanently.  Spironolactone added and tolerating well so far.  Lasix converted to oral formulation.  Metoprolol converted to succinate with cardiomyopathy.  Appreciate cardiology's help with medical management.  Stress test reported as negative.  Procalcitonin is elevated and patient is felt to have pneumonia on top of pulmonary edema from heart failure.  Responding to ceftriaxone therapy.  Can likely transition to Omnicef when he is appropriate for discharge.  Urinalysis is also grossly abnormal with too numerous to count white cells.  Urine culture so far negative.  Glucose reviewed and reads are acceptable.  Titrate insulin further as needed.  Repeat laboratory studies tomorrow.    Atrial  fibrillation:  Initially with RVR.  Rate control with beta-blocker.  Anticoagulation with Pradaxa.  Telemetry monitor.  Correct electrolytes as needed.    Acute on chronic systolic congestive heart failure:  Stress test per cardiology.  Responded well to 80 mg IV Lasix initially.  Amlodipine held during diuresis.  Likely need to discontinue amlodipine long-term as he would likely be better served with Aldactone for heart failure management.  Likely needs systolic heart failure beta-blocker at discharge.  Concern for medication and dietary noncompliance.  Consult nutrition to educate low-sodium diet.    Possible bacterial pneumonia:  Infiltrate on chest x-ray and elevated procalcitonin and hypoxia with respiratory symptoms.  Covered with ceftriaxone.    Hypokalemia: Replete per protocol.  Monitor.    Diabetes:  Monitor glucose.  Oral medicines continued at admission.  Adjust insulin as needed daily.    Urinary tract infection: Urinalysis with numerous to count white cells.  Covered with ceftriaxone.    DVT Prophylaxis: Anticoagulated    CODE STATUS:   Code Status and Medical Interventions:   Ordered at: 03/07/21 1940     Code Status:    CPR     Medical Interventions (Level of Support Prior to Arrest):    Full       Yeyo Valladares MD  03/09/21

## 2021-03-10 ENCOUNTER — READMISSION MANAGEMENT (OUTPATIENT)
Dept: CALL CENTER | Facility: HOSPITAL | Age: 76
End: 2021-03-10

## 2021-03-10 VITALS
HEART RATE: 106 BPM | DIASTOLIC BLOOD PRESSURE: 91 MMHG | HEIGHT: 69 IN | TEMPERATURE: 97.4 F | RESPIRATION RATE: 18 BRPM | SYSTOLIC BLOOD PRESSURE: 140 MMHG | WEIGHT: 183.2 LBS | OXYGEN SATURATION: 95 % | BODY MASS INDEX: 27.13 KG/M2

## 2021-03-10 PROBLEM — J96.01 ACUTE RESPIRATORY FAILURE WITH HYPOXIA (HCC): Status: RESOLVED | Noted: 2021-03-08 | Resolved: 2021-03-10

## 2021-03-10 PROBLEM — A41.9 SEPSIS: Status: RESOLVED | Noted: 2021-03-09 | Resolved: 2021-03-10

## 2021-03-10 LAB
ANION GAP SERPL CALCULATED.3IONS-SCNC: 8.9 MMOL/L (ref 5–15)
BUN SERPL-MCNC: 20 MG/DL (ref 8–23)
BUN/CREAT SERPL: 22.2 (ref 7–25)
CALCIUM SPEC-SCNC: 8.3 MG/DL (ref 8.6–10.5)
CHLORIDE SERPL-SCNC: 108 MMOL/L (ref 98–107)
CO2 SERPL-SCNC: 24.1 MMOL/L (ref 22–29)
CREAT SERPL-MCNC: 0.9 MG/DL (ref 0.76–1.27)
DEPRECATED RDW RBC AUTO: 38.9 FL (ref 37–54)
ERYTHROCYTE [DISTWIDTH] IN BLOOD BY AUTOMATED COUNT: 12.3 % (ref 12.3–15.4)
GFR SERPL CREATININE-BSD FRML MDRD: 82 ML/MIN/1.73
GLUCOSE BLDC GLUCOMTR-MCNC: 121 MG/DL (ref 70–130)
GLUCOSE BLDC GLUCOMTR-MCNC: 128 MG/DL (ref 70–130)
GLUCOSE SERPL-MCNC: 120 MG/DL (ref 65–99)
HCT VFR BLD AUTO: 41.2 % (ref 37.5–51)
HGB BLD-MCNC: 13.8 G/DL (ref 13–17.7)
MCH RBC QN AUTO: 29.3 PG (ref 26.6–33)
MCHC RBC AUTO-ENTMCNC: 33.5 G/DL (ref 31.5–35.7)
MCV RBC AUTO: 87.5 FL (ref 79–97)
PLATELET # BLD AUTO: 254 10*3/MM3 (ref 140–450)
PMV BLD AUTO: 9.6 FL (ref 6–12)
POTASSIUM SERPL-SCNC: 4.1 MMOL/L (ref 3.5–5.2)
RBC # BLD AUTO: 4.71 10*6/MM3 (ref 4.14–5.8)
SODIUM SERPL-SCNC: 141 MMOL/L (ref 136–145)
WBC # BLD AUTO: 10.66 10*3/MM3 (ref 3.4–10.8)

## 2021-03-10 PROCEDURE — 99232 SBSQ HOSP IP/OBS MODERATE 35: CPT | Performed by: INTERNAL MEDICINE

## 2021-03-10 PROCEDURE — 82962 GLUCOSE BLOOD TEST: CPT

## 2021-03-10 PROCEDURE — 85027 COMPLETE CBC AUTOMATED: CPT | Performed by: INTERNAL MEDICINE

## 2021-03-10 PROCEDURE — 80048 BASIC METABOLIC PNL TOTAL CA: CPT | Performed by: INTERNAL MEDICINE

## 2021-03-10 RX ORDER — LOSARTAN POTASSIUM 25 MG/1
25 TABLET ORAL
Status: DISCONTINUED | OUTPATIENT
Start: 2021-03-10 | End: 2021-03-10 | Stop reason: HOSPADM

## 2021-03-10 RX ORDER — FUROSEMIDE 40 MG/1
40 TABLET ORAL DAILY
Qty: 30 TABLET | Refills: 0 | Status: SHIPPED | OUTPATIENT
Start: 2021-03-10

## 2021-03-10 RX ORDER — METOPROLOL SUCCINATE 50 MG/1
50 TABLET, EXTENDED RELEASE ORAL DAILY
Qty: 30 TABLET | Refills: 0 | Status: CANCELLED | OUTPATIENT
Start: 2021-03-10

## 2021-03-10 RX ORDER — SPIRONOLACTONE 25 MG/1
25 TABLET ORAL DAILY
Qty: 30 TABLET | Refills: 0 | Status: SHIPPED | OUTPATIENT
Start: 2021-03-10

## 2021-03-10 RX ORDER — LOSARTAN POTASSIUM 25 MG/1
25 TABLET ORAL
Qty: 30 TABLET | Refills: 0 | Status: SHIPPED | OUTPATIENT
Start: 2021-03-10 | End: 2022-05-03

## 2021-03-10 RX ORDER — LOSARTAN POTASSIUM 25 MG/1
25 TABLET ORAL
Qty: 30 TABLET | Refills: 0 | Status: CANCELLED | OUTPATIENT
Start: 2021-03-10

## 2021-03-10 RX ORDER — METOPROLOL SUCCINATE 50 MG/1
50 TABLET, EXTENDED RELEASE ORAL
Status: DISCONTINUED | OUTPATIENT
Start: 2021-03-10 | End: 2021-03-10 | Stop reason: HOSPADM

## 2021-03-10 RX ORDER — CEFDINIR 300 MG/1
300 CAPSULE ORAL 2 TIMES DAILY
Qty: 8 CAPSULE | Refills: 0 | Status: SHIPPED | OUTPATIENT
Start: 2021-03-10 | End: 2021-03-15

## 2021-03-10 RX ORDER — METOPROLOL SUCCINATE 50 MG/1
50 TABLET, EXTENDED RELEASE ORAL DAILY
Qty: 30 TABLET | Refills: 0 | Status: SHIPPED | OUTPATIENT
Start: 2021-03-10

## 2021-03-10 RX ORDER — METOPROLOL SUCCINATE 100 MG/1
100 TABLET, EXTENDED RELEASE ORAL NIGHTLY
Qty: 30 TABLET | Refills: 0 | Status: SHIPPED | OUTPATIENT
Start: 2021-03-10

## 2021-03-10 RX ADMIN — POTASSIUM CHLORIDE 20 MEQ: 750 TABLET, EXTENDED RELEASE ORAL at 09:30

## 2021-03-10 RX ADMIN — SODIUM CHLORIDE, PRESERVATIVE FREE 10 ML: 5 INJECTION INTRAVENOUS at 09:32

## 2021-03-10 RX ADMIN — SPIRONOLACTONE 25 MG: 25 TABLET ORAL at 09:30

## 2021-03-10 RX ADMIN — FUROSEMIDE 40 MG: 40 TABLET ORAL at 09:30

## 2021-03-10 RX ADMIN — BRIMONIDINE TARTRATE 1 DROP: 1 SOLUTION/ DROPS OPHTHALMIC at 09:32

## 2021-03-10 RX ADMIN — LOSARTAN POTASSIUM 25 MG: 25 TABLET, FILM COATED ORAL at 10:40

## 2021-03-10 RX ADMIN — CLOPIDOGREL 75 MG: 75 TABLET, FILM COATED ORAL at 09:30

## 2021-03-10 RX ADMIN — DABIGATRAN ETEXILATE MESYLATE 150 MG: 150 CAPSULE ORAL at 09:30

## 2021-03-10 RX ADMIN — METOPROLOL SUCCINATE 50 MG: 50 TABLET, EXTENDED RELEASE ORAL at 10:40

## 2021-03-10 RX ADMIN — PANTOPRAZOLE SODIUM 40 MG: 40 TABLET, DELAYED RELEASE ORAL at 09:30

## 2021-03-10 NOTE — PROGRESS NOTES
Case Management Discharge Note      Final Note: Pt discharged home.   PADMAJA Powers RN    Provided Post Acute Provider List?: N/A  Provided Post Acute Provider Quality & Resource List?: N/A    Selected Continued Care - Admitted Since 3/7/2021     Destination    No services have been selected for the patient.              Durable Medical Equipment    No services have been selected for the patient.              Dialysis/Infusion    No services have been selected for the patient.              Home Medical Care    No services have been selected for the patient.              Therapy    No services have been selected for the patient.              Community Resources    No services have been selected for the patient.                  Transportation Services  Private: Car    Final Discharge Disposition Code: 01 - home or self-care

## 2021-03-10 NOTE — PROGRESS NOTES
Continued Stay Note  [facility]     Patient Name: Aj Salazar  MRN: [id number]  Today's Date: 3/10/2021    Admit Date: 3/7/2021    Discharge Plan     Row Name 03/10/21 1143       Plan    Plan  Plan home with spouse.  PADMAJA Powers RN    Patient/Family in Agreement with Plan  yes    Plan Comments  Spoke with pt at bedside.  Pt denies any discharge needs.  Plan home with spouse.  PADMAJA Powers RN        Discharge Codes    No documentation.       Expected Discharge Date and Time     Expected Discharge Date Expected Discharge Time    Mar 10, 2021             Karolyn Powers, RN

## 2021-03-10 NOTE — PROGRESS NOTES
"Patient Name: Aj Salazar  :1945  75 y.o.      Patient Care Team:  Neftali Amezcua MD as PCP - General (Internal Medicine)  Juan Colby MD as Consulting Physician (Cardiology)  Ankush Sky MD as Surgeon (Orthopedic Surgery)  Vale Del Rosario APRN as Nurse Practitioner (Oncology)  Darrion Peña MD as Consulting Physician (Urology)  Tahir Childs MD as Consulting Physician (Pulmonary Disease)  Noman Moseley MD as Consulting Physician (Gastroenterology)  Aura Perry OD (Optometry)  Juwan Diane MD as Consulting Physician (Ophthalmology)    Interval History:   Negative stress test on     Subjective:  Following for heart failure    Objective   Vital Signs  Temp:  [97.4 °F (36.3 °C)-97.9 °F (36.6 °C)] 97.4 °F (36.3 °C)  Heart Rate:  [] 106  Resp:  [18-20] 18  BP: (133-162)/(61-98) 140/91    Intake/Output Summary (Last 24 hours) at 3/10/2021 0846  Last data filed at 3/10/2021 0554  Gross per 24 hour   Intake 650 ml   Output 625 ml   Net 25 ml     Flowsheet Rows      First Filed Value   Admission Height  175.3 cm (69\") Documented at 2021 1159   Admission Weight  88 kg (194 lb) Documented at 2021 1159          Physical Exam:   General Appearance:    Alert, cooperative, in no acute distress   Lungs:     Clear to auscultation.  Normal respiratory effort and rate.      Heart:    irreg irreg and mildly tachy.     Chest Wall:    No abnormalities observed   Abdomen:     Soft, nontender, positive bowel sounds.     Extremities:   no cyanosis, clubbing or edema.  No marked joint deformities.  Adequate musculoskeletal strength.       Results Review:    Results from last 7 days   Lab Units 03/10/21  0655   SODIUM mmol/L 141   POTASSIUM mmol/L 4.1   CHLORIDE mmol/L 108*   CO2 mmol/L 24.1   BUN mg/dL 20   CREATININE mg/dL 0.90   GLUCOSE mg/dL 120*   CALCIUM mg/dL 8.3*     Results from last 7 days   Lab Units 21  1153   TROPONIN T ng/mL <0.010     Results " from last 7 days   Lab Units 03/10/21  0655   WBC 10*3/mm3 10.66   HEMOGLOBIN g/dL 13.8   HEMATOCRIT % 41.2   PLATELETS 10*3/mm3 254             Results from last 7 days   Lab Units 03/09/21  0619   MAGNESIUM mg/dL 2.2             Medication Review:   atorvastatin, 80 mg, Oral, Nightly  brimonidine, 1 drop, Both Eyes, BID  cefTRIAXone, 2 g, Intravenous, Q24H  clopidogrel, 75 mg, Oral, Daily  dabigatran etexilate, 150 mg, Oral, BID  furosemide, 40 mg, Oral, Daily  insulin lispro, 0-9 Units, Subcutaneous, TID AC  latanoprost, 1 drop, Both Eyes, Nightly  metFORMIN ER, 500 mg, Oral, Daily With Dinner  metoprolol succinate XL, 100 mg, Oral, Nightly  pantoprazole, 40 mg, Oral, Daily  potassium chloride, 20 mEq, Oral, Daily  sodium chloride, 10 mL, Intravenous, Q12H  spironolactone, 25 mg, Oral, Daily  tamsulosin, 0.8 mg, Oral, Nightly              Assessment/Plan     1.  Acute on chronic systolic congestive heart failure.  No need to repeat an echo as he just had one done in October.  Ejection fraction known to be diminished at approximately 44%.  Nuclear stress test on March 8 did not show any evidence of ischemia or infarction.  Oral diuretics.  2.  Atrial fibrillation.  Transition to Toprol-XL last night.  Little bit tachycardic still.  Will add 50 mg of Toprol-XL in the morning  3.  Hypokalemia.  Replaced.  4.  Diabetes.  On oral medications.    Increase Toprol-XL.  Add losartan 25 mg a day.  Continue spironolactone and oral Lasix.    Follow-up with Dr. Colby at have a heart in 1 to 2 weeks.    Ade Bryna MD, [facility]  03/10/21  08:46 EST

## 2021-03-10 NOTE — OUTREACH NOTE
Prep Survey      Responses   [facility] facility patient discharged from?  [place]   Is LACE score < 7 ?  No   Emergency Room discharge w/ pulse ox?  No   Eligibility  [facility]   Date of Admission  03/07/21   Date of Discharge  03/10/21   Discharge Disposition  Home or Self Care   Discharge diagnosis  atrial fib with RVR   Does the patient have one of the following disease processes/diagnoses(primary or secondary)?  Other   Does the patient have Home health ordered?  No   Is there a DME ordered?  No   Prep survey completed?  Yes          April Rios RN

## 2021-03-10 NOTE — PLAN OF CARE
Goal Outcome Evaluation:         Pt resting comfortably in bed. No c/o voiced/ VSS cont to monitor.

## 2021-03-11 ENCOUNTER — TRANSITIONAL CARE MANAGEMENT TELEPHONE ENCOUNTER (OUTPATIENT)
Dept: CALL CENTER | Facility: HOSPITAL | Age: 76
End: 2021-03-11

## 2021-03-11 NOTE — OUTREACH NOTE
Call Center TCM Note      Responses   [facility] patient discharged from?  [place]   Does the patient have one of the following disease processes/diagnoses(primary or secondary)?  Other   TCM attempt successful?  No   Unsuccessful attempts  Attempt 2          Gloria Harris MA    3/11/2021, 17:21 EST

## 2021-03-11 NOTE — DISCHARGE SUMMARY
[facility] Medicine Services  DISCHARGE SUMMARY    Patient Name: Aj Salazar  : 1945  MRN: [id number]    Date of Admission: 3/7/2021 11:36 AM  Date of Discharge:  3/10/2021  Primary Care Physician: Neftali Amezcua MD    Consults     Date and Time Order Name Status Description    3/7/2021  7:40 PM Inpatient Cardiology Consult Completed     3/7/2021  4:08 PM [facility] (on-call MD unless specified) Details Completed           Hospital Course     Presenting Problem:   Acute respiratory alkalosis [E87.3]  Atrial fibrillation with RVR (CMS/HCC) [I48.91]    Active Hospital Problems    Diagnosis  POA   • **Atrial fibrillation with RVR (CMS/HCC) [I48.91]  Yes   • Acute cystitis without hematuria [N30.00]  Yes   • Acute on chronic systolic CHF (congestive heart failure) (CMS/[facility]) [I50.23]  Yes   • Bacterial pneumonia [J15.9]  Yes   • COPD (chronic obstructive pulmonary disease) (CMS/[facility]) [J44.9]  Yes   • Type 2 diabetes mellitus with hyperglycemia, without long-term current use of insulin (CMS/[facility]) [E11.65]  Yes   • History of smoking greater than 50 pack years [Z87.891]  Not Applicable      Resolved Hospital Problems    Diagnosis Date Resolved POA   • Sepsis (CMS/HCC) [A41.9] 03/10/2021 Yes   • Acute respiratory failure with hypoxia (CMS/[facility]) [J96.01] 03/10/2021 Yes          Hospital Course:  Aj Salazar is a 75 y.o. male who presents the hospital with atrial fibrillation with RVR and acute on chronic systolic heart failure.     Discussion/plan for today:  Discontinue amlodipine permanently.  Spironolactone added and tolerating well so far.  Lasix converted to oral formulation.  Metoprolol converted to succinate with cardiomyopathy. ARB added.  Appreciate cardiology's help with medical management.  Stress test reported as negative.  Procalcitonin is elevated and patient is felt to have pneumonia on top of pulmonary edema from heart failure.  Responding to ceftriaxone therapy.  Can transition to Omnicef when he is  appropriate for discharge.  Urinalysis is also grossly abnormal with too numerous to count white cells.  Urine culture so far negative.      Atrial fibrillation:  Initially with RVR.  Rate control with beta-blocker.  Anticoagulation with Pradaxa.  Telemetry monitor.  Correct electrolytes as needed.     Acute on chronic systolic congestive heart failure:  Stress test per cardiology.  Responded well to 80 mg IV Lasix initially.  Amlodipine held during diuresis.  Will need to discontinue amlodipine long-term as he would likely be better served with ARB and Aldactone for heart failure management.  Likely needs systolic heart failure beta-blocker at discharge.  Concern for medication and dietary noncompliance.  Consult nutrition to educate low-sodium diet.     Possible bacterial pneumonia:  Infiltrate on chest x-ray and elevated procalcitonin and hypoxia with respiratory symptoms.  Covered with ceftriaxone.     Hypokalemia: Repleted per protocol.      Diabetes:  Oral medications.      Urinary tract infection: Urinalysis with numerous to count white cells.  Covered with ceftriaxone.    Day of Discharge     HPI:   Feels great, wants to go home he states.    ROS:  No current fevers or chills  No current shortness of breath or cough  No current nausea, vomiting, or diarrhea  No current chest pain or palpitations    Vital Signs:   Temp:  [97.4 °F (36.3 °C)-97.6 °F (36.4 °C)] 97.4 °F (36.3 °C)  Heart Rate:  [102-126] 106  Resp:  [18-20] 18  BP: (140-152)/(61-91) 140/91     Physical Exam:  Constitutional:Awake, alert  HENT: NCAT, mucous membranes moist, neck supple  Respiratory: Decreasing rales at the bases bilaterally, more clear at the apex, currently on room air  Cardiovascular: Irregular, not tachycardic during my examination pulse upper 90s, normal radial pulses  Gastrointestinal: Positive bowel sounds, soft, nontender, nondistended  Musculoskeletal: Somewhat debilitated appearance, mild bilateral lower extremity edema,  BMI is 27  Psychiatric: Appropriate affect, cooperative, conversational  Neurologic: No slurred speech or facial droop, follows commands  Skin: No rashes or jaundice, warm    Pertinent  and/or Most Recent Results     Results from last 7 days   Lab Units 03/10/21  0655 03/09/21  0619 03/08/21 2023 03/08/21  0544 03/07/21  1153   WBC 10*3/mm3 10.66 16.55*  --  24.74* 15.64*   HEMOGLOBIN g/dL 13.8 14.3  --  14.5 15.4   HEMATOCRIT % 41.2 42.0  --  41.7 44.4   PLATELETS 10*3/mm3 254 221  --  205 255   SODIUM mmol/L 141 139  --  139 142   POTASSIUM mmol/L 4.1 3.7 3.9 3.2* 4.4   CHLORIDE mmol/L 108* 106  --  103 106   CO2 mmol/L 24.1 24.1  --  23.4 25.4   BUN mg/dL 20 24*  --  21 13   CREATININE mg/dL 0.90 0.82  --  1.01 0.89   GLUCOSE mg/dL 120* 109*  --  129* 125*   CALCIUM mg/dL 8.3* 8.4*  --  8.1* 8.9     Results from last 7 days   Lab Units 03/08/21  0544 03/07/21  1153   BILIRUBIN mg/dL 2.6* 1.6*   ALK PHOS U/L 127* 156*   ALT (SGPT) U/L 20 22   AST (SGOT) U/L 24 24           Invalid input(s): TG, LDLCALC, LDLREALC  Results from last 7 days   Lab Units 03/08/21  0544 03/07/21  1440 03/07/21  1153   TSH uIU/mL 1.880  --   --    HEMOGLOBIN A1C % 6.20*  --   --    PROBNP pg/mL  --   --  1,104.0*   TROPONIN T ng/mL  --   --  <0.010   PROCALCITONIN ng/mL 0.78*  --   --    LACTATE mmol/L  --  1.4  --        Brief Urine Lab Results  (Last result in the past 365 days)      Color   Clarity   Blood   Leuk Est   Nitrite   Protein   CREAT   Urine HCG        03/08/21 1508 Straw Hazy Small (1+) Small (1+) Negative Negative               Microbiology Results Abnormal     Procedure Component Value - Date/Time    Blood Culture - Blood, Arm, Left [[id number]] Collected: 03/07/21 1537    Lab Status: Preliminary result Specimen: Blood from Arm, Left Updated: 03/10/21 1546     Blood Culture No growth at 3 days    Blood Culture - Blood, Arm, Right [[id number]] Collected: 03/07/21 1440    Lab Status: Preliminary result Specimen: Blood from  Arm, Right Updated: 03/10/21 1501     Blood Culture No growth at 3 days    Urine Culture - Urine, Urine, Random Void [[id number]]  (Normal) Collected: 03/08/21 1508    Lab Status: Final result Specimen: Urine, Random Void Updated: 03/09/21 1542     Urine Culture No growth    Respiratory Panel PCR w/COVID-19(SARS-CoV-2) [facility] In-House, NP Swab in UTM/VTM, 3-4 HR TAT - Swab, Nasopharynx [[id number]]  (Normal) Collected: 03/07/21 1358    Lab Status: Final result Specimen: Swab from Nasopharynx Updated: 03/07/21 1834     ADENOVIRUS, PCR Not Detected     Coronavirus 229E Not Detected     Coronavirus HKU1 Not Detected     Coronavirus NL63 Not Detected     Coronavirus OC43 Not Detected     COVID19 Not Detected     Human Metapneumovirus Not Detected     Human Rhinovirus/Enterovirus Not Detected     Influenza A PCR Not Detected     Influenza B PCR Not Detected     Parainfluenza Virus 1 Not Detected     Parainfluenza Virus 2 Not Detected     Parainfluenza Virus 3 Not Detected     Parainfluenza Virus 4 Not Detected     RSV, PCR Not Detected     Bordetella pertussis pcr Not Detected     Bordetella parapertussis PCR Not Detected     Chlamydophila pneumoniae PCR Not Detected     Mycoplasma pneumo by PCR Not Detected    Narrative:      Fact sheet for providers: https://docs.[employer]/wp-content/uploads/GLZ6510-5505-LH7.1-EUA-Provider-Fact-Sheet-3.pdf    Fact sheet for patients: https://docs.[employer]/wp-content/uploads/GKO8479-0071-KC1.1-EUA-Patient-Fact-Sheet-1.pdf    Test performed by PCR.          Imaging Results (All)     Procedure Component Value Units Date/Time    CT Chest Without Contrast Diagnostic [[id number]] Collected: 03/07/21 1625     Updated: 03/07/21 1643    Narrative:      CT SCAN OF THE CHEST WITHOUT CONTRAST     HISTORY: Recent onset of shortness of breath.     FINDINGS: The CT scan was performed as an emergency procedure through  the chest without contrast. The following findings are  present:  1. There are changes of COPD and there is some minimal faint groundglass  opacity in the lungs which is nonspecific but could relate to CHF as  suggested on today's chest x-ray. There are no associated pleural  effusions and there is no focal pneumonia.  2. There is no mediastinal or hilar or axillary adenopathy. There is a  very small pericardial effusion measuring 6 mm. The CT images through  the upper liver, spleen, and both adrenal glands are unremarkable.                 Radiation dose reduction techniques were utilized, including automated  exposure control and exposure modulation based on body size.     This report was finalized on 3/7/2021 4:40 PM by Dr. Dg Sunshine M.D.       XR Chest 1 View [[id number]] Collected: 03/07/21 1328     Updated: 03/07/21 1332    Narrative:      ONE VIEW PORTABLE CHEST     HISTORY: Shortness of breath. COPD     FINDINGS: There is moderate cardiomegaly similar to the study of  10/22/2020. There is mild vascular congestion appearing slightly more  prominent with some interstitial prominence at the bases and raising the  concern of an element of CHF.     This report was finalized on 3/7/2021 1:29 PM by Dr. Dg Sunshine M.D.             Results for orders placed during the hospital encounter of 10/22/20    Bilateral Carotid Duplex    Interpretation Summary  · Proximal right internal carotid artery mild stenosis.  · Proximal left internal carotid artery mild stenosis.      Results for orders placed during the hospital encounter of 10/22/20    Bilateral Carotid Duplex    Interpretation Summary  · Proximal right internal carotid artery mild stenosis.  · Proximal left internal carotid artery mild stenosis.      Results for orders placed during the hospital encounter of 10/22/20    Adult Transthoracic Echo Complete W/ Cont if Necessary Per Protocol (With Agitated Saline)    Interpretation Summary  · Calculated left ventricular EF = 44.3% Estimated left ventricular EF was in  agreement with the calculated left ventricular EF.  · Saline study was poorly visualized. No obvious bubbles crossed.  · The aortic valve exhibits sclerosis. The aortic valve appears trileaflet.  · There is mild, bileaflet mitral valve thickening present.  · Trace aortic valve regurgitation is present  · Mild mitral valve regurgitation is present.  · Mild dilation of the aortic root is present.      Discharge Details        Discharge Medications      New Medications      Instructions Start Date   cefdinir 300 MG capsule  Commonly known as: OMNICEF   300 mg, Oral, 2 Times Daily      furosemide 40 MG tablet  Commonly known as: LASIX   40 mg, Oral, Daily      losartan 25 MG tablet  Commonly known as: COZAAR   25 mg, Oral, Every 24 Hours Scheduled      metoprolol succinate  MG 24 hr tablet  Commonly known as: TOPROL-XL   100 mg, Oral, Nightly, Take 100 mg at night and 50 mg in the morning      metoprolol succinate XL 50 MG 24 hr tablet  Commonly known as: TOPROL-XL   50 mg, Oral, Daily, Take 50 mg every morning and 100 mg every night      spironolactone 25 MG tablet  Commonly known as: ALDACTONE   25 mg, Oral, Daily         Continue These Medications      Instructions Start Date   acetaminophen 325 MG tablet  Commonly known as: TYLENOL   650 mg, Oral, Every 4 Hours PRN      albuterol sulfate  (90 Base) MCG/ACT inhaler  Commonly known as: PROVENTIL HFA;VENTOLIN HFA;PROAIR HFA   2 puffs, Inhalation, Every 4 Hours PRN, Typically does not need to use but has just incase       Alphagan P 0.1 % solution ophthalmic solution  Generic drug: brimonidine   1 drop, Both Eyes, 2 times daily      atorvastatin 80 MG tablet  Commonly known as: LIPITOR   80 mg, Oral, Nightly      clopidogrel 75 MG tablet  Commonly known as: Plavix   75 mg, Oral, Daily      dabigatran etexilate 150 MG capsu  Commonly known as: Pradaxa   150 mg, Oral, 2 Times Daily      Fluticasone-Umeclidin-Vilant 200-62.5-25 MCG/INH aerosol powder    1  puff, Inhalation, Daily, Just increased to this strength 3/21       metFORMIN  MG 24 hr tablet  Commonly known as: GLUCOPHAGE-XR   500 mg, Oral, Daily With Dinner      pantoprazole 40 MG EC tablet  Commonly known as: PROTONIX   40 mg, Oral, Daily      Tafluprost (PF) 0.0015 % solution ophthalmic solution  Commonly known as: ZIOPTAN   1 drop, Both Eyes, Nightly      tamsulosin 0.4 MG capsule 24 hr capsule  Commonly known as: FLOMAX   2 capsules, Oral, Nightly         Stop These Medications    amLODIPine 5 MG tablet  Commonly known as: NORVASC     metoprolol tartrate 25 MG tablet  Commonly known as: LOPRESSOR            Allergies   Allergen Reactions   • Bactrim [Sulfamethoxazole-Trimethoprim] Rash         Discharge Disposition:  Home or Self Care    Diet:  Hospital:No active diet order      Activity:  Activity Instructions     Activity as Tolerated                 CODE STATUS:    Code Status and Medical Interventions:   Ordered at: 03/07/21 1940     Code Status:    CPR     Medical Interventions (Level of Support Prior to Arrest):    Full       Future Appointments   Date Time Provider Department Center   3/15/2021  3:15 PM Neftali Amezcua MD [facility]   6/7/2021 11:30 AM Neftali Amezcua MD [facility]       Additional Instructions for the Follow-ups that You Need to Schedule     Discharge Follow-up with PCP   As directed       Currently Documented PCP:    Neftali Amezcua MD    PCP Phone Number:    [phone]     Follow Up Details: Primary care follow-up recommended within 1 week for general hospital follow-up         Discharge Follow-up with Specified Provider: Follow-up with cardiology heart clinic in 1 week   As directed      To: Follow-up with cardiology heart clinic in 1 week                     Yeyo Valladares MD  03/10/21      Time Spent on Discharge:  I spent  35  minutes on this discharge activity which included: face-to-face encounter with the patient, reviewing the data in the system,  coordination of the care with the nursing staff as well as consultants, documentation, and entering orders.

## 2021-03-11 NOTE — OUTREACH NOTE
Call Center TCM Note      Responses   [facility] patient discharged from?  [place]   Does the patient have one of the following disease processes/diagnoses(primary or secondary)?  Other   TCM attempt successful?  No   Unsuccessful attempts  Attempt 1          Gloria Harris MA    3/11/2021, 15:08 EST

## 2021-03-12 ENCOUNTER — TRANSITIONAL CARE MANAGEMENT TELEPHONE ENCOUNTER (OUTPATIENT)
Dept: CALL CENTER | Facility: HOSPITAL | Age: 76
End: 2021-03-12

## 2021-03-12 LAB
BACTERIA SPEC AEROBE CULT: NORMAL
BACTERIA SPEC AEROBE CULT: NORMAL

## 2021-03-12 NOTE — OUTREACH NOTE
Call Center TCM Note      Responses   [facility] patient discharged from?  [place]   Does the patient have one of the following disease processes/diagnoses(primary or secondary)?  Other   TCM attempt successful?  Yes   Call start time  0947   Call end time  0948   Discharge diagnosis  atrial fib with RVR   Meds reviewed with patient/caregiver?  Yes   Does the patient have all medications ordered at discharge?  Yes   Is the patient taking all medications as directed (includes completed medication regime)?  Yes   Comments regarding appointments  cardiology appt was on 3/11/21   Does the patient have a primary care provider?   Yes   Does the patient have an appointment with their PCP within 7 days of discharge?  Yes   Comments regarding PCP  Hospital d/c f/u appt is on 3/15/21 at 3:15 pm    Has the patient kept scheduled appointments due by today?  Yes   Psychosocial issues?  No   Did the patient receive a copy of their discharge instructions?  Yes   Nursing interventions  Reviewed instructions with patient   What is the patient's perception of their health status since discharge?  Improving   Is the patient/caregiver able to teach back signs and symptoms related to disease process for when to call PCP?  Yes   Is the patient/caregiver able to teach back signs and symptoms related to disease process for when to call 911?  Yes   Is the patient/caregiver able to teach back the hierarchy of who to call/visit for symptoms/problems? PCP, Specialist, Home health nurse, Urgent Care, ED, 911  Yes   TCM call completed?  Yes          Estelle Mriamontes RN    3/12/2021, 09:49 EST

## 2021-03-15 ENCOUNTER — OFFICE VISIT (OUTPATIENT)
Dept: INTERNAL MEDICINE | Age: 76
End: 2021-03-15

## 2021-03-15 VITALS
BODY MASS INDEX: 27.99 KG/M2 | DIASTOLIC BLOOD PRESSURE: 64 MMHG | WEIGHT: 189 LBS | TEMPERATURE: 96.9 F | HEIGHT: 69 IN | SYSTOLIC BLOOD PRESSURE: 126 MMHG | HEART RATE: 87 BPM | OXYGEN SATURATION: 94 %

## 2021-03-15 DIAGNOSIS — J44.1 CHRONIC OBSTRUCTIVE PULMONARY DISEASE WITH ACUTE EXACERBATION (HCC): ICD-10-CM

## 2021-03-15 DIAGNOSIS — I48.91 ATRIAL FIBRILLATION WITH RAPID VENTRICULAR RESPONSE (HCC): Primary | ICD-10-CM

## 2021-03-15 DIAGNOSIS — I10 ESSENTIAL HYPERTENSION: Chronic | ICD-10-CM

## 2021-03-15 DIAGNOSIS — E11.65 TYPE 2 DIABETES MELLITUS WITH HYPERGLYCEMIA, WITHOUT LONG-TERM CURRENT USE OF INSULIN (HCC): Chronic | ICD-10-CM

## 2021-03-15 DIAGNOSIS — K21.9 GASTROESOPHAGEAL REFLUX DISEASE, UNSPECIFIED WHETHER ESOPHAGITIS PRESENT: ICD-10-CM

## 2021-03-15 PROCEDURE — 99215 OFFICE O/P EST HI 40 MIN: CPT | Performed by: INTERNAL MEDICINE

## 2021-03-15 RX ORDER — PANTOPRAZOLE SODIUM 40 MG/1
40 TABLET, DELAYED RELEASE ORAL 2 TIMES DAILY
Start: 2021-03-15 | End: 2021-04-26 | Stop reason: SDUPTHER

## 2021-03-15 RX ORDER — LEVALBUTEROL TARTRATE 45 UG/1
1-2 AEROSOL, METERED ORAL EVERY 6 HOURS PRN
Qty: 15 G | Refills: 2 | Status: SHIPPED | OUTPATIENT
Start: 2021-03-15 | End: 2021-06-01

## 2021-03-15 RX ORDER — LANCETS
EACH MISCELLANEOUS
COMMUNITY
End: 2022-06-27 | Stop reason: SDUPTHER

## 2021-03-15 NOTE — ASSESSMENT & PLAN NOTE
Increase pantoprazole to 40 mg BID.   Will order upper GI series on follow-up to evaluate mild odynophagia and dysphagia.

## 2021-03-15 NOTE — ASSESSMENT & PLAN NOTE
Will change albuterol HFA inhaler to levalbuterol HFA due to atrial fibrillation with RVR. Continue Trelegy 200.

## 2021-03-15 NOTE — ASSESSMENT & PLAN NOTE
Continue metoprolol succ 100 mg qPM and 50 mg qAM.   Continue losartan 25 mg qd.  Started on spironolactone 25 mg ad and furosemide 40 mg qd recently during hospitalization. Need to monitor electrolytes and renal function closely. Check BMP today.

## 2021-03-15 NOTE — PROGRESS NOTES
I N T E R N A L  M E D I C I N E  J U N O H  K I M,  M D      ENCOUNTER DATE:  03/15/2021    Aj Pattonta / 75 y.o. / male      CHIEF COMPLAINT / REASON FOR OFFICE VISIT     hospital f/u Atrial fibrillation with RVR (3/7/2021 - 3/10/2021 )      ASSESSMENT & PLAN     Problem List Items Addressed This Visit     Atrial fibrillation with rapid ventricular response (CMS/HCC) - Primary (Chronic)    Overview     *Lasha         Current Assessment & Plan     Recently hospitalization for atrial fibrillation with RVR.   Started on Pradaxa and is on rate control with metoprolol succinate. Continue follow-up with cardiologist.          Relevant Medications    clopidogrel (Plavix) 75 MG tablet    metoprolol succinate XL (TOPROL-XL) 100 MG 24 hr tablet    metoprolol succinate XL (TOPROL-XL) 50 MG 24 hr tablet    levalbuterol (XOPENEX HFA) 45 MCG/ACT inhaler    Other Relevant Orders    Basic Metabolic Panel    Chronic obstructive pulmonary disease with acute exacerbation (CMS/HCC) (Chronic)    Overview     *Esterle    PFT (5/2018): moderate obstruction with decreased diffusing capacity.          Current Assessment & Plan     Will change albuterol HFA inhaler to levalbuterol HFA due to atrial fibrillation with RVR. Continue Trelegy 200.          Relevant Medications    albuterol sulfate  (90 Base) MCG/ACT inhaler    Fluticasone-Umeclidin-Vilant 200-62.5-25 MCG/INH aerosol powder     levalbuterol (XOPENEX HFA) 45 MCG/ACT inhaler    Hypertension (Chronic)    Current Assessment & Plan     Continue metoprolol succ 100 mg qPM and 50 mg qAM.   Continue losartan 25 mg qd.  Started on spironolactone 25 mg ad and furosemide 40 mg qd recently during hospitalization. Need to monitor electrolytes and renal function closely. Check BMP today.          Relevant Medications    furosemide (LASIX) 40 MG tablet    metoprolol succinate XL (TOPROL-XL) 100 MG 24 hr tablet    spironolactone (ALDACTONE) 25 MG tablet    losartan (COZAAR) 25  "MG tablet    metoprolol succinate XL (TOPROL-XL) 50 MG 24 hr tablet    Other Relevant Orders    Basic Metabolic Panel    Type 2 diabetes mellitus with hyperglycemia, without long-term current use of insulin (CMS/[facility]) (Chronic)    Overview     Continue metformin  mg daily with dinner.          Relevant Medications    metFORMIN ER (GLUCOPHAGE-XR) 500 MG 24 hr tablet    Gastroesophageal reflux disease (Chronic)    Current Assessment & Plan     Increase pantoprazole to 40 mg BID.   Will order upper GI series on follow-up to evaluate mild odynophagia and dysphagia.          Relevant Medications    pantoprazole (PROTONIX) 40 MG EC tablet        Orders Placed This Encounter   Procedures   • Basic Metabolic Panel     New Medications Ordered This Visit   Medications   • levalbuterol (XOPENEX HFA) 45 MCG/ACT inhaler     Sig: Inhale 1-2 puffs Every 6 (Six) Hours As Needed for Wheezing or Shortness of Air.     Dispense:  15 g     Refill:  2   • pantoprazole (PROTONIX) 40 MG EC tablet     Sig: Take 1 tablet by mouth 2 (two) times a day.       SUMMARY/DISCUSSION  • Spent 42 minutes in total encounter time.       Next Appointment with me: 3/29/2021    Return in about 2 weeks (around 3/29/2021) for Reassess today's problem(s).      VITAL SIGNS     Visit Vitals  /64 (BP Location: Left arm)   Pulse 87   Temp 96.9 °F (36.1 °C)   Ht 175.3 cm (69.02\")   Wt 85.7 kg (189 lb)   SpO2 94%   BMI 27.89 kg/m²       BP Readings from Last 3 Encounters:   03/15/21 126/64   03/10/21 140/91   03/02/21 144/80     Wt Readings from Last 3 Encounters:   03/15/21 85.7 kg (189 lb)   03/10/21 83.1 kg (183 lb 3.2 oz)   03/02/21 89.4 kg (197 lb)     Body mass index is 27.89 kg/m².      MEDICATIONS AT THE TIME OF OFFICE VISIT     Current Outpatient Medications on File Prior to Visit   Medication Sig   • acetaminophen (TYLENOL) 325 MG tablet Take 2 tablets by mouth Every 4 (Four) Hours As Needed for Mild Pain .   • albuterol sulfate  (90 " Base) MCG/ACT inhaler Inhale 2 puffs Every 4 (Four) Hours As Needed for Wheezing. Typically does not need to use but has just incase   • ALPHAGAN P 0.1 % solution ophthalmic solution Administer 1 drop to both eyes 2 (two) times a day.   • atorvastatin (LIPITOR) 80 MG tablet Take 1 tablet by mouth Every Night.   • clopidogrel (Plavix) 75 MG tablet Take 1 tablet by mouth Daily.   • dabigatran etexilate (Pradaxa) 150 MG capsule Take 1 capsule by mouth 2 (Two) Times a Day.   • Fluticasone-Umeclidin-Vilant 200-62.5-25 MCG/INH aerosol powder  Inhale 1 puff Daily. Just increased to this strength 3/21   • furosemide (LASIX) 40 MG tablet Take 1 tablet by mouth Daily.   • losartan (COZAAR) 25 MG tablet Take 1 tablet by mouth Daily.   • metFORMIN ER (GLUCOPHAGE-XR) 500 MG 24 hr tablet TAKE 1 TABLET BY MOUTH DAILY WITH DINNER   • metoprolol succinate XL (TOPROL-XL) 100 MG 24 hr tablet Take 1 tablet by mouth Every Night. Take 100 mg at night and 50 mg in the morning   • metoprolol succinate XL (TOPROL-XL) 50 MG 24 hr tablet Take 1 tablet by mouth Daily. Take 50 mg every morning and 100 mg every night   • spironolactone (ALDACTONE) 25 MG tablet Take 1 tablet by mouth Daily.   • Tafluprost, PF, (ZIOPTAN) 0.0015 % solution ophthalmic solution Administer 1 drop to both eyes Every Night.   • tamsulosin (FLOMAX) 0.4 MG capsule 24 hr capsule Take 2 capsules by mouth Every Night.   • pantoprazole (PROTONIX) 40 MG EC tablet TAKE 1 TABLET BY MOUTH DAILY   • Accu-Chek Softclix Lancets lancets Accu-Chek Softclix Lancets   USE TO TEST GLUCOSE QOD   • glucose blood test strip Accu-Chek Dannielle Plus test strips   USE TO CHECK GLUCOSE QOD   • glucose blood test strip Accu-Chek Dannielle Plus Meter   USE TO CHECK GLUCOSE QOD   • [DISCONTINUED] cefdinir (OMNICEF) 300 MG capsule Take 1 capsule by mouth 2 (Two) Times a Day for 4 days.     No current facility-administered medications on file prior to visit.          HISTORY OF PRESENT ILLNESS     Was  hospitalized for rapid atrial fibrillation and possible pneumonia. Started on Pradaxa, rate controlled with beta-blocker. Diuresed with IV lasix and eventually PO lasix with spironolactone. Amlodipine was discontinued. Stress test was negative and reportedly had normal LVEF. He has COPD and complains of ongoing dyspnea with exertion without angina. Denies significant wt change or edema since discharge. Degree of dyspnea is worse than a month ago. He was treated with IV Rocephin and discharged on Omnicef upon discharge for possible pneumonia although there was no convincing evidence of this on the CT (elated procalcitonin level)        REVIEW OF SYSTEMS     No fever or significant wt shift  No angina  Dyspnea without cough; no PND or orthopnea  GI neg for melena or bleeding   Neuro neg for acute change       PHYSICAL EXAMINATION     Physical Exam  No acute distress, appears mildly dyspneic at rest  Heart: irregular with normal rate, no edema of the legs  Lungs: no rales or wheezing, effort normal   Psych: Normal mood and affect. Alert and intact judgment.       REVIEWED DATA     Labs:     Lab Results   Component Value Date     03/10/2021    K 4.1 03/10/2021    AST 24 03/08/2021    ALT 20 03/08/2021    BUN 20 03/10/2021    CREATININE 0.90 03/10/2021    CREATININE 0.82 03/09/2021    CREATININE 1.01 03/08/2021    EGFRIFNONA 82 03/10/2021    EGFRIFAFRI 92 03/03/2020       Lab Results   Component Value Date    HGBA1C 6.20 (H) 03/08/2021    HGBA1C 6.00 (H) 10/23/2020    HGBA1C 6.00 (H) 09/02/2020       Lab Results   Component Value Date    LDL 79 10/23/2020    LDL 90 09/02/2020    HDL 37 (L) 10/23/2020    TRIG 94 10/23/2020       Lab Results   Component Value Date    TSH 1.880 03/08/2021       Lab Results   Component Value Date    WBC 10.66 03/10/2021    HGB 13.8 03/10/2021     03/10/2021         Imaging:   CT SCAN OF THE CHEST WITHOUT CONTRAST     HISTORY: Recent onset of shortness of breath.     FINDINGS:  The CT scan was performed as an emergency procedure through  the chest without contrast. The following findings are present:  1. There are changes of COPD and there is some minimal faint groundglass  opacity in the lungs which is nonspecific but could relate to CHF as  suggested on today's chest x-ray. There are no associated pleural  effusions and there is no focal pneumonia.  2. There is no mediastinal or hilar or axillary adenopathy. There is a  very small pericardial effusion measuring 6 mm. The CT images through  the upper liver, spleen, and both adrenal glands are unremarkable.      This report was finalized on 3/7/2021        Medical Tests:     Stress test with MPI 3/8/21:  · Left ventricular ejection fraction is normal. (Calculated EF = 60%).  · Myocardial perfusion imaging indicates a normal myocardial perfusion study with no evidence of ischemia.  · Impressions are consistent with a low risk study.  · Diaphragmatic attenuation artifact is present.      Summary of old records / correspondence / consultant report:           Request outside records:             *Examiner was wearing KN95 mask, face shield and exam gloves during the entire duration of the visit. Patient was masked the entire time.   Minimum social distance of 6 ft maintained entire visit except if physical contact was necessary as documented.     **Dragon Disclaimer:   Much of this encounter note is an electronic transcription/translation of spoken language to printed text. The electronic translation of spoken language may permit erroneous, or at times, nonsensical words or phrases to be inadvertently transcribed. Although I have reviewed the note for such errors, some may still exist.     Template created by Adelso Amezcua MD

## 2021-03-15 NOTE — ASSESSMENT & PLAN NOTE
Recently hospitalization for atrial fibrillation with RVR.   Started on Pradaxa and is on rate control with metoprolol succinate. Continue follow-up with cardiologist.

## 2021-03-16 LAB
BUN SERPL-MCNC: 23 MG/DL (ref 8–23)
BUN/CREAT SERPL: 21.9 (ref 7–25)
CALCIUM SERPL-MCNC: 9.1 MG/DL (ref 8.6–10.5)
CHLORIDE SERPL-SCNC: 102 MMOL/L (ref 98–107)
CO2 SERPL-SCNC: 27.5 MMOL/L (ref 22–29)
CREAT SERPL-MCNC: 1.05 MG/DL (ref 0.76–1.27)
GLUCOSE SERPL-MCNC: 154 MG/DL (ref 65–99)
POTASSIUM SERPL-SCNC: 4.4 MMOL/L (ref 3.5–5.2)
SODIUM SERPL-SCNC: 138 MMOL/L (ref 136–145)

## 2021-03-16 NOTE — PROGRESS NOTES
[place]:    Aj, here are the result(s) of your test(s):     Kidney function and electrolytes are stable. Continue current plans.     Please do not hesitate to contact me if you have questions.

## 2021-03-18 ENCOUNTER — READMISSION MANAGEMENT (OUTPATIENT)
Dept: CALL CENTER | Facility: HOSPITAL | Age: 76
End: 2021-03-18

## 2021-03-18 NOTE — OUTREACH NOTE
Medical Week 2 Survey      Responses   [facility] patient discharged from?  [place]   Does the patient have one of the following disease processes/diagnoses(primary or secondary)?  Other   Week 2 attempt successful?  No   Unsuccessful attempts  Attempt 1          Sanjeev Armas RN

## 2021-03-20 ENCOUNTER — READMISSION MANAGEMENT (OUTPATIENT)
Dept: CALL CENTER | Facility: HOSPITAL | Age: 76
End: 2021-03-20

## 2021-03-20 NOTE — OUTREACH NOTE
Medical Week 2 Survey      Responses   [facility] patient discharged from?  [place]   Does the patient have one of the following disease processes/diagnoses(primary or secondary)?  Other   Week 2 attempt successful?  No   Unsuccessful attempts  Attempt 2          Gloria Loepz RN

## 2021-03-28 ENCOUNTER — READMISSION MANAGEMENT (OUTPATIENT)
Dept: CALL CENTER | Facility: HOSPITAL | Age: 76
End: 2021-03-28

## 2021-03-28 NOTE — OUTREACH NOTE
Medical Week 3 Survey      Responses   [facility] [place] patient discharged from?  [place]   Does the patient have one of the following disease processes/diagnoses(primary or secondary)?  Other   Week 3 attempt successful?  No   Unsuccessful attempts  Attempt 1          Brooke Edmonds RN

## 2021-03-29 ENCOUNTER — OFFICE VISIT (OUTPATIENT)
Dept: INTERNAL MEDICINE | Age: 76
End: 2021-03-29

## 2021-03-29 VITALS
OXYGEN SATURATION: 99 % | TEMPERATURE: 98.2 F | DIASTOLIC BLOOD PRESSURE: 80 MMHG | BODY MASS INDEX: 27.55 KG/M2 | HEIGHT: 69 IN | WEIGHT: 186 LBS | SYSTOLIC BLOOD PRESSURE: 120 MMHG | HEART RATE: 59 BPM

## 2021-03-29 DIAGNOSIS — I10 ESSENTIAL HYPERTENSION: Chronic | ICD-10-CM

## 2021-03-29 DIAGNOSIS — I48.91 ATRIAL FIBRILLATION WITH RAPID VENTRICULAR RESPONSE (HCC): Chronic | ICD-10-CM

## 2021-03-29 DIAGNOSIS — J44.1 CHRONIC OBSTRUCTIVE PULMONARY DISEASE WITH ACUTE EXACERBATION (HCC): Chronic | ICD-10-CM

## 2021-03-29 DIAGNOSIS — E11.65 TYPE 2 DIABETES MELLITUS WITH HYPERGLYCEMIA, WITHOUT LONG-TERM CURRENT USE OF INSULIN (HCC): Primary | Chronic | ICD-10-CM

## 2021-03-29 DIAGNOSIS — K21.9 GASTROESOPHAGEAL REFLUX DISEASE, UNSPECIFIED WHETHER ESOPHAGITIS PRESENT: Chronic | ICD-10-CM

## 2021-03-29 PROCEDURE — 99214 OFFICE O/P EST MOD 30 MIN: CPT | Performed by: INTERNAL MEDICINE

## 2021-03-29 NOTE — ASSESSMENT & PLAN NOTE
Continue metoprolol succ 100 mg qPM and 50 mg qAM.   Continue losartan 25 mg qd.  Continue spironolactone 25 mg qd and furosemide 40 mg qd.     Lab Results   Component Value Date     03/16/2021    K 4.4 03/16/2021    CALCIUM 9.1 03/16/2021    AST 24 03/08/2021    ALT 20 03/08/2021    BUN 23 03/16/2021    CREATININE 1.05 03/16/2021    CREATININE 0.90 03/10/2021    CREATININE 0.82 03/09/2021

## 2021-03-29 NOTE — PROGRESS NOTES
I N T E R N A L  M E D I C I N E  J U N O H  K I M,  M D      ENCOUNTER DATE:  03/29/2021    Aj Pattonb / 75 y.o. / male      CHIEF COMPLAINT / REASON FOR OFFICE VISIT     Atrial fibrillation with rapid ventricular response, Chronic obstructive pulmonary disease with acute exacerbatio, Hypertension, Diabetes, and Gastroesophageal reflux disease       ASSESSMENT & PLAN     Problem List Items Addressed This Visit        High    Chronic obstructive pulmonary disease with acute exacerbation (CMS/HCC) (Chronic)    Overview     *Esterle    PFT (5/2018): moderate obstruction with decreased diffusing capacity.          Current Assessment & Plan     Doing better overall on higher dose Trelegy 200. Has Levalbuterol to use PRN.            Relevant Medications    albuterol sulfate  (90 Base) MCG/ACT inhaler    Fluticasone-Umeclidin-Vilant 200-62.5-25 MCG/INH aerosol powder     levalbuterol (XOPENEX HFA) 45 MCG/ACT inhaler    Type 2 diabetes mellitus with hyperglycemia, without long-term current use of insulin (CMS/[facility]) - Primary (Chronic)    Overview     Continue metformin  mg daily with dinner.          Relevant Medications    metFORMIN ER (GLUCOPHAGE-XR) 500 MG 24 hr tablet       Medium    Hypertension (Chronic)    Current Assessment & Plan     Continue metoprolol succ 100 mg qPM and 50 mg qAM.   Continue losartan 25 mg qd.  Continue spironolactone 25 mg qd and furosemide 40 mg qd.     Lab Results   Component Value Date     03/16/2021    K 4.4 03/16/2021    CALCIUM 9.1 03/16/2021    AST 24 03/08/2021    ALT 20 03/08/2021    BUN 23 03/16/2021    CREATININE 1.05 03/16/2021    CREATININE 0.90 03/10/2021    CREATININE 0.82 03/09/2021             Relevant Medications    furosemide (LASIX) 40 MG tablet    metoprolol succinate XL (TOPROL-XL) 100 MG 24 hr tablet    spironolactone (ALDACTONE) 25 MG tablet    losartan (COZAAR) 25 MG tablet    metoprolol succinate XL (TOPROL-XL) 50 MG 24 hr tablet    Atrial  "fibrillation with rapid ventricular response (CMS/HCC) (Chronic)    Overview     *Imburgia         Relevant Medications    clopidogrel (Plavix) 75 MG tablet    metoprolol succinate XL (TOPROL-XL) 100 MG 24 hr tablet    metoprolol succinate XL (TOPROL-XL) 50 MG 24 hr tablet    levalbuterol (XOPENEX HFA) 45 MCG/ACT inhaler    Gastroesophageal reflux disease (Chronic)    Current Assessment & Plan     Continue pantoprazole 40 mg BID.   Dietary modifications. May use OTC antacid PRN.          Relevant Medications    pantoprazole (PROTONIX) 40 MG EC tablet        No orders of the defined types were placed in this encounter.    No orders of the defined types were placed in this encounter.      SUMMARY/DISCUSSION  •       Next Appointment with me: 6/7/2021    Return in about 4 months (around 7/29/2021) for Reassess chronic medical problems.      VITAL SIGNS     Visit Vitals  /80 (BP Location: Left arm)   Pulse 59   Temp 98.2 °F (36.8 °C)   Ht 175.3 cm (69.02\")   Wt 84.4 kg (186 lb)   SpO2 99%   BMI 27.45 kg/m²       BP Readings from Last 3 Encounters:   03/29/21 120/80   03/15/21 126/64   03/10/21 140/91     Wt Readings from Last 3 Encounters:   03/29/21 84.4 kg (186 lb)   03/15/21 85.7 kg (189 lb)   03/10/21 83.1 kg (183 lb 3.2 oz)     Body mass index is 27.45 kg/m².      MEDICATIONS AT THE TIME OF OFFICE VISIT     Current Outpatient Medications on File Prior to Visit   Medication Sig   • Accu-Chek Softclix Lancets lancets Accu-Chek Softclix Lancets   USE TO TEST GLUCOSE QOD   • acetaminophen (TYLENOL) 325 MG tablet Take 2 tablets by mouth Every 4 (Four) Hours As Needed for Mild Pain .   • albuterol sulfate  (90 Base) MCG/ACT inhaler Inhale 2 puffs Every 4 (Four) Hours As Needed for Wheezing. Typically does not need to use but has just incase   • ALPHAGAN P 0.1 % solution ophthalmic solution Administer 1 drop to both eyes 2 (two) times a day.   • atorvastatin (LIPITOR) 80 MG tablet Take 1 tablet by mouth Every " "Night.   • clopidogrel (Plavix) 75 MG tablet Take 1 tablet by mouth Daily.   • dabigatran etexilate (Pradaxa) 150 MG capsu Take 1 capsule by mouth 2 (Two) Times a Day.   • Fluticasone-Umeclidin-Vilant 200-62.5-25 MCG/INH aerosol powder  Inhale 1 puff Daily. Just increased to this strength 3/21   • furosemide (LASIX) 40 MG tablet Take 1 tablet by mouth Daily.   • glucose blood test strip Accu-Chek Dannielle Plus test strips   USE TO CHECK GLUCOSE QOD   • glucose blood test strip Accu-Chek Dannielle Plus Meter   USE TO CHECK GLUCOSE QOD   • levalbuterol (XOPENEX HFA) 45 MCG/ACT inhaler Inhale 1-2 puffs Every 6 (Six) Hours As Needed for Wheezing or Shortness of Air.   • losartan (COZAAR) 25 MG tablet Take 1 tablet by mouth Daily.   • metFORMIN ER (GLUCOPHAGE-XR) 500 MG 24 hr tablet TAKE 1 TABLET BY MOUTH DAILY WITH DINNER   • metoprolol succinate XL (TOPROL-XL) 100 MG 24 hr tablet Take 1 tablet by mouth Every Night. Take 100 mg at night and 50 mg in the morning   • metoprolol succinate XL (TOPROL-XL) 50 MG 24 hr tablet Take 1 tablet by mouth Daily. Take 50 mg every morning and 100 mg every night   • pantoprazole (PROTONIX) 40 MG EC tablet Take 1 tablet by mouth 2 (two) times a day.   • spironolactone (ALDACTONE) 25 MG tablet Take 1 tablet by mouth Daily.   • Tafluprost, PF, (ZIOPTAN) 0.0015 % solution ophthalmic solution Administer 1 drop to both eyes Every Night.   • tamsulosin (FLOMAX) 0.4 MG capsule 24 hr capsule Take 2 capsules by mouth Every Night.     No current facility-administered medications on file prior to visit.          HISTORY OF PRESENT ILLNESS     Overall breathing is noticeably better with higher dose Trelegy.   Denies chest pain or increased heart palpitations since last hospitalization.   Complains of intermittent breakthrough \"indigestion\" on pantoprazole 40 mg BID.   Diabetes remains stable on metformin.   Home blood pressure is well controlled.         REVIEW OF SYSTEMS           PHYSICAL EXAMINATION "     Physical Exam  No acute distress, less dyspneic  Heart rate is normal, rhythm is irregular  Lungs are clear without wheezing or rales       REVIEWED DATA     Labs:     Lab Results   Component Value Date     03/16/2021    K 4.4 03/16/2021    CALCIUM 9.1 03/16/2021    AST 24 03/08/2021    ALT 20 03/08/2021    BUN 23 03/16/2021    CREATININE 1.05 03/16/2021    CREATININE 0.90 03/10/2021    CREATININE 0.82 03/09/2021    EGFRIFNONA 69 03/16/2021    EGFRIFAFRI 84 03/16/2021       Lab Results   Component Value Date    HGBA1C 6.20 (H) 03/08/2021    HGBA1C 6.00 (H) 10/23/2020    HGBA1C 6.00 (H) 09/02/2020       Lab Results   Component Value Date    LDL 79 10/23/2020    LDL 90 09/02/2020    HDL 37 (L) 10/23/2020    TRIG 94 10/23/2020       Lab Results   Component Value Date    TSH 1.880 03/08/2021       Lab Results   Component Value Date    WBC 10.66 03/10/2021    HGB 13.8 03/10/2021     03/10/2021         Imaging:           Medical Tests:           Summary of old records / correspondence / consultant report:           Request outside records:             *Examiner was wearing KN95 mask, face shield and exam gloves during the entire duration of the visit. Patient was masked the entire time.   Minimum social distance of 6 ft maintained entire visit except if physical contact was necessary as documented.     **Dragon Disclaimer:   Much of this encounter note is an electronic transcription/translation of spoken language to printed text. The electronic translation of spoken language may permit erroneous, or at times, nonsensical words or phrases to be inadvertently transcribed. Although I have reviewed the note for such errors, some may still exist.     Template created by Adelso Amezcua MD

## 2021-03-30 ENCOUNTER — READMISSION MANAGEMENT (OUTPATIENT)
Dept: CALL CENTER | Facility: HOSPITAL | Age: 76
End: 2021-03-30

## 2021-03-30 NOTE — OUTREACH NOTE
Medical Week 3 Survey      Responses   [facility] [place] patient discharged from?  [place]   Does the patient have one of the following disease processes/diagnoses(primary or secondary)?  Other   Week 3 attempt successful?  No   Unsuccessful attempts  Attempt 2 [UNABLE TO REACH AFTER ATTEMPTING PATIENT'S AND WIFE'S PHONE NUMBERS]          Shell Linton LPN

## 2021-04-26 DIAGNOSIS — K21.9 GASTROESOPHAGEAL REFLUX DISEASE, UNSPECIFIED WHETHER ESOPHAGITIS PRESENT: ICD-10-CM

## 2021-04-26 RX ORDER — PANTOPRAZOLE SODIUM 40 MG/1
40 TABLET, DELAYED RELEASE ORAL 2 TIMES DAILY
Qty: 60 TABLET | Refills: 11 | Status: SHIPPED | OUTPATIENT
Start: 2021-04-26 | End: 2021-12-06 | Stop reason: SDUPTHER

## 2021-04-26 NOTE — TELEPHONE ENCOUNTER
Caller: Aj Salazar    Relationship: Self    Best call back number: [phone]    Medication needed:   Requested Prescriptions     Pending Prescriptions Disp Refills   • pantoprazole (PROTONIX) 40 MG EC tablet       Sig: Take 1 tablet by mouth 2 (two) times a day.       When do you need the refill by: 4/28/2021    What additional details did the patient provide when requesting the medication: PATIENT STATES THE SCRIPT NEEDS TO BE SENT OVER TO SHOW HE TAKES IT TWICE DAILY.    Does the patient have less than a 3 day supply:  [] Yes  [x] No    What is the patient's preferred pharmacy: [id number] [place] [id number] AT [facility] [address] &  - [phone]  - [phone] FX

## 2021-05-29 DIAGNOSIS — I48.91 ATRIAL FIBRILLATION WITH RAPID VENTRICULAR RESPONSE (HCC): ICD-10-CM

## 2021-05-29 DIAGNOSIS — J44.1 CHRONIC OBSTRUCTIVE PULMONARY DISEASE WITH ACUTE EXACERBATION (HCC): ICD-10-CM

## 2021-06-01 RX ORDER — LEVALBUTEROL TARTRATE 45 UG/1
AEROSOL, METERED ORAL
Qty: 15 G | Refills: 2 | Status: SHIPPED | OUTPATIENT
Start: 2021-06-01 | End: 2022-05-03

## 2021-06-07 ENCOUNTER — OFFICE VISIT (OUTPATIENT)
Dept: INTERNAL MEDICINE | Age: 76
End: 2021-06-07

## 2021-06-07 VITALS
HEIGHT: 69 IN | WEIGHT: 182 LBS | BODY MASS INDEX: 26.96 KG/M2 | SYSTOLIC BLOOD PRESSURE: 102 MMHG | DIASTOLIC BLOOD PRESSURE: 52 MMHG | OXYGEN SATURATION: 98 % | HEART RATE: 85 BPM | TEMPERATURE: 97.3 F

## 2021-06-07 DIAGNOSIS — I10 ESSENTIAL HYPERTENSION: Chronic | ICD-10-CM

## 2021-06-07 DIAGNOSIS — I48.91 ATRIAL FIBRILLATION WITH RAPID VENTRICULAR RESPONSE (HCC): Chronic | ICD-10-CM

## 2021-06-07 DIAGNOSIS — J44.1 CHRONIC OBSTRUCTIVE PULMONARY DISEASE WITH ACUTE EXACERBATION (HCC): Chronic | ICD-10-CM

## 2021-06-07 DIAGNOSIS — E11.65 TYPE 2 DIABETES MELLITUS WITH HYPERGLYCEMIA, WITHOUT LONG-TERM CURRENT USE OF INSULIN (HCC): Primary | Chronic | ICD-10-CM

## 2021-06-07 PROCEDURE — 99214 OFFICE O/P EST MOD 30 MIN: CPT | Performed by: INTERNAL MEDICINE

## 2021-06-07 NOTE — PROGRESS NOTES
I N T E R N A L  M E D I C I N E  J U N O H  K I M,  M D      ENCOUNTER DATE:  06/07/2021    Aj Salazar / 75 y.o. / male      CHIEF COMPLAINT / REASON FOR OFFICE VISIT     Diabetes, Hypertension, COPD, and Atrial fibrillation with rapid ventricular response      ASSESSMENT & PLAN     Problem List Items Addressed This Visit        High    Type 2 diabetes mellitus with hyperglycemia, without long-term current use of insulin (CMS/HCC) - Primary (Chronic)    Overview     Continue metformin  mg daily with dinner.          Relevant Medications    metFORMIN ER (GLUCOPHAGE-XR) 500 MG 24 hr tablet       Medium    Chronic obstructive pulmonary disease with acute exacerbation (CMS/HCC) (Chronic)    Overview     *Esterle    PFT (5/2018): moderate obstruction with decreased diffusing capacity.     Continue Trelegy 200 daily and continue follow-up with pulmonologist.         Relevant Medications    Fluticasone-Umeclidin-Vilant 200-62.5-25 MCG/INH aerosol powder     levalbuterol (XOPENEX HFA) 45 MCG/ACT inhaler    Hypertension (Chronic)    Current Assessment & Plan     Continue metoprolol succ 100 mg qPM and 50 mg qAM.   Continue losartan 25 mg qd.  Continue spironolactone 25 mg qd and furosemide 40 mg qd.           Relevant Medications    furosemide (LASIX) 40 MG tablet    metoprolol succinate XL (TOPROL-XL) 100 MG 24 hr tablet    spironolactone (ALDACTONE) 25 MG tablet    losartan (COZAAR) 25 MG tablet    metoprolol succinate XL (TOPROL-XL) 50 MG 24 hr tablet    Atrial fibrillation with rapid ventricular response (CMS/HCC) (Chronic)    Overview     *Imburgia         Current Assessment & Plan     Continue Pradaxa and metoprolol XL 50 mg qAM and 100 mg qHS.          Relevant Medications    clopidogrel (Plavix) 75 MG tablet    metoprolol succinate XL (TOPROL-XL) 100 MG 24 hr tablet    metoprolol succinate XL (TOPROL-XL) 50 MG 24 hr tablet    levalbuterol (XOPENEX HFA) 45 MCG/ACT inhaler        No orders of the defined  "types were placed in this encounter.    No orders of the defined types were placed in this encounter.      SUMMARY/DISCUSSION  •       Next Appointment with me: Visit date not found    Return in about 6 months (around 12/7/2021) for Reassess chronic medical problems.      VITAL SIGNS     Visit Vitals  /52 (BP Location: Left arm)   Pulse 85   Temp 97.3 °F (36.3 °C)   Ht 175.3 cm (69.02\")   Wt 82.6 kg (182 lb)   SpO2 98%   BMI 26.86 kg/m²       BP Readings from Last 3 Encounters:   06/07/21 102/52   03/29/21 120/80   03/15/21 126/64     Wt Readings from Last 3 Encounters:   06/07/21 82.6 kg (182 lb)   03/29/21 84.4 kg (186 lb)   03/15/21 85.7 kg (189 lb)     Body mass index is 26.86 kg/m².      MEDICATIONS AT THE TIME OF OFFICE VISIT     Current Outpatient Medications on File Prior to Visit   Medication Sig   • Accu-Chek Softclix Lancets lancets Accu-Chek Softclix Lancets   USE TO TEST GLUCOSE QOD   • acetaminophen (TYLENOL) 325 MG tablet Take 2 tablets by mouth Every 4 (Four) Hours As Needed for Mild Pain .   • ALPHAGAN P 0.1 % solution ophthalmic solution Administer 1 drop to both eyes 2 (two) times a day.   • atorvastatin (LIPITOR) 80 MG tablet Take 1 tablet by mouth Every Night.   • clopidogrel (Plavix) 75 MG tablet Take 1 tablet by mouth Daily.   • dabigatran etexilate (Pradaxa) 150 MG capsu Take 1 capsule by mouth 2 (Two) Times a Day.   • Fluticasone-Umeclidin-Vilant 200-62.5-25 MCG/INH aerosol powder  Inhale 1 puff Daily. Just increased to this strength 3/21   • furosemide (LASIX) 40 MG tablet Take 1 tablet by mouth Daily.   • glucose blood test strip Accu-Chek Dannielle Plus test strips   USE TO CHECK GLUCOSE QOD   • glucose blood test strip Accu-Chek Dannielle Plus Meter   USE TO CHECK GLUCOSE QOD   • levalbuterol (XOPENEX HFA) 45 MCG/ACT inhaler INHALE 1 TO 2 PUFFS BY MOUTH EVERY 6 HOURS AS NEEDED FOR WHEEZING OR SHORTNESS OF BREATH   • losartan (COZAAR) 25 MG tablet Take 1 tablet by mouth Daily.   • metFORMIN " ER (GLUCOPHAGE-XR) 500 MG 24 hr tablet TAKE 1 TABLET BY MOUTH DAILY WITH DINNER   • metoprolol succinate XL (TOPROL-XL) 100 MG 24 hr tablet Take 1 tablet by mouth Every Night. Take 100 mg at night and 50 mg in the morning   • metoprolol succinate XL (TOPROL-XL) 50 MG 24 hr tablet Take 1 tablet by mouth Daily. Take 50 mg every morning and 100 mg every night   • pantoprazole (PROTONIX) 40 MG EC tablet Take 1 tablet by mouth 2 (two) times a day.   • spironolactone (ALDACTONE) 25 MG tablet Take 1 tablet by mouth Daily.   • Tafluprost, PF, (ZIOPTAN) 0.0015 % solution ophthalmic solution Administer 1 drop to both eyes Every Night.   • tamsulosin (FLOMAX) 0.4 MG capsule 24 hr capsule Take 2 capsules by mouth Every Night.   • [DISCONTINUED] albuterol sulfate  (90 Base) MCG/ACT inhaler Inhale 2 puffs Every 4 (Four) Hours As Needed for Wheezing. Typically does not need to use but has just incase     No current facility-administered medications on file prior to visit.          HISTORY OF PRESENT ILLNESS     Doing fine without problems. Rarely needs to use rescue inhaler now.   Diabetes and blood pressure remain well controlled at home.       Patient Care Team:  Neftali Amezcua MD as PCP - General (Internal Medicine)  Juan Colby MD as Consulting Physician (Cardiology)  Ankush Sky MD as Surgeon (Orthopedic Surgery)  Vale Del Rosario APRN as Nurse Practitioner (Oncology)  Darrion Peña MD as Consulting Physician (Urology)  Tahir Childs MD as Consulting Physician (Pulmonary Disease)  Noman Moseley MD as Consulting Physician (Gastroenterology)  Aura Perry OD (Optometry)  Juwan Diane MD as Consulting Physician (Ophthalmology)    REVIEW OF SYSTEMS     Constitutional neg except per HPI   Resp neg  CV neg   GI neg   Neuro neg       PHYSICAL EXAMINATION     Physical Exam  General: Alert with intact judgment   Cardiovascular Rate: normal. Rhythm: Irregular. Heart sounds: normal. No  edema.   Pulm/Chest: Effort normal, breath sounds normal.       REVIEWED DATA     Labs:     Lab Results   Component Value Date     03/16/2021    K 4.4 03/16/2021    CALCIUM 9.1 03/16/2021    AST 24 03/08/2021    ALT 20 03/08/2021    BUN 23 03/16/2021    CREATININE 1.05 03/16/2021    CREATININE 0.90 03/10/2021    CREATININE 0.82 03/09/2021    EGFRIFNONA 69 03/16/2021    EGFRIFAFRI 84 03/16/2021       Lab Results   Component Value Date    HGBA1C 6.20 (H) 03/08/2021    HGBA1C 6.00 (H) 10/23/2020    HGBA1C 6.00 (H) 09/02/2020       Lab Results   Component Value Date    LDL 79 10/23/2020    LDL 90 09/02/2020    HDL 37 (L) 10/23/2020    TRIG 94 10/23/2020       Lab Results   Component Value Date    TSH 1.880 03/08/2021    TSH 2.45 10/14/2015       Lab Results   Component Value Date    WBC 10.66 03/10/2021    HGB 13.8 03/10/2021     03/10/2021         Imaging:           Medical Tests:           Summary of old records / correspondence / consultant report:           Request outside records:             *Examiner was wearing KN95 mask during the entire duration of the visit. Patient was masked the entire time.   Minimum social distance of 6 ft maintained entire visit except if physical contact was necessary as documented.     **Dragon Disclaimer:   Much of this encounter note is an electronic transcription/translation of spoken language to printed text. The electronic translation of spoken language may permit erroneous, or at times, nonsensical words or phrases to be inadvertently transcribed. Although I have reviewed the note for such errors, some may still exist.     Template created by Adelso Amezcua MD

## 2021-06-07 NOTE — ASSESSMENT & PLAN NOTE
Continue metoprolol succ 100 mg qPM and 50 mg qAM.   Continue losartan 25 mg qd.  Continue spironolactone 25 mg qd and furosemide 40 mg qd.

## 2021-07-01 ENCOUNTER — APPOINTMENT (OUTPATIENT)
Dept: CT IMAGING | Facility: HOSPITAL | Age: 76
End: 2021-07-01

## 2021-07-01 ENCOUNTER — APPOINTMENT (OUTPATIENT)
Dept: ULTRASOUND IMAGING | Facility: HOSPITAL | Age: 76
End: 2021-07-01

## 2021-07-01 ENCOUNTER — HOSPITAL ENCOUNTER (EMERGENCY)
Facility: HOSPITAL | Age: 76
Discharge: HOME | End: 2021-07-01
Attending: EMERGENCY MEDICINE | Admitting: EMERGENCY MEDICINE

## 2021-07-01 VITALS
OXYGEN SATURATION: 95 % | TEMPERATURE: 96.3 F | WEIGHT: 183.2 LBS | SYSTOLIC BLOOD PRESSURE: 131 MMHG | HEART RATE: 98 BPM | BODY MASS INDEX: 26.23 KG/M2 | HEIGHT: 70 IN | RESPIRATION RATE: 16 BRPM | DIASTOLIC BLOOD PRESSURE: 90 MMHG

## 2021-07-01 DIAGNOSIS — E11.9 TYPE 2 DIABETES MELLITUS WITHOUT COMPLICATION, WITHOUT LONG-TERM CURRENT USE OF INSULIN (HCC): ICD-10-CM

## 2021-07-01 DIAGNOSIS — N39.0 ACUTE UTI: Primary | ICD-10-CM

## 2021-07-01 DIAGNOSIS — N44.2 TESTICULAR CYST: ICD-10-CM

## 2021-07-01 LAB
ALBUMIN SERPL-MCNC: 4.1 G/DL (ref 3.5–5.2)
ALBUMIN/GLOB SERPL: 1.3 G/DL
ALP SERPL-CCNC: 103 U/L (ref 39–117)
ALT SERPL W P-5'-P-CCNC: 19 U/L (ref 1–41)
ANION GAP SERPL CALCULATED.3IONS-SCNC: 10.8 MMOL/L (ref 5–15)
AST SERPL-CCNC: 19 U/L (ref 1–40)
BACTERIA UR QL AUTO: ABNORMAL /HPF
BASOPHILS # BLD AUTO: 0.06 10*3/MM3 (ref 0–0.2)
BASOPHILS NFR BLD AUTO: 0.6 % (ref 0–1.5)
BILIRUB SERPL-MCNC: 1 MG/DL (ref 0–1.2)
BILIRUB UR QL STRIP: NEGATIVE
BUN SERPL-MCNC: 13 MG/DL (ref 8–23)
BUN/CREAT SERPL: 16 (ref 7–25)
CALCIUM SPEC-SCNC: 9 MG/DL (ref 8.6–10.5)
CHLORIDE SERPL-SCNC: 105 MMOL/L (ref 98–107)
CLARITY UR: ABNORMAL
CO2 SERPL-SCNC: 26.2 MMOL/L (ref 22–29)
COLOR UR: YELLOW
CREAT SERPL-MCNC: 0.81 MG/DL (ref 0.76–1.27)
DEPRECATED RDW RBC AUTO: 43.3 FL (ref 37–54)
EOSINOPHIL # BLD AUTO: 0.17 10*3/MM3 (ref 0–0.4)
EOSINOPHIL NFR BLD AUTO: 1.8 % (ref 0.3–6.2)
ERYTHROCYTE [DISTWIDTH] IN BLOOD BY AUTOMATED COUNT: 13 % (ref 12.3–15.4)
GFR SERPL CREATININE-BSD FRML MDRD: 93 ML/MIN/1.73
GLOBULIN UR ELPH-MCNC: 3.1 GM/DL
GLUCOSE SERPL-MCNC: 173 MG/DL (ref 65–99)
GLUCOSE UR STRIP-MCNC: NEGATIVE MG/DL
HCT VFR BLD AUTO: 46.2 % (ref 37.5–51)
HGB BLD-MCNC: 15.5 G/DL (ref 13–17.7)
HGB UR QL STRIP.AUTO: ABNORMAL
HYALINE CASTS UR QL AUTO: ABNORMAL /LPF
IMM GRANULOCYTES # BLD AUTO: 0.08 10*3/MM3 (ref 0–0.05)
IMM GRANULOCYTES NFR BLD AUTO: 0.8 % (ref 0–0.5)
KETONES UR QL STRIP: NEGATIVE
LEUKOCYTE ESTERASE UR QL STRIP.AUTO: ABNORMAL
LIPASE SERPL-CCNC: 25 U/L (ref 13–60)
LYMPHOCYTES # BLD AUTO: 1.32 10*3/MM3 (ref 0.7–3.1)
LYMPHOCYTES NFR BLD AUTO: 13.9 % (ref 19.6–45.3)
MCH RBC QN AUTO: 30.6 PG (ref 26.6–33)
MCHC RBC AUTO-ENTMCNC: 33.5 G/DL (ref 31.5–35.7)
MCV RBC AUTO: 91.3 FL (ref 79–97)
MONOCYTES # BLD AUTO: 0.57 10*3/MM3 (ref 0.1–0.9)
MONOCYTES NFR BLD AUTO: 6 % (ref 5–12)
NEUTROPHILS NFR BLD AUTO: 7.33 10*3/MM3 (ref 1.7–7)
NEUTROPHILS NFR BLD AUTO: 76.9 % (ref 42.7–76)
NITRITE UR QL STRIP: POSITIVE
NRBC BLD AUTO-RTO: 0 /100 WBC (ref 0–0.2)
PH UR STRIP.AUTO: <=5 [PH] (ref 5–8)
PLATELET # BLD AUTO: 250 10*3/MM3 (ref 140–450)
PMV BLD AUTO: 9.4 FL (ref 6–12)
POTASSIUM SERPL-SCNC: 3.9 MMOL/L (ref 3.5–5.2)
PROT SERPL-MCNC: 7.2 G/DL (ref 6–8.5)
PROT UR QL STRIP: NEGATIVE
RBC # BLD AUTO: 5.06 10*6/MM3 (ref 4.14–5.8)
RBC # UR: ABNORMAL /HPF
REF LAB TEST METHOD: ABNORMAL
SODIUM SERPL-SCNC: 142 MMOL/L (ref 136–145)
SP GR UR STRIP: 1.01 (ref 1–1.03)
SQUAMOUS #/AREA URNS HPF: ABNORMAL /HPF
UROBILINOGEN UR QL STRIP: ABNORMAL
WBC # BLD AUTO: 9.53 10*3/MM3 (ref 3.4–10.8)
WBC UR QL AUTO: ABNORMAL /HPF

## 2021-07-01 PROCEDURE — 25010000002 IOPAMIDOL 61 % SOLUTION: Performed by: EMERGENCY MEDICINE

## 2021-07-01 PROCEDURE — 87186 SC STD MICRODIL/AGAR DIL: CPT | Performed by: PHYSICIAN ASSISTANT

## 2021-07-01 PROCEDURE — 99284 EMERGENCY DEPT VISIT MOD MDM: CPT

## 2021-07-01 PROCEDURE — 87086 URINE CULTURE/COLONY COUNT: CPT | Performed by: PHYSICIAN ASSISTANT

## 2021-07-01 PROCEDURE — 80053 COMPREHEN METABOLIC PANEL: CPT | Performed by: EMERGENCY MEDICINE

## 2021-07-01 PROCEDURE — 76870 US EXAM SCROTUM: CPT

## 2021-07-01 PROCEDURE — 85025 COMPLETE CBC W/AUTO DIFF WBC: CPT | Performed by: EMERGENCY MEDICINE

## 2021-07-01 PROCEDURE — 25010000002 ONDANSETRON PER 1 MG: Performed by: EMERGENCY MEDICINE

## 2021-07-01 PROCEDURE — 74177 CT ABD & PELVIS W/CONTRAST: CPT

## 2021-07-01 PROCEDURE — 93976 VASCULAR STUDY: CPT

## 2021-07-01 PROCEDURE — 96374 THER/PROPH/DIAG INJ IV PUSH: CPT

## 2021-07-01 PROCEDURE — 99283 EMERGENCY DEPT VISIT LOW MDM: CPT

## 2021-07-01 PROCEDURE — 81001 URINALYSIS AUTO W/SCOPE: CPT | Performed by: EMERGENCY MEDICINE

## 2021-07-01 PROCEDURE — 83690 ASSAY OF LIPASE: CPT | Performed by: EMERGENCY MEDICINE

## 2021-07-01 PROCEDURE — 87077 CULTURE AEROBIC IDENTIFY: CPT | Performed by: PHYSICIAN ASSISTANT

## 2021-07-01 RX ORDER — MORPHINE SULFATE 2 MG/ML
4 INJECTION, SOLUTION INTRAMUSCULAR; INTRAVENOUS ONCE
Status: DISCONTINUED | OUTPATIENT
Start: 2021-07-01 | End: 2021-07-01 | Stop reason: HOSPADM

## 2021-07-01 RX ORDER — METFORMIN HYDROCHLORIDE 500 MG/1
500 TABLET, EXTENDED RELEASE ORAL
Qty: 30 TABLET | Refills: 11 | Status: SHIPPED | OUTPATIENT
Start: 2021-07-01 | End: 2022-07-28 | Stop reason: SDUPTHER

## 2021-07-01 RX ORDER — CEPHALEXIN 500 MG/1
500 CAPSULE ORAL 3 TIMES DAILY
Qty: 21 CAPSULE | Refills: 0 | Status: SHIPPED | OUTPATIENT
Start: 2021-07-01 | End: 2021-09-17

## 2021-07-01 RX ORDER — SODIUM CHLORIDE 0.9 % (FLUSH) 0.9 %
10 SYRINGE (ML) INJECTION AS NEEDED
Status: DISCONTINUED | OUTPATIENT
Start: 2021-07-01 | End: 2021-07-01 | Stop reason: HOSPADM

## 2021-07-01 RX ORDER — ONDANSETRON 2 MG/ML
4 INJECTION INTRAMUSCULAR; INTRAVENOUS ONCE
Status: COMPLETED | OUTPATIENT
Start: 2021-07-01 | End: 2021-07-01

## 2021-07-01 RX ADMIN — ONDANSETRON 4 MG: 2 INJECTION INTRAMUSCULAR; INTRAVENOUS at 10:26

## 2021-07-01 RX ADMIN — IOPAMIDOL 85 ML: 612 INJECTION, SOLUTION INTRAVENOUS at 11:09

## 2021-07-01 NOTE — ED PROVIDER NOTES
EMERGENCY DEPARTMENT ENCOUNTER    Room Number:  08/08  Date of encounter:  7/1/2021  PCP: Neftali Amezcua MD  Historian: Patient, spouse      I used full protective equipment while examining this patient.  This includes face mask, gloves and protective eyewear.  I washed my hands before entering the room and immediately upon leaving the room      HPI:  Chief Complaint: Groin pain  A complete HPI/ROS/PMH/PSH/SH/FH are unobtainable due to: Nothing    Context: Aj Salazar is a 75 y.o. male who presents to the ED c/o several day history of progressively worsening right inguinal/groin pain.  Patient states the pain started several days ago, and initially was mild and intermittent.  Patient states the pain has become worse in severity and frequency.  The pain is worse with movement.  The pain is localized to the right inguinal area.  Patient states the pain is severe, dull, gnawing.  There is no radiation of pain.  Patient has had polyuria.  He denies any dysuria, nausea, vomiting, diarrhea.    Review of Medical Records  I reviewed patient's last internal medicine office visit from 6/7/2021.  Patient being treated for diabetes, hypertension, COPD.    PAST MEDICAL HISTORY  Active Ambulatory Problems     Diagnosis Date Noted   • Chronic obstructive pulmonary disease with acute exacerbation (CMS/[facility]) 03/17/2016   • Hypertension 03/17/2016   • Osteoarthritis 03/17/2016   • History of smoking greater than 50 pack years 06/23/2017   • Benign prostatic hyperplasia with urinary obstruction 03/27/2018   • Atrial fibrillation with rapid ventricular response (CMS/[facility])    • Type 2 diabetes mellitus with hyperglycemia, without long-term current use of insulin (CMS/[facility]) 01/31/2019   • Rash and nonspecific skin eruption 09/02/2020   • TIA (transient ischemic attack) 10/22/2020   • Atrial fibrillation with RVR (CMS/[facility]) 03/07/2021   • Acute on chronic systolic CHF (congestive heart failure) (CMS/[facility]) 03/08/2021   • Gastroesophageal  reflux disease 03/15/2021     Resolved Ambulatory Problems     Diagnosis Date Noted   • Dehydration 09/25/2018   • Hypotension 09/25/2018   • Melena 09/25/2018   • EMPERATRIZ (acute kidney injury) (CMS/[facility]) 09/25/2018   • Hyponatremia 09/25/2018   • Lactic acidosis 09/25/2018   • Facial droop 10/23/2020   • Acute respiratory failure with hypoxia (CMS/HCC) 03/08/2021   • Sepsis (CMS/HCC) 03/09/2021     Past Medical History:   Diagnosis Date   • Alcohol abuse, in remission    • BPH (benign prostatic hypertrophy)    • COPD (chronic obstructive pulmonary disease) (CMS/[facility])    • Depression    • DJD (degenerative joint disease) of cervical spine    • ED (erectile dysfunction)    • Hx of colonic polyps    • Hyperlipidemia    • Influenza B 02/14/2013   • Nocturnal hypoxia    • Peripheral neuropathy    • Permanent atrial fibrillation (CMS/HCC)    • Rectal bleeding 04/26/2017   • Tendonitis of shoulder 11/07/2007   • Ulcer of the stomach and intestine          PAST SURGICAL HISTORY  Past Surgical History:   Procedure Laterality Date   • APPENDECTOMY     • CARDIOVASCULAR STRESS TEST N/A 10/20/2015    NML LEXISCAN CARDIOLITE PERFUSION STUDY, NO EVIDENCE OF ISCHEMIA, AREA OF HYPOPERFUSION OF THE INFERIOR WALL W/NORMAL WALL PERFUSION AND NML LEFT VENTRICULAR EJECTION FRACTION, MOST LIKELY ATTENUATION. DR.MICHAEL BOX   • COLONOSCOPY N/A 02/2017   • COLONOSCOPY N/A 02/23/2011    4 POLYPS REMOVED, RESCOPE IN 5 YRS, DR. EAGLE   • COLONOSCOPY N/A 03/08/2006    ERYTHEMOUS AND GRANULAR MUCOSA IN ILEOCECAL VALVE, NORMAL COLON, REPEAT IN 5 YRS,    • ENDOSCOPY N/A 9/26/2018    normal esophagus, acute gastritis, multiple non-bleeding duodenal ulcers with pigmented material, H Pylori positive   • HERNIA REPAIR Bilateral     INGUINAL   • JOINT REPLACEMENT  11/2015    left hip   • TOTAL HIP ARTHROPLASTY Left 11/06/2015    Dr Ankush Sky   • TOTAL HIP ARTHROPLASTY Right 10/27/2014    DR.RIED SKY   • VASECTOMY           FAMILY  HISTORY  Family History   Problem Relation Age of Onset   • Colon cancer Mother    • Ovarian cancer Mother    • Alzheimer's disease Mother 70   • Stroke Sister         2016   • Dementia Sister    • Heart disease Brother    • Alcohol abuse Brother    • Coronary artery disease Father    • Hypertension Father    • Colon cancer Maternal Grandmother    • Heart disease Brother    • Coronary artery disease Brother    • Hypertension Brother    • Stroke Brother    • Arthritis Brother    • Prostate cancer Neg Hx          SOCIAL HISTORY  Social History     Socioeconomic History   • Marital status:      Spouse name: Ara*   • Number of children: 3   • Years of education: Not on file   • Highest education level: Not on file   Tobacco Use   • Smoking status: Former Smoker     Packs/day: 2.00     Years: 49.00     Pack years: 98.00     Quit date: 2010     Years since quittin.5   • Smokeless tobacco: Never Used   • Tobacco comment: Began Smoking age 15.  Smoked 2 ppd for 49 years until he quit smoking 7 years ago for a 98 pack year history.   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   • Drug use: No     Comment: caffeine use    • Sexual activity: Defer         ALLERGIES  Bactrim [sulfamethoxazole-trimethoprim]        REVIEW OF SYSTEMS  All systems reviewed and negative except for those discussed in HPI.       PHYSICAL EXAM    I have reviewed the triage vital signs and nursing notes.    ED Triage Vitals   Temp Heart Rate Resp BP SpO2   21 0948 21 0948 21 0948 21 0951 21 0948   96.3 °F (35.7 °C) 106 18 120/83 98 %      Temp src Heart Rate Source Patient Position BP Location FiO2 (%)   -- 21 1007 21 1007 21 1007 --    Monitor Lying Left arm        Physical Exam  GENERAL: Alert, oriented, not distressed  HENT: head atraumatic, no nuchal rigidity  EYES: no scleral icterus, EOMI  CV: regular  rhythm, regular rate, no murmur  RESPIRATORY: normal effort, CTA  ABDOMEN: soft, nontender  : Moderate right inguinal/right scrotal tenderness.  No erythema or significant swelling.  Right testicle benign and nontender.  No obvious hernia.  Penis normal.  MUSCULOSKELETAL: no deformity, FROM, no calf swelling or tenderness  NEURO: alert, moves all extremities, follows commands  SKIN: warm, dry        LAB RESULTS  Recent Results (from the past 24 hour(s))   Comprehensive Metabolic Panel    Collection Time: 07/01/21 10:21 AM    Specimen: Blood   Result Value Ref Range    Glucose 173 (H) 65 - 99 mg/dL    BUN 13 8 - 23 mg/dL    Creatinine 0.81 0.76 - 1.27 mg/dL    Sodium 142 136 - 145 mmol/L    Potassium 3.9 3.5 - 5.2 mmol/L    Chloride 105 98 - 107 mmol/L    CO2 26.2 22.0 - 29.0 mmol/L    Calcium 9.0 8.6 - 10.5 mg/dL    Total Protein 7.2 6.0 - 8.5 g/dL    Albumin 4.10 3.50 - 5.20 g/dL    ALT (SGPT) 19 1 - 41 U/L    AST (SGOT) 19 1 - 40 U/L    Alkaline Phosphatase 103 39 - 117 U/L    Total Bilirubin 1.0 0.0 - 1.2 mg/dL    eGFR Non African Amer 93 >60 mL/min/1.73    Globulin 3.1 gm/dL    A/G Ratio 1.3 g/dL    BUN/Creatinine Ratio 16.0 7.0 - 25.0    Anion Gap 10.8 5.0 - 15.0 mmol/L   Lipase    Collection Time: 07/01/21 10:21 AM    Specimen: Blood   Result Value Ref Range    Lipase 25 13 - 60 U/L   CBC Auto Differential    Collection Time: 07/01/21 10:21 AM    Specimen: Blood   Result Value Ref Range    WBC 9.53 3.40 - 10.80 10*3/mm3    RBC 5.06 4.14 - 5.80 10*6/mm3    Hemoglobin 15.5 13.0 - 17.7 g/dL    Hematocrit 46.2 37.5 - 51.0 %    MCV 91.3 79.0 - 97.0 fL    MCH 30.6 26.6 - 33.0 pg    MCHC 33.5 31.5 - 35.7 g/dL    RDW 13.0 12.3 - 15.4 %    RDW-SD 43.3 37.0 - 54.0 fl    MPV 9.4 6.0 - 12.0 fL    Platelets 250 140 - 450 10*3/mm3    Neutrophil % 76.9 (H) 42.7 - 76.0 %    Lymphocyte % 13.9 (L) 19.6 - 45.3 %    Monocyte % 6.0 5.0 - 12.0 %    Eosinophil % 1.8 0.3 - 6.2 %    Basophil % 0.6 0.0 - 1.5 %    Immature Grans % 0.8  (H) 0.0 - 0.5 %    Neutrophils, Absolute 7.33 (H) 1.70 - 7.00 10*3/mm3    Lymphocytes, Absolute 1.32 0.70 - 3.10 10*3/mm3    Monocytes, Absolute 0.57 0.10 - 0.90 10*3/mm3    Eosinophils, Absolute 0.17 0.00 - 0.40 10*3/mm3    Basophils, Absolute 0.06 0.00 - 0.20 10*3/mm3    Immature Grans, Absolute 0.08 (H) 0.00 - 0.05 10*3/mm3    nRBC 0.0 0.0 - 0.2 /100 WBC   Urinalysis With Microscopic If Indicated (No Culture) - Urine, Clean Catch    Collection Time: 07/01/21 10:27 AM    Specimen: Urine, Clean Catch   Result Value Ref Range    Color, UA Yellow Yellow, Straw    Appearance, UA Cloudy (A) Clear    pH, UA <=5.0 5.0 - 8.0    Specific Gravity, UA 1.013 1.005 - 1.030    Glucose, UA Negative Negative    Ketones, UA Negative Negative    Bilirubin, UA Negative Negative    Blood, UA Trace (A) Negative    Protein, UA Negative Negative    Leuk Esterase, UA Large (3+) (A) Negative    Nitrite, UA Positive (A) Negative    Urobilinogen, UA 0.2 E.U./dL 0.2 - 1.0 E.U./dL   Urinalysis, Microscopic Only - Urine, Clean Catch    Collection Time: 07/01/21 10:27 AM    Specimen: Urine, Clean Catch   Result Value Ref Range    RBC, UA 0-2 None Seen, 0-2 /HPF    WBC, UA Too Numerous to Count (A) None Seen, 0-2 /HPF    Bacteria, UA 4+ (A) None Seen /HPF    Squamous Epithelial Cells, UA 0-2 None Seen, 0-2 /HPF    Hyaline Casts, UA 0-2 None Seen /LPF    Methodology Automated Microscopy        Ordered the above labs and independently reviewed the results.        RADIOLOGY  CT Abdomen Pelvis With Contrast    Result Date: 7/1/2021  CT ABDOMEN AND PELVIS WITH IV CONTRAST  HISTORY: 75-year-old male with left inguinal pain. Appendectomy, bilateral inguinal hernia repair in the past.  TECHNIQUE: Radiation dose reduction techniques were utilized, including automated exposure control and exposure modulation based on body size. 3 mm images were obtained through the abdomen and pelvis after the administration of IV contrast. Compared with previous CT from  09/25/2018.  FINDINGS: The liver appears unremarkable and there is no biliary dilatation. There is a thickened appearance of the gallbladder phrygian cap and there may be a stone within the cap. There is no evidence for cholecystitis. The spleen, pancreas, adrenals, and kidneys appear unremarkable. The urinary bladder is significantly distended and has a slightly thickened wall. No acute bowel abnormality is seen. There is a new tiny amount of free fluid within the dependent aspect of the pelvis. There is extensive streak artifact through the lower pelvis secondary to bilateral hip arthroplasty hardware, and the prostate is essentially not visualized. There are no fluid collections. Extensive abdominal aortic atherosclerotic changes without aneurysmal dilatation. Extensive emphysematous changes are noted at the visualized lower lung fields.      1. Significantly distended urinary bladder and acute cystitis is suspected. 2. Uncertain etiology of the new tiny amount of free fluid within the pelvis. Possibly secondary to 3rd spacing of fluid or the acute cystitis. 3. Extensive emphysema.  Discussed with NARDA Roca.      US Scrotum & Testicles with doppler    Result Date: 7/1/2021  US SCROTUM AND TESTICLES WITH DOPPLER-  INDICATIONS: Right scrotal pain  TECHNIQUE: Scrotal ultrasound with Doppler assessment of the testicles  COMPARISON: None available  FINDINGS:   The right testis measures 4.1 x 3.6 x 2.6 cm, contains a 2 mm cyst, as well as a 6 mm indeterminate cystic lesion with some internal echogenicity, but no demonstrated internal vascularity, advise ultrasound follow-up in 4-6 weeks to characterize change; this finding could represent a cyst with debris or a resolving hematoma, possibility of a cystic neoplasm is not entirely excluded, and if the patient has a history of orchitis, possibility of a small abscess would not be excluded, although there is no hyperemia to suggest that possibility. The left testis  measures 4.0 x 3.0 x 2.9 cm, and appears unremarkable. Vascularity is demonstrated in the bilateral testicles.  A right epididymal cyst measures 7 mm. A left epididymal cyst measures 6 mm. The epididymides otherwise appear unremarkable. Right epididymal head measures 1.5 cm. Left epididymal head measures 1.8 cm.  No hydrocele or varicocele is demonstrated.           1. No evidence for testicular torsion. A 2 mm right testicular cyst, as well as a 6 mm indeterminate cystic lesion of the right testicle, recommend follow-up ultrasound in 4-6 weeks to characterize change. 2. Epididymal cysts.  Discussed by telephone with Rayshawn Govea at 1408, 07/01/2021.    This report was finalized on 7/1/2021 2:13 PM by Dr. Uzair Dawkins M.D.        I ordered the above noted radiological studies. Reviewed by me and discussed with radiologist.  See dictation for official radiology interpretation.      MEDICATIONS GIVEN IN ER    Medications   sodium chloride 0.9 % flush 10 mL (has no administration in time range)   morphine injection 4 mg (0 mg Intravenous Hold 7/1/21 1031)   ondansetron (ZOFRAN) injection 4 mg (4 mg Intravenous Given 7/1/21 1026)   iopamidol (ISOVUE-300) 61 % injection 100 mL (85 mL Intravenous Given by Other 7/1/21 1109)         PROGRESS, DATA ANALYSIS, CONSULTS, AND MEDICAL DECISION MAKING    All labs have been independently reviewed by me.  All radiology studies have been reviewed by me and discussed with radiologist dictating the report.   EKG's independently viewed and interpreted by me.  Discussion below represents my analysis of pertinent findings related to patient's condition, differential diagnosis, treatment plan and final disposition.    I have discussed case with Dr. Toribio, emergency room physician.  He has performed his own bedside examination and agrees with treatment plan.    ED Course as of Jul 01 1507   Thu Jul 01, 2021   1009 Patient presents with several day history of right inguinal pain.   Differential diagnoses include but not limited to hernia, strain, epididymitis, UTI.    [EE]   1100 Bacteria, UA(!): 4+ [EE]   1100 Creatinine: 0.81 [EE]   1112 WBC: 9.53 [EE]   1112 Hemoglobin: 15.5 [EE]   1249 I discussed CT findings with Dr. Wayne.  Patient has no evidence of acute hernia.  There is distention of the bladder.  (Patient has since voided multiple times.).  No other acute intra-abdominal abnormalities.  Plan to check ultrasound of testicles for further evaluation.    [EE]   1424 I discussed ultrasound findings with Dr. Dawkins.  Patient has a 2 mm and a 6 mm testicular cyst on the right.  He recommends follow-up in 4 to 6 weeks for resolution.    [EE]   1502 I updated patient on plan.  We will start him on antibiotics and have him follow-up Dr. Peña in urology.    [EE]      ED Course User Index  [EE] Rayshawn Govea PA       AS OF 15:07 EDT VITALS:    BP - 131/98  HR - 103  TEMP - 96.3 °F (35.7 °C)  O2 SATS - 90%        DIAGNOSIS  Final diagnoses:   Acute UTI   Testicular cyst         DISPOSITION  Discharged           Rayshawn Govea PA  07/01/21 1504

## 2021-07-01 NOTE — ED PROVIDER NOTES
Brief history of present illness: 75-year-old male complains of right groin pain that occasionally radiates to the right flank as well as to the right testicle.  No focal testicular pain reported.  No dysuria or hematuria.  No change in bowel habit.  No prior history of ureterolithiasis.    Physical exam:   ED Triage Vitals   Temp Heart Rate Resp BP SpO2   07/01/21 0948 07/01/21 0948 07/01/21 0948 07/01/21 0951 07/01/21 0948   96.3 °F (35.7 °C) 106 18 120/83 98 %      Temp src Heart Rate Source Patient Position BP Location FiO2 (%)   -- 07/01/21 1007 07/01/21 1007 07/01/21 1007 --    Monitor Lying Left arm      Alert appropriate.  Not overtly toxic appearing.  No pallor or icterus.  No respiratory distress or tachypnea.  Pink warm and well perfused.  Abdomen soft but does have some mild right inguinal/right lower pelvic discomfort with palpation.  No significant CVA tenderness to percussion.    MDM: Agree with plan for laboratory and radiologic evaluation of this patient.  Patient agreeable with plan.  He declines offer for pain medication at this time.    I have seen and personally evaluated this patient, discussed the case with the treating advanced practice provider, and reviewed their note. I was involved in the medical decision making during the evaluation, testing and disposition planning for this patient.     Issa Toribio MD  07/01/21 [id number]

## 2021-07-01 NOTE — ED TRIAGE NOTES
Pt to ER via PV. Pt states right groin pain that started x2-3 days ago. Pt states he believes it's a hernia. Hx of hernia in same spot in the past. Denies any heavy lifting. Denies  symptoms    Patient in mask. This RN in appropriate PPE throughout the patient's entire encounter.

## 2021-07-04 LAB — BACTERIA SPEC AEROBE CULT: ABNORMAL

## 2021-07-29 ENCOUNTER — HOSPITAL ENCOUNTER (EMERGENCY)
Facility: HOSPITAL | Age: 76
Discharge: HOME | End: 2021-07-29
Attending: EMERGENCY MEDICINE | Admitting: EMERGENCY MEDICINE

## 2021-07-29 ENCOUNTER — APPOINTMENT (OUTPATIENT)
Dept: GENERAL RADIOLOGY | Facility: HOSPITAL | Age: 76
End: 2021-07-29

## 2021-07-29 VITALS
BODY MASS INDEX: 26.29 KG/M2 | SYSTOLIC BLOOD PRESSURE: 116 MMHG | HEART RATE: 87 BPM | TEMPERATURE: 97.3 F | HEIGHT: 70 IN | OXYGEN SATURATION: 98 % | DIASTOLIC BLOOD PRESSURE: 72 MMHG | RESPIRATION RATE: 18 BRPM

## 2021-07-29 DIAGNOSIS — M25.512 ACUTE PAIN OF LEFT SHOULDER: Primary | ICD-10-CM

## 2021-07-29 PROCEDURE — 99283 EMERGENCY DEPT VISIT LOW MDM: CPT

## 2021-07-29 PROCEDURE — 73030 X-RAY EXAM OF SHOULDER: CPT

## 2021-07-29 RX ORDER — CYCLOBENZAPRINE HCL 10 MG
10 TABLET ORAL ONCE
Status: COMPLETED | OUTPATIENT
Start: 2021-07-29 | End: 2021-07-29

## 2021-07-29 RX ORDER — CYCLOBENZAPRINE HCL 10 MG
10 TABLET ORAL 3 TIMES DAILY PRN
Qty: 15 TABLET | Refills: 0 | Status: SHIPPED | OUTPATIENT
Start: 2021-07-29 | End: 2021-09-17

## 2021-07-29 RX ORDER — HYDROCODONE BITARTRATE AND ACETAMINOPHEN 5; 325 MG/1; MG/1
1 TABLET ORAL EVERY 6 HOURS PRN
Qty: 10 TABLET | Refills: 0 | Status: SHIPPED | OUTPATIENT
Start: 2021-07-29 | End: 2021-09-17

## 2021-07-29 RX ORDER — HYDROCODONE BITARTRATE AND ACETAMINOPHEN 7.5; 325 MG/1; MG/1
1 TABLET ORAL ONCE
Status: COMPLETED | OUTPATIENT
Start: 2021-07-29 | End: 2021-07-29

## 2021-07-29 RX ADMIN — HYDROCODONE BITARTRATE AND ACETAMINOPHEN 1 TABLET: 7.5; 325 TABLET ORAL at 05:14

## 2021-07-29 RX ADMIN — CYCLOBENZAPRINE 10 MG: 10 TABLET, FILM COATED ORAL at 05:14

## 2021-08-17 ENCOUNTER — DOCUMENTATION (OUTPATIENT)
Dept: INTERNAL MEDICINE | Age: 76
End: 2021-08-17

## 2021-08-18 ENCOUNTER — TELEPHONE (OUTPATIENT)
Dept: INTERNAL MEDICINE | Age: 76
End: 2021-08-18

## 2021-08-18 NOTE — TELEPHONE ENCOUNTER
[facility] to hold the losartan       Patient-Entered Data   Home BP low  Received: Yesterday  Neftali Amezcua MD  [id number] Clinical Pool  Blood pressure readings from home are running low.   Have him hold losartan for now.     NOTE TO CHART.           Previous Messages       ----- Message -----   From: SYSTEM   Sent: 8/17/2021  12:02 AM EDT   To: Neftali Amezcua MD   Subject: New results from patient entered flowsheet          Aj's recent Blood Pressure Tracking readings (past 60 days):  Time Taken Time Submitted Systolic Diastolic Pulse   8/13/2021  6:00 AM 8/16/2021  7:42 AM 99 64 84   8/9/2021  6:00 AM 8/16/2021  7:42 AM 98 56 71   8/6/2021  6:00 AM 8/8/2021  9:47 AM 91 64 54   8/1/2021  6:00 AM 8/8/2021  9:47  60 84   7/30/2021  6:00 AM 8/1/2021  1:34  60 73   7/26/2021  6:00 AM 8/1/2021  1:34  55 81   7/23/2021  6:00 AM 7/23/2021  3:16 PM 96 65 76   7/19/2021  6:00 AM 7/23/2021  3:16  57 76   View all of Aj's readings.   Manage these messages

## 2021-09-16 ENCOUNTER — TELEPHONE (OUTPATIENT)
Dept: INTERNAL MEDICINE | Age: 76
End: 2021-09-16

## 2021-09-16 NOTE — TELEPHONE ENCOUNTER
lvm to call and schedule daniela for the low BP with dr artis     Call patient  Neftali Artis MD Salim, Sally, MA  Home blood pressure is consistently low. Have him see us this week.

## 2021-09-17 ENCOUNTER — OFFICE VISIT (OUTPATIENT)
Dept: INTERNAL MEDICINE | Age: 76
End: 2021-09-17

## 2021-09-17 VITALS
DIASTOLIC BLOOD PRESSURE: 68 MMHG | HEART RATE: 79 BPM | TEMPERATURE: 97.1 F | SYSTOLIC BLOOD PRESSURE: 110 MMHG | BODY MASS INDEX: 25.2 KG/M2 | HEIGHT: 70 IN | OXYGEN SATURATION: 99 % | WEIGHT: 176 LBS

## 2021-09-17 DIAGNOSIS — I95.2 HYPOTENSION DUE TO DRUGS: Primary | ICD-10-CM

## 2021-09-17 DIAGNOSIS — Z00.00 MEDICARE ANNUAL WELLNESS VISIT, SUBSEQUENT: ICD-10-CM

## 2021-09-17 DIAGNOSIS — I10 ESSENTIAL HYPERTENSION: Chronic | ICD-10-CM

## 2021-09-17 DIAGNOSIS — I48.91 ATRIAL FIBRILLATION WITH RAPID VENTRICULAR RESPONSE (HCC): Chronic | ICD-10-CM

## 2021-09-17 DIAGNOSIS — E11.65 TYPE 2 DIABETES MELLITUS WITH HYPERGLYCEMIA, WITHOUT LONG-TERM CURRENT USE OF INSULIN (HCC): ICD-10-CM

## 2021-09-17 DIAGNOSIS — G45.9 TIA (TRANSIENT ISCHEMIC ATTACK): ICD-10-CM

## 2021-09-17 PROCEDURE — 99214 OFFICE O/P EST MOD 30 MIN: CPT | Performed by: INTERNAL MEDICINE

## 2021-09-17 PROCEDURE — 1170F FXNL STATUS ASSESSED: CPT | Performed by: INTERNAL MEDICINE

## 2021-09-17 PROCEDURE — 1160F RVW MEDS BY RX/DR IN RCRD: CPT | Performed by: INTERNAL MEDICINE

## 2021-09-17 PROCEDURE — G0439 PPPS, SUBSEQ VISIT: HCPCS | Performed by: INTERNAL MEDICINE

## 2021-09-17 PROCEDURE — 96160 PT-FOCUSED HLTH RISK ASSMT: CPT | Performed by: INTERNAL MEDICINE

## 2021-09-17 NOTE — ASSESSMENT & PLAN NOTE
Held losartan 25 mg due to blood pressure < 100 at home. Blood pressure now is better. Advised to discuss with his cardiologist.

## 2021-09-17 NOTE — PATIENT INSTRUCTIONS
** IMPORTANT MESSAGE FROM DR. LOPEZ **    In our office, your satisfaction is VERY important to us.     You may receive a survey from Verón[employer] Nguyen by mail or E-mail for you to provide feedback about your visit. This information is invaluable for me to know what we can do to improve our services.     I ask that you please take a few minutes to complete the survey and let us know how we are doing in serving your needs. (You may receive the survey more than once for multiple visits)    Thank You !    Dr. Lopez & Staff    _________________________________________________________________________________________________________________________      ** ADDITIONAL INSTRUCTION / REMINDERS FROM DR. LOPEZ **      Medicare Wellness  Personal Prevention Plan of Service     Date of Office Visit:  2021  Encounter Provider:  Neftali Lopez MD  Place of Service:  [facility]  Patient Name: jA Salazar  :  1945    As part of the Medicare Wellness portion of your visit today, we are providing you with this personalized preventive plan of services (PPPS). This plan is based upon recommendations of the [place] Preventive Services Task Force (USPSTF) and the Advisory Committee on Immunization Practices (ACIP).    This lists the preventive care services that should be considered, and provides dates of when you are due. Items listed as completed are up-to-date and do not require any further intervention.      Age-Appropriate Screening Schedule:  (Refer to the list below for future screening recommendations based on patient's age, sex and/or medical conditions. Orders for these recommended tests are listed in the plan section. The patient has been provided with a written plan)    Health Maintenance Topics  Health Maintenance   Topic Date Due   • DIABETIC FOOT EXAM  2021   • URINE MICROALBUMIN  2021   • HEMOGLOBIN A1C  2021   • INFLUENZA VACCINE  10/01/2021   • LIPID PANEL   10/23/2021   • LUNG CANCER SCREENING  03/07/2022   • COLORECTAL CANCER SCREENING  03/23/2022   • DIABETIC EYE EXAM  08/04/2022   • ANNUAL WELLNESS VISIT  09/17/2022   • TDAP/TD VACCINES (3 - Td or Tdap) 02/24/2027   • HEPATITIS C SCREENING  Completed   • COVID-19 Vaccine  Completed   • Pneumococcal Vaccine 65+  Completed   • AAA SCREEN (ONE-TIME)  Completed   • ZOSTER VACCINE  Completed       Health Maintenance Topics Due or Over-Due  Health Maintenance Due   Topic Date Due   • DIABETIC FOOT EXAM  09/02/2021   • URINE MICROALBUMIN  09/02/2021   • HEMOGLOBIN A1C  09/08/2021         ADDITIONAL RESOURCES:    For excellent information on senior health and wellness refer to the National [place] [employer] web-site at:    WWW.NIHSENIORHEALTH.GOV

## 2021-09-17 NOTE — PROGRESS NOTES
I N T E R N A L  M E D I C I N E  J U N O H  K I M,  M D      ENCOUNTER DATE:  09/17/2021    Aj Pattonb / 75 y.o. / male      CHIEF COMPLAINT / REASON FOR OFFICE VISIT     low BP      ASSESSMENT & PLAN     Problem List Items Addressed This Visit        High    Hypertension (Chronic)    Current Assessment & Plan     Held losartan 25 mg due to blood pressure < 100 at home. Blood pressure now is better. Advised to discuss with his cardiologist.          Relevant Medications    furosemide (LASIX) 40 MG tablet    metoprolol succinate XL (TOPROL-XL) 100 MG 24 hr tablet    spironolactone (ALDACTONE) 25 MG tablet    losartan (COZAAR) 25 MG tablet    metoprolol succinate XL (TOPROL-XL) 50 MG 24 hr tablet    Type 2 diabetes mellitus with hyperglycemia, without long-term current use of insulin (CMS/HCC) (Chronic)    Overview     Continue metformin  mg daily with dinner.          Relevant Medications    metFORMIN ER (GLUCOPHAGE-XR) 500 MG 24 hr tablet    Other Relevant Orders    Comprehensive Metabolic Panel    Hemoglobin A1c    Microalbumin / Creatinine Urine Ratio - Urine, Clean Catch       Medium    Atrial fibrillation with rapid ventricular response (CMS/HCC) (Chronic)    Overview     *Imburgia         Relevant Medications    clopidogrel (Plavix) 75 MG tablet    metoprolol succinate XL (TOPROL-XL) 100 MG 24 hr tablet    metoprolol succinate XL (TOPROL-XL) 50 MG 24 hr tablet    levalbuterol (XOPENEX HFA) 45 MCG/ACT inhaler       Low    TIA (transient ischemic attack)    Overview     10/22/20: TIA with left facial droop and left hand weakness          Relevant Orders    Lipid Panel With / Chol / HDL Ratio      Other Visit Diagnoses     Hypotension due to drugs    -  Primary    Medicare annual wellness visit, subsequent            Orders Placed This Encounter   Procedures   • Comprehensive Metabolic Panel   • Hemoglobin A1c   • Lipid Panel With / Chol / HDL Ratio   • Microalbumin / Creatinine Urine Ratio - Urine,  "Clean Catch     No orders of the defined types were placed in this encounter.      SUMMARY/DISCUSSION  •       Next Appointment with me: 12/7/2021    No follow-ups on file.        VITAL SIGNS     Visit Vitals  /68 (BP Location: Left arm)   Pulse 79   Temp 97.1 °F (36.2 °C)   Ht 177.8 cm (70\")   Wt 79.8 kg (176 lb)   SpO2 99%   BMI 25.25 kg/m²       BP Readings from Last 3 Encounters:   09/17/21 110/68   07/29/21 116/72   07/01/21 131/90     Wt Readings from Last 3 Encounters:   09/17/21 79.8 kg (176 lb)   07/01/21 83.1 kg (183 lb 3.2 oz)   06/07/21 82.6 kg (182 lb)     Body mass index is 25.25 kg/m².      MEDICATIONS AT THE TIME OF OFFICE VISIT     Current Outpatient Medications on File Prior to Visit   Medication Sig   • Accu-Chek Softclix Lancets lancets Accu-Chek Softclix Lancets   USE TO TEST GLUCOSE QOD   • acetaminophen (TYLENOL) 325 MG tablet Take 2 tablets by mouth Every 4 (Four) Hours As Needed for Mild Pain .   • ALPHAGAN P 0.1 % solution ophthalmic solution Administer 1 drop to both eyes 2 (two) times a day.   • atorvastatin (LIPITOR) 80 MG tablet Take 1 tablet by mouth Every Night.   • clopidogrel (Plavix) 75 MG tablet Take 1 tablet by mouth Daily.   • dabigatran etexilate (Pradaxa) 150 MG capsu Take 1 capsule by mouth 2 (Two) Times a Day.   • Fluticasone-Umeclidin-Vilant 200-62.5-25 MCG/INH aerosol powder  Inhale 1 puff Daily. Just increased to this strength 3/21   • furosemide (LASIX) 40 MG tablet Take 1 tablet by mouth Daily.   • glucose blood test strip Accu-Chek Dannielle Plus test strips   USE TO CHECK GLUCOSE QOD   • glucose blood test strip Accu-Chek Dannielle Plus Meter   USE TO CHECK GLUCOSE QOD   • levalbuterol (XOPENEX HFA) 45 MCG/ACT inhaler INHALE 1 TO 2 PUFFS BY MOUTH EVERY 6 HOURS AS NEEDED FOR WHEEZING OR SHORTNESS OF BREATH   • metoprolol succinate XL (TOPROL-XL) 100 MG 24 hr tablet Take 1 tablet by mouth Every Night. Take 100 mg at night and 50 mg in the morning   • metoprolol succinate " XL (TOPROL-XL) 50 MG 24 hr tablet Take 1 tablet by mouth Daily. Take 50 mg every morning and 100 mg every night   • pantoprazole (PROTONIX) 40 MG EC tablet Take 1 tablet by mouth 2 (two) times a day.   • spironolactone (ALDACTONE) 25 MG tablet Take 1 tablet by mouth Daily.   • Tafluprost, PF, (ZIOPTAN) 0.0015 % solution ophthalmic solution Administer 1 drop to both eyes Every Night.   • tamsulosin (FLOMAX) 0.4 MG capsule 24 hr capsule Take 2 capsules by mouth Every Night.   • [DISCONTINUED] cephalexin (KEFLEX) 500 MG capsule Take 1 capsule by mouth 3 (Three) Times a Day.   • [DISCONTINUED] HYDROcodone-acetaminophen (NORCO) 5-325 MG per tablet Take 1 tablet by mouth Every 6 (Six) Hours As Needed for Moderate Pain .   • losartan (COZAAR) 25 MG tablet  *HOLDING DUE TO LOW BP  Take 1 tablet by mouth Daily.   • metFORMIN ER (GLUCOPHAGE-XR) 500 MG 24 hr tablet TAKE 1 TABLET BY MOUTH DAILY WITH DINNER   • [DISCONTINUED] cyclobenzaprine (FLEXERIL) 10 MG tablet Take 1 tablet by mouth 3 (Three) Times a Day As Needed for Muscle Spasms.     No current facility-administered medications on file prior to visit.         HISTORY OF PRESENT ILLNESS     Losartan 25 mg held due to low blood pressure. Now blood pressure is normalized. Denies chest pain, palpitations, lightheadedness. Diabetes remains stable. Atrial fibrillation stable without palpitations. No melena or bleeding.     Lab Results   Component Value Date    HGBA1C 6.20 (H) 03/08/2021    HGBA1C 6.00 (H) 10/23/2020    HGBA1C 6.00 (H) 09/02/2020    CREATININE 0.81 07/01/2021    LDL 79 10/23/2020    MICROALBUR 145.0 09/02/2020          REVIEW OF SYSTEMS     Intended wt loss with improved diet/sodium intake  No chest pain LAWS or palpitations  GI neg  Neuro neg       PHYSICAL EXAMINATION     Physical Exam  General: No acute distress  Psych: Normal thought and judgment   Cardiovascular Rate: normal. Trace bilateral lower extremities edema.   Pulm/Chest: Effort normal, breath  sounds normal.       REVIEWED DATA     Labs:       Lab Results   Component Value Date     07/01/2021    K 3.9 07/01/2021    CALCIUM 9.0 07/01/2021    AST 19 07/01/2021    ALT 19 07/01/2021    BUN 13 07/01/2021    CREATININE 0.81 07/01/2021    CREATININE 1.05 03/16/2021    CREATININE 0.90 03/10/2021    EGFRIFNONA 93 07/01/2021    EGFRIFAFRI 84 03/16/2021       Lab Results   Component Value Date    HGBA1C 6.20 (H) 03/08/2021    HGBA1C 6.00 (H) 10/23/2020    HGBA1C 6.00 (H) 09/02/2020       Lab Results   Component Value Date    LDL 79 10/23/2020    LDL 90 09/02/2020    HDL 37 (L) 10/23/2020    TRIG 94 10/23/2020       Lab Results   Component Value Date    TSH 1.880 03/08/2021    TSH 2.45 10/14/2015       Lab Results   Component Value Date    WBC 9.53 07/01/2021    HGB 15.5 07/01/2021     07/01/2021       Lab Results   Component Value Date    MICROALBUR 145.0 09/02/2020           Imaging:           Medical Tests:   Echocardiogram 3/8/21  · Left ventricular ejection fraction is normal. (Calculated EF = 60%).  · Myocardial perfusion imaging indicates a normal myocardial perfusion study with no evidence of ischemia.  · Impressions are consistent with a low risk study.  · Diaphragmatic attenuation artifact is present.           Summary of old records / correspondence / consultant report:           Request outside records:           *Examiner was wearing [id number] mask and eye protection during the entire duration of the visit. Patient was masked the entire time. Minimum social distance of 6 ft maintained entire visit except if physical contact was necessary as documented.     **Dragon Disclaimer: Much of this encounter note is an electronic transcription/translation of spoken language to printed text. The electronic translation of spoken language may permit erroneous, or at times, nonsensical words or phrases to be inadvertently transcribed. Although I have reviewed the note for such errors, some may still exist.        Template created by Adelso Amezcua MD

## 2021-09-17 NOTE — PROGRESS NOTES
"    I N T E R N A L  M E D I C I N E  J U N O H  K I M,  M D      ENCOUNTER DATE:  09/17/2021    Aj Pattonb / 75 y.o. / male        MEDICARE ANNUAL WELLNESS VISIT       Chief Complaint: AWV    Patient's general assessment of his health since a year ago:     - Compared to one year ago, he feels his physical health is about the same without significant change.    - Compared to one year ago, he feels his mental health is about the same without significant change.      HPI for other active medical problems:             * The required components of Health Risk Assessment (HRA) that were completed by the patient and/or my staff are contained within this note and in the scanned documents titled \"Health Risk Assessment\" within the media section of the patient's chart in [facility].         HISTORY       Recent Hospitalizations:    Recent hospitalization?:     If YES, location, date, and diagnoses:     · Location:   · Date:   · Principle Discharge Dx:   · Secondary Dx:       Patient Care Team:    Patient Care Team:  Neftali Amezcua MD as PCP - General (Internal Medicine)  Juan Colby MD as Consulting Physician (Cardiology)  Ankush Sky MD as Surgeon (Orthopedic Surgery)  Vale Del Rosario APRN as Nurse Practitioner (Oncology)  Darrion Peña MD as Consulting Physician (Urology)  Tahir Childs MD as Consulting Physician (Pulmonary Disease)  Noman Moseley MD as Consulting Physician (Gastroenterology)  Aura Perry OD (Optometry)  Juwan Diane MD as Consulting Physician (Ophthalmology)      Allergies:  Bactrim [sulfamethoxazole-trimethoprim]    Medications:  Current Outpatient Medications on File Prior to Visit   Medication Sig Dispense Refill   • Accu-Chek Softclix Lancets lancets Accu-Chek Softclix Lancets   USE TO TEST GLUCOSE QOD     • acetaminophen (TYLENOL) 325 MG tablet Take 2 tablets by mouth Every 4 (Four) Hours As Needed for Mild Pain .     • ALPHAGAN P 0.1 % solution ophthalmic " solution Administer 1 drop to both eyes 2 (two) times a day.  6   • atorvastatin (LIPITOR) 80 MG tablet Take 1 tablet by mouth Every Night. 30 tablet 11   • clopidogrel (Plavix) 75 MG tablet Take 1 tablet by mouth Daily. 30 tablet 11   • dabigatran etexilate (Pradaxa) 150 MG capsu Take 1 capsule by mouth 2 (Two) Times a Day. 60 capsule 6   • Fluticasone-Umeclidin-Vilant 200-62.5-25 MCG/INH aerosol powder  Inhale 1 puff Daily. Just increased to this strength 3/21     • furosemide (LASIX) 40 MG tablet Take 1 tablet by mouth Daily. 30 tablet 0   • glucose blood test strip Accu-Chek Dannielle Plus test strips   USE TO CHECK GLUCOSE QOD     • glucose blood test strip Accu-Chek Dannielle Plus Meter   USE TO CHECK GLUCOSE QOD     • levalbuterol (XOPENEX HFA) 45 MCG/ACT inhaler INHALE 1 TO 2 PUFFS BY MOUTH EVERY 6 HOURS AS NEEDED FOR WHEEZING OR SHORTNESS OF BREATH 15 g 2   • metoprolol succinate XL (TOPROL-XL) 100 MG 24 hr tablet Take 1 tablet by mouth Every Night. Take 100 mg at night and 50 mg in the morning 30 tablet 0   • metoprolol succinate XL (TOPROL-XL) 50 MG 24 hr tablet Take 1 tablet by mouth Daily. Take 50 mg every morning and 100 mg every night 30 tablet 0   • pantoprazole (PROTONIX) 40 MG EC tablet Take 1 tablet by mouth 2 (two) times a day. 60 tablet 11   • spironolactone (ALDACTONE) 25 MG tablet Take 1 tablet by mouth Daily. 30 tablet 0   • Tafluprost, PF, (ZIOPTAN) 0.0015 % solution ophthalmic solution Administer 1 drop to both eyes Every Night.     • tamsulosin (FLOMAX) 0.4 MG capsule 24 hr capsule Take 2 capsules by mouth Every Night. 30 capsule    • [DISCONTINUED] cephalexin (KEFLEX) 500 MG capsule Take 1 capsule by mouth 3 (Three) Times a Day. 21 capsule 0   • [DISCONTINUED] HYDROcodone-acetaminophen (NORCO) 5-325 MG per tablet Take 1 tablet by mouth Every 6 (Six) Hours As Needed for Moderate Pain . 10 tablet 0   • losartan (COZAAR) 25 MG tablet Take 1 tablet by mouth Daily. 30 tablet 0   • metFORMIN ER  (GLUCOPHAGE-XR) 500 MG 24 hr tablet TAKE 1 TABLET BY MOUTH DAILY WITH DINNER 30 tablet 11   • [DISCONTINUED] cyclobenzaprine (FLEXERIL) 10 MG tablet Take 1 tablet by mouth 3 (Three) Times a Day As Needed for Muscle Spasms. 15 tablet 0     No current facility-administered medications on file prior to visit.        PFSH:     The following portions of the patient's history were reviewed and updated as appropriate: Allergies / Current Medications / Past Medical History / Surgical History / Social History / Family History    Problem List:  Patient Active Problem List   Diagnosis   • Chronic obstructive pulmonary disease with acute exacerbation (CMS/HCC)   • Hypertension   • Osteoarthritis   • History of smoking greater than 50 pack years   • Benign prostatic hyperplasia with urinary obstruction   • Atrial fibrillation with rapid ventricular response (CMS/HCC)   • Type 2 diabetes mellitus with hyperglycemia, without long-term current use of insulin (CMS/HCC)   • Rash and nonspecific skin eruption   • TIA (transient ischemic attack)   • Atrial fibrillation with RVR (CMS/HCC)   • Acute on chronic systolic CHF (congestive heart failure) (CMS/HCC)   • Gastroesophageal reflux disease       Past Medical History:  Past Medical History:   Diagnosis Date   • Alcohol abuse, in remission    • BPH (benign prostatic hypertrophy)     see doctors at [facility]   • COPD (chronic obstructive pulmonary disease) (CMS/HCC)    • Depression    • DJD (degenerative joint disease) of cervical spine    • ED (erectile dysfunction)     [facility]   • Hx of colonic polyps     followed by GI ([facility])   • Hyperlipidemia    • Hypertension    • Influenza B 02/14/2013       • Nocturnal hypoxia    • Osteoarthritis     HIPS, HANDS, MULTIPLE SITES   • Peripheral neuropathy    • Permanent atrial fibrillation (CMS/[facility])    • Rectal bleeding 04/26/2017   • Tendonitis of shoulder 11/07/2007    RIGHT SHOULDER/DELTOID INSERTION   • TIA (transient  ischemic attack) 10/2020   • Ulcer of the stomach and intestine        Past Surgical History:  Past Surgical History:   Procedure Laterality Date   • APPENDECTOMY     • CARDIOVASCULAR STRESS TEST N/A 10/20/2015    NML LEXISCAN CARDIOLITE PERFUSION STUDY, NO EVIDENCE OF ISCHEMIA, AREA OF HYPOPERFUSION OF THE INFERIOR WALL W/NORMAL WALL PERFUSION AND NML LEFT VENTRICULAR EJECTION FRACTION, MOST LIKELY ATTENUATION. DR.MICHAEL BOX   • COLONOSCOPY N/A 2017   • COLONOSCOPY N/A 2011    4 POLYPS REMOVED, RESCOPE IN 5 YRS, DR. EAGLE   • COLONOSCOPY N/A 2006    ERYTHEMOUS AND GRANULAR MUCOSA IN ILEOCECAL VALVE, NORMAL COLON, REPEAT IN 5 YRS,    • ENDOSCOPY N/A 2018    normal esophagus, acute gastritis, multiple non-bleeding duodenal ulcers with pigmented material, H Pylori positive   • HERNIA REPAIR Bilateral     INGUINAL   • JOINT REPLACEMENT  2015    left hip   • TOTAL HIP ARTHROPLASTY Left 2015    Dr Ankush Sky   • TOTAL HIP ARTHROPLASTY Right 10/27/2014    DR.RIED SKY   • VASECTOMY         Social History:  Social History     Socioeconomic History   • Marital status:      Spouse name: Ara*   • Number of children: 3   • Years of education: Not on file   • Highest education level: Not on file   Tobacco Use   • Smoking status: Former Smoker     Packs/day: 2.00     Years: 49.00     Pack years: 98.00     Quit date: 2010     Years since quittin.7   • Smokeless tobacco: Never Used   • Tobacco comment: Began Smoking age 15.  Smoked 2 ppd for 49 years until he quit smoking 7 years ago for a 98 pack year history.   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   • Drug use: No     Comment: caffeine use    • Sexual activity: Defer       Family History:  Family History   Problem Relation Age of Onset   • Colon cancer Mother    • Ovarian cancer Mother    • Alzheimer's disease Mother 70   • Stroke  "Sister         October 2016   • Dementia Sister    • Heart disease Brother    • Alcohol abuse Brother    • Coronary artery disease Father    • Hypertension Father    • Colon cancer Maternal Grandmother    • Heart disease Brother    • Coronary artery disease Brother    • Hypertension Brother    • Stroke Brother    • Arthritis Brother    • Prostate cancer Neg Hx          PATIENT ASSESSMENT     Vitals:  /68 (BP Location: Left arm)   Pulse 79   Temp 97.1 °F (36.2 °C)   Ht 177.8 cm (70\")   Wt 79.8 kg (176 lb)   SpO2 99%   BMI 25.25 kg/m²   BP Readings from Last 3 Encounters:   09/17/21 110/68   07/29/21 116/72   07/01/21 131/90     Wt Readings from Last 3 Encounters:   09/17/21 79.8 kg (176 lb)   07/01/21 83.1 kg (183 lb 3.2 oz)   06/07/21 82.6 kg (182 lb)      Body mass index is 25.25 kg/m².    There were no vitals filed for this visit.      Review of Systems:    Review of Systems      Physical Exam:    Physical Exam      Reviewed Data:    Labs:   Lab Results   Component Value Date     07/01/2021    K 3.9 07/01/2021    CALCIUM 9.0 07/01/2021    AST 19 07/01/2021    ALT 19 07/01/2021    BUN 13 07/01/2021    CREATININE 0.81 07/01/2021    CREATININE 1.05 03/16/2021    CREATININE 0.90 03/10/2021    EGFRIFNONA 93 07/01/2021    EGFRIFAFRI 84 03/16/2021       Lab Results   Component Value Date     (H) 03/16/2021     (H) 03/03/2020     (H) 01/31/2019    HGBA1C 6.20 (H) 03/08/2021    HGBA1C 6.00 (H) 10/23/2020    HGBA1C 6.00 (H) 09/02/2020    MICROALBUR 145.0 09/02/2020       Lab Results   Component Value Date    LDL 79 10/23/2020    LDL 90 09/02/2020    LDL 84 08/02/2019    HDL 37 (L) 10/23/2020    TRIG 94 10/23/2020    CHOLHDLRATIO 3.56 09/02/2020       Lab Results   Component Value Date    TSH 1.880 03/08/2021          Lab Results   Component Value Date    WBC 9.53 07/01/2021    HGB 15.5 07/01/2021    HGB 13.8 03/10/2021    HGB 14.3 03/09/2021     07/01/2021                   Lab " Results   Component Value Date    PSA 7.8 (H) 04/30/2018    PSA 9.650 (H) 03/27/2018       Imaging:          Medical Tests:          Screening for Glaucoma:  Previous screening for glaucoma?: Yes      Hearing Loss Screen:  Finger Rub Hearing Test (right ear): passed  Finger Rub Hearing Test (left ear): passed      Urinary Incontinence Screen:  Episodes of urinary incontinence? : No      Depression Screen:  PHQ-2/PHQ-9 Depression Screening 9/17/2021   Little interest or pleasure in doing things 0   Feeling down, depressed, or hopeless 0   Total Score 0        PHQ-2: 0 (Not depressed)    PHQ-9: 0 (Negative screening for depression)       FUNCTIONAL, FALL RISK, & COGNITIVE SCREENING (Components below):    DATA:    Functional & Cognitive Status 9/17/2021   Do you have difficulty preparing food and eating? No   Do you have difficulty bathing yourself, getting dressed or grooming yourself? No   Do you have difficulty using the toilet? No   Do you have difficulty moving around from place to place? No   Do you have trouble with steps or getting out of a bed or a chair? No   Current Diet Diabetic Diet   Dental Exam Up to date   Eye Exam Up to date   Exercise (times per week) 7 times per week   Current Exercises Include Walking   Current Exercise Activities Include -   Do you need help using the phone?  No   Are you deaf or do you have serious difficulty hearing?  No   Do you need help with transportation? No   Do you need help shopping? No   Do you need help preparing meals?  No   Do you need help with housework?  No   Do you need help with laundry? No   Do you need help taking your medications? No   Do you need help managing money? No   Do you ever drive or ride in a car without wearing a seat belt? No   Have you felt unusual stress, anger or loneliness in the last month? No   Who do you live with? Spouse   If you need help, do you have trouble finding someone available to you? No   Have you been bothered in the last four  weeks by sexual problems? No   Do you have difficulty concentrating, remembering or making decisions? No         A) Assessment of Functional Ability:  (Assessment of ability to perform ADL's (showering/bathing, using toilet, dressing, feeding self, moving self around) and IADL's (use telephone, shop, prepare food, housekeep, do laundry, transport independently, take medications independently, and handle finances)    Degree of functional impairment: MILD (based on assessment noted above)      B) Assessment of Fall Risk:  Fall Risk Assessment was completed, and patient is at moderate risk for falls.       Need for further evaluation of gait, strength, and balance? : No    Timed Up and Go (TUG):   (>= 12 seconds indicates high risk for falling)    Observable abnormalities included: Normal gait pattern       C. Assessment of Cognitive Function:    Mini-Cog Test:     1) Registration (3 objects): Yes   2) Number of objects recalled: 3   3) Clock Draw: Passed? : Yes       Further evaluation required? : No        COUNSELING       A. Identification of Health Risk Factors:    Risk factors include: cardiovascular risk factors      B. Age-Appropriate Screening Schedule:  (Refer to the list below for future screening recommendations based on patient's age, sex and/or medical conditions. Orders for these recommended tests are listed in the plan section. The patient has been provided with a written plan)    Health Maintenance Topics  Health Maintenance   Topic Date Due   • DIABETIC FOOT EXAM  09/02/2021   • URINE MICROALBUMIN  09/02/2021   • HEMOGLOBIN A1C  09/08/2021   • INFLUENZA VACCINE  10/01/2021   • LIPID PANEL  10/23/2021   • DIABETIC EYE EXAM  08/04/2022   • TDAP/TD VACCINES (3 - Td or Tdap) 02/24/2027   • ZOSTER VACCINE  Completed       Health Maintenance Topics Due or Over-Due  Health Maintenance Due   Topic Date Due   • DIABETIC FOOT EXAM  09/02/2021   • URINE MICROALBUMIN  09/02/2021   • HEMOGLOBIN A1C  09/08/2021          C. Advanced Care Planning:    Advance Care Planning   ACP discussion was held with the patient during this visit. Patient has an advance directive in EMR which is still valid.        D. Patient Self-Management and Personalized Health Advice:    He has been provided with personalized counseling/information (including brochures/handouts) about:     -- optimizing diet/nutrition plans, improving exercise / conditioning and reducing risk for cardiac/vascular events with pre-existing disease      He has been recommended for the following preventative services which has been performed today, will be ordered today or ordered/performed on upcoming follow-up visit:     -- nutrition counseling provided, exercise counseling provided, counseling for cardiovascular disease risk reduction, fall risk assessment / plan of care completed, urinary incontinence assessment done, **vaccination recommendations provided for COVID-19      E. Miscellaneous Items:    -Aspirin use counseling: on clopidrogel as an alternative     -Discussed BMI with him. The BMI is in the acceptable range    -Reviewed use of high risk medication in the elderly: YES    -Reviewed for potential of harmful drug interactions in the elderly: YES        WRAP UP       Assessment & Plan:    1) MEDICARE ANNUAL WELLNESS VISIT    2) OTHER MEDICAL CONDITIONS ADDRESSED TODAY:            Problem List Items Addressed This Visit        High    Hypertension (Chronic)    Current Assessment & Plan     Held losartan 25 mg due to blood pressure < 100 at home. Blood pressure now is better. Advised to discuss with his cardiologist.          Relevant Medications    furosemide (LASIX) 40 MG tablet    metoprolol succinate XL (TOPROL-XL) 100 MG 24 hr tablet    spironolactone (ALDACTONE) 25 MG tablet    losartan (COZAAR) 25 MG tablet    metoprolol succinate XL (TOPROL-XL) 50 MG 24 hr tablet    Type 2 diabetes mellitus with hyperglycemia, without long-term current use of insulin  (CMS/[facility]) (Chronic)    Overview     Continue metformin  mg daily with dinner.          Relevant Medications    metFORMIN ER (GLUCOPHAGE-XR) 500 MG 24 hr tablet    Other Relevant Orders    Comprehensive Metabolic Panel    Hemoglobin A1c    Microalbumin / Creatinine Urine Ratio - Urine, Clean Catch       Medium    Atrial fibrillation with rapid ventricular response (CMS/[facility]) (Chronic)    Overview     *Imburgia         Relevant Medications    clopidogrel (Plavix) 75 MG tablet    metoprolol succinate XL (TOPROL-XL) 100 MG 24 hr tablet    metoprolol succinate XL (TOPROL-XL) 50 MG 24 hr tablet    levalbuterol (XOPENEX HFA) 45 MCG/ACT inhaler       Low    TIA (transient ischemic attack)    Overview     10/22/20: TIA with left facial droop and left hand weakness          Relevant Orders    Lipid Panel With / Chol / HDL Ratio      Other Visit Diagnoses     Hypotension due to drugs    -  Primary    Medicare annual wellness visit, subsequent                        Orders Placed This Encounter   Procedures   • Comprehensive Metabolic Panel   • Hemoglobin A1c   • Lipid Panel With / Chol / HDL Ratio   • Microalbumin / Creatinine Urine Ratio - Urine, Clean Catch       Discussion / Summary:        Medications as of TODAY:              Current Outpatient Medications   Medication Sig Dispense Refill   • Accu-Chek Softclix Lancets lancets Accu-Chek Softclix Lancets   USE TO TEST GLUCOSE QOD     • acetaminophen (TYLENOL) 325 MG tablet Take 2 tablets by mouth Every 4 (Four) Hours As Needed for Mild Pain .     • ALPHAGAN P 0.1 % solution ophthalmic solution Administer 1 drop to both eyes 2 (two) times a day.  6   • atorvastatin (LIPITOR) 80 MG tablet Take 1 tablet by mouth Every Night. 30 tablet 11   • clopidogrel (Plavix) 75 MG tablet Take 1 tablet by mouth Daily. 30 tablet 11   • dabigatran etexilate (Pradaxa) 150 MG capsu Take 1 capsule by mouth 2 (Two) Times a Day. 60 capsule 6   • Fluticasone-Umeclidin-Vilant 200-62.5-25  MCG/INH aerosol powder  Inhale 1 puff Daily. Just increased to this strength 3/21     • furosemide (LASIX) 40 MG tablet Take 1 tablet by mouth Daily. 30 tablet 0   • glucose blood test strip Accu-Chek Dannielle Plus test strips   USE TO CHECK GLUCOSE QOD     • glucose blood test strip Accu-Chek Dannielle Plus Meter   USE TO CHECK GLUCOSE QOD     • levalbuterol (XOPENEX HFA) 45 MCG/ACT inhaler INHALE 1 TO 2 PUFFS BY MOUTH EVERY 6 HOURS AS NEEDED FOR WHEEZING OR SHORTNESS OF BREATH 15 g 2   • metoprolol succinate XL (TOPROL-XL) 100 MG 24 hr tablet Take 1 tablet by mouth Every Night. Take 100 mg at night and 50 mg in the morning 30 tablet 0   • metoprolol succinate XL (TOPROL-XL) 50 MG 24 hr tablet Take 1 tablet by mouth Daily. Take 50 mg every morning and 100 mg every night 30 tablet 0   • pantoprazole (PROTONIX) 40 MG EC tablet Take 1 tablet by mouth 2 (two) times a day. 60 tablet 11   • spironolactone (ALDACTONE) 25 MG tablet Take 1 tablet by mouth Daily. 30 tablet 0   • Tafluprost, PF, (ZIOPTAN) 0.0015 % solution ophthalmic solution Administer 1 drop to both eyes Every Night.     • tamsulosin (FLOMAX) 0.4 MG capsule 24 hr capsule Take 2 capsules by mouth Every Night. 30 capsule    • losartan (COZAAR) 25 MG tablet Take 1 tablet by mouth Daily. 30 tablet 0   • metFORMIN ER (GLUCOPHAGE-XR) 500 MG 24 hr tablet TAKE 1 TABLET BY MOUTH DAILY WITH DINNER 30 tablet 11     No current facility-administered medications for this visit.         FOLLOW-UP:            No follow-ups on file.                 Future Appointments   Date Time Provider Department Center   12/7/2021 10:45 AM Neftali Amezcua MD [facility]           After Visit Summary (AVS) including the Personalized Prevention  Plan Services (PPPS) was either printed and given to the patient at check-out today and/or sent to [facility] for review.         *Examiner was wearing KN95 mask and eye protection during the entire duration of the visit. Patient was masked the entire  time. Minimum social distance of 6 ft maintained entire visit except if physical contact was necessary as documented.     **Dragon Disclaimer: Much of this encounter note is an electronic transcription/translation of spoken language to printed text. The electronic translation of spoken language may permit erroneous, or at times, nonsensical words or phrases to be inadvertently transcribed. Although I have reviewed the note for such errors, some may still exist.       Template created by Adelso Amezcua MD

## 2021-09-18 LAB
ALBUMIN SERPL-MCNC: 4.3 G/DL (ref 3.5–5.2)
ALBUMIN/CREAT UR: 46 MG/G CREAT (ref 0–29)
ALBUMIN/GLOB SERPL: 2.3 G/DL
ALP SERPL-CCNC: 98 U/L (ref 39–117)
ALT SERPL-CCNC: 18 U/L (ref 1–41)
AST SERPL-CCNC: 24 U/L (ref 1–40)
BILIRUB SERPL-MCNC: 1.1 MG/DL (ref 0–1.2)
BUN SERPL-MCNC: 14 MG/DL (ref 8–23)
BUN/CREAT SERPL: 16.3 (ref 7–25)
CALCIUM SERPL-MCNC: 9.1 MG/DL (ref 8.6–10.5)
CHLORIDE SERPL-SCNC: 102 MMOL/L (ref 98–107)
CHOLEST SERPL-MCNC: 98 MG/DL (ref 0–200)
CHOLEST/HDLC SERPL: 2.65 {RATIO}
CO2 SERPL-SCNC: 26 MMOL/L (ref 22–29)
CREAT SERPL-MCNC: 0.86 MG/DL (ref 0.76–1.27)
CREAT UR-MCNC: 38.1 MG/DL
GLOBULIN SER CALC-MCNC: 1.9 GM/DL
GLUCOSE SERPL-MCNC: 105 MG/DL (ref 65–99)
HBA1C MFR BLD: 6 % (ref 4.8–5.6)
HDLC SERPL-MCNC: 37 MG/DL (ref 40–60)
LDLC SERPL CALC-MCNC: 40 MG/DL (ref 0–100)
MICROALBUMIN UR-MCNC: 17.6 UG/ML
POTASSIUM SERPL-SCNC: 3.9 MMOL/L (ref 3.5–5.2)
PROT SERPL-MCNC: 6.2 G/DL (ref 6–8.5)
SODIUM SERPL-SCNC: 137 MMOL/L (ref 136–145)
TRIGL SERPL-MCNC: 117 MG/DL (ref 0–150)
VLDLC SERPL CALC-MCNC: 21 MG/DL (ref 5–40)

## 2021-10-28 RX ORDER — ATENOLOL 100 MG/1
TABLET ORAL
Qty: 180 CAPSULE | OUTPATIENT
Start: 2021-10-28

## 2021-11-23 RX ORDER — ATORVASTATIN CALCIUM 80 MG/1
80 TABLET, FILM COATED ORAL NIGHTLY
Qty: 30 TABLET | Refills: 11 | Status: SHIPPED | OUTPATIENT
Start: 2021-11-23 | End: 2021-12-06 | Stop reason: SDUPTHER

## 2021-11-23 RX ORDER — CLOPIDOGREL BISULFATE 75 MG/1
75 TABLET ORAL DAILY
Qty: 30 TABLET | Refills: 11 | Status: SHIPPED | OUTPATIENT
Start: 2021-11-23 | End: 2021-12-06 | Stop reason: SDUPTHER

## 2021-11-23 NOTE — TELEPHONE ENCOUNTER
----- Message from Aj Salazar sent at 11/23/2021  9:21 AM EST -----  Regarding: two prescriptions  my clopidogrel & atorvastatin have no more refills.do i still need to take these and if so will you call them in? thanks

## 2021-12-06 DIAGNOSIS — K21.9 GASTROESOPHAGEAL REFLUX DISEASE, UNSPECIFIED WHETHER ESOPHAGITIS PRESENT: ICD-10-CM

## 2021-12-06 RX ORDER — ATORVASTATIN CALCIUM 80 MG/1
80 TABLET, FILM COATED ORAL NIGHTLY
Qty: 90 TABLET | Refills: 3 | Status: SHIPPED | OUTPATIENT
Start: 2021-12-06 | End: 2023-03-02 | Stop reason: SDUPTHER

## 2021-12-06 RX ORDER — PANTOPRAZOLE SODIUM 40 MG/1
40 TABLET, DELAYED RELEASE ORAL 2 TIMES DAILY
Qty: 180 TABLET | Refills: 3 | Status: SHIPPED | OUTPATIENT
Start: 2021-12-06 | End: 2023-02-09

## 2021-12-06 RX ORDER — CLOPIDOGREL BISULFATE 75 MG/1
75 TABLET ORAL DAILY
Qty: 90 TABLET | Refills: 3 | Status: SHIPPED | OUTPATIENT
Start: 2021-12-06 | End: 2023-02-16

## 2021-12-07 ENCOUNTER — OFFICE VISIT (OUTPATIENT)
Dept: INTERNAL MEDICINE | Age: 76
End: 2021-12-07

## 2021-12-07 VITALS
OXYGEN SATURATION: 98 % | HEIGHT: 70 IN | WEIGHT: 176 LBS | DIASTOLIC BLOOD PRESSURE: 74 MMHG | TEMPERATURE: 96.8 F | SYSTOLIC BLOOD PRESSURE: 120 MMHG | BODY MASS INDEX: 25.2 KG/M2 | HEART RATE: 68 BPM

## 2021-12-07 DIAGNOSIS — I48.91 ATRIAL FIBRILLATION WITH RAPID VENTRICULAR RESPONSE (HCC): Chronic | ICD-10-CM

## 2021-12-07 DIAGNOSIS — I73.00 RAYNAUD'S DISEASE WITHOUT GANGRENE: ICD-10-CM

## 2021-12-07 DIAGNOSIS — I10 PRIMARY HYPERTENSION: Primary | Chronic | ICD-10-CM

## 2021-12-07 DIAGNOSIS — J44.1 CHRONIC OBSTRUCTIVE PULMONARY DISEASE WITH ACUTE EXACERBATION (HCC): Chronic | ICD-10-CM

## 2021-12-07 DIAGNOSIS — E11.65 TYPE 2 DIABETES MELLITUS WITH HYPERGLYCEMIA, WITHOUT LONG-TERM CURRENT USE OF INSULIN (HCC): Chronic | ICD-10-CM

## 2021-12-07 DIAGNOSIS — J30.9 ALLERGIC RHINITIS, UNSPECIFIED SEASONALITY, UNSPECIFIED TRIGGER: ICD-10-CM

## 2021-12-07 PROBLEM — G45.9 TIA (TRANSIENT ISCHEMIC ATTACK): Chronic | Status: ACTIVE | Noted: 2020-10-22

## 2021-12-07 PROCEDURE — 99214 OFFICE O/P EST MOD 30 MIN: CPT | Performed by: INTERNAL MEDICINE

## 2021-12-07 RX ORDER — CETIRIZINE HYDROCHLORIDE 10 MG/1
5 TABLET ORAL NIGHTLY
COMMUNITY
Start: 2021-12-07 | End: 2022-05-03

## 2021-12-07 RX ORDER — FLUTICASONE PROPIONATE 50 MCG
SPRAY, SUSPENSION (ML) NASAL
COMMUNITY
Start: 2021-12-07 | End: 2022-05-03

## 2021-12-07 NOTE — PROGRESS NOTES
I N T E R N A L  M E D I C I N E  J U N O H  K I M,  M D      ENCOUNTER DATE:  12/07/2021    Aj Pattonb / 76 y.o. / male      CHIEF COMPLAINT / REASON FOR OFFICE VISIT     Diabetes and Hypertension      ASSESSMENT & PLAN     Problem List Items Addressed This Visit        High    Hypertension - Primary (Chronic)    Current Assessment & Plan     Continue current blood pressure medications.   He is having raynaud's of fingers/toes. If needed will consider CCB. Discussed.     BP Readings from Last 3 Encounters:   12/07/21 120/74   09/17/21 110/68   07/29/21 116/72             Relevant Medications    furosemide (LASIX) 40 MG tablet    metoprolol succinate XL (TOPROL-XL) 100 MG 24 hr tablet    spironolactone (ALDACTONE) 25 MG tablet    losartan (COZAAR) 25 MG tablet    metoprolol succinate XL (TOPROL-XL) 50 MG 24 hr tablet    Type 2 diabetes mellitus with hyperglycemia, without long-term current use of insulin (HCC) (Chronic)    Overview     Continue metformin  mg daily with dinner.          Relevant Medications    metFORMIN ER (GLUCOPHAGE-XR) 500 MG 24 hr tablet    Raynaud's disease without gangrene (Chronic)    Current Assessment & Plan     This is a new problem to this examiner.  Avoid cold temperature (wear gloves/socks/insulated shoes)  Consider CCB if not improving/worse.   He is on beta-blocker for his heart.            Medium    Chronic obstructive pulmonary disease with acute exacerbation (HCC) (Chronic)    Overview     *Esterle    PFT (5/2018): moderate obstruction with decreased diffusing capacity.     Continue Trelegy 200 daily and continue follow-up with pulmonologist.         Relevant Medications    Fluticasone-Umeclidin-Vilant 200-62.5-25 MCG/INH aerosol powder     levalbuterol (XOPENEX HFA) 45 MCG/ACT inhaler    cetirizine (zyrTEC) 10 MG tablet    fluticasone (FLONASE) 50 MCG/ACT nasal spray    Atrial fibrillation with rapid ventricular response (HCC) (Chronic)    Overview     *Lasha       "   Relevant Medications    metoprolol succinate XL (TOPROL-XL) 100 MG 24 hr tablet    metoprolol succinate XL (TOPROL-XL) 50 MG 24 hr tablet    levalbuterol (XOPENEX HFA) 45 MCG/ACT inhaler    clopidogrel (Plavix) 75 MG tablet      Other Visit Diagnoses     Allergic rhinitis, unspecified seasonality, unspecified trigger        Relevant Medications    cetirizine (zyrTEC) 10 MG tablet    fluticasone (FLONASE) 50 MCG/ACT nasal spray        No orders of the defined types were placed in this encounter.    New Medications Ordered This Visit   Medications   • cetirizine (zyrTEC) 10 MG tablet     Sig: Take 0.5 tablets by mouth Every Night.   • fluticasone (FLONASE) 50 MCG/ACT nasal spray     Sig: Administer 1spray in each nostril twice daily.       SUMMARY/DISCUSSION  •       Next Appointment with me: Visit date not found    Return in about 6 months (around 6/7/2022) for Reassess chronic medical problems.      VITAL SIGNS     Visit Vitals  /74 (BP Location: Left arm)   Pulse 68   Temp 96.8 °F (36 °C)   Ht 177.8 cm (70\")   Wt 79.8 kg (176 lb)   SpO2 98%   BMI 25.25 kg/m²       BP Readings from Last 3 Encounters:   12/07/21 120/74   09/17/21 110/68   07/29/21 116/72     Wt Readings from Last 3 Encounters:   12/07/21 79.8 kg (176 lb)   09/17/21 79.8 kg (176 lb)   07/01/21 83.1 kg (183 lb 3.2 oz)     Body mass index is 25.25 kg/m².      MEDICATIONS AT THE TIME OF OFFICE VISIT     Current Outpatient Medications on File Prior to Visit   Medication Sig   • Accu-Chek Softclix Lancets lancets Accu-Chek Softclix Lancets   USE TO TEST GLUCOSE QOD   • acetaminophen (TYLENOL) 325 MG tablet Take 2 tablets by mouth Every 4 (Four) Hours As Needed for Mild Pain .   • ALPHAGAN P 0.1 % solution ophthalmic solution Administer 1 drop to both eyes 2 (two) times a day.   • atorvastatin (LIPITOR) 80 MG tablet Take 1 tablet by mouth Every Night.   • clopidogrel (Plavix) 75 MG tablet Take 1 tablet by mouth Daily.   • dabigatran etexilate " (Pradaxa) 150 MG capsu Take 1 capsule by mouth 2 (Two) Times a Day.   • Fluticasone-Umeclidin-Vilant 200-62.5-25 MCG/INH aerosol powder  Inhale 1 puff Daily. Just increased to this strength 3/21   • furosemide (LASIX) 40 MG tablet Take 1 tablet by mouth Daily.   • glucose blood test strip Accu-Chek Dannielle Plus test strips   USE TO CHECK GLUCOSE QOD   • glucose blood test strip Accu-Chek Dannielle Plus Meter   USE TO CHECK GLUCOSE QOD   • levalbuterol (XOPENEX HFA) 45 MCG/ACT inhaler INHALE 1 TO 2 PUFFS BY MOUTH EVERY 6 HOURS AS NEEDED FOR WHEEZING OR SHORTNESS OF BREATH   • metFORMIN ER (GLUCOPHAGE-XR) 500 MG 24 hr tablet TAKE 1 TABLET BY MOUTH DAILY WITH DINNER   • metoprolol succinate XL (TOPROL-XL) 100 MG 24 hr tablet Take 1 tablet by mouth Every Night. Take 100 mg at night and 50 mg in the morning   • metoprolol succinate XL (TOPROL-XL) 50 MG 24 hr tablet Take 1 tablet by mouth Daily. Take 50 mg every morning and 100 mg every night   • pantoprazole (PROTONIX) 40 MG EC tablet Take 1 tablet by mouth 2 (Two) Times a Day.   • spironolactone (ALDACTONE) 25 MG tablet Take 1 tablet by mouth Daily.   • Tafluprost, PF, (ZIOPTAN) 0.0015 % solution ophthalmic solution Administer 1 drop to both eyes Every Night.   • tamsulosin (FLOMAX) 0.4 MG capsule 24 hr capsule Take 2 capsules by mouth Every Night.   • losartan (COZAAR) 25 MG tablet Take 1 tablet by mouth Daily. (Patient taking differently: Take 25 mg by mouth Daily. Holding since 8/2021)     No current facility-administered medications on file prior to visit.          HISTORY OF PRESENT ILLNESS     Complains of fingers/toes feeling very cold and describes Raynaud's type symptoms of his fingers, especially worse during colder weather.  Denies gangrene symptoms.  Discussed this problem with his cardiologist but no change in medications made.  He is on metoprolol for atrial fibrillation.  Previous vascular screening in 2017 was negative.  Hypertension otherwise remained stable.   Diabetes remains stable on metformin.  Denies worsening cough or shortness of breath in regards to his COPD.  He follows up with his pulmonologist on regular basis.      Patient Care Team:  Neftali Amezcua MD as PCP - General (Internal Medicine)  Juan Colby MD as Consulting Physician (Cardiology)  Ankush Sky MD as Surgeon (Orthopedic Surgery)  Vale Del Rosario APRN as Nurse Practitioner (Oncology)  Darrion Peña MD as Consulting Physician (Urology)  Tahir Childs MD as Consulting Physician (Pulmonary Disease)  Noman Moseley MD as Consulting Physician (Gastroenterology)  Aura Perry OD (Optometry)  Juwan Diane MD as Consulting Physician (Ophthalmology)    REVIEW OF SYSTEMS     Review of Systems   Denies worsening cough or shortness of breath  Denies chest pain   No melena or blood in stool.   Neuro neg for acute change  Vascular Raynaud symptoms involving finger(s) without gangrene   Psych neg     PHYSICAL EXAMINATION     Physical Exam  General: No acute distress  Psych: Normal thought and judgment   Cardiovascular Rate: normal. Rhythm: regular. Heart sounds: normal.   Hands/feet: cool extremities without cyanosis or gangrene; radial and DP/PT pulses palpable       REVIEWED DATA     Labs:     Lab Results   Component Value Date     09/17/2021    K 3.9 09/17/2021    CALCIUM 9.1 09/17/2021    AST 24 09/17/2021    ALT 18 09/17/2021    BUN 14 09/17/2021    CREATININE 0.86 09/17/2021    CREATININE 0.81 07/01/2021    CREATININE 1.05 03/16/2021    EGFRIFNONA 87 09/17/2021    EGFRIFAFRI 105 09/17/2021       Lab Results   Component Value Date    HGBA1C 6.00 (H) 09/17/2021    HGBA1C 6.20 (H) 03/08/2021    HGBA1C 6.00 (H) 10/23/2020       Lab Results   Component Value Date    LDL 40 09/17/2021    LDL 79 10/23/2020    HDL 37 (L) 09/17/2021    TRIG 117 09/17/2021       Lab Results   Component Value Date    TSH 1.880 03/08/2021    TSH 2.45 10/14/2015       Lab Results   Component  Value Date    WBC 9.53 07/01/2021    HGB 15.5 07/01/2021     07/01/2021       Lab Results   Component Value Date    MICROALBUR 17.6 09/17/2021           Imaging:           Medical Tests:     Vascular screening 1/8/17  Carotid Artery Disease and Stroke Screening: The right vessel is normal. The left vessel is normal.  Abdominal Aortic Aneurysm (AAA) Screening: The artery was normal.  Peripheral Artery Disease (P.A.D.) Screening: The right has normal arterial pressures. The left has normal arterial pressures.  No further workup needed.         Summary of old records / correspondence / consultant report:           Request outside records:             *Examiner was wearing KN95 mask and eye protection during the entire duration of the visit. Patient was masked the entire time. Minimum social distance of 6 ft maintained entire visit except if physical contact was necessary as documented.     **Dragon Disclaimer: Much of this encounter note is an electronic transcription/translation of spoken language to printed text. The electronic translation of spoken language may permit erroneous, or at times, nonsensical words or phrases to be inadvertently transcribed. Although I have reviewed the note for such errors, some may still exist.       Template created by Adelso Amezcua MD

## 2021-12-07 NOTE — ASSESSMENT & PLAN NOTE
Continue current blood pressure medications.   He is having raynaud's of fingers/toes. If needed will consider CCB. Discussed.     BP Readings from Last 3 Encounters:   12/07/21 120/74   09/17/21 110/68   07/29/21 116/72

## 2021-12-07 NOTE — ASSESSMENT & PLAN NOTE
This is a new problem to this examiner.  Avoid cold temperature (wear gloves/socks/insulated shoes)  Consider CCB if not improving/worse.   He is on beta-blocker for his heart.

## 2022-03-29 ENCOUNTER — OFFICE VISIT (OUTPATIENT)
Dept: ORTHOPEDIC SURGERY | Facility: CLINIC | Age: 77
End: 2022-03-29

## 2022-03-29 VITALS — TEMPERATURE: 96.9 F | WEIGHT: 176 LBS | BODY MASS INDEX: 25.2 KG/M2 | HEIGHT: 70 IN

## 2022-03-29 DIAGNOSIS — M25.551 RIGHT HIP PAIN: ICD-10-CM

## 2022-03-29 DIAGNOSIS — R52 PAIN: Primary | ICD-10-CM

## 2022-03-29 DIAGNOSIS — M54.40 ACUTE RIGHT-SIDED LOW BACK PAIN WITH SCIATICA, SCIATICA LATERALITY UNSPECIFIED: ICD-10-CM

## 2022-03-29 PROCEDURE — 73502 X-RAY EXAM HIP UNI 2-3 VIEWS: CPT | Performed by: NURSE PRACTITIONER

## 2022-03-29 PROCEDURE — 99214 OFFICE O/P EST MOD 30 MIN: CPT | Performed by: NURSE PRACTITIONER

## 2022-03-29 NOTE — PROGRESS NOTES
willowPatient: Aj Salazar  YOB: 1945 76 y.o. male  Medical Record Number: [id number]    Chief Complaints:   Chief Complaint   Patient presents with   • Right Hip - Initial Evaluation       History of Present Illness:Aj Salazar is a 76 y.o. male who presents with new complaint of right hip pain.  Patient was a former patient of ours but its been over 3 years since he was seen last.  Patient had a previous right total hip replacement approximately 8 years ago, had been doing well but about 2 months ago started with some pain in his right lower back going down into his right hip sometimes all the way down his leg with some occasional numbness and tingling in his right foot.  He has been seen previously for his back and has had epidural injections though its been many years.  Patient denies any injury.  Patient reports that the pain is a constant moderate sometimes severe pain that is worse with certain movements only slightly better with rest    Allergies:   Allergies   Allergen Reactions   • Bactrim [Sulfamethoxazole-Trimethoprim] Rash       Medications:   Current Outpatient Medications   Medication Sig Dispense Refill   • Accu-Chek Softclix Lancets lancets Accu-Chek Softclix Lancets   USE TO TEST GLUCOSE QOD     • acetaminophen (TYLENOL) 325 MG tablet Take 2 tablets by mouth Every 4 (Four) Hours As Needed for Mild Pain .     • ALPHAGAN P 0.1 % solution ophthalmic solution Administer 1 drop to both eyes 2 (two) times a day.  6   • atorvastatin (LIPITOR) 80 MG tablet Take 1 tablet by mouth Every Night. 90 tablet 3   • clopidogrel (Plavix) 75 MG tablet Take 1 tablet by mouth Daily. 90 tablet 3   • dabigatran etexilate (Pradaxa) 150 MG capsu Take 1 capsule by mouth 2 (Two) Times a Day. 60 capsule 6   • furosemide (LASIX) 40 MG tablet Take 1 tablet by mouth Daily. 30 tablet 0   • glucose blood test strip Accu-Chek Dannielle Plus test strips   USE TO CHECK GLUCOSE QOD     • glucose blood test strip  Accu-Chek Dannielle Plus Meter   USE TO CHECK GLUCOSE QOD     • metFORMIN ER (GLUCOPHAGE-XR) 500 MG 24 hr tablet TAKE 1 TABLET BY MOUTH DAILY WITH DINNER 30 tablet 11   • metoprolol succinate XL (TOPROL-XL) 100 MG 24 hr tablet Take 1 tablet by mouth Every Night. Take 100 mg at night and 50 mg in the morning 30 tablet 0   • metoprolol succinate XL (TOPROL-XL) 50 MG 24 hr tablet Take 1 tablet by mouth Daily. Take 50 mg every morning and 100 mg every night 30 tablet 0   • pantoprazole (PROTONIX) 40 MG EC tablet Take 1 tablet by mouth 2 (Two) Times a Day. 180 tablet 3   • spironolactone (ALDACTONE) 25 MG tablet Take 1 tablet by mouth Daily. 30 tablet 0   • Tafluprost, PF, (ZIOPTAN) 0.0015 % solution ophthalmic solution Administer 1 drop to both eyes Every Night.     • tamsulosin (FLOMAX) 0.4 MG capsule 24 hr capsule Take 2 capsules by mouth Every Night. 30 capsule    • cetirizine (zyrTEC) 10 MG tablet Take 0.5 tablets by mouth Every Night.     • fluticasone (FLONASE) 50 MCG/ACT nasal spray Administer 1spray in each nostril twice daily.     • Fluticasone-Umeclidin-Vilant 200-62.5-25 MCG/INH aerosol powder  Inhale 1 puff Daily. Just increased to this strength 3/21     • levalbuterol (XOPENEX HFA) 45 MCG/ACT inhaler INHALE 1 TO 2 PUFFS BY MOUTH EVERY 6 HOURS AS NEEDED FOR WHEEZING OR SHORTNESS OF BREATH 15 g 2   • losartan (COZAAR) 25 MG tablet Take 1 tablet by mouth Daily. (Patient taking differently: Take 25 mg by mouth Daily. Holding since 8/2021) 30 tablet 0     No current facility-administered medications for this visit.         The following portions of the patient's history were reviewed and updated as appropriate: allergies, current medications, past family history, past medical history, past social history, past surgical history and problem list.    Review of Systems:   A 14 point review of systems was performed. All systems negative except pertinent positives/negative listed in HPI above    Physical Exam:   Vitals:  "   03/29/22 0858   Temp: 96.9 °F (36.1 °C)   TempSrc: Temporal   Weight: 79.8 kg (176 lb)   Height: 177.8 cm (70\")       General: A and O x 3, ASA, NAD    SCLERA:    Normal    Skin clear no unusual lesions noted  Right hip patient is nontender palpation he has good range of motion with no instability he does however have decreased range of motion of his lower back with a positive right straight leg raise neurologic intact       Radiology:  Xrays 2 views of right hip ordered and reviewed today secondary to pain and show a previously replaced right hip he has significant arthritic changes noted of the lower lumbar spine which is only able be partially visualized on hip x-rays.  Hip replacement shows no change however there is questionable lesion noted right mid rami as well as 1 involving the lesser trochanter.  It is difficult to determine if those lesions were there previously.  I did review x-rays with Dr. Sky as well today    Assessment/Plan: Status post left KARMA  Low back pain with radiculopathy  Abnormal x-ray    Dr. Sky and I did discuss this patient's case as well as images.  We will proceed with a metal suppressed MRI of the right hip to further evaluate and will ask the radiologist to look closely at these areas.  In the meantime I will send the patient to outpatient physical therapy for his back and he will also tried scheduled Tylenol.  Patient is going to call me as soon as he has the MRI of the hip to discuss results.  If that is negative and if he does not improve with conservative measures we will proceed with an MRI of the lumbar spine.  Patient verbalized understanding and does know to follow-up with me immediately following the MRI of his hip      Marily Jiménez, APRN  3/29/2022  "

## 2022-04-13 ENCOUNTER — APPOINTMENT (OUTPATIENT)
Dept: MRI IMAGING | Facility: HOSPITAL | Age: 77
End: 2022-04-13

## 2022-04-20 ENCOUNTER — APPOINTMENT (OUTPATIENT)
Dept: MRI IMAGING | Facility: HOSPITAL | Age: 77
End: 2022-04-20

## 2022-04-21 ENCOUNTER — OFFICE VISIT (OUTPATIENT)
Dept: ORTHOPEDIC SURGERY | Facility: CLINIC | Age: 77
End: 2022-04-21

## 2022-04-21 VITALS — TEMPERATURE: 96 F | BODY MASS INDEX: 25.48 KG/M2 | WEIGHT: 178 LBS | HEIGHT: 70 IN

## 2022-04-21 DIAGNOSIS — M54.40 ACUTE RIGHT-SIDED LOW BACK PAIN WITH SCIATICA, SCIATICA LATERALITY UNSPECIFIED: Primary | ICD-10-CM

## 2022-04-21 PROCEDURE — 99214 OFFICE O/P EST MOD 30 MIN: CPT | Performed by: NURSE PRACTITIONER

## 2022-04-21 RX ORDER — METHYLPREDNISOLONE 4 MG/1
TABLET ORAL
Qty: 21 TABLET | Refills: 0 | Status: ON HOLD | OUTPATIENT
Start: 2022-04-21 | End: 2022-05-05

## 2022-04-21 NOTE — PROGRESS NOTES
Patient: Aj Salazar  YOB: 1945 76 y.o. male  Medical Record Number: [id number]    Chief Complaints:   Chief Complaint   Patient presents with   • Right Hip - Follow-up, Pain       History of Present Illness:Aj Salazar is a 76 y.o. male who presents with continued complaints of severe right low back pain.  The patient had an MRI of his right hip because we did see some questionable lesions noted on plain films, thankfully the MRI of his hip looked good, no unusual lesions masses or tumors seen.  However he has been left with continued right low back pain going down his leg with occasional numbness and tingling.  He had lumbar epidural injections several years ago which were successful but he has not been seen for his back since.  He denies any specific injury.    Allergies:   Allergies   Allergen Reactions   • Bactrim [Sulfamethoxazole-Trimethoprim] Rash       Medications:   Current Outpatient Medications   Medication Sig Dispense Refill   • Accu-Chek Softclix Lancets lancets Accu-Chek Softclix Lancets   USE TO TEST GLUCOSE QOD     • acetaminophen (TYLENOL) 325 MG tablet Take 2 tablets by mouth Every 4 (Four) Hours As Needed for Mild Pain .     • ALPHAGAN P 0.1 % solution ophthalmic solution Administer 1 drop to both eyes 2 (two) times a day.  6   • atorvastatin (LIPITOR) 80 MG tablet Take 1 tablet by mouth Every Night. 90 tablet 3   • clopidogrel (Plavix) 75 MG tablet Take 1 tablet by mouth Daily. 90 tablet 3   • dabigatran etexilate (Pradaxa) 150 MG capsu Take 1 capsule by mouth 2 (Two) Times a Day. 60 capsule 6   • Fluticasone-Umeclidin-Vilant 200-62.5-25 MCG/INH aerosol powder  Inhale 1 puff Daily. Just increased to this strength 3/21     • furosemide (LASIX) 40 MG tablet Take 1 tablet by mouth Daily. 30 tablet 0   • glucose blood test strip Accu-Chek Dannielle Plus test strips   USE TO CHECK GLUCOSE QOD     • glucose blood test strip Accu-Chek Dannielle Plus Meter   USE TO CHECK GLUCOSE QOD     •  metFORMIN ER (GLUCOPHAGE-XR) 500 MG 24 hr tablet TAKE 1 TABLET BY MOUTH DAILY WITH DINNER 30 tablet 11   • metoprolol succinate XL (TOPROL-XL) 100 MG 24 hr tablet Take 1 tablet by mouth Every Night. Take 100 mg at night and 50 mg in the morning 30 tablet 0   • metoprolol succinate XL (TOPROL-XL) 50 MG 24 hr tablet Take 1 tablet by mouth Daily. Take 50 mg every morning and 100 mg every night 30 tablet 0   • pantoprazole (PROTONIX) 40 MG EC tablet Take 1 tablet by mouth 2 (Two) Times a Day. 180 tablet 3   • spironolactone (ALDACTONE) 25 MG tablet Take 1 tablet by mouth Daily. 30 tablet 0   • Tafluprost, PF, (ZIOPTAN) 0.0015 % solution ophthalmic solution Administer 1 drop to both eyes Every Night.     • tamsulosin (FLOMAX) 0.4 MG capsule 24 hr capsule Take 2 capsules by mouth Every Night. 30 capsule    • cetirizine (zyrTEC) 10 MG tablet Take 0.5 tablets by mouth Every Night.     • fluticasone (FLONASE) 50 MCG/ACT nasal spray Administer 1spray in each nostril twice daily.     • levalbuterol (XOPENEX HFA) 45 MCG/ACT inhaler INHALE 1 TO 2 PUFFS BY MOUTH EVERY 6 HOURS AS NEEDED FOR WHEEZING OR SHORTNESS OF BREATH 15 g 2   • losartan (COZAAR) 25 MG tablet Take 1 tablet by mouth Daily. (Patient taking differently: Take 25 mg by mouth Daily. Holding since 8/2021) 30 tablet 0   • methylPREDNISolone (MEDROL) 4 MG dose pack Use as directed by package instructions 21 tablet 0     No current facility-administered medications for this visit.         The following portions of the patient's history were reviewed and updated as appropriate: allergies, current medications, past family history, past medical history, past social history, past surgical history and problem list.    Review of Systems:   A 14 point review of systems was performed. All systems negative except pertinent positives/negative listed in HPI above    Physical Exam:   Vitals:    04/21/22 0833   Temp: 96 °F (35.6 °C)   TempSrc: Temporal   Weight: 80.7 kg (178 lb)  "  Height: 177.8 cm (70\")   PainSc:   3       General: A and O x 3, ASA, NAD    SCLERA:    Normal    Skin clear no unusual lesions noted  Patient has decreased range of motion of the lower back with a positive right straight leg raise he does have decreased strength in the right lower extremity with 3 out of 5 right lower extremity normal left lower extremity 5 out of 5.       Radiology:  Xrays previous x-rays of the right hip were reviewed which also show the lower lumbar spine which shows significant osteoarthritic changes.    Assessment/Plan: Severe low back pain with radiculopathy and decreased strength    Patient I discussed options, we will proceed with an MRI of the lumbar spine to further evaluate and patient will call me a couple days after regarding results and treatment options.  Hopefully we can send him for lumbar epidural injections if appropriate since he has responded to that as well in the past.  In the meantime because of the severe pain we will try Medrol Dosepak but he was advised it could cause his blood sugars to elevate and he will check the more frequently, call his primary care physician if they are greater than 200.  In addition we will try physical therapy for some temporary relief but I do not know that it will necessarily be helpful and I do think that the MRI is necessary.      Marily Jiménez, APRN  4/21/2022  "

## 2022-05-03 ENCOUNTER — OFFICE VISIT (OUTPATIENT)
Dept: ORTHOPEDIC SURGERY | Facility: CLINIC | Age: 77
End: 2022-05-03

## 2022-05-03 VITALS — BODY MASS INDEX: 24.91 KG/M2 | TEMPERATURE: 96.4 F | HEIGHT: 70 IN | WEIGHT: 174 LBS

## 2022-05-03 DIAGNOSIS — M54.42 ACUTE LEFT-SIDED LOW BACK PAIN WITH LEFT-SIDED SCIATICA: Primary | ICD-10-CM

## 2022-05-03 PROCEDURE — 99213 OFFICE O/P EST LOW 20 MIN: CPT | Performed by: ORTHOPAEDIC SURGERY

## 2022-05-03 NOTE — PROGRESS NOTES
Patient: Aj Salazar  YOB: 1945 76 y.o. male  Medical Record Number: [id number]    Chief Complaints:   Chief Complaint   Patient presents with   • Lumbar Spine - Follow-up, Pain       History of Present Illness:Aj Salazar is a 76 y.o. male who presents with  LBP , left buttock and post hip/leg pain not past the knee. Has h/o lumbar degenerative disease and has had previous epidural injections for right sided radiculopathy a number of years back.  The pain in the buttock region has been present for couple months and has progressed and is not going away despite anti-inflammatories.    Allergies:   Allergies   Allergen Reactions   • Bactrim [Sulfamethoxazole-Trimethoprim] Rash   • Sulfacetamide Rash       Medications:   Current Outpatient Medications   Medication Sig Dispense Refill   • Accu-Chek Softclix Lancets lancets Accu-Chek Softclix Lancets   USE TO TEST GLUCOSE QOD     • acetaminophen (TYLENOL) 325 MG tablet Take 2 tablets by mouth Every 4 (Four) Hours As Needed for Mild Pain .     • ALPHAGAN P 0.1 % solution ophthalmic solution Administer 1 drop to both eyes 2 (two) times a day.  6   • atorvastatin (LIPITOR) 80 MG tablet Take 1 tablet by mouth Every Night. 90 tablet 3   • clopidogrel (Plavix) 75 MG tablet Take 1 tablet by mouth Daily. 90 tablet 3   • dabigatran etexilate (Pradaxa) 150 MG capsu Take 1 capsule by mouth 2 (Two) Times a Day. 60 capsule 6   • Fluticasone-Umeclidin-Vilant 200-62.5-25 MCG/INH aerosol powder  Inhale 1 puff Daily. Just increased to this strength 3/21     • furosemide (LASIX) 40 MG tablet Take 1 tablet by mouth Daily. 30 tablet 0   • glucose blood test strip Accu-Chek Dannielle Plus test strips   USE TO CHECK GLUCOSE QOD     • glucose blood test strip Accu-Chek Dannielle Plus Meter   USE TO CHECK GLUCOSE QOD     • metFORMIN ER (GLUCOPHAGE-XR) 500 MG 24 hr tablet TAKE 1 TABLET BY MOUTH DAILY WITH DINNER 30 tablet 11   • methylPREDNISolone (MEDROL) 4 MG dose pack Use as  "directed by package instructions 21 tablet 0   • metoprolol succinate XL (TOPROL-XL) 100 MG 24 hr tablet Take 1 tablet by mouth Every Night. Take 100 mg at night and 50 mg in the morning 30 tablet 0   • metoprolol succinate XL (TOPROL-XL) 50 MG 24 hr tablet Take 1 tablet by mouth Daily. Take 50 mg every morning and 100 mg every night 30 tablet 0   • pantoprazole (PROTONIX) 40 MG EC tablet Take 1 tablet by mouth 2 (Two) Times a Day. 180 tablet 3   • spironolactone (ALDACTONE) 25 MG tablet Take 1 tablet by mouth Daily. 30 tablet 0   • Tafluprost, PF, (ZIOPTAN) 0.0015 % solution ophthalmic solution Administer 1 drop to both eyes Every Night.     • tamsulosin (FLOMAX) 0.4 MG capsule 24 hr capsule Take 2 capsules by mouth Every Night. 30 capsule      No current facility-administered medications for this visit.         The following portions of the patient's history were reviewed and updated as appropriate: allergies, current medications, past family history, past medical history, past social history, past surgical history and problem list.    Review of Systems:   A 14 point review of systems was performed. All systems negative except pertinent positives/negative listed in HPI above    Physical Exam:   Vitals:    05/03/22 1329   Temp: 96.4 °F (35.8 °C)   Weight: 78.9 kg (174 lb)   Height: 177.8 cm (70\")       General: A and O x 3, ASA, NAD    SCLERA:    Normal    DENTITION:   Normal  There is no hip irritability he does have mildly positive straight leg raise is intact to light touch.  Skin is normal there is no trochanteric tenderness he walks with a slight antalgic gait with a short stride length       Radiology:  UMAIR IBRAHIM from 4 years ago reviewed which shows extensive degenerative changes with evidence of neural compression.    Recent MRI lumbar reviewed demonstrating: An L4-5 disc herniation with some neural compression    Assessment/Plan: Lumbar degenerative disc disease and left-sided posterior radiculopathy.  I am " going to set him up for an epidural block and I will see him back if that is not successful      Ankush Sky MD  5/3/2022

## 2022-05-05 ENCOUNTER — APPOINTMENT (OUTPATIENT)
Dept: CT IMAGING | Facility: HOSPITAL | Age: 77
End: 2022-05-05

## 2022-05-05 ENCOUNTER — HOSPITAL ENCOUNTER (INPATIENT)
Facility: HOSPITAL | Age: 77
LOS: 5 days | Discharge: HOME | End: 2022-05-10
Attending: EMERGENCY MEDICINE | Admitting: INTERNAL MEDICINE

## 2022-05-05 ENCOUNTER — APPOINTMENT (OUTPATIENT)
Dept: GENERAL RADIOLOGY | Facility: HOSPITAL | Age: 77
End: 2022-05-05

## 2022-05-05 ENCOUNTER — TELEPHONE (OUTPATIENT)
Dept: INTERNAL MEDICINE | Age: 77
End: 2022-05-05

## 2022-05-05 DIAGNOSIS — A41.9 SEPSIS, DUE TO UNSPECIFIED ORGANISM, UNSPECIFIED WHETHER ACUTE ORGAN DYSFUNCTION PRESENT: Primary | ICD-10-CM

## 2022-05-05 DIAGNOSIS — N39.0 ACUTE UTI: ICD-10-CM

## 2022-05-05 LAB
ALBUMIN SERPL-MCNC: 3.3 G/DL (ref 3.5–5.2)
ALBUMIN/GLOB SERPL: 0.9 G/DL
ALP SERPL-CCNC: 101 U/L (ref 39–117)
ALT SERPL W P-5'-P-CCNC: 21 U/L (ref 1–41)
ANION GAP SERPL CALCULATED.3IONS-SCNC: 15 MMOL/L (ref 5–15)
AST SERPL-CCNC: 22 U/L (ref 1–40)
B PARAPERT DNA SPEC QL NAA+PROBE: NOT DETECTED
B PERT DNA SPEC QL NAA+PROBE: NOT DETECTED
BACTERIA UR QL AUTO: ABNORMAL /HPF
BASOPHILS # BLD AUTO: 0.04 10*3/MM3 (ref 0–0.2)
BASOPHILS NFR BLD AUTO: 0.3 % (ref 0–1.5)
BILIRUB SERPL-MCNC: 1.9 MG/DL (ref 0–1.2)
BILIRUB UR QL STRIP: NEGATIVE
BUN SERPL-MCNC: 18 MG/DL (ref 8–23)
BUN/CREAT SERPL: 17.8 (ref 7–25)
C PNEUM DNA NPH QL NAA+NON-PROBE: NOT DETECTED
CALCIUM SPEC-SCNC: 8.7 MG/DL (ref 8.6–10.5)
CHLORIDE SERPL-SCNC: 100 MMOL/L (ref 98–107)
CLARITY UR: ABNORMAL
CO2 SERPL-SCNC: 20 MMOL/L (ref 22–29)
COLOR UR: YELLOW
CREAT SERPL-MCNC: 1.01 MG/DL (ref 0.76–1.27)
D-LACTATE SERPL-SCNC: 3.7 MMOL/L (ref 0.5–2)
DEPRECATED RDW RBC AUTO: 42.7 FL (ref 37–54)
EGFRCR SERPLBLD CKD-EPI 2021: 77.6 ML/MIN/1.73
EOSINOPHIL # BLD AUTO: 0 10*3/MM3 (ref 0–0.4)
EOSINOPHIL NFR BLD AUTO: 0 % (ref 0.3–6.2)
ERYTHROCYTE [DISTWIDTH] IN BLOOD BY AUTOMATED COUNT: 13 % (ref 12.3–15.4)
FLUAV SUBTYP SPEC NAA+PROBE: NOT DETECTED
FLUBV RNA ISLT QL NAA+PROBE: NOT DETECTED
GLOBULIN UR ELPH-MCNC: 3.8 GM/DL
GLUCOSE SERPL-MCNC: 262 MG/DL (ref 65–99)
GLUCOSE UR STRIP-MCNC: NEGATIVE MG/DL
HADV DNA SPEC NAA+PROBE: NOT DETECTED
HCOV 229E RNA SPEC QL NAA+PROBE: NOT DETECTED
HCOV HKU1 RNA SPEC QL NAA+PROBE: NOT DETECTED
HCOV NL63 RNA SPEC QL NAA+PROBE: NOT DETECTED
HCOV OC43 RNA SPEC QL NAA+PROBE: NOT DETECTED
HCT VFR BLD AUTO: 45.7 % (ref 37.5–51)
HGB BLD-MCNC: 15.1 G/DL (ref 13–17.7)
HGB UR QL STRIP.AUTO: ABNORMAL
HMPV RNA NPH QL NAA+NON-PROBE: NOT DETECTED
HPIV1 RNA ISLT QL NAA+PROBE: NOT DETECTED
HPIV2 RNA SPEC QL NAA+PROBE: NOT DETECTED
HPIV3 RNA NPH QL NAA+PROBE: NOT DETECTED
HPIV4 P GENE NPH QL NAA+PROBE: NOT DETECTED
HYALINE CASTS UR QL AUTO: ABNORMAL /LPF
IMM GRANULOCYTES # BLD AUTO: 0.25 10*3/MM3 (ref 0–0.05)
IMM GRANULOCYTES NFR BLD AUTO: 1.6 % (ref 0–0.5)
KETONES UR QL STRIP: NEGATIVE
LEUKOCYTE ESTERASE UR QL STRIP.AUTO: ABNORMAL
LYMPHOCYTES # BLD AUTO: 0.49 10*3/MM3 (ref 0.7–3.1)
LYMPHOCYTES NFR BLD AUTO: 3.1 % (ref 19.6–45.3)
M PNEUMO IGG SER IA-ACNC: NOT DETECTED
MCH RBC QN AUTO: 29.6 PG (ref 26.6–33)
MCHC RBC AUTO-ENTMCNC: 33 G/DL (ref 31.5–35.7)
MCV RBC AUTO: 89.6 FL (ref 79–97)
MONOCYTES # BLD AUTO: 0.9 10*3/MM3 (ref 0.1–0.9)
MONOCYTES NFR BLD AUTO: 5.7 % (ref 5–12)
MRSA DNA SPEC QL NAA+PROBE: NORMAL
NEUTROPHILS NFR BLD AUTO: 14.15 10*3/MM3 (ref 1.7–7)
NEUTROPHILS NFR BLD AUTO: 89.3 % (ref 42.7–76)
NITRITE UR QL STRIP: NEGATIVE
NRBC BLD AUTO-RTO: 0.1 /100 WBC (ref 0–0.2)
PH UR STRIP.AUTO: <=5 [PH] (ref 5–8)
PLATELET # BLD AUTO: 241 10*3/MM3 (ref 140–450)
PMV BLD AUTO: 8.8 FL (ref 6–12)
POTASSIUM SERPL-SCNC: 4.3 MMOL/L (ref 3.5–5.2)
PROCALCITONIN SERPL-MCNC: 0.45 NG/ML (ref 0–0.25)
PROT SERPL-MCNC: 7.1 G/DL (ref 6–8.5)
PROT UR QL STRIP: ABNORMAL
QT INTERVAL: 267 MS
RBC # BLD AUTO: 5.1 10*6/MM3 (ref 4.14–5.8)
RBC # UR STRIP: ABNORMAL /HPF
REF LAB TEST METHOD: ABNORMAL
RHINOVIRUS RNA SPEC NAA+PROBE: NOT DETECTED
RSV RNA NPH QL NAA+NON-PROBE: NOT DETECTED
SARS-COV-2 RNA NPH QL NAA+NON-PROBE: NOT DETECTED
SODIUM SERPL-SCNC: 135 MMOL/L (ref 136–145)
SP GR UR STRIP: 1.01 (ref 1–1.03)
SQUAMOUS #/AREA URNS HPF: ABNORMAL /HPF
TROPONIN T SERPL-MCNC: <0.01 NG/ML (ref 0–0.03)
UROBILINOGEN UR QL STRIP: ABNORMAL
WBC # UR STRIP: ABNORMAL /HPF
WBC NRBC COR # BLD: 15.83 10*3/MM3 (ref 3.4–10.8)

## 2022-05-05 PROCEDURE — 93005 ELECTROCARDIOGRAM TRACING: CPT | Performed by: EMERGENCY MEDICINE

## 2022-05-05 PROCEDURE — 99284 EMERGENCY DEPT VISIT MOD MDM: CPT

## 2022-05-05 PROCEDURE — 81001 URINALYSIS AUTO W/SCOPE: CPT | Performed by: EMERGENCY MEDICINE

## 2022-05-05 PROCEDURE — 87641 MR-STAPH DNA AMP PROBE: CPT | Performed by: INTERNAL MEDICINE

## 2022-05-05 PROCEDURE — 93010 ELECTROCARDIOGRAM REPORT: CPT | Performed by: INTERNAL MEDICINE

## 2022-05-05 PROCEDURE — 87086 URINE CULTURE/COLONY COUNT: CPT | Performed by: STUDENT IN AN ORGANIZED HEALTH CARE EDUCATION/TRAINING PROGRAM

## 2022-05-05 PROCEDURE — 25010000002 CEFEPIME PER 500 MG: Performed by: EMERGENCY MEDICINE

## 2022-05-05 PROCEDURE — 84484 ASSAY OF TROPONIN QUANT: CPT | Performed by: INTERNAL MEDICINE

## 2022-05-05 PROCEDURE — 85025 COMPLETE CBC W/AUTO DIFF WBC: CPT | Performed by: EMERGENCY MEDICINE

## 2022-05-05 PROCEDURE — 25010000002 VANCOMYCIN 10 G RECONSTITUTED SOLUTION: Performed by: EMERGENCY MEDICINE

## 2022-05-05 PROCEDURE — 36415 COLL VENOUS BLD VENIPUNCTURE: CPT

## 2022-05-05 PROCEDURE — 87040 BLOOD CULTURE FOR BACTERIA: CPT | Performed by: EMERGENCY MEDICINE

## 2022-05-05 PROCEDURE — 25010000002 CEFEPIME PER 500 MG: Performed by: INTERNAL MEDICINE

## 2022-05-05 PROCEDURE — 84145 PROCALCITONIN (PCT): CPT | Performed by: EMERGENCY MEDICINE

## 2022-05-05 PROCEDURE — 71045 X-RAY EXAM CHEST 1 VIEW: CPT

## 2022-05-05 PROCEDURE — 74176 CT ABD & PELVIS W/O CONTRAST: CPT

## 2022-05-05 PROCEDURE — 80053 COMPREHEN METABOLIC PANEL: CPT | Performed by: EMERGENCY MEDICINE

## 2022-05-05 PROCEDURE — 83605 ASSAY OF LACTIC ACID: CPT | Performed by: EMERGENCY MEDICINE

## 2022-05-05 PROCEDURE — 0202U NFCT DS 22 TRGT SARS-COV-2: CPT | Performed by: EMERGENCY MEDICINE

## 2022-05-05 RX ORDER — METOPROLOL SUCCINATE 50 MG/1
50 TABLET, EXTENDED RELEASE ORAL DAILY
Status: DISCONTINUED | OUTPATIENT
Start: 2022-05-06 | End: 2022-05-10 | Stop reason: HOSPADM

## 2022-05-05 RX ORDER — NICOTINE POLACRILEX 4 MG
15 LOZENGE BUCCAL
Status: DISCONTINUED | OUTPATIENT
Start: 2022-05-05 | End: 2022-05-10 | Stop reason: HOSPADM

## 2022-05-05 RX ORDER — PANTOPRAZOLE SODIUM 40 MG/1
40 TABLET, DELAYED RELEASE ORAL 2 TIMES DAILY
Status: DISCONTINUED | OUTPATIENT
Start: 2022-05-05 | End: 2022-05-10 | Stop reason: HOSPADM

## 2022-05-05 RX ORDER — DEXTROSE MONOHYDRATE 25 G/50ML
25 INJECTION, SOLUTION INTRAVENOUS
Status: DISCONTINUED | OUTPATIENT
Start: 2022-05-05 | End: 2022-05-10 | Stop reason: HOSPADM

## 2022-05-05 RX ORDER — CLOPIDOGREL BISULFATE 75 MG/1
75 TABLET ORAL DAILY
Status: DISCONTINUED | OUTPATIENT
Start: 2022-05-06 | End: 2022-05-10 | Stop reason: HOSPADM

## 2022-05-05 RX ORDER — INSULIN LISPRO 100 [IU]/ML
0-9 INJECTION, SOLUTION INTRAVENOUS; SUBCUTANEOUS
Status: DISCONTINUED | OUTPATIENT
Start: 2022-05-06 | End: 2022-05-10 | Stop reason: HOSPADM

## 2022-05-05 RX ORDER — ATORVASTATIN CALCIUM 80 MG/1
80 TABLET, FILM COATED ORAL NIGHTLY
Status: DISCONTINUED | OUTPATIENT
Start: 2022-05-05 | End: 2022-05-10 | Stop reason: HOSPADM

## 2022-05-05 RX ORDER — ACETAMINOPHEN 325 MG/1
650 TABLET ORAL EVERY 4 HOURS PRN
Status: DISCONTINUED | OUTPATIENT
Start: 2022-05-05 | End: 2022-05-10 | Stop reason: HOSPADM

## 2022-05-05 RX ORDER — ONDANSETRON 4 MG/1
4 TABLET, FILM COATED ORAL EVERY 6 HOURS PRN
Status: DISCONTINUED | OUTPATIENT
Start: 2022-05-05 | End: 2022-05-10 | Stop reason: HOSPADM

## 2022-05-05 RX ORDER — LATANOPROST 50 UG/ML
1 SOLUTION/ DROPS OPHTHALMIC NIGHTLY
Status: DISCONTINUED | OUTPATIENT
Start: 2022-05-05 | End: 2022-05-10 | Stop reason: HOSPADM

## 2022-05-05 RX ORDER — METOPROLOL SUCCINATE 100 MG/1
100 TABLET, EXTENDED RELEASE ORAL NIGHTLY
Status: DISCONTINUED | OUTPATIENT
Start: 2022-05-05 | End: 2022-05-10 | Stop reason: HOSPADM

## 2022-05-05 RX ORDER — SODIUM CHLORIDE 9 MG/ML
150 INJECTION, SOLUTION INTRAVENOUS CONTINUOUS
Status: DISCONTINUED | OUTPATIENT
Start: 2022-05-05 | End: 2022-05-08

## 2022-05-05 RX ORDER — DABIGATRAN ETEXILATE 150 MG/1
150 CAPSULE ORAL 2 TIMES DAILY
Status: DISCONTINUED | OUTPATIENT
Start: 2022-05-05 | End: 2022-05-10 | Stop reason: HOSPADM

## 2022-05-05 RX ORDER — SPIRONOLACTONE 25 MG/1
25 TABLET ORAL DAILY
Status: DISCONTINUED | OUTPATIENT
Start: 2022-05-06 | End: 2022-05-06

## 2022-05-05 RX ORDER — NITROGLYCERIN 0.4 MG/1
0.4 TABLET SUBLINGUAL
Status: DISCONTINUED | OUTPATIENT
Start: 2022-05-05 | End: 2022-05-10 | Stop reason: HOSPADM

## 2022-05-05 RX ORDER — UREA 10 %
3 LOTION (ML) TOPICAL NIGHTLY PRN
Status: DISCONTINUED | OUTPATIENT
Start: 2022-05-05 | End: 2022-05-10 | Stop reason: HOSPADM

## 2022-05-05 RX ORDER — TAMSULOSIN HYDROCHLORIDE 0.4 MG/1
0.8 CAPSULE ORAL NIGHTLY
Status: DISCONTINUED | OUTPATIENT
Start: 2022-05-05 | End: 2022-05-10 | Stop reason: HOSPADM

## 2022-05-05 RX ORDER — ONDANSETRON 2 MG/ML
4 INJECTION INTRAMUSCULAR; INTRAVENOUS EVERY 6 HOURS PRN
Status: DISCONTINUED | OUTPATIENT
Start: 2022-05-05 | End: 2022-05-10 | Stop reason: HOSPADM

## 2022-05-05 RX ORDER — BUDESONIDE AND FORMOTEROL FUMARATE DIHYDRATE 160; 4.5 UG/1; UG/1
2 AEROSOL RESPIRATORY (INHALATION)
Status: DISCONTINUED | OUTPATIENT
Start: 2022-05-05 | End: 2022-05-10 | Stop reason: HOSPADM

## 2022-05-05 RX ORDER — ACETAMINOPHEN 500 MG
1000 TABLET ORAL ONCE
Status: COMPLETED | OUTPATIENT
Start: 2022-05-05 | End: 2022-05-05

## 2022-05-05 RX ADMIN — METOPROLOL TARTRATE 5 MG: 1 INJECTION, SOLUTION INTRAVENOUS at 16:09

## 2022-05-05 RX ADMIN — SODIUM CHLORIDE 75 ML/HR: 9 INJECTION, SOLUTION INTRAVENOUS at 18:57

## 2022-05-05 RX ADMIN — ACETAMINOPHEN 1000 MG: 500 TABLET ORAL at 14:48

## 2022-05-05 RX ADMIN — LATANOPROST 1 DROP: 50 SOLUTION OPHTHALMIC at 21:43

## 2022-05-05 RX ADMIN — SODIUM CHLORIDE, POTASSIUM CHLORIDE, SODIUM LACTATE AND CALCIUM CHLORIDE 2421 ML: 600; 310; 30; 20 INJECTION, SOLUTION INTRAVENOUS at 16:46

## 2022-05-05 RX ADMIN — BRIMONIDINE TARTRATE 1 DROP: 1 SOLUTION/ DROPS OPHTHALMIC at 21:43

## 2022-05-05 RX ADMIN — SODIUM CHLORIDE 1000 ML: 9 INJECTION, SOLUTION INTRAVENOUS at 14:48

## 2022-05-05 RX ADMIN — DABIGATRAN ETEXILATE MESYLATE 150 MG: 150 CAPSULE ORAL at 21:42

## 2022-05-05 RX ADMIN — VANCOMYCIN HYDROCHLORIDE 1500 MG: 10 INJECTION, POWDER, LYOPHILIZED, FOR SOLUTION INTRAVENOUS at 16:10

## 2022-05-05 RX ADMIN — CEFEPIME HYDROCHLORIDE 2 G: 2 INJECTION, POWDER, FOR SOLUTION INTRAVENOUS at 15:28

## 2022-05-05 RX ADMIN — METOPROLOL SUCCINATE 100 MG: 100 TABLET, EXTENDED RELEASE ORAL at 21:42

## 2022-05-05 RX ADMIN — CEFEPIME HYDROCHLORIDE 2 G: 2 INJECTION, POWDER, FOR SOLUTION INTRAVENOUS at 21:42

## 2022-05-05 RX ADMIN — PANTOPRAZOLE SODIUM 40 MG: 40 TABLET, DELAYED RELEASE ORAL at 21:43

## 2022-05-05 RX ADMIN — ATORVASTATIN CALCIUM 80 MG: 80 TABLET, FILM COATED ORAL at 21:42

## 2022-05-05 RX ADMIN — TAMSULOSIN HYDROCHLORIDE 0.8 MG: 0.4 CAPSULE ORAL at 21:42

## 2022-05-05 NOTE — ED PROVIDER NOTES
EMERGENCY DEPARTMENT ENCOUNTER    Room Number:  38/38  PCP: Neftali Amezcua MD  Historian: Patient  History Limited By: Nothing      HPI  Chief Complaint: Fever chills cough  Context: Aj Salazar is a 76 y.o. male who presents to the ED c/o fever chills cough.  Patient states he was seen at outside clinic on Tuesday for symptoms that started Monday.  Was having some urinary symptoms at that point.  Patient diagnosed with urinary tract infection as well as strep throat.  Was started on antibiotics.  Since then patient has been sleeping more.  Has been nauseated without vomiting.  Has had fevers and chills.  Continues to have sore throat cough runny nose.  Continues to have some burning with urination and now has some lower abdominal pain.      Location: Fevers and chills  Radiation: N/A  Character: Rigors  Duration: Monday  Severity: Moderate  Progression: Not improving  Aggravating Factors: Nothing  Alleviating Factors: Nothing        MEDICAL RECORD REVIEW    Patient seen at outside facility and started on Augmentin          PAST MEDICAL HISTORY  Active Ambulatory Problems     Diagnosis Date Noted   • Chronic obstructive pulmonary disease with acute exacerbation ([facility]) 03/17/2016   • Hypertension 03/17/2016   • Osteoarthritis 03/17/2016   • History of smoking greater than 50 pack years 06/23/2017   • Benign prostatic hyperplasia with urinary obstruction 03/27/2018   • Atrial fibrillation with rapid ventricular response ([facility])    • Type 2 diabetes mellitus with hyperglycemia, without long-term current use of insulin ([facility]) 01/31/2019   • Rash and nonspecific skin eruption 09/02/2020   • TIA (transient ischemic attack) 10/22/2020   • Atrial fibrillation with RVR ([facility]) 03/07/2021   • Acute on chronic systolic CHF (congestive heart failure) ([facility]) 03/08/2021   • Gastroesophageal reflux disease 03/15/2021   • Raynaud's disease without gangrene 12/07/2021     Resolved Ambulatory Problems     Diagnosis Date Noted   •  Dehydration 09/25/2018   • Hypotension 09/25/2018   • Melena 09/25/2018   • EMPERATRIZ (acute kidney injury) ([facility]) 09/25/2018   • Hyponatremia 09/25/2018   • Lactic acidosis 09/25/2018   • Facial droop 10/23/2020   • Acute respiratory failure with hypoxia ([facility]) 03/08/2021   • Sepsis ([facility]) 03/09/2021     Past Medical History:   Diagnosis Date   • Alcohol abuse, in remission    • BPH (benign prostatic hypertrophy)    • COPD (chronic obstructive pulmonary disease) ([facility])    • Depression    • DJD (degenerative joint disease) of cervical spine    • ED (erectile dysfunction)    • Hx of colonic polyps    • Hyperlipidemia    • Influenza B 02/14/2013   • Nocturnal hypoxia    • Peripheral neuropathy    • Permanent atrial fibrillation ([facility])    • Rectal bleeding 04/26/2017   • Tendonitis of shoulder 11/07/2007   • Ulcer of the stomach and intestine          PAST SURGICAL HISTORY  Past Surgical History:   Procedure Laterality Date   • APPENDECTOMY     • CARDIOVASCULAR STRESS TEST N/A 10/20/2015    NML LEXISCAN CARDIOLITE PERFUSION STUDY, NO EVIDENCE OF ISCHEMIA, AREA OF HYPOPERFUSION OF THE INFERIOR WALL W/NORMAL WALL PERFUSION AND NML LEFT VENTRICULAR EJECTION FRACTION, MOST LIKELY ATTENUATION. DR.MICHAEL BOX   • COLONOSCOPY N/A 02/2017   • COLONOSCOPY N/A 02/23/2011    4 POLYPS REMOVED, RESCOPE IN 5 YRS, DR. EAGLE   • COLONOSCOPY N/A 03/08/2006    ERYTHEMOUS AND GRANULAR MUCOSA IN ILEOCECAL VALVE, NORMAL COLON, REPEAT IN 5 YRS,    • ENDOSCOPY N/A 09/26/2018    normal esophagus, acute gastritis, multiple non-bleeding duodenal ulcers with pigmented material, H Pylori positive   • HERNIA REPAIR Bilateral     INGUINAL   • JOINT REPLACEMENT  11/2015    left hip   • TOTAL HIP ARTHROPLASTY Left 11/06/2015    Dr Ankush Sky   • TOTAL HIP ARTHROPLASTY Right 10/27/2014    DR.RIED SKY   • VASECTOMY           FAMILY HISTORY  Family History   Problem Relation Age of Onset   • Colon cancer Mother    • Ovarian cancer Mother     • Alzheimer's disease Mother 70   • Stroke Sister         2016   • Dementia Sister    • Heart disease Brother    • Alcohol abuse Brother    • Coronary artery disease Father    • Hypertension Father    • Colon cancer Maternal Grandmother    • Heart disease Brother    • Coronary artery disease Brother    • Hypertension Brother    • Stroke Brother    • Arthritis Brother    • Prostate cancer Neg Hx          SOCIAL HISTORY  Social History     Socioeconomic History   • Marital status:      Spouse name: Ara*   • Number of children: 3   Tobacco Use   • Smoking status: Former Smoker     Packs/day: 2.00     Years: 49.00     Pack years: 98.00     Quit date: 2010     Years since quittin.3   • Smokeless tobacco: Never Used   • Tobacco comment: long smoking history   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   • Drug use: No     Comment: caffeine use    • Sexual activity: Defer         ALLERGIES  Bactrim [sulfamethoxazole-trimethoprim] and Sulfacetamide        REVIEW OF SYSTEMS  Review of Systems   Constitutional: Positive for chills, fatigue and fever. Negative for activity change and appetite change.   HENT: Positive for congestion. Negative for sore throat.    Eyes: Negative.    Respiratory: Positive for cough. Negative for shortness of breath.    Cardiovascular: Negative for chest pain and leg swelling.   Gastrointestinal: Positive for abdominal pain. Negative for diarrhea and vomiting.   Endocrine: Negative.    Genitourinary: Positive for dysuria. Negative for decreased urine volume.   Musculoskeletal: Negative for neck pain.   Skin: Negative for rash and wound.   Allergic/Immunologic: Negative.    Neurological: Positive for weakness. Negative for numbness and headaches.   Hematological: Negative.    Psychiatric/Behavioral: Negative.    All other systems reviewed and are negative.           PHYSICAL EXAM  ED Triage Vitals  [05/05/22 1432]   Temp Pulse Resp BP SpO2   (!) 101.2 °F (38.4 °C) -- -- -- --      Temp src Heart Rate Source Patient Position BP Location FiO2 (%)   Tympanic -- -- -- --       Physical Exam  Vitals and nursing note reviewed.   Constitutional:       General: He is not in acute distress.     Appearance: He is ill-appearing.   HENT:      Head: Normocephalic and atraumatic.      Mouth/Throat:      Comments: Mucous membranes dry  Eyes:      Pupils: Pupils are equal, round, and reactive to light.   Cardiovascular:      Rate and Rhythm: Tachycardia present. Rhythm irregular.      Heart sounds: Normal heart sounds.   Pulmonary:      Effort: Pulmonary effort is normal. No respiratory distress.      Breath sounds: Normal breath sounds.   Abdominal:      Palpations: Abdomen is soft.      Tenderness: There is no abdominal tenderness. There is no guarding or rebound.   Musculoskeletal:         General: Normal range of motion.      Cervical back: Normal range of motion and neck supple.   Skin:     General: Skin is warm and dry.   Neurological:      Mental Status: He is alert and oriented to person, place, and time.      Sensory: Sensation is intact.      Motor: No weakness.   Psychiatric:         Mood and Affect: Mood and affect normal.     Patient was wearing a face mask when I entered the room and they continued to wear a mask throughout their stay in the ED.  I wore PPE, including  gloves, face mask with shield or face mask with goggles whenever I was in the room with patient.        LAB RESULTS  Recent Results (from the past 24 hour(s))   ECG 12 Lead    Collection Time: 05/05/22  2:43 PM   Result Value Ref Range    QT Interval 267 ms   Comprehensive Metabolic Panel    Collection Time: 05/05/22  2:46 PM    Specimen: Blood   Result Value Ref Range    Glucose 262 (H) 65 - 99 mg/dL    BUN 18 8 - 23 mg/dL    Creatinine 1.01 0.76 - 1.27 mg/dL    Sodium 135 (L) 136 - 145 mmol/L    Potassium 4.3 3.5 - 5.2 mmol/L    Chloride 100 98  - 107 mmol/L    CO2 20.0 (L) 22.0 - 29.0 mmol/L    Calcium 8.7 8.6 - 10.5 mg/dL    Total Protein 7.1 6.0 - 8.5 g/dL    Albumin 3.30 (L) 3.50 - 5.20 g/dL    ALT (SGPT) 21 1 - 41 U/L    AST (SGOT) 22 1 - 40 U/L    Alkaline Phosphatase 101 39 - 117 U/L    Total Bilirubin 1.9 (H) 0.0 - 1.2 mg/dL    Globulin 3.8 gm/dL    A/G Ratio 0.9 g/dL    BUN/Creatinine Ratio 17.8 7.0 - 25.0    Anion Gap 15.0 5.0 - 15.0 mmol/L    eGFR 77.6 >60.0 mL/min/1.73   Lactic Acid, Plasma    Collection Time: 05/05/22  2:46 PM    Specimen: Blood   Result Value Ref Range    Lactate 3.7 (C) 0.5 - 2.0 mmol/L   Procalcitonin    Collection Time: 05/05/22  2:46 PM    Specimen: Blood   Result Value Ref Range    Procalcitonin 0.45 (H) 0.00 - 0.25 ng/mL   CBC Auto Differential    Collection Time: 05/05/22  2:46 PM    Specimen: Blood   Result Value Ref Range    WBC 15.83 (H) 3.40 - 10.80 10*3/mm3    RBC 5.10 4.14 - 5.80 10*6/mm3    Hemoglobin 15.1 13.0 - 17.7 g/dL    Hematocrit 45.7 37.5 - 51.0 %    MCV 89.6 79.0 - 97.0 fL    MCH 29.6 26.6 - 33.0 pg    MCHC 33.0 31.5 - 35.7 g/dL    RDW 13.0 12.3 - 15.4 %    RDW-SD 42.7 37.0 - 54.0 fl    MPV 8.8 6.0 - 12.0 fL    Platelets 241 140 - 450 10*3/mm3    Neutrophil % 89.3 (H) 42.7 - 76.0 %    Lymphocyte % 3.1 (L) 19.6 - 45.3 %    Monocyte % 5.7 5.0 - 12.0 %    Eosinophil % 0.0 (L) 0.3 - 6.2 %    Basophil % 0.3 0.0 - 1.5 %    Immature Grans % 1.6 (H) 0.0 - 0.5 %    Neutrophils, Absolute 14.15 (H) 1.70 - 7.00 10*3/mm3    Lymphocytes, Absolute 0.49 (L) 0.70 - 3.10 10*3/mm3    Monocytes, Absolute 0.90 0.10 - 0.90 10*3/mm3    Eosinophils, Absolute 0.00 0.00 - 0.40 10*3/mm3    Basophils, Absolute 0.04 0.00 - 0.20 10*3/mm3    Immature Grans, Absolute 0.25 (H) 0.00 - 0.05 10*3/mm3    nRBC 0.1 0.0 - 0.2 /100 WBC   Respiratory Panel PCR w/COVID-19(SARS-CoV-2) [facility] In-House, NP Swab in [facility], 3-4 HR TAT - Swab, Nasopharynx    Collection Time: 05/05/22  2:49 PM    Specimen: Nasopharynx; Swab    Result Value Ref Range    ADENOVIRUS, PCR Not Detected Not Detected    Coronavirus 229E Not Detected Not Detected    Coronavirus HKU1 Not Detected Not Detected    Coronavirus NL63 Not Detected Not Detected    Coronavirus OC43 Not Detected Not Detected    COVID19 Not Detected Not Detected - Ref. Range    Human Metapneumovirus Not Detected Not Detected    Human Rhinovirus/Enterovirus Not Detected Not Detected    Influenza A PCR Not Detected Not Detected    Influenza B PCR Not Detected Not Detected    Parainfluenza Virus 1 Not Detected Not Detected    Parainfluenza Virus 2 Not Detected Not Detected    Parainfluenza Virus 3 Not Detected Not Detected    Parainfluenza Virus 4 Not Detected Not Detected    RSV, PCR Not Detected Not Detected    Bordetella pertussis pcr Not Detected Not Detected    Bordetella parapertussis PCR Not Detected Not Detected    Chlamydophila pneumoniae PCR Not Detected Not Detected    Mycoplasma pneumo by PCR Not Detected Not Detected   Urinalysis With Microscopic If Indicated (No Culture) - Urine, Clean Catch    Collection Time: 05/05/22  3:22 PM    Specimen: Urine, Clean Catch   Result Value Ref Range    Color, UA Yellow Yellow, Straw    Appearance, UA Cloudy (A) Clear    pH, UA <=5.0 5.0 - 8.0    Specific Gravity, UA 1.015 1.005 - 1.030    Glucose, UA Negative Negative    Ketones, UA Negative Negative    Bilirubin, UA Negative Negative    Blood, UA Moderate (2+) (A) Negative    Protein, UA 30 mg/dL (1+) (A) Negative    Leuk Esterase, UA Large (3+) (A) Negative    Nitrite, UA Negative Negative    Urobilinogen, UA 0.2 E.U./dL 0.2 - 1.0 E.U./dL   Urinalysis, Microscopic Only - Urine, Clean Catch    Collection Time: 05/05/22  3:22 PM    Specimen: Urine, Clean Catch   Result Value Ref Range    RBC, UA 13-20 (A) None Seen, 0-2 /HPF    WBC, UA Too Numerous to Count (A) None Seen, 0-2 /HPF    Bacteria, UA None Seen None Seen /HPF    Squamous Epithelial Cells, UA 0-2 None Seen, 0-2 /HPF    Hyaline  Casts, UA 7-12 None Seen /LPF    Methodology Automated Microscopy        Ordered the above labs and reviewed the results.        RADIOLOGY  CT Abdomen Pelvis Without Contrast   Final Result   Limited noncontrast CT impression:    1. Slightly increased perinephric stranding, please correlate with   urinalysis to exclude infection/pyelonephritis.   2. Morphologic changes of chronic liver disease with mild splenomegaly.   Please correlate clinically and follow-up.   3. Emphysema.   4. Diverticulosis without acute diverticulitis.   5. Please see above for additional findings/recommendations.       This report was finalized on 5/5/2022 4:37 PM by Dr. Canelo Easley M.D.          XR Chest 1 View   Preliminary Result   No active disease is seen in the chest. There is an enlarged   cardiomediastinal silhouette.               Ordered the above noted radiological studies. Reviewed by me in PACS.          PROCEDURES  Critical Care  Performed by: Cipriano Conway MD  Authorized by: Jo Presley MD     Critical care provider statement:     Critical care time (minutes):  30    Critical care time was exclusive of:  Separately billable procedures and treating other patients    Critical care was necessary to treat or prevent imminent or life-threatening deterioration of the following conditions:  Sepsis and shock    Critical care was time spent personally by me on the following activities:  Ordering and performing treatments and interventions, ordering and review of laboratory studies, ordering and review of radiographic studies, discussions with consultants and discussions with primary provider          EKG:          EKG time: 1443  Rhythm/Rate: Atrial fibrillation 148  P waves and CO: No P waves  QRS, axis: Normal QRS  ST and T waves: Specific ST-T wave    Interpreted Contemporaneously by me, independently viewed  Unchanged compared to prior 3/7/2021          MEDICATIONS GIVEN IN ER  Medications   lactated ringers bolus  2,421 mL (has no administration in time range)   acetaminophen (TYLENOL) tablet 1,000 mg (1,000 mg Oral Given 5/5/22 1448)   sodium chloride 0.9 % bolus 1,000 mL (0 mL Intravenous Stopped 5/5/22 1613)   cefepime (MAXIPIME) 2 g/100 mL 0.9% NS (mbp) (0 g Intravenous Stopped 5/5/22 1610)   vancomycin 1500 mg/500 mL 0.9% NS IVPB ([facility]) (1,500 mg Intravenous New Bag 5/5/22 1610)   metoprolol tartrate (LOPRESSOR) injection 5 mg (5 mg Intravenous Given 5/5/22 1609)             PROGRESS AND CONSULTS  ED Course as of 05/05/22 1643   Thu May 05, 2022   1621 16:21 EDT  Patient presents with sepsis.  Most likely urinary source.  Has been given cefepime and vancomycin on arrival.  Will be given sepsis fluids.  CT scan pending.  Patient has been discussed with Dr. Presley and will be admitted. [SL]      ED Course User Index  [SL] Cipriano Conway MD           MEDICAL DECISION MAKING      MDM  Number of Diagnoses or Management Options     Amount and/or Complexity of Data Reviewed  Clinical lab tests: reviewed and ordered (Lactic acid elevated procalcitonin elevated)  Tests in the radiology section of CPT®: reviewed and ordered (Ct negative for obstruction)  Discuss the patient with other providers: yes (Discussed with Dr. Presley who will admit.)               DIAGNOSIS  Final diagnoses:   Sepsis, due to unspecified organism, unspecified whether acute organ dysfunction present (HCC)   Acute UTI           DISPOSITION  admit        Latest Documented Vital Signs:  As of 16:43 EDT  BP- (!) 147/105 HR- (!) 141 Temp- 100 °F (37.8 °C) (Tympanic) O2 sat- 96%                         Cipriano Conway MD  05/05/22 1012

## 2022-05-05 NOTE — TELEPHONE ENCOUNTER
PATIENT'S WIFE CALLED AND STATES PATIENT WENT TO  2 DAYS AGO FOR BURNING URINATION, DIFFICULTY BREATHING. WAS GIVEN ANTIBIOTIC, NOT HELPING. ADVISED TO GO TO ER. SHE STATED SHE WOULD TAKE HIM.    CALL BACK NUMBER [phone]

## 2022-05-05 NOTE — H&P
HISTORY AND PHYSICAL   [facility]        Date of Admission: 2022  Patient Identification:  Name: Aj Salazar  Age: 76 y.o.  Sex: male  :  1945  MRN: [id number]                     Primary Care Physician: Neftali Amezcua MD    Chief Complaint:  76 year old gentleman who presented to the emergency room with fever, chills, cough and weakness which started three days ago; he was seen at a clinic and started on antibiotic for a uti; he has not improved and has been sleeping more; he has had poor po intake; he has also had a sore throat, aches and fatigue    History of Present Illness:   As above    Past Medical History:  Past Medical History:   Diagnosis Date   • Alcohol abuse, in remission    • BPH (benign prostatic hypertrophy)     see doctors at [facility]   • COPD (chronic obstructive pulmonary disease) (Formerly [facility])    • Depression    • DJD (degenerative joint disease) of cervical spine    • ED (erectile dysfunction)     [facility]   • Hx of colonic polyps     followed by GI (K[facility])   • Hyperlipidemia    • Hypertension    • Influenza B 2013       • Nocturnal hypoxia    • Osteoarthritis     HIPS, HANDS, MULTIPLE SITES   • Peripheral neuropathy    • Permanent atrial fibrillation (HCC)    • Rectal bleeding 2017   • Tendonitis of shoulder 2007    RIGHT SHOULDER/DELTOID INSERTION   • TIA (transient ischemic attack) 10/2020   • Ulcer of the stomach and intestine      Past Surgical History:  Past Surgical History:   Procedure Laterality Date   • APPENDECTOMY     • CARDIOVASCULAR STRESS TEST N/A 10/20/2015    NML LEXISCAN CARDIOLITE PERFUSION STUDY, NO EVIDENCE OF ISCHEMIA, AREA OF HYPOPERFUSION OF THE INFERIOR WALL W/NORMAL WALL PERFUSION AND NML LEFT VENTRICULAR EJECTION FRACTION, MOST LIKELY ATTENUATION. DR.MICHAEL BOX   • COLONOSCOPY N/A 2017   • COLONOSCOPY N/A 2011    4 POLYPS REMOVED, RESCOPE IN 5 YRS, DR. EAGLE   • COLONOSCOPY N/A 2006     ERYTHEMOUS AND GRANULAR MUCOSA IN ILEOCECAL VALVE, NORMAL COLON, REPEAT IN 5 YRS,    • ENDOSCOPY N/A 09/26/2018    normal esophagus, acute gastritis, multiple non-bleeding duodenal ulcers with pigmented material, H Pylori positive   • HERNIA REPAIR Bilateral     INGUINAL   • JOINT REPLACEMENT  11/2015    left hip   • TOTAL HIP ARTHROPLASTY Left 11/06/2015    Dr Ankush Sky   • TOTAL HIP ARTHROPLASTY Right 10/27/2014    DR.RIED SKY   • VASECTOMY        Home Meds:  Medications Prior to Admission   Medication Sig Dispense Refill Last Dose   • acetaminophen (TYLENOL) 325 MG tablet Take 2 tablets by mouth Every 4 (Four) Hours As Needed for Mild Pain .   Past Week at Unknown time   • ALPHAGAN P 0.1 % solution ophthalmic solution Administer 1 drop to both eyes 2 (two) times a day.  6 5/5/2022 at Unknown time   • atorvastatin (LIPITOR) 80 MG tablet Take 1 tablet by mouth Every Night. 90 tablet 3 5/4/2022 at Unknown time   • clopidogrel (Plavix) 75 MG tablet Take 1 tablet by mouth Daily. 90 tablet 3 5/5/2022 at Unknown time   • dabigatran etexilate (Pradaxa) 150 MG capsu Take 1 capsule by mouth 2 (Two) Times a Day. 60 capsule 6 5/5/2022 at Unknown time   • Fluticasone-Umeclidin-Vilant 200-62.5-25 MCG/INH aerosol powder  Inhale 1 puff Daily. Just increased to this strength 3/21   5/5/2022 at Unknown time   • furosemide (LASIX) 40 MG tablet Take 1 tablet by mouth Daily. 30 tablet 0 5/5/2022 at Unknown time   • metFORMIN ER (GLUCOPHAGE-XR) 500 MG 24 hr tablet TAKE 1 TABLET BY MOUTH DAILY WITH DINNER 30 tablet 11 5/5/2022 at Unknown time   • metoprolol succinate XL (TOPROL-XL) 100 MG 24 hr tablet Take 1 tablet by mouth Every Night. Take 100 mg at night and 50 mg in the morning 30 tablet 0 5/4/2022 at Unknown time   • metoprolol succinate XL (TOPROL-XL) 50 MG 24 hr tablet Take 1 tablet by mouth Daily. Take 50 mg every morning and 100 mg every night 30 tablet 0 5/5/2022 at Unknown time   • pantoprazole (PROTONIX)  40 MG EC tablet Take 1 tablet by mouth 2 (Two) Times a Day. 180 tablet 3 5/5/2022 at Unknown time   • spironolactone (ALDACTONE) 25 MG tablet Take 1 tablet by mouth Daily. 30 tablet 0 5/5/2022 at Unknown time   • Tafluprost, PF, (ZIOPTAN) 0.0015 % solution ophthalmic solution Administer 1 drop to both eyes Every Night.   5/4/2022 at Unknown time   • tamsulosin (FLOMAX) 0.4 MG capsule 24 hr capsule Take 2 capsules by mouth Every Night. 30 capsule  5/4/2022 at Unknown time   • Accu-Chek Softclix Lancets lancets Accu-Chek Softclix Lancets   USE TO TEST GLUCOSE QOD   Unknown at Unknown time   • glucose blood test strip Accu-Chek Dannielle Plus test strips   USE TO CHECK GLUCOSE QOD   Unknown at Unknown time   • glucose blood test strip Accu-Chek Dannielle Plus Meter   USE TO CHECK GLUCOSE QOD   Unknown at Unknown time       Allergies:  Allergies   Allergen Reactions   • Bactrim [Sulfamethoxazole-Trimethoprim] Rash   • Sulfacetamide Rash     Immunizations:  Immunization History   Administered Date(s) Administered   • COVID-19 (MODERNA) 1st, 2nd, 3rd Dose Only 01/20/2021, 02/17/2021, 12/01/2021   • COVID-19 (UNSPECIFIED) 12/01/2021   • FluMist 2-49yrs 09/27/2017   • Fluad Quad 65+ 09/02/2020   • Fluzone High Dose =>65 Years ([employer] ONLY) 09/14/2018, 09/04/2019, 09/14/2021   • Hepatitis A 02/06/2019, 11/30/2019   • Influenza LAIV (Nasal) 10/21/2015   • Influenza TIV (IM) 09/18/2016   • Pneumococcal Conjugate 13-Valent (PCV13) 01/30/2015   • Pneumococcal Polysaccharide (PPSV23) 03/30/2013, 09/28/2021   • Shingrix 11/30/2019, 02/24/2020   • Td 03/30/2012   • Zostavax 11/30/2014     Social History:   Social History     Social History Narrative   • Not on file     Social History     Socioeconomic History   • Marital status:      Spouse name: Ara*   • Number of children: 3   Tobacco Use   • Smoking status: Former Smoker     Packs/day: 2.00     Years: 49.00     Pack years: 98.00     Quit date: 1/1/2010     Years since quitting:  12.3   • Smokeless tobacco: Never Used   • Tobacco comment: long smoking history   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   • Drug use: No     Comment: caffeine use    • Sexual activity: Defer       Family History:  Family History   Problem Relation Age of Onset   • Colon cancer Mother    • Ovarian cancer Mother    • Alzheimer's disease Mother 70   • Stroke Sister         2016   • Dementia Sister    • Heart disease Brother    • Alcohol abuse Brother    • Coronary artery disease Father    • Hypertension Father    • Colon cancer Maternal Grandmother    • Heart disease Brother    • Coronary artery disease Brother    • Hypertension Brother    • Stroke Brother    • Arthritis Brother    • Prostate cancer Neg Hx         Review of Systems  See history of present illness and past medical history.  Patient denies headache, dizziness, syncope, falls, trauma, change in vision, change in hearing, change in taste, changes in weight, changes in appetite, focal weakness, numbness, or paresthesia.  Patient denies chest pain, palpitations, dyspnea, orthopnea, PND, cough, sinus pressure, rhinorrhea, epistaxis, hemoptysis, nausea, vomiting,hematemesis, diarrhea, constipation or hematchezia.  Denies cold or heat intolerance, polydipsia, polyuria, polyphagia. Denies hematuria, pyuria, dysuria, hesitancy, frequency or urgency. Denies consumption of raw and under cooked meats foods or change in water source.  Denies fever, chills, sweats, night sweats.  Denies missing any routine medications. Remainder of ROS is negative.    Objective:  T Max 24 hrs: Temp (24hrs), Av.6 °F (38.1 °C), Min:98.8 °F (37.1 °C), Max:102.4 °F (39.1 °C)    Vitals Ranges:   Temp:  [98.8 °F (37.1 °C)-102.4 °F (39.1 °C)] 98.8 °F (37.1 °C)  Heart Rate:  [113-141] 113  Resp:  [22-24] 22  BP: (133-147)/() 140/100      Exam:  /100 (BP Location: Right arm, Patient  "Position: Lying)   Pulse 113   Temp 98.8 °F (37.1 °C) (Oral)   Resp 22   Ht 177.8 cm (70\")   Wt 80.7 kg (178 lb)   SpO2 95%   BMI 25.54 kg/m²     General Appearance:    Alert, cooperative, no distress, appears stated age   Head:    Normocephalic, without obvious abnormality, atraumatic   Eyes:    PERRL, conjunctivae/corneas clear, EOM's intact, both eyes   Ears:    Normal external ear canals, both ears   Nose:   Nares normal, septum midline, mucosa normal, no drainage    or sinus tenderness   Throat:   Lips, mucosa, and tongue normal   Neck:   Supple, symmetrical, trachea midline, no adenopathy;     thyroid:  no enlargement/tenderness/nodules; no carotid    bruit or JVD   Back:     Symmetric, no curvature, ROM normal, no CVA tenderness   Lungs:     Clear to auscultation bilaterally, respirations unlabored   Chest Wall:    No tenderness or deformity    Heart:    Regular rate and rhythm, S1 and S2 normal, no murmur, rub   or gallop   Abdomen:     Soft, nontender, bowel sounds active all four quadrants,     no masses, no hepatomegaly, no splenomegaly   Extremities:   Extremities normal, atraumatic, no cyanosis or edema   Pulses:   2+ and symmetric all extremities   Skin:   Skin color, texture, turgor normal, no rashes or lesions   Lymph nodes:   Cervical, supraclavicular, and axillary nodes normal   Neurologic:   CNII-XII intact, normal strength, sensation intact throughout      .    Data Review:  Labs in chart were reviewed.  WBC   Date Value Ref Range Status   05/05/2022 15.83 (H) 3.40 - 10.80 10*3/mm3 Final     Hemoglobin   Date Value Ref Range Status   05/05/2022 15.1 13.0 - 17.7 g/dL Final     Hematocrit   Date Value Ref Range Status   05/05/2022 45.7 37.5 - 51.0 % Final     Platelets   Date Value Ref Range Status   05/05/2022 241 140 - 450 10*3/mm3 Final     Sodium   Date Value Ref Range Status   05/05/2022 135 (L) 136 - 145 mmol/L Final     Potassium   Date Value Ref Range Status   05/05/2022 4.3 3.5 - " 5.2 mmol/L Final     Chloride   Date Value Ref Range Status   05/05/2022 100 98 - 107 mmol/L Final     CO2   Date Value Ref Range Status   05/05/2022 20.0 (L) 22.0 - 29.0 mmol/L Final     BUN   Date Value Ref Range Status   05/05/2022 18 8 - 23 mg/dL Final     Creatinine   Date Value Ref Range Status   05/05/2022 1.01 0.76 - 1.27 mg/dL Final     Glucose   Date Value Ref Range Status   05/05/2022 262 (H) 65 - 99 mg/dL Final     Calcium   Date Value Ref Range Status   05/05/2022 8.7 8.6 - 10.5 mg/dL Final     AST (SGOT)   Date Value Ref Range Status   05/05/2022 22 1 - 40 U/L Final     ALT (SGPT)   Date Value Ref Range Status   05/05/2022 21 1 - 41 U/L Final     Alkaline Phosphatase   Date Value Ref Range Status   05/05/2022 101 39 - 117 U/L Final                Imaging Results (All)     Procedure Component Value Units Date/Time    CT Abdomen Pelvis Without Contrast [[id number]] Collected: 05/05/22 1615     Updated: 05/05/22 1640    Narrative:      CT ABDOMEN AND PELVIS WITHOUT IV CONTRAST     HISTORY: 76-year-old abdominal pain. Smoker, appendectomy, bilateral  inguinal hernia repair.     TECHNIQUE: Radiation dose reduction techniques were utilized, including  automated exposure control and exposure modulation based on body size.   3 mm images were obtained through the abdomen and pelvis without the  administration of IV contrast. Lack of IV contrast limits evaluation of  solid, visceral, and vascular structures. Sensitivity for underlying  lesions and infection decreased.     COMPARISON: 07/01/2021     FINDINGS:      LOWER CHEST: Cardiomegaly. Atherosclerosis. Emphysema with pleural and  parenchymal scarring and coarsened interstitial markings. Please refer  to the prior CT chest for further detail and follow-up recommendations..     ABDOMEN:  Liver/Biliary Tract: Morphologic changes of chronic liver disease.  Evaluation for underlying lesions limited without contrast. Continued  surveillance recommended. Calcified  granulomas.     Spleen: Mild splenomegaly (14 cm).     Pancreas: Homogenous attenuation.     Adrenals: Within normal limits.     Kidneys:       The nephric stranding slightly increased from the prior exam with  thickening of the lateral conal fascia left greater than right. No focal  fluid collections. No hydronephrosis. Lobular contour of the kidneys  similar in appearance to the prior. Evaluation for underlying lesions  again limited without IV contrast..     Bowel:  Small hiatal hernia with circumferential thickening of the  distal esophagus. Paraesophageal and retrocrural nodes similar to the  prior. The stomach is incompletely distended. No bowel obstruction. The  appendix is not definitively visualized however no acute inflammatory  changes in the right lower quadrant. Submucosal fatty deposition in the  colon suggesting prior colitis. Sigmoid colon is incompletely distended.  Please ensure up-to-date with colonoscopy to exclude underlying process.     Peritoneum: No free fluid or free intraperitoneal air.     Vasculature:    Extensive atherosclerosis abdominal aorta and branch  vessels. There is ectasia of the abdominal aorta without focal  aneurysmal dilatation.     Lymph Nodes:  Mildly prominent abdominoperitoneal nodes with an index  gastrohepatic node measuring 1 cm in short axis similar to the prior.  Recommend continued attention on follow-up.     PELVIS:                                   Pelvic organs: Streak artifact from hip prostheses markedly limits  evaluation. Bladder incompletely distended. Prostate in situ. Surgical  clips right scrotum.        Abdominal/Pelvic Wall: Tiny fat-containing umbilical hernia.     BONES: Status post bilateral hip arthroplasty. Diffuse bone  demineralization. Multilevel degenerative changes..          Impression:      Limited noncontrast CT impression:   1. Slightly increased perinephric stranding, please correlate with  urinalysis to exclude  infection/pyelonephritis.  2. Morphologic changes of chronic liver disease with mild splenomegaly.  Please correlate clinically and follow-up.  3. Emphysema.  4. Diverticulosis without acute diverticulitis.  5. Please see above for additional findings/recommendations.     This report was finalized on 5/5/2022 4:37 PM by Dr. Canelo Easley M.D.       XR Chest 1 View [[id number]] Collected: 05/05/22 1619     Updated: 05/05/22 1619    Narrative:      EMERGENCY PORTABLE CHEST 05/05/2022     CLINICAL HISTORY: Shortness of breath.     COMPARISON: This is correlated to a prior portable chest x-ray  03/07/2021.     FINDINGS: The cardiomediastinal is enlarged. The pulmonary vasculature  is within normal limits. The lungs are clear. The costophrenic angles  are sharp.       Impression:      No active disease is seen in the chest. There is an enlarged  cardiomediastinal silhouette.               Assessment:  Active Hospital Problems    Diagnosis  POA   • Sepsis (HCC) [A41.9]  Yes      Resolved Hospital Problems   No resolved problems to display.   uti  Hypertension  Diabetes  Copd  Atrial fibrillation    Plan:  Continue antibiotics  Trend wbc  accu checks, insulin sliding scale  Monitor bp  Monitor on telemetry  Anna patient and ED provider  Jo Presley MD  5/5/2022  19:55 EDT

## 2022-05-05 NOTE — ED NOTES
Patient was diagnosed on Tuesday with UTI and Strep throat. Worsening soa, abdominal pain and sore throat. Patient was started on abx at that time.     Patient was placed in face mask during triage process. Patient was wearing facemask when I entered the room and throughout our encounter. I wore full protective equipment throughout this patient encounter including a face mask, eye protection, and gloves. Hand hygiene was performed before donning protective equipment and again following doffing of PPE after leaving the room.

## 2022-05-06 PROBLEM — N13.8 BPH WITH OBSTRUCTION/LOWER URINARY TRACT SYMPTOMS: Status: ACTIVE | Noted: 2018-03-27

## 2022-05-06 PROBLEM — Z86.73 PERSONAL HISTORY OF TRANSIENT ISCHEMIC ATTACK (TIA), AND CEREBRAL INFARCTION WITHOUT RESIDUAL DEFICITS: Status: ACTIVE | Noted: 2022-05-06

## 2022-05-06 PROBLEM — F10.21 ALCOHOL DEPENDENCE IN REMISSION: Status: ACTIVE | Noted: 2022-05-06

## 2022-05-06 PROBLEM — I50.32 CHRONIC DIASTOLIC CHF (CONGESTIVE HEART FAILURE) (HCC): Status: ACTIVE | Noted: 2022-05-06

## 2022-05-06 PROBLEM — N40.1 BPH WITH OBSTRUCTION/LOWER URINARY TRACT SYMPTOMS: Status: ACTIVE | Noted: 2018-03-27

## 2022-05-06 PROBLEM — N10 ACUTE PYELONEPHRITIS: Status: ACTIVE | Noted: 2022-05-06

## 2022-05-06 LAB
ANION GAP SERPL CALCULATED.3IONS-SCNC: 10.6 MMOL/L (ref 5–15)
BUN SERPL-MCNC: 14 MG/DL (ref 8–23)
BUN/CREAT SERPL: 21.9 (ref 7–25)
CALCIUM SPEC-SCNC: 7.6 MG/DL (ref 8.6–10.5)
CHLORIDE SERPL-SCNC: 106 MMOL/L (ref 98–107)
CO2 SERPL-SCNC: 21.4 MMOL/L (ref 22–29)
CREAT SERPL-MCNC: 0.64 MG/DL (ref 0.76–1.27)
D-LACTATE SERPL-SCNC: 1.6 MMOL/L (ref 0.5–2)
DEPRECATED RDW RBC AUTO: 44.2 FL (ref 37–54)
EGFRCR SERPLBLD CKD-EPI 2021: 98.7 ML/MIN/1.73
ERYTHROCYTE [DISTWIDTH] IN BLOOD BY AUTOMATED COUNT: 13.5 % (ref 12.3–15.4)
GLUCOSE BLDC GLUCOMTR-MCNC: 100 MG/DL (ref 70–130)
GLUCOSE BLDC GLUCOMTR-MCNC: 115 MG/DL (ref 70–130)
GLUCOSE BLDC GLUCOMTR-MCNC: 124 MG/DL (ref 70–130)
GLUCOSE BLDC GLUCOMTR-MCNC: 97 MG/DL (ref 70–130)
GLUCOSE SERPL-MCNC: 124 MG/DL (ref 65–99)
HBA1C MFR BLD: 6.6 % (ref 4.8–5.6)
HCT VFR BLD AUTO: 36.8 % (ref 37.5–51)
HGB BLD-MCNC: 12.2 G/DL (ref 13–17.7)
MCH RBC QN AUTO: 29.5 PG (ref 26.6–33)
MCHC RBC AUTO-ENTMCNC: 33.2 G/DL (ref 31.5–35.7)
MCV RBC AUTO: 88.9 FL (ref 79–97)
PLATELET # BLD AUTO: 196 10*3/MM3 (ref 140–450)
PMV BLD AUTO: 9.3 FL (ref 6–12)
POTASSIUM SERPL-SCNC: 3.4 MMOL/L (ref 3.5–5.2)
RBC # BLD AUTO: 4.14 10*6/MM3 (ref 4.14–5.8)
SODIUM SERPL-SCNC: 138 MMOL/L (ref 136–145)
WBC NRBC COR # BLD: 11.74 10*3/MM3 (ref 3.4–10.8)

## 2022-05-06 PROCEDURE — 25010000002 VANCOMYCIN 750 MG RECONSTITUTED SOLUTION: Performed by: INTERNAL MEDICINE

## 2022-05-06 PROCEDURE — 83036 HEMOGLOBIN GLYCOSYLATED A1C: CPT | Performed by: INTERNAL MEDICINE

## 2022-05-06 PROCEDURE — 82962 GLUCOSE BLOOD TEST: CPT

## 2022-05-06 PROCEDURE — 85027 COMPLETE CBC AUTOMATED: CPT | Performed by: INTERNAL MEDICINE

## 2022-05-06 PROCEDURE — 80048 BASIC METABOLIC PNL TOTAL CA: CPT | Performed by: INTERNAL MEDICINE

## 2022-05-06 PROCEDURE — 83605 ASSAY OF LACTIC ACID: CPT | Performed by: STUDENT IN AN ORGANIZED HEALTH CARE EDUCATION/TRAINING PROGRAM

## 2022-05-06 PROCEDURE — 25010000002 CEFEPIME PER 500 MG: Performed by: INTERNAL MEDICINE

## 2022-05-06 RX ORDER — CALCIUM CARBONATE 200(500)MG
2 TABLET,CHEWABLE ORAL 3 TIMES DAILY PRN
Status: DISCONTINUED | OUTPATIENT
Start: 2022-05-06 | End: 2022-05-10 | Stop reason: HOSPADM

## 2022-05-06 RX ADMIN — SODIUM CHLORIDE 750 MG: 900 INJECTION, SOLUTION INTRAVENOUS at 06:08

## 2022-05-06 RX ADMIN — TAMSULOSIN HYDROCHLORIDE 0.8 MG: 0.4 CAPSULE ORAL at 20:00

## 2022-05-06 RX ADMIN — DABIGATRAN ETEXILATE MESYLATE 150 MG: 150 CAPSULE ORAL at 20:00

## 2022-05-06 RX ADMIN — SODIUM CHLORIDE 75 ML/HR: 9 INJECTION, SOLUTION INTRAVENOUS at 09:25

## 2022-05-06 RX ADMIN — BRIMONIDINE TARTRATE 1 DROP: 1 SOLUTION/ DROPS OPHTHALMIC at 08:09

## 2022-05-06 RX ADMIN — ATORVASTATIN CALCIUM 80 MG: 80 TABLET, FILM COATED ORAL at 20:00

## 2022-05-06 RX ADMIN — CLOPIDOGREL 75 MG: 75 TABLET, FILM COATED ORAL at 08:08

## 2022-05-06 RX ADMIN — CALCIUM CARBONATE 2 TABLET: 500 TABLET, CHEWABLE ORAL at 09:25

## 2022-05-06 RX ADMIN — DABIGATRAN ETEXILATE MESYLATE 150 MG: 150 CAPSULE ORAL at 08:09

## 2022-05-06 RX ADMIN — CALCIUM CARBONATE 2 TABLET: 500 TABLET, CHEWABLE ORAL at 22:59

## 2022-05-06 RX ADMIN — ACETAMINOPHEN 650 MG: 325 TABLET, FILM COATED ORAL at 09:30

## 2022-05-06 RX ADMIN — BRIMONIDINE TARTRATE 1 DROP: 1 SOLUTION/ DROPS OPHTHALMIC at 20:01

## 2022-05-06 RX ADMIN — CALCIUM CARBONATE 2 TABLET: 500 TABLET, CHEWABLE ORAL at 15:36

## 2022-05-06 RX ADMIN — METOPROLOL SUCCINATE 50 MG: 100 TABLET, EXTENDED RELEASE ORAL at 08:09

## 2022-05-06 RX ADMIN — SPIRONOLACTONE 25 MG: 25 TABLET, FILM COATED ORAL at 08:09

## 2022-05-06 RX ADMIN — CEFEPIME HYDROCHLORIDE 2 G: 2 INJECTION, POWDER, FOR SOLUTION INTRAVENOUS at 04:06

## 2022-05-06 RX ADMIN — METOPROLOL SUCCINATE 100 MG: 100 TABLET, EXTENDED RELEASE ORAL at 20:00

## 2022-05-06 RX ADMIN — PANTOPRAZOLE SODIUM 40 MG: 40 TABLET, DELAYED RELEASE ORAL at 20:00

## 2022-05-06 RX ADMIN — CEFEPIME HYDROCHLORIDE 2 G: 2 INJECTION, POWDER, FOR SOLUTION INTRAVENOUS at 12:33

## 2022-05-06 RX ADMIN — PANTOPRAZOLE SODIUM 40 MG: 40 TABLET, DELAYED RELEASE ORAL at 08:09

## 2022-05-06 RX ADMIN — CEFEPIME HYDROCHLORIDE 2 G: 2 INJECTION, POWDER, FOR SOLUTION INTRAVENOUS at 20:00

## 2022-05-06 RX ADMIN — LATANOPROST 1 DROP: 50 SOLUTION OPHTHALMIC at 20:00

## 2022-05-06 NOTE — PROGRESS NOTES
"[facility] Clinical Pharmacy Services: Vancomycin Pharmacokinetic Initial Consult Note    Aj Salazar is a 76 y.o. male who is on day 1 of pharmacy to dose vancomycin.    Indication: sepsis  Consulting Provider: Dr. Presley  Planned Duration of Therapy: 5 days  Loading Dose Ordered or Given: 1500 mg on 5/5 at 1610  MRSA PCR performed: ordered; Result: pending  Culture/Source: Blood - pending; Resp. Panel - all clear  Target: -600 mg/L.hr   Other Antimicrobials: cefepime    Vitals/Labs  Ht: 177.8 cm (70\"); Wt: 80.7 kg (178 lb)  Temp Readings from Last 1 Encounters:   05/05/22 98.8 °F (37.1 °C) (Oral)    Estimated Creatinine Clearance: 72.1 mL/min (by C-G formula based on SCr of 1.01 mg/dL).        Results from last 7 days   Lab Units 05/05/22  1446   CREATININE mg/dL 1.01   WBC 10*3/mm3 15.83*     Assessment/Plan:    Vancomycin Dose:   750 mg IV every  12  hours  Predictive AUC level for the dose ordered is 491 mg/L.hr, which is within the target of 400-600 mg/L.hr  Vanc Trough has been ordered for 5/6 at 1730     Pharmacy will follow patient's kidney function and will adjust doses and obtain levels as necessary. Thank you for involving pharmacy in this patient's care. Please contact pharmacy with any questions or concerns.                           GERMAN SALAZAR, [facility]  Clinical Pharmacist  "

## 2022-05-06 NOTE — PLAN OF CARE
Problem: Adult Inpatient Plan of Care  Goal: Plan of Care Review  Outcome: Ongoing, Progressing  Flowsheets (Taken 5/6/2022 0147)  Outcome Evaluation: Pt A&Ox4, VSS, no falls at this time. Pt denies any complaints at this time other than some throat soreness.  Goal: Patient-Specific Goal (Individualized)  Outcome: Ongoing, Progressing  Goal: Absence of Hospital-Acquired Illness or Injury  Outcome: Ongoing, Progressing  Intervention: Identify and Manage Fall Risk  Recent Flowsheet Documentation  Taken 5/6/2022 0010 by Becky Kumari RN  Safety Promotion/Fall Prevention:   activity supervised   assistive device/personal items within reach   clutter free environment maintained   fall prevention program maintained   nonskid shoes/slippers when out of bed   safety round/check completed  Taken 5/5/2022 2202 by Becky Kumari RN  Safety Promotion/Fall Prevention:   activity supervised   assistive device/personal items within reach   clutter free environment maintained   fall prevention program maintained   nonskid shoes/slippers when out of bed   safety round/check completed   room organization consistent  Taken 5/5/2022 2143 by Becky Kumari RN  Safety Promotion/Fall Prevention:   activity supervised   assistive device/personal items within reach   clutter free environment maintained   fall prevention program maintained   nonskid shoes/slippers when out of bed   room organization consistent   safety round/check completed  Intervention: Prevent Skin Injury  Recent Flowsheet Documentation  Taken 5/6/2022 0010 by Becky Kumari RN  Body Position: position changed independently  Taken 5/5/2022 2202 by Becky Kumari RN  Body Position: position changed independently  Taken 5/5/2022 2143 by Becky Kumari RN  Body Position: position changed independently  Intervention: Prevent and Manage VTE (Venous Thromboembolism) Risk  Recent Flowsheet Documentation  Taken 5/6/2022 0010 by Becky Kumari RN  VTE Prevention/Management:    bilateral   dorsiflexion/plantar flexion performed  Taken 5/5/2022 2143 by Becky Kumari RN  VTE Prevention/Management:   bilateral   dorsiflexion/plantar flexion performed  Intervention: Prevent Infection  Recent Flowsheet Documentation  Taken 5/6/2022 0010 by Becky Kumari RN  Infection Prevention:   rest/sleep promoted   single patient room provided  Taken 5/5/2022 2202 by Becky Kumari RN  Infection Prevention:   rest/sleep promoted   single patient room provided  Taken 5/5/2022 2143 by Becky Kumari RN  Infection Prevention:   rest/sleep promoted   single patient room provided  Goal: Optimal Comfort and Wellbeing  Outcome: Ongoing, Progressing  Intervention: Provide Person-Centered Care  Recent Flowsheet Documentation  Taken 5/6/2022 0010 by Becky Kumari RN  Trust Relationship/Rapport: thoughts/feelings acknowledged  Taken 5/5/2022 2143 by Becky Kumari RN  Trust Relationship/Rapport:   care explained   choices provided   emotional support provided   empathic listening provided   questions answered   questions encouraged   reassurance provided   thoughts/feelings acknowledged  Goal: Readiness for Transition of Care  Outcome: Ongoing, Progressing     Problem: Heart Failure Comorbidity  Goal: Maintenance of Heart Failure Symptom Control  Outcome: Ongoing, Progressing  Intervention: Maintain Heart Failure-Management  Recent Flowsheet Documentation  Taken 5/6/2022 0010 by Becky Kumari RN  Medication Review/Management: medications reviewed  Taken 5/5/2022 2143 by Becky Kumari RN  Medication Review/Management: medications reviewed     Problem: Hypertension Comorbidity  Goal: Blood Pressure in Desired Range  Outcome: Ongoing, Progressing  Intervention: Maintain Blood Pressure Management  Recent Flowsheet Documentation  Taken 5/6/2022 0010 by Becky Kumari RN  Medication Review/Management: medications reviewed  Taken 5/5/2022 2143 by Becky Kumari RN  Medication Review/Management: medications reviewed    Goal Outcome Evaluation:              Outcome Evaluation: Pt A&Ox4, VSS, no falls at this time. Pt denies any complaints at this time other than some throat soreness.

## 2022-05-06 NOTE — CASE MANAGEMENT/SOCIAL WORK
Discharge Planning Assessment  [facility]     Patient Name: Aj Salazar  MRN: [id number]  Today's Date: 5/6/2022    Admit Date: 5/5/2022     Discharge Needs Assessment     Row Name 05/06/22 1411       Living Environment    People in Home spouse    Name(s) of People in Home spouse Ara    Current Living Arrangements home    Primary Care Provided by self    Able to Return to Prior Arrangements yes       Transition Planning    Patient/Family Anticipates Transition to home with family    Transportation Anticipated family or friend will provide       Discharge Needs Assessment    Equipment Currently Used at Home cpap;glucometer    Concerns to be Addressed adjustment to diagnosis/illness               Discharge Plan     Row Name 05/06/22 1411       Plan    Plan Plans are home. CCP will follow for needs.    Patient/Family in Agreement with Plan yes    Plan Comments CCP spoke with pt @ bedside, confirmed face sheet and primary pharmacy as [facility] on [address] / [address], pt states he has no trouble affording his meds, says most come from [employer] mail order.  Pt uses pill planner to manage meds.  Pt lives with spouse and is ALONA's.  Pt states plans are home, instructed that CCP would follow and assist as needed. Isabelle HOWARD RN/CCP              Continued Care and Services - Admitted Since 5/5/2022    Coordination has not been started for this encounter.       Expected Discharge Date and Time     Expected Discharge Date Expected Discharge Time    May 9, 2022          Demographic Summary    No documentation.                Functional Status     Row Name 05/06/22 1410       Functional Status    Usual Activity Tolerance good    Current Activity Tolerance moderate       Functional Status, IADL    Medications independent               Psychosocial    No documentation.                Abuse/Neglect    No documentation.                Legal     Row Name 05/06/22 1410       Financial/Legal    Who Manages Finances if Patient Unable Pt has  living will in [facility]               Substance Abuse    No documentation.                Patient Forms    No documentation.                   Isabelle Ovalle RN

## 2022-05-06 NOTE — PROGRESS NOTES
Name: Aj Salazar ADMIT: 2022   : 1945  PCP: Neftali Amezcua MD    MRN: [id number] LOS: 1 days   AGE/SEX: 76 y.o. male  ROOM: [facility]     Subjective   Subjective     He reports that he feels fatigued and has some lower abdominal discomfort today. His wbc is improved and his tachycardia is better as well. No more recorded fevers.       Objective   Objective   Vital Signs  Temp:  [98.7 °F (37.1 °C)-102.4 °F (39.1 °C)] 98.8 °F (37.1 °C)  Heart Rate:  [108-141] 116  Resp:  [20-24] 20  BP: (109-147)/() 130/86  SpO2:  [93 %-98 %] 95 %  on  Flow (L/min):  [2] 2;   Device (Oxygen Therapy): nasal cannula  Body mass index is 25.04 kg/m².  Physical Exam  Constitutional:       General: He is not in acute distress.     Appearance: He is ill-appearing.   Cardiovascular:      Rate and Rhythm: Tachycardia present. Rhythm irregular.      Heart sounds: Normal heart sounds.   Pulmonary:      Effort: Pulmonary effort is normal.      Breath sounds: Normal breath sounds.   Abdominal:      General: Bowel sounds are normal.      Palpations: Abdomen is soft.   Musculoskeletal:         General: No tenderness.      Right lower leg: No edema.      Left lower leg: No edema.   Neurological:      Mental Status: He is alert.   Psychiatric:         Mood and Affect: Mood normal.         Behavior: Behavior normal.         Results Review     I reviewed the patient's new clinical results.  Results from last 7 days   Lab Units 22  0651 22  1446   WBC 10*3/mm3 11.74* 15.83*   HEMOGLOBIN g/dL 12.2* 15.1   PLATELETS 10*3/mm3 196 241     Results from last 7 days   Lab Units 22  0651 22  1446   SODIUM mmol/L 138 135*   POTASSIUM mmol/L 3.4* 4.3   CHLORIDE mmol/L 106 100   CO2 mmol/L 21.4* 20.0*   BUN mg/dL 14 18   CREATININE mg/dL 0.64* 1.01   GLUCOSE mg/dL 124* 262*   Estimated Creatinine Clearance: 111.7 mL/min (A) (by C-G formula based on SCr of 0.64 mg/dL (L)).  Results from last 7 days   Lab Units  05/05/22  1446   ALBUMIN g/dL 3.30*   BILIRUBIN mg/dL 1.9*   ALK PHOS U/L 101   AST (SGOT) U/L 22   ALT (SGPT) U/L 21     Results from last 7 days   Lab Units 05/06/22  0651 05/05/22  1446   CALCIUM mg/dL 7.6* 8.7   ALBUMIN g/dL  --  3.30*     Results from last 7 days   Lab Units 05/06/22  0923 05/05/22  1446   PROCALCITONIN ng/mL  --  0.45*   LACTATE mmol/L 1.6 3.7*     COVID19   Date Value Ref Range Status   05/05/2022 Not Detected Not Detected - Ref. Range Final   03/07/2021 Not Detected Not Detected - Ref. Range Final   10/22/2020 Not Detected Not Detected - Ref. Range Final     Hemoglobin A1C   Date/Time Value Ref Range Status   05/06/2022 0651 6.60 (H) 4.80 - 5.60 % Final     Glucose   Date/Time Value Ref Range Status   05/06/2022 0633 115 70 - 130 mg/dL Final     Comment:     Meter: [id number] : [id number] Julia Salinas MAGDALENE       XR Chest 1 View  Narrative: EMERGENCY PORTABLE CHEST 05/05/2022     CLINICAL HISTORY: Shortness of breath.     COMPARISON: This is correlated to a prior portable chest x-ray  03/07/2021.     FINDINGS: The cardiomediastinal is enlarged. The pulmonary vasculature  is within normal limits. The lungs are clear. The costophrenic angles  are sharp.     Impression: 1. No active disease is seen in the chest. There is an enlarged  cardiomediastinal silhouette.     This report was finalized on 5/6/2022 6:23 AM by Dr. Darrion Villarreal M.D.       Scheduled Medications  atorvastatin, 80 mg, Oral, Nightly  brimonidine, 1 drop, Both Eyes, BID  budesonide-formoterol, 2 puff, Inhalation, BID - RT   And  tiotropium bromide monohydrate, 1 puff, Inhalation, Daily - RT  cefepime, 2 g, Intravenous, Q8H  clopidogrel, 75 mg, Oral, Daily  dabigatran etexilate, 150 mg, Oral, BID  insulin lispro, 0-9 Units, Subcutaneous, TID With Meals  latanoprost, 1 drop, Both Eyes, Nightly  metoprolol succinate XL, 100 mg, Oral, Nightly  metoprolol succinate XL, 50 mg, Oral, Daily  pantoprazole, 40 mg, Oral,  BID  spironolactone, 25 mg, Oral, Daily  tamsulosin, 0.8 mg, Oral, Nightly    Infusions  sodium chloride, 75 mL/hr, Last Rate: 75 mL/hr (05/06/22 0925)    Diet  Diet Regular; Cardiac       Assessment/Plan     Active Hospital Problems    Diagnosis  POA   • **Acute pyelonephritis [N10]  Yes   • Chronic diastolic CHF (congestive heart failure) ([facility]) [I50.32]  Yes   • Alcohol dependence in remission ([facility]) [F10.21]  Yes   • Personal history of transient ischemic attack (TIA), and cerebral infarction without residual deficits [Z86.73]  Not Applicable   • Sepsis ([facility]) [A41.9]  Yes   • Gastroesophageal reflux disease [K21.9]  Yes   • A-fib ([facility]) [I48.91]  Yes   • Type 2 diabetes mellitus with hyperglycemia, without long-term current use of insulin ([facility]) [E11.65]  Yes   • BPH with obstruction/lower urinary tract symptoms [N40.1, N13.8]  Yes   • COPD (chronic obstructive pulmonary disease) ([facility]) [J44.9]  Yes   • Hypertension [I10]  Yes      Resolved Hospital Problems   No resolved problems to display.       76 y.o. male admitted with Acute pyelonephritis.    · Pyelonephritis causing sepsis (wbc, fever, tachycardia)-continue cefepime. Stop vancomycin. Follow cultures. Continue IVF since still tachycardic  · Chronic diastolic heart failure-lasix is on hold given sepsis  · Chronic afib-on pradaxa and metoprolol  · GERD-ppi  · DM2-hold metformin. SSI  · BPH-tamsulosin  · History of TIA-plavix/statin  · COPD-inhalers  · Hypertension-on spironolactone. Hold until sepsis resolves  · Pradaxa for DVT prophylaxis.  · Full code.  · Discussed with patient and nursing staff.  · Anticipate discharge TBD timing yet to be determined.      Chago Denise MD  [facility]  05/06/22  10:23 EDT    I wore protective equipment throughout this patient encounter including a face mask, gloves and protective eyewear.  Hand hygiene was performed before donning protective equipment and after removal when leaving the room.

## 2022-05-06 NOTE — PLAN OF CARE
Goal Outcome Evaluation:  Plan of Care Reviewed With: patient, spouse        Progress: no change  Outcome Evaluation: pt c/o acid reflux throughout shift, relieved by tums. HR remains elevated but pt denies any discomfort. MD notified. Will CTM.

## 2022-05-07 LAB
ANION GAP SERPL CALCULATED.3IONS-SCNC: 8 MMOL/L (ref 5–15)
BACTERIA SPEC AEROBE CULT: NO GROWTH
BASOPHILS # BLD AUTO: 0.03 10*3/MM3 (ref 0–0.2)
BASOPHILS NFR BLD AUTO: 0.3 % (ref 0–1.5)
BUN SERPL-MCNC: 10 MG/DL (ref 8–23)
BUN/CREAT SERPL: 15.6 (ref 7–25)
CALCIUM SPEC-SCNC: 7.5 MG/DL (ref 8.6–10.5)
CHLORIDE SERPL-SCNC: 108 MMOL/L (ref 98–107)
CO2 SERPL-SCNC: 21 MMOL/L (ref 22–29)
CREAT SERPL-MCNC: 0.64 MG/DL (ref 0.76–1.27)
DEPRECATED RDW RBC AUTO: 40.4 FL (ref 37–54)
EGFRCR SERPLBLD CKD-EPI 2021: 98.7 ML/MIN/1.73
EOSINOPHIL # BLD AUTO: 0.06 10*3/MM3 (ref 0–0.4)
EOSINOPHIL NFR BLD AUTO: 0.6 % (ref 0.3–6.2)
ERYTHROCYTE [DISTWIDTH] IN BLOOD BY AUTOMATED COUNT: 13.1 % (ref 12.3–15.4)
GLUCOSE BLDC GLUCOMTR-MCNC: 109 MG/DL (ref 70–130)
GLUCOSE BLDC GLUCOMTR-MCNC: 114 MG/DL (ref 70–130)
GLUCOSE BLDC GLUCOMTR-MCNC: 136 MG/DL (ref 70–130)
GLUCOSE BLDC GLUCOMTR-MCNC: 95 MG/DL (ref 70–130)
GLUCOSE SERPL-MCNC: 107 MG/DL (ref 65–99)
HCT VFR BLD AUTO: 33.2 % (ref 37.5–51)
HGB BLD-MCNC: 11.2 G/DL (ref 13–17.7)
IMM GRANULOCYTES # BLD AUTO: 0.1 10*3/MM3 (ref 0–0.05)
IMM GRANULOCYTES NFR BLD AUTO: 0.9 % (ref 0–0.5)
LYMPHOCYTES # BLD AUTO: 1.19 10*3/MM3 (ref 0.7–3.1)
LYMPHOCYTES NFR BLD AUTO: 10.9 % (ref 19.6–45.3)
MCH RBC QN AUTO: 28.9 PG (ref 26.6–33)
MCHC RBC AUTO-ENTMCNC: 33.7 G/DL (ref 31.5–35.7)
MCV RBC AUTO: 85.8 FL (ref 79–97)
MONOCYTES # BLD AUTO: 1.24 10*3/MM3 (ref 0.1–0.9)
MONOCYTES NFR BLD AUTO: 11.4 % (ref 5–12)
NEUTROPHILS NFR BLD AUTO: 75.9 % (ref 42.7–76)
NEUTROPHILS NFR BLD AUTO: 8.28 10*3/MM3 (ref 1.7–7)
NRBC BLD AUTO-RTO: 0 /100 WBC (ref 0–0.2)
PLATELET # BLD AUTO: 193 10*3/MM3 (ref 140–450)
PMV BLD AUTO: 9.5 FL (ref 6–12)
POTASSIUM SERPL-SCNC: 3.6 MMOL/L (ref 3.5–5.2)
RBC # BLD AUTO: 3.87 10*6/MM3 (ref 4.14–5.8)
SODIUM SERPL-SCNC: 137 MMOL/L (ref 136–145)
WBC NRBC COR # BLD: 10.9 10*3/MM3 (ref 3.4–10.8)

## 2022-05-07 PROCEDURE — 80048 BASIC METABOLIC PNL TOTAL CA: CPT | Performed by: STUDENT IN AN ORGANIZED HEALTH CARE EDUCATION/TRAINING PROGRAM

## 2022-05-07 PROCEDURE — 82962 GLUCOSE BLOOD TEST: CPT

## 2022-05-07 PROCEDURE — 85025 COMPLETE CBC W/AUTO DIFF WBC: CPT | Performed by: STUDENT IN AN ORGANIZED HEALTH CARE EDUCATION/TRAINING PROGRAM

## 2022-05-07 PROCEDURE — 25010000002 CEFEPIME PER 500 MG: Performed by: INTERNAL MEDICINE

## 2022-05-07 RX ADMIN — DABIGATRAN ETEXILATE MESYLATE 150 MG: 150 CAPSULE ORAL at 22:01

## 2022-05-07 RX ADMIN — METOPROLOL SUCCINATE 100 MG: 100 TABLET, EXTENDED RELEASE ORAL at 22:46

## 2022-05-07 RX ADMIN — METOPROLOL SUCCINATE 50 MG: 100 TABLET, EXTENDED RELEASE ORAL at 08:48

## 2022-05-07 RX ADMIN — CEFEPIME HYDROCHLORIDE 2 G: 2 INJECTION, POWDER, FOR SOLUTION INTRAVENOUS at 22:01

## 2022-05-07 RX ADMIN — CALCIUM CARBONATE 2 TABLET: 500 TABLET, CHEWABLE ORAL at 10:34

## 2022-05-07 RX ADMIN — CEFEPIME HYDROCHLORIDE 2 G: 2 INJECTION, POWDER, FOR SOLUTION INTRAVENOUS at 04:47

## 2022-05-07 RX ADMIN — CEFEPIME HYDROCHLORIDE 2 G: 2 INJECTION, POWDER, FOR SOLUTION INTRAVENOUS at 12:18

## 2022-05-07 RX ADMIN — SODIUM CHLORIDE 150 ML/HR: 9 INJECTION, SOLUTION INTRAVENOUS at 22:55

## 2022-05-07 RX ADMIN — SODIUM CHLORIDE 150 ML/HR: 9 INJECTION, SOLUTION INTRAVENOUS at 08:46

## 2022-05-07 RX ADMIN — PANTOPRAZOLE SODIUM 40 MG: 40 TABLET, DELAYED RELEASE ORAL at 08:48

## 2022-05-07 RX ADMIN — CALCIUM CARBONATE 2 TABLET: 500 TABLET, CHEWABLE ORAL at 16:09

## 2022-05-07 RX ADMIN — ATORVASTATIN CALCIUM 80 MG: 80 TABLET, FILM COATED ORAL at 22:01

## 2022-05-07 RX ADMIN — CLOPIDOGREL 75 MG: 75 TABLET, FILM COATED ORAL at 08:48

## 2022-05-07 RX ADMIN — DABIGATRAN ETEXILATE MESYLATE 150 MG: 150 CAPSULE ORAL at 08:48

## 2022-05-07 RX ADMIN — SODIUM CHLORIDE 150 ML/HR: 9 INJECTION, SOLUTION INTRAVENOUS at 16:11

## 2022-05-07 RX ADMIN — BRIMONIDINE TARTRATE 1 DROP: 1 SOLUTION/ DROPS OPHTHALMIC at 08:46

## 2022-05-07 RX ADMIN — BRIMONIDINE TARTRATE 1 DROP: 1 SOLUTION/ DROPS OPHTHALMIC at 22:06

## 2022-05-07 RX ADMIN — LATANOPROST 1 DROP: 50 SOLUTION OPHTHALMIC at 22:06

## 2022-05-07 RX ADMIN — TAMSULOSIN HYDROCHLORIDE 0.8 MG: 0.4 CAPSULE ORAL at 22:01

## 2022-05-07 RX ADMIN — CALCIUM CARBONATE 2 TABLET: 500 TABLET, CHEWABLE ORAL at 22:21

## 2022-05-07 RX ADMIN — SODIUM CHLORIDE 150 ML/HR: 9 INJECTION, SOLUTION INTRAVENOUS at 00:49

## 2022-05-07 RX ADMIN — ACETAMINOPHEN 650 MG: 325 TABLET, FILM COATED ORAL at 22:20

## 2022-05-07 RX ADMIN — PANTOPRAZOLE SODIUM 40 MG: 40 TABLET, DELAYED RELEASE ORAL at 22:21

## 2022-05-07 NOTE — PLAN OF CARE
Problem: Adult Inpatient Plan of Care  Goal: Plan of Care Review  Outcome: Ongoing, Progressing  Flowsheets (Taken 5/7/2022 0109)  Outcome Evaluation: Pt complains of acid reflux treated per MAR with relief. HR remains elevated and pt experiences some SOA with exertion but pt denies any other complaints at this time.  Goal: Patient-Specific Goal (Individualized)  Outcome: Ongoing, Progressing  Goal: Absence of Hospital-Acquired Illness or Injury  Outcome: Ongoing, Progressing  Intervention: Identify and Manage Fall Risk  Recent Flowsheet Documentation  Taken 5/7/2022 0000 by Becky Kumari RN  Safety Promotion/Fall Prevention:   activity supervised   assistive device/personal items within reach   clutter free environment maintained   fall prevention program maintained   muscle strengthening facilitated   nonskid shoes/slippers when out of bed   room organization consistent   safety round/check completed  Taken 5/6/2022 2215 by Becky Kumari RN  Safety Promotion/Fall Prevention:   activity supervised   assistive device/personal items within reach   clutter free environment maintained   fall prevention program maintained   nonskid shoes/slippers when out of bed   room organization consistent   safety round/check completed  Taken 5/6/2022 2000 by Becky Kumari RN  Safety Promotion/Fall Prevention:   activity supervised   assistive device/personal items within reach   clutter free environment maintained   fall prevention program maintained   nonskid shoes/slippers when out of bed   room organization consistent   safety round/check completed  Intervention: Prevent Skin Injury  Recent Flowsheet Documentation  Taken 5/7/2022 0000 by Becky Kumari RN  Body Position: position changed independently  Taken 5/6/2022 2215 by Becky Kumari RN  Body Position: position changed independently  Taken 5/6/2022 2000 by Becky Kumari RN  Body Position: position changed independently  Intervention: Prevent and Manage VTE (Venous  Thromboembolism) Risk  Recent Flowsheet Documentation  Taken 5/7/2022 0000 by Becky Kumari RN  Activity Management:   activity adjusted per tolerance   standing at bedside  VTE Prevention/Management:   bilateral   dorsiflexion/plantar flexion performed  Taken 5/6/2022 2000 by Becky Kumari RN  VTE Prevention/Management:   bilateral   dorsiflexion/plantar flexion performed  Intervention: Prevent Infection  Recent Flowsheet Documentation  Taken 5/7/2022 0000 by Becky Kumari RN  Infection Prevention:   rest/sleep promoted   single patient room provided   hand hygiene promoted  Taken 5/6/2022 2000 by Becky Kumari RN  Infection Prevention: hand hygiene promoted  Goal: Optimal Comfort and Wellbeing  Outcome: Ongoing, Progressing  Intervention: Monitor Pain and Promote Comfort  Recent Flowsheet Documentation  Taken 5/6/2022 2215 by Becky Kumari RN  Pain Management Interventions: see MAR  Taken 5/6/2022 2000 by Becky Kumari RN  Pain Management Interventions: see MAR  Intervention: Provide Person-Centered Care  Recent Flowsheet Documentation  Taken 5/7/2022 0000 by Becky Kumari RN  Trust Relationship/Rapport: thoughts/feelings acknowledged  Taken 5/6/2022 2000 by Becky Kumari RN  Trust Relationship/Rapport:   care explained   choices provided   emotional support provided   empathic listening provided   questions answered   questions encouraged   reassurance provided   thoughts/feelings acknowledged  Goal: Readiness for Transition of Care  Outcome: Ongoing, Progressing     Problem: Heart Failure Comorbidity  Goal: Maintenance of Heart Failure Symptom Control  Outcome: Ongoing, Progressing  Intervention: Maintain Heart Failure-Management  Recent Flowsheet Documentation  Taken 5/7/2022 0000 by Becky Kumari RN  Medication Review/Management: medications reviewed  Taken 5/6/2022 2215 by Becky Kumari RN  Medication Review/Management: medications reviewed  Taken 5/6/2022 2000 by Becky Kumari RN  Medication  Review/Management: medications reviewed     Problem: Hypertension Comorbidity  Goal: Blood Pressure in Desired Range  Outcome: Ongoing, Progressing  Intervention: Maintain Blood Pressure Management  Recent Flowsheet Documentation  Taken 5/7/2022 0000 by Becky Kumari RN  Medication Review/Management: medications reviewed  Taken 5/6/2022 2215 by Becky Kumari RN  Medication Review/Management: medications reviewed  Taken 5/6/2022 2000 by Becky Kumari RN  Medication Review/Management: medications reviewed   Goal Outcome Evaluation:              Outcome Evaluation: Pt complains of acid reflux treated per MAR with relief. HR remains elevated and pt experiences some SOA with exertion but pt denies any other complaints at this time.

## 2022-05-07 NOTE — PROGRESS NOTES
Name: Aj Salazar ADMIT: 2022   : 1945  PCP: Neftali Amezcua MD    MRN: [id number] LOS: 2 days   AGE/SEX: 76 y.o. male  ROOM: [facility]     Subjective   Subjective   Patient seen at bedside.     Objective   Objective   Vital Signs  Temp:  [97.9 °F (36.6 °C)-99.3 °F (37.4 °C)] 99.3 °F (37.4 °C)  Heart Rate:  [103-121] 103  Resp:  [17-18] 17  BP: (126-149)/(82-98) 149/98  SpO2:  [93 %-97 %] 97 %  on  Flow (L/min):  [2] 2;   Device (Oxygen Therapy): nasal cannula  Body mass index is 24.97 kg/m².  Physical Exam  Constitutional:       General: He is not in acute distress.     Appearance: He is ill-appearing.   Cardiovascular:      Rate and Rhythm: Tachycardia present. Rhythm irregular.      Heart sounds: Normal heart sounds.   Pulmonary:      Effort: Pulmonary effort is normal.      Breath sounds: Normal breath sounds.   Abdominal:      General: Bowel sounds are normal.      Palpations: Abdomen is soft.   Musculoskeletal:         General: No tenderness.      Right lower leg: No edema.      Left lower leg: No edema.   Neurological:      Mental Status: He is alert.   Psychiatric:         Mood and Affect: Mood normal.         Behavior: Behavior normal.        Results Review     I reviewed the patient's new clinical results.  Results from last 7 days   Lab Units 22  0514 22  0651 22  1446   WBC 10*3/mm3 10.90* 11.74* 15.83*   HEMOGLOBIN g/dL 11.2* 12.2* 15.1   PLATELETS 10*3/mm3 193 196 241     Results from last 7 days   Lab Units 22  0514 22  0651 22  1446   SODIUM mmol/L 137 138 135*   POTASSIUM mmol/L 3.6 3.4* 4.3   CHLORIDE mmol/L 108* 106 100   CO2 mmol/L 21.0* 21.4* 20.0*   BUN mg/dL 10 14 18   CREATININE mg/dL 0.64* 0.64* 1.01   GLUCOSE mg/dL 107* 124* 262*   EGFR mL/min/1.73 98.7 98.7 77.6     Results from last 7 days   Lab Units 22  1446   ALBUMIN g/dL 3.30*   BILIRUBIN mg/dL 1.9*   ALK PHOS U/L 101   AST (SGOT) U/L 22   ALT (SGPT) U/L 21     Results from last  7 days   Lab Units 05/07/22  0514 05/06/22  0651 05/05/22  1446   CALCIUM mg/dL 7.5* 7.6* 8.7   ALBUMIN g/dL  --   --  3.30*     Results from last 7 days   Lab Units 05/06/22  0923 05/05/22  1446   PROCALCITONIN ng/mL  --  0.45*   LACTATE mmol/L 1.6 3.7*     Hemoglobin A1C   Date/Time Value Ref Range Status   05/06/2022 0651 6.60 (H) 4.80 - 5.60 % Final     Glucose   Date/Time Value Ref Range Status   05/07/2022 1533 114 70 - 130 mg/dL Final     Comment:     Meter: [id number] : [id number] NA   05/07/2022 1032 136 (H) 70 - 130 mg/dL Final     Comment:     Meter: [id number] : [id number] Dion Carranza NA   05/07/2022 0554 109 70 - 130 mg/dL Final     Comment:     Meter: [id number] : [id number] Álvaro Kamini NA   05/06/2022 2105 97 70 - 130 mg/dL Final     Comment:     Meter: [id number] : [id number] Álvaro Kamini NA   05/06/2022 1629 100 70 - 130 mg/dL Final     Comment:     Meter: [id number] : [id number] Totze Bri NA   05/06/2022 1057 124 70 - 130 mg/dL Final     Comment:     Meter: [id number] : [id number] Totze Bri NA   05/06/2022 0633 115 70 - 130 mg/dL Final     Comment:     Meter: [id number] : [id number] Julia DEL CASTILLO       No radiology results for the last day  Scheduled Medications  atorvastatin, 80 mg, Oral, Nightly  brimonidine, 1 drop, Both Eyes, BID  budesonide-formoterol, 2 puff, Inhalation, BID - RT   And  tiotropium bromide monohydrate, 1 puff, Inhalation, Daily - RT  cefepime, 2 g, Intravenous, Q8H  clopidogrel, 75 mg, Oral, Daily  dabigatran etexilate, 150 mg, Oral, BID  insulin lispro, 0-9 Units, Subcutaneous, TID With Meals  latanoprost, 1 drop, Both Eyes, Nightly  metoprolol succinate XL, 100 mg, Oral, Nightly  metoprolol succinate XL, 50 mg, Oral, Daily  pantoprazole, 40 mg, Oral, BID  tamsulosin, 0.8 mg, Oral, Nightly    Infusions  sodium chloride, 150 mL/hr, Last Rate: 150 mL/hr (05/07/22 1611)    Diet  Diet Regular; Cardiac        Assessment/Plan     Active Hospital Problems    Diagnosis  POA   • **Acute pyelonephritis [N10]  Yes   • Chronic diastolic CHF (congestive heart failure) ([facility]) [I50.32]  Yes   • Alcohol dependence in remission ([facility]) [F10.21]  Yes   • Personal history of transient ischemic attack (TIA), and cerebral infarction without residual deficits [Z86.73]  Not Applicable   • Sepsis ([facility]) [A41.9]  Yes   • Gastroesophageal reflux disease [K21.9]  Yes   • A-fib ([facility]) [I48.91]  Yes   • Type 2 diabetes mellitus with hyperglycemia, without long-term current use of insulin ([facility]) [E11.65]  Yes   • BPH with obstruction/lower urinary tract symptoms [N40.1, N13.8]  Yes   • COPD (chronic obstructive pulmonary disease) ([facility]) [J44.9]  Yes   • Hypertension [I10]  Yes      Resolved Hospital Problems   No resolved problems to display.       76 y.o. male admitted with Acute pyelonephritis.    Assessment and plan:  1.  Sepsis associated with acute pyelonephritis.  Continue cefepime.  Follow culture results.  Discontinue IV fluids as patient is tolerating p.o. intake.    2.  Chronic diastolic CHF.  Continue to monitor for volume status changes, appears euvolemic at this point of time.    3.  Chronic atrial fibrillation, patient is on Toprol-XL and Pradaxa.    4.  Gastroesophageal reflux disease, continue PPI therapy.    5.  Diabetes mellitus, continue Accu-Cheks and sliding scale insulin coverage.    6.  Benign prostate hypertrophy, continue Flomax.    7.  History of TIA, continue Plavix and statin therapy.    8.  COPD, chronic, does not appear to be in exacerbation.    9.  CODE STATUS is full code.  DVT prophylaxis, patient is already on anticoagulation.    10.  Disposition, home, in the next 2 to 3 days.    Arben Adler MD  [facility]  05/07/22  16:37 EDT

## 2022-05-08 ENCOUNTER — APPOINTMENT (OUTPATIENT)
Dept: GENERAL RADIOLOGY | Facility: HOSPITAL | Age: 77
End: 2022-05-08

## 2022-05-08 ENCOUNTER — APPOINTMENT (OUTPATIENT)
Dept: MRI IMAGING | Facility: HOSPITAL | Age: 77
End: 2022-05-08

## 2022-05-08 LAB
ALBUMIN SERPL-MCNC: 2 G/DL (ref 3.5–5.2)
ALBUMIN/GLOB SERPL: 0.7 G/DL
ALP SERPL-CCNC: 65 U/L (ref 39–117)
ALT SERPL W P-5'-P-CCNC: 31 U/L (ref 1–41)
ANION GAP SERPL CALCULATED.3IONS-SCNC: 10.6 MMOL/L (ref 5–15)
AST SERPL-CCNC: 27 U/L (ref 1–40)
BASOPHILS # BLD AUTO: 0.03 10*3/MM3 (ref 0–0.2)
BASOPHILS NFR BLD AUTO: 0.3 % (ref 0–1.5)
BILIRUB SERPL-MCNC: 1.2 MG/DL (ref 0–1.2)
BUN SERPL-MCNC: 9 MG/DL (ref 8–23)
BUN/CREAT SERPL: 18.4 (ref 7–25)
CALCIUM SPEC-SCNC: 7.7 MG/DL (ref 8.6–10.5)
CHLORIDE SERPL-SCNC: 107 MMOL/L (ref 98–107)
CO2 SERPL-SCNC: 20.4 MMOL/L (ref 22–29)
CREAT SERPL-MCNC: 0.49 MG/DL (ref 0.76–1.27)
DEPRECATED RDW RBC AUTO: 41.2 FL (ref 37–54)
EGFRCR SERPLBLD CKD-EPI 2021: 107 ML/MIN/1.73
EOSINOPHIL # BLD AUTO: 0.12 10*3/MM3 (ref 0–0.4)
EOSINOPHIL NFR BLD AUTO: 1.4 % (ref 0.3–6.2)
ERYTHROCYTE [DISTWIDTH] IN BLOOD BY AUTOMATED COUNT: 13.1 % (ref 12.3–15.4)
GLOBULIN UR ELPH-MCNC: 3 GM/DL
GLUCOSE BLDC GLUCOMTR-MCNC: 89 MG/DL (ref 70–130)
GLUCOSE BLDC GLUCOMTR-MCNC: 93 MG/DL (ref 70–130)
GLUCOSE BLDC GLUCOMTR-MCNC: 97 MG/DL (ref 70–130)
GLUCOSE BLDC GLUCOMTR-MCNC: 98 MG/DL (ref 70–130)
GLUCOSE SERPL-MCNC: 98 MG/DL (ref 65–99)
HCT VFR BLD AUTO: 31.5 % (ref 37.5–51)
HGB BLD-MCNC: 11 G/DL (ref 13–17.7)
IMM GRANULOCYTES # BLD AUTO: 0.08 10*3/MM3 (ref 0–0.05)
IMM GRANULOCYTES NFR BLD AUTO: 0.9 % (ref 0–0.5)
LYMPHOCYTES # BLD AUTO: 1.12 10*3/MM3 (ref 0.7–3.1)
LYMPHOCYTES NFR BLD AUTO: 12.6 % (ref 19.6–45.3)
MCH RBC QN AUTO: 29.9 PG (ref 26.6–33)
MCHC RBC AUTO-ENTMCNC: 34.9 G/DL (ref 31.5–35.7)
MCV RBC AUTO: 85.6 FL (ref 79–97)
MONOCYTES # BLD AUTO: 0.87 10*3/MM3 (ref 0.1–0.9)
MONOCYTES NFR BLD AUTO: 9.8 % (ref 5–12)
NEUTROPHILS NFR BLD AUTO: 6.66 10*3/MM3 (ref 1.7–7)
NEUTROPHILS NFR BLD AUTO: 75 % (ref 42.7–76)
NRBC BLD AUTO-RTO: 0 /100 WBC (ref 0–0.2)
PLATELET # BLD AUTO: 199 10*3/MM3 (ref 140–450)
PMV BLD AUTO: 9.4 FL (ref 6–12)
POTASSIUM SERPL-SCNC: 3.4 MMOL/L (ref 3.5–5.2)
PROT SERPL-MCNC: 5 G/DL (ref 6–8.5)
RBC # BLD AUTO: 3.68 10*6/MM3 (ref 4.14–5.8)
SODIUM SERPL-SCNC: 138 MMOL/L (ref 136–145)
WBC NRBC COR # BLD: 8.88 10*3/MM3 (ref 3.4–10.8)

## 2022-05-08 PROCEDURE — 80053 COMPREHEN METABOLIC PANEL: CPT | Performed by: INTERNAL MEDICINE

## 2022-05-08 PROCEDURE — 85025 COMPLETE CBC W/AUTO DIFF WBC: CPT | Performed by: INTERNAL MEDICINE

## 2022-05-08 PROCEDURE — 25010000002 CEFEPIME PER 500 MG: Performed by: INTERNAL MEDICINE

## 2022-05-08 PROCEDURE — 82962 GLUCOSE BLOOD TEST: CPT

## 2022-05-08 PROCEDURE — 71045 X-RAY EXAM CHEST 1 VIEW: CPT

## 2022-05-08 RX ORDER — CEFDINIR 300 MG/1
300 CAPSULE ORAL EVERY 12 HOURS SCHEDULED
Status: DISCONTINUED | OUTPATIENT
Start: 2022-05-08 | End: 2022-05-10 | Stop reason: HOSPADM

## 2022-05-08 RX ADMIN — DABIGATRAN ETEXILATE MESYLATE 150 MG: 150 CAPSULE ORAL at 21:21

## 2022-05-08 RX ADMIN — CALCIUM CARBONATE 2 TABLET: 500 TABLET, CHEWABLE ORAL at 10:13

## 2022-05-08 RX ADMIN — BRIMONIDINE TARTRATE 1 DROP: 1 SOLUTION/ DROPS OPHTHALMIC at 08:01

## 2022-05-08 RX ADMIN — CLOPIDOGREL 75 MG: 75 TABLET, FILM COATED ORAL at 08:01

## 2022-05-08 RX ADMIN — CALCIUM CARBONATE 2 TABLET: 500 TABLET, CHEWABLE ORAL at 21:27

## 2022-05-08 RX ADMIN — CEFEPIME HYDROCHLORIDE 2 G: 2 INJECTION, POWDER, FOR SOLUTION INTRAVENOUS at 11:30

## 2022-05-08 RX ADMIN — PANTOPRAZOLE SODIUM 40 MG: 40 TABLET, DELAYED RELEASE ORAL at 21:27

## 2022-05-08 RX ADMIN — SODIUM CHLORIDE 150 ML/HR: 9 INJECTION, SOLUTION INTRAVENOUS at 05:45

## 2022-05-08 RX ADMIN — TAMSULOSIN HYDROCHLORIDE 0.8 MG: 0.4 CAPSULE ORAL at 21:21

## 2022-05-08 RX ADMIN — ACETAMINOPHEN 650 MG: 325 TABLET, FILM COATED ORAL at 10:13

## 2022-05-08 RX ADMIN — PANTOPRAZOLE SODIUM 40 MG: 40 TABLET, DELAYED RELEASE ORAL at 08:01

## 2022-05-08 RX ADMIN — ATORVASTATIN CALCIUM 80 MG: 80 TABLET, FILM COATED ORAL at 21:21

## 2022-05-08 RX ADMIN — METOPROLOL SUCCINATE 50 MG: 100 TABLET, EXTENDED RELEASE ORAL at 08:01

## 2022-05-08 RX ADMIN — DABIGATRAN ETEXILATE MESYLATE 150 MG: 150 CAPSULE ORAL at 08:01

## 2022-05-08 RX ADMIN — CEFDINIR 300 MG: 300 CAPSULE ORAL at 13:57

## 2022-05-08 RX ADMIN — CEFEPIME HYDROCHLORIDE 2 G: 2 INJECTION, POWDER, FOR SOLUTION INTRAVENOUS at 04:03

## 2022-05-08 RX ADMIN — BRIMONIDINE TARTRATE 1 DROP: 1 SOLUTION/ DROPS OPHTHALMIC at 21:22

## 2022-05-08 RX ADMIN — METOPROLOL SUCCINATE 100 MG: 100 TABLET, EXTENDED RELEASE ORAL at 21:21

## 2022-05-08 RX ADMIN — CEFDINIR 300 MG: 300 CAPSULE ORAL at 21:21

## 2022-05-08 NOTE — PROGRESS NOTES
Name: Aj Salazar ADMIT: 2022   : 1945  PCP: Neftali Amezcua MD    MRN: [id number] LOS: 3 days   AGE/SEX: 76 y.o. male  ROOM: [facility]     Subjective   Subjective   Patient seen at bedside.       Objective   Objective   Vital Signs  Temp:  [98 °F (36.7 °C)-99.3 °F (37.4 °C)] 98 °F (36.7 °C)  Heart Rate:  [103-112] 110  Resp:  [17-22] 20  BP: (111-149)/(64-98) 111/64  SpO2:  [95 %-97 %] 96 %  on  Flow (L/min):  [2] 2;   Device (Oxygen Therapy): nasal cannula  Body mass index is 24.97 kg/m².  Physical Exam  Constitutional:       General: He is not in acute distress.     Appearance: He is ill-appearing.   Cardiovascular:      Rate and Rhythm: Tachycardia present. Rhythm irregular.      Heart sounds: Normal heart sounds.   Pulmonary:      Effort: Pulmonary effort is normal.      Breath sounds: Normal breath sounds.   Abdominal:      General: Bowel sounds are normal.      Palpations: Abdomen is soft.   Musculoskeletal:         General: No tenderness.      Right lower leg: No edema.      Left lower leg: No edema.   Neurological:      Mental Status: He is alert.   Psychiatric:         Mood and Affect: Mood normal.         Behavior: Behavior normal.        Results Review     I reviewed the patient's new clinical results.  Results from last 7 days   Lab Units 2257 22  0514 22  0651 22  1446   WBC 10*3/mm3 8.88 10.90* 11.74* 15.83*   HEMOGLOBIN g/dL 11.0* 11.2* 12.2* 15.1   PLATELETS 10*3/mm3 199 193 196 241     Results from last 7 days   Lab Units 22  0557 22  0514 22  0651 22  1446   SODIUM mmol/L 138 137 138 135*   POTASSIUM mmol/L 3.4* 3.6 3.4* 4.3   CHLORIDE mmol/L 107 108* 106 100   CO2 mmol/L 20.4* 21.0* 21.4* 20.0*   BUN mg/dL 9 10 14 18   CREATININE mg/dL 0.49* 0.64* 0.64* 1.01   GLUCOSE mg/dL 98 107* 124* 262*   EGFR mL/min/1.73 107.0 98.7 98.7 77.6     Results from last 7 days   Lab Units 22  0557 22  1446   ALBUMIN g/dL 2.00* 3.30*    BILIRUBIN mg/dL 1.2 1.9*   ALK PHOS U/L 65 101   AST (SGOT) U/L 27 22   ALT (SGPT) U/L 31 21     Results from last 7 days   Lab Units 05/08/22  0557 05/07/22  0514 05/06/22  0651 05/05/22  1446   CALCIUM mg/dL 7.7* 7.5* 7.6* 8.7   ALBUMIN g/dL 2.00*  --   --  3.30*     Results from last 7 days   Lab Units 05/06/22  0923 05/05/22  1446   PROCALCITONIN ng/mL  --  0.45*   LACTATE mmol/L 1.6 3.7*     Hemoglobin A1C   Date/Time Value Ref Range Status   05/06/2022 0651 6.60 (H) 4.80 - 5.60 % Final     Glucose   Date/Time Value Ref Range Status   05/08/2022 1049 97 70 - 130 mg/dL Final     Comment:     Meter: [id number] : [id number] [address] [place] NA   05/08/2022 0613 93 70 - 130 mg/dL Final     Comment:     Meter: [id number] : [id number] Álvaro Kamini NA   05/07/2022 2100 95 70 - 130 mg/dL Final     Comment:     Meter: [id number] : [id number] Álvaro Kamini NA   05/07/2022 1533 114 70 - 130 mg/dL Final     Comment:     Meter: [id number] : [id number] [address] [place] NA   05/07/2022 1032 136 (H) 70 - 130 mg/dL Final     Comment:     Meter: [id number] : [id number] Dion Carranza NA   05/07/2022 0554 109 70 - 130 mg/dL Final     Comment:     Meter: [id number] : [id number] Álvaro Kamini NA   05/06/2022 2105 97 70 - 130 mg/dL Final     Comment:     Meter: [id number] : [id number] Álvaro Kamini NA       No radiology results for the last day  Scheduled Medications  atorvastatin, 80 mg, Oral, Nightly  brimonidine, 1 drop, Both Eyes, BID  budesonide-formoterol, 2 puff, Inhalation, BID - RT   And  tiotropium bromide monohydrate, 1 puff, Inhalation, Daily - RT  cefepime, 2 g, Intravenous, Q8H  clopidogrel, 75 mg, Oral, Daily  dabigatran etexilate, 150 mg, Oral, BID  insulin lispro, 0-9 Units, Subcutaneous, TID With Meals  latanoprost, 1 drop, Both Eyes, Nightly  metoprolol succinate XL, 100 mg, Oral, Nightly  metoprolol succinate XL, 50 mg, Oral, Daily  pantoprazole, 40 mg, Oral, BID  tamsulosin,  0.8 mg, Oral, Nightly    Infusions   Diet  Diet Regular; Cardiac       Assessment/Plan     Active Hospital Problems    Diagnosis  POA   • **Acute pyelonephritis [N10]  Yes   • Chronic diastolic CHF (congestive heart failure) ([facility]) [I50.32]  Yes   • Alcohol dependence in remission ([facility]) [F10.21]  Yes   • Personal history of transient ischemic attack (TIA), and cerebral infarction without residual deficits [Z86.73]  Not Applicable   • Sepsis ([facility]) [A41.9]  Yes   • Gastroesophageal reflux disease [K21.9]  Yes   • A-fib ([facility]) [I48.91]  Yes   • Type 2 diabetes mellitus with hyperglycemia, without long-term current use of insulin ([facility]) [E11.65]  Yes   • BPH with obstruction/lower urinary tract symptoms [N40.1, N13.8]  Yes   • COPD (chronic obstructive pulmonary disease) ([facility]) [J44.9]  Yes   • Hypertension [I10]  Yes      Resolved Hospital Problems   No resolved problems to display.       76 y.o. male admitted with Acute pyelonephritis.    Assessment and plan:  1.  Sepsis associated with acute pyelonephritis.  Urine cultures positive for Klebsiella.  Discontinue IV fluids as patient is tolerating p.o. intake.  We will switch cefepime to Omnicef.     2.  Chronic diastolic CHF.  Continue to monitor for volume status changes, patient reports dyspnea, obtain chest x-ray.      3.  Chronic atrial fibrillation, patient is on Toprol-XL and Pradaxa.     4.  Gastroesophageal reflux disease, continue PPI therapy.     5.  Diabetes mellitus, continue Accu-Cheks and sliding scale insulin coverage.     6.  Benign prostate hypertrophy, continue Flomax.     7.  History of TIA, continue Plavix and statin therapy.     8.  COPD, chronic, does not appear to be in exacerbation.     9.  CODE STATUS is full code.  DVT prophylaxis, patient is already on anticoagulation.     10.  Disposition, home, in the next 2 days.      Arben Adler MD  [facility]  05/08/22  11:49 EDT

## 2022-05-08 NOTE — PLAN OF CARE
Problem: Adult Inpatient Plan of Care  Goal: Plan of Care Review  Outcome: Ongoing, Progressing  Goal: Patient-Specific Goal (Individualized)  Outcome: Ongoing, Progressing  Goal: Absence of Hospital-Acquired Illness or Injury  Outcome: Ongoing, Progressing  Intervention: Identify and Manage Fall Risk  Recent Flowsheet Documentation  Taken 5/8/2022 0224 by Becky Kumari RN  Safety Promotion/Fall Prevention:   activity supervised   clutter free environment maintained   assistive device/personal items within reach   fall prevention program maintained   nonskid shoes/slippers when out of bed   room organization consistent   safety round/check completed  Taken 5/8/2022 0000 by Becky Kumari RN  Safety Promotion/Fall Prevention:   activity supervised   assistive device/personal items within reach   clutter free environment maintained   fall prevention program maintained   nonskid shoes/slippers when out of bed   room organization consistent   safety round/check completed  Taken 5/7/2022 2200 by Becky Kumari RN  Safety Promotion/Fall Prevention:   activity supervised   assistive device/personal items within reach   clutter free environment maintained   fall prevention program maintained   nonskid shoes/slippers when out of bed   room organization consistent   safety round/check completed  Taken 5/7/2022 2158 by Becky Kumari RN  Safety Promotion/Fall Prevention:   activity supervised   assistive device/personal items within reach   clutter free environment maintained   fall prevention program maintained   nonskid shoes/slippers when out of bed   safety round/check completed   room organization consistent  Intervention: Prevent Skin Injury  Recent Flowsheet Documentation  Taken 5/8/2022 0224 by Becky Kumari RN  Body Position: position changed independently  Taken 5/8/2022 0000 by Becky Kumari RN  Body Position: position changed independently  Taken 5/7/2022 2200 by Becky Kumari RN  Body Position: position  changed independently  Taken 5/7/2022 2158 by Becky Kumari RN  Body Position: position changed independently  Skin Protection: adhesive use limited  Intervention: Prevent and Manage VTE (Venous Thromboembolism) Risk  Recent Flowsheet Documentation  Taken 5/8/2022 0000 by Becky Kumari RN  VTE Prevention/Management: (See MAR)   bilateral   dorsiflexion/plantar flexion performed   sequential compression devices off  Taken 5/7/2022 2158 by Becky Kumari RN  VTE Prevention/Management: (See MAR)   bilateral   dorsiflexion/plantar flexion performed   sequential compression devices off  Intervention: Prevent Infection  Recent Flowsheet Documentation  Taken 5/8/2022 0224 by Becky Kumari RN  Infection Prevention:   single patient room provided   rest/sleep promoted  Taken 5/8/2022 0000 by Becky Kumari RN  Infection Prevention:   rest/sleep promoted   single patient room provided  Taken 5/7/2022 2200 by Becky Kumari RN  Infection Prevention:   rest/sleep promoted   single patient room provided  Taken 5/7/2022 2158 by Becky Kumari RN  Infection Prevention:   rest/sleep promoted   single patient room provided  Goal: Optimal Comfort and Wellbeing  Outcome: Ongoing, Progressing  Intervention: Monitor Pain and Promote Comfort  Recent Flowsheet Documentation  Taken 5/7/2022 2220 by Becky Kumari RN  Pain Management Interventions: see MAR  Intervention: Provide Person-Centered Care  Recent Flowsheet Documentation  Taken 5/8/2022 0000 by Becky Kumari RN  Trust Relationship/Rapport: thoughts/feelings acknowledged  Taken 5/7/2022 2158 by Becky Kumari RN  Trust Relationship/Rapport:   care explained   choices provided   emotional support provided   empathic listening provided   questions answered   reassurance provided   questions encouraged   thoughts/feelings acknowledged  Goal: Readiness for Transition of Care  Outcome: Ongoing, Progressing     Problem: Heart Failure Comorbidity  Goal: Maintenance of Heart Failure  Symptom Control  Outcome: Ongoing, Progressing  Intervention: Maintain Heart Failure-Management  Recent Flowsheet Documentation  Taken 5/8/2022 0000 by Becky Kumari RN  Medication Review/Management: medications reviewed  Taken 5/7/2022 2158 by Becky Kumari RN  Medication Review/Management: medications reviewed     Problem: Hypertension Comorbidity  Goal: Blood Pressure in Desired Range  Outcome: Ongoing, Progressing  Intervention: Maintain Blood Pressure Management  Recent Flowsheet Documentation  Taken 5/8/2022 0000 by Becky Kumari RN  Medication Review/Management: medications reviewed  Taken 5/7/2022 2158 by Becky Kumari RN  Medication Review/Management: medications reviewed     Problem: Adult Inpatient Plan of Care  Goal: Plan of Care Review  Outcome: Ongoing, Progressing  Goal: Patient-Specific Goal (Individualized)  Outcome: Ongoing, Progressing  Goal: Absence of Hospital-Acquired Illness or Injury  Outcome: Ongoing, Progressing  Intervention: Identify and Manage Fall Risk  Recent Flowsheet Documentation  Taken 5/8/2022 0224 by Becky Kumari RN  Safety Promotion/Fall Prevention:   activity supervised   clutter free environment maintained   assistive device/personal items within reach   fall prevention program maintained   nonskid shoes/slippers when out of bed   room organization consistent   safety round/check completed  Taken 5/8/2022 0000 by Becky Kumari RN  Safety Promotion/Fall Prevention:   activity supervised   assistive device/personal items within reach   clutter free environment maintained   fall prevention program maintained   nonskid shoes/slippers when out of bed   room organization consistent   safety round/check completed  Taken 5/7/2022 2200 by Becky Kumari RN  Safety Promotion/Fall Prevention:   activity supervised   assistive device/personal items within reach   clutter free environment maintained   fall prevention program maintained   nonskid shoes/slippers when out of bed   room  organization consistent   safety round/check completed  Taken 5/7/2022 2158 by Becky uKmari RN  Safety Promotion/Fall Prevention:   activity supervised   assistive device/personal items within reach   clutter free environment maintained   fall prevention program maintained   nonskid shoes/slippers when out of bed   safety round/check completed   room organization consistent  Intervention: Prevent Skin Injury  Recent Flowsheet Documentation  Taken 5/8/2022 0224 by Becky Kumari RN  Body Position: position changed independently  Taken 5/8/2022 0000 by Becky Kumari RN  Body Position: position changed independently  Taken 5/7/2022 2200 by Becky Kumari RN  Body Position: position changed independently  Taken 5/7/2022 2158 by Becky Kumari RN  Body Position: position changed independently  Skin Protection: adhesive use limited  Intervention: Prevent and Manage VTE (Venous Thromboembolism) Risk  Recent Flowsheet Documentation  Taken 5/8/2022 0000 by Becky Kumari RN  VTE Prevention/Management: (See MAR)   bilateral   dorsiflexion/plantar flexion performed   sequential compression devices off  Taken 5/7/2022 2158 by Becky Kumari RN  VTE Prevention/Management: (See MAR)   bilateral   dorsiflexion/plantar flexion performed   sequential compression devices off  Intervention: Prevent Infection  Recent Flowsheet Documentation  Taken 5/8/2022 0224 by Becky Kumari RN  Infection Prevention:   single patient room provided   rest/sleep promoted  Taken 5/8/2022 0000 by Becky Kumari RN  Infection Prevention:   rest/sleep promoted   single patient room provided  Taken 5/7/2022 2200 by Becky Kumari RN  Infection Prevention:   rest/sleep promoted   single patient room provided  Taken 5/7/2022 2158 by Becky Kumari RN  Infection Prevention:   rest/sleep promoted   single patient room provided  Goal: Optimal Comfort and Wellbeing  Outcome: Ongoing, Progressing  Intervention: Monitor Pain and Promote Comfort  Recent  Flowsheet Documentation  Taken 5/7/2022 2220 by Becky Kumari RN  Pain Management Interventions: see MAR  Intervention: Provide Person-Centered Care  Recent Flowsheet Documentation  Taken 5/8/2022 0000 by Becky Kumari RN  Trust Relationship/Rapport: thoughts/feelings acknowledged  Taken 5/7/2022 2158 by Becky Kumari RN  Trust Relationship/Rapport:   care explained   choices provided   emotional support provided   empathic listening provided   questions answered   reassurance provided   questions encouraged   thoughts/feelings acknowledged  Goal: Readiness for Transition of Care  Outcome: Ongoing, Progressing     Problem: Heart Failure Comorbidity  Goal: Maintenance of Heart Failure Symptom Control  Outcome: Ongoing, Progressing  Intervention: Maintain Heart Failure-Management  Recent Flowsheet Documentation  Taken 5/8/2022 0000 by Becky Kumari RN  Medication Review/Management: medications reviewed  Taken 5/7/2022 2158 by Becky Kumari RN  Medication Review/Management: medications reviewed     Problem: Hypertension Comorbidity  Goal: Blood Pressure in Desired Range  Outcome: Ongoing, Progressing  Intervention: Maintain Blood Pressure Management  Recent Flowsheet Documentation  Taken 5/8/2022 0000 by Becky Kumari RN  Medication Review/Management: medications reviewed  Taken 5/7/2022 2158 by Becky Kmuari RN  Medication Review/Management: medications reviewed   Goal Outcome Evaluation:              Outcome Evaluation: Pt complains of acid reflux treated per MAR with relief. HR remains elevated and pt experiences some SOA with exertion but pt denies any other complaints at this time.

## 2022-05-09 LAB
ALBUMIN SERPL-MCNC: 2.7 G/DL (ref 3.5–5.2)
ALBUMIN/GLOB SERPL: 0.8 G/DL
ALP SERPL-CCNC: 85 U/L (ref 39–117)
ALT SERPL W P-5'-P-CCNC: 37 U/L (ref 1–41)
ANION GAP SERPL CALCULATED.3IONS-SCNC: 11.5 MMOL/L (ref 5–15)
AST SERPL-CCNC: 28 U/L (ref 1–40)
BASOPHILS # BLD AUTO: 0.04 10*3/MM3 (ref 0–0.2)
BASOPHILS NFR BLD AUTO: 0.4 % (ref 0–1.5)
BILIRUB SERPL-MCNC: 1.4 MG/DL (ref 0–1.2)
BUN SERPL-MCNC: 8 MG/DL (ref 8–23)
BUN/CREAT SERPL: 13.8 (ref 7–25)
CALCIUM SPEC-SCNC: 8 MG/DL (ref 8.6–10.5)
CHLORIDE SERPL-SCNC: 106 MMOL/L (ref 98–107)
CO2 SERPL-SCNC: 20.5 MMOL/L (ref 22–29)
CREAT SERPL-MCNC: 0.58 MG/DL (ref 0.76–1.27)
DEPRECATED RDW RBC AUTO: 45.5 FL (ref 37–54)
EGFRCR SERPLBLD CKD-EPI 2021: 101.7 ML/MIN/1.73
EOSINOPHIL # BLD AUTO: 0.14 10*3/MM3 (ref 0–0.4)
EOSINOPHIL NFR BLD AUTO: 1.3 % (ref 0.3–6.2)
ERYTHROCYTE [DISTWIDTH] IN BLOOD BY AUTOMATED COUNT: 13.4 % (ref 12.3–15.4)
GLOBULIN UR ELPH-MCNC: 3.2 GM/DL
GLUCOSE BLDC GLUCOMTR-MCNC: 112 MG/DL (ref 70–130)
GLUCOSE BLDC GLUCOMTR-MCNC: 130 MG/DL (ref 70–130)
GLUCOSE BLDC GLUCOMTR-MCNC: 86 MG/DL (ref 70–130)
GLUCOSE BLDC GLUCOMTR-MCNC: 93 MG/DL (ref 70–130)
GLUCOSE SERPL-MCNC: 92 MG/DL (ref 65–99)
HCT VFR BLD AUTO: 38.8 % (ref 37.5–51)
HGB BLD-MCNC: 12.6 G/DL (ref 13–17.7)
IMM GRANULOCYTES # BLD AUTO: 0.08 10*3/MM3 (ref 0–0.05)
IMM GRANULOCYTES NFR BLD AUTO: 0.8 % (ref 0–0.5)
LYMPHOCYTES # BLD AUTO: 1 10*3/MM3 (ref 0.7–3.1)
LYMPHOCYTES NFR BLD AUTO: 9.5 % (ref 19.6–45.3)
MCH RBC QN AUTO: 29.8 PG (ref 26.6–33)
MCHC RBC AUTO-ENTMCNC: 32.5 G/DL (ref 31.5–35.7)
MCV RBC AUTO: 91.7 FL (ref 79–97)
MONOCYTES # BLD AUTO: 0.73 10*3/MM3 (ref 0.1–0.9)
MONOCYTES NFR BLD AUTO: 6.9 % (ref 5–12)
NEUTROPHILS NFR BLD AUTO: 8.55 10*3/MM3 (ref 1.7–7)
NEUTROPHILS NFR BLD AUTO: 81.1 % (ref 42.7–76)
NRBC BLD AUTO-RTO: 0 /100 WBC (ref 0–0.2)
PLATELET # BLD AUTO: 251 10*3/MM3 (ref 140–450)
PMV BLD AUTO: 9.8 FL (ref 6–12)
POTASSIUM SERPL-SCNC: 3 MMOL/L (ref 3.5–5.2)
PROT SERPL-MCNC: 5.9 G/DL (ref 6–8.5)
RBC # BLD AUTO: 4.23 10*6/MM3 (ref 4.14–5.8)
SODIUM SERPL-SCNC: 138 MMOL/L (ref 136–145)
WBC NRBC COR # BLD: 10.54 10*3/MM3 (ref 3.4–10.8)

## 2022-05-09 PROCEDURE — 94799 UNLISTED PULMONARY SVC/PX: CPT

## 2022-05-09 PROCEDURE — 85025 COMPLETE CBC W/AUTO DIFF WBC: CPT | Performed by: INTERNAL MEDICINE

## 2022-05-09 PROCEDURE — 93005 ELECTROCARDIOGRAM TRACING: CPT | Performed by: INTERNAL MEDICINE

## 2022-05-09 PROCEDURE — 97530 THERAPEUTIC ACTIVITIES: CPT

## 2022-05-09 PROCEDURE — 97162 PT EVAL MOD COMPLEX 30 MIN: CPT

## 2022-05-09 PROCEDURE — 99222 1ST HOSP IP/OBS MODERATE 55: CPT | Performed by: INTERNAL MEDICINE

## 2022-05-09 PROCEDURE — 82962 GLUCOSE BLOOD TEST: CPT

## 2022-05-09 PROCEDURE — 94761 N-INVAS EAR/PLS OXIMETRY MLT: CPT

## 2022-05-09 PROCEDURE — 80053 COMPREHEN METABOLIC PANEL: CPT | Performed by: INTERNAL MEDICINE

## 2022-05-09 PROCEDURE — 93010 ELECTROCARDIOGRAM REPORT: CPT | Performed by: INTERNAL MEDICINE

## 2022-05-09 RX ORDER — POTASSIUM CHLORIDE 1.5 G/1.77G
40 POWDER, FOR SOLUTION ORAL AS NEEDED
Status: DISCONTINUED | OUTPATIENT
Start: 2022-05-09 | End: 2022-05-10 | Stop reason: HOSPADM

## 2022-05-09 RX ORDER — POTASSIUM CHLORIDE 750 MG/1
40 TABLET, FILM COATED, EXTENDED RELEASE ORAL AS NEEDED
Status: DISCONTINUED | OUTPATIENT
Start: 2022-05-09 | End: 2022-05-10 | Stop reason: HOSPADM

## 2022-05-09 RX ORDER — POTASSIUM CHLORIDE 7.45 MG/ML
10 INJECTION INTRAVENOUS
Status: DISCONTINUED | OUTPATIENT
Start: 2022-05-09 | End: 2022-05-10 | Stop reason: HOSPADM

## 2022-05-09 RX ORDER — DILTIAZEM HYDROCHLORIDE 60 MG/1
60 TABLET, FILM COATED ORAL EVERY 8 HOURS SCHEDULED
Status: DISCONTINUED | OUTPATIENT
Start: 2022-05-09 | End: 2022-05-10

## 2022-05-09 RX ADMIN — BRIMONIDINE TARTRATE 1 DROP: 1 SOLUTION/ DROPS OPHTHALMIC at 20:28

## 2022-05-09 RX ADMIN — POTASSIUM CHLORIDE 40 MEQ: 10 TABLET, EXTENDED RELEASE ORAL at 18:18

## 2022-05-09 RX ADMIN — POTASSIUM CHLORIDE 40 MEQ: 10 TABLET, EXTENDED RELEASE ORAL at 15:06

## 2022-05-09 RX ADMIN — CLOPIDOGREL 75 MG: 75 TABLET, FILM COATED ORAL at 08:07

## 2022-05-09 RX ADMIN — CEFDINIR 300 MG: 300 CAPSULE ORAL at 20:27

## 2022-05-09 RX ADMIN — PANTOPRAZOLE SODIUM 40 MG: 40 TABLET, DELAYED RELEASE ORAL at 20:28

## 2022-05-09 RX ADMIN — CEFDINIR 300 MG: 300 CAPSULE ORAL at 08:07

## 2022-05-09 RX ADMIN — CALCIUM CARBONATE 2 TABLET: 500 TABLET, CHEWABLE ORAL at 20:27

## 2022-05-09 RX ADMIN — DILTIAZEM HYDROCHLORIDE 60 MG: 60 TABLET, FILM COATED ORAL at 21:45

## 2022-05-09 RX ADMIN — DABIGATRAN ETEXILATE MESYLATE 150 MG: 150 CAPSULE ORAL at 08:07

## 2022-05-09 RX ADMIN — DILTIAZEM HYDROCHLORIDE 60 MG: 60 TABLET, FILM COATED ORAL at 14:00

## 2022-05-09 RX ADMIN — METOPROLOL SUCCINATE 100 MG: 100 TABLET, EXTENDED RELEASE ORAL at 20:27

## 2022-05-09 RX ADMIN — CALCIUM CARBONATE 2 TABLET: 500 TABLET, CHEWABLE ORAL at 09:39

## 2022-05-09 RX ADMIN — POTASSIUM CHLORIDE 40 MEQ: 10 TABLET, EXTENDED RELEASE ORAL at 11:14

## 2022-05-09 RX ADMIN — BRIMONIDINE TARTRATE 1 DROP: 1 SOLUTION/ DROPS OPHTHALMIC at 08:07

## 2022-05-09 RX ADMIN — TAMSULOSIN HYDROCHLORIDE 0.8 MG: 0.4 CAPSULE ORAL at 20:28

## 2022-05-09 RX ADMIN — ATORVASTATIN CALCIUM 80 MG: 80 TABLET, FILM COATED ORAL at 20:28

## 2022-05-09 RX ADMIN — DABIGATRAN ETEXILATE MESYLATE 150 MG: 150 CAPSULE ORAL at 21:41

## 2022-05-09 RX ADMIN — PANTOPRAZOLE SODIUM 40 MG: 40 TABLET, DELAYED RELEASE ORAL at 08:07

## 2022-05-09 RX ADMIN — Medication 3 MG: at 21:43

## 2022-05-09 RX ADMIN — METOPROLOL SUCCINATE 50 MG: 100 TABLET, EXTENDED RELEASE ORAL at 08:07

## 2022-05-09 RX ADMIN — LATANOPROST 1 DROP: 50 SOLUTION OPHTHALMIC at 20:28

## 2022-05-09 NOTE — CASE MANAGEMENT/SOCIAL WORK
Continued Stay Note  [facility]     Patient Name: Aj Salazar  MRN: [id number]  Today's Date: 5/9/2022    Admit Date: 5/5/2022     Discharge Plan     Row Name 05/09/22 1715       Plan    Plan Home w/ spouse    Plan Comments S/W pt at chairside.  He is now on room air and he worked w/ PT today.  He plans to return home with his wife upon DC and denies DC needs at this time.  CCP will continue to follow and assist if needed ..........Kiera BOOKER/ CCP               Discharge Codes    No documentation.               Expected Discharge Date and Time     Expected Discharge Date Expected Discharge Time    May 10, 2022             Kiera Monae RN

## 2022-05-09 NOTE — CONSULTS
Patient Name: Aj Salazar  :1945  76 y.o.    Date of Admission: 2022  Date of Consultation:  22  Encounter Provider: Charlie Proctor III, MD  Place of Service: [facility] CARDIOLOGY  Referring Provider: Jo Presley MD  Patient Care Team:  Neftali Amezcua MD as PCP - General (Internal Medicine)  Juan Colby MD as Consulting Physician (Cardiology)  Ankush Sky MD as Surgeon (Orthopedic Surgery)  Vale Del Rosario APRN as Nurse Practitioner (Oncology)  Darrion Peña MD as Consulting Physician (Urology)  Tahir Childs MD as Consulting Physician (Pulmonary Disease)  Noman Moseley MD as Consulting Physician (Gastroenterology)  Aura Perry OD (Optometry)  Juwan Diane MD as Consulting Physician (Ophthalmology)      Chief complaint: Fever, chills, cough    Reason for consult: Afib w/RVR    History of Present Illness:    This is a 76 year old male patient who follows with Dr. [facility]. He has a history persistent atrial fibrillation, diabetes, acute on chronic systolic CHF, hypertension, COPD, and CVA. He is anticoagulated on Pradaxa and Plavix as well as aspirin. He was last hospitalized here in 2021 and seen in consult by Dr. Bryan at that time. He had had negative stress testing. His amlodipine was discontinued and he was started on spironolactone. His metoprolol was changed to succinate and he was started on losartan.     Patient is continue to follow with Dr. Colby and they have a heart clinic since then.  He was last seen there 2021.  EKG at that time showed A. fib with a rate that was stable at 84.    Patient says he has been doing fine at home with no issues with tachycardia or other cardiac complaints.    Patient presented to [facility] emergency department on May 5.  He had several days of fever chills cough.  He been seen in the urgent care center 2 days prior  for burning with urination difficulty breathing, he was diagnosed with UTI and started on oral antibiotics.  He was treated with sepsis protocol, started on IV's cefepime and vancomycin and given IV fluids.    He has been tachycardic pretty much the entire hospitalization, somewhat worse over the last 24 hours, and so a consult to see him to help manage his atrial fibrillation with RVR.    Initial EKG showed atrial fibrillation with rapid ventricular rate, rate of 148. He was given a dose of IV Lopressor 5 mg in the ED. He was restarted on his home medication doses of metoprolol succinate  mg at night and 50 mg in morning. His rates have been in the 110-130's since admission.            Previous cardiac testing:     Stress Test 03/08/2021:    · Left ventricular ejection fraction is normal. (Calculated EF = 60%).  · Myocardial perfusion imaging indicates a normal myocardial perfusion study with no evidence of ischemia.  · Impressions are consistent with a low risk study.  · Diaphragmatic attenuation artifact is present.    Echocardiogram 10/23/2020:  · Calculated left ventricular EF = 44.3% Estimated left ventricular EF was in agreement with the calculated left ventricular EF.  · Saline study was poorly visualized. No obvious bubbles crossed.  · The aortic valve exhibits sclerosis. The aortic valve appears trileaflet.  · There is mild, bileaflet mitral valve thickening present.  · Trace aortic valve regurgitation is present  · Mild mitral valve regurgitation is present.  · Mild dilation of the aortic root is present.            Past Medical History:   Diagnosis Date   • Alcohol abuse, in remission    • BPH (benign prostatic hypertrophy)     see doctors at [facility]   • COPD (chronic obstructive pulmonary disease) (HCC)    • Depression    • DJD (degenerative joint disease) of cervical spine    • ED (erectile dysfunction)     SE[facility]   • Hx of colonic polyps     followed by GI ([facility])   • Hyperlipidemia     • Hypertension    • Influenza B 02/14/2013       • Nocturnal hypoxia    • Osteoarthritis     HIPS, HANDS, MULTIPLE SITES   • Peripheral neuropathy    • Permanent atrial fibrillation (HCC)    • Rectal bleeding 04/26/2017   • Tendonitis of shoulder 11/07/2007    RIGHT SHOULDER/DELTOID INSERTION   • TIA (transient ischemic attack) 10/2020   • Ulcer of the stomach and intestine        Past Surgical History:   Procedure Laterality Date   • APPENDECTOMY     • CARDIOVASCULAR STRESS TEST N/A 10/20/2015    NML LEXISCAN CARDIOLITE PERFUSION STUDY, NO EVIDENCE OF ISCHEMIA, AREA OF HYPOPERFUSION OF THE INFERIOR WALL W/NORMAL WALL PERFUSION AND NML LEFT VENTRICULAR EJECTION FRACTION, MOST LIKELY ATTENUATION. DR.MICHAEL BOX   • COLONOSCOPY N/A 02/2017   • COLONOSCOPY N/A 02/23/2011    4 POLYPS REMOVED, RESCOPE IN 5 YRS, DR. EAGLE   • COLONOSCOPY N/A 03/08/2006    ERYTHEMOUS AND GRANULAR MUCOSA IN ILEOCECAL VALVE, NORMAL COLON, REPEAT IN 5 YRS,    • ENDOSCOPY N/A 09/26/2018    normal esophagus, acute gastritis, multiple non-bleeding duodenal ulcers with pigmented material, H Pylori positive   • HERNIA REPAIR Bilateral     INGUINAL   • JOINT REPLACEMENT  11/2015    left hip   • TOTAL HIP ARTHROPLASTY Left 11/06/2015    Dr Ankush Sky   • TOTAL HIP ARTHROPLASTY Right 10/27/2014    DR.RIED SKY   • VASECTOMY           Prior to Admission medications    Medication Sig Start Date End Date Taking? Authorizing Provider   acetaminophen (TYLENOL) 325 MG tablet Take 2 tablets by mouth Every 4 (Four) Hours As Needed for Mild Pain . 10/23/20  Yes Joe Blake MD   ALPHAGAN P 0.1 % solution ophthalmic solution Administer 1 drop to both eyes 2 (two) times a day. 7/5/18  Yes Pietro Parham MD   atorvastatin (LIPITOR) 80 MG tablet Take 1 tablet by mouth Every Night. 12/6/21  Yes Neftali Amezcua MD   clopidogrel (Plavix) 75 MG tablet Take 1 tablet by mouth Daily. 12/6/21  Yes Neftali Amezcua MD    dabigatran etexilate (Pradaxa) 150 MG capsu Take 1 capsule by mouth 2 (Two) Times a Day. 10/29/20  Yes Juan Colby MD   Fluticasone-Umeclidin-Vilant 200-62.5-25 MCG/INH aerosol powder  Inhale 1 puff Daily. Just increased to this strength 3/21   Yes Pietro Parham MD   furosemide (LASIX) 40 MG tablet Take 1 tablet by mouth Daily. 3/10/21  Yes Yeyo Valladares MD   metFORMIN ER (GLUCOPHAGE-XR) 500 MG 24 hr tablet TAKE 1 TABLET BY MOUTH DAILY WITH DINNER 7/1/21  Yes Neftali Amezcua MD   metoprolol succinate XL (TOPROL-XL) 100 MG 24 hr tablet Take 1 tablet by mouth Every Night. Take 100 mg at night and 50 mg in the morning 3/10/21  Yes Yeyo Valladares MD   metoprolol succinate XL (TOPROL-XL) 50 MG 24 hr tablet Take 1 tablet by mouth Daily. Take 50 mg every morning and 100 mg every night 3/10/21  Yes Yeyo Valladares MD   pantoprazole (PROTONIX) 40 MG EC tablet Take 1 tablet by mouth 2 (Two) Times a Day. 12/6/21  Yes Neftali Amezcua MD   spironolactone (ALDACTONE) 25 MG tablet Take 1 tablet by mouth Daily. 3/10/21  Yes Yeyo Valladares MD   Tafluprost, PF, (ZIOPTAN) 0.0015 % solution ophthalmic solution Administer 1 drop to both eyes Every Night.   Yes Pietro Parham MD   tamsulosin (FLOMAX) 0.4 MG capsule 24 hr capsule Take 2 capsules by mouth Every Night. 7/31/18  Yes Neftali Amezcua MD   Accu-Chek Softclix Lancets lancets Accu-Chek Softclix Lancets   USE TO TEST GLUCOSE QOD    Pietro Parham MD   glucose blood test strip Accu-Chek Dannielle Plus test strips   USE TO CHECK GLUCOSE QOD    Pietro Parham MD   glucose blood test strip Accu-Chek Dannielle Plus Meter   USE TO CHECK GLUCOSE QOD    Pietro Parham MD       Allergies   Allergen Reactions   • Bactrim [Sulfamethoxazole-Trimethoprim] Rash   • Sulfacetamide Rash       Social History     Socioeconomic History   • Marital status:      Spouse name: Ara*   • Number of children: 3   Tobacco Use   •  Smoking status: Former Smoker     Packs/day: 2.00     Years: 49.00     Pack years: 98.00     Quit date: 2010     Years since quittin.3   • Smokeless tobacco: Never Used   • Tobacco comment: long smoking history   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   • Drug use: No     Comment: caffeine use    • Sexual activity: Defer       Family History   Problem Relation Age of Onset   • Colon cancer Mother    • Ovarian cancer Mother    • Alzheimer's disease Mother 70   • Stroke Sister         2016   • Dementia Sister    • Heart disease Brother    • Alcohol abuse Brother    • Coronary artery disease Father    • Hypertension Father    • Colon cancer Maternal Grandmother    • Heart disease Brother    • Coronary artery disease Brother    • Hypertension Brother    • Stroke Brother    • Arthritis Brother    • Prostate cancer Neg Hx        REVIEW OF SYSTEMS:   All systems reviewed.  Pertinent positives identified in HPI.  All other systems are negative.      Objective:     Vitals:    22 0700 22 0807 22 0815 22 0900   BP: 147/98      BP Location:       Patient Position:       Pulse: (!) 121 118 (!) 130    Resp: 18      Temp: 97.8 °F (36.6 °C)   98.6 °F (37 °C)   TempSrc: Oral   Oral   SpO2: 96% 94% 94%    Weight:       Height:         Body mass index is 24.97 kg/m².    Physical Exam:  General Appearance:    Alert, cooperative, in no acute distress   Head:    Normocephalic, without obvious abnormality   Eyes:            Lids and lashes normal, conjunctivae and sclerae normal, no icterus, no pallor, corneas clear   Ears:    Ears appear intact with no abnormalities noted   Throat:   No oral lesions, oral mucosa moist   Neck:   No adenopathy, supple, trachea midline, no thyromegaly, no carotid bruit, no JVD   Back:     No kyphosis present, no erythema or scars, no tenderness to palpation    Lungs:     Clear to  auscultation,respirations regular, even and unlabored    Heart:    irregular rhythm and normal rate, normal S1 and S2, no murmur, no gallop, no rub, no click   Chest Wall:    No abnormalities observed   Abdomen:     Normal bowel sounds, no masses, no organomegaly, soft        non-tender, non-distended, no guarding   Extremities:   Moves all extremities well, no edema, no cyanosis, no redness   Pulses:  Bilateral carotids brisk   Skin:  Psychiatric:   No bleeding or rash    Alert and oriented, normal mood and affect         Lab Review:     Results from last 7 days   Lab Units 05/09/22  0733   SODIUM mmol/L 138   POTASSIUM mmol/L 3.0*   CHLORIDE mmol/L 106   CO2 mmol/L 20.5*   BUN mg/dL 8   CREATININE mg/dL 0.58*   CALCIUM mg/dL 8.0*   BILIRUBIN mg/dL 1.4*   ALK PHOS U/L 85   ALT (SGPT) U/L 37   AST (SGOT) U/L 28   GLUCOSE mg/dL 92     Results from last 7 days   Lab Units 05/05/22  2137   TROPONIN T ng/mL <0.010     Results from last 7 days   Lab Units 05/09/22  0733   WBC 10*3/mm3 10.54   HEMOGLOBIN g/dL 12.6*   HEMATOCRIT % 38.8   PLATELETS 10*3/mm3 251                          AM EKG 05/09/2022:      Admission EKG 05/05/2022:      Baseline EKG 10/29/2020:      I personally viewed and interpreted the patient's EKG/Telemetry data.      Current Facility-Administered Medications:   •  acetaminophen (TYLENOL) tablet 650 mg, 650 mg, Oral, Q4H PRN, Jo Presley MD, 650 mg at 05/08/22 1013  •  atorvastatin (LIPITOR) tablet 80 mg, 80 mg, Oral, Nightly, Jo Presley MD, 80 mg at 05/08/22 2121  •  brimonidine (ALPHAGAN P) 0.1 % ophthalmic solution 1 drop, 1 drop, Both Eyes, BID, Jo Presley MD, 1 drop at 05/09/22 0807  •  budesonide-formoterol (SYMBICORT) 160-4.5 MCG/ACT inhaler 2 puff, 2 puff, Inhalation, BID - RT **AND** tiotropium (SPIRIVA RESPIMAT) 2.5 mcg/act aerosol solution inhaler, 1 puff, Inhalation, Daily - RT, Jo Presley MD  •  calcium carbonate (TUMS) chewable tablet 500  mg (200 mg elemental), 2 tablet, Oral, TID PRN, Chago Denise MD, 2 tablet at 05/09/22 0939  •  cefdinir (OMNICEF) capsule 300 mg, 300 mg, Oral, Q12H, Arben Adler MD, 300 mg at 05/09/22 0807  •  clopidogrel (PLAVIX) tablet 75 mg, 75 mg, Oral, Daily, Jo Presley MD, 75 mg at 05/09/22 0807  •  dabigatran etexilate (PRADAXA) capsule 150 mg, 150 mg, Oral, BID, Jo Presley MD, 150 mg at 05/09/22 0807  •  dextrose (D50W) (25 g/50 mL) IV injection 25 g, 25 g, Intravenous, Q15 Min PRN, Jo Presley MD  •  dextrose (GLUTOSE) oral gel 15 g, 15 g, Oral, Q15 Min PRN, Jo Presley MD  •  glucagon (human recombinant) (GLUCAGEN DIAGNOSTIC) injection 1 mg, 1 mg, Intramuscular, Q15 Min PRN, Jo Presley MD  •  insulin lispro (ADMELOG) injection 0-9 Units, 0-9 Units, Subcutaneous, TID With Meals, Jo Presley MD  •  latanoprost (XALATAN) 0.005 % ophthalmic solution 1 drop, 1 drop, Both Eyes, Nightly, Jo Presley MD, 1 drop at 05/07/22 2206  •  melatonin tablet 3 mg, 3 mg, Oral, Nightly PRN, Jo Presley MD  •  metoprolol succinate XL (TOPROL-XL) 24 hr tablet 100 mg, 100 mg, Oral, Nightly, Jo Presley MD, 100 mg at 05/08/22 2121  •  metoprolol succinate XL (TOPROL-XL) 24 hr tablet 50 mg, 50 mg, Oral, Daily, Jo Presley MD, 50 mg at 05/09/22 0807  •  nitroglycerin (NITROSTAT) SL tablet 0.4 mg, 0.4 mg, Sublingual, Q5 Min PRN, Jo Presley MD  •  ondansetron (ZOFRAN) tablet 4 mg, 4 mg, Oral, Q6H PRN **OR** ondansetron (ZOFRAN) injection 4 mg, 4 mg, Intravenous, Q6H PRN, Jo Presley MD  •  pantoprazole (PROTONIX) EC tablet 40 mg, 40 mg, Oral, BID, Jo Presley MD, 40 mg at 05/09/22 0807  •  potassium chloride (K-DUR,KLOR-CON) ER tablet 40 mEq, 40 mEq, Oral, PRN, 40 mEq at 05/09/22 1114 **OR** potassium chloride (KLOR-CON) packet 40 mEq, 40 mEq, Oral, PRN **OR** potassium chloride 10 mEq in 100 mL  IVPB, 10 mEq, Intravenous, Q1H PRN, Arben Adler MD  •  tamsulosin (FLOMAX) 24 hr capsule 0.8 mg, 0.8 mg, Oral, Nightly, Stingl, Jo Moss MD, 0.8 mg at 05/08/22 2121    Assessment and Plan:       Active Hospital Problems    Diagnosis  POA   • **Acute pyelonephritis [N10]  Yes   • Chronic diastolic CHF (congestive heart failure) ([facility]) [I50.32]  Yes   • Alcohol dependence in remission ([facility]) [F10.21]  Yes   • Personal history of transient ischemic attack (TIA), and cerebral infarction without residual deficits [Z86.73]  Not Applicable   • Sepsis ([facility]) [A41.9]  Yes   • Gastroesophageal reflux disease [K21.9]  Yes   • A-fib ([facility]) [I48.91]  Yes   • Type 2 diabetes mellitus with hyperglycemia, without long-term current use of insulin ([facility]) [E11.65]  Yes   • BPH with obstruction/lower urinary tract symptoms [N40.1, N13.8]  Yes   • COPD (chronic obstructive pulmonary disease) ([facility]) [J44.9]  Yes   • Hypertension [I10]  Yes      Resolved Hospital Problems   No resolved problems to display.     1.  Urosepsis, treatment per primary team  2.  Chronic atrial fibrillation with RVR, patient has been persistently tachycardic since admission, he has been maintained on his routine home regimen.  This point I will add oral diltiazem, will then follow response.  3.  Diabetes mellitus with hyperglycemia  4.  Chronic anticoagulation, continue same  5.  Hypokalemia, per primary team  6.  Elevated bilirubin level    Charlie Proctor III, MD  05/09/22  11:39 EDT

## 2022-05-09 NOTE — THERAPY EVALUATION
Patient Name: Aj Salazar  : 1945    MRN: [id number]                              Today's Date: 2022       Admit Date: 2022    Visit Dx:     ICD-10-CM ICD-9-CM   1. Sepsis, due to unspecified organism, unspecified whether acute organ dysfunction present ([facility])  A41.9 038.9     995.91   2. Acute UTI  N39.0 599.0     Patient Active Problem List   Diagnosis   • COPD (chronic obstructive pulmonary disease) ([facility])   • Hypertension   • Osteoarthritis   • History of smoking greater than 50 pack years   • BPH with obstruction/lower urinary tract symptoms   • Atrial fibrillation with rapid ventricular response ([facility])   • Type 2 diabetes mellitus with hyperglycemia, without long-term current use of insulin ([facility])   • Rash and nonspecific skin eruption   • TIA (transient ischemic attack)   • A-fib ([facility])   • Acute on chronic systolic CHF (congestive heart failure) ([facility])   • Gastroesophageal reflux disease   • Raynaud's disease without gangrene   • Sepsis ([facility])   • Acute pyelonephritis   • Chronic diastolic CHF (congestive heart failure) ([facility])   • Alcohol dependence in remission ([facility])   • Personal history of transient ischemic attack (TIA), and cerebral infarction without residual deficits     Past Medical History:   Diagnosis Date   • Alcohol abuse, in remission    • BPH (benign prostatic hypertrophy)     see doctors at [facility]   • COPD (chronic obstructive pulmonary disease) ([facility])    • Depression    • DJD (degenerative joint disease) of cervical spine    • ED (erectile dysfunction)     [facility]   • Hx of colonic polyps     followed by GI ([facility])   • Hyperlipidemia    • Hypertension    • Influenza B 2013       • Nocturnal hypoxia    • Osteoarthritis     HIPS, HANDS, MULTIPLE SITES   • Peripheral neuropathy    • Permanent atrial fibrillation ([facility])    • Rectal bleeding 2017   • Tendonitis of shoulder 2007    RIGHT SHOULDER/DELTOID INSERTION   • TIA (transient ischemic  attack) 10/2020   • Ulcer of the stomach and intestine      Past Surgical History:   Procedure Laterality Date   • APPENDECTOMY     • CARDIOVASCULAR STRESS TEST N/A 10/20/2015    NML LEXISCAN CARDIOLITE PERFUSION STUDY, NO EVIDENCE OF ISCHEMIA, AREA OF HYPOPERFUSION OF THE INFERIOR WALL W/NORMAL WALL PERFUSION AND NML LEFT VENTRICULAR EJECTION FRACTION, MOST LIKELY ATTENUATION. DR.MICHAEL BOX   • COLONOSCOPY N/A 02/2017   • COLONOSCOPY N/A 02/23/2011    4 POLYPS REMOVED, RESCOPE IN 5 YRS, DR. EAGLE   • COLONOSCOPY N/A 03/08/2006    ERYTHEMOUS AND GRANULAR MUCOSA IN ILEOCECAL VALVE, NORMAL COLON, REPEAT IN 5 YRS,    • ENDOSCOPY N/A 09/26/2018    normal esophagus, acute gastritis, multiple non-bleeding duodenal ulcers with pigmented material, H Pylori positive   • HERNIA REPAIR Bilateral     INGUINAL   • JOINT REPLACEMENT  11/2015    left hip   • TOTAL HIP ARTHROPLASTY Left 11/06/2015    Dr Ankush Sky   • TOTAL HIP ARTHROPLASTY Right 10/27/2014    DR.RIED SKY   • VASECTOMY        General Information     Row Name 05/09/22 1137          Physical Therapy Time and Intention    Document Type evaluation  -RD     Mode of Treatment physical therapy  -RD     Row Name 05/09/22 1137          General Information    Patient Profile Reviewed yes  -RD     Prior Level of Function independent:  -RD     Existing Precautions/Restrictions no known precautions/restrictions  -RD     Barriers to Rehab none identified  -RD     Row Name 05/09/22 1137          Home Main Entrance    Number of Stairs, Main Entrance four  -RD     Row Name 05/09/22 1137          Cognition    Orientation Status (Cognition) oriented x 4  -RD     Row Name 05/09/22 1137          Safety Issues, Functional Mobility    Impairments Affecting Function (Mobility) shortness of breath  -RD           User Key  (r) = Recorded By, (t) = Taken By, (c) = Cosigned By    Initials Name Provider Type    Terri Hopson, PT Physical Therapist                Mobility     Row Name 05/09/22 1137          Bed Mobility    Bed Mobility bed mobility (all) activities  -RD     All Activities, Roosevelt (Bed Mobility) modified independence  -RD     Row Name 05/09/22 1137          Sit-Stand Transfer    Sit-Stand Roosevelt (Transfers) modified independence  -RD     Row Name 05/09/22 1137          Gait/Stairs (Locomotion)    Roosevelt Level (Gait) supervision  -RD     Distance in Feet (Gait) 90  -RD     Deviations/Abnormal Patterns (Gait) munira decreased;gait speed decreased  -RD           User Key  (r) = Recorded By, (t) = Taken By, (c) = Cosigned By    Initials Name Provider Type    Terri Hopson, PT Physical Therapist               Obj/Interventions     Row Name 05/09/22 1138          Range of Motion Comprehensive    General Range of Motion no range of motion deficits identified  -     Row Name 05/09/22 1138          Strength Comprehensive (MMT)    General Manual Muscle Testing (MMT) Assessment no strength deficits identified  -     Row Name 05/09/22 1138          Motor Skills    Therapeutic Exercise --  unilateral sh abd and h abd with resp exercises to increase lung capacity.  -RD     Row Name 05/09/22 1138          Balance    Balance Assessment standing dynamic balance  -RD     Dynamic Standing Balance supervision  -     Row Name 05/09/22 1138          Sensory Assessment (Somatosensory)    Sensory Assessment (Somatosensory) sensation intact  -RD           User Key  (r) = Recorded By, (t) = Taken By, (c) = Cosigned By    Initials Name Provider Type    Terri Hopson, PT Physical Therapist               Goals/Plan     Row Name 05/09/22 1154          Patient Education Goal (PT)    Activity (Patient Education Goal, PT) indep w/ HEP  -RD     Time Frame (Patient Education Goal, PT) 2 weeks  -     Row Name 05/09/22 1154          Therapy Assessment/Plan (PT)    Planned Therapy Interventions (PT) home exercise program;postural  re-education;stretching;strengthening  -RD           User Key  (r) = Recorded By, (t) = Taken By, (c) = Cosigned By    Initials Name Provider Type    Terri Hopson, PT Physical Therapist               Clinical Impression     Row Name 05/09/22 1139          Pain    Pretreatment Pain Rating 0/10 - no pain  -RD     Posttreatment Pain Rating 0/10 - no pain  -RD     Row Name 05/09/22 1139          Plan of Care Review    Plan of Care Reviewed With patient  -RD     Outcome Evaluation Patient evaluated for dc needs and decreased functional mobility.  Patient is limited by SOA with 02 sats remaining in the 90s throughout but patients perceived dyspnea on exertion, remained high.  Provided education for home safety and compensation techniques as well as some exercises to increase lung capacity.  Pt needs to continue to increase activity but is safe and appropriate to continue with nursing staff.  Encouraged patient if ever admitted again to request increased activity from admission.  Will continue to see patient 2-3x/week to progress HEP and increase respiratory tolerance.  Pt has equipment if needed at home and verbalized technique for energy conservation with stair climbing.  Safe for DC to home at anytime.  Recommend OPPT at DC.  -     Row Name 05/09/22 1138          Therapy Assessment/Plan (PT)    Patient/Family Therapy Goals Statement (PT) go home.  -RD     Rehab Potential (PT) good, to achieve stated therapy goals  -RD     Criteria for Skilled Interventions Met (PT) yes  -RD     Therapy Frequency (PT) 3 times/wk  -RD     Predicted Duration of Therapy Intervention (PT) 2 weeks  -RD     Row Name 05/09/22 1139          Vital Signs    Pretreatment Heart Rate (beats/min) 116  -RD     Intratreatment Heart Rate (beats/min) 130  -RD     Posttreatment Heart Rate (beats/min) 124  -RD     Pre SpO2 (%) 92  -RD     O2 Delivery Pre Treatment room air  -RD     Intra SpO2 (%) 90  -RD     Post SpO2 (%) 92  -RD     Pre Patient  Position Sitting  -RD     Post Patient Position Sitting  -RD     Row Name 05/09/22 1139          Positioning and Restraints    Pre-Treatment Position in bed  -RD     Post Treatment Position bed  -RD     In Bed --  no alarms on bed when PT arrived.  -RD           User Key  (r) = Recorded By, (t) = Taken By, (c) = Cosigned By    Initials Name Provider Type    Terri Hopson PT Physical Therapist               Outcome Measures     Row Name 05/09/22 1155          How much help from another person do you currently need...    Turning from your back to your side while in flat bed without using bedrails? 4  -RD     Moving from lying on back to sitting on the side of a flat bed without bedrails? 4  -RD     Moving to and from a bed to a chair (including a wheelchair)? 4  -RD     Standing up from a chair using your arms (e.g., wheelchair, bedside chair)? 4  -RD     Climbing 3-5 steps with a railing? 3  -RD     To walk in hospital room? 4  -RD     AM-PAC 6 Clicks Score (PT) 23  -RD     Highest level of mobility 7 --> Walked 25 feet or more  -RD     Row Name 05/09/22 1155          Functional Assessment    Outcome Measure Options AM-PAC 6 Clicks Basic Mobility (PT)  -RD           User Key  (r) = Recorded By, (t) = Taken By, (c) = Cosigned By    Initials Name Provider Type    Terri Hopson, PT Physical Therapist                             Physical Therapy Education                 Title: PT OT SLP Therapies (Done)     Topic: Physical Therapy (Done)     Point: Mobility training (Done)     Learning Progress Summary           Patient Eager, E,TB, VU by ARIADNA at 5/9/2022 1156                   Point: Home exercise program (Done)     Learning Progress Summary           Patient Eager, E,TB, VU by ARIADNA at 5/9/2022 1156                   Point: Body mechanics (Done)     Learning Progress Summary           Patient Eager, E,TB, VU by ARIADNA at 5/9/2022 1156                   Point: Precautions (Done)     Learning Progress Summary            Patient Eager, E,TB, VU by RD at 5/9/2022 1156                               User Key     Initials Effective Dates Name Provider Type Discipline    RD 06/16/21 -  Terri Olivas, PT Physical Therapist PT              PT Recommendation and Plan  Planned Therapy Interventions (PT): home exercise program, postural re-education, stretching, strengthening  Plan of Care Reviewed With: patient  Outcome Evaluation: Patient evaluated for dc needs and decreased functional mobility.  Patient is limited by SOA with 02 sats remaining in the 90s throughout but patients perceived dyspnea on exertion, remained high.  Provided education for home safety and compensation techniques as well as some exercises to increase lung capacity.  Pt needs to continue to increase activity but is safe and appropriate to continue with nursing staff.  Encouraged patient if ever admitted again to request increased activity from admission.  Will continue to see patient 2-3x/week to progress HEP and increase respiratory tolerance.  Pt has equipment if needed at home and verbalized technique for energy conservation with stair climbing.  Safe for DC to home at anytime.  Recommend OPPT at DC.     Time Calculation:    PT Charges     Row Name 05/09/22 1157             Time Calculation    Start Time 1115  -RD      Stop Time 1135  -RD      Time Calculation (min) 20 min  -RD              Time Calculation- PT    Total Timed Code Minutes- PT 10 minute(s)  -RD            User Key  (r) = Recorded By, (t) = Taken By, (c) = Cosigned By    Initials Name Provider Type    RD Terri Olivas, PT Physical Therapist              Therapy Charges for Today     Code Description Service Date Service Provider Modifiers Qty    85320153122 HC PT EVAL MOD COMPLEXITY 3 5/9/2022 Terri Olivas, PT GP 1    27206464599  PT THERAPEUTIC ACT EA 15 MIN 5/9/2022 Terri Olivas, PT GP 1          PT G-Codes  Outcome Measure Options: AM-PAC 6 Clicks Basic Mobility  (PT)  AM-PAC 6 Clicks Score (PT): 23    Terri Olivas, PT  5/9/2022

## 2022-05-09 NOTE — PLAN OF CARE
Problem: Adult Inpatient Plan of Care  Goal: Patient-Specific Goal (Individualized)  Outcome: Ongoing, Progressing   Goal Outcome Evaluation:  Plan of Care Reviewed With: patient        Progress: improving  Outcome Evaluation: Pt still c/o acid reflux. Tums given per pt request. Afib on telemetry as high as 135 bpm overnight per MT but pt does not sustain elevated HR. SOA with exertion. 3LO2 via NC. Denies n/v/d. Continue PO anbx. Awaiting morning labs. Will pass information on to day shift nursing staff and continue to monitor.

## 2022-05-09 NOTE — PROGRESS NOTES
Name: Aj Salazar ADMIT: 2022   : 1945  PCP: Neftali Amezcua MD    MRN: [id number] LOS: 4 days   AGE/SEX: 76 y.o. male  ROOM: [facility]     Subjective   Subjective   Patient seen at bedside.       Objective   Objective   Vital Signs  Temp:  [97.7 °F (36.5 °C)-98.7 °F (37.1 °C)] 98.7 °F (37.1 °C)  Heart Rate:  [114-130] 114  Resp:  [16-22] 16  BP: (147-151)/(85-98) 150/96  SpO2:  [90 %-98 %] 91 %  on  Flow (L/min):  [2-3] 3;   Device (Oxygen Therapy): room air  Body mass index is 24.97 kg/m².  Physical Exam  Constitutional:       General: He is not in acute distress.     Appearance: He is ill-appearing.   Cardiovascular:      Rate and Rhythm: Tachycardia present. Rhythm irregular.      Heart sounds: Normal heart sounds.   Pulmonary:      Effort: Pulmonary effort is normal.      Breath sounds: Normal breath sounds.   Abdominal:      General: Bowel sounds are normal.      Palpations: Abdomen is soft.   Musculoskeletal:         General: No tenderness.      Right lower leg: No edema.      Left lower leg: No edema.   Neurological:      Mental Status: He is alert.   Psychiatric:         Mood and Affect: Mood normal.         Behavior: Behavior normal.     Results Review     I reviewed the patient's new clinical results.  Results from last 7 days   Lab Units 22  0733 22  0557 22  0514 22  0651   WBC 10*3/mm3 10.54 8.88 10.90* 11.74*   HEMOGLOBIN g/dL 12.6* 11.0* 11.2* 12.2*   PLATELETS 10*3/mm3 251 199 193 196     Results from last 7 days   Lab Units 22  0733 22  0557 22  0514 22  0651   SODIUM mmol/L 138 138 137 138   POTASSIUM mmol/L 3.0* 3.4* 3.6 3.4*   CHLORIDE mmol/L 106 107 108* 106   CO2 mmol/L 20.5* 20.4* 21.0* 21.4*   BUN mg/dL 8 9 10 14   CREATININE mg/dL 0.58* 0.49* 0.64* 0.64*   GLUCOSE mg/dL 92 98 107* 124*   EGFR mL/min/1.73 101.7 107.0 98.7 98.7     Results from last 7 days   Lab Units 22  0733 22  0557 22  1446   ALBUMIN g/dL  2.70* 2.00* 3.30*   BILIRUBIN mg/dL 1.4* 1.2 1.9*   ALK PHOS U/L 85 65 101   AST (SGOT) U/L 28 27 22   ALT (SGPT) U/L 37 31 21     Results from last 7 days   Lab Units 05/09/22  0733 05/08/22  0557 05/07/22  0514 05/06/22  0651 05/05/22  1446   CALCIUM mg/dL 8.0* 7.7* 7.5* 7.6* 8.7   ALBUMIN g/dL 2.70* 2.00*  --   --  3.30*     Results from last 7 days   Lab Units 05/06/22  0923 05/05/22  1446   PROCALCITONIN ng/mL  --  0.45*   LACTATE mmol/L 1.6 3.7*     Glucose   Date/Time Value Ref Range Status   05/09/2022 1122 112 70 - 130 mg/dL Final     Comment:     Meter: [id number] : [id number] Indira Graceie MAGDALENE   05/09/2022 0609 93 70 - 130 mg/dL Final     Comment:     Meter: [id number] : [id number] Álvaro Suárez NA   05/08/2022 2228 98 70 - 130 mg/dL Final     Comment:     Meter: [id number] : [id number] Tay Clements NA   05/08/2022 1555 89 70 - 130 mg/dL Final     Comment:     Meter: [id number] : [id number] Lakhwinder Helma NA   05/08/2022 1049 97 70 - 130 mg/dL Final     Comment:     Meter: [id number] : [id number] shanelle [place] NA   05/08/2022 0613 93 70 - 130 mg/dL Final     Comment:     Meter: [id number] : [id number] Álvaro Kamini NA   05/07/2022 2100 95 70 - 130 mg/dL Final     Comment:     Meter: [id number] : [id number] Álvaro Suárez NA       XR Chest 1 View    Result Date: 5/8/2022  Minimal likely atelectasis at the bases. Cardiomegaly. Follow-up as clinical indications persist.  This report was finalized on 5/8/2022 2:27 PM by Dr. Uzair Dawkins M.D.      Scheduled Medications  atorvastatin, 80 mg, Oral, Nightly  brimonidine, 1 drop, Both Eyes, BID  budesonide-formoterol, 2 puff, Inhalation, BID - RT   And  tiotropium bromide monohydrate, 1 puff, Inhalation, Daily - RT  cefdinir, 300 mg, Oral, Q12H  clopidogrel, 75 mg, Oral, Daily  dabigatran etexilate, 150 mg, Oral, BID  dilTIAZem, 60 mg, Oral, Q8H  insulin lispro, 0-9 Units, Subcutaneous, TID With Meals  latanoprost, 1 drop,  Both Eyes, Nightly  metoprolol succinate XL, 100 mg, Oral, Nightly  metoprolol succinate XL, 50 mg, Oral, Daily  pantoprazole, 40 mg, Oral, BID  tamsulosin, 0.8 mg, Oral, Nightly    Infusions   Diet  Diet Regular; Cardiac       Assessment/Plan     Active Hospital Problems    Diagnosis  POA   • **Acute pyelonephritis [N10]  Yes   • Chronic diastolic CHF (congestive heart failure) ([facility]) [I50.32]  Yes   • Alcohol dependence in remission ([facility]) [F10.21]  Yes   • Personal history of transient ischemic attack (TIA), and cerebral infarction without residual deficits [Z86.73]  Not Applicable   • Sepsis ([facility]) [A41.9]  Yes   • Gastroesophageal reflux disease [K21.9]  Yes   • A-fib ([facility]) [I48.91]  Yes   • Type 2 diabetes mellitus with hyperglycemia, without long-term current use of insulin ([facility]) [E11.65]  Yes   • BPH with obstruction/lower urinary tract symptoms [N40.1, N13.8]  Yes   • COPD (chronic obstructive pulmonary disease) ([facility]) [J44.9]  Yes   • Hypertension [I10]  Yes      Resolved Hospital Problems   No resolved problems to display.       76 y.o. male admitted with Acute pyelonephritis.     Assessment and plan:  1.  Sepsis associated with acute pyelonephritis.  Urine cultures positive for Klebsiella.  Continue Omnicef.     2.  Chronic diastolic CHF.  Continue to monitor for volume status changes, patient reports dyspnea, obtain chest x-ray.      3.  Chronic atrial fibrillation, patient is on Toprol-XL and Pradaxa.  Patient does exhibit tachycardia, cardiology consultation has been requested.      4.  Gastroesophageal reflux disease, continue PPI therapy.     5.  Diabetes mellitus, continue Accu-Cheks and sliding scale insulin coverage.     6.  Benign prostate hypertrophy, continue Flomax.     7.  History of TIA, continue Plavix and statin therapy.     8.  COPD, chronic, does not appear to be in exacerbation.    9.  Hypokalemia, replacement of electrolytes per protocol.     10.  CODE STATUS is full code.  DVT  prophylaxis, patient is already on anticoagulation.     11.  Disposition, home, hopefully tomorrow.         Arben Adler MD  [facility]  05/09/22  14:03 EDT

## 2022-05-09 NOTE — PLAN OF CARE
Goal Outcome Evaluation:  Plan of Care Reviewed With: patient           Outcome Evaluation: Patient evaluated for dc needs and decreased functional mobility.  Patient is limited by SOA with 02 sats remaining in the 90s throughout but patients perceived dyspnea on exertion, remained high.  Provided education for home safety and compensation techniques as well as some exercises to increase lung capacity.  Pt needs to continue to increase activity but is safe and appropriate to continue with nursing staff.  Encouraged patient if ever admitted again to request increased activity from admission.  Will continue to see patient 2-3x/week to progress HEP and increase respiratory tolerance.  Pt has equipment if needed at home and verbalized technique for energy conservation with stair climbing.  Safe for DC to home at anytime.  Recommend OPPT at DC.

## 2022-05-10 ENCOUNTER — APPOINTMENT (OUTPATIENT)
Dept: PHYSICAL THERAPY | Facility: HOSPITAL | Age: 77
End: 2022-05-10

## 2022-05-10 ENCOUNTER — READMISSION MANAGEMENT (OUTPATIENT)
Dept: CALL CENTER | Facility: HOSPITAL | Age: 77
End: 2022-05-10

## 2022-05-10 VITALS
RESPIRATION RATE: 18 BRPM | DIASTOLIC BLOOD PRESSURE: 72 MMHG | OXYGEN SATURATION: 93 % | HEIGHT: 70 IN | SYSTOLIC BLOOD PRESSURE: 114 MMHG | TEMPERATURE: 98.4 F | HEART RATE: 102 BPM | BODY MASS INDEX: 25.79 KG/M2 | WEIGHT: 180.12 LBS

## 2022-05-10 LAB
ALBUMIN SERPL-MCNC: 2.7 G/DL (ref 3.5–5.2)
ALBUMIN/GLOB SERPL: 0.9 G/DL
ALP SERPL-CCNC: 85 U/L (ref 39–117)
ALT SERPL W P-5'-P-CCNC: 35 U/L (ref 1–41)
ANION GAP SERPL CALCULATED.3IONS-SCNC: 9.2 MMOL/L (ref 5–15)
ANISOCYTOSIS BLD QL: ABNORMAL
AST SERPL-CCNC: 27 U/L (ref 1–40)
BACTERIA SPEC AEROBE CULT: NORMAL
BACTERIA SPEC AEROBE CULT: NORMAL
BASOPHILS # BLD MANUAL: 0.1 10*3/MM3 (ref 0–0.2)
BASOPHILS NFR BLD MANUAL: 1 % (ref 0–1.5)
BILIRUB SERPL-MCNC: 1.1 MG/DL (ref 0–1.2)
BUN SERPL-MCNC: 9 MG/DL (ref 8–23)
BUN/CREAT SERPL: 15 (ref 7–25)
BURR CELLS BLD QL SMEAR: ABNORMAL
CALCIUM SPEC-SCNC: 8.2 MG/DL (ref 8.6–10.5)
CHLORIDE SERPL-SCNC: 107 MMOL/L (ref 98–107)
CO2 SERPL-SCNC: 21.8 MMOL/L (ref 22–29)
CREAT SERPL-MCNC: 0.6 MG/DL (ref 0.76–1.27)
DEPRECATED RDW RBC AUTO: 40.7 FL (ref 37–54)
EGFRCR SERPLBLD CKD-EPI 2021: 100.7 ML/MIN/1.73
EOSINOPHIL # BLD MANUAL: 0.1 10*3/MM3 (ref 0–0.4)
EOSINOPHIL NFR BLD MANUAL: 1 % (ref 0.3–6.2)
ERYTHROCYTE [DISTWIDTH] IN BLOOD BY AUTOMATED COUNT: 13 % (ref 12.3–15.4)
GLOBULIN UR ELPH-MCNC: 3 GM/DL
GLUCOSE BLDC GLUCOMTR-MCNC: 134 MG/DL (ref 70–130)
GLUCOSE SERPL-MCNC: 153 MG/DL (ref 65–99)
HCT VFR BLD AUTO: 34.5 % (ref 37.5–51)
HGB BLD-MCNC: 11.9 G/DL (ref 13–17.7)
LYMPHOCYTES # BLD MANUAL: 0.53 10*3/MM3 (ref 0.7–3.1)
LYMPHOCYTES NFR BLD MANUAL: 3 % (ref 5–12)
MACROCYTES BLD QL SMEAR: ABNORMAL
MCH RBC QN AUTO: 29.5 PG (ref 26.6–33)
MCHC RBC AUTO-ENTMCNC: 34.5 G/DL (ref 31.5–35.7)
MCV RBC AUTO: 85.6 FL (ref 79–97)
MICROCYTES BLD QL: ABNORMAL
MONOCYTES # BLD: 0.31 10*3/MM3 (ref 0.1–0.9)
NEUTROPHILS # BLD AUTO: 9.26 10*3/MM3 (ref 1.7–7)
NEUTROPHILS NFR BLD MANUAL: 89.9 % (ref 42.7–76)
PLAT MORPH BLD: NORMAL
PLATELET # BLD AUTO: 297 10*3/MM3 (ref 140–450)
PMV BLD AUTO: 9.5 FL (ref 6–12)
POIKILOCYTOSIS BLD QL SMEAR: ABNORMAL
POTASSIUM SERPL-SCNC: 3.9 MMOL/L (ref 3.5–5.2)
PROT SERPL-MCNC: 5.7 G/DL (ref 6–8.5)
QT INTERVAL: 328 MS
RBC # BLD AUTO: 4.03 10*6/MM3 (ref 4.14–5.8)
SODIUM SERPL-SCNC: 138 MMOL/L (ref 136–145)
VARIANT LYMPHS NFR BLD MANUAL: 5.1 % (ref 19.6–45.3)
WBC MORPH BLD: NORMAL
WBC NRBC COR # BLD: 10.3 10*3/MM3 (ref 3.4–10.8)

## 2022-05-10 PROCEDURE — 82962 GLUCOSE BLOOD TEST: CPT

## 2022-05-10 PROCEDURE — 85025 COMPLETE CBC W/AUTO DIFF WBC: CPT | Performed by: INTERNAL MEDICINE

## 2022-05-10 PROCEDURE — 80053 COMPREHEN METABOLIC PANEL: CPT | Performed by: INTERNAL MEDICINE

## 2022-05-10 PROCEDURE — 94761 N-INVAS EAR/PLS OXIMETRY MLT: CPT

## 2022-05-10 PROCEDURE — 85007 BL SMEAR W/DIFF WBC COUNT: CPT | Performed by: INTERNAL MEDICINE

## 2022-05-10 PROCEDURE — 94799 UNLISTED PULMONARY SVC/PX: CPT

## 2022-05-10 PROCEDURE — 99232 SBSQ HOSP IP/OBS MODERATE 35: CPT | Performed by: INTERNAL MEDICINE

## 2022-05-10 RX ORDER — DILTIAZEM HYDROCHLORIDE 180 MG/1
180 CAPSULE, COATED, EXTENDED RELEASE ORAL
Qty: 30 CAPSULE | Refills: 0 | Status: SHIPPED | OUTPATIENT
Start: 2022-05-10

## 2022-05-10 RX ORDER — DILTIAZEM HYDROCHLORIDE 180 MG/1
180 CAPSULE, COATED, EXTENDED RELEASE ORAL
Status: DISCONTINUED | OUTPATIENT
Start: 2022-05-10 | End: 2022-05-10 | Stop reason: HOSPADM

## 2022-05-10 RX ORDER — CEFDINIR 300 MG/1
300 CAPSULE ORAL EVERY 12 HOURS SCHEDULED
Qty: 5 CAPSULE | Refills: 0 | OUTPATIENT
Start: 2022-05-10 | End: 2022-05-13

## 2022-05-10 RX ADMIN — METOPROLOL SUCCINATE 50 MG: 100 TABLET, EXTENDED RELEASE ORAL at 08:38

## 2022-05-10 RX ADMIN — DILTIAZEM HYDROCHLORIDE 180 MG: 180 CAPSULE, COATED, EXTENDED RELEASE ORAL at 12:57

## 2022-05-10 RX ADMIN — DABIGATRAN ETEXILATE MESYLATE 150 MG: 150 CAPSULE ORAL at 08:38

## 2022-05-10 RX ADMIN — BRIMONIDINE TARTRATE 1 DROP: 1 SOLUTION/ DROPS OPHTHALMIC at 08:38

## 2022-05-10 RX ADMIN — CLOPIDOGREL 75 MG: 75 TABLET, FILM COATED ORAL at 08:38

## 2022-05-10 RX ADMIN — CEFDINIR 300 MG: 300 CAPSULE ORAL at 08:38

## 2022-05-10 RX ADMIN — CALCIUM CARBONATE 2 TABLET: 500 TABLET, CHEWABLE ORAL at 06:03

## 2022-05-10 RX ADMIN — DILTIAZEM HYDROCHLORIDE 60 MG: 60 TABLET, FILM COATED ORAL at 05:58

## 2022-05-10 RX ADMIN — PANTOPRAZOLE SODIUM 40 MG: 40 TABLET, DELAYED RELEASE ORAL at 08:38

## 2022-05-10 NOTE — PLAN OF CARE
Problem: Adult Inpatient Plan of Care  Goal: Plan of Care Review  Outcome: Ongoing, Progressing  Goal: Patient-Specific Goal (Individualized)  Outcome: Ongoing, Progressing  Goal: Absence of Hospital-Acquired Illness or Injury  Outcome: Ongoing, Progressing  Intervention: Identify and Manage Fall Risk  Recent Flowsheet Documentation  Taken 5/10/2022 0402 by Becky Kumari RN  Safety Promotion/Fall Prevention:   activity supervised   assistive device/personal items within reach   clutter free environment maintained   fall prevention program maintained   nonskid shoes/slippers when out of bed   room organization consistent   safety round/check completed  Taken 5/10/2022 0248 by Becky Kumari, HATTIE  Safety Promotion/Fall Prevention:   activity supervised   assistive device/personal items within reach   clutter free environment maintained   fall prevention program maintained   nonskid shoes/slippers when out of bed   room organization consistent   safety round/check completed  Taken 5/10/2022 0000 by Becky Kumari RN  Safety Promotion/Fall Prevention:   activity supervised   assistive device/personal items within reach   clutter free environment maintained   fall prevention program maintained   nonskid shoes/slippers when out of bed   room organization consistent   safety round/check completed  Taken 5/9/2022 2202 by Becky Kumari, HATTIE  Safety Promotion/Fall Prevention:   activity supervised   assistive device/personal items within reach   clutter free environment maintained   fall prevention program maintained   room organization consistent   safety round/check completed  Taken 5/9/2022 2027 by Becky Kumari, RN  Safety Promotion/Fall Prevention:   assistive device/personal items within reach   activity supervised   clutter free environment maintained   fall prevention program maintained   nonskid shoes/slippers when out of bed   room organization consistent   safety round/check completed  Intervention: Prevent Skin  Injury  Recent Flowsheet Documentation  Taken 5/10/2022 0402 by Becky Kumari RN  Body Position: position changed independently  Taken 5/10/2022 0248 by Becky Kumari RN  Body Position: position changed independently  Taken 5/10/2022 0000 by Becky Kumair RN  Body Position: position changed independently  Taken 5/9/2022 2202 by Becky Kumari RN  Body Position: position changed independently  Taken 5/9/2022 2027 by Becky Kumari RN  Body Position: position changed independently  Skin Protection: adhesive use limited  Intervention: Prevent and Manage VTE (Venous Thromboembolism) Risk  Recent Flowsheet Documentation  Taken 5/10/2022 0000 by Becky Kumari RN  VTE Prevention/Management: (See MAR)   bilateral   dorsiflexion/plantar flexion performed   other (see comments)   sequential compression devices off   patient refused intervention  Taken 5/9/2022 2027 by Becky Kumari RN  VTE Prevention/Management: (See MAR)   bilateral   dorsiflexion/plantar flexion performed   sequential compression devices off   other (see comments)  Intervention: Prevent Infection  Recent Flowsheet Documentation  Taken 5/10/2022 0402 by Becky Kumari RN  Infection Prevention:   rest/sleep promoted   single patient room provided  Taken 5/10/2022 0248 by Becky Kumari RN  Infection Prevention:   rest/sleep promoted   single patient room provided  Taken 5/10/2022 0000 by Becky Kumari RN  Infection Prevention:   rest/sleep promoted   single patient room provided  Taken 5/9/2022 2202 by Becky Kumari RN  Infection Prevention:   rest/sleep promoted   single patient room provided  Taken 5/9/2022 2027 by Becky Kumari RN  Infection Prevention:   rest/sleep promoted   single patient room provided  Goal: Optimal Comfort and Wellbeing  Outcome: Ongoing, Progressing  Intervention: Monitor Pain and Promote Comfort  Recent Flowsheet Documentation  Taken 5/9/2022 2027 by Becky Kumari RN  Pain Management Interventions: see MAR  Intervention:  Provide Person-Centered Care  Recent Flowsheet Documentation  Taken 5/10/2022 0000 by Becky Kumari RN  Trust Relationship/Rapport: thoughts/feelings acknowledged  Taken 5/9/2022 2027 by Becky Kumari RN  Trust Relationship/Rapport:   care explained   choices provided   emotional support provided   empathic listening provided   questions answered   questions encouraged   reassurance provided   thoughts/feelings acknowledged  Goal: Readiness for Transition of Care  Outcome: Ongoing, Progressing     Problem: Heart Failure Comorbidity  Goal: Maintenance of Heart Failure Symptom Control  Outcome: Ongoing, Progressing  Intervention: Maintain Heart Failure-Management  Recent Flowsheet Documentation  Taken 5/10/2022 0000 by Becky Kumari RN  Medication Review/Management: medications reviewed  Taken 5/9/2022 2202 by Becky Kumari RN  Medication Review/Management: medications reviewed  Taken 5/9/2022 2027 by Becky Kumari RN  Medication Review/Management: medications reviewed     Problem: Hypertension Comorbidity  Goal: Blood Pressure in Desired Range  Outcome: Ongoing, Progressing  Intervention: Maintain Blood Pressure Management  Recent Flowsheet Documentation  Taken 5/10/2022 0000 by Becky Kumari RN  Medication Review/Management: medications reviewed  Taken 5/9/2022 2202 by Becky Kumari RN  Medication Review/Management: medications reviewed  Taken 5/9/2022 2027 by Becky Kumari RN  Medication Review/Management: medications reviewed   Goal Outcome Evaluation:

## 2022-05-10 NOTE — PROGRESS NOTES
"    Patient Name: Aj Salazar  :1945  76 y.o.      Patient Care Team:  Neftali Amezcua MD as PCP - General (Internal Medicine)  Juan Colby MD as Consulting Physician (Cardiology)  Ankush Sky MD as Surgeon (Orthopedic Surgery)  Vale Del Rosario APRN as Nurse Practitioner (Oncology)  Darrion Peña MD as Consulting Physician (Urology)  Tahir Childs MD as Consulting Physician (Pulmonary Disease)  Noman Moseley MD as Consulting Physician (Gastroenterology)  Aura Perry OD (Optometry)  Juwan Diane MD as Consulting Physician (Ophthalmology)    Chief Complaint: UTI    Interval History: feels much better, rate better controlled       Objective   Vital Signs  Temp:  [98.4 °F (36.9 °C)-98.8 °F (37.1 °C)] 98.4 °F (36.9 °C)  Heart Rate:  [] 102  Resp:  [16-20] 18  BP: (114-150)/(68-96) 114/72    Intake/Output Summary (Last 24 hours) at 5/10/2022 0750  Last data filed at 5/10/2022 0601  Gross per 24 hour   Intake 720 ml   Output 500 ml   Net 220 ml     Flowsheet Rows    Flowsheet Row First Filed Value   Admission Height 177.8 cm (70\") Documented at 2022 1502   Admission Weight 80.7 kg (178 lb) Documented at 2022 1502          Physical Exam:   General Appearance:    Alert, cooperative, in no acute distress   Lungs:     Clear to auscultation.  Normal respiratory effort and rate.      Heart:    irregular rhythm and normal rate, normal S1 and S2, no murmurs, gallops or rubs.     Chest Wall:    No abnormalities observed   Abdomen:     Soft, nontender, positive bowel sounds.     Extremities:   no cyanosis, clubbing or edema.  No marked joint deformities.  Adequate musculoskeletal strength.       Results Review:    Results from last 7 days   Lab Units 22  0733   SODIUM mmol/L 138   POTASSIUM mmol/L 3.0*   CHLORIDE mmol/L 106   CO2 mmol/L 20.5*   BUN mg/dL 8   CREATININE mg/dL 0.58*   GLUCOSE mg/dL 92   CALCIUM mg/dL 8.0*     Results from last 7 days   Lab " Units 05/05/22  2137   TROPONIN T ng/mL <0.010     Results from last 7 days   Lab Units 05/09/22  0733   WBC 10*3/mm3 10.54   HEMOGLOBIN g/dL 12.6*   HEMATOCRIT % 38.8   PLATELETS 10*3/mm3 251                           Medication Review:   atorvastatin, 80 mg, Oral, Nightly  brimonidine, 1 drop, Both Eyes, BID  budesonide-formoterol, 2 puff, Inhalation, BID - RT   And  tiotropium bromide monohydrate, 1 puff, Inhalation, Daily - RT  cefdinir, 300 mg, Oral, Q12H  clopidogrel, 75 mg, Oral, Daily  dabigatran etexilate, 150 mg, Oral, BID  dilTIAZem, 60 mg, Oral, Q8H  insulin lispro, 0-9 Units, Subcutaneous, TID With Meals  latanoprost, 1 drop, Both Eyes, Nightly  metoprolol succinate XL, 100 mg, Oral, Nightly  metoprolol succinate XL, 50 mg, Oral, Daily  pantoprazole, 40 mg, Oral, BID  tamsulosin, 0.8 mg, Oral, Nightly              Active Hospital Problems    Diagnosis  POA   • **Acute pyelonephritis [N10]  Yes   • Chronic diastolic CHF (congestive heart failure) ([facility]) [I50.32]  Yes   • Alcohol dependence in remission ([facility]) [F10.21]  Yes   • Personal history of transient ischemic attack (TIA), and cerebral infarction without residual deficits [Z86.73]  Not Applicable   • Sepsis ([facility]) [A41.9]  Yes   • Gastroesophageal reflux disease [K21.9]  Yes   • A-fib ([facility]) [I48.91]  Yes   • Type 2 diabetes mellitus with hyperglycemia, without long-term current use of insulin ([facility]) [E11.65]  Yes   • BPH with obstruction/lower urinary tract symptoms [N40.1, N13.8]  Yes   • COPD (chronic obstructive pulmonary disease) ([facility]) [J44.9]  Yes   • Hypertension [I10]  Yes      Resolved Hospital Problems   No resolved problems to display.       1.  Urosepsis, treatment per primary team  2.  Chronic atrial fibrillation with RVR, patient has been persistently tachycardic since admission, he has been maintained on his routine home regimen.  HR much improved with oral diltiazem, will switch to once daily dosing  3.  Diabetes mellitus with  hyperglycemia  4.  Chronic anticoagulation, continue same  5.  Hypokalemia, per primary team  6.  Elevated bilirubin level     Plans for D/C noted, OK to d/c anytime from our standpoint, he can f/u with Dr Colby who he typically sees, will sign off    Charlie Proctor III, MD  [facility]  05/10/22  07:50 EDT

## 2022-05-10 NOTE — CASE MANAGEMENT/SOCIAL WORK
Case Management Discharge Note      Final Note: pt d/c'ed home         Selected Continued Care - Discharged on 5/10/2022 Admission date: 5/5/2022 - Discharge disposition: Home or Self Care    Destination    No services have been selected for the patient.              Durable Medical Equipment    No services have been selected for the patient.              Dialysis/Infusion    No services have been selected for the patient.              Home Medical Care    No services have been selected for the patient.              Therapy    No services have been selected for the patient.              Community Resources    No services have been selected for the patient.              Community & DME    No services have been selected for the patient.                       Final Discharge Disposition Code: 01 - home or self-care

## 2022-05-10 NOTE — DISCHARGE SUMMARY
[facility]    Date of Discharge:  5/10/2022    PCP: Neftali Amezcua MD    Discharge Diagnosis:   Active Hospital Problems    Diagnosis  POA   • **Acute pyelonephritis [N10]  Yes   • Chronic diastolic CHF (congestive heart failure) ([facility]) [I50.32]  Yes   • Alcohol dependence in remission ([facility]) [F10.21]  Yes   • Personal history of transient ischemic attack (TIA), and cerebral infarction without residual deficits [Z86.73]  Not Applicable   • Sepsis ([facility]) [A41.9]  Yes   • Gastroesophageal reflux disease [K21.9]  Yes   • A-fib ([facility]) [I48.91]  Yes   • Type 2 diabetes mellitus with hyperglycemia, without long-term current use of insulin ([facility]) [E11.65]  Yes   • BPH with obstruction/lower urinary tract symptoms [N40.1, N13.8]  Yes   • COPD (chronic obstructive pulmonary disease) ([facility]) [J44.9]  Yes   • Hypertension [I10]  Yes      Resolved Hospital Problems   No resolved problems to display.          Consults     Date and Time Order Name Status Description    5/9/2022 10:24 AM Inpatient Cardiology Consult Completed     5/5/2022  4:01 PM [facility] (on-call MD unless specified) Details          Hospital Course  76 y.o. male with past medical history significant for CHF, A. fib, GERD, diabetes, presented to the hospital has been diagnosed to have sepsis associated with acute pyelonephritis, urine cultures came back positive for Klebsiella, patient was initially on IV cefepime, has been switched to Omnicef, he will complete the course on outpatient basis.  Patient also had intermittent tachycardia, he has history of chronic A. fib, he is on Toprol-XL and Pradaxa, cardiology evaluation was requested.  They had started him on Cardizem.  Since patient has continued to improve, he is medically ready to be discharged home today, he will complete the course of antibiotics on outpatient basis.  He also has history of gastroesophageal reflux disease for which she was continued on PPI  therapy.  He also has COPD, does not appear to be in exacerbation at this point of time.  I have seen and examined patient at bedside, discussed plan of care with him, he has verbalized understanding.  Total time spent on discharge management is more than 30 minutes.      Condition on Discharge: Improved.     Temp:  [98.4 °F (36.9 °C)-98.8 °F (37.1 °C)] 98.4 °F (36.9 °C)  Heart Rate:  [] 102  Resp:  [16-20] 18  BP: (114-150)/(68-96) 114/72  Body mass index is 25.84 kg/m².    Physical Exam   General, awake and alert.  Head and ENT, normocephalic and atraumatic.  Lungs, symmetric expansion, equal air entry bilaterally.  Heart, irregular  Abdomen, soft and nontender.  Extremities, no clubbing or cyanosis.  Neuro, no focal deficits.  Skin: Warm and no rash.  Psych, normal mood and affect.  Musculoskeletal, joint examination is grossly normal.    Disposition: Home or Self Care       Discharge Medications      New Medications      Instructions Start Date   cefdinir 300 MG capsule  Commonly known as: OMNICEF   300 mg, Oral, Every 12 Hours Scheduled      dilTIAZem  MG 24 hr capsule  Commonly known as: CARDIZEM CD   180 mg, Oral, Every 24 Hours Scheduled         Continue These Medications      Instructions Start Date   Accu-Chek Softclix Lancets lancets   Accu-Chek Softclix Lancets   USE TO TEST GLUCOSE QOD      acetaminophen 325 MG tablet  Commonly known as: TYLENOL   650 mg, Oral, Every 4 Hours PRN      Alphagan P 0.1 % solution ophthalmic solution  Generic drug: brimonidine   1 drop, Both Eyes, 2 times daily      atorvastatin 80 MG tablet  Commonly known as: LIPITOR   80 mg, Oral, Nightly      clopidogrel 75 MG tablet  Commonly known as: Plavix   75 mg, Oral, Daily      dabigatran etexilate 150 MG capsu  Commonly known as: Pradaxa   150 mg, Oral, 2 Times Daily      Fluticasone-Umeclidin-Vilant 200-62.5-25 MCG/INH inhaler  Commonly known as: TRELEGY   1 puff, Inhalation, Daily, Just increased to this strength  3/21       furosemide 40 MG tablet  Commonly known as: LASIX   40 mg, Oral, Daily      glucose blood test strip   Accu-Chek Dannielle Plus test strips   USE TO CHECK GLUCOSE QOD      glucose blood test strip   Accu-Chek Dannielle Plus Meter   USE TO CHECK GLUCOSE QOD      metFORMIN  MG 24 hr tablet  Commonly known as: GLUCOPHAGE-XR   500 mg, Oral, Daily With Dinner      metoprolol succinate  MG 24 hr tablet  Commonly known as: TOPROL-XL   100 mg, Oral, Nightly, Take 100 mg at night and 50 mg in the morning      metoprolol succinate XL 50 MG 24 hr tablet  Commonly known as: TOPROL-XL   50 mg, Oral, Daily, Take 50 mg every morning and 100 mg every night      pantoprazole 40 MG EC tablet  Commonly known as: PROTONIX   40 mg, Oral, 2 Times Daily      spironolactone 25 MG tablet  Commonly known as: ALDACTONE   25 mg, Oral, Daily      Tafluprost (PF) 0.0015 % solution ophthalmic solution  Commonly known as: ZIOPTAN   1 drop, Both Eyes, Nightly      tamsulosin 0.4 MG capsule 24 hr capsule  Commonly known as: FLOMAX   2 capsules, Oral, Nightly              Additional Instructions for the Follow-ups that You Need to Schedule     Discharge Follow-up with PCP   As directed       Currently Documented PCP:    Neftali Amezcua MD    PCP Phone Number:    [phone]     Follow Up Details: Follow-up with PCP in 7 days.         Discharge Follow-up with Specialty: Follow-up with cardiology in 2 to 3 weeks.   As directed      Specialty: Follow-up with cardiology in 2 to 3 weeks.            Follow-up Information     Neftali Amezcua MD .    Specialty: Internal Medicine  Why: Follow-up with PCP in 7 days.  Contact information:  [address] [place] [address]  [phone]                        Pending Labs     Order Current Status    Blood Culture - Blood, Arm, Left Preliminary result    Blood Culture - Blood, Arm, Left Preliminary result         Arben Adler MD  05/10/22  11:14 EDT    Discharge time spent greater than  30 minutes.

## 2022-05-10 NOTE — OUTREACH NOTE
Prep Survey    Flowsheet Row Responses   [facility] patient [facility]? [facility]   Is LACE score < 7 ? No   Emergency Room discharge w/ pulse ox? No   Eligibility [facility]   Date of Admission 05/05/22   Date of Discharge 05/10/22   Discharge Disposition Home or Self Care   Discharge diagnosis acute pyelnoephritis   Does the patient have one of the following disease processes/diagnoses(primary or secondary)? Other   Does the patient have Home health ordered? No   Is there a DME ordered? No   Prep survey completed? Yes          JUAN ANGELES - Registered Nurse

## 2022-05-11 ENCOUNTER — TRANSITIONAL CARE MANAGEMENT TELEPHONE ENCOUNTER (OUTPATIENT)
Dept: CALL CENTER | Facility: HOSPITAL | Age: 77
End: 2022-05-11

## 2022-05-11 NOTE — OUTREACH NOTE
Call Center TCM Note    Flowsheet Row Responses   [facility] patient discharged from? [place]   Does the patient have one of the following disease processes/diagnoses(primary or secondary)? Other   TCM attempt successful? No   Unsuccessful attempts Attempt 2          Raquel Davila RN    5/11/2022, 13:16 EDT

## 2022-05-11 NOTE — OUTREACH NOTE
Call Center TCM Note    Flowsheet Row Responses   [facility] patient discharged from? [place]   Does the patient have one of the following disease processes/diagnoses(primary or secondary)? Other   TCM attempt successful? No   Unsuccessful attempts Attempt 1  [Ara spouse on verbal release ]          Raquel Davila RN    5/11/2022, 11:31 EDT       Please inform patient echocardiogram unremarkable  Mild leaking of aortic valve, not significant enough to cause symptoms  Follow-up as discussed

## 2022-05-12 ENCOUNTER — TRANSITIONAL CARE MANAGEMENT TELEPHONE ENCOUNTER (OUTPATIENT)
Dept: CALL CENTER | Facility: HOSPITAL | Age: 77
End: 2022-05-12

## 2022-05-13 ENCOUNTER — APPOINTMENT (OUTPATIENT)
Dept: GENERAL RADIOLOGY | Facility: HOSPITAL | Age: 77
End: 2022-05-13

## 2022-05-13 ENCOUNTER — APPOINTMENT (OUTPATIENT)
Dept: CARDIOLOGY | Facility: HOSPITAL | Age: 77
End: 2022-05-13

## 2022-05-13 ENCOUNTER — APPOINTMENT (OUTPATIENT)
Dept: CT IMAGING | Facility: HOSPITAL | Age: 77
End: 2022-05-13

## 2022-05-13 ENCOUNTER — NURSE TRIAGE (OUTPATIENT)
Dept: CALL CENTER | Facility: HOSPITAL | Age: 77
End: 2022-05-13

## 2022-05-13 ENCOUNTER — HOSPITAL ENCOUNTER (EMERGENCY)
Facility: HOSPITAL | Age: 77
Discharge: HOME | End: 2022-05-13
Attending: EMERGENCY MEDICINE | Admitting: EMERGENCY MEDICINE

## 2022-05-13 VITALS
SYSTOLIC BLOOD PRESSURE: 101 MMHG | DIASTOLIC BLOOD PRESSURE: 82 MMHG | HEIGHT: 69 IN | BODY MASS INDEX: 26.66 KG/M2 | WEIGHT: 180 LBS | TEMPERATURE: 96.3 F | HEART RATE: 98 BPM | OXYGEN SATURATION: 98 % | RESPIRATION RATE: 16 BRPM

## 2022-05-13 DIAGNOSIS — R10.9 LEFT FLANK PAIN: Primary | ICD-10-CM

## 2022-05-13 DIAGNOSIS — I50.9 CHRONIC CONGESTIVE HEART FAILURE, UNSPECIFIED HEART FAILURE TYPE: ICD-10-CM

## 2022-05-13 DIAGNOSIS — I82.611 SUPERFICIAL VENOUS THROMBOSIS OF RIGHT UPPER EXTREMITY: ICD-10-CM

## 2022-05-13 LAB
ALBUMIN SERPL-MCNC: 3.3 G/DL (ref 3.5–5.2)
ALBUMIN/GLOB SERPL: 1.1 G/DL
ALP SERPL-CCNC: 104 U/L (ref 39–117)
ALT SERPL W P-5'-P-CCNC: 30 U/L (ref 1–41)
ANION GAP SERPL CALCULATED.3IONS-SCNC: 10 MMOL/L (ref 5–15)
AST SERPL-CCNC: 20 U/L (ref 1–40)
BACTERIA UR QL AUTO: ABNORMAL /HPF
BASOPHILS # BLD AUTO: 0.07 10*3/MM3 (ref 0–0.2)
BASOPHILS NFR BLD AUTO: 1.1 % (ref 0–1.5)
BH CV UPPER VENOUS LEFT INTERNAL JUGULAR AUGMENT: NORMAL
BH CV UPPER VENOUS LEFT INTERNAL JUGULAR COMPRESS: NORMAL
BH CV UPPER VENOUS LEFT INTERNAL JUGULAR PHASIC: NORMAL
BH CV UPPER VENOUS LEFT INTERNAL JUGULAR SPONT: NORMAL
BH CV UPPER VENOUS LEFT SUBCLAVIAN AUGMENT: NORMAL
BH CV UPPER VENOUS LEFT SUBCLAVIAN COMPRESS: NORMAL
BH CV UPPER VENOUS LEFT SUBCLAVIAN PHASIC: NORMAL
BH CV UPPER VENOUS LEFT SUBCLAVIAN SPONT: NORMAL
BH CV UPPER VENOUS RIGHT AXILLARY AUGMENT: NORMAL
BH CV UPPER VENOUS RIGHT AXILLARY COMPRESS: NORMAL
BH CV UPPER VENOUS RIGHT AXILLARY PHASIC: NORMAL
BH CV UPPER VENOUS RIGHT AXILLARY SPONT: NORMAL
BH CV UPPER VENOUS RIGHT BASILIC FOREARM COLOR: 1
BH CV UPPER VENOUS RIGHT BASILIC FOREARM COMPRESS: NORMAL
BH CV UPPER VENOUS RIGHT BASILIC FOREARM THROMBUS: NORMAL
BH CV UPPER VENOUS RIGHT BASILIC UPPER COMPRESS: NORMAL
BH CV UPPER VENOUS RIGHT BRACHIAL COMPRESS: NORMAL
BH CV UPPER VENOUS RIGHT CEPHALIC FOREARM COMPRESS: NORMAL
BH CV UPPER VENOUS RIGHT CEPHALIC UPPER COLOR: 1
BH CV UPPER VENOUS RIGHT CEPHALIC UPPER COMPRESS: NORMAL
BH CV UPPER VENOUS RIGHT CEPHALIC UPPER THROMBUS: NORMAL
BH CV UPPER VENOUS RIGHT INTERNAL JUGULAR AUGMENT: NORMAL
BH CV UPPER VENOUS RIGHT INTERNAL JUGULAR COMPRESS: NORMAL
BH CV UPPER VENOUS RIGHT INTERNAL JUGULAR PHASIC: NORMAL
BH CV UPPER VENOUS RIGHT INTERNAL JUGULAR SPONT: NORMAL
BH CV UPPER VENOUS RIGHT MED CUBITAL COMPRESS: NORMAL
BH CV UPPER VENOUS RIGHT RADIAL COMPRESS: NORMAL
BH CV UPPER VENOUS RIGHT SUBCLAVIAN AUGMENT: NORMAL
BH CV UPPER VENOUS RIGHT SUBCLAVIAN COMPRESS: NORMAL
BH CV UPPER VENOUS RIGHT SUBCLAVIAN PHASIC: NORMAL
BH CV UPPER VENOUS RIGHT SUBCLAVIAN SPONT: NORMAL
BH CV UPPER VENOUS RIGHT ULNAR COMPRESS: NORMAL
BILIRUB SERPL-MCNC: 0.9 MG/DL (ref 0–1.2)
BILIRUB UR QL STRIP: NEGATIVE
BUN SERPL-MCNC: 8 MG/DL (ref 8–23)
BUN/CREAT SERPL: 12.5 (ref 7–25)
CALCIUM SPEC-SCNC: 8.6 MG/DL (ref 8.6–10.5)
CHLORIDE SERPL-SCNC: 102 MMOL/L (ref 98–107)
CLARITY UR: CLEAR
CO2 SERPL-SCNC: 27 MMOL/L (ref 22–29)
COLOR UR: YELLOW
CREAT SERPL-MCNC: 0.64 MG/DL (ref 0.76–1.27)
D-LACTATE SERPL-SCNC: 1.2 MMOL/L (ref 0.5–2)
DEPRECATED RDW RBC AUTO: 40.8 FL (ref 37–54)
EGFRCR SERPLBLD CKD-EPI 2021: 98.7 ML/MIN/1.73
EOSINOPHIL # BLD AUTO: 0.09 10*3/MM3 (ref 0–0.4)
EOSINOPHIL NFR BLD AUTO: 1.5 % (ref 0.3–6.2)
ERYTHROCYTE [DISTWIDTH] IN BLOOD BY AUTOMATED COUNT: 13.2 % (ref 12.3–15.4)
GLOBULIN UR ELPH-MCNC: 3.1 GM/DL
GLUCOSE SERPL-MCNC: 122 MG/DL (ref 65–99)
GLUCOSE UR STRIP-MCNC: NEGATIVE MG/DL
HCT VFR BLD AUTO: 37.1 % (ref 37.5–51)
HGB BLD-MCNC: 13 G/DL (ref 13–17.7)
HGB UR QL STRIP.AUTO: ABNORMAL
HYALINE CASTS UR QL AUTO: ABNORMAL /LPF
IMM GRANULOCYTES # BLD AUTO: 0.2 10*3/MM3 (ref 0–0.05)
IMM GRANULOCYTES NFR BLD AUTO: 3.2 % (ref 0–0.5)
KETONES UR QL STRIP: NEGATIVE
LEUKOCYTE ESTERASE UR QL STRIP.AUTO: NEGATIVE
LIPASE SERPL-CCNC: 28 U/L (ref 13–60)
LYMPHOCYTES # BLD AUTO: 0.79 10*3/MM3 (ref 0.7–3.1)
LYMPHOCYTES NFR BLD AUTO: 12.8 % (ref 19.6–45.3)
MAXIMAL PREDICTED HEART RATE: 145 BPM
MCH RBC QN AUTO: 30.2 PG (ref 26.6–33)
MCHC RBC AUTO-ENTMCNC: 35 G/DL (ref 31.5–35.7)
MCV RBC AUTO: 86.3 FL (ref 79–97)
MONOCYTES # BLD AUTO: 0.54 10*3/MM3 (ref 0.1–0.9)
MONOCYTES NFR BLD AUTO: 8.8 % (ref 5–12)
NEUTROPHILS NFR BLD AUTO: 4.47 10*3/MM3 (ref 1.7–7)
NEUTROPHILS NFR BLD AUTO: 72.6 % (ref 42.7–76)
NITRITE UR QL STRIP: NEGATIVE
NRBC BLD AUTO-RTO: 0 /100 WBC (ref 0–0.2)
NT-PROBNP SERPL-MCNC: 1568 PG/ML (ref 0–1800)
PH UR STRIP.AUTO: 7 [PH] (ref 5–8)
PLATELET # BLD AUTO: 434 10*3/MM3 (ref 140–450)
PMV BLD AUTO: 9 FL (ref 6–12)
POTASSIUM SERPL-SCNC: 3.4 MMOL/L (ref 3.5–5.2)
PROT SERPL-MCNC: 6.4 G/DL (ref 6–8.5)
PROT UR QL STRIP: NEGATIVE
QT INTERVAL: 382 MS
RBC # BLD AUTO: 4.3 10*6/MM3 (ref 4.14–5.8)
RBC # UR STRIP: ABNORMAL /HPF
REF LAB TEST METHOD: ABNORMAL
SODIUM SERPL-SCNC: 139 MMOL/L (ref 136–145)
SP GR UR STRIP: 1.01 (ref 1–1.03)
SQUAMOUS #/AREA URNS HPF: ABNORMAL /HPF
STRESS TARGET HR: 123 BPM
TROPONIN T SERPL-MCNC: <0.01 NG/ML (ref 0–0.03)
UROBILINOGEN UR QL STRIP: ABNORMAL
WBC # UR STRIP: ABNORMAL /HPF
WBC NRBC COR # BLD: 6.16 10*3/MM3 (ref 3.4–10.8)

## 2022-05-13 PROCEDURE — 93005 ELECTROCARDIOGRAM TRACING: CPT | Performed by: EMERGENCY MEDICINE

## 2022-05-13 PROCEDURE — 83605 ASSAY OF LACTIC ACID: CPT | Performed by: EMERGENCY MEDICINE

## 2022-05-13 PROCEDURE — 85025 COMPLETE CBC W/AUTO DIFF WBC: CPT | Performed by: EMERGENCY MEDICINE

## 2022-05-13 PROCEDURE — 93010 ELECTROCARDIOGRAM REPORT: CPT | Performed by: INTERNAL MEDICINE

## 2022-05-13 PROCEDURE — 99283 EMERGENCY DEPT VISIT LOW MDM: CPT

## 2022-05-13 PROCEDURE — 71045 X-RAY EXAM CHEST 1 VIEW: CPT

## 2022-05-13 PROCEDURE — 84484 ASSAY OF TROPONIN QUANT: CPT | Performed by: EMERGENCY MEDICINE

## 2022-05-13 PROCEDURE — 80053 COMPREHEN METABOLIC PANEL: CPT | Performed by: EMERGENCY MEDICINE

## 2022-05-13 PROCEDURE — 74176 CT ABD & PELVIS W/O CONTRAST: CPT

## 2022-05-13 PROCEDURE — 93971 EXTREMITY STUDY: CPT

## 2022-05-13 PROCEDURE — 36415 COLL VENOUS BLD VENIPUNCTURE: CPT

## 2022-05-13 PROCEDURE — 83880 ASSAY OF NATRIURETIC PEPTIDE: CPT | Performed by: EMERGENCY MEDICINE

## 2022-05-13 PROCEDURE — 81001 URINALYSIS AUTO W/SCOPE: CPT | Performed by: EMERGENCY MEDICINE

## 2022-05-13 PROCEDURE — 83690 ASSAY OF LIPASE: CPT | Performed by: EMERGENCY MEDICINE

## 2022-05-13 RX ORDER — SODIUM CHLORIDE 0.9 % (FLUSH) 0.9 %
10 SYRINGE (ML) INJECTION AS NEEDED
Status: DISCONTINUED | OUTPATIENT
Start: 2022-05-13 | End: 2022-05-13 | Stop reason: HOSPADM

## 2022-05-13 RX ORDER — TRAMADOL HYDROCHLORIDE 50 MG/1
50 TABLET ORAL ONCE
Status: COMPLETED | OUTPATIENT
Start: 2022-05-13 | End: 2022-05-13

## 2022-05-13 RX ORDER — ACETAMINOPHEN 500 MG
1000 TABLET ORAL ONCE
Status: COMPLETED | OUTPATIENT
Start: 2022-05-13 | End: 2022-05-13

## 2022-05-13 RX ORDER — CEFDINIR 300 MG/1
300 CAPSULE ORAL 2 TIMES DAILY
Qty: 10 CAPSULE | Refills: 0 | Status: SHIPPED | OUTPATIENT
Start: 2022-05-13 | End: 2022-06-22

## 2022-05-13 RX ORDER — TRAMADOL HYDROCHLORIDE 50 MG/1
50 TABLET ORAL EVERY 8 HOURS PRN
Qty: 30 TABLET | Refills: 0 | Status: SHIPPED | OUTPATIENT
Start: 2022-05-13 | End: 2022-12-13

## 2022-05-13 RX ADMIN — TRAMADOL HYDROCHLORIDE 50 MG: 50 TABLET, COATED ORAL at 09:08

## 2022-05-13 RX ADMIN — ACETAMINOPHEN 1000 MG: 500 TABLET, FILM COATED ORAL at 09:08

## 2022-05-13 NOTE — ED TRIAGE NOTES
Pt reports left abd. Pain radiates into left flank states started while was admitted 5/5/22 for sepsis and pyelonephritis. Pt states thought it was due to laying in bed, however pain has not subsided.

## 2022-05-13 NOTE — TELEPHONE ENCOUNTER
"    Reason for Disposition  • Illness requiring prolonged bedrest in past month (e.g., immobilization, long hospital stay)    Additional Information  • Negative: SEVERE difficulty breathing (e.g., struggling for each breath, speaks in single words)  • Negative: Difficult to awaken or acting confused (e.g., disoriented, slurred speech)  • Negative: Shock suspected (e.g., cold/pale/clammy skin, too weak to stand, low BP, rapid pulse)  • Negative: Passed out (i.e., fainted, collapsed and was not responding)  • Negative: [1] Chest pain lasts > 5 minutes AND [2] age > 44  • Negative: [1] Chest pain lasts > 5 minutes AND [2] age > 30 AND [3] one or more cardiac risk factors (e.g., diabetes, high blood pressure, high cholesterol, smoker, or strong family history of heart disease)  • Negative: [1] Chest pain lasts > 5 minutes AND [2] history of heart disease (i.e., angina, heart attack, heart failure, bypass surgery, takes nitroglycerin)  • Negative: [1] Chest pain lasts > 5 minutes AND [2] described as crushing, pressure-like, or heavy  • Negative: Heart beating < 50 beats per minute OR > 140 beats per minute  • Negative: Visible sweat on face or sweat dripping down face  • Negative: Sounds like a life-threatening emergency to the triager  • Negative: Followed a chest injury  • Negative: SEVERE chest pain  • Negative: [1] Chest pain (or \"angina\") comes and goes AND [2] is happening more often (increasing in frequency) or getting worse (increasing in severity) (Exception: chest pains that last only a few seconds)  • Negative: Pain also in shoulder(s) or arm(s) or jaw (Exception: pain is clearly made worse by movement)  • Negative: Difficulty breathing  • Negative: Dizziness or lightheadedness  • Negative: Coughing up blood  • Negative: Cocaine use within last 3 days  • Negative: Major surgery in past month  • Negative: Hip or leg fracture (broken bone) in past month (or had cast on leg or ankle in past month)    Answer " "Assessment - Initial Assessment Questions  1. LOCATION: \"Where does it hurt?\"        Chest and back  2. RADIATION: \"Does the pain go anywhere else?\" (e.g., into neck, jaw, arms, back)      Through to the back  3. ONSET: \"When did the chest pain begin?\" (Minutes, hours or days)       jsut now  4. PATTERN \"Does the pain come and go, or has it been constant since it started?\"  \"Does it get worse with exertion?\"       cosntant  5. DURATION: \"How long does it last\" (e.g., seconds, minutes, hours)     unsure  6. SEVERITY: \"How bad is the pain?\"  (e.g., Scale 1-10; mild, moderate, or severe)     - MILD (1-3): doesn't interfere with normal activities      - MODERATE (4-7): interferes with normal activities or awakens from sleep     - SEVERE (8-10): excruciating pain, unable to do any normal activities       moderated  7. CARDIAC RISK FACTORS: \"Do you have any history of heart problems or risk factors for heart disease?\" (e.g., angina, prior heart attack; diabetes, high blood pressure, high cholesterol, smoker, or strong family history of heart disease)     Yes  8. PULMONARY RISK FACTORS: \"Do you have any history of lung disease?\"  (e.g., blood clots in lung, asthma, emphysema, birth control pills)      *  9. CAUSE: \"What do you think is causing the chest pain?\"      unsure  10. OTHER SYMPTOMS: \"Do you have any other symptoms?\" (e.g., dizziness, nausea, vomiting, sweating, fever, difficulty breathing, cough)        *No Answer*  11. PREGNANCY: \"Is there any chance you are pregnant?\" \"When was your last menstrual period?\"        *No Answer*    Protocols used: CHEST PAIN-ADULT-AH      "

## 2022-05-13 NOTE — DISCHARGE INSTRUCTIONS
Continue taking all your medications as prescribed.  I have extended your antibiotic prescription for another 5 days.  On the right arm, you can use a compression sleeve and warm compresses for the arm swelling

## 2022-05-13 NOTE — ED PROVIDER NOTES
EMERGENCY DEPARTMENT ENCOUNTER    Room Number:  13/13  Date of encounter:  5/14/2022  PCP: Neftali Amezcua MD  Historian: Patient      HPI:  Chief Complaint: Multiple complaint  A complete HPI/ROS/PMH/PSH/SH/FH are unobtainable due to: Nothing    Context: Aj Salazar is a 76 y.o. male who presents to the ED c/o just discharged from the hospital 3 days ago after being treated for pyelonephritis.  He said that since he is gone home he just still has not felt well.  He complains of moderate severity shortness of breath, particular with exertion.  He said that when he woke up this morning it was particularly bad and it scared him.    Patient also has moderate severity right arm swelling since being discharged which seems to be around the previous IV insertion site in the antecubital fossa.    Patient also complains of moderate severity left-sided flank pain that is sharp and stabbing in radiates around to the left lower quadrant.  This he said has been present since he was in the hospital but this morning it was acutely worse.  He has not had fever, is able to urinate, and has no other associated symptoms.  Live right down the street.      PAST MEDICAL HISTORY  Active Ambulatory Problems     Diagnosis Date Noted   • COPD (chronic obstructive pulmonary disease) ([facility]) 03/17/2016   • Hypertension 03/17/2016   • Osteoarthritis 03/17/2016   • History of smoking greater than 50 pack years 06/23/2017   • BPH with obstruction/lower urinary tract symptoms 03/27/2018   • Atrial fibrillation with rapid ventricular response ([facility])    • Type 2 diabetes mellitus with hyperglycemia, without long-term current use of insulin ([facility]) 01/31/2019   • Rash and nonspecific skin eruption 09/02/2020   • TIA (transient ischemic attack) 10/22/2020   • A-fib ([facility]) 03/07/2021   • Acute on chronic systolic CHF (congestive heart failure) ([facility]) 03/08/2021   • Gastroesophageal reflux disease 03/15/2021   • Raynaud's disease without gangrene 12/07/2021    • Sepsis ([facility]) 05/05/2022   • Acute pyelonephritis 05/06/2022   • Chronic diastolic CHF (congestive heart failure) ([facility]) 05/06/2022   • Alcohol dependence in remission ([facility]) 05/06/2022   • Personal history of transient ischemic attack (TIA), and cerebral infarction without residual deficits 05/06/2022     Resolved Ambulatory Problems     Diagnosis Date Noted   • Dehydration 09/25/2018   • Hypotension 09/25/2018   • Melena 09/25/2018   • EMPERATRIZ (acute kidney injury) ([facility]) 09/25/2018   • Hyponatremia 09/25/2018   • Lactic acidosis 09/25/2018   • Facial droop 10/23/2020   • Acute respiratory failure with hypoxia ([facility]) 03/08/2021   • Sepsis ([facility]) 03/09/2021     Past Medical History:   Diagnosis Date   • Alcohol abuse, in remission    • BPH (benign prostatic hypertrophy)    • Depression    • DJD (degenerative joint disease) of cervical spine    • ED (erectile dysfunction)    • Hx of colonic polyps    • Hyperlipidemia    • Influenza B 02/14/2013   • Nocturnal hypoxia    • Peripheral neuropathy    • Permanent atrial fibrillation ([facility])    • Rectal bleeding 04/26/2017   • Tendonitis of shoulder 11/07/2007   • Ulcer of the stomach and intestine          PAST SURGICAL HISTORY  Past Surgical History:   Procedure Laterality Date   • APPENDECTOMY     • CARDIOVASCULAR STRESS TEST N/A 10/20/2015    NML LEXISCAN CARDIOLITE PERFUSION STUDY, NO EVIDENCE OF ISCHEMIA, AREA OF HYPOPERFUSION OF THE INFERIOR WALL W/NORMAL WALL PERFUSION AND NML LEFT VENTRICULAR EJECTION FRACTION, MOST LIKELY ATTENUATION. DR.MICHAEL BOX   • COLONOSCOPY N/A 02/2017   • COLONOSCOPY N/A 02/23/2011    4 POLYPS REMOVED, RESCOPE IN 5 YRS, DR. EAGLE   • COLONOSCOPY N/A 03/08/2006    ERYTHEMOUS AND GRANULAR MUCOSA IN ILEOCECAL VALVE, NORMAL COLON, REPEAT IN 5 YRS,    • ENDOSCOPY N/A 09/26/2018    normal esophagus, acute gastritis, multiple non-bleeding duodenal ulcers with pigmented material, H Pylori positive   • HERNIA REPAIR Bilateral      INGUINAL   • JOINT REPLACEMENT  2015    left hip   • TOTAL HIP ARTHROPLASTY Left 2015    Dr Ankush Sky   • TOTAL HIP ARTHROPLASTY Right 10/27/2014    DR.RIED SKY   • VASECTOMY           FAMILY HISTORY  Family History   Problem Relation Age of Onset   • Colon cancer Mother    • Ovarian cancer Mother    • Alzheimer's disease Mother 70   • Stroke Sister         2016   • Dementia Sister    • Heart disease Brother    • Alcohol abuse Brother    • Coronary artery disease Father    • Hypertension Father    • Colon cancer Maternal Grandmother    • Heart disease Brother    • Coronary artery disease Brother    • Hypertension Brother    • Stroke Brother    • Arthritis Brother    • Prostate cancer Neg Hx          SOCIAL HISTORY  Social History     Socioeconomic History   • Marital status:      Spouse name: Ara*   • Number of children: 3   Tobacco Use   • Smoking status: Former Smoker     Packs/day: 2.00     Years: 49.00     Pack years: 98.00     Quit date: 2010     Years since quittin.3   • Smokeless tobacco: Never Used   • Tobacco comment: long smoking history   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   • Drug use: No     Comment: caffeine use    • Sexual activity: Defer         ALLERGIES  Bactrim [sulfamethoxazole-trimethoprim] and Sulfacetamide        REVIEW OF SYSTEMS  Review of Systems     All systems reviewed and negative except for those discussed in HPI.       PHYSICAL EXAM    I have reviewed the triage vital signs and nursing notes.    ED Triage Vitals [22 0744]   Temp Heart Rate Resp BP SpO2   96.3 °F (35.7 °C) 110 18 -- 94 %      Temp src Heart Rate Source Patient Position BP Location FiO2 (%)   Tympanic -- -- -- --       Physical Exam  GENERAL: Awake and alert, nontoxic-appearing  HENT: nares patent  EYES: no scleral icterus  CV: regular rhythm, regular rate  RESPIRATORY: normal effort  ABDOMEN: soft,  nontender to palpation bowel sounds present  MUSCULOSKELETAL: no deformity, there is some soft tissue swelling and tenderness of the right upper arm particularly in a little bit to the forearm.  There are some ecchymosis around what appears to be an IV insertion site from his admission and the swelling and tenderness or surrounding this.  Perhaps or some phlebitis there.  The arm is otherwise neurovascular intact  NEURO: alert, moves all extremities, follows commands  SKIN: warm, dry        LAB RESULTS  No results found for this or any previous visit (from the past 24 hour(s)).    Ordered the above labs and independently reviewed the results.        RADIOLOGY  No Radiology Exams Resulted Within Past 24 Hours    I ordered the above noted radiological studies. Reviewed by me and discussed with radiologist.  See dictation for official radiology interpretation.      PROCEDURES    Procedures      MEDICATIONS GIVEN IN ER    Medications   traMADol (ULTRAM) tablet 50 mg (50 mg Oral Given 5/13/22 0908)   acetaminophen (TYLENOL) tablet 1,000 mg (1,000 mg Oral Given 5/13/22 0908)         PROGRESS, DATA ANALYSIS, CONSULTS, AND MEDICAL DECISION MAKING    All labs have been independently reviewed by me.  All radiology studies have been reviewed by me and discussed with radiologist dictating the report.   EKG's independently viewed and interpreted by me.  Discussion below represents my analysis of pertinent findings related to patient's condition, differential diagnosis, treatment plan and final disposition.        ED Course as of 05/14/22 1458   Sat May 14, 2022   1457 CBC and chemistry are unremarkable [DP]   1458 Urinalysis is negative [DP]   1458 Troponin and proBNP are normal [DP]   1458 Venous Doppler shows superficial thrombosis in the right upper extremity [DP]   1458 CT scan of the abdomen pelvis shows no acute change when compared to previous.  There is still some stranding around the kidneys bilaterally but it is likely  residual from the pyelonephritis [DP]   1458 Chest x-ray is unremarkable [DP]      ED Course User Index  [DP] Arian Gross MD           PPE: The patient wore a surgical mask throughout the entire patient encounter. I wore an N95.    AS OF 14:58 EDT VITALS:    BP - 101/82  HR - 98  TEMP - 96.3 °F (35.7 °C) (Tympanic)  O2 SATS - 98%        DIAGNOSIS  Final diagnoses:   Left flank pain   Chronic congestive heart failure, unspecified heart failure type (HCC)   Superficial venous thrombosis of right upper extremity         DISPOSITION  Discharge           Arian Gross MD  05/14/22 2894

## 2022-05-13 NOTE — PROGRESS NOTES
Today's right upper venous doppler preliminary report is positive for superficial venous thrombosis in the upper right arm. The preliminary report was called into Dr. Gross.

## 2022-05-16 ENCOUNTER — OFFICE VISIT (OUTPATIENT)
Dept: INTERNAL MEDICINE | Age: 77
End: 2022-05-16

## 2022-05-16 VITALS
TEMPERATURE: 97.3 F | DIASTOLIC BLOOD PRESSURE: 68 MMHG | OXYGEN SATURATION: 97 % | BODY MASS INDEX: 25.33 KG/M2 | WEIGHT: 171 LBS | HEIGHT: 69 IN | HEART RATE: 58 BPM | SYSTOLIC BLOOD PRESSURE: 100 MMHG

## 2022-05-16 DIAGNOSIS — N12 PYELONEPHRITIS: Primary | ICD-10-CM

## 2022-05-16 DIAGNOSIS — E87.6 HYPOKALEMIA: ICD-10-CM

## 2022-05-16 DIAGNOSIS — A41.9 SEPSIS, DUE TO UNSPECIFIED ORGANISM, UNSPECIFIED WHETHER ACUTE ORGAN DYSFUNCTION PRESENT: ICD-10-CM

## 2022-05-16 DIAGNOSIS — I48.20 CHRONIC ATRIAL FIBRILLATION: ICD-10-CM

## 2022-05-16 PROCEDURE — 99495 TRANSJ CARE MGMT MOD F2F 14D: CPT | Performed by: INTERNAL MEDICINE

## 2022-05-16 PROCEDURE — 1111F DSCHRG MED/CURRENT MED MERGE: CPT | Performed by: INTERNAL MEDICINE

## 2022-05-16 NOTE — PROGRESS NOTES
"    I N T E R N A L  M E D I C I N E  J U N O H  K I M,  M D      ENCOUNTER DATE:  05/16/2022    Aj Pattonb / 76 y.o. / male        CC:   (Transitional Care Follow Up Visit)  TCM-Acute pyelonephritis (5/5/2022 - 5/10/2022 ) and ER f/u- Left flank pain  (5/13/22)        Within 48 business hours after discharge our office contacted him via telephone to coordinate his care and needs.      I reviewed and discussed the details of that call along with the discharge summary, hospital problems, inpatient lab results, inpatient diagnostic studies, and consultation reports with the patient.     Date of TCM Phone Call 5/10/2022   [facility]   Date of Admission 5/5/2022   Date of Discharge 5/10/2022   Discharge Disposition Home or Self Care       Risk for Readmission (LACE) Score: 13 (5/10/2022  6:01 AM)            VITALS    Visit Vitals  /68 (BP Location: Left arm)   Pulse 58   Temp 97.3 °F (36.3 °C)   Ht 175.3 cm (69\")   Wt 77.6 kg (171 lb)   SpO2 97%   BMI 25.25 kg/m²       BP Readings from Last 3 Encounters:   05/16/22 100/68   05/13/22 101/82   05/10/22 114/72     Wt Readings from Last 3 Encounters:   05/16/22 77.6 kg (171 lb)   05/13/22 81.6 kg (180 lb)   05/10/22 81.7 kg (180 lb 1.9 oz)      Body mass index is 25.25 kg/m².    HPI:     Date of admission/discharge: As noted above in CC  Hospital: [facility]   Principle Dx: Pyelonephritis/sepsis (Klebsiella on urine culture)  Secondary Dx: Recurrent UTI, Afib   History prior to hospitalization: He has had history of recurrent UTI, sees urologist for enlarged prostate.   Evaluation/Treatment: Diagnosed with with pyleonephritis with sepsis.  Urine culture grew Klebsiella.  He was treated with IV antibiotics and subsequently discharged on Omnicef.  He was started on Cardizem by cardiologist due to intermittent episodes of tachycardia with chronic A. fib.  Course: Patient returned to the emergency department on May 13 for increased dyspnea and feeling " poorly.  He was also experiencing left flank pain.  He also had redness and swelling of the right upper extremity and venous duplex showed superficial thrombophlebitis without evidence of DVT.  His antibiotic course was extended.    Patient Care Team:  Neftali Amezcua MD as PCP - General (Internal Medicine)  Juan Colby MD as Consulting Physician (Cardiology)  Ankush Sky MD as Surgeon (Orthopedic Surgery)  Vale Del Rosario APRN as Nurse Practitioner (Oncology)  Darrion Peña MD as Consulting Physician (Urology)  Tahir Childs MD as Consulting Physician (Pulmonary Disease)  Noman Moseley MD as Consulting Physician (Gastroenterology)  Aura Perry OD (Optometry)  Juwan Diane MD as Consulting Physician (Ophthalmology)  ____________________________________________________________________    ASSESSMENT & PLAN:    1. Pyelonephritis    2. Sepsis, due to unspecified organism, unspecified whether acute organ dysfunction present (HCC)    3. Hypokalemia    4. Chronic atrial fibrillation (HCC)      Orders Placed This Encounter   Procedures   • Basic Metabolic Panel       Summary/Discussion:  • Clinically stable from pyelonephritis and sepsis standpoint.  Complete course of oral antibiotic as prescribed.  • Check BMP for hypokalemia as he is complaining of significant fatigue and generalized weakness symptoms.  His generalized symptoms may be related to start of diltiazem in the hospital for atrial fibrillation.  He was advised to monitor his blood pressure and heart rate and follow-up with his cardiologist.    Return for worsening or lack of improvement, Reassess today's problem(s).    ____________________________________________________________________    REVIEW OF SYSTEMS    Review of Systems  No fever or chills; + moderately severe fatigue  Denies chest pain, palpitations or LAWS ; negative for PND/orthopnea/edema of legs  RUE with decreased swelling/redness  GI negative for diarrhea  or bleeding/melena    negative for dysuria     PHYSICAL EXAMINATION    Physical Exam  Appears tired; no acute distress   Alert with normal thought and judgment.   Cardiovascular: Normal rate  Pulm/Chest: Effort normal, breath sounds normal.     REVIEWED DATA:    Labs:   Lab Results   Component Value Date     05/13/2022    K 3.4 (L) 05/13/2022    CALCIUM 8.6 05/13/2022    AST 20 05/13/2022    ALT 30 05/13/2022    BUN 8 05/13/2022    CREATININE 0.64 (L) 05/13/2022    CREATININE 0.60 (L) 05/10/2022    CREATININE 0.58 (L) 05/09/2022    EGFRIFNONA 87 09/17/2021    EGFRIFAFRI 105 09/17/2021       Lab Results   Component Value Date    WBC 6.16 05/13/2022    HGB 13.0 05/13/2022    HGB 11.9 (L) 05/10/2022    HGB 12.6 (L) 05/09/2022     05/13/2022       Lab Results   Component Value Date    PROTEIN See below: (A) 10/21/2020    GLUCOSEU Negative 05/13/2022    BLOODU Small (1+) (A) 05/13/2022    NITRITEU Negative 05/13/2022    LEUKOCYTESUR Negative 05/13/2022       Imaging:   CT Abdomen Pelvis Without Contrast    Result Date: 5/13/2022  Narrative: CT ABDOMEN AND PELVIS WITHOUT CONTRAST  HISTORY: Flank pain.  TECHNIQUE: Axial CT images of the abdomen and pelvis were obtained without administration of intravenous contrast. The patient was not given oral contrast. Coronal and sagittal reformats were obtained.  COMPARISON: 05/05/2022  FINDINGS: Bilateral adrenal glands are normal. Both kidneys are normal in size and attenuation. No renal calculi or hydronephrosis. There is bilateral perinephric stranding without interim change from prior imaging.  Evaluation of pelvis is limited by streak artifact from patient's bilateral hip prosthesis. The visualized portion of the urinary bladder demonstrates circumferential wall thickening and perivesicular fat stranding.  Bilateral small layering pleural effusions, right greater than left. Marked emphysematous change is seen within the visualized lung fields, which are poorly  evaluated secondary to motion. Noncontrast attenuation of the liver is normal. Punctate calcified granulomas are seen within the liver and spleen. Mild gallbladder fundal wall thickening, possibly related to adenomyomatosis. Follow-up with sonogram is recommended for characterization. The spleen is normal without ductal dilatation. There is no evidence of bowel obstruction. The appendix is not definitively identified. Moderate calcified atherosclerotic plaque is seen within the abdominal aorta and its branches.      Impression: Exam is limited in the absence of contrast and extensive streak artifact from patient's bilateral hip prosthesis. 1. The urinary bladder demonstrates diffuse wall thickening and perivesicular stranding that may suggest cystitis. There is bilateral perinephric fat stranding without significant change from most recent CT. 2. Wall thickening of the gallbladder fundus favored to be related to adenomyomatosis. A nonemergent ultrasound may be performed in an attempt to characterize. 3. Small bilateral layering pleural effusions, right greater than left.  These findings were discussed with Dr. Gross by telephone.  Radiation dose reduction techniques were utilized, including automated exposure control and exposure modulation based on body size.  This report was finalized on 5/13/2022 1:04 PM by Dr. Poonam Rizo M.D.      CT Abdomen Pelvis Without Contrast    Result Date: 5/5/2022  Narrative: CT ABDOMEN AND PELVIS WITHOUT IV CONTRAST  HISTORY: 76-year-old abdominal pain. Smoker, appendectomy, bilateral inguinal hernia repair.  TECHNIQUE: Radiation dose reduction techniques were utilized, including automated exposure control and exposure modulation based on body size. 3 mm images were obtained through the abdomen and pelvis without the administration of IV contrast. Lack of IV contrast limits evaluation of solid, visceral, and vascular structures. Sensitivity for underlying lesions and infection  decreased.  COMPARISON: 07/01/2021  FINDINGS:  LOWER CHEST: Cardiomegaly. Atherosclerosis. Emphysema with pleural and parenchymal scarring and coarsened interstitial markings. Please refer to the prior CT chest for further detail and follow-up recommendations..  ABDOMEN: Liver/Biliary Tract: Morphologic changes of chronic liver disease. Evaluation for underlying lesions limited without contrast. Continued surveillance recommended. Calcified granulomas.  Spleen: Mild splenomegaly (14 cm).  Pancreas: Homogenous attenuation.  Adrenals: Within normal limits.  Kidneys:   The nephric stranding slightly increased from the prior exam with thickening of the lateral conal fascia left greater than right. No focal fluid collections. No hydronephrosis. Lobular contour of the kidneys similar in appearance to the prior. Evaluation for underlying lesions again limited without IV contrast..  Bowel:  Small hiatal hernia with circumferential thickening of the distal esophagus. Paraesophageal and retrocrural nodes similar to the prior. The stomach is incompletely distended. No bowel obstruction. The appendix is not definitively visualized however no acute inflammatory changes in the right lower quadrant. Submucosal fatty deposition in the colon suggesting prior colitis. Sigmoid colon is incompletely distended. Please ensure up-to-date with colonoscopy to exclude underlying process.  Peritoneum: No free fluid or free intraperitoneal air.  Vasculature:    Extensive atherosclerosis abdominal aorta and branch vessels. There is ectasia of the abdominal aorta without focal aneurysmal dilatation.  Lymph Nodes:  Mildly prominent abdominoperitoneal nodes with an index gastrohepatic node measuring 1 cm in short axis similar to the prior. Recommend continued attention on follow-up.  PELVIS:                                 Pelvic organs: Streak artifact from hip prostheses markedly limits evaluation. Bladder incompletely distended. Prostate in  situ. Surgical clips right scrotum.   Abdominal/Pelvic Wall: Tiny fat-containing umbilical hernia.  BONES: Status post bilateral hip arthroplasty. Diffuse bone demineralization. Multilevel degenerative changes..       Impression: Limited noncontrast CT impression: 1. Slightly increased perinephric stranding, please correlate with urinalysis to exclude infection/pyelonephritis. 2. Morphologic changes of chronic liver disease with mild splenomegaly. Please correlate clinically and follow-up. 3. Emphysema. 4. Diverticulosis without acute diverticulitis. 5. Please see above for additional findings/recommendations.  This report was finalized on 5/5/2022 4:37 PM by Dr. Canelo Easley M.D.      XR Chest 1 View    Result Date: 5/13/2022  Narrative: Portable chest radiograph  HISTORY:Shortness of air  TECHNIQUE: Single AP portable radiograph of the chest  COMPARISON:Chest radiograph dating back to 05/05/2022      Impression: FINDINGS AND IMPRESSION: Bibasilar atelectasis and or pleural parenchymal scarring is present, as before. No new pulmonary consolidation, pleural effusion or pneumothorax is seen; however, please note evaluation is limited due to collimation of the left costophrenic angle out of the field-of-view. Cardiac silhouette is moderately enlarged, as before.  This report was finalized on 5/13/2022 8:51 AM by Dr. Fabio Kincaid M.D.      XR Chest 1 View    Result Date: 5/8/2022  Narrative: XR CHEST 1 VW-  HISTORY: Male who is 76 years-old,  dyspnea  TECHNIQUE: Frontal views of the chest  COMPARISON: 05/05/2022  FINDINGS: Heart is enlarged. Pulmonary vasculature is unremarkable. Aorta is calcified. Minimal likely atelectasis at the bases. No pleural effusion, or pneumothorax. No acute osseous process.      Impression: Minimal likely atelectasis at the bases. Cardiomegaly. Follow-up as clinical indications persist.  This report was finalized on 5/8/2022 2:27 PM by Dr. Uzair Dawkins M.D.      XR Chest 1  View    Result Date: 5/6/2022  Narrative: EMERGENCY PORTABLE CHEST 05/05/2022  CLINICAL HISTORY: Shortness of breath.  COMPARISON: This is correlated to a prior portable chest x-ray 03/07/2021.  FINDINGS: The cardiomediastinal is enlarged. The pulmonary vasculature is within normal limits. The lungs are clear. The costophrenic angles are sharp.      Impression: 1. No active disease is seen in the chest. There is an enlarged cardiomediastinal silhouette.  This report was finalized on 5/6/2022 6:23 AM by Dr. Darrion Villarreal M.D.      Duplex Venous Upper Extremity - Right CAR    Result Date: 5/13/2022  Narrative: · Acute right upper extremity superficial thrombophlebitis noted in the basilic (forearm) and cephalic (upper arm). · All other right sided vessels appear normal.      MRI outside films    Result Date: 5/3/2022  Narrative: This procedure was auto-finalized with no dictation required.       Medical Tests:        Summary of old records / correspondence / consultant report:   DC summary re: issues addressed on HPI and ED encounter note re: issues addressed on HPI    Request outside records:         MEDICATIONS   Current Outpatient Medications   Medication Sig Dispense Refill   • Accu-Chek Softclix Lancets lancets Accu-Chek Softclix Lancets   USE TO TEST GLUCOSE QOD     • acetaminophen (TYLENOL) 325 MG tablet Take 2 tablets by mouth Every 4 (Four) Hours As Needed for Mild Pain .     • ALPHAGAN P 0.1 % solution ophthalmic solution Administer 1 drop to both eyes 2 (two) times a day.  6   • atorvastatin (LIPITOR) 80 MG tablet Take 1 tablet by mouth Every Night. 90 tablet 3   • cefdinir (OMNICEF) 300 MG capsule Take 1 capsule by mouth 2 (Two) Times a Day. 10 capsule 0   • clopidogrel (Plavix) 75 MG tablet Take 1 tablet by mouth Daily. 90 tablet 3   • dabigatran etexilate (Pradaxa) 150 MG capsu Take 1 capsule by mouth 2 (Two) Times a Day. 60 capsule 6   • dilTIAZem CD (CARDIZEM CD) 180 MG 24 hr capsule Take 1 capsule by  mouth Daily. 30 capsule 0   • Fluticasone-Umeclidin-Vilant 200-62.5-25 MCG/INH aerosol powder  Inhale 1 puff Daily. Just increased to this strength 3/21     • furosemide (LASIX) 40 MG tablet Take 1 tablet by mouth Daily. 30 tablet 0   • glucose blood test strip Accu-Chek Dannielle Plus test strips   USE TO CHECK GLUCOSE QOD     • glucose blood test strip Accu-Chek Dannielle Plus Meter   USE TO CHECK GLUCOSE QOD     • metFORMIN ER (GLUCOPHAGE-XR) 500 MG 24 hr tablet TAKE 1 TABLET BY MOUTH DAILY WITH DINNER 30 tablet 11   • metoprolol succinate XL (TOPROL-XL) 100 MG 24 hr tablet Take 1 tablet by mouth Every Night. Take 100 mg at night and 50 mg in the morning 30 tablet 0   • metoprolol succinate XL (TOPROL-XL) 50 MG 24 hr tablet Take 1 tablet by mouth Daily. Take 50 mg every morning and 100 mg every night 30 tablet 0   • pantoprazole (PROTONIX) 40 MG EC tablet Take 1 tablet by mouth 2 (Two) Times a Day. 180 tablet 3   • spironolactone (ALDACTONE) 25 MG tablet Take 1 tablet by mouth Daily. 30 tablet 0   • Tafluprost, PF, (ZIOPTAN) 0.0015 % solution ophthalmic solution Administer 1 drop to both eyes Every Night.     • tamsulosin (FLOMAX) 0.4 MG capsule 24 hr capsule Take 2 capsules by mouth Every Night. 30 capsule    • traMADol (ULTRAM) 50 MG tablet Take 1 tablet by mouth Every 8 (Eight) Hours As Needed for Moderate Pain . 30 tablet 0     No current facility-administered medications for this visit.       Current outpatient and discharge medications have been reconciled for the patient.  Reviewed by: Neftali Amezcua MD         Examiner was wearing KN95 mask and exam gloves during the entire duration of the visit. Patient was masked the entire time.   Minimum social distance of 6 ft maintained entire visit except if physical contact was necessary as documented.

## 2022-05-18 LAB
BUN SERPL-MCNC: 11 MG/DL (ref 8–27)
BUN/CREAT SERPL: 13 (ref 10–24)
CALCIUM SERPL-MCNC: 9.1 MG/DL (ref 8.6–10.2)
CHLORIDE SERPL-SCNC: 100 MMOL/L (ref 96–106)
CO2 SERPL-SCNC: 25 MMOL/L (ref 20–29)
CREAT SERPL-MCNC: 0.86 MG/DL (ref 0.76–1.27)
EGFRCR SERPLBLD CKD-EPI 2021: 90 ML/MIN/1.73
GLUCOSE SERPL-MCNC: 118 MG/DL (ref 65–99)
POTASSIUM SERPL-SCNC: 3.9 MMOL/L (ref 3.5–5.2)
SODIUM SERPL-SCNC: 140 MMOL/L (ref 134–144)

## 2022-05-19 ENCOUNTER — APPOINTMENT (OUTPATIENT)
Dept: PAIN MEDICINE | Facility: HOSPITAL | Age: 77
End: 2022-05-19

## 2022-05-26 ENCOUNTER — TELEMEDICINE (OUTPATIENT)
Dept: INTERNAL MEDICINE | Age: 77
End: 2022-05-26

## 2022-05-26 VITALS — BODY MASS INDEX: 26.29 KG/M2 | WEIGHT: 178 LBS | OXYGEN SATURATION: 96 %

## 2022-05-26 DIAGNOSIS — U07.1 LAB TEST POSITIVE FOR DETECTION OF COVID-19 VIRUS: ICD-10-CM

## 2022-05-26 DIAGNOSIS — Z12.2 ENCOUNTER FOR SCREENING FOR LUNG CANCER: ICD-10-CM

## 2022-05-26 DIAGNOSIS — J43.9 PULMONARY EMPHYSEMA, UNSPECIFIED EMPHYSEMA TYPE: Primary | ICD-10-CM

## 2022-05-26 PROCEDURE — 99213 OFFICE O/P EST LOW 20 MIN: CPT | Performed by: NURSE PRACTITIONER

## 2022-05-26 NOTE — PROGRESS NOTES
I N T E R N A L  M E D I C I N E  SHANIQUA Woodall       ENCOUNTER DATE:  05/26/2022    Aj Salazar / 76 y.o. / male        You have chosen to receive care through a telehealth visit.  Do you consent to use a video/audio connection for your medical care today? YES  Location of patient is in [place]       CHIEF COMPLAINT / REASON FOR OFFICE VISIT     TELEHEALTH ENCOUNTER:  positive covid  (Symptoms started Friday . Congestion ,cough , runny nose ,fatigue, sob, 96 O2 stomach discomfort )      ASSESSMENT & PLAN     1. Lab Test Positive for Detection of COVID-19 Virus:       - Continue Zyrtec 5mg daily at Bedtime       - Continue Flonase Nasal Spray as directed       - Continue Mucinex DM as directed        - Quarantine for 5 days       - Go to the ER if symptoms worsen: increased shortness of breath, chest pain       - Patient feeling better and declined Paxlovid at this time      2. COPD:       - Continue Fluticasone as prescribed    SUMMARY/DISCUSSION  • Follow up with Dr Amezcua as scheduled       Time spent: 20 minutes    Return in about 27 days (around 6/22/2022) for Next scheduled follow up.        HISTORY OF PRESENT ILLNESS     76 year old male whom tested positive at Local lab for COVID this past Tuesday. States feeling fine with Zyrtec, Flonase, Mucinex, Negative for fever,  sore throat, headache, loss of taste or smell. Positive for non productive cough. Discussion of COVID treatments and Paxlovid and patient declines at this time. Instructed to go to the ER if increased shortness of breath or chest pain.         REVIEWED DATA     Labs:           Imaging:           Medical Tests:           Summary of old records / correspondence / consultant report:           Request outside records:         Template created by Vanessa MCGOVERN

## 2022-06-22 ENCOUNTER — OFFICE VISIT (OUTPATIENT)
Dept: INTERNAL MEDICINE | Age: 77
End: 2022-06-22

## 2022-06-22 VITALS
DIASTOLIC BLOOD PRESSURE: 44 MMHG | WEIGHT: 165 LBS | HEART RATE: 88 BPM | TEMPERATURE: 97.7 F | BODY MASS INDEX: 24.44 KG/M2 | OXYGEN SATURATION: 97 % | HEIGHT: 69 IN | SYSTOLIC BLOOD PRESSURE: 102 MMHG

## 2022-06-22 DIAGNOSIS — I48.91 ATRIAL FIBRILLATION WITH RAPID VENTRICULAR RESPONSE: Chronic | ICD-10-CM

## 2022-06-22 DIAGNOSIS — I10 PRIMARY HYPERTENSION: Primary | Chronic | ICD-10-CM

## 2022-06-22 DIAGNOSIS — E11.65 TYPE 2 DIABETES MELLITUS WITH HYPERGLYCEMIA, WITHOUT LONG-TERM CURRENT USE OF INSULIN: Chronic | ICD-10-CM

## 2022-06-22 PROCEDURE — 99214 OFFICE O/P EST MOD 30 MIN: CPT | Performed by: INTERNAL MEDICINE

## 2022-06-22 RX ORDER — DORZOLAMIDE HCL 20 MG/ML
1 SOLUTION/ DROPS OPHTHALMIC 2 TIMES DAILY
COMMUNITY
Start: 2022-06-02

## 2022-06-22 RX ORDER — CEPHALEXIN 250 MG/1
250 CAPSULE ORAL DAILY
COMMUNITY
Start: 2022-06-20 | End: 2022-10-24

## 2022-06-22 RX ORDER — CEPHALEXIN 500 MG/1
500 CAPSULE ORAL 3 TIMES DAILY
COMMUNITY
Start: 2022-06-20 | End: 2022-10-24

## 2022-06-22 NOTE — PROGRESS NOTES
I N T E R N A L  M E D I C I N E  J U N O H  K I M,  M D      ENCOUNTER DATE:  06/22/2022    Aj Salazar / 76 y.o. / male      CHIEF COMPLAINT / REASON FOR OFFICE VISIT     Hypertension, Diabetes, Raynaud's disease without gangrene , and Atrial fibrillation with rapid ventricular response      ASSESSMENT & PLAN     Problem List Items Addressed This Visit        High    Type 2 diabetes mellitus with hyperglycemia, without long-term current use of insulin (HCC) (Chronic)    Overview     Continue metformin  mg daily with dinner.            Relevant Medications    metFORMIN ER (GLUCOPHAGE-XR) 500 MG 24 hr tablet       Medium    Hypertension - Primary (Chronic)    Current Assessment & Plan     Continue metoprolol succinate, spironolactone and diltiazem CD.  These meds are being managed by his cardiologist.  Blood pressure is running on the low side but he remains mostly asymptomatic.  He was advised to contact his cardiologist if systolic blood pressure runs less than 100    BP Readings from Last 3 Encounters:   06/22/22 102/44   05/16/22 100/68   05/13/22 101/82               Relevant Medications    furosemide (LASIX) 40 MG tablet    metoprolol succinate XL (TOPROL-XL) 100 MG 24 hr tablet    spironolactone (ALDACTONE) 25 MG tablet    metoprolol succinate XL (TOPROL-XL) 50 MG 24 hr tablet    dilTIAZem CD (CARDIZEM CD) 180 MG 24 hr capsule       Low    Atrial fibrillation with rapid ventricular response (HCC) (Chronic)    Overview     *[place]           Current Assessment & Plan     Continue plans as per cardiology.  He is on diltiazem CD and metoprolol succinate along with Pradaxa.           Relevant Medications    metoprolol succinate XL (TOPROL-XL) 100 MG 24 hr tablet    metoprolol succinate XL (TOPROL-XL) 50 MG 24 hr tablet    clopidogrel (Plavix) 75 MG tablet    dilTIAZem CD (CARDIZEM CD) 180 MG 24 hr capsule        No orders of the defined types were placed in this encounter.    No orders of the  "defined types were placed in this encounter.      SUMMARY/DISCUSSION  •       Next Appointment with me: Visit date not found    Return in about 4 months (around 10/22/2022) for Reassess chronic medical problems.      VITAL SIGNS     Vitals:    06/22/22 1058   BP: 102/44   BP Location: Left arm   Pulse: 88   Temp: 97.7 °F (36.5 °C)   SpO2: 97%   Weight: 74.8 kg (165 lb)   Height: 175.3 cm (69\")       BP Readings from Last 3 Encounters:   06/22/22 102/44   05/16/22 100/68   05/13/22 101/82     Wt Readings from Last 3 Encounters:   06/22/22 74.8 kg (165 lb)   05/26/22 80.7 kg (178 lb)   05/16/22 77.6 kg (171 lb)     Body mass index is 24.37 kg/m².    Blood pressure readings recorded on patient flowsheet:    Time Systolic Diastolic Pulse   4/29/2022  6:00  74 104   4/25/2022  6:00  73 81   4/22/2022  6:00  60 108   4/18/2022  6:00  61 97   4/15/2022  6:00  69 88   4/11/2022  6:00  76 85   4/8/2022  6:00 AM 98 62 87   4/4/2022  6:00  66 93   4/1/2022  6:00  61 91   3/28/2022  6:00  78 78          MEDICATIONS AT THE TIME OF OFFICE VISIT     Current Outpatient Medications on File Prior to Visit   Medication Sig   • Accu-Chek Softclix Lancets lancets Accu-Chek Softclix Lancets   USE TO TEST GLUCOSE QOD   • acetaminophen (TYLENOL) 325 MG tablet Take 2 tablets by mouth Every 4 (Four) Hours As Needed for Mild Pain .   • ALPHAGAN P 0.1 % solution ophthalmic solution Administer 1 drop to both eyes 2 (two) times a day.   • atorvastatin (LIPITOR) 80 MG tablet Take 1 tablet by mouth Every Night.   • cephalexin (KEFLEX) 250 MG capsule Take 250 mg by mouth Daily.   • cephalexin (KEFLEX) 500 MG capsule Take 500 mg by mouth 3 (Three) Times a Day.   • clopidogrel (Plavix) 75 MG tablet Take 1 tablet by mouth Daily.   • dabigatran etexilate (Pradaxa) 150 MG capsu Take 1 capsule by mouth 2 (Two) Times a Day.   • Fluticasone-Umeclidin-Vilant 200-62.5-25 MCG/INH aerosol powder  Inhale 1 " puff Daily. Just increased to this strength 3/21   • furosemide (LASIX) 40 MG tablet Take 1 tablet by mouth Daily.   • glucose blood test strip Accu-Chek Dannielle Plus test strips   USE TO CHECK GLUCOSE QOD   • glucose blood test strip Accu-Chek Dannielle Plus Meter   USE TO CHECK GLUCOSE QOD   • metFORMIN ER (GLUCOPHAGE-XR) 500 MG 24 hr tablet TAKE 1 TABLET BY MOUTH DAILY WITH DINNER   • metoprolol succinate XL (TOPROL-XL) 100 MG 24 hr tablet Take 1 tablet by mouth Every Night. Take 100 mg at night and 50 mg in the morning   • metoprolol succinate XL (TOPROL-XL) 50 MG 24 hr tablet Take 1 tablet by mouth Daily. Take 50 mg every morning and 100 mg every night   • pantoprazole (PROTONIX) 40 MG EC tablet Take 1 tablet by mouth 2 (Two) Times a Day.   • spironolactone (ALDACTONE) 25 MG tablet Take 1 tablet by mouth Daily.   • Tafluprost, PF, (ZIOPTAN) 0.0015 % solution ophthalmic solution Administer 1 drop to both eyes Every Night.   • tamsulosin (FLOMAX) 0.4 MG capsule 24 hr capsule Take 2 capsules by mouth Every Night.   • traMADol (ULTRAM) 50 MG tablet Take 1 tablet by mouth Every 8 (Eight) Hours As Needed for Moderate Pain .   • [DISCONTINUED] cefdinir (OMNICEF) 300 MG capsule Take 1 capsule by mouth 2 (Two) Times a Day.   • dilTIAZem CD (CARDIZEM CD) 180 MG 24 hr capsule Take 1 capsule by mouth Daily.   • dorzolamide (TRUSOPT) 2 % ophthalmic solution Administer 1 drop to both eyes 2 (Two) Times a Day.     No current facility-administered medications on file prior to visit.          HISTORY OF PRESENT ILLNESS     Denies significant changes or problems.  Blood pressure tends to run on the lower side but he remains asymptomatic mostly.  He is on a multidrug regimen for atrial fibrillation including metoprolol and diltiazem CD along with spironolactone and furosemide.  Diabetes remains stable on metformin.        Patient Care Team:  Neftali Amezcua MD as PCP - General (Internal Medicine)  Juan Colby MD as Consulting  Physician (Cardiology)  Ankush Sky MD as Surgeon (Orthopedic Surgery)  Vale Del Rosario APRN as Nurse Practitioner (Oncology)  Darrion Peña MD as Consulting Physician (Urology)  Tahir Childs MD as Consulting Physician (Pulmonary Disease)  Noman Moseley MD as Consulting Physician (Gastroenterology)  Aura Perry, OD (Optometry)  Juwan Diane MD as Consulting Physician (Ophthalmology)    REVIEW OF SYSTEMS     Review of Systems   Constitutional neg except per HPI   Resp neg  CV neg   GI negative for melena or bleeding  Neuro negative for acute change    PHYSICAL EXAMINATION     Physical Exam  General: No acute distress  Psych: Normal thought and judgment   Cardiovascular Rate: normal.   Pulmonary/Chest: Effort normal and breath sounds normal.        REVIEWED DATA     Labs:     Lab Results   Component Value Date     05/17/2022    K 3.9 05/17/2022    CALCIUM 9.1 05/17/2022    AST 20 05/13/2022    ALT 30 05/13/2022    BUN 11 05/17/2022    CREATININE 0.86 05/17/2022    CREATININE 0.64 (L) 05/13/2022    CREATININE 0.60 (L) 05/10/2022    EGFRIFNONA 87 09/17/2021    EGFRIFAFRI 105 09/17/2021       Lab Results   Component Value Date    HGBA1C 6.60 (H) 05/06/2022    HGBA1C 6.00 (H) 09/17/2021    HGBA1C 6.20 (H) 03/08/2021       Lab Results   Component Value Date    LDL 40 09/17/2021    LDL 79 10/23/2020    LDL 90 09/02/2020    HDL 37 (L) 09/17/2021    HDL 37 (L) 10/23/2020    TRIG 117 09/17/2021    TRIG 94 10/23/2020       Lab Results   Component Value Date    TSH 1.880 03/08/2021    TSH 2.45 10/14/2015       Lab Results   Component Value Date    WBC 6.16 05/13/2022    HGB 13.0 05/13/2022     05/13/2022       Lab Results   Component Value Date    MALBCRERATIO 46 (H) 09/17/2021          Imaging:           Medical Tests:           Summary of old records / correspondence / consultant report:           Request outside records:             *Examiner was wearing [id number] mask and eye  protection during the entire duration of the visit. Patient was masked the entire time. Minimum social distance of 6 ft maintained entire visit except if physical contact was necessary as documented.       Template created by Adelso Amezcua MD

## 2022-06-22 NOTE — ASSESSMENT & PLAN NOTE
Continue plans as per cardiology.  He is on diltiazem CD and metoprolol succinate along with Pradaxa.

## 2022-06-22 NOTE — ASSESSMENT & PLAN NOTE
Continue metoprolol succinate, spironolactone and diltiazem CD.  These meds are being managed by his cardiologist.  Blood pressure is running on the low side but he remains mostly asymptomatic.  He was advised to contact his cardiologist if systolic blood pressure runs less than 100    BP Readings from Last 3 Encounters:   06/22/22 102/44   05/16/22 100/68   05/13/22 101/82

## 2022-06-27 DIAGNOSIS — E11.65 CONTROLLED TYPE 2 DIABETES MELLITUS WITH HYPERGLYCEMIA, WITHOUT LONG-TERM CURRENT USE OF INSULIN: Primary | ICD-10-CM

## 2022-06-27 RX ORDER — LANCETS
1 EACH MISCELLANEOUS EVERY OTHER DAY
Qty: 100 EACH | Refills: 0 | Status: SHIPPED | OUTPATIENT
Start: 2022-06-27

## 2022-06-27 NOTE — TELEPHONE ENCOUNTER
----- Message from Aj Salazar sent at 6/27/2022  1:33 PM EDT -----  Regarding: Accu-Chek Softclix Lancets  for some reason i no longer have a rx for these. could you send a order to   [facility] at [phone] thanks

## 2022-07-28 DIAGNOSIS — E11.9 TYPE 2 DIABETES MELLITUS WITHOUT COMPLICATION, WITHOUT LONG-TERM CURRENT USE OF INSULIN: ICD-10-CM

## 2022-07-28 RX ORDER — METFORMIN HYDROCHLORIDE 500 MG/1
500 TABLET, EXTENDED RELEASE ORAL
Qty: 30 TABLET | Refills: 11 | Status: SHIPPED | OUTPATIENT
Start: 2022-07-28

## 2022-07-28 NOTE — TELEPHONE ENCOUNTER
----- Message from Neftali Amezcua MD sent at 7/28/2022 12:52 PM EDT -----  Regarding: FW: metformin      ----- Message -----  From: Melba No MA  Sent: 7/28/2022   7:26 AM EDT  To: Neftali Amezcua MD  Subject: FW: metformin                                      ----- Message -----  From: Aj Salazar  Sent: 7/27/2022   4:55 PM EDT  To: [facility] [address] [place]  Subject: metformin                                        i am taking one pill at supper time.Ara takes 2 in the morning and 2 in the evening

## 2022-10-24 ENCOUNTER — TELEPHONE (OUTPATIENT)
Dept: INTERNAL MEDICINE | Age: 77
End: 2022-10-24

## 2022-10-24 ENCOUNTER — OFFICE VISIT (OUTPATIENT)
Dept: INTERNAL MEDICINE | Age: 77
End: 2022-10-24

## 2022-10-24 VITALS
DIASTOLIC BLOOD PRESSURE: 66 MMHG | WEIGHT: 176.4 LBS | HEART RATE: 85 BPM | TEMPERATURE: 98 F | BODY MASS INDEX: 26.13 KG/M2 | HEIGHT: 69 IN | OXYGEN SATURATION: 97 % | SYSTOLIC BLOOD PRESSURE: 102 MMHG

## 2022-10-24 DIAGNOSIS — B86 SCABIES: ICD-10-CM

## 2022-10-24 DIAGNOSIS — N40.1 BPH WITH OBSTRUCTION/LOWER URINARY TRACT SYMPTOMS: ICD-10-CM

## 2022-10-24 DIAGNOSIS — R21 RASH AND NONSPECIFIC SKIN ERUPTION: Chronic | ICD-10-CM

## 2022-10-24 DIAGNOSIS — I10 PRIMARY HYPERTENSION: Primary | Chronic | ICD-10-CM

## 2022-10-24 DIAGNOSIS — N13.8 BPH WITH OBSTRUCTION/LOWER URINARY TRACT SYMPTOMS: ICD-10-CM

## 2022-10-24 DIAGNOSIS — I73.00 RAYNAUD'S DISEASE WITHOUT GANGRENE: Chronic | ICD-10-CM

## 2022-10-24 DIAGNOSIS — I50.32 CHRONIC DIASTOLIC CHF (CONGESTIVE HEART FAILURE): ICD-10-CM

## 2022-10-24 DIAGNOSIS — E11.65 TYPE 2 DIABETES MELLITUS WITH HYPERGLYCEMIA, WITHOUT LONG-TERM CURRENT USE OF INSULIN: Chronic | ICD-10-CM

## 2022-10-24 LAB
ALBUMIN SERPL-MCNC: 3.9 G/DL (ref 3.5–5.2)
ALBUMIN/GLOB SERPL: 1.8 G/DL
ALP SERPL-CCNC: 89 U/L (ref 39–117)
ALT SERPL-CCNC: 18 U/L (ref 1–41)
AST SERPL-CCNC: 15 U/L (ref 1–40)
BASOPHILS # BLD AUTO: 0.05 10*3/MM3 (ref 0–0.2)
BASOPHILS NFR BLD AUTO: 0.6 % (ref 0–1.5)
BILIRUB SERPL-MCNC: 1.1 MG/DL (ref 0–1.2)
BUN SERPL-MCNC: 16 MG/DL (ref 8–23)
BUN/CREAT SERPL: 18 (ref 7–25)
CALCIUM SERPL-MCNC: 9.2 MG/DL (ref 8.6–10.5)
CHLORIDE SERPL-SCNC: 105 MMOL/L (ref 98–107)
CHOLEST SERPL-MCNC: 112 MG/DL (ref 0–200)
CO2 SERPL-SCNC: 28.3 MMOL/L (ref 22–29)
CREAT SERPL-MCNC: 0.89 MG/DL (ref 0.76–1.27)
EGFRCR SERPLBLD CKD-EPI 2021: 89.4 ML/MIN/1.73
EOSINOPHIL # BLD AUTO: 0.12 10*3/MM3 (ref 0–0.4)
EOSINOPHIL NFR BLD AUTO: 1.4 % (ref 0.3–6.2)
ERYTHROCYTE [DISTWIDTH] IN BLOOD BY AUTOMATED COUNT: 13.4 % (ref 12.3–15.4)
GLOBULIN SER CALC-MCNC: 2.2 GM/DL
GLUCOSE SERPL-MCNC: 116 MG/DL (ref 65–99)
HBA1C MFR BLD: 6.1 % (ref 4.8–5.6)
HCT VFR BLD AUTO: 41.5 % (ref 37.5–51)
HDLC SERPL-MCNC: 47 MG/DL (ref 40–60)
HGB BLD-MCNC: 14.3 G/DL (ref 13–17.7)
IMM GRANULOCYTES # BLD AUTO: 0.09 10*3/MM3 (ref 0–0.05)
IMM GRANULOCYTES NFR BLD AUTO: 1.1 % (ref 0–0.5)
LDLC SERPL CALC-MCNC: 49 MG/DL (ref 0–100)
LYMPHOCYTES # BLD AUTO: 1.32 10*3/MM3 (ref 0.7–3.1)
LYMPHOCYTES NFR BLD AUTO: 15.7 % (ref 19.6–45.3)
MCH RBC QN AUTO: 29.9 PG (ref 26.6–33)
MCHC RBC AUTO-ENTMCNC: 34.5 G/DL (ref 31.5–35.7)
MCV RBC AUTO: 86.6 FL (ref 79–97)
MONOCYTES # BLD AUTO: 0.76 10*3/MM3 (ref 0.1–0.9)
MONOCYTES NFR BLD AUTO: 9 % (ref 5–12)
NEUTROPHILS # BLD AUTO: 6.08 10*3/MM3 (ref 1.7–7)
NEUTROPHILS NFR BLD AUTO: 72.2 % (ref 42.7–76)
NRBC BLD AUTO-RTO: 0 /100 WBC (ref 0–0.2)
PLATELET # BLD AUTO: 220 10*3/MM3 (ref 140–450)
POTASSIUM SERPL-SCNC: 4.1 MMOL/L (ref 3.5–5.2)
PROT SERPL-MCNC: 6.1 G/DL (ref 6–8.5)
RBC # BLD AUTO: 4.79 10*6/MM3 (ref 4.14–5.8)
SODIUM SERPL-SCNC: 143 MMOL/L (ref 136–145)
TRIGL SERPL-MCNC: 76 MG/DL (ref 0–150)
VLDLC SERPL CALC-MCNC: 16 MG/DL (ref 5–40)
WBC # BLD AUTO: 8.42 10*3/MM3 (ref 3.4–10.8)

## 2022-10-24 PROCEDURE — 99214 OFFICE O/P EST MOD 30 MIN: CPT | Performed by: NURSE PRACTITIONER

## 2022-10-24 RX ORDER — MIRABEGRON 25 MG/1
TABLET, FILM COATED, EXTENDED RELEASE ORAL
COMMUNITY
Start: 2022-09-23

## 2022-10-24 RX ORDER — IVERMECTIN 3 MG/1
3 TABLET ORAL ONCE
Qty: 1 TABLET | Refills: 0 | Status: SHIPPED | OUTPATIENT
Start: 2022-10-24 | End: 2022-10-24

## 2022-10-24 RX ORDER — NITROFURANTOIN 25; 75 MG/1; MG/1
CAPSULE ORAL
COMMUNITY
Start: 2022-09-24 | End: 2022-11-02

## 2022-10-24 RX ORDER — METFORMIN HYDROCHLORIDE 500 MG/1
TABLET, EXTENDED RELEASE ORAL
COMMUNITY
Start: 2022-07-28 | End: 2022-11-02 | Stop reason: SDUPTHER

## 2022-10-24 NOTE — ASSESSMENT & PLAN NOTE
Appears like scabies and recently treated with Keflex x 2 without improvement. Will try Ivermectin and follow.

## 2022-10-24 NOTE — PROGRESS NOTES
I N T E R N A L  M E D I C I N E  Vanessa Ruelas, APRBRIELLE    ENCOUNTER DATE:  10/24/2022    Aj Salazar / 76 y.o. / male      CHIEF COMPLAINT / REASON FOR OFFICE VISIT     Follow-up (Trouble clearing throat of phlegm, ), Hypertension, Rash (Rash all over, feels like little bugs crawling under his skin), and Hand Pain (Index and middle finger feel cold and numb, feet do too sometimes. )      ASSESSMENT & PLAN     Diagnoses and all orders for this visit:    1. Primary hypertension (Primary)  Assessment & Plan:  Hypertension is improving with treatment. Continue Metoprolol succinate XL 50 mg daily, Cardizem  mg daily, and Spironolctone 25 mg daily. Managed per Cardiology.   Continue current treatment regimen.  Weight loss.  Blood pressure will be reassessed at the next regular appointment.    Orders:  -     Comprehensive metabolic panel  -     Lipid panel  -     CBC w AUTO Differential    2. Chronic diastolic CHF (congestive heart failure) (HCC)  Assessment & Plan:  Congestive heart failure due to coronary artery disease (CAD).  Heart failure is improving with treatment.    Continue current treatment regimen.  Dietary sodium restriction.  Encouraged daily monitoring of the patient's weight.  Continue current medications.  Heart failure will be reassessed as scheduled per cardiology.    Orders:  -     Hemoglobin A1c    3. Type 2 diabetes mellitus with hyperglycemia, without long-term current use of insulin (HCC)  Overview:  Continue metformin  mg daily with dinner.     Assessment & Plan:  Diabetes is improving with treatment.  Continue Metformin 500 mg daily. States blood sugars typically 110-125.   Continue current treatment regimen.  Diabetes will be reassessed next scheduled visit.    4. BPH with obstruction/lower urinary tract symptoms       - Followed by Dr Peña. Continue Flomax 0.4 mg daily and Myrbetriq 25 mg daily     5. Rash and nonspecific skin eruption/Scabies  Assessment & Plan:  Appears  "like scabies and recently treated with Keflex x 2 without improvement. Will try Ivermectin and follow.   -     ivermectin (STROMECTOL) 3 MG tablet tablet; Take 1 tablet by mouth 1 (One) Time for 1 dose.  Dispense: 1 tablet; Refill: 0    6. Raynaud's disease without gangrene  Assessment & Plan:  Continues with cold fingers and feet. Currently on beta blocker for his heart and Plavix.       SUMMARY/DISCUSSION  • Discussion of allergies and continuation of over the counter allergy medication/Benedryl as needed  • Discussion of Raynaud's Disease and wearing gloves, keeping hands and feet warm. Currently on Beta blocker. Does not want additional medications at this time  • Discussion of what appears to be scabies all over his chest and arms. Has tried Keflex in the past without relief. Will try Ivermectin and follow  • Follow up with Cardiology as scheduled  • Follow up with Urology as scheduled  • Follow up with Dr Amezcua as scheduled and as needed  • I spent 30 min in direct care of this patient on this date of service. This time includes times spent by me in the following activities: Preparing for the visit, obtaining and/or reviewing a separately obtained history, performing a medically appropriate examination and/or evaluation, reviewing medical records, reviewing tests, ordering medications, tests, or procedures, counseling and educating the patient, documenting information in the medical record and reviewing office note/correspondence from other providers.    Return in about 6 months (around 4/24/2023) for Next scheduled follow up.      VITAL SIGNS     Visit Vitals  /66 (BP Location: Left arm, Patient Position: Sitting, Cuff Size: Adult)   Pulse 85   Temp 98 °F (36.7 °C) (Temporal)   Ht 175.3 cm (69\")   Wt 80 kg (176 lb 6.4 oz)   SpO2 97%   BMI 26.05 kg/m²           BP Readings from Last 3 Encounters:   10/24/22 102/66   06/22/22 102/44   05/16/22 100/68     Wt Readings from Last 3 Encounters:   10/24/22 80 kg " (176 lb 6.4 oz)   06/22/22 74.8 kg (165 lb)   05/26/22 80.7 kg (178 lb)     Body mass index is 26.05 kg/m².    Blood pressure readings recorded on patient flowsheet:  No flowsheet data found.          MEDICATIONS AT THE TIME OF OFFICE VISIT     Current Outpatient Medications on File Prior to Visit   Medication Sig Dispense Refill   • Accu-Chek Softclix Lancets lancets 1 each by Other route Every Other Day. Use as instructed 100 each 0   • acetaminophen (TYLENOL) 325 MG tablet Take 2 tablets by mouth Every 4 (Four) Hours As Needed for Mild Pain .     • ALPHAGAN P 0.1 % solution ophthalmic solution Administer 1 drop to both eyes 2 (two) times a day.  6   • atorvastatin (LIPITOR) 80 MG tablet Take 1 tablet by mouth Every Night. 90 tablet 3   • clopidogrel (Plavix) 75 MG tablet Take 1 tablet by mouth Daily. 90 tablet 3   • dabigatran etexilate (Pradaxa) 150 MG capsu Take 1 capsule by mouth 2 (Two) Times a Day. 60 capsule 6   • dorzolamide (TRUSOPT) 2 % ophthalmic solution Administer 1 drop to both eyes 2 (Two) Times a Day.     • Fluticasone-Umeclidin-Vilant 200-62.5-25 MCG/INH aerosol powder  Inhale 1 puff Daily. Just increased to this strength 3/21     • furosemide (LASIX) 40 MG tablet Take 1 tablet by mouth Daily. 30 tablet 0   • glucose blood test strip Accu-Chek Dannielle Plus test strips   USE TO CHECK GLUCOSE QOD     • glucose blood test strip Accu-Chek Dannielle Plus Meter   USE TO CHECK GLUCOSE QOD     • metFORMIN ER (GLUCOPHAGE-XR) 500 MG 24 hr tablet Take 1 tablet by mouth Daily With Dinner. 30 tablet 11   • metoprolol succinate XL (TOPROL-XL) 100 MG 24 hr tablet Take 1 tablet by mouth Every Night. Take 100 mg at night and 50 mg in the morning 30 tablet 0   • metoprolol succinate XL (TOPROL-XL) 50 MG 24 hr tablet Take 1 tablet by mouth Daily. Take 50 mg every morning and 100 mg every night 30 tablet 0   • Myrbetriq 25 MG tablet sustained-release 24 hour 24 hr tablet      • nitrofurantoin, macrocrystal-monohydrate,  (MACROBID) 100 MG capsule      • pantoprazole (PROTONIX) 40 MG EC tablet Take 1 tablet by mouth 2 (Two) Times a Day. 180 tablet 3   • spironolactone (ALDACTONE) 25 MG tablet Take 1 tablet by mouth Daily. 30 tablet 0   • Tafluprost, PF, (ZIOPTAN) 0.0015 % solution ophthalmic solution Administer 1 drop to both eyes Every Night.     • tamsulosin (FLOMAX) 0.4 MG capsule 24 hr capsule Take 2 capsules by mouth Every Night. 30 capsule    • traMADol (ULTRAM) 50 MG tablet Take 1 tablet by mouth Every 8 (Eight) Hours As Needed for Moderate Pain . 30 tablet 0   • [DISCONTINUED] cephalexin (KEFLEX) 250 MG capsule Take 250 mg by mouth Daily.     • dilTIAZem CD (CARDIZEM CD) 180 MG 24 hr capsule Take 1 capsule by mouth Daily. 30 capsule 0   • metFORMIN ER (GLUCOPHAGE-XR) 500 MG 24 hr tablet      • [DISCONTINUED] cephalexin (KEFLEX) 500 MG capsule Take 500 mg by mouth 3 (Three) Times a Day.       No current facility-administered medications on file prior to visit.        HISTORY OF PRESENT ILLNESS     76 year old male being seen today for continued rash on his chest, neck, both arms- states feels like bugs crawling all over him. Has tried Keflex in the past x 2 without relief. States having drainage in his throat. Not currently on antihistamines and will try benadryl as needed. States continues with cold fingers and feet, currently not on medication for Raynaud's and wants to hold off on new medications at this time. States recently seen by Urology for continued urinary tract infections, states that his wife takes cranberry juice/tablets daily and patient states going to start and see if this helps. States is drinking enough water daily. Hypertension controlled with current medications. Diabetes Mellitus controlled with blood sugars between 110-125, states checks his blood sugars twice weekly.       Patient Care Team:  Neftali Amezcua MD as PCP - General (Internal Medicine)  Juan Colby MD as Consulting Physician  (Cardiology)  Ankush Sky MD as Surgeon (Orthopedic Surgery)  Vale Del Rosario APRN as Nurse Practitioner (Oncology)  Darrion Peña MD as Consulting Physician (Urology)  Tahir Childs MD as Consulting Physician (Pulmonary Disease)  Noman Moseley MD as Consulting Physician (Gastroenterology)  Aura Perry, FERNY (Optometry)  Juwan Diane MD as Consulting Physician (Ophthalmology)    REVIEW OF SYSTEMS     Review of Systems   Constitutional: Negative.  Negative for chills, fatigue and fever.   HENT: Positive for rhinorrhea. Negative for congestion, sinus pressure, sinus pain and sore throat.    Eyes: Negative.    Respiratory: Negative.  Negative for cough, chest tightness and shortness of breath.    Cardiovascular: Negative.  Negative for chest pain and palpitations.   Gastrointestinal: Negative.    Endocrine: Negative.  Negative for polydipsia, polyphagia and polyuria.   Genitourinary: Negative.    Musculoskeletal:        States cold fingers and feet.    Skin: Positive for rash.        Rash with dried scabs on both arms and chest   Allergic/Immunologic: Positive for environmental allergies.   Neurological: Negative.  Negative for dizziness and headaches.   Hematological: Bruises/bleeds easily.   Psychiatric/Behavioral: Negative.           PHYSICAL EXAMINATION     Physical Exam  Cardiovascular:      Rate and Rhythm: Normal rate and regular rhythm.      Pulses: Normal pulses.      Heart sounds: Normal heart sounds.   Pulmonary:      Effort: Pulmonary effort is normal.      Breath sounds: Normal breath sounds.   Abdominal:      General: Bowel sounds are normal.      Palpations: Abdomen is soft.   Musculoskeletal:         General: Normal range of motion.   Skin:     Findings: Rash present.      Comments: Has small pinpoint red rash on both arms and chest with white scabs noted. States feels like bugs crawling all over him.    Neurological:      Mental Status: He is alert.              REVIEWED DATA     Labs:     Lab Results   Component Value Date     05/17/2022    K 3.9 05/17/2022    CALCIUM 9.1 05/17/2022    AST 20 05/13/2022    ALT 30 05/13/2022    BUN 11 05/17/2022    CREATININE 0.86 05/17/2022    CREATININE 0.64 (L) 05/13/2022    CREATININE 0.60 (L) 05/10/2022    EGFRIFNONA 87 09/17/2021    EGFRIFAFRI 105 09/17/2021       Lab Results   Component Value Date    HGBA1C 6.60 (H) 05/06/2022    HGBA1C 6.00 (H) 09/17/2021    HGBA1C 6.20 (H) 03/08/2021       Lab Results   Component Value Date    LDL 40 09/17/2021    LDL 79 10/23/2020    LDL 90 09/02/2020    HDL 37 (L) 09/17/2021    HDL 37 (L) 10/23/2020    TRIG 117 09/17/2021    TRIG 94 10/23/2020       Lab Results   Component Value Date    TSH 1.880 03/08/2021    TSH 2.45 10/14/2015       Lab Results   Component Value Date    WBC 6.16 05/13/2022    HGB 13.0 05/13/2022     05/13/2022       Lab Results   Component Value Date    MALBCRERATIO 46 (H) 09/17/2021          Imaging:           Medical Tests:           Summary of old records / correspondence / consultant report:           Request outside records:           SHANIQUA Woodall

## 2022-10-24 NOTE — ASSESSMENT & PLAN NOTE
Congestive heart failure due to coronary artery disease (CAD).  Heart failure is improving with treatment.    Continue current treatment regimen.  Dietary sodium restriction.  Encouraged daily monitoring of the patient's weight.  Continue current medications.  Heart failure will be reassessed as scheduled per cardiology.

## 2022-10-24 NOTE — TELEPHONE ENCOUNTER
Caller: Aj Salazar    Relationship: Self    Best call back number: [phone]    What is the best time to reach you: ANY    What was the call regarding: PATIENT'S PHARMACY [id number] - [place], [place] - [address] AT [facility] - [phone]  - [phone] FX  IS NEEDING A CALL ON ivermectin (STROMECTOL) 3 MG tablet tablet.     Do you require a callback: PLEASE CALL PHARMACY TO STRAIGHTEN OUT ISSUE. PLEASE CALL AND ADVISE PATIENT.

## 2022-10-24 NOTE — ASSESSMENT & PLAN NOTE
Diabetes is improving with treatment.  Continue Metformin 500 mg daily. States blood sugars typically 110-125.   Continue current treatment regimen.  Diabetes will be reassessed next scheduled visit.

## 2022-10-24 NOTE — ASSESSMENT & PLAN NOTE
Hypertension is improving with treatment. Continue Metoprolol succinate XL 50 mg daily, Cardizem  mg daily, and Spironolctone 25 mg daily. Managed per Cardiology.   Continue current treatment regimen.  Weight loss.  Blood pressure will be reassessed at the next regular appointment.

## 2022-10-26 RX ORDER — IVERMECTIN 3 MG/1
3 TABLET ORAL DAILY
COMMUNITY
Start: 2022-10-25 | End: 2022-10-26 | Stop reason: SDUPTHER

## 2022-10-26 RX ORDER — IVERMECTIN 3 MG/1
3 TABLET ORAL DAILY
Qty: 1 TABLET | Refills: 0 | Status: SHIPPED | OUTPATIENT
Start: 2022-10-26 | End: 2022-11-02

## 2022-10-26 NOTE — TELEPHONE ENCOUNTER
CALLED PT TO RELAY MESSAGE THAT SAMMY HAD CALLED THE PHARMACY AND CORRECTED THE RX, NO ANSWER, LDVM THAT HIS RX HAD BEEN CORRECTED AND IF HE HAD ANY OTHER QUESTIONS OR CONCERNS TO CALL THE OFFICE BACK.

## 2022-11-02 ENCOUNTER — OFFICE VISIT (OUTPATIENT)
Dept: INTERNAL MEDICINE | Age: 77
End: 2022-11-02

## 2022-11-02 VITALS
HEART RATE: 100 BPM | TEMPERATURE: 97.3 F | BODY MASS INDEX: 26.51 KG/M2 | DIASTOLIC BLOOD PRESSURE: 76 MMHG | SYSTOLIC BLOOD PRESSURE: 122 MMHG | HEIGHT: 69 IN | OXYGEN SATURATION: 99 % | WEIGHT: 179 LBS

## 2022-11-02 DIAGNOSIS — L29.9 ITCHING: Primary | ICD-10-CM

## 2022-11-02 DIAGNOSIS — R21 RASH AND NONSPECIFIC SKIN ERUPTION: ICD-10-CM

## 2022-11-02 PROCEDURE — 99213 OFFICE O/P EST LOW 20 MIN: CPT

## 2022-11-02 RX ORDER — HYDROXYZINE HYDROCHLORIDE 25 MG/1
25 TABLET, FILM COATED ORAL NIGHTLY PRN
Qty: 30 TABLET | Refills: 0 | Status: SHIPPED | OUTPATIENT
Start: 2022-11-02

## 2022-11-02 NOTE — PROGRESS NOTES
"    I N T E R N A L  M E D I C I N E  Michelle Naqvi, SHANIQUA    ENCOUNTER DATE:  11/02/2022    Aj Pattonb / 76 y.o. / male      CHIEF COMPLAINT / REASON FOR OFFICE VISIT     Rash      ASSESSMENT & PLAN     Diagnoses and all orders for this visit:    1. Itching (Primary)  -     hydrOXYzine (ATARAX) 25 MG tablet; Take 1 tablet by mouth At Night As Needed for Itching.  Dispense: 30 tablet; Refill: 0    2. Rash and nonspecific skin eruption         SUMMARY/DISCUSSION  • He is agreeable to trial hydroxyzine for itching.  Will follow up with dermatology next week.  Will provide up in a few days, and will return for worsening symptoms.  • Advised to keep skin clean, dry, and well moisturized with Aquaphor.        Next Appointment with me: 12/13/2022    No follow-ups on file.      VITAL SIGNS     Visit Vitals  /76   Pulse 100   Temp 97.3 °F (36.3 °C)   Ht 175.3 cm (69.02\")   Wt 81.2 kg (179 lb)   SpO2 99%   BMI 26.42 kg/m²             Wt Readings from Last 3 Encounters:   11/02/22 81.2 kg (179 lb)   10/24/22 80 kg (176 lb 6.4 oz)   06/22/22 74.8 kg (165 lb)     Body mass index is 26.42 kg/m².        MEDICATIONS AT THE TIME OF OFFICE VISIT     Current Outpatient Medications on File Prior to Visit   Medication Sig Dispense Refill   • Accu-Chek Softclix Lancets lancets 1 each by Other route Every Other Day. Use as instructed 100 each 0   • acetaminophen (TYLENOL) 325 MG tablet Take 2 tablets by mouth Every 4 (Four) Hours As Needed for Mild Pain .     • ALPHAGAN P 0.1 % solution ophthalmic solution Administer 1 drop to both eyes 2 (two) times a day.  6   • atorvastatin (LIPITOR) 80 MG tablet Take 1 tablet by mouth Every Night. 90 tablet 3   • clopidogrel (Plavix) 75 MG tablet Take 1 tablet by mouth Daily. 90 tablet 3   • dabigatran etexilate (Pradaxa) 150 MG capsu Take 1 capsule by mouth 2 (Two) Times a Day. 60 capsule 6   • dilTIAZem CD (CARDIZEM CD) 180 MG 24 hr capsule Take 1 capsule by mouth Daily. 30 capsule 0   • " dorzolamide (TRUSOPT) 2 % ophthalmic solution Administer 1 drop to both eyes 2 (Two) Times a Day.     • Fluticasone-Umeclidin-Vilant 200-62.5-25 MCG/INH aerosol powder  Inhale 1 puff Daily. Just increased to this strength 3/21     • furosemide (LASIX) 40 MG tablet Take 1 tablet by mouth Daily. 30 tablet 0   • glucose blood test strip Accu-Chek Dannielle Plus test strips   USE TO CHECK GLUCOSE QOD     • glucose blood test strip Accu-Chek Danneille Plus Meter   USE TO CHECK GLUCOSE QOD     • metFORMIN ER (GLUCOPHAGE-XR) 500 MG 24 hr tablet Take 1 tablet by mouth Daily With Dinner. 30 tablet 11   • metoprolol succinate XL (TOPROL-XL) 100 MG 24 hr tablet Take 1 tablet by mouth Every Night. Take 100 mg at night and 50 mg in the morning 30 tablet 0   • metoprolol succinate XL (TOPROL-XL) 50 MG 24 hr tablet Take 1 tablet by mouth Daily. Take 50 mg every morning and 100 mg every night 30 tablet 0   • Myrbetriq 25 MG tablet sustained-release 24 hour 24 hr tablet      • pantoprazole (PROTONIX) 40 MG EC tablet Take 1 tablet by mouth 2 (Two) Times a Day. 180 tablet 3   • spironolactone (ALDACTONE) 25 MG tablet Take 1 tablet by mouth Daily. 30 tablet 0   • Tafluprost, PF, (ZIOPTAN) 0.0015 % solution ophthalmic solution Administer 1 drop to both eyes Every Night.     • tamsulosin (FLOMAX) 0.4 MG capsule 24 hr capsule Take 2 capsules by mouth Every Night. 30 capsule    • traMADol (ULTRAM) 50 MG tablet Take 1 tablet by mouth Every 8 (Eight) Hours As Needed for Moderate Pain . 30 tablet 0   • [DISCONTINUED] ivermectin (STROMECTOL) 3 MG tablet tablet Take 1 tablet by mouth Daily. 1 tablet 0   • [DISCONTINUED] metFORMIN ER (GLUCOPHAGE-XR) 500 MG 24 hr tablet      • [DISCONTINUED] nitrofurantoin, macrocrystal-monohydrate, (MACROBID) 100 MG capsule        No current facility-administered medications on file prior to visit.        HISTORY OF PRESENT ILLNESS     Here today as follow up for scabies.  He was diagnosed and treated for presumed  scabies with ivermectin in our office on October 24 by Vanessa MCGOVERN.  He reports no changes in symptoms following completion of treatment.    Symptoms started in September with a erythremic rash on bilateral arms and has since spread to chest, abdomen and back.  Rash is pruritic.  Denies any new medications, soaps, lotions.  No interdigital rash.  Reports he developed a similar rash when he experienced a prior allergic reaction to bactrim several years ago.    He is scheduled to follow up with dermatology 1 week from today.        Patient Care Team:  Neftali Amezcua MD as PCP - General (Internal Medicine)  Juan Colby MD as Consulting Physician (Cardiology)  Ankush Sky MD as Surgeon (Orthopedic Surgery)  Vale Del Rosario APRN as Nurse Practitioner (Oncology)  Darrion Peña MD as Consulting Physician (Urology)  Tahir Childs MD as Consulting Physician (Pulmonary Disease)  Noman Moseley MD as Consulting Physician (Gastroenterology)  Aura Perry OD (Optometry)  Juwan Diane MD as Consulting Physician (Ophthalmology)    REVIEW OF SYSTEMS     Review of Systems   Constitutional: Negative for chills, fever and unexpected weight change.   Respiratory: Negative for cough, chest tightness and shortness of breath.    Cardiovascular: Negative for chest pain, palpitations and leg swelling.   Skin: Positive for rash.   Neurological: Negative for dizziness, weakness, light-headedness and headaches.   Psychiatric/Behavioral: The patient is not nervous/anxious.           PHYSICAL EXAMINATION     Physical Exam  Vitals reviewed.   Constitutional:       General: He is not in acute distress.     Appearance: Normal appearance. He is not ill-appearing, toxic-appearing or diaphoretic.   HENT:      Head: Normocephalic and atraumatic.   Cardiovascular:      Rate and Rhythm: Normal rate and regular rhythm.      Heart sounds: Normal heart sounds.   Pulmonary:      Effort: Pulmonary effort is  normal.      Breath sounds: Normal breath sounds.   Musculoskeletal:      Right lower leg: No edema.      Left lower leg: No edema.   Skin:     General: Skin is warm and dry.      Findings: Rash present. Rash is crusting and macular.      Comments: Macular pink rash with isolated areas of crusting to bilateral arms, abdomen and back.   Neurological:      Mental Status: He is alert and oriented to person, place, and time. Mental status is at baseline.   Psychiatric:         Mood and Affect: Mood normal.         Behavior: Behavior normal.         Thought Content: Thought content normal.         Judgment: Judgment normal.           REVIEWED DATA     Labs:           Imaging:            Medical Tests:           Summary of old records / correspondence / consultant report:           Request outside records:

## 2022-12-06 ENCOUNTER — OFFICE VISIT (OUTPATIENT)
Dept: INTERNAL MEDICINE | Age: 77
End: 2022-12-06

## 2022-12-06 VITALS
DIASTOLIC BLOOD PRESSURE: 76 MMHG | SYSTOLIC BLOOD PRESSURE: 122 MMHG | OXYGEN SATURATION: 98 % | BODY MASS INDEX: 26.51 KG/M2 | HEART RATE: 81 BPM | HEIGHT: 69 IN | TEMPERATURE: 97.6 F | WEIGHT: 179 LBS

## 2022-12-06 DIAGNOSIS — L08.9 INFECTED ABRASION OF SKIN OF RIGHT LOWER LEG: Primary | ICD-10-CM

## 2022-12-06 DIAGNOSIS — L03.115 CELLULITIS OF RIGHT LOWER EXTREMITY: ICD-10-CM

## 2022-12-06 DIAGNOSIS — S80.811A INFECTED ABRASION OF SKIN OF RIGHT LOWER LEG: Primary | ICD-10-CM

## 2022-12-06 PROCEDURE — 99213 OFFICE O/P EST LOW 20 MIN: CPT

## 2022-12-06 RX ORDER — DOXYCYCLINE HYCLATE 100 MG/1
100 CAPSULE ORAL 2 TIMES DAILY
Qty: 14 CAPSULE | Refills: 0 | Status: SHIPPED | OUTPATIENT
Start: 2022-12-06 | End: 2022-12-13

## 2022-12-06 RX ORDER — BUMETANIDE 1 MG/1
TABLET ORAL
COMMUNITY

## 2022-12-06 RX ORDER — NITROFURANTOIN 25; 75 MG/1; MG/1
CAPSULE ORAL
COMMUNITY

## 2022-12-06 NOTE — PROGRESS NOTES
"    I N T E R N A L  M E D I C I N E  Michelle NaqviSHANIQUA    ENCOUNTER DATE:  12/06/2022    Aj Pattonb / 77 y.o. / male      CHIEF COMPLAINT / REASON FOR OFFICE VISIT     Laceration (Right lower leg, scrap on car door 11-24-22/Red,no puss )      ASSESSMENT & PLAN     Diagnoses and all orders for this visit:    1. Infected abrasion of skin of right lower leg (Primary)  -     Ambulatory Referral to Wound Clinic  -     doxycycline (VIBRAMYCIN) 100 MG capsule; Take 1 capsule by mouth 2 (Two) Times a Day for 7 days.  Dispense: 14 capsule; Refill: 0    2. Cellulitis of right lower extremity  -     doxycycline (VIBRAMYCIN) 100 MG capsule; Take 1 capsule by mouth 2 (Two) Times a Day for 7 days.  Dispense: 14 capsule; Refill: 0         SUMMARY/DISCUSSION  • Educated on importance of closely monitoring symptoms and visiting ER for any fever, chills, worsening area of erythema, purulent drainage, warmth, pain.  • Will reassess at 1 week follow up appointment.      Next Appointment with me: 12/13/2022    Return for Next scheduled follow up.      VITAL SIGNS     Visit Vitals  /76   Pulse 81   Temp 97.6 °F (36.4 °C)   Ht 175.3 cm (69.02\")   Wt 81.2 kg (179 lb)   SpO2 98%   BMI 26.42 kg/m²             Wt Readings from Last 3 Encounters:   12/06/22 81.2 kg (179 lb)   11/02/22 81.2 kg (179 lb)   10/24/22 80 kg (176 lb 6.4 oz)     Body mass index is 26.42 kg/m².        MEDICATIONS AT THE TIME OF OFFICE VISIT     Current Outpatient Medications on File Prior to Visit   Medication Sig Dispense Refill   • Accu-Chek Softclix Lancets lancets 1 each by Other route Every Other Day. Use as instructed 100 each 0   • acetaminophen (TYLENOL) 325 MG tablet Take 2 tablets by mouth Every 4 (Four) Hours As Needed for Mild Pain .     • ALPHAGAN P 0.1 % solution ophthalmic solution Administer 1 drop to both eyes 2 (two) times a day.  6   • atorvastatin (LIPITOR) 80 MG tablet Take 1 tablet by mouth Every Night. 90 tablet 3   • clopidogrel " (Plavix) 75 MG tablet Take 1 tablet by mouth Daily. 90 tablet 3   • dabigatran etexilate (Pradaxa) 150 MG capsu Take 1 capsule by mouth 2 (Two) Times a Day. 60 capsule 6   • dilTIAZem CD (CARDIZEM CD) 180 MG 24 hr capsule Take 1 capsule by mouth Daily. 30 capsule 0   • dorzolamide (TRUSOPT) 2 % ophthalmic solution Administer 1 drop to both eyes 2 (Two) Times a Day.     • Fluticasone-Umeclidin-Vilant 200-62.5-25 MCG/INH aerosol powder  Inhale 1 puff Daily. Just increased to this strength 3/21     • furosemide (LASIX) 40 MG tablet Take 1 tablet by mouth Daily. 30 tablet 0   • glucose blood test strip Accu-Chek Dannielle Plus test strips   USE TO CHECK GLUCOSE QOD     • glucose blood test strip Accu-Chek Dannielle Plus Meter   USE TO CHECK GLUCOSE QOD     • hydrOXYzine (ATARAX) 25 MG tablet Take 1 tablet by mouth At Night As Needed for Itching. 30 tablet 0   • metFORMIN ER (GLUCOPHAGE-XR) 500 MG 24 hr tablet Take 1 tablet by mouth Daily With Dinner. 30 tablet 11   • metoprolol succinate XL (TOPROL-XL) 100 MG 24 hr tablet Take 1 tablet by mouth Every Night. Take 100 mg at night and 50 mg in the morning 30 tablet 0   • metoprolol succinate XL (TOPROL-XL) 50 MG 24 hr tablet Take 1 tablet by mouth Daily. Take 50 mg every morning and 100 mg every night 30 tablet 0   • Myrbetriq 25 MG tablet sustained-release 24 hour 24 hr tablet      • pantoprazole (PROTONIX) 40 MG EC tablet Take 1 tablet by mouth 2 (Two) Times a Day. 180 tablet 3   • spironolactone (ALDACTONE) 25 MG tablet Take 1 tablet by mouth Daily. 30 tablet 0   • Tafluprost, PF, (ZIOPTAN) 0.0015 % solution ophthalmic solution Administer 1 drop to both eyes Every Night.     • tamsulosin (FLOMAX) 0.4 MG capsule 24 hr capsule Take 2 capsules by mouth Every Night. 30 capsule    • traMADol (ULTRAM) 50 MG tablet Take 1 tablet by mouth Every 8 (Eight) Hours As Needed for Moderate Pain . 30 tablet 0   • bumetanide (BUMEX) 1 MG tablet bumetanide 1 mg tablet   TAKE 1 TABLET BY MOUTH  EVERY DAY     • nitrofurantoin, macrocrystal-monohydrate, (MACROBID) 100 MG capsule nitrofurantoin monohydrate/macrocrystals 100 mg capsule       No current facility-administered medications on file prior to visit.        HISTORY OF PRESENT ILLNESS     Here today for evaluation of two skin abrasions on his right lateral lower extremity.  12 days ago, he scraped his leg on the car door.   He has been keeping clean and dry, and cleaning with peroxide.  Up to date on TDAP.  Allergic to Bactrim.  Denies any fever, chills, purulent drainage, pain, warmth.   Abrasions are surrounded by mild area of erythema.   He is a type 2 diabetic with A1C of 6.1 in October 2022.      Patient Care Team:  Neftali Amezcua MD as PCP - General (Internal Medicine)  Juan Colby MD as Consulting Physician (Cardiology)  Ankush Sky MD as Surgeon (Orthopedic Surgery)  Vale Del Rosario APRN as Nurse Practitioner (Oncology)  Darrion Peña MD as Consulting Physician (Urology)  Tahir Childs MD as Consulting Physician (Pulmonary Disease)  Noman Moseley MD as Consulting Physician (Gastroenterology)  Aura Perry OD (Optometry)  Juwan Diane MD as Consulting Physician (Ophthalmology)    REVIEW OF SYSTEMS     Review of Systems   Constitutional: Negative for chills, fever and unexpected weight change.   Respiratory: Negative for cough, chest tightness and shortness of breath.    Cardiovascular: Negative for chest pain, palpitations and leg swelling.   Skin: Positive for wound.   Neurological: Negative for dizziness, weakness, light-headedness and headaches.   Psychiatric/Behavioral: The patient is not nervous/anxious.           PHYSICAL EXAMINATION     Physical Exam  Vitals reviewed.   Constitutional:       General: He is not in acute distress.     Appearance: Normal appearance. He is not ill-appearing, toxic-appearing or diaphoretic.   HENT:      Head: Normocephalic and atraumatic.   Cardiovascular:      Rate  and Rhythm: Normal rate and regular rhythm.      Heart sounds: Normal heart sounds.   Pulmonary:      Effort: Pulmonary effort is normal.      Breath sounds: Normal breath sounds.   Skin:     General: Skin is warm and dry.      Findings: Erythema present.             Comments: Two superficial abrasions to right lateral lower extremity without drainage.  Mild area of surrounding erythema.  No warmth.   Neurological:      Mental Status: He is alert and oriented to person, place, and time. Mental status is at baseline.   Psychiatric:         Mood and Affect: Mood normal.         Behavior: Behavior normal.         Thought Content: Thought content normal.         Judgment: Judgment normal.           REVIEWED DATA     Labs:           Imaging:            Medical Tests:           Summary of old records / correspondence / consultant report:           Request outside records:

## 2022-12-07 ENCOUNTER — TELEPHONE (OUTPATIENT)
Dept: INTERNAL MEDICINE | Age: 77
End: 2022-12-07

## 2022-12-07 NOTE — TELEPHONE ENCOUNTER
Caller: Aj Salazar    Relationship: Self    Best call back number: [phone]    What is the best time to reach you: ANY TIME    Who are you requesting to speak with (clinical staff, provider,  specific staff member): ALLEN NORRIS    What was the call regarding: PATIENT CALLED STATING THE WOUND CARE THAT ALLEN NORRIS REFERRED HIM TO YESTERDAY CANNOT GET HIM IN UNTIL NEXT Thursday 12/15/22 AND HE WANTS TO KNOW IF THERE ARE ANY OTHER OFFICES OR PROVIDERS HE CAN GET IN WITH SOONER, OR IF THERE IS ANY CARE INSTRUCTIONS SHE HAS FOR HIM UNTIL HE CAN SEE THIS WOUND CARE SPECIALIST. HE DOESN'T WANT THE WOUND TO KEEP OPENING.    ALSO, PATIENT STATES WHEN HE REMOVED HIS BANDAGE THIS MORNING HIS WOUND DID OPEN UP AND STARTED BLEEDING. HE HAS STOPPED THE BLEEDING AND PLACED ANOTHER NON STICK BANDAGE ON THE WOUND.    PLEASE ADVISE    Do you require a callback: YES

## 2022-12-07 NOTE — TELEPHONE ENCOUNTER
Recommend contacting the wound specialist office and asking to be added to a wait list.  Keep area clean and dry.  Avoid using Hydrogen Peroxide at this time.  Please take antibiotic as prescribed and monitor for signs of worsening infection - redness, swelling, pain, tenderness, foul smelling or looking drainage, fever/ chills.

## 2022-12-13 ENCOUNTER — OFFICE VISIT (OUTPATIENT)
Dept: INTERNAL MEDICINE | Age: 77
End: 2022-12-13

## 2022-12-13 VITALS
WEIGHT: 180 LBS | SYSTOLIC BLOOD PRESSURE: 126 MMHG | TEMPERATURE: 97.6 F | BODY MASS INDEX: 26.66 KG/M2 | DIASTOLIC BLOOD PRESSURE: 82 MMHG | HEIGHT: 69 IN | HEART RATE: 63 BPM | OXYGEN SATURATION: 96 %

## 2022-12-13 DIAGNOSIS — F17.211 NICOTINE DEPENDENCE, CIGARETTES, IN REMISSION: ICD-10-CM

## 2022-12-13 DIAGNOSIS — Z00.00 ENCOUNTER FOR MEDICARE ANNUAL WELLNESS EXAM: Primary | ICD-10-CM

## 2022-12-13 DIAGNOSIS — E11.69 TYPE 2 DIABETES MELLITUS WITH OTHER SPECIFIED COMPLICATION, WITHOUT LONG-TERM CURRENT USE OF INSULIN: ICD-10-CM

## 2022-12-13 DIAGNOSIS — Z12.11 COLON CANCER SCREENING: ICD-10-CM

## 2022-12-13 DIAGNOSIS — Z12.2 SCREENING FOR LUNG CANCER: ICD-10-CM

## 2022-12-13 PROCEDURE — G0439 PPPS, SUBSEQ VISIT: HCPCS

## 2022-12-13 PROCEDURE — 1170F FXNL STATUS ASSESSED: CPT

## 2022-12-13 PROCEDURE — 1159F MED LIST DOCD IN RCRD: CPT

## 2022-12-13 PROCEDURE — 96160 PT-FOCUSED HLTH RISK ASSMT: CPT

## 2022-12-13 NOTE — PROGRESS NOTES
I N T E R N A L  M E D I C I N E  SHANIQUA IBANEZ      ENCOUNTER DATE:  12/13/2022    Aj Salazar / 77 y.o. / male        MEDICARE ANNUAL WELLNESS VISIT       Chief Complaint: Medicare Wellness-subsequent       Patient's general assessment of his health since a year ago:     - Compared to one year ago, he feels his physical health is about the same without significant change.    - Compared to one year ago, he feels his mental health is about the same without significant change.      HPI for other active medical problems:     HLD: Atorvastatin 80 mg nightly.  October 2022 Lipid panel with LDL of 49; triglycerides 76.    CHF: Bumex 1 mg daily, lasix 40 mg daily, sprinolactone 25 mg daily.  Denies any lower extremity swelling, worsening cough, orthopnea.      Sleep apnea: Remains compliant with CPAP usage.    Afib: Plavix 75 mg daily, Pradaxa 150 mg BID, cardizem 180 mg, metoprolol succinate XL 50 mg in the am and 100 mg in the pm.  Followed by cardiology, Dr. Colby.    COPD: Prescribed fluticasone-umeclidin-vilant 200-62.5-25.  Denies any worsening cough.  Followed by pulmonology, Dr. Childs.      BPH: Followed by [facility].  Symptoms well controlled on Myrbetriq 25 mg daily and Flomax 0.4 mg, 2 capsules, daily.  He denies any nocturia.  On macrobid preventatively for recurrent UTIs.    Type 2 diabetes: October 2022 A1C 6.10.  Remains on metformin 500 mg nightly.    He recently completed doxycycline with benefit for RLE skin abrasions.  He has appointment scheduled with wound this Thursday.  Denies any erythema, swelling, purulent drainage, pain, fever/ chills.    He reports he has received COVID bivalent booster.      HISTORY       Recent Hospitalizations:    Recent hospitalization?: NO    If YES, location, date, and diagnoses:     · Location:   · Date:   · Principle Discharge Dx:   · Secondary Dx:       Patient Care Team:    Patient Care Team:  Neftali Amezcua MD as PCP - General (Internal  Medicine)  Juan Colby MD as Consulting Physician (Cardiology)  Ankush Sky MD as Surgeon (Orthopedic Surgery)  Vale Del Rosario APRN as Nurse Practitioner (Oncology)  Darrion Peña MD as Consulting Physician (Urology)  Tahir Childs MD as Consulting Physician (Pulmonary Disease)  Noman Moseley MD as Consulting Physician (Gastroenterology)  Aura Perry, FERNY (Optometry)  Juwan Diane MD as Consulting Physician (Ophthalmology)      Allergies:  Bactrim [sulfamethoxazole-trimethoprim] and Sulfacetamide    Medications:  Current Outpatient Medications on File Prior to Visit   Medication Sig Dispense Refill   • Accu-Chek Softclix Lancets lancets 1 each by Other route Every Other Day. Use as instructed 100 each 0   • acetaminophen (TYLENOL) 325 MG tablet Take 2 tablets by mouth Every 4 (Four) Hours As Needed for Mild Pain .     • ALPHAGAN P 0.1 % solution ophthalmic solution Administer 1 drop to both eyes 2 (two) times a day.  6   • atorvastatin (LIPITOR) 80 MG tablet Take 1 tablet by mouth Every Night. 90 tablet 3   • bumetanide (BUMEX) 1 MG tablet bumetanide 1 mg tablet   TAKE 1 TABLET BY MOUTH EVERY DAY     • clopidogrel (Plavix) 75 MG tablet Take 1 tablet by mouth Daily. 90 tablet 3   • dabigatran etexilate (Pradaxa) 150 MG capsu Take 1 capsule by mouth 2 (Two) Times a Day. 60 capsule 6   • dilTIAZem CD (CARDIZEM CD) 180 MG 24 hr capsule Take 1 capsule by mouth Daily. 30 capsule 0   • dorzolamide (TRUSOPT) 2 % ophthalmic solution Administer 1 drop to both eyes 2 (Two) Times a Day.     • Fluticasone-Umeclidin-Vilant 200-62.5-25 MCG/INH aerosol powder  Inhale 1 puff Daily. Just increased to this strength 3/21     • furosemide (LASIX) 40 MG tablet Take 1 tablet by mouth Daily. 30 tablet 0   • glucose blood test strip Accu-Chek Dannielle Plus test strips   USE TO CHECK GLUCOSE QOD     • glucose blood test strip Accu-Chek Dannielle Plus Meter   USE TO CHECK GLUCOSE QOD     • hydrOXYzine  (ATARAX) 25 MG tablet Take 1 tablet by mouth At Night As Needed for Itching. 30 tablet 0   • metFORMIN ER (GLUCOPHAGE-XR) 500 MG 24 hr tablet Take 1 tablet by mouth Daily With Dinner. 30 tablet 11   • metoprolol succinate XL (TOPROL-XL) 100 MG 24 hr tablet Take 1 tablet by mouth Every Night. Take 100 mg at night and 50 mg in the morning 30 tablet 0   • metoprolol succinate XL (TOPROL-XL) 50 MG 24 hr tablet Take 1 tablet by mouth Daily. Take 50 mg every morning and 100 mg every night 30 tablet 0   • Myrbetriq 25 MG tablet sustained-release 24 hour 24 hr tablet      • nitrofurantoin, macrocrystal-monohydrate, (MACROBID) 100 MG capsule nitrofurantoin monohydrate/macrocrystals 100 mg capsule     • pantoprazole (PROTONIX) 40 MG EC tablet Take 1 tablet by mouth 2 (Two) Times a Day. 180 tablet 3   • spironolactone (ALDACTONE) 25 MG tablet Take 1 tablet by mouth Daily. 30 tablet 0   • Tafluprost, PF, (ZIOPTAN) 0.0015 % solution ophthalmic solution Administer 1 drop to both eyes Every Night.     • tamsulosin (FLOMAX) 0.4 MG capsule 24 hr capsule Take 2 capsules by mouth Every Night. 30 capsule      No current facility-administered medications on file prior to visit.        PFSH:     The following portions of the patient's history were reviewed and updated as appropriate: Allergies / Current Medications / Past Medical History / Surgical History / Social History / Family History    Problem List:  Patient Active Problem List   Diagnosis   • COPD (chronic obstructive pulmonary disease) ([facility])   • Hypertension   • Osteoarthritis   • History of smoking greater than 50 pack years   • BPH with obstruction/lower urinary tract symptoms   • Atrial fibrillation with rapid ventricular response ([facility])   • Type 2 diabetes mellitus with hyperglycemia, without long-term current use of insulin ([facility])   • Rash and nonspecific skin eruption   • TIA (transient ischemic attack)   • A-fib ([facility])   • Acute on chronic systolic CHF (congestive heart  failure) ([facility])   • Gastroesophageal reflux disease   • Raynaud's disease without gangrene   • Sepsis ([facility])   • Acute pyelonephritis   • Chronic diastolic CHF (congestive heart failure) ([facility])   • Alcohol dependence in remission ([facility])   • Personal history of transient ischemic attack (TIA), and cerebral infarction without residual deficits   • Lab test positive for detection of COVID-19 virus   • Scabies       Past Medical History:  Past Medical History:   Diagnosis Date   • Alcohol abuse, in remission    • BPH (benign prostatic hypertrophy)     see doctors at [facility]   • COPD (chronic obstructive pulmonary disease) ([facility])    • Depression    • DJD (degenerative joint disease) of cervical spine    • ED (erectile dysfunction)     [facility]   • Hx of colonic polyps     followed by GI ([facility])   • Hyperlipidemia    • Hypertension    • Influenza B 02/14/2013       • Nocturnal hypoxia    • Osteoarthritis     HIPS, HANDS, MULTIPLE SITES   • Peripheral neuropathy    • Permanent atrial fibrillation ([facility])    • Rectal bleeding 04/26/2017   • Tendonitis of shoulder 11/07/2007    RIGHT SHOULDER/DELTOID INSERTION   • TIA (transient ischemic attack) 10/2020   • Ulcer of the stomach and intestine        Past Surgical History:  Past Surgical History:   Procedure Laterality Date   • APPENDECTOMY     • CARDIOVASCULAR STRESS TEST N/A 10/20/2015    NML LEXISCAN CARDIOLITE PERFUSION STUDY, NO EVIDENCE OF ISCHEMIA, AREA OF HYPOPERFUSION OF THE INFERIOR WALL W/NORMAL WALL PERFUSION AND NML LEFT VENTRICULAR EJECTION FRACTION, MOST LIKELY ATTENUATION. DR.MICHAEL BOX   • COLONOSCOPY N/A 02/2017   • COLONOSCOPY N/A 02/23/2011    4 POLYPS REMOVED, RESCOPE IN 5 YRS, DR. EAGLE   • COLONOSCOPY N/A 03/08/2006    ERYTHEMOUS AND GRANULAR MUCOSA IN ILEOCECAL VALVE, NORMAL COLON, REPEAT IN 5 YRS,    • ENDOSCOPY N/A 09/26/2018    normal esophagus, acute gastritis, multiple non-bleeding duodenal ulcers with  "pigmented material, H Pylori positive   • HERNIA REPAIR Bilateral     INGUINAL   • JOINT REPLACEMENT  2015    left hip   • TOTAL HIP ARTHROPLASTY Left 2015    Dr Ankush Sky   • TOTAL HIP ARTHROPLASTY Right 10/27/2014    DR.RIED SKY   • VASECTOMY         Social History:  Social History     Socioeconomic History   • Marital status:      Spouse name: Ara*   • Number of children: 3   Tobacco Use   • Smoking status: Former     Packs/day: 2.00     Years: 49.00     Pack years: 98.00     Types: Cigarettes     Quit date: 2010     Years since quittin.0   • Smokeless tobacco: Never   • Tobacco comments:     long smoking history   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   • Drug use: No     Comment: caffeine use    • Sexual activity: Defer       Family History:  Family History   Problem Relation Age of Onset   • Colon cancer Mother    • Ovarian cancer Mother    • Alzheimer's disease Mother 70   • Stroke Sister         2016   • Dementia Sister    • Heart disease Brother    • Alcohol abuse Brother    • Coronary artery disease Father    • Hypertension Father    • Colon cancer Maternal Grandmother    • Heart disease Brother    • Coronary artery disease Brother    • Hypertension Brother    • Stroke Brother    • Arthritis Brother    • Prostate cancer Neg Hx          PATIENT ASSESSMENT     Vitals:  Vitals:    22 0750   BP: 126/82   Pulse: 63   Temp: 97.6 °F (36.4 °C)   SpO2: 96%   Weight: 81.6 kg (180 lb)   Height: 175.3 cm (69.02\")       BP Readings from Last 3 Encounters:   22 126/82   22 122/76   22 122/76     Wt Readings from Last 3 Encounters:   22 81.6 kg (180 lb)   22 81.2 kg (179 lb)   22 81.2 kg (179 lb)      Body mass index is 26.57 kg/m².    [unfilled]        Review of Systems:    Review of Systems   Constitutional: Negative for chills, fever and unexpected weight change. "   Respiratory: Negative for cough, chest tightness and shortness of breath.    Cardiovascular: Negative for chest pain, palpitations and leg swelling.   Neurological: Negative for dizziness, weakness, light-headedness and headaches.   Psychiatric/Behavioral: The patient is not nervous/anxious.        Physical Exam:    Physical Exam  Vitals reviewed.   Constitutional:       General: He is not in acute distress.     Appearance: Normal appearance. He is not ill-appearing, toxic-appearing or diaphoretic.   HENT:      Head: Normocephalic and atraumatic.      Right Ear: Tympanic membrane, ear canal and external ear normal. There is no impacted cerumen.      Left Ear: Tympanic membrane, ear canal and external ear normal. There is no impacted cerumen.      Nose: Nose normal. No congestion or rhinorrhea.      Mouth/Throat:      Mouth: Mucous membranes are moist.      Pharynx: Oropharynx is clear. No oropharyngeal exudate or posterior oropharyngeal erythema.   Eyes:      Extraocular Movements: Extraocular movements intact.      Conjunctiva/sclera: Conjunctivae normal.      Pupils: Pupils are equal, round, and reactive to light.   Cardiovascular:      Rate and Rhythm: Normal rate and regular rhythm.      Heart sounds: Normal heart sounds.   Pulmonary:      Effort: Pulmonary effort is normal. No respiratory distress.      Breath sounds: Normal breath sounds.   Abdominal:      General: Bowel sounds are normal.      Palpations: Abdomen is soft.      Tenderness: There is no abdominal tenderness.   Musculoskeletal:         General: Normal range of motion.      Cervical back: Normal range of motion and neck supple.      Right lower leg: No edema.      Left lower leg: No edema.   Lymphadenopathy:      Cervical: No cervical adenopathy.   Skin:     General: Skin is warm and dry.      Comments: Two superficial abrasions to right lateral lower extremity without drainage.  No surrounding erythema, warmth.  Covered with clean, dry  bandage.  Newer abrasion on left anterior lower extremity without drainage, erythema, warmth.  Covered with clean, dry bandage.   Neurological:      General: No focal deficit present.      Mental Status: He is alert and oriented to person, place, and time. Mental status is at baseline.   Psychiatric:         Mood and Affect: Mood normal.         Behavior: Behavior normal.         Thought Content: Thought content normal.         Judgment: Judgment normal.           Reviewed Data:    Labs:   Lab Results   Component Value Date     10/24/2022    K 4.1 10/24/2022    CALCIUM 9.2 10/24/2022    AST 15 10/24/2022    ALT 18 10/24/2022    BUN 16 10/24/2022    CREATININE 0.89 10/24/2022    CREATININE 0.86 05/17/2022    CREATININE 0.64 (L) 05/13/2022    EGFRIFNONA 87 09/17/2021    EGFRIFAFRI 105 09/17/2021       Lab Results   Component Value Date    HGBA1C 6.10 (H) 10/24/2022    HGBA1C 6.60 (H) 05/06/2022    HGBA1C 6.00 (H) 09/17/2021    MICROALBUR 62.0 12/13/2022       Lab Results   Component Value Date    LDL 49 10/24/2022    LDL 40 09/17/2021    LDL 79 10/23/2020    HDL 47 10/24/2022    TRIG 76 10/24/2022    CHOLHDLRATIO 2.65 09/17/2021       Lab Results   Component Value Date    TSH 1.880 03/08/2021          Lab Results   Component Value Date    WBC 8.42 10/24/2022    HGB 14.3 10/24/2022    HGB 13.0 05/13/2022    HGB 11.9 (L) 05/10/2022     10/24/2022                   Lab Results   Component Value Date    PSA 7.8 (H) 04/30/2018    PSA 9.650 (H) 03/27/2018       Imaging:          Medical Tests:          Screening for Glaucoma:  Previous screening for glaucoma?: Yes      Hearing Loss Screen:  Finger Rub Hearing Test (right ear): passed  Finger Rub Hearing Test (left ear): passed      Urinary Incontinence Screen:  Episodes of urinary incontinence? : No      Depression Screen:  PHQ-2/PHQ-9 Depression Screening 12/13/2022   Retired PHQ-9 Total Score -   Retired Total Score -   Little Interest or Pleasure in Doing  Things 0-->not at all   Feeling Down, Depressed or Hopeless 0-->not at all   PHQ-9: Brief Depression Severity Measure Score 0        PHQ-2: 0 (Not depressed)    PHQ-9: 0 (Negative screening for depression)       FUNCTIONAL, FALL RISK, & COGNITIVE SCREENING (Components below):    DATA:    Functional & Cognitive Status 12/13/2022   Do you have difficulty preparing food and eating? No   Do you have difficulty bathing yourself, getting dressed or grooming yourself? No   Do you have difficulty using the toilet? No   Do you have difficulty moving around from place to place? No   Do you have trouble with steps or getting out of a bed or a chair? -   Current Diet Well Balanced Diet   Dental Exam Up to date   Eye Exam Up to date   Exercise (times per week) 5 times per week   Current Exercises Include Walking   Current Exercise Activities Include -   Do you need help using the phone?  No   Are you deaf or do you have serious difficulty hearing?  No   Do you need help with transportation? No   Do you need help shopping? No   Do you need help preparing meals?  No   Do you need help with housework?  No   Do you need help with laundry? No   Do you need help taking your medications? No   Do you need help managing money? No   Do you ever drive or ride in a car without wearing a seat belt? No   Have you felt unusual stress, anger or loneliness in the last month? -   Who do you live with? -   If you need help, do you have trouble finding someone available to you? -   Have you been bothered in the last four weeks by sexual problems? -   Do you have difficulty concentrating, remembering or making decisions? No         A) Assessment of Functional Ability:  (Assessment of ability to perform ADL's (showering/bathing, using toilet, dressing, feeding self, moving self around) and IADL's (use telephone, shop, prepare food, housekeep, do laundry, transport independently, take medications independently, and handle finances)    Degree of  functional impairment: NONE (based on assessment noted above)      B) Assessment of Fall Risk:  Fall Risk Assessment was completed, and patient is at low risk for falls.       Need for further evaluation of gait, strength, and balance? : No    Timed Up and Go (TUG):   (>= 12 seconds indicates high risk for falling)    Observable abnormalities included: Normal gait pattern       C. Assessment of Cognitive Function:    Mini-Cog Test:     1) Registration (3 objects): Yes   2) Number of objects recalled: 3   3) Clock Draw: Passed? : N/A       Further evaluation required? : No        COUNSELING       A. Identification of Health Risk Factors:    Risk factors include: cardiovascular risk factors and polypharmacy      B. Age-Appropriate Screening Schedule:  (Refer to the list below for future screening recommendations based on patient's age, sex and/or medical conditions. Orders for these recommended tests are listed in the plan section. The patient has been provided with a written plan)    Health Maintenance Topics  Health Maintenance   Topic Date Due   • HEMOGLOBIN A1C  04/24/2023   • LIPID PANEL  10/24/2023   • DIABETIC EYE EXAM  11/30/2023   • URINE MICROALBUMIN  12/13/2023   • TDAP/TD VACCINES (3 - Td or Tdap) 02/24/2027   • INFLUENZA VACCINE  Completed   • ZOSTER VACCINE  Completed       Health Maintenance Topics Due or Over-Due  Health Maintenance Due   Topic Date Due   • LUNG CANCER SCREENING  03/07/2022   • COLORECTAL CANCER SCREENING  03/23/2022         C. Advanced Care Planning:    Advance Care Planning   ACP discussion was held with the patient during this visit. Patient has an advance directive in EMR which is still valid.        D. Patient Self-Management and Personalized Health Advice:    He has been provided with personalized counseling/information (including brochures/handouts) about:     -- improving exercise / conditioning, reducing risk for cardiac/vascular events with pre-existing disease and fall  prevention      He has been recommended for the following preventative services which has been performed today, will be ordered today or ordered/performed on upcoming follow-up visit:     -- screening for prostate cancer (discussed and followed/managed by urologist), **COLORECTAL cancer screening (colonoscopy/Cologuard discussed or ordered), LUNG CANCER screening discussed/low dose CT ORDERED (or discussed plans)      E. Miscellaneous Items:    -Aspirin use counseling: Does not need ASA (and currently is not on it)    -Discussed BMI with him. The BMI is in the acceptable range    -Reviewed use of high risk medication in the elderly: YES    -Reviewed for potential of harmful drug interactions in the elderly: YES        WRAP UP       Assessment & Plan:    1) MEDICARE ANNUAL WELLNESS VISIT    2) OTHER MEDICAL CONDITIONS ADDRESSED TODAY:            Problem List Items Addressed This Visit        Endocrine and Metabolic    Type 2 diabetes mellitus with hyperglycemia, without long-term current use of insulin (HCC) (Chronic)    Overview     Continue metformin  mg daily with dinner.          Relevant Medications    metFORMIN ER (GLUCOPHAGE-XR) 500 MG 24 hr tablet   Other Visit Diagnoses     Encounter for Medicare annual wellness exam    -  Primary    Screening for lung cancer        Relevant Orders    CT Chest Low Dose Wo    Nicotine dependence, cigarettes, in remission        Relevant Orders    CT Chest Low Dose Wo    Colon cancer screening        Relevant Orders    Cologuard - Stool, Per Rectum                    Orders Placed This Encounter   Procedures   • CT Chest Low Dose Wo   • Cologuard - Stool, Per Rectum   • Microalbumin / Creatinine Urine Ratio - Urine, Clean Catch       Discussion / Summary:    Follow up with wound clinic and  Dr. Amezcua as scheduled.    Medications as of TODAY:              Current Outpatient Medications   Medication Sig Dispense Refill   • Accu-Chek Softclix Lancets lancets 1 each by Other  route Every Other Day. Use as instructed 100 each 0   • acetaminophen (TYLENOL) 325 MG tablet Take 2 tablets by mouth Every 4 (Four) Hours As Needed for Mild Pain .     • ALPHAGAN P 0.1 % solution ophthalmic solution Administer 1 drop to both eyes 2 (two) times a day.  6   • atorvastatin (LIPITOR) 80 MG tablet Take 1 tablet by mouth Every Night. 90 tablet 3   • bumetanide (BUMEX) 1 MG tablet bumetanide 1 mg tablet   TAKE 1 TABLET BY MOUTH EVERY DAY     • clopidogrel (Plavix) 75 MG tablet Take 1 tablet by mouth Daily. 90 tablet 3   • dabigatran etexilate (Pradaxa) 150 MG capsu Take 1 capsule by mouth 2 (Two) Times a Day. 60 capsule 6   • dilTIAZem CD (CARDIZEM CD) 180 MG 24 hr capsule Take 1 capsule by mouth Daily. 30 capsule 0   • dorzolamide (TRUSOPT) 2 % ophthalmic solution Administer 1 drop to both eyes 2 (Two) Times a Day.     • Fluticasone-Umeclidin-Vilant 200-62.5-25 MCG/INH aerosol powder  Inhale 1 puff Daily. Just increased to this strength 3/21     • furosemide (LASIX) 40 MG tablet Take 1 tablet by mouth Daily. 30 tablet 0   • glucose blood test strip Accu-Chek Dannielle Plus test strips   USE TO CHECK GLUCOSE QOD     • glucose blood test strip Accu-Chek Dannielle Plus Meter   USE TO CHECK GLUCOSE QOD     • hydrOXYzine (ATARAX) 25 MG tablet Take 1 tablet by mouth At Night As Needed for Itching. 30 tablet 0   • metFORMIN ER (GLUCOPHAGE-XR) 500 MG 24 hr tablet Take 1 tablet by mouth Daily With Dinner. 30 tablet 11   • metoprolol succinate XL (TOPROL-XL) 100 MG 24 hr tablet Take 1 tablet by mouth Every Night. Take 100 mg at night and 50 mg in the morning 30 tablet 0   • metoprolol succinate XL (TOPROL-XL) 50 MG 24 hr tablet Take 1 tablet by mouth Daily. Take 50 mg every morning and 100 mg every night 30 tablet 0   • Myrbetriq 25 MG tablet sustained-release 24 hour 24 hr tablet      • nitrofurantoin, macrocrystal-monohydrate, (MACROBID) 100 MG capsule nitrofurantoin monohydrate/macrocrystals 100 mg capsule     •  pantoprazole (PROTONIX) 40 MG EC tablet Take 1 tablet by mouth 2 (Two) Times a Day. 180 tablet 3   • spironolactone (ALDACTONE) 25 MG tablet Take 1 tablet by mouth Daily. 30 tablet 0   • Tafluprost, PF, (ZIOPTAN) 0.0015 % solution ophthalmic solution Administer 1 drop to both eyes Every Night.     • tamsulosin (FLOMAX) 0.4 MG capsule 24 hr capsule Take 2 capsules by mouth Every Night. 30 capsule      No current facility-administered medications for this visit.         FOLLOW-UP:            Return for Next scheduled follow up.                 Future Appointments   Date Time Provider Department Center   1/9/2023  8:00 AM Coleen Christianson APRN  [facility]   1/27/2023  8:15 AM [facility]  [facility]   4/25/2023  7:30 AM Neftali Amezcua MD [facility]           After Visit Summary (AVS) including the Personalized Prevention  Plan Services (PPPS) was either printed and given to the patient at check-out today and/or sent to [facility] for review.       [unfilled]

## 2022-12-14 LAB
ALBUMIN/CREAT UR: 41 MG/G CREAT (ref 0–29)
CREAT UR-MCNC: 152.5 MG/DL
MICROALBUMIN UR-MCNC: 62 UG/ML

## 2022-12-15 ENCOUNTER — OFFICE VISIT (OUTPATIENT)
Dept: WOUND CARE | Facility: HOSPITAL | Age: 77
End: 2022-12-15

## 2022-12-15 ENCOUNTER — LAB REQUISITION (OUTPATIENT)
Dept: LAB | Facility: HOSPITAL | Age: 77
End: 2022-12-15

## 2022-12-15 DIAGNOSIS — S80.812A ABRASION, LEFT LOWER LEG, INITIAL ENCOUNTER: ICD-10-CM

## 2022-12-15 PROCEDURE — 87077 CULTURE AEROBIC IDENTIFY: CPT | Performed by: NURSE PRACTITIONER

## 2022-12-15 PROCEDURE — 97602 WOUND(S) CARE NON-SELECTIVE: CPT

## 2022-12-15 PROCEDURE — G0463 HOSPITAL OUTPT CLINIC VISIT: HCPCS

## 2022-12-15 PROCEDURE — 87070 CULTURE OTHR SPECIMN AEROBIC: CPT | Performed by: NURSE PRACTITIONER

## 2022-12-15 PROCEDURE — 87205 SMEAR GRAM STAIN: CPT | Performed by: NURSE PRACTITIONER

## 2022-12-15 PROCEDURE — 87075 CULTR BACTERIA EXCEPT BLOOD: CPT | Performed by: NURSE PRACTITIONER

## 2022-12-15 PROCEDURE — 87186 SC STD MICRODIL/AGAR DIL: CPT | Performed by: NURSE PRACTITIONER

## 2022-12-20 LAB
BACTERIA SPEC AEROBE CULT: ABNORMAL
BACTERIA SPEC ANAEROBE CULT: NORMAL
GRAM STN SPEC: ABNORMAL

## 2022-12-29 ENCOUNTER — OFFICE VISIT (OUTPATIENT)
Dept: WOUND CARE | Facility: HOSPITAL | Age: 77
End: 2022-12-29
Payer: MEDICARE

## 2022-12-29 ENCOUNTER — LAB REQUISITION (OUTPATIENT)
Dept: LAB | Facility: HOSPITAL | Age: 77
End: 2022-12-29
Payer: MEDICARE

## 2022-12-29 DIAGNOSIS — E11.622 TYPE 2 DIABETES MELLITUS WITH OTHER SKIN ULCER (CODE): ICD-10-CM

## 2022-12-29 PROCEDURE — G0463 HOSPITAL OUTPT CLINIC VISIT: HCPCS

## 2022-12-29 PROCEDURE — 87075 CULTR BACTERIA EXCEPT BLOOD: CPT | Performed by: NURSE PRACTITIONER

## 2022-12-29 PROCEDURE — 87070 CULTURE OTHR SPECIMN AEROBIC: CPT | Performed by: NURSE PRACTITIONER

## 2022-12-29 PROCEDURE — 87205 SMEAR GRAM STAIN: CPT | Performed by: NURSE PRACTITIONER

## 2023-01-01 LAB
BACTERIA SPEC AEROBE CULT: NORMAL
GRAM STN SPEC: NORMAL

## 2023-01-03 LAB — BACTERIA SPEC ANAEROBE CULT: NORMAL

## 2023-01-09 ENCOUNTER — OFFICE VISIT (OUTPATIENT)
Dept: WOUND CARE | Facility: HOSPITAL | Age: 78
End: 2023-01-09
Payer: MEDICARE

## 2023-01-09 PROCEDURE — G0463 HOSPITAL OUTPT CLINIC VISIT: HCPCS

## 2023-01-23 ENCOUNTER — OFFICE VISIT (OUTPATIENT)
Dept: WOUND CARE | Facility: HOSPITAL | Age: 78
End: 2023-01-23
Payer: MEDICARE

## 2023-01-23 PROCEDURE — G0463 HOSPITAL OUTPT CLINIC VISIT: HCPCS

## 2023-01-24 NOTE — OUTREACH NOTE
Care Coordination Assessment    Documented/Reviewed By:  Gus Lubin RN Date/time:  2/14/2020  1:44 PM   Assessment completed with:  patient  Enrolled in care management program:  No  Living arrangement:  spouse  Type of residence:  private residence  Equipment used at home:  oxygen/respiratory treatment  Communication device:  Yes  Bed or wheelchair confined:  No  Medication adherence problem:  No  History of fall(s) in last 6 months:  No  Difficulty keeping appointments:  No  Family aware of the patient's advance care planning wishes:  Yes        Ear Star Wedge Flap Text: The defect edges were debeveled with a #15 blade scalpel.  Given the location of the defect and the proximity to free margins (helical rim) an ear star wedge flap was deemed most appropriate.  Using a sterile surgical marker, the appropriate flap was drawn incorporating the defect and placing the expected incisions between the helical rim and antihelix where possible.  The area thus outlined was incised through and through with a #15 scalpel blade.

## 2023-01-27 ENCOUNTER — HOSPITAL ENCOUNTER (OUTPATIENT)
Dept: CT IMAGING | Facility: HOSPITAL | Age: 78
Discharge: HOME | End: 2023-01-27
Payer: MEDICARE

## 2023-01-27 DIAGNOSIS — Z12.2 SCREENING FOR LUNG CANCER: ICD-10-CM

## 2023-01-27 DIAGNOSIS — F17.211 NICOTINE DEPENDENCE, CIGARETTES, IN REMISSION: ICD-10-CM

## 2023-01-27 PROCEDURE — 71271 CT THORAX LUNG CANCER SCR C-: CPT

## 2023-01-30 ENCOUNTER — TELEPHONE (OUTPATIENT)
Dept: INTERNAL MEDICINE | Age: 78
End: 2023-01-30
Payer: MEDICARE

## 2023-01-30 DIAGNOSIS — J44.9 CHRONIC OBSTRUCTIVE PULMONARY DISEASE, UNSPECIFIED COPD TYPE: ICD-10-CM

## 2023-01-30 DIAGNOSIS — F17.211 NICOTINE DEPENDENCE, CIGARETTES, IN REMISSION: ICD-10-CM

## 2023-01-30 DIAGNOSIS — J43.9 PULMONARY EMPHYSEMA, UNSPECIFIED EMPHYSEMA TYPE: ICD-10-CM

## 2023-01-30 DIAGNOSIS — R91.8 MULTIPLE PULMONARY NODULES: Primary | ICD-10-CM

## 2023-01-30 NOTE — TELEPHONE ENCOUNTER
I called and talked to the patient regarding abnormal CT findings. He states that he has already been set up to see the [facility] next week. I instructed him to update his pulmonologist (Dr. Childs) of current situation. Advised him to call me if he has any questions or concerns. He expressed understanding and agreed to follow above instructions.

## 2023-01-30 NOTE — TELEPHONE ENCOUNTER
----- Message from SHANIQUA Weldon sent at 1/30/2023 10:37 AM EST -----  Please call pt.     Chest CT showed development of new, multiple pulmonary nodules since March 7, 2021 examination.  Recommend referral to the [facility] for further investigation and discussion of appropriate following up testing, including possible PET scan or repeat CT.  Referral has been placed.

## 2023-02-06 ENCOUNTER — OFFICE VISIT (OUTPATIENT)
Dept: WOUND CARE | Facility: HOSPITAL | Age: 78
End: 2023-02-06
Payer: MEDICARE

## 2023-02-06 PROCEDURE — G0463 HOSPITAL OUTPT CLINIC VISIT: HCPCS

## 2023-02-07 ENCOUNTER — OFFICE VISIT (OUTPATIENT)
Dept: OTHER | Facility: HOSPITAL | Age: 78
End: 2023-02-07
Payer: MEDICARE

## 2023-02-07 VITALS
SYSTOLIC BLOOD PRESSURE: 151 MMHG | OXYGEN SATURATION: 90 % | TEMPERATURE: 96.9 F | RESPIRATION RATE: 18 BRPM | HEART RATE: 52 BPM | BODY MASS INDEX: 25.71 KG/M2 | DIASTOLIC BLOOD PRESSURE: 84 MMHG | WEIGHT: 179.6 LBS | HEIGHT: 70 IN

## 2023-02-07 DIAGNOSIS — R91.8 PULMONARY NODULES/LESIONS, MULTIPLE: ICD-10-CM

## 2023-02-07 PROCEDURE — 99205 OFFICE O/P NEW HI 60 MIN: CPT | Performed by: INTERNAL MEDICINE

## 2023-02-07 PROCEDURE — G0463 HOSPITAL OUTPT CLINIC VISIT: HCPCS

## 2023-02-07 RX ORDER — KETOROLAC TROMETHAMINE 5 MG/ML
SOLUTION OPHTHALMIC
COMMUNITY
Start: 2023-01-24

## 2023-02-07 NOTE — PATIENT INSTRUCTIONS
Met with patient and wife in lung clinic on 2/7/23.  PET scan scheduled 2/15/23. Provided education pamphlets for PET.  Explained pertinent information regarding preparation for and location of test. Patient and family member able to repeat back and verbalize understanding. No additional needs noted.  Pt will be seen for follow up appointment in Dr. Rangel's office.   Dr. Rangel's office will call to schedule the appt. Taylor Roman RN, BSN, OCN, CCM, Lung navigator

## 2023-02-07 NOTE — PROGRESS NOTES
.     REASON FOR CONSULTATION:     Provide an opinion on any further workup or treatment of enlarging pulmonary nodules.                             REQUESTING PHYSICIAN: Neftali Amezcua MD     RECORDS OBTAINED:  Records of the patient's history including those obtained from the referring provider were reviewed and summarized in detail.    HISTORY OF PRESENT ILLNESS:  The patient is a 77 y.o. year old male who presented today to the multimodality lung clinic at the [facility] for initial evaluation because of worsening pulmonary nodules. The patient is accompanied by his wife today.     This patient has history of cigarette smoking for more than 50 years, 2 packs per day and he quit about 15 years ago. The patient reports dry cough. He also has COPD for more than 5 years, he has exertional dyspnea. He reports that since last month he has become more short of breath, however denies hemoptysis.  He denies weight loss, no headaches or vision changes.     The patient reports he is followed by pulmonologist Tahir Childs MD, and he has a scheduled PFT examination soon.     The patient reports he is diabetic, and has been on metformin with controlled glucose level. The patient also reports he has atrial fibrillation for quite some time, maybe started 8 years ago when he had hip surgery. He is on Plavix. He is not on any other blood thinner as I reviewed medicines. The patient is followed by Cardiology, Juan Colby MD. The patient also reports chronic trace edema in legs.     This patient had low dose CT scan for the chest due to risk for lung cancer with significant history of cigarette smoking. This study was done on 01/27/2023, and reported right upper lobe newly developed suspicious stellate 10 mm nodule. There is also a right upper lobe base lung nodule along the major fissure 15 mm spiculated nodule. There is also grouping of 4 sub-6 mm pulmonary nodules  within the right upper lobe, and also another group of 5 sub-6 mm nodules within the right lower lobe. There was also benign calcified granuloma in the right lower lobe. Within the left upper lobe, there is a 6 mm pulmonary nodule.     I reviewed his chest CT scan images with Thoracic Surgeon, Eduardo cMmullen MD, and we recommended to obtain PET scan examination for further evaluation because those lesions are highly suspicious for lung cancer. If those lesions are hypermetabolic, we will try to purse CT guided core needle biopsy. However I discussed with the patient and his wife that the nodules are relatively small, and is not immediately close to chest wall. It will be difficult to biopsy and has high risk for pneumothorax because this patient has evidence of emphysema on CT scan of the chest.        Past Medical History:   Diagnosis Date   • Alcohol abuse, in remission    • BPH (benign prostatic hypertrophy)     see doctors [facility]   • COPD (chronic obstructive pulmonary disease) (HCC)    • Depression    • DJD (degenerative joint disease) of cervical spine    • ED (erectile dysfunction)     [facility]   • Hx of colonic polyps     followed by GI (Kyle)   • Hyperlipidemia    • Hypertension    • Influenza B 02/14/2013       • Nocturnal hypoxia    • Osteoarthritis     HIPS, HANDS, MULTIPLE SITES   • Peripheral neuropathy    • Permanent atrial fibrillation (HCC)    • Rectal bleeding 04/26/2017   • Tendonitis of shoulder 11/07/2007    RIGHT SHOULDER/DELTOID INSERTION   • TIA (transient ischemic attack) 10/2020   • Ulcer of the stomach and intestine      Past Surgical History:   Procedure Laterality Date   • APPENDECTOMY     • CARDIOVASCULAR STRESS TEST N/A 10/20/2015    NML LEXISCAN CARDIOLITE PERFUSION STUDY, NO EVIDENCE OF ISCHEMIA, AREA OF HYPOPERFUSION OF THE INFERIOR WALL W/NORMAL WALL PERFUSION AND NML LEFT VENTRICULAR EJECTION FRACTION, MOST LIKELY ATTENUATION. DR.MICHAEL BOX   •  COLONOSCOPY N/A 02/2017   • COLONOSCOPY N/A 02/23/2011    4 POLYPS REMOVED, RESCOPE IN 5 YRS, DR. EAGLE   • COLONOSCOPY N/A 03/08/2006    ERYTHEMOUS AND GRANULAR MUCOSA IN ILEOCECAL VALVE, NORMAL COLON, REPEAT IN 5 YRS,    • ENDOSCOPY N/A 09/26/2018    normal esophagus, acute gastritis, multiple non-bleeding duodenal ulcers with pigmented material, H Pylori positive   • HERNIA REPAIR Bilateral     INGUINAL   • JOINT REPLACEMENT  11/2015    left hip   • TOTAL HIP ARTHROPLASTY Left 11/06/2015    Dr Ankush Sky   • TOTAL HIP ARTHROPLASTY Right 10/27/2014    DR.RIED SKY   • VASECTOMY         MEDICATIONS    Current Outpatient Medications:   •  Accu-Chek Softclix Lancets lancets, 1 each by Other route Every Other Day. Use as instructed, Disp: 100 each, Rfl: 0  •  acetaminophen (TYLENOL) 325 MG tablet, Take 2 tablets by mouth Every 4 (Four) Hours As Needed for Mild Pain ., Disp:  , Rfl:   •  ALPHAGAN P 0.1 % solution ophthalmic solution, Administer 1 drop to both eyes 2 (two) times a day., Disp: , Rfl: 6  •  atorvastatin (LIPITOR) 80 MG tablet, Take 1 tablet by mouth Every Night., Disp: 90 tablet, Rfl: 3  •  bumetanide (BUMEX) 1 MG tablet, bumetanide 1 mg tablet  TAKE 1 TABLET BY MOUTH EVERY DAY, Disp: , Rfl:   •  clopidogrel (Plavix) 75 MG tablet, Take 1 tablet by mouth Daily., Disp: 90 tablet, Rfl: 3  •  dabigatran etexilate (Pradaxa) 150 MG capsu, Take 1 capsule by mouth 2 (Two) Times a Day., Disp: 60 capsule, Rfl: 6  •  dilTIAZem CD (CARDIZEM CD) 180 MG 24 hr capsule, Take 1 capsule by mouth Daily., Disp: 30 capsule, Rfl: 0  •  dorzolamide (TRUSOPT) 2 % ophthalmic solution, Administer 1 drop to both eyes 2 (Two) Times a Day., Disp: , Rfl:   •  Fluticasone-Umeclidin-Vilant 200-62.5-25 MCG/INH aerosol powder , Inhale 1 puff Daily. Just increased to this strength 3/21, Disp: , Rfl:   •  furosemide (LASIX) 40 MG tablet, Take 1 tablet by mouth Daily., Disp: 30 tablet, Rfl: 0  •  glucose blood test strip,  Accu-Chek Dannielle Plus test strips  USE TO CHECK GLUCOSE QOD, Disp: , Rfl:   •  glucose blood test strip, Accu-Chek Dannielle Plus Meter  USE TO CHECK GLUCOSE QOD, Disp: , Rfl:   •  hydrOXYzine (ATARAX) 25 MG tablet, Take 1 tablet by mouth At Night As Needed for Itching., Disp: 30 tablet, Rfl: 0  •  metFORMIN ER (GLUCOPHAGE-XR) 500 MG 24 hr tablet, Take 1 tablet by mouth Daily With Dinner., Disp: 30 tablet, Rfl: 11  •  metoprolol succinate XL (TOPROL-XL) 100 MG 24 hr tablet, Take 1 tablet by mouth Every Night. Take 100 mg at night and 50 mg in the morning, Disp: 30 tablet, Rfl: 0  •  metoprolol succinate XL (TOPROL-XL) 50 MG 24 hr tablet, Take 1 tablet by mouth Daily. Take 50 mg every morning and 100 mg every night, Disp: 30 tablet, Rfl: 0  •  Myrbetriq 25 MG tablet sustained-release 24 hour 24 hr tablet, , Disp: , Rfl:   •  nitrofurantoin, macrocrystal-monohydrate, (MACROBID) 100 MG capsule, nitrofurantoin monohydrate/macrocrystals 100 mg capsule, Disp: , Rfl:   •  pantoprazole (PROTONIX) 40 MG EC tablet, Take 1 tablet by mouth 2 (Two) Times a Day., Disp: 180 tablet, Rfl: 3  •  spironolactone (ALDACTONE) 25 MG tablet, Take 1 tablet by mouth Daily., Disp: 30 tablet, Rfl: 0  •  Tafluprost, PF, (ZIOPTAN) 0.0015 % solution ophthalmic solution, Administer 1 drop to both eyes Every Night., Disp: , Rfl:   •  tamsulosin (FLOMAX) 0.4 MG capsule 24 hr capsule, Take 2 capsules by mouth Every Night., Disp: 30 capsule, Rfl:   •  ketorolac (ACULAR) 0.5 % ophthalmic solution, , Disp: , Rfl:     ALLERGIES:     Allergies   Allergen Reactions   • Bactrim [Sulfamethoxazole-Trimethoprim] Rash   • Sulfacetamide Rash       SOCIAL HISTORY:       Social History     Socioeconomic History   • Marital status:      Spouse name: Ara*   • Number of children: 3   Tobacco Use   • Smoking status: Former     Packs/day: 2.00     Years: 49.00     Pack years: 98.00     Types: Cigarettes     Quit date: 2010     Years since quittin.1   •  "Smokeless tobacco: Never   • Tobacco comments:     long smoking history   Vaping Use   • Vaping Use: Never used   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   • Drug use: No     Comment: caffeine use    • Sexual activity: Defer         FAMILY HISTORY:  Family History   Problem Relation Age of Onset   • Colon cancer Mother    • Ovarian cancer Mother    • Alzheimer's disease Mother 70   • Stroke Sister         October 2016   • Dementia Sister    • Heart disease Brother    • Alcohol abuse Brother    • Coronary artery disease Father    • Hypertension Father    • Colon cancer Maternal Grandmother    • Heart disease Brother    • Coronary artery disease Brother    • Hypertension Brother    • Stroke Brother    • Arthritis Brother    • Prostate cancer Neg Hx        REVIEW OF SYSTEMS:  Review of Systems           Vitals:    02/07/23 0903   BP: 151/84   Pulse: 52   Resp: 18   Temp: 96.9 °F (36.1 °C)   SpO2: 90%  Comment: cold hands   Weight: 81.5 kg (179 lb 9.6 oz)   Height: 177.8 cm (70\")   PainSc: 0-No pain     Current Status 2/7/2023   ECOG score 0      PHYSICAL EXAM:      CONSTITUTIONAL:  Vital signs reviewed.  No distress, looks comfortable.  EYES:  Conjunctivae and lids unremarkable.  PERRLA  EARS,NOSE,MOUTH,THROAT:  Ears and nose appear unremarkable.  Lips, teeth, gums appear unremarkable.  RESPIRATORY:  Normal respiratory effort.  Lungs clear to auscultation bilaterally.  CARDIOVASCULAR:  Normal S1, S2.  No murmurs rubs or gallops.  No significant lower extremity edema.  GASTROINTESTINAL: Abdomen appears unremarkable.  Nontender.  No hepatomegaly.  No splenomegaly.  LYMPHATIC:  No cervical, supraclavicular, axillary lymphadenopathy.  MUSCULOSKELETAL:  Unremarkable gait and station.  Unremarkable digits/nails.  No cyanosis or clubbing.  SKIN:  Warm.  No rashes.  PSYCHIATRIC:  Normal judgment and insight.  Normal mood and affect.      RECENT LABS:   "      WBC   Date Value Ref Range Status   10/24/2022 8.42 3.40 - 10.80 10*3/mm3 Final   05/13/2022 6.16 3.40 - 10.80 10*3/mm3 Final   05/10/2022 10.30 3.40 - 10.80 10*3/mm3 Final   05/09/2022 10.54 3.40 - 10.80 10*3/mm3 Final   05/08/2022 8.88 3.40 - 10.80 10*3/mm3 Final   05/07/2022 10.90 (H) 3.40 - 10.80 10*3/mm3 Final   05/06/2022 11.74 (H) 3.40 - 10.80 10*3/mm3 Final   05/05/2022 15.83 (H) 3.40 - 10.80 10*3/mm3 Final   07/01/2021 9.53 3.40 - 10.80 10*3/mm3 Final   03/10/2021 10.66 3.40 - 10.80 10*3/mm3 Final   03/09/2021 16.55 (H) 3.40 - 10.80 10*3/mm3 Final   03/08/2021 24.74 (H) 3.40 - 10.80 10*3/mm3 Final   03/07/2021 15.64 (H) 3.40 - 10.80 10*3/mm3 Final   10/23/2020 6.53 3.40 - 10.80 10*3/mm3 Final   10/22/2020 7.04 3.40 - 10.80 10*3/mm3 Final   02/08/2020 12.11 (H) 3.40 - 10.80 10*3/mm3 Final   11/19/2018 11.08 (H) 4.50 - 10.70 10*3/mm3 Final   09/26/2018 10.22 4.50 - 10.70 10*3/mm3 Final   09/25/2018 12.41 (H) 4.50 - 10.70 10*3/mm3 Final   07/31/2018 7.67 4.50 - 10.70 10*3/mm3 Final   08/31/2016 6.29 4.50 - 10.70 10*3/mm3 Final   11/06/2015 6.73 4.50 - 10.70 K/Cumm Final   10/05/2015 7.26 4.50 - 10.70 K/Cumm Final   10/14/2014 6.91 4.50 - 10.70 K/Cumm Final     Hemoglobin   Date Value Ref Range Status   10/24/2022 14.3 13.0 - 17.7 g/dL Final   05/13/2022 13.0 13.0 - 17.7 g/dL Final   05/10/2022 11.9 (L) 13.0 - 17.7 g/dL Final   05/09/2022 12.6 (L) 13.0 - 17.7 g/dL Final   05/08/2022 11.0 (L) 13.0 - 17.7 g/dL Final   05/07/2022 11.2 (L) 13.0 - 17.7 g/dL Final   05/06/2022 12.2 (L) 13.0 - 17.7 g/dL Final   05/05/2022 15.1 13.0 - 17.7 g/dL Final   07/01/2021 15.5 13.0 - 17.7 g/dL Final   03/10/2021 13.8 13.0 - 17.7 g/dL Final   03/09/2021 14.3 13.0 - 17.7 g/dL Final   03/08/2021 14.5 13.0 - 17.7 g/dL Final   03/07/2021 15.4 13.0 - 17.7 g/dL Final   10/23/2020 15.7 13.0 - 17.7 g/dL Final   10/22/2020 15.4 13.0 - 17.7 g/dL Final   02/08/2020 15.8 13.0 - 17.7 g/dL Final   11/19/2018 16.1 13.7 - 17.6 g/dL Final    09/28/2018 12.7 (L) 13.7 - 17.6 g/dL Final   09/27/2018 14.0 13.7 - 17.6 g/dL Final   09/26/2018 13.8 13.7 - 17.6 g/dL Final   09/26/2018 13.4 (L) 13.7 - 17.6 g/dL Final   09/26/2018 14.0 13.7 - 17.6 g/dL Final   09/25/2018 15.4 13.7 - 17.6 g/dL Final   09/25/2018 16.0 13.7 - 17.6 g/dL Final   07/31/2018 16.2 13.7 - 17.6 g/dL Final   08/31/2016 15.7 13.7 - 17.6 g/dL Final   11/07/2015 13.3 (L) 13.7 - 17.6 g/dL Final   11/06/2015 14.6 13.7 - 17.6 g/dL Final   10/05/2015 15.4 13.7 - 17.6 g/dL Final   10/28/2014 13.8 13.7 - 17.6 g/dL Final   10/14/2014 16.5 13.7 - 17.6 g/dL Final     Platelets   Date Value Ref Range Status   10/24/2022 220 140 - 450 10*3/mm3 Final   05/13/2022 434 140 - 450 10*3/mm3 Final   05/10/2022 297 140 - 450 10*3/mm3 Final   05/09/2022 251 140 - 450 10*3/mm3 Final   05/08/2022 199 140 - 450 10*3/mm3 Final   05/07/2022 193 140 - 450 10*3/mm3 Final   05/06/2022 196 140 - 450 10*3/mm3 Final   05/05/2022 241 140 - 450 10*3/mm3 Final   07/01/2021 250 140 - 450 10*3/mm3 Final   03/10/2021 254 140 - 450 10*3/mm3 Final   03/09/2021 221 140 - 450 10*3/mm3 Final   03/08/2021 205 140 - 450 10*3/mm3 Final   03/07/2021 255 140 - 450 10*3/mm3 Final   10/23/2020 212 140 - 450 10*3/mm3 Final   10/22/2020 200 140 - 450 10*3/mm3 Final   02/08/2020 228 140 - 450 10*3/mm3 Final   11/19/2018 235 140 - 500 10*3/mm3 Final   09/26/2018 132 (L) 140 - 500 10*3/mm3 Final   09/25/2018 208 140 - 500 10*3/mm3 Final   07/31/2018 235 140 - 500 10*3/mm3 Final   08/31/2016 156 140 - 500 10*3/mm3 Final   11/06/2015 215 140 - 500 K/Cumm Final   10/05/2015 223 140 - 500 K/Cumm Final   10/14/2014 227 140 - 500 K/Cumm Final           Assessment & Plan   Pulmonary nodules/lesions, multiple      ASSESSMENT:     * .  Pulmonary nodules.   · the patient has high risk for lung cancer with significant history of cigarette smoking about 100 pack year although he quit about 15 years ago.   · Nevertheless the patient had screening chest CT  scan on 01/27/2023 which reported 2 pulmonary nodules up to 15 mm, spiculated highly suspicious for lung cancer.   · We recommended to have PET scan examination first and then could choose the site of biopsy. The patient is high risk for pneumothorax, however there is no other way to bypass the biopsy.     PLAN:  1. Request PET scan examination. (This is arranged on 2/15/2023).   2. I will discuss his case at the multimodality lung conference on 2/16/2023.  3. We will arrange CT guided core needle biopsy once PET scan shows hypermetabolic lesion.  4. I will bring the patient back for reevaluation with me in 2 weeks.     I discussed with the patient about laboratory results and further management plan.  Patient voiced understanding and agreeable.      I discussed with Thoracic Surgeon, Eduardo Mcmullen MD, and Radiation Oncologist, Don Smith MD, in terms of further evaluation and potential management plan.       IDANIA HYLTON M.D., Ph.D.   2/7/2023        CC:   MD Eduardo Carrillo MD Cooper Rapp, MD

## 2023-02-09 DIAGNOSIS — K21.9 GASTROESOPHAGEAL REFLUX DISEASE, UNSPECIFIED WHETHER ESOPHAGITIS PRESENT: ICD-10-CM

## 2023-02-09 RX ORDER — PANTOPRAZOLE SODIUM 40 MG/1
TABLET, DELAYED RELEASE ORAL
Qty: 180 TABLET | Refills: 3 | Status: SHIPPED | OUTPATIENT
Start: 2023-02-09

## 2023-02-15 ENCOUNTER — HOSPITAL ENCOUNTER (OUTPATIENT)
Dept: PET IMAGING | Facility: HOSPITAL | Age: 78
Discharge: HOME | End: 2023-02-15
Payer: MEDICARE

## 2023-02-15 ENCOUNTER — HOSPITAL ENCOUNTER (OUTPATIENT)
Dept: PET IMAGING | Facility: HOSPITAL | Age: 78
Discharge: HOME | End: 2023-02-15
Admitting: STUDENT IN AN ORGANIZED HEALTH CARE EDUCATION/TRAINING PROGRAM
Payer: MEDICARE

## 2023-02-15 DIAGNOSIS — R91.8 PULMONARY NODULES/LESIONS, MULTIPLE: ICD-10-CM

## 2023-02-15 LAB — GLUCOSE BLDC GLUCOMTR-MCNC: 91 MG/DL (ref 70–130)

## 2023-02-15 PROCEDURE — 0 FLUDEOXYGLUCOSE F18 SOLUTION: Performed by: STUDENT IN AN ORGANIZED HEALTH CARE EDUCATION/TRAINING PROGRAM

## 2023-02-15 PROCEDURE — A9552 F18 FDG: HCPCS | Performed by: STUDENT IN AN ORGANIZED HEALTH CARE EDUCATION/TRAINING PROGRAM

## 2023-02-15 PROCEDURE — 82962 GLUCOSE BLOOD TEST: CPT

## 2023-02-15 PROCEDURE — 78815 PET IMAGE W/CT SKULL-THIGH: CPT

## 2023-02-15 RX ADMIN — FLUDEOXYGLUCOSE F18 1 DOSE: 300 INJECTION INTRAVENOUS at 10:39

## 2023-02-16 RX ORDER — CLOPIDOGREL BISULFATE 75 MG/1
TABLET ORAL
Qty: 90 TABLET | Refills: 0 | Status: SHIPPED | OUTPATIENT
Start: 2023-02-16

## 2023-02-16 RX ORDER — AMOXICILLIN AND CLAVULANATE POTASSIUM 875; 125 MG/1; MG/1
1 TABLET, FILM COATED ORAL 2 TIMES DAILY
Qty: 28 TABLET | Refills: 0 | Status: SHIPPED | OUTPATIENT
Start: 2023-02-16 | End: 2023-04-07

## 2023-02-24 ENCOUNTER — OFFICE VISIT (OUTPATIENT)
Dept: ONCOLOGY | Facility: CLINIC | Age: 78
End: 2023-02-24
Payer: MEDICARE

## 2023-02-24 ENCOUNTER — LAB (OUTPATIENT)
Dept: LAB | Facility: HOSPITAL | Age: 78
End: 2023-02-24
Payer: MEDICARE

## 2023-02-24 VITALS
DIASTOLIC BLOOD PRESSURE: 88 MMHG | HEART RATE: 66 BPM | TEMPERATURE: 96.2 F | WEIGHT: 183.4 LBS | RESPIRATION RATE: 18 BRPM | HEIGHT: 70 IN | OXYGEN SATURATION: 96 % | SYSTOLIC BLOOD PRESSURE: 138 MMHG | BODY MASS INDEX: 26.26 KG/M2

## 2023-02-24 DIAGNOSIS — R94.2 ABNORMAL PET OF LEFT LUNG: ICD-10-CM

## 2023-02-24 DIAGNOSIS — R91.8 PULMONARY NODULES/LESIONS, MULTIPLE: ICD-10-CM

## 2023-02-24 DIAGNOSIS — R91.8 PULMONARY NODULES/LESIONS, MULTIPLE: Primary | ICD-10-CM

## 2023-02-24 LAB
ALBUMIN SERPL-MCNC: 4.2 G/DL (ref 3.5–5.2)
ALBUMIN/GLOB SERPL: 1.5 G/DL (ref 1.1–2.4)
ALP SERPL-CCNC: 95 U/L (ref 38–116)
ALT SERPL W P-5'-P-CCNC: 19 U/L (ref 0–41)
ANION GAP SERPL CALCULATED.3IONS-SCNC: 11.6 MMOL/L (ref 5–15)
AST SERPL-CCNC: 23 U/L (ref 0–40)
BASOPHILS # BLD AUTO: 0.06 10*3/MM3 (ref 0–0.2)
BASOPHILS NFR BLD AUTO: 0.7 % (ref 0–1.5)
BILIRUB SERPL-MCNC: 1 MG/DL (ref 0.2–1.2)
BUN SERPL-MCNC: 13 MG/DL (ref 6–20)
BUN/CREAT SERPL: 13.5 (ref 7.3–30)
CALCIUM SPEC-SCNC: 9.4 MG/DL (ref 8.5–10.2)
CHLORIDE SERPL-SCNC: 102 MMOL/L (ref 98–107)
CO2 SERPL-SCNC: 27.4 MMOL/L (ref 22–29)
CREAT SERPL-MCNC: 0.96 MG/DL (ref 0.7–1.3)
DEPRECATED RDW RBC AUTO: 44.8 FL (ref 37–54)
EGFRCR SERPLBLD CKD-EPI 2021: 81.9 ML/MIN/1.73
EOSINOPHIL # BLD AUTO: 0.15 10*3/MM3 (ref 0–0.4)
EOSINOPHIL NFR BLD AUTO: 1.6 % (ref 0.3–6.2)
ERYTHROCYTE [DISTWIDTH] IN BLOOD BY AUTOMATED COUNT: 13.4 % (ref 12.3–15.4)
GLOBULIN UR ELPH-MCNC: 2.8 GM/DL (ref 1.8–3.5)
GLUCOSE SERPL-MCNC: 125 MG/DL (ref 74–124)
HCT VFR BLD AUTO: 43.7 % (ref 37.5–51)
HGB BLD-MCNC: 14.5 G/DL (ref 13–17.7)
IMM GRANULOCYTES # BLD AUTO: 0.06 10*3/MM3 (ref 0–0.05)
IMM GRANULOCYTES NFR BLD AUTO: 0.7 % (ref 0–0.5)
LYMPHOCYTES # BLD AUTO: 1.72 10*3/MM3 (ref 0.7–3.1)
LYMPHOCYTES NFR BLD AUTO: 18.8 % (ref 19.6–45.3)
MCH RBC QN AUTO: 30.1 PG (ref 26.6–33)
MCHC RBC AUTO-ENTMCNC: 33.2 G/DL (ref 31.5–35.7)
MCV RBC AUTO: 90.7 FL (ref 79–97)
MONOCYTES # BLD AUTO: 0.8 10*3/MM3 (ref 0.1–0.9)
MONOCYTES NFR BLD AUTO: 8.8 % (ref 5–12)
NEUTROPHILS NFR BLD AUTO: 6.34 10*3/MM3 (ref 1.7–7)
NEUTROPHILS NFR BLD AUTO: 69.4 % (ref 42.7–76)
NRBC BLD AUTO-RTO: 0 /100 WBC (ref 0–0.2)
PLATELET # BLD AUTO: 220 10*3/MM3 (ref 140–450)
PMV BLD AUTO: 9.2 FL (ref 6–12)
POTASSIUM SERPL-SCNC: 3.8 MMOL/L (ref 3.5–4.7)
PROT SERPL-MCNC: 7 G/DL (ref 6.3–8)
RBC # BLD AUTO: 4.82 10*6/MM3 (ref 4.14–5.8)
SODIUM SERPL-SCNC: 141 MMOL/L (ref 134–145)
WBC NRBC COR # BLD: 9.13 10*3/MM3 (ref 3.4–10.8)

## 2023-02-24 PROCEDURE — 80053 COMPREHEN METABOLIC PANEL: CPT

## 2023-02-24 PROCEDURE — 36415 COLL VENOUS BLD VENIPUNCTURE: CPT

## 2023-02-24 PROCEDURE — 99214 OFFICE O/P EST MOD 30 MIN: CPT | Performed by: INTERNAL MEDICINE

## 2023-02-24 PROCEDURE — 85025 COMPLETE CBC W/AUTO DIFF WBC: CPT

## 2023-02-24 RX ORDER — PREDNISOLONE ACETATE 10 MG/ML
SUSPENSION/ DROPS OPHTHALMIC
COMMUNITY

## 2023-02-24 RX ORDER — KETOROLAC TROMETHAMINE 5 MG/ML
SOLUTION OPHTHALMIC
COMMUNITY

## 2023-03-02 RX ORDER — ATORVASTATIN CALCIUM 80 MG/1
80 TABLET, FILM COATED ORAL NIGHTLY
Qty: 90 TABLET | Refills: 3 | Status: SHIPPED | OUTPATIENT
Start: 2023-03-02

## 2023-03-02 NOTE — TELEPHONE ENCOUNTER
----- Message from Aj Salazar sent at 3/2/2023  9:47 AM EST -----  Regarding: atorvastatin 80mg  Contact: [phone]  no more refills if you want me to keep taking this it needs to be renewed. thanks  it goes to [facility] pharmacy  [phone]

## 2023-03-04 PROBLEM — R94.2 ABNORMAL PET OF LEFT LUNG: Status: ACTIVE | Noted: 2023-03-04

## 2023-03-29 ENCOUNTER — HOSPITAL ENCOUNTER (OUTPATIENT)
Dept: CT IMAGING | Facility: HOSPITAL | Age: 78
Discharge: HOME | End: 2023-03-29
Admitting: INTERNAL MEDICINE
Payer: MEDICARE

## 2023-03-29 DIAGNOSIS — R91.8 PULMONARY NODULES/LESIONS, MULTIPLE: ICD-10-CM

## 2023-03-29 PROCEDURE — 25510000001 IOPAMIDOL 61 % SOLUTION: Performed by: INTERNAL MEDICINE

## 2023-03-29 PROCEDURE — 82565 ASSAY OF CREATININE: CPT

## 2023-03-29 PROCEDURE — 71260 CT THORAX DX C+: CPT

## 2023-03-29 RX ADMIN — IOPAMIDOL 75 ML: 612 INJECTION, SOLUTION INTRAVENOUS at 09:01

## 2023-03-30 LAB — CREAT BLDA-MCNC: 0.9 MG/DL (ref 0.6–1.3)

## 2023-04-07 ENCOUNTER — OFFICE VISIT (OUTPATIENT)
Dept: ONCOLOGY | Facility: CLINIC | Age: 78
End: 2023-04-07
Payer: MEDICARE

## 2023-04-07 VITALS
WEIGHT: 181.8 LBS | BODY MASS INDEX: 26.03 KG/M2 | DIASTOLIC BLOOD PRESSURE: 79 MMHG | HEIGHT: 70 IN | SYSTOLIC BLOOD PRESSURE: 134 MMHG | TEMPERATURE: 97.1 F | HEART RATE: 96 BPM | RESPIRATION RATE: 18 BRPM | OXYGEN SATURATION: 95 %

## 2023-04-07 DIAGNOSIS — I10 PRIMARY HYPERTENSION: Chronic | ICD-10-CM

## 2023-04-07 DIAGNOSIS — R91.8 PULMONARY NODULES/LESIONS, MULTIPLE: Primary | ICD-10-CM

## 2023-04-07 DIAGNOSIS — E11.65 TYPE 2 DIABETES MELLITUS WITH HYPERGLYCEMIA, WITHOUT LONG-TERM CURRENT USE OF INSULIN: Chronic | ICD-10-CM

## 2023-04-07 DIAGNOSIS — J18.9 PNEUMONIA OF RIGHT UPPER LOBE DUE TO INFECTIOUS ORGANISM: ICD-10-CM

## 2023-04-07 RX ORDER — AMOXICILLIN AND CLAVULANATE POTASSIUM 875; 125 MG/1; MG/1
1 TABLET, FILM COATED ORAL 2 TIMES DAILY
Qty: 20 TABLET | Refills: 0 | Status: SHIPPED | OUTPATIENT
Start: 2023-04-07

## 2023-04-07 NOTE — PROGRESS NOTES
.     REASON FOR FOLLOWUP:     Provide an opinion on any further workup or treatment of enlarging pulmonary nodules.                               HISTORY OF PRESENT ILLNESS:  The patient is a 77 y.o. year old male with hypertension, type 2 diabetes, coronary artery disease, benign prostate hypertrophy, who presented today on 04/07/2023 for a 6-week follow-up evaluation, after his chest CT scan examination on 03/29/2023.     The study reported a new irregular peripheral mass-like consolidation in the posterolateral right upper lobe measuring 2.1 x 2.0 cm. A 0.7 cm nodular opacity in the right upper lobe is less conspicuous compared to that from 02/15/2023 and has decreased in size from 01/27/2023. It was 1.1 cm. The previously noted focal consolidation in the posterior right upper lobe is smaller and less conspicuous. There was also a 0.4 cm solid nodule in the posterolateral right middle lobe which is also new. A 1.4 cm irregular ground-glass nodular opacity in the anterior left upper lobe has decreased in size and density compared to that from 02/15/2023. Previously was 1.8 cm.    The patient denies any fever or night sweats. He complains of a painful, dry, hacking cough.  Patient has no hemoptysis.  No weight loss.       Past Medical History:   Diagnosis Date   • Alcohol abuse, in remission    • BPH (benign prostatic hypertrophy)     see doctors at [facility]   • COPD (chronic obstructive pulmonary disease)    • Depression    • DJD (degenerative joint disease) of cervical spine    • ED (erectile dysfunction)     SE[facility] DO[facility]   • GERD (gastroesophageal reflux disease)    • Glaucoma    • History of prediabetes    • Hx of colonic polyps     followed by GI (K[facility])   • Hyperlipidemia    • Hypertension    • Influenza B 02/14/2013       • Nocturnal hypoxia    • Osteoarthritis     HIPS, HANDS, MULTIPLE SITES   • Peripheral neuropathy    • Permanent atrial fibrillation    • Rectal bleeding 04/26/2017    • Tendonitis of shoulder 11/07/2007    RIGHT SHOULDER/DELTOID INSERTION   • TIA (transient ischemic attack) 10/2020   • Ulcer of the stomach and intestine      Past Surgical History:   Procedure Laterality Date   • APPENDECTOMY     • CARDIOVASCULAR STRESS TEST N/A 10/20/2015    NML LEXISCAN CARDIOLITE PERFUSION STUDY, NO EVIDENCE OF ISCHEMIA, AREA OF HYPOPERFUSION OF THE INFERIOR WALL W/NORMAL WALL PERFUSION AND NML LEFT VENTRICULAR EJECTION FRACTION, MOST LIKELY ATTENUATION. DR.MICHAEL BOX   • COLONOSCOPY N/A 02/2017   • COLONOSCOPY N/A 02/23/2011    4 POLYPS REMOVED, RESCOPE IN 5 YRS, DR. EAGLE   • COLONOSCOPY N/A 03/08/2006    ERYTHEMOUS AND GRANULAR MUCOSA IN ILEOCECAL VALVE, NORMAL COLON, REPEAT IN 5 YRS,    • ENDOSCOPY N/A 09/26/2018    normal esophagus, acute gastritis, multiple non-bleeding duodenal ulcers with pigmented material, H Pylori positive   • HERNIA REPAIR Bilateral     INGUINAL   • JOINT REPLACEMENT  11/2015    left hip   • TOTAL HIP ARTHROPLASTY Left 11/06/2015    Dr Ankush Sky   • TOTAL HIP ARTHROPLASTY Right 10/27/2014    DR.RIED SKY   • VASECTOMY       MEDICAL ONCOLOGY HISTORY: The patient is a 77 y.o. year old male who presented on 2/7/2023 to the multimodality lung clinic at the [facility] for initial evaluation because of worsening pulmonary nodules. The patient is accompanied by his wife today.     This patient has history of cigarette smoking for more than 50 years, 2 packs per day and he quit about 15 years ago. The patient reports dry cough. He also has COPD for more than 5 years, he has exertional dyspnea. He reports that since last month he has become more short of breath, however denies hemoptysis.  He denies weight loss, no headaches or vision changes.     The patient reports he is followed by pulmonologist Tahir Childs MD, and he has a scheduled PFT examination soon.     The patient reports he is  diabetic, and has been on metformin with controlled glucose level. The patient also reports he has atrial fibrillation for quite some time, maybe started 8 years ago when he had hip surgery. He is on Plavix. He is not on any other blood thinner as I reviewed medicines. The patient is followed by Cardiology, Juan Colby MD. The patient also reports chronic trace edema in legs.     This patient had low dose CT scan for the chest due to risk for lung cancer with significant history of cigarette smoking. This study was done on 01/27/2023, and reported right upper lobe newly developed suspicious stellate 10 mm nodule. There is also a right upper lobe base lung nodule along the major fissure 15 mm spiculated nodule. There is also grouping of 4 sub-6 mm pulmonary nodules within the right upper lobe, and also another group of 5 sub-6 mm nodules within the right lower lobe. There was also benign calcified granuloma in the right lower lobe. Within the left upper lobe, there is a 6 mm pulmonary nodule.       I reviewed his chest CT scan images with Thoracic Surgeon, Eduardo Mcmullen MD, and we recommended to obtain PET scan examination for further evaluation because those lesions are highly suspicious for lung cancer. If those lesions are hypermetabolic, we will try to purse CT guided core needle biopsy. However I discussed with the patient and his wife that the nodules are relatively small, and is not immediately close to chest wall. It will be difficult to biopsy and has high risk for pneumothorax because this patient has evidence of emphysema on CT scan of the chest.        Patient had PET scan examination on 2/15/2023 reported photopenic pulmonary nodules except a newly developed hypermetabolic lesion 1.8 cm with SUV 3.1 cm at the lingular lobe.    I presented his case at the multimodality conference on 2/16/2023, and we suspected the hypermetabolic lesion is infectious.  A conference recommended to treat him with  antibiotics and then reassess with a chest CT scan in 5 to 6 weeks.  We started patient on Augmentin 875 mg twice a day for 10 days on 2/16/2023.    Today on 2/24/2023 patient reports no fever sweating chills.  No chest pains.  No cough no hemoptysis.       MEDICATIONS    Current Outpatient Medications:   •  Accu-Chek Softclix Lancets lancets, 1 each by Other route Every Other Day. Use as instructed, Disp: 100 each, Rfl: 0  •  acetaminophen (TYLENOL) 325 MG tablet, Take 2 tablets by mouth Every 4 (Four) Hours As Needed for Mild Pain ., Disp:  , Rfl:   •  ALPHAGAN P 0.1 % solution ophthalmic solution, Administer 1 drop to both eyes 2 (two) times a day., Disp: , Rfl: 6  •  atorvastatin (LIPITOR) 80 MG tablet, Take 1 tablet by mouth Every Night., Disp: 90 tablet, Rfl: 3  •  bumetanide (BUMEX) 1 MG tablet, bumetanide 1 mg tablet  TAKE 1 TABLET BY MOUTH EVERY DAY, Disp: , Rfl:   •  clopidogrel (PLAVIX) 75 MG tablet, TAKE 1 TABLET EVERY DAY, Disp: 90 tablet, Rfl: 0  •  dabigatran etexilate (Pradaxa) 150 MG capsu, Take 1 capsule by mouth 2 (Two) Times a Day., Disp: 60 capsule, Rfl: 6  •  dilTIAZem CD (CARDIZEM CD) 180 MG 24 hr capsule, Take 1 capsule by mouth Daily., Disp: 30 capsule, Rfl: 0  •  dorzolamide (TRUSOPT) 2 % ophthalmic solution, Administer 1 drop to both eyes 2 (Two) Times a Day., Disp: , Rfl:   •  Fluticasone-Umeclidin-Vilant 200-62.5-25 MCG/INH aerosol powder , Inhale 1 puff Daily. Just increased to this strength 3/21, Disp: , Rfl:   •  furosemide (LASIX) 40 MG tablet, Take 1 tablet by mouth Daily., Disp: 30 tablet, Rfl: 0  •  glucose blood test strip, Accu-Chek Dannielle Plus test strips  USE TO CHECK GLUCOSE QOD, Disp: , Rfl:   •  glucose blood test strip, Accu-Chek Dannielle Plus Meter  USE TO CHECK GLUCOSE QOD, Disp: , Rfl:   •  hydrOXYzine (ATARAX) 25 MG tablet, Take 1 tablet by mouth At Night As Needed for Itching., Disp: 30 tablet, Rfl: 0  •  ketorolac (ACULAR) 0.5 % ophthalmic solution, , Disp: , Rfl:   •   ketorolac (ACULAR) 0.5 % ophthalmic solution, ketorolac 0.5 % eye drops  INSTILL 1 DROP IN RIGHT EYE FOUR TIMES DAILY. START DROP 3 DAYS BEFORE SURGERY AND. CONTINUE ACCORDING TO POST-OP INSTRUCTIONS, Disp: , Rfl:   •  metFORMIN ER (GLUCOPHAGE-XR) 500 MG 24 hr tablet, Take 1 tablet by mouth Daily With Dinner., Disp: 30 tablet, Rfl: 11  •  metoprolol succinate XL (TOPROL-XL) 100 MG 24 hr tablet, Take 1 tablet by mouth Every Night. Take 100 mg at night and 50 mg in the morning, Disp: 30 tablet, Rfl: 0  •  metoprolol succinate XL (TOPROL-XL) 50 MG 24 hr tablet, Take 1 tablet by mouth Daily. Take 50 mg every morning and 100 mg every night, Disp: 30 tablet, Rfl: 0  •  mupirocin (BACTROBAN) 2 % ointment, mupirocin 2 % topical ointment  APPLY TOPICALLY TO THE AFFECTED AREA THREE TIMES DAILY UNTIL HEALED, Disp: , Rfl:   •  Myrbetriq 25 MG tablet sustained-release 24 hour 24 hr tablet, , Disp: , Rfl:   •  nitrofurantoin, macrocrystal-monohydrate, (MACROBID) 100 MG capsule, nitrofurantoin monohydrate/macrocrystals 100 mg capsule, Disp: , Rfl:   •  pantoprazole (PROTONIX) 40 MG EC tablet, TAKE 1 TABLET TWICE DAILY, Disp: 180 tablet, Rfl: 3  •  prednisoLONE acetate (PRED FORTE) 1 % ophthalmic suspension, prednisolone acetate 1 % eye drops,suspension  SHAKE LIQUID AND INSTILL 1 DROP IN RIGHT EYE FOUR TIMES DAILY. START DROP 3 DAYS BEFORE SURGERY AND. CONTINUE ACCORDING TO POST-OP INSTRUCTIONS, Disp: , Rfl:   •  spironolactone (ALDACTONE) 25 MG tablet, Take 1 tablet by mouth Daily., Disp: 30 tablet, Rfl: 0  •  Tafluprost, PF, (ZIOPTAN) 0.0015 % solution ophthalmic solution, Administer 1 drop to both eyes Every Night., Disp: , Rfl:   •  tamsulosin (FLOMAX) 0.4 MG capsule 24 hr capsule, Take 2 capsules by mouth Every Night., Disp: 30 capsule, Rfl:   •  amoxicillin-clavulanate (AUGMENTIN) 875-125 MG per tablet, Take 1 tablet by mouth 2 (Two) Times a Day., Disp: 20 tablet, Rfl: 0    ALLERGIES:     Allergies   Allergen Reactions   •  "Bactrim [Sulfamethoxazole-Trimethoprim] Rash   • Sulfacetamide Rash       SOCIAL HISTORY:       Social History     Socioeconomic History   • Marital status:      Spouse name: Ara*   • Number of children: 3   Tobacco Use   • Smoking status: Former     Packs/day: 2.00     Years: 49.00     Pack years: 98.00     Types: Cigarettes     Quit date: 2010     Years since quittin.2   • Smokeless tobacco: Never   • Tobacco comments:     long smoking history   Vaping Use   • Vaping Use: Never used   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   • Drug use: No     Comment: caffeine use    • Sexual activity: Defer         FAMILY HISTORY:  Family History   Problem Relation Age of Onset   • Cancer Mother         Bladder cancer   • Colon cancer Mother    • Ovarian cancer Mother    • Alzheimer's disease Mother 70   • Hyperlipidemia Father    • Heart disease Father    • Coronary artery disease Father    • Hypertension Father    • Stroke Sister         2016   • Dementia Sister    • Heart disease Brother    • Alcohol abuse Brother    • Heart disease Brother    • Coronary artery disease Brother    • Hypertension Brother    • Stroke Brother    • Arthritis Brother    • Colon cancer Maternal Grandmother    • Prostate cancer Neg Hx        REVIEW OF SYSTEMS:  Review of Systems see HPI           Vitals:    23 0859   BP: 134/79   Pulse: 96   Resp: 18   Temp: 97.1 °F (36.2 °C)   TempSrc: Temporal   SpO2: 95%   Weight: 82.5 kg (181 lb 12.8 oz)   Height: 177.8 cm (70\")   PainSc: 0-No pain     Current Status 2023   ECOG score 0      PHYSICAL EXAM:    GENERAL:  Well-developed, well-nourished elder gentleman, in no acute distress.  Orientated to time place and the people.  SKIN:  Warm, dry without rashes, purpura or petechiae.  HEENT:  Normocephalic.  Wearing mask.   LYMPHATICS:  No cervical, supraclavicular adenopathy.  CHEST: Normal respiratory " effort.  Lungs clear to auscultation. Good airflow.  CARDIAC:  Regular rate and rhythm without murmurs. Normal S1,S2.  ABDOMEN:  Soft, nontender with no organomegaly or masses.  Bowel sounds normal.  EXTREMITIES:  No clubbing, cyanosis or edema.  NEUROLOGICAL:  Grossly intact.    PSYCHIATRIC:  Normal affect and mood.      RECENT LABS:  Lab Results   Component Value Date    WBC 9.13 02/24/2023    HGB 14.5 02/24/2023    HCT 43.7 02/24/2023    MCV 90.7 02/24/2023     02/24/2023     Lab Results   Component Value Date    NEUTROABS 6.34 02/24/2023     Lab Results   Component Value Date    GLUCOSE 125 (H) 02/24/2023    BUN 13 02/24/2023    CREATININE 0.90 03/29/2023    EGFRRESULT 89.4 10/24/2022    EGFR 81.9 02/24/2023    BCR 13.5 02/24/2023    K 3.8 02/24/2023    CO2 27.4 02/24/2023    CALCIUM 9.4 02/24/2023    PROTENTOTREF 6.1 10/24/2022    ALBUMIN 4.2 02/24/2023    LABIL2 1.8 10/24/2022    AST 23 02/24/2023    ALT 19 02/24/2023       IMAGING:  CT Chest With Contrast Diagnostic  Narrative: CT CHEST W CONTRAST DIAGNOSTIC-     HISTORY:  Pulmonary nodule follow-up.     TECHNIQUE: CT of the chest was performed following administration of  intravenous contrast. Reformatted images were reviewed. Radiation dose  reduction techniques were utilized, including automated exposure control  and exposure modulation based on body size.     COMPARISON:  PET/CT 02/15/2023. CT chest 01/27/2023.     FINDINGS: There are partially calcified lymph nodes. The heart is mildly  enlarged. There is no pericardial effusion. There is incompletely  assessed calcific coronary artery atherosclerosis. There is advanced  calcific and noncalcified aortic and branch vessel atherosclerosis. The  descending aorta is ectatic to 3.0 cm. Pleural spaces are clear.  Limited imaging through the upper abdomen demonstrates calcified hepatic  and splenic granulomata. There is calcific atherosclerosis. There is  multilevel degenerative disc disease.  There is  an accessory azygous lobe, a developmental variant. There is  advanced emphysema. There is new irregular peripheral masslike  consolidation in the posterolateral right upper lobe, measuring 2.1 x  2.0 cm. A 0.7 cm nodular opacity in the right upper lobe this is less  conspicuous compared to 02/15/2023 and has decreased in size from  01/27/2023, at which time it measured 1.1 cm. Previously noted focal  consolidation in the posterior right upper lobe is smaller and less  conspicuous. A 0.4 cm solid nodule in the posterolateral right middle  lobe is new. A 1.4 cm irregular groundglass nodular opacity in the  anterior left upper lobe has decreased in size and density compared to  02/15/2023, previously 1.8 cm. There are a few scattered calcified  granulomata..        Impression:    1.  New masslike consolidation measuring 2.1 cm in the right upper lobe  and new 0.4 cm right middle lobe nodule. Additional previously noted  nodular opacities, including including the previously described new  opacity on the left, have improved. Recommend continued short-term  follow-up CT chest in 3 months.  2.  Emphysema.     This report was finalized on 3/30/2023 7:41 AM by Dr. Ayala Wilcox M.D.         Assessment & Plan     ASSESSMENT:     1.  Pulmonary nodules.   · the patient has high risk for lung cancer with significant history of cigarette smoking about 100 pack year although he quit about 15 years ago.   · Nevertheless the patient had screening chest CT scan on 01/27/2023 which reported 2 pulmonary nodules up to 15 mm, spiculated highly suspicious for lung cancer.   · We recommended to have PET scan examination first and then could choose the site of biopsy. The patient is high risk for pneumothorax, however there is no other way to bypass the biopsy.   · PET scan examination on 2/15/2023 reported photopenic pulmonary nodules except a newly developed hypermetabolic lesion 1.8 cm with SUV 3.1 cm at the lingular lobe.  · His case  was presented at the multimodality conference on 2/16/2023, and I suspected the hypermetabolic lesion is infectious.  We started patient on Augmentin 875 mg twice a day for 10 days on 2/16/2023.  · On 2/24/2023 patient reports no fever sweating chills.  No chest pains.  No cough no hemoptysis.  We recommended to have repeated chest CT scan in about 6 weeks for reassessment, as recommended by the multimodality conference.  Patient is agreeable.  · On 3/29/2023 patient had a repeating chest CT scan examination.  I reviewed the images with the patient and his wife today, 04/07/2023. Certainly, this right upper lobe consolidation is new, just developed in the past 6 to 8 weeks. He is symptomatic with cough and sweating, chills despite no fever. He does have some pleuritic chest pain also. I discussed with the patient and his wife, recommended to treat him as pneumonia with Augmentin for 10 days. I will obtain a CT scan in 3 months with IV contrast as recommended by radiologist. I do not think this is cancerous in nature, this is more of infection. Right now, there is no evidence for cancer recurrence.       2.  Diabetes.  Glucose 125 today 4/7/2023.  Patient will continue metformin and monitor glucose level at home.    3.  Hypertension-coronary artery disease.  Patient is on multiple medications.  We will continue follow-up with PCP for management.     PLAN:  1. Start Augmentin 875 mg twice a day for 10 days. I sent a new prescription to his pharmacy today.  2. Patient has appointment with his pulmonologist, Dr. Tahir Childs, next week.    3. We will arrange the patient to have a CT of the chest with IV contrast in 3 months for reassessment.  4. I will see patient in 3 months, 1 week after the CT scan and then we will check CBC and CMP at that time.    Pneumonia is a new problem today.    I reviewed the images of CT scan from 3/29/2023, and compared to the images of CT scan from 1/27/2023 and the images of PET scan from  "2/15/2023.  I shared those with patient and his wife today.     I discussed with the patient about laboratory results and further management plan.  Patient voiced understanding and agreeable.      IDANIA HYLTON M.D., Ph.D.         CC:   MD Eduardo Carrillo MD Cooper Rapp, MD Mark Esterle, M.D.         Transcribed from ambient dictation for Idania Hylton MD PhD by Analisa Leon.  04/07/23   10:33 EDT    [facility] Provider Statement:[id number]::\"Patient or patient representative verbalized consent to the visit recording.\",\"I have personally performed the services described in this document as transcribed by the above individual, and it is both accurate and complete.\"            "

## 2023-04-23 PROBLEM — J18.9 PNEUMONIA DUE TO INFECTIOUS ORGANISM: Status: ACTIVE | Noted: 2023-04-23

## 2023-04-25 ENCOUNTER — OFFICE VISIT (OUTPATIENT)
Dept: INTERNAL MEDICINE | Age: 78
End: 2023-04-25
Payer: MEDICARE

## 2023-04-25 VITALS
HEART RATE: 93 BPM | OXYGEN SATURATION: 94 % | BODY MASS INDEX: 26.05 KG/M2 | TEMPERATURE: 97.5 F | WEIGHT: 182 LBS | DIASTOLIC BLOOD PRESSURE: 74 MMHG | HEIGHT: 70 IN | SYSTOLIC BLOOD PRESSURE: 126 MMHG

## 2023-04-25 DIAGNOSIS — E11.65 TYPE 2 DIABETES MELLITUS WITH HYPERGLYCEMIA, WITHOUT LONG-TERM CURRENT USE OF INSULIN: Primary | Chronic | ICD-10-CM

## 2023-04-25 DIAGNOSIS — I10 PRIMARY HYPERTENSION: Chronic | ICD-10-CM

## 2023-04-25 DIAGNOSIS — R91.8 PULMONARY NODULES/LESIONS, MULTIPLE: ICD-10-CM

## 2023-04-25 DIAGNOSIS — J43.9 PULMONARY EMPHYSEMA, UNSPECIFIED EMPHYSEMA TYPE: Chronic | ICD-10-CM

## 2023-04-25 DIAGNOSIS — J18.9 PNEUMONIA DUE TO INFECTIOUS ORGANISM, UNSPECIFIED LATERALITY, UNSPECIFIED PART OF LUNG: ICD-10-CM

## 2023-04-25 LAB — HBA1C MFR BLD: 6.5 % (ref 4.8–5.6)

## 2023-04-25 PROCEDURE — 3078F DIAST BP <80 MM HG: CPT | Performed by: INTERNAL MEDICINE

## 2023-04-25 PROCEDURE — 99214 OFFICE O/P EST MOD 30 MIN: CPT | Performed by: INTERNAL MEDICINE

## 2023-04-25 PROCEDURE — 3074F SYST BP LT 130 MM HG: CPT | Performed by: INTERNAL MEDICINE

## 2023-04-25 NOTE — PROGRESS NOTES
I N T E R N A L  M E D I C I N E    J U N O H  K I M,  M D      ENCOUNTER DATE:  04/25/2023    Aj Salazar / 77 y.o. / male    CHIEF COMPLAINT / REASON FOR OFFICE VISIT     Diabetes, Hypertension, and Shortness of Breath      ASSESSMENT & PLAN     Problem List Items Addressed This Visit        High    Type 2 diabetes mellitus with hyperglycemia, without long-term current use of insulin (HCC) - Primary (Chronic)    Overview     Continue metformin  mg daily with dinner.          Relevant Medications    metFORMIN ER (GLUCOPHAGE-XR) 500 MG 24 hr tablet    Other Relevant Orders    Hemoglobin A1c    Pulmonary nodules/lesions, multiple    Relevant Medications    Fluticasone-Umeclidin-Vilant 200-62.5-25 MCG/INH aerosol powder     Pneumonia due to infectious organism    Relevant Medications    Fluticasone-Umeclidin-Vilant 200-62.5-25 MCG/INH aerosol powder     amoxicillin-clavulanate (AUGMENTIN) 875-125 MG per tablet       Medium    COPD (chronic obstructive pulmonary disease) (Chronic)    Overview     *Esterle    PFT (5/2018): moderate obstruction with decreased diffusing capacity.     Continue Trelegy 200 daily and continue follow-up with pulmonologist.         Relevant Medications    Fluticasone-Umeclidin-Vilant 200-62.5-25 MCG/INH aerosol powder     Hypertension (Chronic)    Relevant Medications    furosemide (LASIX) 40 MG tablet    metoprolol succinate XL (TOPROL-XL) 100 MG 24 hr tablet    spironolactone (ALDACTONE) 25 MG tablet    metoprolol succinate XL (TOPROL-XL) 50 MG 24 hr tablet    dilTIAZem CD (CARDIZEM CD) 180 MG 24 hr capsule    bumetanide (BUMEX) 1 MG tablet     Orders Placed This Encounter   Procedures   • Hemoglobin A1c     No orders of the defined types were placed in this encounter.      SUMMARY/DISCUSSION  • Completed course of antibiotic for pneumonia with close follow-up plans with oncology for repeat CT in 3 months and with pulmonologist.   • Chronic dyspnea:  Check home pulse oximetry  "with activity, if < 90% discuss with pulmonologist about O2  • Continue metformin for diabetes, check A1c   • Continue all other heart / hypertension medications   • Recommended bivalent 2nd booster at pharmacy       Next Appointment with me: Visit date not found    Return in about 6 months (around 10/25/2023) for Diabetes, Hypertension, COPD.        VITAL SIGNS     Vitals:    04/25/23 0727   BP: 126/74   Pulse: 93   Temp: 97.5 °F (36.4 °C)   SpO2: 94%   Weight: 82.6 kg (182 lb)   Height: 177.8 cm (70\")       BP Readings from Last 3 Encounters:   04/25/23 126/74   04/07/23 134/79   02/24/23 138/88     Wt Readings from Last 3 Encounters:   04/25/23 82.6 kg (182 lb)   04/07/23 82.5 kg (181 lb 12.8 oz)   02/24/23 83.2 kg (183 lb 6.4 oz)     Body mass index is 26.11 kg/m².    Blood pressure readings recorded on patient flowsheet:       MEDICATIONS AT THE TIME OF OFFICE VISIT     Current Outpatient Medications on File Prior to Visit   Medication Sig   • Accu-Chek Softclix Lancets lancets 1 each by Other route Every Other Day. Use as instructed   • acetaminophen (TYLENOL) 325 MG tablet Take 2 tablets by mouth Every 4 (Four) Hours As Needed for Mild Pain .   • ALPHAGAN P 0.1 % solution ophthalmic solution Administer 1 drop to both eyes 2 (two) times a day.   • amoxicillin-clavulanate (AUGMENTIN) 875-125 MG per tablet Take 1 tablet by mouth 2 (Two) Times a Day.   • atorvastatin (LIPITOR) 80 MG tablet Take 1 tablet by mouth Every Night.   • bumetanide (BUMEX) 1 MG tablet bumetanide 1 mg tablet   TAKE 1 TABLET BY MOUTH EVERY DAY   • clopidogrel (PLAVIX) 75 MG tablet TAKE 1 TABLET EVERY DAY   • dorzolamide (TRUSOPT) 2 % ophthalmic solution Administer 1 drop to both eyes 2 (Two) Times a Day.   • Fluticasone-Umeclidin-Vilant 200-62.5-25 MCG/INH aerosol powder  Inhale 1 puff Daily. Just increased to this strength 3/21   • furosemide (LASIX) 40 MG tablet Take 1 tablet by mouth Daily.   • glucose blood test strip Accu-Chek Dannielle " Plus test strips   USE TO CHECK GLUCOSE QOD   • glucose blood test strip Accu-Chek Dannielle Plus Meter   USE TO CHECK GLUCOSE QOD   • metFORMIN ER (GLUCOPHAGE-XR) 500 MG 24 hr tablet Take 1 tablet by mouth Daily With Dinner.   • metoprolol succinate XL (TOPROL-XL) 100 MG 24 hr tablet Take 1 tablet by mouth Every Night. Take 100 mg at night and 50 mg in the morning   • metoprolol succinate XL (TOPROL-XL) 50 MG 24 hr tablet Take 1 tablet by mouth Daily. Take 50 mg every morning and 100 mg every night   • mupirocin (BACTROBAN) 2 % ointment mupirocin 2 % topical ointment   APPLY TOPICALLY TO THE AFFECTED AREA THREE TIMES DAILY UNTIL HEALED   • Myrbetriq 25 MG tablet sustained-release 24 hour 24 hr tablet    • nitrofurantoin, macrocrystal-monohydrate, (MACROBID) 100 MG capsule nitrofurantoin monohydrate/macrocrystals 100 mg capsule   • pantoprazole (PROTONIX) 40 MG EC tablet TAKE 1 TABLET TWICE DAILY   • spironolactone (ALDACTONE) 25 MG tablet Take 1 tablet by mouth Daily.   • Tafluprost, PF, (ZIOPTAN) 0.0015 % solution ophthalmic solution Administer 1 drop to both eyes Every Night.   • tamsulosin (FLOMAX) 0.4 MG capsule 24 hr capsule Take 2 capsules by mouth Every Night.   • [DISCONTINUED] hydrOXYzine (ATARAX) 25 MG tablet Take 1 tablet by mouth At Night As Needed for Itching.   • [DISCONTINUED] ketorolac (ACULAR) 0.5 % ophthalmic solution    • [DISCONTINUED] ketorolac (ACULAR) 0.5 % ophthalmic solution ketorolac 0.5 % eye drops   INSTILL 1 DROP IN RIGHT EYE FOUR TIMES DAILY. START DROP 3 DAYS BEFORE SURGERY AND. CONTINUE ACCORDING TO POST-OP INSTRUCTIONS   • [DISCONTINUED] prednisoLONE acetate (PRED FORTE) 1 % ophthalmic suspension prednisolone acetate 1 % eye drops,suspension   SHAKE LIQUID AND INSTILL 1 DROP IN RIGHT EYE FOUR TIMES DAILY. START DROP 3 DAYS BEFORE SURGERY AND. CONTINUE ACCORDING TO POST-OP INSTRUCTIONS   • dabigatran etexilate (Pradaxa) 150 MG capsu Take 1 capsule by mouth 2 (Two) Times a Day. (Patient  not taking: Reported on 4/25/2023)   • dilTIAZem CD (CARDIZEM CD) 180 MG 24 hr capsule Take 1 capsule by mouth Daily. (Patient not taking: Reported on 4/25/2023)     No current facility-administered medications on file prior to visit.         HISTORY OF PRESENT ILLNESS     Followed closely by oncology/pulmonary for multiple lung nodules/mass, felt to like have underlying pneumonia treated with course of Augmentin with clinical improvement in terms of signs/sx infection but complains of ongoing chronic dyspnea with mild/moderate activity. On Trelegy for COPD. Not on O2. Has checked O2 saturatino with physical activity. Denies chest pain or palpitations. No PND/orthopnea/increased peripheral edema. Diabetes remains stable at home on metformin.        Lab Results   Component Value Date    HGBA1C 6.10 (H) 10/24/2022    HGBA1C 6.60 (H) 05/06/2022    HGBA1C 6.00 (H) 09/17/2021    CREATININE 0.90 03/29/2023    LDL 49 10/24/2022    MALBCRERATIO 41 (H) 12/13/2022     Blood sugar readings recorded on patient's flowsheet:       View : No data to display.                82.6 kg (182 lb)    REVIEW OF SYSTEMS           PHYSICAL EXAMINATION     Physical Exam  Alert with normal thought and judgment. No acute distress   Heart rate is normal   Lungs: no rales or wheezing, no tachypnea       REVIEWED DATA     Labs:       Lab Results   Component Value Date     02/24/2023    K 3.8 02/24/2023    CALCIUM 9.4 02/24/2023    AST 23 02/24/2023    ALT 19 02/24/2023    BUN 13 02/24/2023    CREATININE 0.90 03/29/2023    CREATININE 0.96 02/24/2023    CREATININE 0.89 10/24/2022    EGFRIFNONA 87 09/17/2021    EGFRIFAFRI 105 09/17/2021       Lab Results   Component Value Date    HGBA1C 6.10 (H) 10/24/2022    HGBA1C 6.60 (H) 05/06/2022    HGBA1C 6.00 (H) 09/17/2021       Lab Results   Component Value Date    LDL 49 10/24/2022    LDL 40 09/17/2021    LDL 79 10/23/2020    HDL 47 10/24/2022    HDL 37 (L) 09/17/2021    TRIG 76 10/24/2022    TRIG  117 09/17/2021       Lab Results   Component Value Date    TSH 1.880 03/08/2021    TSH 2.45 10/14/2015       Lab Results   Component Value Date    WBC 9.13 02/24/2023    HGB 14.5 02/24/2023     02/24/2023       Lab Results   Component Value Date    MAEO 41 (H) 12/13/2022            Imaging:     CT Chest With Contrast Diagnostic (03/29/2023 09:01)    NM PET/CT Skull Base to Mid Thigh (02/15/2023 11:47)      Medical Tests:           Summary of old records / correspondence / consultant report:     Office Visit with Adarsh Rangel MD PhD (04/07/2023)  • On 3/29/2023 patient had a repeating chest CT scan examination.  I reviewed the images with the patient and his wife today, 04/07/2023. Certainly, this right upper lobe consolidation is new, just developed in the past 6 to 8 weeks. He is symptomatic with cough and sweating, chills despite no fever. He does have some pleuritic chest pain also. I discussed with the patient and his wife, recommended to treat him as pneumonia with Augmentin for 10 days. I will obtain a CT scan in 3 months with IV contrast as recommended by radiologist. I do not think this is cancerous in nature, this is more of infection. Right now, there is no evidence for cancer recurrence.     PLAN:  1. Start Augmentin 875 mg twice a day for 10 days. I sent a new prescription to his pharmacy today.  2. Patient has appointment with his pulmonologist, Dr. Tahir Childs, next week.    3. We will arrange the patient to have a CT of the chest with IV contrast in 3 months for reassessment.  4. I will see patient in 3 months, 1 week after the CT scan and then we will check CBC and CMP at that time.    Request outside records:           *Examiner was wearing [id number] mask during the entire duration of the visit. Patient was masked the entire time. Minimum social distance of 6 ft maintained entire visit except if physical contact was necessary as documented.       Template created by Adelso Amezcua MD

## 2023-04-25 NOTE — PROGRESS NOTES
MyChart:    Here are the result(s) of your test(s):     A1c level for diabetes is higher.  Consider increasing metformin to 2 tablets with dinner.  Let me know when you need a new prescription.    Lab Results       Component                Value               Date                       HGBA1C                   6.50 (H)            04/25/2023                 HGBA1C                   6.10 (H)            10/24/2022                 HGBA1C                   6.60 (H)            05/06/2022              Please do not hesitate to contact me if you have questions.

## 2023-06-02 ENCOUNTER — APPOINTMENT (OUTPATIENT)
Dept: GENERAL RADIOLOGY | Facility: HOSPITAL | Age: 78
End: 2023-06-02
Payer: MEDICARE

## 2023-06-02 ENCOUNTER — HOSPITAL ENCOUNTER (EMERGENCY)
Facility: HOSPITAL | Age: 78
Discharge: HOME | End: 2023-06-02
Attending: EMERGENCY MEDICINE
Payer: MEDICARE

## 2023-06-02 VITALS
OXYGEN SATURATION: 99 % | SYSTOLIC BLOOD PRESSURE: 147 MMHG | DIASTOLIC BLOOD PRESSURE: 80 MMHG | HEART RATE: 70 BPM | RESPIRATION RATE: 16 BRPM | TEMPERATURE: 97 F

## 2023-06-02 DIAGNOSIS — R07.9 CHEST PAIN IN ADULT: ICD-10-CM

## 2023-06-02 DIAGNOSIS — M54.2 ACUTE NECK PAIN: ICD-10-CM

## 2023-06-02 DIAGNOSIS — M54.12 CERVICAL RADICULOPATHY: Primary | ICD-10-CM

## 2023-06-02 LAB
ALBUMIN SERPL-MCNC: 4.2 G/DL (ref 3.5–5.2)
ALBUMIN/GLOB SERPL: 1.6 G/DL
ALP SERPL-CCNC: 108 U/L (ref 39–117)
ALT SERPL W P-5'-P-CCNC: 19 U/L (ref 1–41)
ANION GAP SERPL CALCULATED.3IONS-SCNC: 12 MMOL/L (ref 5–15)
AST SERPL-CCNC: 25 U/L (ref 1–40)
BASOPHILS # BLD AUTO: 0.05 10*3/MM3 (ref 0–0.2)
BASOPHILS NFR BLD AUTO: 0.5 % (ref 0–1.5)
BILIRUB SERPL-MCNC: 1.1 MG/DL (ref 0–1.2)
BUN SERPL-MCNC: 14 MG/DL (ref 8–23)
BUN/CREAT SERPL: 16.5 (ref 7–25)
CALCIUM SPEC-SCNC: 9.1 MG/DL (ref 8.6–10.5)
CHLORIDE SERPL-SCNC: 104 MMOL/L (ref 98–107)
CO2 SERPL-SCNC: 25 MMOL/L (ref 22–29)
CREAT SERPL-MCNC: 0.85 MG/DL (ref 0.76–1.27)
DEPRECATED RDW RBC AUTO: 42.7 FL (ref 37–54)
EGFRCR SERPLBLD CKD-EPI 2021: 90.1 ML/MIN/1.73
EOSINOPHIL # BLD AUTO: 0.15 10*3/MM3 (ref 0–0.4)
EOSINOPHIL NFR BLD AUTO: 1.6 % (ref 0.3–6.2)
ERYTHROCYTE [DISTWIDTH] IN BLOOD BY AUTOMATED COUNT: 13.1 % (ref 12.3–15.4)
GEN 5 2HR TROPONIN T REFLEX: 21 NG/L
GLOBULIN UR ELPH-MCNC: 2.7 GM/DL
GLUCOSE SERPL-MCNC: 104 MG/DL (ref 65–99)
HCT VFR BLD AUTO: 45.8 % (ref 37.5–51)
HGB BLD-MCNC: 15.3 G/DL (ref 13–17.7)
HOLD SPECIMEN: NORMAL
HOLD SPECIMEN: NORMAL
IMM GRANULOCYTES # BLD AUTO: 0.12 10*3/MM3 (ref 0–0.05)
IMM GRANULOCYTES NFR BLD AUTO: 1.3 % (ref 0–0.5)
LYMPHOCYTES # BLD AUTO: 1.64 10*3/MM3 (ref 0.7–3.1)
LYMPHOCYTES NFR BLD AUTO: 17.6 % (ref 19.6–45.3)
MCH RBC QN AUTO: 29.7 PG (ref 26.6–33)
MCHC RBC AUTO-ENTMCNC: 33.4 G/DL (ref 31.5–35.7)
MCV RBC AUTO: 88.9 FL (ref 79–97)
MONOCYTES # BLD AUTO: 0.83 10*3/MM3 (ref 0.1–0.9)
MONOCYTES NFR BLD AUTO: 8.9 % (ref 5–12)
NEUTROPHILS NFR BLD AUTO: 6.53 10*3/MM3 (ref 1.7–7)
NEUTROPHILS NFR BLD AUTO: 70.1 % (ref 42.7–76)
NRBC BLD AUTO-RTO: 0 /100 WBC (ref 0–0.2)
PLATELET # BLD AUTO: 256 10*3/MM3 (ref 140–450)
PMV BLD AUTO: 9.1 FL (ref 6–12)
POTASSIUM SERPL-SCNC: 3.8 MMOL/L (ref 3.5–5.2)
PROT SERPL-MCNC: 6.9 G/DL (ref 6–8.5)
QT INTERVAL: 346 MS
RBC # BLD AUTO: 5.15 10*6/MM3 (ref 4.14–5.8)
SODIUM SERPL-SCNC: 141 MMOL/L (ref 136–145)
TROPONIN T DELTA: -2 NG/L
TROPONIN T SERPL HS-MCNC: 23 NG/L
WBC NRBC COR # BLD: 9.32 10*3/MM3 (ref 3.4–10.8)
WHOLE BLOOD HOLD COAG: NORMAL
WHOLE BLOOD HOLD SPECIMEN: NORMAL

## 2023-06-02 PROCEDURE — 85025 COMPLETE CBC W/AUTO DIFF WBC: CPT

## 2023-06-02 PROCEDURE — 80053 COMPREHEN METABOLIC PANEL: CPT | Performed by: EMERGENCY MEDICINE

## 2023-06-02 PROCEDURE — 71046 X-RAY EXAM CHEST 2 VIEWS: CPT

## 2023-06-02 PROCEDURE — 96374 THER/PROPH/DIAG INJ IV PUSH: CPT

## 2023-06-02 PROCEDURE — 36415 COLL VENOUS BLD VENIPUNCTURE: CPT

## 2023-06-02 PROCEDURE — 25010000002 MORPHINE PER 10 MG: Performed by: EMERGENCY MEDICINE

## 2023-06-02 PROCEDURE — 84484 ASSAY OF TROPONIN QUANT: CPT | Performed by: EMERGENCY MEDICINE

## 2023-06-02 PROCEDURE — 72050 X-RAY EXAM NECK SPINE 4/5VWS: CPT

## 2023-06-02 PROCEDURE — 93005 ELECTROCARDIOGRAM TRACING: CPT | Performed by: EMERGENCY MEDICINE

## 2023-06-02 PROCEDURE — 99284 EMERGENCY DEPT VISIT MOD MDM: CPT

## 2023-06-02 PROCEDURE — 93005 ELECTROCARDIOGRAM TRACING: CPT

## 2023-06-02 PROCEDURE — 63710000001 PREDNISONE PER 1 MG: Performed by: EMERGENCY MEDICINE

## 2023-06-02 RX ORDER — ASPIRIN 325 MG
325 TABLET ORAL ONCE
Status: COMPLETED | OUTPATIENT
Start: 2023-06-02 | End: 2023-06-02

## 2023-06-02 RX ORDER — PREDNISONE 20 MG/1
60 TABLET ORAL ONCE
Status: COMPLETED | OUTPATIENT
Start: 2023-06-02 | End: 2023-06-02

## 2023-06-02 RX ORDER — SODIUM CHLORIDE 0.9 % (FLUSH) 0.9 %
10 SYRINGE (ML) INJECTION AS NEEDED
Status: DISCONTINUED | OUTPATIENT
Start: 2023-06-02 | End: 2023-06-02 | Stop reason: HOSPADM

## 2023-06-02 RX ORDER — HYDROCODONE BITARTRATE AND ACETAMINOPHEN 5; 325 MG/1; MG/1
1 TABLET ORAL ONCE
Status: COMPLETED | OUTPATIENT
Start: 2023-06-02 | End: 2023-06-02

## 2023-06-02 RX ORDER — HYDROCODONE BITARTRATE AND ACETAMINOPHEN 5; 325 MG/1; MG/1
TABLET ORAL
Qty: 16 TABLET | Refills: 0 | Status: SHIPPED | OUTPATIENT
Start: 2023-06-02

## 2023-06-02 RX ORDER — PREDNISONE 10 MG/1
TABLET ORAL
Qty: 28 TABLET | Refills: 0 | Status: SHIPPED | OUTPATIENT
Start: 2023-06-02

## 2023-06-02 RX ORDER — MORPHINE SULFATE 2 MG/ML
4 INJECTION, SOLUTION INTRAMUSCULAR; INTRAVENOUS ONCE
Status: COMPLETED | OUTPATIENT
Start: 2023-06-02 | End: 2023-06-02

## 2023-06-02 RX ADMIN — HYDROCODONE BITARTRATE AND ACETAMINOPHEN 1 TABLET: 5; 325 TABLET ORAL at 18:33

## 2023-06-02 RX ADMIN — PREDNISONE 60 MG: 20 TABLET ORAL at 18:33

## 2023-06-02 RX ADMIN — ASPIRIN 325 MG: 325 TABLET ORAL at 16:08

## 2023-06-02 RX ADMIN — MORPHINE SULFATE 4 MG: 2 INJECTION, SOLUTION INTRAMUSCULAR; INTRAVENOUS at 17:47

## 2023-06-02 NOTE — ED TRIAGE NOTES
Left sided upper chest pains that go into patients neck, patient reports having neck pains that go into his arm also.

## 2023-06-02 NOTE — ED PROVIDER NOTES
EMERGENCY DEPARTMENT ENCOUNTER    Room Number:  25/25  Date of encounter:  6/2/2023  PCP: Neftali Amezcua MD  Historian: Patient and significant other who is at bedside    Patient was placed in face mask during triage process. Patient was wearing facemask when I entered the room and throughout our encounter. I wore full protective equipment throughout this patient encounter including a face mask, eye protection, and gloves. Hand hygiene was performed before donning protective equipment and again following doffing of PPE after leaving the room.    HPI:  Chief Complaint: Left-sided neck pain, shoulder pain, arm pain and chest pain  A complete HPI/ROS/PMH/PSH/SH/FH are unobtainable due to: N/A   Context: Aj Salazar is a 77 y.o. male who presents to the ED c/o persistent left-sided neck, chest, shoulder, and even arm pain over the last 3 days with no clear exacerbating or relieving factors.  No exertional relation.  No shortness of breath, cough, nausea, diaphoresis.  Normal appetite with no vomiting or diarrhea.  No black or bloody stools, hemoptysis or fevers.  No known trauma.  No numbness or weakness of the left upper extremity.      MEDICAL HISTORY REVIEW  EMR reviewed:    Cardiology office note 10/29/2020 by Dr. Colby reviewed: Followed for history of persistent atrial fibrillation.  No history of coronary artery disease.    PAST MEDICAL HISTORY  Active Ambulatory Problems     Diagnosis Date Noted   • COPD (chronic obstructive pulmonary disease) 03/17/2016   • Hypertension 03/17/2016   • Osteoarthritis 03/17/2016   • History of smoking greater than 50 pack years 06/23/2017   • BPH with obstruction/lower urinary tract symptoms 03/27/2018   • Atrial fibrillation with rapid ventricular response (HCC)    • Type 2 diabetes mellitus with hyperglycemia, without long-term current use of insulin (HCC) 01/31/2019   • Rash and nonspecific skin eruption 09/02/2020   • TIA (transient ischemic attack) 10/22/2020   • A-fib  ([facility]) 03/07/2021   • Acute on chronic systolic CHF (congestive heart failure) 03/08/2021   • Gastroesophageal reflux disease 03/15/2021   • Raynaud's disease without gangrene 12/07/2021   • Sepsis 05/05/2022   • Acute pyelonephritis 05/06/2022   • Chronic diastolic CHF (congestive heart failure) 05/06/2022   • Alcohol dependence in remission 05/06/2022   • Personal history of transient ischemic attack (TIA), and cerebral infarction without residual deficits 05/06/2022   • Lab test positive for detection of COVID-19 virus 05/26/2022   • Scabies 10/24/2022   • Pulmonary nodules/lesions, multiple 01/30/2023   • Abnormal PET of left lung 03/04/2023   • Pneumonia due to infectious organism 04/23/2023     Resolved Ambulatory Problems     Diagnosis Date Noted   • Dehydration 09/25/2018   • Hypotension 09/25/2018   • Melena 09/25/2018   • EMPERATRIZ (acute kidney injury) 09/25/2018   • Hyponatremia 09/25/2018   • Lactic acidosis 09/25/2018   • Facial droop 10/23/2020   • Acute respiratory failure with hypoxia 03/08/2021   • Sepsis 03/09/2021     Past Medical History:   Diagnosis Date   • Alcohol abuse, in remission    • Asthma copd   • BPH (benign prostatic hypertrophy)    • Cataract    • CHF (congestive heart failure)    • Depression    • DJD (degenerative joint disease) of cervical spine    • ED (erectile dysfunction)    • GERD (gastroesophageal reflux disease)    • Glaucoma    • History of prediabetes    • Hx of colonic polyps    • Hyperlipidemia    • Influenza B 02/14/2013   • Low back pain    • Nocturnal hypoxia    • Peripheral neuropathy    • Permanent atrial fibrillation    • Pneumonia    • Rectal bleeding 04/26/2017   • Tendonitis of shoulder 11/07/2007   • Ulcer of the stomach and intestine          PAST SURGICAL HISTORY  Past Surgical History:   Procedure Laterality Date   • APPENDECTOMY     • CARDIOVASCULAR STRESS TEST N/A 10/20/2015    NML LEXISCAN CARDIOLITE PERFUSION STUDY, NO EVIDENCE OF ISCHEMIA, AREA OF  HYPOPERFUSION OF THE INFERIOR WALL W/NORMAL WALL PERFUSION AND NML LEFT VENTRICULAR EJECTION FRACTION, MOST LIKELY ATTENUATION. DR.MICHAEL BOX   • CATARACT EXTRACTION Bilateral 2023   • COLONOSCOPY N/A 2017   • COLONOSCOPY N/A 2011    4 POLYPS REMOVED, RESCOPE IN 5 YRS, DR. EAGLE   • COLONOSCOPY N/A 2006    ERYTHEMOUS AND GRANULAR MUCOSA IN ILEOCECAL VALVE, NORMAL COLON, REPEAT IN 5 YRS,    • ENDOSCOPY N/A 2018    normal esophagus, acute gastritis, multiple non-bleeding duodenal ulcers with pigmented material, H Pylori positive   • HERNIA REPAIR Bilateral     INGUINAL   • JOINT REPLACEMENT  2015    left hip   • TOTAL HIP ARTHROPLASTY Left 2015    Dr Ankush Sky   • TOTAL HIP ARTHROPLASTY Right 10/27/2014    DR.RIED SKY   • VASECTOMY           FAMILY HISTORY  Family History   Problem Relation Age of Onset   • Cancer Mother         Bladder cancer   • Colon cancer Mother    • Ovarian cancer Mother    • Alzheimer's disease Mother 70   • Hyperlipidemia Father    • Heart disease Father    • Coronary artery disease Father    • Hypertension Father    • Stroke Sister         2016   • Dementia Sister    • Heart disease Brother    • Alcohol abuse Brother    • Heart disease Brother    • Coronary artery disease Brother    • Hypertension Brother    • Stroke Brother    • Arthritis Brother    • Colon cancer Maternal Grandmother    • Prostate cancer Neg Hx          SOCIAL HISTORY  Social History     Socioeconomic History   • Marital status:      Spouse name: Ara*   • Number of children: 3   Tobacco Use   • Smoking status: Former     Packs/day: 2.00     Years: 49.00     Pack years: 98.00     Types: Cigarettes     Quit date: 2010     Years since quittin.4   • Smokeless tobacco: Never   • Tobacco comments:     long smoking history   Vaping Use   • Vaping Use: Never used   Substance and Sexual Activity   • Alcohol use: No     Comment: stopped drinking >20  yrs ago; 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   • Drug use: No     Comment: caffeine use    • Sexual activity: Defer         ALLERGIES  Bactrim [sulfamethoxazole-trimethoprim] and Sulfacetamide        REVIEW OF SYSTEMS  Review of Systems     All systems reviewed and negative except for those discussed in HPI.       PHYSICAL EXAM    I have reviewed the triage vital signs and nursing notes.    ED Triage Vitals   Temp Heart Rate Resp BP SpO2   06/02/23 1518 06/02/23 1518 06/02/23 1518 06/02/23 1536 06/02/23 1518   97 °F (36.1 °C) 90 16 111/89 94 %      Temp src Heart Rate Source Patient Position BP Location FiO2 (%)   -- -- -- -- --              Physical Exam    Physical Exam   Constitutional: No distress.   HENT:  Head: Normocephalic and atraumatic.   Oropharynx: Mucous membranes are moist.   Eyes: No scleral icterus. No conjunctival pallor.  Neck: Painless range of motion noted. Neck supple.  No focal midline tenderness to palpation or step-off.  C5-8 motor and sensory grossly intact.  Cardiovascular: Normal rate, regular rhythm and intact distal pulses.  Pulmonary/Chest: No respiratory distress. There are no wheezes, no rhonchi, and no rales.   Abdominal: Soft. There is no tenderness. There is no rebound and no guarding.   Musculoskeletal: Moves all extremities equally. There is no pedal edema or calf tenderness.   Neurological: Alert.  Baseline strength and sensation noted.   Skin: Skin is pink, warm, and dry. No pallor.   Psychiatric: Mood and affect normal.   Nursing note and vitals reviewed.    LAB RESULTS  Recent Results (from the past 24 hour(s))   ECG 12 Lead Chest Pain    Collection Time: 06/02/23  3:25 PM   Result Value Ref Range    QT Interval 346 ms   Comprehensive Metabolic Panel    Collection Time: 06/02/23  3:40 PM    Specimen: Blood   Result Value Ref Range    Glucose 104 (H) 65 - 99 mg/dL    BUN 14 8 - 23 mg/dL    Creatinine 0.85 0.76 - 1.27 mg/dL    Sodium 141 136 - 145 mmol/L    Potassium 3.8 3.5 - 5.2 mmol/L     Chloride 104 98 - 107 mmol/L    CO2 25.0 22.0 - 29.0 mmol/L    Calcium 9.1 8.6 - 10.5 mg/dL    Total Protein 6.9 6.0 - 8.5 g/dL    Albumin 4.2 3.5 - 5.2 g/dL    ALT (SGPT) 19 1 - 41 U/L    AST (SGOT) 25 1 - 40 U/L    Alkaline Phosphatase 108 39 - 117 U/L    Total Bilirubin 1.1 0.0 - 1.2 mg/dL    Globulin 2.7 gm/dL    A/G Ratio 1.6 g/dL    BUN/Creatinine Ratio 16.5 7.0 - 25.0    Anion Gap 12.0 5.0 - 15.0 mmol/L    eGFR 90.1 >60.0 mL/min/1.73   High Sensitivity Troponin T    Collection Time: 06/02/23  3:40 PM    Specimen: Blood   Result Value Ref Range    HS Troponin T 23 (H) <15 ng/L   Green Top (Gel)    Collection Time: 06/02/23  3:40 PM   Result Value Ref Range    Extra Tube Hold for add-ons.    Lavender Top    Collection Time: 06/02/23  3:40 PM   Result Value Ref Range    Extra Tube hold for add-on    Gold Top - SST    Collection Time: 06/02/23  3:40 PM   Result Value Ref Range    Extra Tube Hold for add-ons.    Light Blue Top    Collection Time: 06/02/23  3:40 PM   Result Value Ref Range    Extra Tube Hold for add-ons.    CBC Auto Differential    Collection Time: 06/02/23  3:40 PM    Specimen: Blood   Result Value Ref Range    WBC 9.32 3.40 - 10.80 10*3/mm3    RBC 5.15 4.14 - 5.80 10*6/mm3    Hemoglobin 15.3 13.0 - 17.7 g/dL    Hematocrit 45.8 37.5 - 51.0 %    MCV 88.9 79.0 - 97.0 fL    MCH 29.7 26.6 - 33.0 pg    MCHC 33.4 31.5 - 35.7 g/dL    RDW 13.1 12.3 - 15.4 %    RDW-SD 42.7 37.0 - 54.0 fl    MPV 9.1 6.0 - 12.0 fL    Platelets 256 140 - 450 10*3/mm3    Neutrophil % 70.1 42.7 - 76.0 %    Lymphocyte % 17.6 (L) 19.6 - 45.3 %    Monocyte % 8.9 5.0 - 12.0 %    Eosinophil % 1.6 0.3 - 6.2 %    Basophil % 0.5 0.0 - 1.5 %    Immature Grans % 1.3 (H) 0.0 - 0.5 %    Neutrophils, Absolute 6.53 1.70 - 7.00 10*3/mm3    Lymphocytes, Absolute 1.64 0.70 - 3.10 10*3/mm3    Monocytes, Absolute 0.83 0.10 - 0.90 10*3/mm3    Eosinophils, Absolute 0.15 0.00 - 0.40 10*3/mm3    Basophils, Absolute 0.05 0.00 - 0.20 10*3/mm3     Immature Grans, Absolute 0.12 (H) 0.00 - 0.05 10*3/mm3    nRBC 0.0 0.0 - 0.2 /100 WBC   High Sensitivity Troponin T 2Hr    Collection Time: 06/02/23  5:47 PM    Specimen: Blood   Result Value Ref Range    HS Troponin T 21 (H) <15 ng/L    Troponin T Delta -2 >=-4 - <+4 ng/L       Ordered the above labs and independently reviewed the results.        RADIOLOGY  XR Chest 2 View    Result Date: 6/2/2023  XR CHEST 2 VW-  Clinical: Chest pain  COMPARISON examination 05/13/2022  FINDINGS: There is cardiac enlargement with atherosclerotic calcification of the aorta. Mediastinum and raphael otherwise satisfactory in appearance. No pleural effusion, vascular congestion nor acute airspace disease has developed. There are small calcified benign hilar lymph nodes seen. Azygous lobe noted, anatomic variant.  CONCLUSION: Cardiac enlargement, no acute pulmonary process is demonstrated.  This report was finalized on 6/2/2023 3:52 PM by Dr. Narciso Hooper M.D.      XR Spine Cervical Complete 4 or 5 View    Result Date: 6/2/2023  XR SPINE CERVICAL COMPLETE 4 OR 5 VW-  INDICATIONS: Pain  TECHNIQUE: 6 views of the cervical spine  COMPARISON: None available  FINDINGS:  Mild anterolisthesis of C3 on C4. Alignment is otherwise in range of normal. Disc space narrowing and endplate spurring seen at C5/6, C6/7. Multilevel facet arthropathy and bilateral neuroforaminal narrowing present. No acute fracture or abnormal prevertebral soft tissue swelling is noted. Arterial calcifications are apparent. If there is further clinical concern, MRI could be considered for further evaluation.       As described.  This report was finalized on 6/2/2023 5:37 PM by Dr. Uzair Dawkins M.D.        I ordered the above noted radiological studies. Reviewed by me and discussed with radiologist.  See dictation for official radiology interpretation.      PROCEDURES    Procedures        MEDICATIONS GIVEN IN ER    Medications   sodium chloride 0.9 % flush 10 mL (has  no administration in time range)   aspirin tablet 325 mg (325 mg Oral Given 6/2/23 1608)   morphine injection 4 mg (4 mg Intravenous Given 6/2/23 1747)   HYDROcodone-acetaminophen (NORCO) 5-325 MG per tablet 1 tablet (1 tablet Oral Given 6/2/23 1833)   predniSONE (DELTASONE) tablet 60 mg (60 mg Oral Given 6/2/23 1833)         PROGRESS, DATA ANALYSIS, CONSULTS, AND MEDICAL DECISION MAKING    My differential diagnosis for chest pain includes but is not limited to:  Muscle strain, costochondritis, myositis, pleurisy, rib fracture, intercostal neuritis, herpes zoster, tumor, myocardial infarction, coronary syndrome, unstable angina, angina, aortic dissection, mitral valve prolapse, pericarditis, palpitations, pulmonary embolus, pneumonia, pneumothorax, lung cancer, GERD, esophagitis, esophageal spasm    HEART SCORE:    History #0  (Highly suspicious 2, Moderately suspicious 1, Slightly or non-suspicious 0)    ECG #1  (Significant ST depression 2,  Nonspecific repol disturbance 1, Normal 0)    Age #2  (> or = 65 2, 46-65 1,  < or = 45 0)    Risk factors #1  (hypercholesterolemia, HTN, DM, smoking, pos fam hx, obesity)  (> or = to 3 RF 2, 1 or 2 1, No risk factors 0)    Troponin #1  (> or = 3x normal limit 2, 1-3x normal limit 1, < or = Normal limit 0)    HEART Score Key:  Scores 0-3: 0.9-1.7% risk of adverse cardiac event. In the HEART Score study, these patients were discharged (0.99% in the retrospective study, 1.7% in the prospective study)  Scores 4-6: 12-16.6% risk of adverse cardiac event. In the HEART Score study, these patients were admitted to the hospital. (11.6% retrospective, 16.6% prospective)  Scores ?7: 50-65% risk of adverse cardiac event. In the HEART Score study, these patients were candidates for early invasive measures. (65.2% retrospective, 50.1% prospective)      This patient's HEART score is 5      All labs have been independently reviewed by me.  All radiology studies have been reviewed by me and  discussed with radiologist dictating the report.   EKG's independently viewed and interpreted by me.  Discussion below represents my analysis of pertinent findings related to patient's condition, differential diagnosis, treatment plan and final disposition.      ED Course as of 06/02/23 1838 Fri Jun 02, 2023   1709 RADIOLOGY      Study: Two-view chest  Findings: No pneumothorax or focal infiltrate appreciated  I independently viewed and interpreted these images contemporaneously with treatment.    [RS]   1711 EKG           EKG time: 1525  Rhythm/Rate: A-fib with occasional PVCs; ventricular rate 60-90  P waves and [place]: N/A  QRS, axis: Narrow complex with slow R wave progression  ST and T waves: No STEMI; QTc within normal limits    Interpreted Contemporaneously by me, independently viewed  Comparison: 5/13/2022-previously noted A-fib   [RS]   1712 Patient tells a story most concerning for left-sided cervical radiculopathy that radiates down his arm and into his upper chest wall.  No exertional changes or other associated anginal equivalents.  Plan delta troponin and x-ray of the neck.  Morphine for pain.  Patient agreeable. [RS]   1821 Troponin T Delta: -2  Negative delta troponin. [RS]   1822 Given the patient does have heart score of 5, I will touch base with his primary cardiology group for ultimately setting disposition.  I believe this is noncardiac chest pain but rather cervical radiculopathy causing his symptoms. [RS]   1823 Hiro query complete. Treatment plan to include limited course of prescribed controlled substance.  Risks including addiction, tolerance, sedation, benefits and alternatives presented to patient. [RS]   1824 Patient and spouse updated with findings.  Morphine helped with discomfort a little bit but now he has a headache.  Steroids and Norco ordered.  He is agreeable with plan to touch base with cardiology but would very much like to go home this evening. [RS]   1835 CONSULT         Provider: Dr. Perez-cardiology    Discussion: Reviewed patient history, ED presentation and evaluation.  Agrees with plan for discharge and outpatient follow-up as this is a very atypical presentation for ACS especially with negative troponin change.    Agreeable c treatment and planned disposition.         [RS]      ED Course User Index  [RS] Issa Toribio MD       AS OF 18:38 EDT VITALS:    BP - 127/91  HR - 87  TEMP - 97 °F (36.1 °C)  O2 SATS - 96%        DIAGNOSIS  Final diagnoses:   Cervical radiculopathy   Chest pain in adult   Acute neck pain         DISPOSITION  DISCHARGE    Patient discharged in stable condition.    Reviewed implications of results, diagnosis, meds, responsibility to follow up, warning signs and symptoms of possible worsening, potential complications and reasons to return to ER.    Patient/Family voiced understanding of above instructions.    Discussed plan for discharge, as there is no emergent indication for admission. Patient referred to primary care provider for regular health maintenance. Pt/family is agreeable and understands need for follow up and possible repeat testing.  Pt is aware that discharge does not mean that nothing is wrong but it indicates no emergency is present that requires admission and they must continue care with follow-up as given below or physician of their choice.     FOLLOW-UP  Neftali Amezcua MD  [address] [facility] [address]  [place] [id number]  [phone]    Schedule an appointment as soon as possible for a visit   As needed    Juan Colby MD  [address] [facility] [address]  [place] [id number]  [phone]    Schedule an appointment as soon as possible for a visit   As needed         Medication List      New Prescriptions    Diclofenac Sodium 1 % gel gel  Commonly known as: VOLTAREN  Apply 4 g topically to the appropriate area as directed 2 (Two) Times a Day.     HYDROcodone-acetaminophen 5-325 MG per tablet  Commonly known as: NORCO  Take 1 to 2  tablets by mouth every 4 hours as needed for pain     predniSONE 10 MG tablet  Commonly known as: DELTASONE  Take 4 tablets by mouth daily for 5 days then 2 tablets by mouth daily for 3 days then 1 tablet by mouth daily for 2 days           Where to Get Your Medications      These medications were sent to [id number] - [place], [place] - [address] AT [facility] - [phone]  - [phone] [facility] , [facility] [address]    Phone: [phone]   · Diclofenac Sodium 1 % gel gel  · HYDROcodone-acetaminophen 5-325 MG per tablet  · predniSONE 10 MG tablet                   Issa Toribio MD  06/02/23 9628

## 2023-06-13 ENCOUNTER — TELEPHONE (OUTPATIENT)
Dept: CASE MANAGEMENT | Facility: OTHER | Age: 78
End: 2023-06-13
Payer: MEDICARE

## 2023-06-14 NOTE — TELEPHONE ENCOUNTER
Attempted outreach to patient for High Risk Case Management regarding recent ED visit. No successful contact x 3. Voicemail left with ACM contact info. No further outreach scheduled at this time.

## 2023-07-17 PROBLEM — M48.02 NEUROFORAMINAL STENOSIS OF CERVICAL SPINE: Status: ACTIVE | Noted: 2023-07-17

## 2023-07-17 PROBLEM — B86 SCABIES: Status: RESOLVED | Noted: 2022-10-24 | Resolved: 2023-07-17

## 2023-07-17 PROBLEM — R21 RASH AND NONSPECIFIC SKIN ERUPTION: Chronic | Status: RESOLVED | Noted: 2020-09-02 | Resolved: 2023-07-17

## 2023-07-17 PROBLEM — A41.9 SEPSIS: Status: RESOLVED | Noted: 2022-05-05 | Resolved: 2023-07-17

## 2023-07-17 PROBLEM — J18.9 PNEUMONIA DUE TO INFECTIOUS ORGANISM: Status: RESOLVED | Noted: 2023-04-23 | Resolved: 2023-07-17

## 2023-07-21 ENCOUNTER — TRANSCRIBE ORDERS (OUTPATIENT)
Dept: CARDIAC REHAB | Facility: HOSPITAL | Age: 78
End: 2023-07-21
Payer: MEDICARE

## 2023-07-21 DIAGNOSIS — J98.4 RESTRICTIVE LUNG DISEASE: Primary | ICD-10-CM

## 2023-08-04 ENCOUNTER — OFFICE VISIT (OUTPATIENT)
Dept: CARDIAC REHAB | Facility: HOSPITAL | Age: 78
End: 2023-08-04
Payer: MEDICARE

## 2023-08-04 VITALS
DIASTOLIC BLOOD PRESSURE: 80 MMHG | BODY MASS INDEX: 25.97 KG/M2 | OXYGEN SATURATION: 97 % | SYSTOLIC BLOOD PRESSURE: 134 MMHG | HEART RATE: 97 BPM | WEIGHT: 181.4 LBS | HEIGHT: 70 IN

## 2023-08-04 DIAGNOSIS — J98.4 RESTRICTIVE LUNG DISEASE: Primary | ICD-10-CM

## 2023-08-04 PROCEDURE — G0238 OTH RESP PROC, INDIV: HCPCS

## 2023-08-08 ENCOUNTER — TREATMENT (OUTPATIENT)
Dept: CARDIAC REHAB | Facility: HOSPITAL | Age: 78
End: 2023-08-08
Payer: MEDICARE

## 2023-08-08 DIAGNOSIS — J98.4 RESTRICTIVE LUNG DISEASE: Primary | ICD-10-CM

## 2023-08-08 PROCEDURE — G0238 OTH RESP PROC, INDIV: HCPCS

## 2023-08-10 ENCOUNTER — TREATMENT (OUTPATIENT)
Dept: CARDIAC REHAB | Facility: HOSPITAL | Age: 78
End: 2023-08-10
Payer: MEDICARE

## 2023-08-10 DIAGNOSIS — J98.4 RESTRICTIVE LUNG DISEASE: Primary | ICD-10-CM

## 2023-08-10 PROCEDURE — G0238 OTH RESP PROC, INDIV: HCPCS

## 2023-08-15 ENCOUNTER — TREATMENT (OUTPATIENT)
Dept: CARDIAC REHAB | Facility: HOSPITAL | Age: 78
End: 2023-08-15
Payer: MEDICARE

## 2023-08-15 DIAGNOSIS — J98.4 RESTRICTIVE LUNG DISEASE: Primary | ICD-10-CM

## 2023-08-15 PROCEDURE — G0238 OTH RESP PROC, INDIV: HCPCS

## 2023-08-17 ENCOUNTER — TREATMENT (OUTPATIENT)
Dept: CARDIAC REHAB | Facility: HOSPITAL | Age: 78
End: 2023-08-17
Payer: MEDICARE

## 2023-08-17 DIAGNOSIS — J98.4 RESTRICTIVE LUNG DISEASE: Primary | ICD-10-CM

## 2023-08-17 PROCEDURE — G0238 OTH RESP PROC, INDIV: HCPCS

## 2023-08-22 ENCOUNTER — TREATMENT (OUTPATIENT)
Dept: CARDIAC REHAB | Facility: HOSPITAL | Age: 78
End: 2023-08-22
Payer: MEDICARE

## 2023-08-22 DIAGNOSIS — J98.4 RESTRICTIVE LUNG DISEASE: Primary | ICD-10-CM

## 2023-08-22 PROCEDURE — G0238 OTH RESP PROC, INDIV: HCPCS

## 2023-08-24 ENCOUNTER — TREATMENT (OUTPATIENT)
Dept: CARDIAC REHAB | Facility: HOSPITAL | Age: 78
End: 2023-08-24
Payer: MEDICARE

## 2023-08-24 DIAGNOSIS — J98.4 RESTRICTIVE LUNG DISEASE: Primary | ICD-10-CM

## 2023-08-24 PROCEDURE — G0238 OTH RESP PROC, INDIV: HCPCS

## 2023-08-29 ENCOUNTER — TREATMENT (OUTPATIENT)
Dept: CARDIAC REHAB | Facility: HOSPITAL | Age: 78
End: 2023-08-29
Payer: MEDICARE

## 2023-08-29 DIAGNOSIS — J98.4 RESTRICTIVE LUNG DISEASE: Primary | ICD-10-CM

## 2023-08-29 PROCEDURE — G0238 OTH RESP PROC, INDIV: HCPCS

## 2023-08-30 RX ORDER — CLOPIDOGREL BISULFATE 75 MG/1
TABLET ORAL
Qty: 90 TABLET | Refills: 0 | Status: SHIPPED | OUTPATIENT
Start: 2023-08-30

## 2023-08-31 ENCOUNTER — TREATMENT (OUTPATIENT)
Dept: CARDIAC REHAB | Facility: HOSPITAL | Age: 78
End: 2023-08-31
Payer: MEDICARE

## 2023-08-31 DIAGNOSIS — J98.4 RESTRICTIVE LUNG DISEASE: Primary | ICD-10-CM

## 2023-08-31 PROCEDURE — G0238 OTH RESP PROC, INDIV: HCPCS

## 2023-09-05 ENCOUNTER — TREATMENT (OUTPATIENT)
Dept: CARDIAC REHAB | Facility: HOSPITAL | Age: 78
End: 2023-09-05
Payer: MEDICARE

## 2023-09-05 DIAGNOSIS — J98.4 RESTRICTIVE LUNG DISEASE: Primary | ICD-10-CM

## 2023-09-05 PROCEDURE — G0238 OTH RESP PROC, INDIV: HCPCS

## 2023-09-07 ENCOUNTER — TREATMENT (OUTPATIENT)
Dept: CARDIAC REHAB | Facility: HOSPITAL | Age: 78
End: 2023-09-07
Payer: MEDICARE

## 2023-09-07 DIAGNOSIS — J98.4 RESTRICTIVE LUNG DISEASE: Primary | ICD-10-CM

## 2023-09-07 PROCEDURE — G0238 OTH RESP PROC, INDIV: HCPCS

## 2023-09-12 ENCOUNTER — TREATMENT (OUTPATIENT)
Dept: CARDIAC REHAB | Facility: HOSPITAL | Age: 78
End: 2023-09-12
Payer: MEDICARE

## 2023-09-12 DIAGNOSIS — J98.4 RESTRICTIVE LUNG DISEASE: Primary | ICD-10-CM

## 2023-09-12 PROCEDURE — G0238 OTH RESP PROC, INDIV: HCPCS

## 2023-09-14 ENCOUNTER — TREATMENT (OUTPATIENT)
Dept: CARDIAC REHAB | Facility: HOSPITAL | Age: 78
End: 2023-09-14
Payer: MEDICARE

## 2023-09-14 DIAGNOSIS — J98.4 RESTRICTIVE LUNG DISEASE: Primary | ICD-10-CM

## 2023-09-14 PROCEDURE — G0238 OTH RESP PROC, INDIV: HCPCS

## 2023-09-19 ENCOUNTER — TREATMENT (OUTPATIENT)
Dept: CARDIAC REHAB | Facility: HOSPITAL | Age: 78
End: 2023-09-19
Payer: MEDICARE

## 2023-09-19 DIAGNOSIS — J98.4 RESTRICTIVE LUNG DISEASE: Primary | ICD-10-CM

## 2023-09-19 PROCEDURE — G0238 OTH RESP PROC, INDIV: HCPCS

## 2023-09-21 ENCOUNTER — TREATMENT (OUTPATIENT)
Dept: CARDIAC REHAB | Facility: HOSPITAL | Age: 78
End: 2023-09-21
Payer: MEDICARE

## 2023-09-21 DIAGNOSIS — J98.4 RESTRICTIVE LUNG DISEASE: Primary | ICD-10-CM

## 2023-09-21 PROCEDURE — G0238 OTH RESP PROC, INDIV: HCPCS

## 2023-09-26 ENCOUNTER — TREATMENT (OUTPATIENT)
Dept: CARDIAC REHAB | Facility: HOSPITAL | Age: 78
End: 2023-09-26
Payer: MEDICARE

## 2023-09-26 DIAGNOSIS — J98.4 RESTRICTIVE LUNG DISEASE: Primary | ICD-10-CM

## 2023-09-26 PROCEDURE — G0238 OTH RESP PROC, INDIV: HCPCS

## 2023-09-28 ENCOUNTER — TREATMENT (OUTPATIENT)
Dept: CARDIAC REHAB | Facility: HOSPITAL | Age: 78
End: 2023-09-28
Payer: MEDICARE

## 2023-09-28 DIAGNOSIS — E11.9 TYPE 2 DIABETES MELLITUS WITHOUT COMPLICATION, WITHOUT LONG-TERM CURRENT USE OF INSULIN: ICD-10-CM

## 2023-09-28 DIAGNOSIS — J98.4 RESTRICTIVE LUNG DISEASE: Primary | ICD-10-CM

## 2023-09-28 PROCEDURE — G0238 OTH RESP PROC, INDIV: HCPCS

## 2023-09-28 RX ORDER — METFORMIN HYDROCHLORIDE 500 MG/1
500 TABLET, EXTENDED RELEASE ORAL
Qty: 30 TABLET | Refills: 0 | Status: SHIPPED | OUTPATIENT
Start: 2023-09-28

## 2023-09-30 DIAGNOSIS — E11.9 TYPE 2 DIABETES MELLITUS WITHOUT COMPLICATION, WITHOUT LONG-TERM CURRENT USE OF INSULIN: ICD-10-CM

## 2023-10-03 ENCOUNTER — TREATMENT (OUTPATIENT)
Dept: CARDIAC REHAB | Facility: HOSPITAL | Age: 78
End: 2023-10-03
Payer: MEDICARE

## 2023-10-03 DIAGNOSIS — J98.4 RESTRICTIVE LUNG DISEASE: Primary | ICD-10-CM

## 2023-10-03 PROCEDURE — G0238 OTH RESP PROC, INDIV: HCPCS

## 2023-10-05 ENCOUNTER — TREATMENT (OUTPATIENT)
Dept: CARDIAC REHAB | Facility: HOSPITAL | Age: 78
End: 2023-10-05
Payer: MEDICARE

## 2023-10-05 DIAGNOSIS — J98.4 RESTRICTIVE LUNG DISEASE: Primary | ICD-10-CM

## 2023-10-05 PROCEDURE — G0238 OTH RESP PROC, INDIV: HCPCS

## 2023-10-09 RX ORDER — METFORMIN HYDROCHLORIDE 500 MG/1
500 TABLET, EXTENDED RELEASE ORAL
Qty: 90 TABLET | Refills: 1 | Status: SHIPPED | OUTPATIENT
Start: 2023-10-09

## 2023-10-10 ENCOUNTER — TREATMENT (OUTPATIENT)
Dept: CARDIAC REHAB | Facility: HOSPITAL | Age: 78
End: 2023-10-10
Payer: MEDICARE

## 2023-10-10 DIAGNOSIS — J98.4 RESTRICTIVE LUNG DISEASE: Primary | ICD-10-CM

## 2023-10-10 PROCEDURE — G0238 OTH RESP PROC, INDIV: HCPCS

## 2023-10-12 ENCOUNTER — TREATMENT (OUTPATIENT)
Dept: CARDIAC REHAB | Facility: HOSPITAL | Age: 78
End: 2023-10-12
Payer: MEDICARE

## 2023-10-12 DIAGNOSIS — J98.4 RESTRICTIVE LUNG DISEASE: Primary | ICD-10-CM

## 2023-10-12 PROCEDURE — G0238 OTH RESP PROC, INDIV: HCPCS

## 2023-10-17 ENCOUNTER — APPOINTMENT (OUTPATIENT)
Dept: CARDIAC REHAB | Facility: HOSPITAL | Age: 78
End: 2023-10-17
Payer: MEDICARE

## 2023-10-19 ENCOUNTER — TREATMENT (OUTPATIENT)
Dept: CARDIAC REHAB | Facility: HOSPITAL | Age: 78
End: 2023-10-19
Payer: MEDICARE

## 2023-10-19 DIAGNOSIS — J98.4 RESTRICTIVE LUNG DISEASE: Primary | ICD-10-CM

## 2023-10-19 PROCEDURE — G0238 OTH RESP PROC, INDIV: HCPCS

## 2023-10-24 ENCOUNTER — TREATMENT (OUTPATIENT)
Dept: CARDIAC REHAB | Facility: HOSPITAL | Age: 78
End: 2023-10-24
Payer: MEDICARE

## 2023-10-24 DIAGNOSIS — J98.4 RESTRICTIVE LUNG DISEASE: Primary | ICD-10-CM

## 2023-10-24 PROCEDURE — G0238 OTH RESP PROC, INDIV: HCPCS

## 2023-10-26 ENCOUNTER — TREATMENT (OUTPATIENT)
Dept: CARDIAC REHAB | Facility: HOSPITAL | Age: 78
End: 2023-10-26
Payer: MEDICARE

## 2023-10-26 DIAGNOSIS — J98.4 RESTRICTIVE LUNG DISEASE: Primary | ICD-10-CM

## 2023-10-26 PROCEDURE — G0238 OTH RESP PROC, INDIV: HCPCS

## 2023-10-31 ENCOUNTER — TREATMENT (OUTPATIENT)
Dept: CARDIAC REHAB | Facility: HOSPITAL | Age: 78
End: 2023-10-31
Payer: MEDICARE

## 2023-10-31 DIAGNOSIS — J98.4 RESTRICTIVE LUNG DISEASE: Primary | ICD-10-CM

## 2023-10-31 PROCEDURE — G0238 OTH RESP PROC, INDIV: HCPCS

## 2023-11-02 ENCOUNTER — APPOINTMENT (OUTPATIENT)
Dept: CARDIAC REHAB | Facility: HOSPITAL | Age: 78
End: 2023-11-02
Payer: MEDICARE

## 2023-11-07 ENCOUNTER — TREATMENT (OUTPATIENT)
Dept: CARDIAC REHAB | Facility: HOSPITAL | Age: 78
End: 2023-11-07
Payer: MEDICARE

## 2023-11-07 DIAGNOSIS — J98.4 RESTRICTIVE LUNG DISEASE: Primary | ICD-10-CM

## 2023-11-07 PROCEDURE — G0238 OTH RESP PROC, INDIV: HCPCS

## 2023-11-09 ENCOUNTER — TREATMENT (OUTPATIENT)
Dept: CARDIAC REHAB | Facility: HOSPITAL | Age: 78
End: 2023-11-09
Payer: MEDICARE

## 2023-11-09 DIAGNOSIS — J98.4 RESTRICTIVE LUNG DISEASE: Primary | ICD-10-CM

## 2023-11-09 PROCEDURE — G0238 OTH RESP PROC, INDIV: HCPCS

## 2023-11-14 ENCOUNTER — TREATMENT (OUTPATIENT)
Dept: CARDIAC REHAB | Facility: HOSPITAL | Age: 78
End: 2023-11-14
Payer: MEDICARE

## 2023-11-14 DIAGNOSIS — J98.4 RESTRICTIVE LUNG DISEASE: Primary | ICD-10-CM

## 2023-11-14 PROCEDURE — G0238 OTH RESP PROC, INDIV: HCPCS

## 2023-11-16 ENCOUNTER — TREATMENT (OUTPATIENT)
Dept: CARDIAC REHAB | Facility: HOSPITAL | Age: 78
End: 2023-11-16
Payer: MEDICARE

## 2023-11-16 ENCOUNTER — TRANSCRIBE ORDERS (OUTPATIENT)
Dept: LAB | Facility: HOSPITAL | Age: 78
End: 2023-11-16
Payer: MEDICARE

## 2023-11-16 ENCOUNTER — LAB (OUTPATIENT)
Dept: LAB | Facility: HOSPITAL | Age: 78
End: 2023-11-16
Payer: MEDICARE

## 2023-11-16 DIAGNOSIS — I50.32 CHRONIC DIASTOLIC HEART FAILURE: ICD-10-CM

## 2023-11-16 DIAGNOSIS — I50.32 CHRONIC DIASTOLIC HEART FAILURE: Primary | ICD-10-CM

## 2023-11-16 DIAGNOSIS — J98.4 RESTRICTIVE LUNG DISEASE: Primary | ICD-10-CM

## 2023-11-16 LAB
ANION GAP SERPL CALCULATED.3IONS-SCNC: 7.9 MMOL/L (ref 5–15)
BUN SERPL-MCNC: 14 MG/DL (ref 8–23)
BUN/CREAT SERPL: 14.4 (ref 7–25)
CALCIUM SPEC-SCNC: 9.2 MG/DL (ref 8.6–10.5)
CHLORIDE SERPL-SCNC: 104 MMOL/L (ref 98–107)
CO2 SERPL-SCNC: 30.1 MMOL/L (ref 22–29)
CREAT SERPL-MCNC: 0.97 MG/DL (ref 0.76–1.27)
EGFRCR SERPLBLD CKD-EPI 2021: 80.4 ML/MIN/1.73
GLUCOSE SERPL-MCNC: 119 MG/DL (ref 65–99)
POTASSIUM SERPL-SCNC: 3.9 MMOL/L (ref 3.5–5.2)
SODIUM SERPL-SCNC: 142 MMOL/L (ref 136–145)

## 2023-11-16 PROCEDURE — 36415 COLL VENOUS BLD VENIPUNCTURE: CPT

## 2023-11-16 PROCEDURE — G0238 OTH RESP PROC, INDIV: HCPCS

## 2023-11-16 PROCEDURE — 80048 BASIC METABOLIC PNL TOTAL CA: CPT

## 2023-11-21 ENCOUNTER — TREATMENT (OUTPATIENT)
Dept: CARDIAC REHAB | Facility: HOSPITAL | Age: 78
End: 2023-11-21
Payer: MEDICARE

## 2023-11-21 DIAGNOSIS — J98.4 RESTRICTIVE LUNG DISEASE: Primary | ICD-10-CM

## 2023-11-21 PROCEDURE — G0238 OTH RESP PROC, INDIV: HCPCS

## 2023-11-28 ENCOUNTER — TREATMENT (OUTPATIENT)
Dept: CARDIAC REHAB | Facility: HOSPITAL | Age: 78
End: 2023-11-28
Payer: MEDICARE

## 2023-11-28 DIAGNOSIS — J98.4 RESTRICTIVE LUNG DISEASE: Primary | ICD-10-CM

## 2023-11-28 PROCEDURE — G0238 OTH RESP PROC, INDIV: HCPCS

## 2023-11-30 ENCOUNTER — TREATMENT (OUTPATIENT)
Dept: CARDIAC REHAB | Facility: HOSPITAL | Age: 78
End: 2023-11-30
Payer: MEDICARE

## 2023-11-30 DIAGNOSIS — J98.4 RESTRICTIVE LUNG DISEASE: Primary | ICD-10-CM

## 2023-11-30 PROCEDURE — G0238 OTH RESP PROC, INDIV: HCPCS

## 2023-12-05 ENCOUNTER — TREATMENT (OUTPATIENT)
Dept: CARDIAC REHAB | Facility: HOSPITAL | Age: 78
End: 2023-12-05
Payer: MEDICARE

## 2023-12-05 DIAGNOSIS — J98.4 RESTRICTIVE LUNG DISEASE: Primary | ICD-10-CM

## 2023-12-05 PROCEDURE — G0238 OTH RESP PROC, INDIV: HCPCS

## 2023-12-07 ENCOUNTER — TREATMENT (OUTPATIENT)
Dept: CARDIAC REHAB | Facility: HOSPITAL | Age: 78
End: 2023-12-07
Payer: MEDICARE

## 2023-12-07 DIAGNOSIS — J98.4 RESTRICTIVE LUNG DISEASE: Primary | ICD-10-CM

## 2023-12-07 PROCEDURE — G0238 OTH RESP PROC, INDIV: HCPCS

## 2023-12-12 ENCOUNTER — TREATMENT (OUTPATIENT)
Dept: CARDIAC REHAB | Facility: HOSPITAL | Age: 78
End: 2023-12-12
Payer: MEDICARE

## 2023-12-12 DIAGNOSIS — J98.4 RESTRICTIVE LUNG DISEASE: Primary | ICD-10-CM

## 2023-12-12 PROCEDURE — G0238 OTH RESP PROC, INDIV: HCPCS

## 2023-12-14 ENCOUNTER — TREATMENT (OUTPATIENT)
Dept: CARDIAC REHAB | Facility: HOSPITAL | Age: 78
End: 2023-12-14
Payer: MEDICARE

## 2023-12-14 DIAGNOSIS — J98.4 RESTRICTIVE LUNG DISEASE: Primary | ICD-10-CM

## 2023-12-14 PROCEDURE — G0238 OTH RESP PROC, INDIV: HCPCS

## 2023-12-15 ENCOUNTER — HOSPITAL ENCOUNTER (OUTPATIENT)
Dept: CT IMAGING | Facility: HOSPITAL | Age: 78
Discharge: HOME | End: 2023-12-15
Admitting: INTERNAL MEDICINE
Payer: MEDICARE

## 2023-12-15 DIAGNOSIS — R91.8 PULMONARY NODULES/LESIONS, MULTIPLE: ICD-10-CM

## 2023-12-15 DIAGNOSIS — J18.9 PNEUMONIA OF RIGHT UPPER LOBE DUE TO INFECTIOUS ORGANISM: ICD-10-CM

## 2023-12-15 LAB — CREAT BLDA-MCNC: 1 MG/DL (ref 0.6–1.3)

## 2023-12-15 PROCEDURE — 71260 CT THORAX DX C+: CPT

## 2023-12-15 PROCEDURE — 82565 ASSAY OF CREATININE: CPT

## 2023-12-15 PROCEDURE — 25510000001 IOPAMIDOL 61 % SOLUTION: Performed by: INTERNAL MEDICINE

## 2023-12-15 RX ADMIN — IOPAMIDOL 85 ML: 612 INJECTION, SOLUTION INTRAVENOUS at 08:21

## 2023-12-18 ENCOUNTER — TELEPHONE (OUTPATIENT)
Dept: INTERNAL MEDICINE | Age: 78
End: 2023-12-18

## 2023-12-18 NOTE — TELEPHONE ENCOUNTER
"Caller: Aj Salazar \"AMRIT\"    Relationship to patient: Self    Best call back number: [phone]    Chief complaint: MED CHECK    Type of visit: WELLNESS VISIT     Requested date: ANYTIME IN DECEMBER EARLY IF POSSIBLE      If rescheduling, when is the original appointment:12/22  "

## 2023-12-19 ENCOUNTER — OFFICE VISIT (OUTPATIENT)
Dept: CARDIAC REHAB | Facility: HOSPITAL | Age: 78
End: 2023-12-19

## 2023-12-19 ENCOUNTER — APPOINTMENT (OUTPATIENT)
Dept: CARDIAC REHAB | Facility: HOSPITAL | Age: 78
End: 2023-12-19
Payer: MEDICARE

## 2023-12-19 DIAGNOSIS — J98.4 RESTRICTIVE LUNG DISEASE: Primary | ICD-10-CM

## 2023-12-21 ENCOUNTER — APPOINTMENT (OUTPATIENT)
Dept: CARDIAC REHAB | Facility: HOSPITAL | Age: 78
End: 2023-12-21
Payer: MEDICARE

## 2023-12-22 ENCOUNTER — OFFICE VISIT (OUTPATIENT)
Dept: ONCOLOGY | Facility: CLINIC | Age: 78
End: 2023-12-22
Payer: MEDICARE

## 2023-12-22 ENCOUNTER — LAB (OUTPATIENT)
Dept: LAB | Facility: HOSPITAL | Age: 78
End: 2023-12-22
Payer: MEDICARE

## 2023-12-22 VITALS
RESPIRATION RATE: 16 BRPM | TEMPERATURE: 97.9 F | BODY MASS INDEX: 25 KG/M2 | DIASTOLIC BLOOD PRESSURE: 72 MMHG | HEIGHT: 70 IN | WEIGHT: 174.6 LBS | HEART RATE: 79 BPM | OXYGEN SATURATION: 91 % | SYSTOLIC BLOOD PRESSURE: 148 MMHG

## 2023-12-22 DIAGNOSIS — R91.8 PULMONARY NODULES/LESIONS, MULTIPLE: Primary | ICD-10-CM

## 2023-12-22 DIAGNOSIS — R91.8 PULMONARY NODULES/LESIONS, MULTIPLE: ICD-10-CM

## 2023-12-22 DIAGNOSIS — J18.9 PNEUMONIA OF RIGHT UPPER LOBE DUE TO INFECTIOUS ORGANISM: ICD-10-CM

## 2023-12-22 LAB
ALBUMIN SERPL-MCNC: 3.8 G/DL (ref 3.5–5.2)
ALBUMIN/GLOB SERPL: 1.3 G/DL
ALP SERPL-CCNC: 99 U/L (ref 39–117)
ALT SERPL W P-5'-P-CCNC: 9 U/L (ref 1–41)
ANION GAP SERPL CALCULATED.3IONS-SCNC: 8 MMOL/L (ref 5–15)
AST SERPL-CCNC: 26 U/L (ref 1–40)
BASOPHILS # BLD AUTO: 0.06 10*3/MM3 (ref 0–0.2)
BASOPHILS NFR BLD AUTO: 0.8 % (ref 0–1.5)
BILIRUB SERPL-MCNC: 1.2 MG/DL (ref 0–1.2)
BUN SERPL-MCNC: 14 MG/DL (ref 8–23)
BUN/CREAT SERPL: 14.3 (ref 7–25)
CALCIUM SPEC-SCNC: 8.9 MG/DL (ref 8.6–10.5)
CHLORIDE SERPL-SCNC: 106 MMOL/L (ref 98–107)
CO2 SERPL-SCNC: 28 MMOL/L (ref 22–29)
CREAT SERPL-MCNC: 0.98 MG/DL (ref 0.76–1.27)
DEPRECATED RDW RBC AUTO: 48.5 FL (ref 37–54)
EGFRCR SERPLBLD CKD-EPI 2021: 79.4 ML/MIN/1.73
EOSINOPHIL # BLD AUTO: 0.1 10*3/MM3 (ref 0–0.4)
EOSINOPHIL NFR BLD AUTO: 1.4 % (ref 0.3–6.2)
ERYTHROCYTE [DISTWIDTH] IN BLOOD BY AUTOMATED COUNT: 14.3 % (ref 12.3–15.4)
GLOBULIN UR ELPH-MCNC: 3 GM/DL
GLUCOSE SERPL-MCNC: 113 MG/DL (ref 65–99)
HCT VFR BLD AUTO: 44.2 % (ref 37.5–51)
HGB BLD-MCNC: 13.9 G/DL (ref 13–17.7)
IMM GRANULOCYTES # BLD AUTO: 0.04 10*3/MM3 (ref 0–0.05)
IMM GRANULOCYTES NFR BLD AUTO: 0.5 % (ref 0–0.5)
LYMPHOCYTES # BLD AUTO: 1.7 10*3/MM3 (ref 0.7–3.1)
LYMPHOCYTES NFR BLD AUTO: 23.1 % (ref 19.6–45.3)
MCH RBC QN AUTO: 29.1 PG (ref 26.6–33)
MCHC RBC AUTO-ENTMCNC: 31.4 G/DL (ref 31.5–35.7)
MCV RBC AUTO: 92.7 FL (ref 79–97)
MONOCYTES # BLD AUTO: 0.76 10*3/MM3 (ref 0.1–0.9)
MONOCYTES NFR BLD AUTO: 10.3 % (ref 5–12)
NEUTROPHILS NFR BLD AUTO: 4.71 10*3/MM3 (ref 1.7–7)
NEUTROPHILS NFR BLD AUTO: 63.9 % (ref 42.7–76)
NRBC BLD AUTO-RTO: 0 /100 WBC (ref 0–0.2)
PLATELET # BLD AUTO: 244 10*3/MM3 (ref 140–450)
PMV BLD AUTO: 9.2 FL (ref 6–12)
POTASSIUM SERPL-SCNC: 4.4 MMOL/L (ref 3.5–5.2)
PROT SERPL-MCNC: 6.8 G/DL (ref 6–8.5)
RBC # BLD AUTO: 4.77 10*6/MM3 (ref 4.14–5.8)
SODIUM SERPL-SCNC: 142 MMOL/L (ref 136–145)
WBC NRBC COR # BLD AUTO: 7.37 10*3/MM3 (ref 3.4–10.8)

## 2023-12-22 PROCEDURE — 80053 COMPREHEN METABOLIC PANEL: CPT

## 2023-12-22 PROCEDURE — 36415 COLL VENOUS BLD VENIPUNCTURE: CPT

## 2023-12-22 PROCEDURE — 85025 COMPLETE CBC W/AUTO DIFF WBC: CPT

## 2023-12-22 RX ORDER — FLUTICASONE FUROATE, UMECLIDINIUM BROMIDE AND VILANTEROL TRIFENATATE 200; 62.5; 25 UG/1; UG/1; UG/1
POWDER RESPIRATORY (INHALATION)
COMMUNITY

## 2023-12-22 RX ORDER — AMOXICILLIN 500 MG/1
CAPSULE ORAL
COMMUNITY
Start: 2023-08-25

## 2023-12-22 NOTE — PROGRESS NOTES
.     REASON FOR FOLLOWUP:     Provide an opinion on any further workup or treatment of enlarging pulmonary nodules.                               HISTORY OF PRESENT ILLNESS:  The patient is a 78 y.o. year old male with hypertension, type 2 diabetes, coronary artery disease, benign prostate hypertrophy, who presents today on 12/22/2023 for a 6-month follow-up evaluation.    The patient had a CT scan on 12/15/2023 and the study reported a new irregular density in the right upper lobe measuring about 2 cm. There is also a new small ground glass opacity in the anterior right upper lobe. Previously noted right middle lobe opacity has resolved. There is also a new tiny nodular density in the left apex. There is a new 10 mm nodular density with an air bronchogram in the left upper lobe. Also, a small new ground glass opacity in the left upper lobe and the 7 mm left lower lobe nodule has resolved. There was stable emphysema and a stable mediastinal and hilar lymph node. Radiologist commented that the new findings are favored to be infectious/inflammatory, however, follow up CT scan in 3 months recommended.    Laboratory study today on 12/22/2023 reported normal WBC 7,370, neutrophils 4,710, lymphocytes 1,700, hemoglobin 13.9 g/dL and platelets 244,000. Chemistry labor is pending. Last BMP was on 11/16/2023 reported glucose 119, otherwise unremarkable.    The patient reports he has no recent infection, no cough, no hemoptysis. He recently just finished pulmonary rehab and he was seen by his pulmonologist, Dr. Childs, about a month ago and then he will follow up on every 6 months schedule.    Past Medical History:   Diagnosis Date    Alcohol abuse, in remission     Asthma copd    BPH (benign prostatic hypertrophy)     see doctors at [facility]    Cataract     CHF (congestive heart failure)     COPD (chronic obstructive pulmonary disease)     Depression     DJD (degenerative joint disease) of cervical spine     ED (erectile  dysfunction)     [facility]    GERD (gastroesophageal reflux disease)     Glaucoma     History of prediabetes     Hx of colonic polyps     followed by GI ([facility])    Hyperlipidemia     Hypertension     Influenza B 02/14/2013        Low back pain     Nocturnal hypoxia     Osteoarthritis     HIPS, HANDS, MULTIPLE SITES    Osteoarthritis 03/17/2016    Peripheral neuropathy     Permanent atrial fibrillation     Pneumonia     Pneumonia due to infectious organism 04/23/2023    Rectal bleeding 04/26/2017    Tendonitis of shoulder 11/07/2007    RIGHT SHOULDER/DELTOID INSERTION    TIA (transient ischemic attack) 10/2020    Ulcer of the stomach and intestine      Past Surgical History:   Procedure Laterality Date    APPENDECTOMY      CARDIOVASCULAR STRESS TEST N/A 10/20/2015    NML LEXISCAN CARDIOLITE PERFUSION STUDY, NO EVIDENCE OF ISCHEMIA, AREA OF HYPOPERFUSION OF THE INFERIOR WALL W/NORMAL WALL PERFUSION AND NML LEFT VENTRICULAR EJECTION FRACTION, MOST LIKELY ATTENUATION. DR.MICHAEL BOX    CATARACT EXTRACTION Bilateral 02/2023    COLONOSCOPY N/A 02/2017    COLONOSCOPY N/A 02/23/2011    4 POLYPS REMOVED, RESCOPE IN 5 YRS, DR. EAGLE    COLONOSCOPY N/A 03/08/2006    ERYTHEMOUS AND GRANULAR MUCOSA IN ILEOCECAL VALVE, NORMAL COLON, REPEAT IN 5 YRS,     ENDOSCOPY N/A 09/26/2018    normal esophagus, acute gastritis, multiple non-bleeding duodenal ulcers with pigmented material, H Pylori positive    HERNIA REPAIR Bilateral     INGUINAL    JOINT REPLACEMENT  11/2015    left hip    TOTAL HIP ARTHROPLASTY Left 11/06/2015    Dr Ankush Sky    TOTAL HIP ARTHROPLASTY Right 10/27/2014    DR.RIED SKY    VASECTOMY       MEDICAL ONCOLOGY HISTORY: The patient is a 77 y.o. year old male who presented on 2/7/2023 to the multimodality lung clinic at the [facility] for initial evaluation because of worsening pulmonary nodules. The patient is  accompanied by his wife today.     This patient has history of cigarette smoking for more than 50 years, 2 packs per day and he quit about 15 years ago. The patient reports dry cough. He also has COPD for more than 5 years, he has exertional dyspnea. He reports that since last month he has become more short of breath, however denies hemoptysis.  He denies weight loss, no headaches or vision changes.     The patient reports he is followed by pulmonologist Tahir Childs MD, and he has a scheduled PFT examination soon.     The patient reports he is diabetic, and has been on metformin with controlled glucose level. The patient also reports he has atrial fibrillation for quite some time, maybe started 8 years ago when he had hip surgery. He is on Plavix. He is not on any other blood thinner as I reviewed medicines. The patient is followed by Cardiology, Juan Colby MD. The patient also reports chronic trace edema in legs.     This patient had low dose CT scan for the chest due to risk for lung cancer with significant history of cigarette smoking. This study was done on 01/27/2023, and reported right upper lobe newly developed suspicious stellate 10 mm nodule. There is also a right upper lobe base lung nodule along the major fissure 15 mm spiculated nodule. There is also grouping of 4 sub-6 mm pulmonary nodules within the right upper lobe, and also another group of 5 sub-6 mm nodules within the right lower lobe. There was also benign calcified granuloma in the right lower lobe. Within the left upper lobe, there is a 6 mm pulmonary nodule.       I reviewed his chest CT scan images with Thoracic Surgeon, Eduardo Mcmullen MD, and we recommended to obtain PET scan examination for further evaluation because those lesions are highly suspicious for lung cancer. If those lesions are hypermetabolic, we will try to purse CT guided core needle biopsy. However I discussed with the patient and his wife that the nodules are  relatively small, and is not immediately close to chest wall. It will be difficult to biopsy and has high risk for pneumothorax because this patient has evidence of emphysema on CT scan of the chest.      Patient had PET scan examination on 2/15/2023 reported photopenic pulmonary nodules except a newly developed hypermetabolic lesion 1.8 cm with SUV 3.1 cm at the lingular lobe.    I presented his case at the multimodality conference on 2/16/2023, and we suspected the hypermetabolic lesion is infectious.  A conference recommended to treat him with antibiotics and then reassess with a chest CT scan in 5 to 6 weeks.  We started patient on Augmentin 875 mg twice a day for 10 days on 2/16/2023.    Today on 2/24/2023 patient reports no fever sweating chills.  No chest pains.  No cough no hemoptysis.     follow-up evaluation, after his chest CT scan examination on 03/29/2023.     The study reported a new irregular peripheral mass-like consolidation in the posterolateral right upper lobe measuring 2.1 x 2.0 cm. A 0.7 cm nodular opacity in the right upper lobe is less conspicuous compared to that from 02/15/2023 and has decreased in size from 01/27/2023. It was 1.1 cm. The previously noted focal consolidation in the posterior right upper lobe is smaller and less conspicuous. There was also a 0.4 cm solid nodule in the posterolateral right middle lobe which is also new. A 1.4 cm irregular ground-glass nodular opacity in the anterior left upper lobe has decreased in size and density compared to that from 02/15/2023. Previously was 1.8 cm.      The CT scan examination on 6/30/2023 reported interval resolution of the previous described area of mass-like consolidation in the right upper lobe as well as a previously described 4 mm nodule in the right middle lobe. However, 3 new irregular nodules are noted up to 1.8 cm, 1 in the right middle lobe and 2 in the superior segment of the left lower lobe.    The patient reports he has no  productive cough, no hemoptysis.  Denies a fever sweating chills.  He does use CPAP machine at night. He has COPD/emphysema and sleep apnea.    Laboratory study today on 7/7/2023 reports slightly trending down hemoglobin 13.3 g/dL, normal WBC and the platelets.        MEDICATIONS    Current Outpatient Medications:     Accu-Chek Softclix Lancets lancets, 1 each by Other route Every Other Day. Use as instructed, Disp: 100 each, Rfl: 0    acetaminophen (TYLENOL) 325 MG tablet, Take 2 tablets by mouth Every 4 (Four) Hours As Needed for Mild Pain ., Disp:  , Rfl:     ALPHAGAN P 0.1 % solution ophthalmic solution, Administer 1 drop to both eyes 2 (two) times a day., Disp: , Rfl: 6    amoxicillin (AMOXIL) 500 MG capsule, TAKE 4 CAPSULES BY MOUTH 1 HOUR BEFORE APPOINTMENT THEN TAKE 1 CAPSULE BY MOUTH EVERY 8 HOURS UNTIL GONE., Disp: , Rfl:     atorvastatin (LIPITOR) 80 MG tablet, Take 1 tablet by mouth Every Night., Disp: 90 tablet, Rfl: 3    bumetanide (BUMEX) 1 MG tablet, bumetanide 1 mg tablet  TAKE 1 TABLET BY MOUTH EVERY DAY, Disp: , Rfl:     clopidogrel (PLAVIX) 75 MG tablet, TAKE 1 TABLET EVERY DAY, Disp: 90 tablet, Rfl: 0    dabigatran etexilate (Pradaxa) 150 MG capsu, Take 1 capsule by mouth 2 (Two) Times a Day., Disp: 60 capsule, Rfl: 6    dorzolamide (TRUSOPT) 2 % ophthalmic solution, Administer 1 drop to both eyes 2 (Two) Times a Day., Disp: , Rfl:     Fluticasone-Umeclidin-Vilant (Trelegy Ellipta) 200-62.5-25 MCG/ACT aerosol powder , Inhale., Disp: , Rfl:     glucose blood test strip, Accu-Chek Dannielle Plus test strips  USE TO CHECK GLUCOSE QOD, Disp: , Rfl:     glucose blood test strip, Accu-Chek Dannielle Plus Meter  USE TO CHECK GLUCOSE QOD, Disp: , Rfl:     metFORMIN ER (GLUCOPHAGE-XR) 500 MG 24 hr tablet, TAKE 1 TABLET BY MOUTH DAILY WITH DINNER, Disp: 90 tablet, Rfl: 1    metoprolol succinate XL (TOPROL-XL) 100 MG 24 hr tablet, Take 1 tablet by mouth Every Night. Take 100 mg at night and 50 mg in the morning,  Disp: 30 tablet, Rfl: 0    metoprolol succinate XL (TOPROL-XL) 50 MG 24 hr tablet, Take 1 tablet by mouth Daily. Take 50 mg every morning and 100 mg every night, Disp: 30 tablet, Rfl: 0    Myrbetriq 25 MG tablet sustained-release 24 hour 24 hr tablet, , Disp: , Rfl:     pantoprazole (PROTONIX) 40 MG EC tablet, TAKE 1 TABLET TWICE DAILY, Disp: 180 tablet, Rfl: 3    spironolactone (ALDACTONE) 25 MG tablet, Take 1 tablet by mouth Daily., Disp: 30 tablet, Rfl: 0    Tafluprost, PF, (ZIOPTAN) 0.0015 % solution ophthalmic solution, Administer 1 drop to both eyes Every Night., Disp: , Rfl:     tamsulosin (FLOMAX) 0.4 MG capsule 24 hr capsule, Take 2 capsules by mouth Every Night., Disp: 30 capsule, Rfl:     Fluticasone-Umeclidin-Vilant 200-62.5-25 MCG/INH aerosol powder , Inhale 1 puff Daily. Just increased to this strength 3/21 (Patient not taking: Reported on 2023), Disp: , Rfl:     ALLERGIES:     Allergies   Allergen Reactions    Bactrim [Sulfamethoxazole-Trimethoprim] Rash    Sulfacetamide Rash       SOCIAL HISTORY:       Social History     Socioeconomic History    Marital status:      Spouse name: Ara*    Number of children: 3   Tobacco Use    Smoking status: Former     Packs/day: 2.00     Years: 49.00     Additional pack years: 0.00     Total pack years: 98.00     Types: Cigarettes     Quit date: 2010     Years since quittin.9    Smokeless tobacco: Never    Tobacco comments:     long smoking history   Vaping Use    Vaping Use: Never used   Substance and Sexual Activity    Alcohol use: No     Comment: 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; 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    Drug use: No     Comment: caffeine use     Sexual activity: Defer         FAMILY HISTORY:  Family History   Problem Relation Age of Onset    Cancer Mother         Bladder cancer    Colon cancer Mother     Ovarian cancer Mother     Alzheimer's disease Mother 70    Hyperlipidemia Father     Heart disease Father     Coronary artery disease Father   "   Hypertension Father     Stroke Sister         October 2016    Dementia Sister     Heart disease Brother     Alcohol abuse Brother     Heart disease Brother     Coronary artery disease Brother     Hypertension Brother     Stroke Brother     Arthritis Brother     Colon cancer Maternal Grandmother     Prostate cancer Neg Hx        REVIEW OF SYSTEMS:  Review of Systems see HPI           Vitals:    12/22/23 1004   BP: 148/72   Pulse: 79   Resp: 16   Temp: 97.9 °F (36.6 °C)   TempSrc: Temporal   SpO2: 91%   Weight: 79.2 kg (174 lb 9.6 oz)   Height: 177.8 cm (70\")   PainSc: 0-No pain         12/22/2023    10:02 AM   Current Status   ECOG score 0      PHYSICAL EXAM: 12/22/2023  GENERAL:  Well-developed, well-nourished male in no acute distress.  Orientated to time place and the people.  SKIN:  Warm, dry without rashes, purpura or petechiae.  HEENT:  Normocephalic.   LYMPHATICS:  No cervical, supraclavicular adenopathy.  CHEST: Normal respiratory effort.  Lungs clear to auscultation.  No wheezing no rales.  CARDIAC:  Regular rate and rhythm without murmurs. Normal S1,S2.  ABDOMEN:  Soft, nontender with no organomegaly or masses.  Bowel sounds normal.  EXTREMITIES:  No lower extremity edema.      RECENT LABS:  Lab Results   Component Value Date    WBC 7.37 12/22/2023    HGB 13.9 12/22/2023    HCT 44.2 12/22/2023    MCV 92.7 12/22/2023     12/22/2023     Lab Results   Component Value Date    NEUTROABS 4.71 12/22/2023     Lab Results   Component Value Date    GLUCOSE 119 (H) 11/16/2023    BUN 14 11/16/2023    CREATININE 1.00 12/15/2023    EGFRRESULT 89.4 10/24/2022    EGFR 80.4 11/16/2023    BCR 14.4 11/16/2023    K 3.9 11/16/2023    CO2 30.1 (H) 11/16/2023    CALCIUM 9.2 11/16/2023    PROTENTOTREF 6.1 10/24/2022    ALBUMIN 3.9 07/07/2023    LABIL2 1.8 10/24/2022    AST 21 07/07/2023    ALT 12 07/07/2023       IMAGING:  CT Chest With Contrast Diagnostic  Narrative: CT CHEST WITH IV CONTRAST     HISTORY: Follow-up lung " nodule     TECHNIQUE: Radiation dose reduction techniques were utilized, including  automated exposure control and exposure modulation based on body size.  Axial images were obtained through the chest after the administration of  IV contrast. Coronal and sagittal reformatted images obtained.     COMPARISON: 6/30/2023     FINDINGS: There is a new irregular density in the right upper lobe on  image 27 measuring about 2 cm. There is a new small groundglass opacity  in the anterior right upper lobe on image 53. The previously noted right  middle lobe opacity has resolved. New tiny nodular density in the left  apex on image 20. New 10 mm nodular density with air bronchogram in the  left upper lobe on image 40. New small groundglass opacity in the left  upper lobe on image 48. A 7 mm left lower lobe nodule has resolved.  Stable emphysema. Stable mediastinal and hilar lymph nodes. Coronary  artery calcification. No pleural effusion. No acute bony abnormality.     Impression: New right upper lobe irregular density and new smaller densities  elsewhere in the lungs as described. A few densities and nodules on the  previous study have resolved. The new findings are favored to be  infectious/inflammatory, however follow-up chest CT is suggested in 3  months to ensure clearing           Radiation dose reduction techniques were utilized, including automated  exposure control and exposure modulation based on body size.        This report was finalized on 12/18/2023 11:56 AM by Dr. En Toribio M.D on Workstation: [id number]         Assessment & Plan     ASSESSMENT:     1.  Pulmonary nodules.   the patient has high risk for lung cancer with significant history of cigarette smoking about 100 pack year although he quit about 15 years ago.   Nevertheless the patient had screening chest CT scan on 01/27/2023 which reported 2 pulmonary nodules up to 15 mm, spiculated highly suspicious for lung cancer.   We recommended to have PET  scan examination first and then could choose the site of biopsy. The patient is high risk for pneumothorax, however there is no other way to bypass the biopsy.   PET scan examination on 2/15/2023 reported photopenic pulmonary nodules except a newly developed hypermetabolic lesion 1.8 cm with SUV 3.1 cm at the lingular lobe.  His case was presented at the multimodality conference on 2/16/2023, and I suspected the hypermetabolic lesion is infectious.  We started patient on Augmentin 875 mg twice a day for 10 days on 2/16/2023.  On 2/24/2023 patient reports no fever sweating chills.  No chest pains.  No cough no hemoptysis.  We recommended to have repeated chest CT scan in about 6 weeks for reassessment, as recommended by the multimodality conference.  Patient is agreeable.  On 3/29/2023 patient had a repeating chest CT scan examination.  I reviewed the images with the patient and his wife today, 04/07/2023. Certainly, this right upper lobe consolidation is new, just developed in the past 6 to 8 weeks. He is symptomatic with cough and sweating, chills despite no fever. He does have some pleuritic chest pain also. I discussed with the patient and his wife, recommended to treat him as pneumonia with Augmentin for 10 days. I will obtain a CT scan in 3 months with IV contrast as recommended by radiologist. I do not think this is cancerous in nature, this is more of infection. Right now, there is no evidence for cancer recurrence.   CT scan examination on 6/30/2023 reported resolution of the previous documented small pulmonary nodules, however, now he has 3 new nodules up to 1.8 cm.  I reviewed the images with the patient and his wife, for both the new CT from 06/30/2023 and compared it to the previous CT scan from 03/29/2023. I explained to them that new pulmonary nodule most likely are inflammatory in nature, it was not there 3 months ago. In the meantime, the previous nodules including the largest one in the right upper  lobe pleural based has resolved without a trace. I recommended to have repeating CT of the chest with IV contrast in 3 months for reassessment. Patient and his wife voiced understanding and agreeable.  The patient had a CT scan examination of the chest on 12/15/2023 and had multiple new pulmonary nodules/ground glass density, and also had some resolution of previous nodules/densities. I reviewed the images with the patient together today on 12/22/2023 and it is probably inflammatory in nature. I do recommend having a chest CT scan in 3 months for follow-up evaluation as recommended by the radiologist. Patient voiced understanding.      2. COPD/emphysema.   The patient will continue to follow up with his pulmonologist, Dr. Childs.    3.  Hypertension-coronary artery disease.  Patient is on multiple medications.  We will continue follow-up with PCP for management.         PLAN:    1. We will have a CT scan for the chest with IV contrast in 3 months for reassessment of pulmonary nodules.  2. I will see patient again in 3 months for follow-up with a CBC, CMP.  3. Patient will continue to follow up with the pulmonologist, Dr. Childs.      I reviewed the CT scan images from 12/18/2023, and compared to those images of CT scan fromand 6/30/2023, I shared those with patient and his wife today.     I discussed with the patient about laboratory results and further management plan.  Patient voiced understanding and agreeable.          Adarsh Rangel MD PhD       CC:   MD Eduardo Carrillo MD Cooper Rapp, MD Mark Esterle, M.D.        Transcribed from ambient dictation for Adarsh Rangel MD PhD by Ángela Cevallos.  12/22/23   11:29 EST    Patient or patient representative verbalized consent to the visit recording.  I have personally performed the services described in this document as transcribed by the above individual, and it is both accurate and complete.      Adarsh Rangel MD PhD

## 2023-12-26 ENCOUNTER — APPOINTMENT (OUTPATIENT)
Dept: CARDIAC REHAB | Facility: HOSPITAL | Age: 78
End: 2023-12-26
Payer: MEDICARE

## 2023-12-28 ENCOUNTER — APPOINTMENT (OUTPATIENT)
Dept: CARDIAC REHAB | Facility: HOSPITAL | Age: 78
End: 2023-12-28
Payer: MEDICARE

## 2023-12-29 ENCOUNTER — OFFICE VISIT (OUTPATIENT)
Dept: INTERNAL MEDICINE | Age: 78
End: 2023-12-29
Payer: MEDICARE

## 2023-12-29 VITALS
DIASTOLIC BLOOD PRESSURE: 72 MMHG | HEART RATE: 57 BPM | TEMPERATURE: 97.7 F | HEIGHT: 70 IN | WEIGHT: 177 LBS | OXYGEN SATURATION: 100 % | BODY MASS INDEX: 25.34 KG/M2 | SYSTOLIC BLOOD PRESSURE: 126 MMHG

## 2023-12-29 DIAGNOSIS — S81.812S LACERATION OF LEFT LOWER EXTREMITY, SEQUELA: Primary | ICD-10-CM

## 2023-12-29 RX ORDER — CEPHALEXIN 500 MG/1
500 CAPSULE ORAL 4 TIMES DAILY
Qty: 20 CAPSULE | Refills: 0 | Status: SHIPPED | OUTPATIENT
Start: 2023-12-29 | End: 2024-01-03

## 2023-12-29 RX ORDER — EMPAGLIFLOZIN 10 MG/1
TABLET, FILM COATED ORAL
COMMUNITY
Start: 2023-12-20

## 2023-12-29 RX ORDER — CEPHALEXIN 500 MG/1
500 CAPSULE ORAL 4 TIMES DAILY
COMMUNITY
Start: 2023-12-20 | End: 2023-12-30

## 2023-12-29 RX ORDER — DUPILUMAB 300 MG/2ML
INJECTION, SOLUTION SUBCUTANEOUS
COMMUNITY
Start: 2023-11-29

## 2023-12-29 NOTE — PROGRESS NOTES
I N T E R N A L  M E D I C I N E  SHANIQUA LEONARD      ENCOUNTER DATE:  12/29/2023    Aj Salazar / 78 y.o. / male      CHIEF COMPLAINT / REASON FOR OFFICE VISIT     Suture / Staple Removal (LEFT SHIN)      ASSESSMENT & PLAN     Problem List Items Addressed This Visit    None  Visit Diagnoses       Laceration of left lower extremity, sequela    -  Primary    Relevant Medications    cephalexin (Keflex) 500 MG capsule          No orders of the defined types were placed in this encounter.    New Medications Ordered This Visit   Medications    cephalexin (Keflex) 500 MG capsule     Sig: Take 1 capsule by mouth 4 (Four) Times a Day for 5 days.     Dispense:  20 capsule     Refill:  0       SUMMARY/DISCUSSION  Suture Removal    Date/Time: 12/29/2023 9:59 AM    Performed by: Mina Kumari APRN  Authorized by: Mina Kumari APRN  Consent: Verbal consent obtained.  Risks and benefits: risks, benefits and alternatives were discussed  Body area: lower extremity  Location details: left lower leg  Wound Appearance: clean, tender, warm and red  Sutures Removed: 5  Post-removal: dressing applied and antibiotic ointment applied  Patient tolerance: patient tolerated the procedure well with no immediate complications         Patient has some mild to moderate remaining erythema around the shin.  I will go ahead and extend his Keflex for 5 days.  Recommend starting the Bactroban ointment that was given to him through the urgent care center and laceration of his left elbow, left knee and shin.  Elbow and knee seem to be healing overall well  Discussed that if the redness increases in size at all that he will need to come back to see us we will likely need to refer him to wound care, if significant worsening may need IV antibiotics.  However overall believe it to be healing just lower than typical  He has follow-up with Dr. Amezcua on January 11 which he will keep    Next Appointment with me: Visit date not found    No follow-ups on  "file.      VITAL SIGNS     Visit Vitals  /72 (BP Location: Left arm, Patient Position: Sitting, Cuff Size: Adult)   Temp 97.7 °F (36.5 °C) (Temporal)   Ht 177.8 cm (70\")   Wt 80.3 kg (177 lb)   BMI 25.40 kg/m²     Wt Readings from Last 3 Encounters:   12/29/23 80.3 kg (177 lb)   12/22/23 79.2 kg (174 lb 9.6 oz)   08/04/23 82.3 kg (181 lb 6.4 oz)     Body mass index is 25.4 kg/m².    MEDICATIONS AT THE TIME OF OFFICE VISIT     Current Outpatient Medications on File Prior to Visit   Medication Sig    Accu-Chek Softclix Lancets lancets 1 each by Other route Every Other Day. Use as instructed    acetaminophen (TYLENOL) 325 MG tablet Take 2 tablets by mouth Every 4 (Four) Hours As Needed for Mild Pain .    ALPHAGAN P 0.1 % solution ophthalmic solution Administer 1 drop to both eyes 2 (two) times a day.    amoxicillin (AMOXIL) 500 MG capsule TAKE 4 CAPSULES BY MOUTH 1 HOUR BEFORE APPOINTMENT THEN TAKE 1 CAPSULE BY MOUTH EVERY 8 HOURS UNTIL GONE.    atorvastatin (LIPITOR) 80 MG tablet Take 1 tablet by mouth Every Night.    bumetanide (BUMEX) 1 MG tablet bumetanide 1 mg tablet   TAKE 1 TABLET BY MOUTH EVERY DAY    cephalexin (KEFLEX) 500 MG capsule Take 1 capsule by mouth 4 (Four) Times a Day.    clopidogrel (PLAVIX) 75 MG tablet TAKE 1 TABLET EVERY DAY    dabigatran etexilate (Pradaxa) 150 MG capsu Take 1 capsule by mouth 2 (Two) Times a Day.    dorzolamide (TRUSOPT) 2 % ophthalmic solution Administer 1 drop to both eyes 2 (Two) Times a Day.    Dupixent 300 MG/2ML solution prefilled syringe     Fluticasone-Umeclidin-Vilant (Trelegy Ellipta) 200-62.5-25 MCG/ACT aerosol powder  Inhale.    Fluticasone-Umeclidin-Vilant 200-62.5-25 MCG/INH aerosol powder  Inhale 1 puff Daily. Just increased to this strength 3/21    glucose blood test strip Accu-Chek Dannielle Plus test strips   USE TO CHECK GLUCOSE QOD    glucose blood test strip Accu-Chek Dannielle Plus Meter   USE TO CHECK GLUCOSE QOD    Jardiance 10 MG tablet tablet 0 " Refill(s)    metFORMIN ER (GLUCOPHAGE-XR) 500 MG 24 hr tablet TAKE 1 TABLET BY MOUTH DAILY WITH DINNER    metoprolol succinate XL (TOPROL-XL) 100 MG 24 hr tablet Take 1 tablet by mouth Every Night. Take 100 mg at night and 50 mg in the morning    metoprolol succinate XL (TOPROL-XL) 50 MG 24 hr tablet Take 1 tablet by mouth Daily. Take 50 mg every morning and 100 mg every night    Myrbetriq 25 MG tablet sustained-release 24 hour 24 hr tablet     pantoprazole (PROTONIX) 40 MG EC tablet TAKE 1 TABLET TWICE DAILY    spironolactone (ALDACTONE) 25 MG tablet Take 1 tablet by mouth Daily.    Tafluprost, PF, (ZIOPTAN) 0.0015 % solution ophthalmic solution Administer 1 drop to both eyes Every Night.    tamsulosin (FLOMAX) 0.4 MG capsule 24 hr capsule Take 2 capsules by mouth Every Night.     No current facility-administered medications on file prior to visit.      HISTORY OF PRESENT ILLNESS     1220 patient had an abrasion to the left shin he hit lower extremity on car door and presented to [facility] urgent care subsequently.  Prediabetes diagnosis.  His knee and left elbow recut also.  He had a 3 cm open wound on the front of the left shin.  Unable to approximate wound edges with pressure due to front of shin and thin skin.  The wound bed was visible with slight bleeding.  He does small 2 cm wound on left knee With slight bleeding.  A 3 cm wound on elbow that was superficial with scant bleeding.  His wounds were cleaned with Hibiclens and covered with gauze and wrapped in Coban.  Prescribed Keflex and Bactroban ointment.  He was advised to go to the ER for deep wound to the shin.  Sutures placed in emergency room.    Patient presents for suture removal today, he is still having some erythema around the site of the left shin along with some tenderness and slight warmth.  States that overall the redness has been decreasing in size but he has approximately 1-1/2 days left of Keflex.  Has not been using the Bactroban  ointment    REVIEW OF SYSTEMS     Constitutional afebrile   CV neg   Skin left shin, left knee, and left elbow laceration    PHYSICAL EXAMINATION     Physical Exam  Constitutional  No distress  Skin left shin laceration with mild to moderate erythema and tenderness and slight warmth around the site, left elbow laceration and stages of healing along with left knee laceration    REVIEWED DATA     Labs:   .  Lab Results   Component Value Date    GLUCOSE 113 (H) 12/22/2023    BUN 14 12/22/2023    CREATININE 0.98 12/22/2023    EGFRRESULT 89.4 10/24/2022    EGFR 79.4 12/22/2023    BCR 14.3 12/22/2023    K 4.4 12/22/2023    CO2 28.0 12/22/2023    CALCIUM 8.9 12/22/2023    PROTENTOTREF 6.1 10/24/2022    ALBUMIN 3.8 12/22/2023    BILITOT 1.2 12/22/2023    AST 26 12/22/2023    ALT 9 12/22/2023     Lab Results   Component Value Date    HGBA1C 6.50 (H) 07/17/2023     Imaging:           Medical Tests:           Summary of old records / correspondence / consultant report:           Request outside records:             *Dragon dictation used for documentation.

## 2024-02-07 ENCOUNTER — TELEPHONE (OUTPATIENT)
Dept: INTERNAL MEDICINE | Age: 79
End: 2024-02-07
Payer: MEDICARE

## 2024-02-07 NOTE — TELEPHONE ENCOUNTER
----- Message from Aj Salazar sent at 2/6/2024  5:22 PM EST -----  Regarding: sildenafil 100 mg  Contact: [phone]  [facility] and i would [facility] to get this thru [facility] can you write this script or do i need to contact the [facility] thanks

## 2024-02-17 DIAGNOSIS — K21.9 GASTROESOPHAGEAL REFLUX DISEASE, UNSPECIFIED WHETHER ESOPHAGITIS PRESENT: ICD-10-CM

## 2024-02-19 RX ORDER — PANTOPRAZOLE SODIUM 40 MG/1
TABLET, DELAYED RELEASE ORAL
Qty: 180 TABLET | Refills: 1 | Status: SHIPPED | OUTPATIENT
Start: 2024-02-19

## 2024-02-26 RX ORDER — CLOPIDOGREL BISULFATE 75 MG/1
TABLET ORAL
Qty: 90 TABLET | Refills: 0 | Status: SHIPPED | OUTPATIENT
Start: 2024-02-26

## 2024-03-18 RX ORDER — ATORVASTATIN CALCIUM 80 MG/1
80 TABLET, FILM COATED ORAL NIGHTLY
Qty: 90 TABLET | Refills: 1 | Status: SHIPPED | OUTPATIENT
Start: 2024-03-18

## 2024-04-01 ENCOUNTER — HOSPITAL ENCOUNTER (OUTPATIENT)
Dept: CT IMAGING | Facility: HOSPITAL | Age: 79
Discharge: HOME | End: 2024-04-01
Admitting: INTERNAL MEDICINE
Payer: MEDICARE

## 2024-04-01 DIAGNOSIS — R91.8 PULMONARY NODULES/LESIONS, MULTIPLE: ICD-10-CM

## 2024-04-01 PROCEDURE — 25510000001 IOPAMIDOL 61 % SOLUTION: Performed by: INTERNAL MEDICINE

## 2024-04-01 PROCEDURE — 82565 ASSAY OF CREATININE: CPT

## 2024-04-01 PROCEDURE — 71260 CT THORAX DX C+: CPT

## 2024-04-01 RX ADMIN — IOPAMIDOL 75 ML: 612 INJECTION, SOLUTION INTRAVENOUS at 11:11

## 2024-04-03 LAB — CREAT BLDA-MCNC: 1.2 MG/DL (ref 0.6–1.3)

## 2024-04-05 ENCOUNTER — OFFICE VISIT (OUTPATIENT)
Dept: ONCOLOGY | Facility: CLINIC | Age: 79
End: 2024-04-05
Payer: MEDICARE

## 2024-04-05 ENCOUNTER — LAB (OUTPATIENT)
Dept: LAB | Facility: HOSPITAL | Age: 79
End: 2024-04-05
Payer: MEDICARE

## 2024-04-05 VITALS
SYSTOLIC BLOOD PRESSURE: 118 MMHG | BODY MASS INDEX: 24.84 KG/M2 | WEIGHT: 173.5 LBS | DIASTOLIC BLOOD PRESSURE: 78 MMHG | HEIGHT: 70 IN | HEART RATE: 69 BPM | OXYGEN SATURATION: 99 % | RESPIRATION RATE: 16 BRPM | TEMPERATURE: 97.3 F

## 2024-04-05 DIAGNOSIS — R91.8 PULMONARY NODULES/LESIONS, MULTIPLE: Primary | ICD-10-CM

## 2024-04-05 DIAGNOSIS — R91.8 PULMONARY NODULES/LESIONS, MULTIPLE: ICD-10-CM

## 2024-04-05 LAB
ALBUMIN SERPL-MCNC: 4.3 G/DL (ref 3.5–5.2)
ALBUMIN/GLOB SERPL: 1.5 G/DL
ALP SERPL-CCNC: 120 U/L (ref 39–117)
ALT SERPL W P-5'-P-CCNC: 14 U/L (ref 1–41)
ANION GAP SERPL CALCULATED.3IONS-SCNC: 9.7 MMOL/L (ref 5–15)
AST SERPL-CCNC: 24 U/L (ref 1–40)
BASOPHILS # BLD AUTO: 0.04 10*3/MM3 (ref 0–0.2)
BASOPHILS NFR BLD AUTO: 0.4 % (ref 0–1.5)
BILIRUB SERPL-MCNC: 1.1 MG/DL (ref 0–1.2)
BUN SERPL-MCNC: 15 MG/DL (ref 8–23)
BUN/CREAT SERPL: 14.7 (ref 7–25)
CALCIUM SPEC-SCNC: 9.4 MG/DL (ref 8.6–10.5)
CHLORIDE SERPL-SCNC: 101 MMOL/L (ref 98–107)
CO2 SERPL-SCNC: 28.3 MMOL/L (ref 22–29)
CREAT SERPL-MCNC: 1.02 MG/DL (ref 0.76–1.27)
DEPRECATED RDW RBC AUTO: 47.9 FL (ref 37–54)
EGFRCR SERPLBLD CKD-EPI 2021: 75.7 ML/MIN/1.73
EOSINOPHIL # BLD AUTO: 0.11 10*3/MM3 (ref 0–0.4)
EOSINOPHIL NFR BLD AUTO: 1.2 % (ref 0.3–6.2)
ERYTHROCYTE [DISTWIDTH] IN BLOOD BY AUTOMATED COUNT: 15 % (ref 12.3–15.4)
GLOBULIN UR ELPH-MCNC: 2.9 GM/DL
GLUCOSE SERPL-MCNC: 119 MG/DL (ref 65–99)
HCT VFR BLD AUTO: 47.8 % (ref 37.5–51)
HGB BLD-MCNC: 15 G/DL (ref 13–17.7)
IMM GRANULOCYTES # BLD AUTO: 0.07 10*3/MM3 (ref 0–0.05)
IMM GRANULOCYTES NFR BLD AUTO: 0.8 % (ref 0–0.5)
LYMPHOCYTES # BLD AUTO: 1.77 10*3/MM3 (ref 0.7–3.1)
LYMPHOCYTES NFR BLD AUTO: 19.4 % (ref 19.6–45.3)
MCH RBC QN AUTO: 27.2 PG (ref 26.6–33)
MCHC RBC AUTO-ENTMCNC: 31.4 G/DL (ref 31.5–35.7)
MCV RBC AUTO: 86.6 FL (ref 79–97)
MONOCYTES # BLD AUTO: 0.79 10*3/MM3 (ref 0.1–0.9)
MONOCYTES NFR BLD AUTO: 8.7 % (ref 5–12)
NEUTROPHILS NFR BLD AUTO: 6.34 10*3/MM3 (ref 1.7–7)
NEUTROPHILS NFR BLD AUTO: 69.5 % (ref 42.7–76)
NRBC BLD AUTO-RTO: 0 /100 WBC (ref 0–0.2)
PLATELET # BLD AUTO: 303 10*3/MM3 (ref 140–450)
PMV BLD AUTO: 9.1 FL (ref 6–12)
POTASSIUM SERPL-SCNC: 3.7 MMOL/L (ref 3.5–5.2)
PROT SERPL-MCNC: 7.2 G/DL (ref 6–8.5)
RBC # BLD AUTO: 5.52 10*6/MM3 (ref 4.14–5.8)
SODIUM SERPL-SCNC: 139 MMOL/L (ref 136–145)
WBC NRBC COR # BLD AUTO: 9.12 10*3/MM3 (ref 3.4–10.8)

## 2024-04-05 PROCEDURE — 85025 COMPLETE CBC W/AUTO DIFF WBC: CPT

## 2024-04-05 PROCEDURE — 80053 COMPREHEN METABOLIC PANEL: CPT

## 2024-04-05 PROCEDURE — 36415 COLL VENOUS BLD VENIPUNCTURE: CPT

## 2024-04-05 NOTE — PROGRESS NOTES
.     REASON FOR FOLLOWUP:     Provide an opinion on any further workup or treatment of enlarging pulmonary nodules.                               HISTORY OF PRESENT ILLNESS:  The patient is a 78 y.o. year old male with hypertension, type 2 diabetes, coronary artery disease, benign prostate hypertrophy, who presented on 4/5/2024 for 3-month follow-up evaluation of the chest CT scan obtained earlier this week.     Patient reports at baseline condition.  No cough no hemoptysis no dyspnea.  No headaches no vision changes.  Performance status is ECOG 0-1.    His chest CT scan with IV contrast obtained on 4/1/2024 reported resolution and improvement of previous pulmonary densities, however there is a new right upper lobe pleural-based masslike parenchymal density measuring 2.0 x 3.1 cm.    Laboratory studies study on 4/5/2024 reported normal CBC, and unremarkable CMP.         Past Medical History:   Diagnosis Date    Alcohol abuse, in remission     Asthma copd    BPH (benign prostatic hypertrophy)     see doctors at [facility]    Cataract     CHF (congestive heart failure)     COPD (chronic obstructive pulmonary disease)     Depression     DJD (degenerative joint disease) of cervical spine     ED (erectile dysfunction)     [facility]    GERD (gastroesophageal reflux disease)     Glaucoma     History of prediabetes     Hx of colonic polyps     followed by GI (Kyle)    Hyperlipidemia     Hypertension     Influenza B 02/14/2013        Low back pain     Nocturnal hypoxia     Osteoarthritis     HIPS, HANDS, MULTIPLE SITES    Osteoarthritis 03/17/2016    Peripheral neuropathy     Permanent atrial fibrillation     Pneumonia     Pneumonia due to infectious organism 04/23/2023    Rectal bleeding 04/26/2017    Tendonitis of shoulder 11/07/2007    RIGHT SHOULDER/DELTOID INSERTION    TIA (transient ischemic attack) 10/2020    Ulcer of the stomach and intestine      Past Surgical History:   Procedure  Laterality Date    APPENDECTOMY      CARDIOVASCULAR STRESS TEST N/A 10/20/2015    NML LEXISCAN CARDIOLITE PERFUSION STUDY, NO EVIDENCE OF ISCHEMIA, AREA OF HYPOPERFUSION OF THE INFERIOR WALL W/NORMAL WALL PERFUSION AND NML LEFT VENTRICULAR EJECTION FRACTION, MOST LIKELY ATTENUATION. DR.MICHAEL BOX    CATARACT EXTRACTION Bilateral 02/2023    COLONOSCOPY N/A 02/2017    COLONOSCOPY N/A 02/23/2011    4 POLYPS REMOVED, RESCOPE IN 5 YRS, DR. EAGLE    COLONOSCOPY N/A 03/08/2006    ERYTHEMOUS AND GRANULAR MUCOSA IN ILEOCECAL VALVE, NORMAL COLON, REPEAT IN 5 YRS,     ENDOSCOPY N/A 09/26/2018    normal esophagus, acute gastritis, multiple non-bleeding duodenal ulcers with pigmented material, H Pylori positive    HERNIA REPAIR Bilateral     INGUINAL    JOINT REPLACEMENT  11/2015    left hip    TOTAL HIP ARTHROPLASTY Left 11/06/2015    Dr Ankush Sky    TOTAL HIP ARTHROPLASTY Right 10/27/2014    DR.RIED SKY    VASECTOMY       MEDICAL ONCOLOGY HISTORY: The patient is a 77 y.o. year old male who presented on 2/7/2023 to the multimodality lung clinic at the [facility] for initial evaluation because of worsening pulmonary nodules. The patient is accompanied by his wife today.     This patient has history of cigarette smoking for more than 50 years, 2 packs per day and he quit about 15 years ago. The patient reports dry cough. He also has COPD for more than 5 years, he has exertional dyspnea. He reports that since last month he has become more short of breath, however denies hemoptysis.  He denies weight loss, no headaches or vision changes.     The patient reports he is followed by pulmonologist Tahir Childs MD, and he has a scheduled PFT examination soon.     The patient reports he is diabetic, and has been on metformin with controlled glucose level. The patient also reports he has atrial fibrillation for quite some time, maybe started 8 years ago when  he had hip surgery. He is on Plavix. He is not on any other blood thinner as I reviewed medicines. The patient is followed by Cardiology, Juan Colby MD. The patient also reports chronic trace edema in legs.     This patient had low dose CT scan for the chest due to risk for lung cancer with significant history of cigarette smoking. This study was done on 01/27/2023, and reported right upper lobe newly developed suspicious stellate 10 mm nodule. There is also a right upper lobe base lung nodule along the major fissure 15 mm spiculated nodule. There is also grouping of 4 sub-6 mm pulmonary nodules within the right upper lobe, and also another group of 5 sub-6 mm nodules within the right lower lobe. There was also benign calcified granuloma in the right lower lobe. Within the left upper lobe, there is a 6 mm pulmonary nodule.       I reviewed his chest CT scan images with Thoracic Surgeon, Eduardo Mcmullen MD, and we recommended to obtain PET scan examination for further evaluation because those lesions are highly suspicious for lung cancer. If those lesions are hypermetabolic, we will try to purse CT guided core needle biopsy. However I discussed with the patient and his wife that the nodules are relatively small, and is not immediately close to chest wall. It will be difficult to biopsy and has high risk for pneumothorax because this patient has evidence of emphysema on CT scan of the chest.      Patient had PET scan examination on 2/15/2023 reported photopenic pulmonary nodules except a newly developed hypermetabolic lesion 1.8 cm with SUV 3.1 cm at the lingular lobe.    I presented his case at the multimodality conference on 2/16/2023, and we suspected the hypermetabolic lesion is infectious.  A conference recommended to treat him with antibiotics and then reassess with a chest CT scan in 5 to 6 weeks.  We started patient on Augmentin 875 mg twice a day for 10 days on 2/16/2023.    Today on 2/24/2023 patient  reports no fever sweating chills.  No chest pains.  No cough no hemoptysis.     follow-up evaluation, after his chest CT scan examination on 03/29/2023.     The study reported a new irregular peripheral mass-like consolidation in the posterolateral right upper lobe measuring 2.1 x 2.0 cm. A 0.7 cm nodular opacity in the right upper lobe is less conspicuous compared to that from 02/15/2023 and has decreased in size from 01/27/2023. It was 1.1 cm. The previously noted focal consolidation in the posterior right upper lobe is smaller and less conspicuous. There was also a 0.4 cm solid nodule in the posterolateral right middle lobe which is also new. A 1.4 cm irregular ground-glass nodular opacity in the anterior left upper lobe has decreased in size and density compared to that from 02/15/2023. Previously was 1.8 cm.      The CT scan examination on 6/30/2023 reported interval resolution of the previous described area of mass-like consolidation in the right upper lobe as well as a previously described 4 mm nodule in the right middle lobe. However, 3 new irregular nodules are noted up to 1.8 cm, 1 in the right middle lobe and 2 in the superior segment of the left lower lobe.    The patient reports he has no productive cough, no hemoptysis.  Denies a fever sweating chills.  He does use CPAP machine at night. He has COPD/emphysema and sleep apnea.    Laboratory study today on 7/7/2023 reports slightly trending down hemoglobin 13.3 g/dL, normal WBC and the platelets.    The patient had a CT scan on 12/15/2023 and the study reported a new irregular density in the right upper lobe measuring about 2 cm. There is also a new small ground glass opacity in the anterior right upper lobe. Previously noted right middle lobe opacity has resolved. There is also a new tiny nodular density in the left apex. There is a new 10 mm nodular density with an air bronchogram in the left upper lobe. Also, a small new ground glass opacity in the  left upper lobe and the 7 mm left lower lobe nodule has resolved. There was stable emphysema and a stable mediastinal and hilar lymph node. Radiologist commented that the new findings are favored to be infectious/inflammatory, however, follow up CT scan in 3 months recommended.    Laboratory study today on 12/22/2023 reported normal WBC 7,370, neutrophils 4,710, lymphocytes 1,700, hemoglobin 13.9 g/dL and platelets 244,000. Chemistry labor is pending. Last BMP was on 11/16/2023 reported glucose 119, otherwise unremarkable.      MEDICATIONS    Current Outpatient Medications:     Accu-Chek Softclix Lancets lancets, 1 each by Other route Every Other Day. Use as instructed, Disp: 100 each, Rfl: 0    ALPHAGAN P 0.1 % solution ophthalmic solution, Administer 1 drop to both eyes 2 (two) times a day., Disp: , Rfl: 6    atorvastatin (LIPITOR) 80 MG tablet, TAKE 1 TABLET EVERY NIGHT, Disp: 90 tablet, Rfl: 1    bumetanide (BUMEX) 1 MG tablet, bumetanide 1 mg tablet  TAKE 1 TABLET BY MOUTH EVERY DAY, Disp: , Rfl:     clopidogrel (PLAVIX) 75 MG tablet, TAKE 1 TABLET EVERY DAY, Disp: 90 tablet, Rfl: 0    dabigatran etexilate (Pradaxa) 150 MG capsu, Take 1 capsule by mouth 2 (Two) Times a Day., Disp: 60 capsule, Rfl: 6    Dupixent 300 MG/2ML solution prefilled syringe, , Disp: , Rfl:     Fluticasone-Umeclidin-Vilant (Trelegy Ellipta) 200-62.5-25 MCG/ACT aerosol powder , Inhale., Disp: , Rfl:     glucose blood test strip, Accu-Chek Dannielle Plus test strips  USE TO CHECK GLUCOSE QOD, Disp: , Rfl:     glucose blood test strip, Accu-Chek Dannielle Plus Meter  USE TO CHECK GLUCOSE QOD, Disp: , Rfl:     Jardiance 10 MG tablet tablet, 0 Refill(s), Disp: , Rfl:     metFORMIN ER (GLUCOPHAGE-XR) 500 MG 24 hr tablet, TAKE 1 TABLET BY MOUTH DAILY WITH DINNER, Disp: 90 tablet, Rfl: 1    metoprolol succinate XL (TOPROL-XL) 100 MG 24 hr tablet, Take 1 tablet by mouth Every Night. Take 100 mg at night and 50 mg in the morning, Disp: 30 tablet, Rfl: 0     metoprolol succinate XL (TOPROL-XL) 50 MG 24 hr tablet, Take 1 tablet by mouth Daily. Take 50 mg every morning and 100 mg every night, Disp: 30 tablet, Rfl: 0    Myrbetriq 25 MG tablet sustained-release 24 hour 24 hr tablet, , Disp: , Rfl:     pantoprazole (PROTONIX) 40 MG EC tablet, TAKE 1 TABLET TWICE DAILY, Disp: 180 tablet, Rfl: 1    spironolactone (ALDACTONE) 25 MG tablet, Take 1 tablet by mouth Daily., Disp: 30 tablet, Rfl: 0    Tafluprost, PF, (ZIOPTAN) 0.0015 % solution ophthalmic solution, Administer 1 drop to both eyes Every Night., Disp: , Rfl:     tamsulosin (FLOMAX) 0.4 MG capsule 24 hr capsule, Take 2 capsules by mouth Every Night., Disp: 30 capsule, Rfl:     COVID-19 mRNA Vac-Vanessa,Pfizer, 30 MCG/0.3ML suspension prefilled syringe prefilled syringe, Inject  into the appropriate muscle as directed by prescriber., Disp: 0.3 mL, Rfl: 0    mupirocin (BACTROBAN) 2 % ointment, Apply 1 Application topically to the appropriate area as directed 2 (Two) Times a Day for 14 days., Disp: 22 g, Rfl: 0    saccharomyces boulardii (FLORASTOR) 250 MG capsule, Take 1 capsule by mouth 2 (Two) Times a Day for 10 days., Disp: 30 capsule, Rfl: 0    ALLERGIES:     Allergies   Allergen Reactions    Bactrim [Sulfamethoxazole-Trimethoprim] Rash    Sulfacetamide Rash       SOCIAL HISTORY:       Social History     Socioeconomic History    Marital status:      Spouse name: Ara*    Number of children: 3   Tobacco Use    Smoking status: Former     Current packs/day: 0.00     Average packs/day: 2.0 packs/day for 49.0 years (98.0 ttl pk-yrs)     Types: Cigarettes     Start date: 1961     Quit date: 2010     Years since quittin.3    Smokeless tobacco: Never    Tobacco comments:     long smoking history   Vaping Use    Vaping status: Never Used   Substance and Sexual Activity    Alcohol use: No     Comment: 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; 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    Drug use: No     Comment: caffeine use     Sexual  "activity: Defer         FAMILY HISTORY:  Family History   Problem Relation Age of Onset    Cancer Mother         Bladder cancer    Colon cancer Mother     Ovarian cancer Mother     Alzheimer's disease Mother 70    Hyperlipidemia Father     Heart disease Father     Coronary artery disease Father     Hypertension Father     Stroke Sister         October 2016    Dementia Sister     Heart disease Brother     Alcohol abuse Brother     Heart disease Brother     Coronary artery disease Brother     Hypertension Brother     Stroke Brother     Arthritis Brother     Colon cancer Maternal Grandmother     Prostate cancer Neg Hx        REVIEW OF SYSTEMS:  Review of Systems see HPI           Vitals:    04/05/24 0937   BP: 118/78   Pulse: 69   Resp: 16   Temp: 97.3 °F (36.3 °C)   TempSrc: Temporal   SpO2: 99%   Weight: 78.7 kg (173 lb 8 oz)   Height: 177.8 cm (70\")   PainSc: 0-No pain         4/5/2024     9:28 AM   Current Status   ECOG score 0      PHYSICAL EXAM:   GENERAL:  Well-developed, well-nourished elderly gentleman in no acute distress.    SKIN:  Warm, dry without rashes, purpura or petechiae.  HEENT:  Normocephalic.   LYMPHATICS:  No cervical, supraclavicular adenopathy.  CHEST: Normal respiratory effort.  Lungs clear to auscultation. Good airflow.  CARDIAC:  Regular rate and rhythm without murmurs. Normal S1,S2.  ABDOMEN:  Soft, nontender.  Bowel sounds normal.  EXTREMITIES:  No lower extremity edema.      RECENT LABS:  Lab Results   Component Value Date    WBC 9.12 04/05/2024    HGB 15.0 04/05/2024    HCT 47.8 04/05/2024    MCV 86.6 04/05/2024     04/05/2024     Lab Results   Component Value Date    NEUTROABS 6.34 04/05/2024     Lab Results   Component Value Date    GLUCOSE 119 (H) 04/05/2024    BUN 15 04/05/2024    CREATININE 1.02 04/05/2024    EGFRRESULT 89.4 10/24/2022    EGFR 75.7 04/05/2024    BCR 14.7 04/05/2024    K 3.7 04/05/2024    CO2 28.3 04/05/2024    CALCIUM 9.4 04/05/2024    PROTENTOTREF 6.1 " 10/24/2022    ALBUMIN 4.3 04/05/2024    BILITOT 1.1 04/05/2024    AST 24 04/05/2024    ALT 14 04/05/2024         IMAGING:  Doppler Arterial Multi Level Lower Extremity - Bilateral CAR    Right Conclusion: The right ROXANA is moderately reduced. Waveforms are   consistent with femoral disease and popliteal disease. Moderate digital   ischemia.    Left Conclusion: The left ROXANA is normal. Normal digital pressures.      Assessment & Plan     ASSESSMENT:     1.  Pulmonary nodules.   the patient has high risk for lung cancer with significant history of cigarette smoking about 100 pack year although he quit about 15 years ago.   Nevertheless the patient had screening chest CT scan on 01/27/2023 which reported 2 pulmonary nodules up to 15 mm, spiculated highly suspicious for lung cancer.   We recommended to have PET scan examination first and then could choose the site of biopsy. The patient is high risk for pneumothorax, however there is no other way to bypass the biopsy.   PET scan examination on 2/15/2023 reported photopenic pulmonary nodules except a newly developed hypermetabolic lesion 1.8 cm with SUV 3.1 cm at the lingular lobe.  His case was presented at the multimodality conference on 2/16/2023, and suspected the hypermetabolic lesion is infectious.  We started patient on Augmentin 875 mg twice a day for 10 days on 2/16/2023.  On 2/24/2023 patient reports no fever sweating chills.  No chest pains.  No cough no hemoptysis.  We recommended to have repeated chest CT scan in about 6 weeks for reassessment, as recommended by the multimodality conference.  Patient is agreeable.  On 3/29/2023 patient had a repeating chest CT scan examination.  I reviewed the images with the patient and his wife today, 04/07/2023. Certainly, this right upper lobe consolidation is new, just developed in the past 6 to 8 weeks. He is symptomatic with cough and sweating, chills despite no fever. He does have some pleuritic chest pain also. I  discussed with the patient and his wife, recommended to treat him as pneumonia with Augmentin for 10 days. I will obtain a CT scan in 3 months with IV contrast as recommended by radiologist. I do not think this is cancerous in nature, this is more of infection. Right now, there is no evidence for cancer recurrence.   CT scan examination on 6/30/2023 reported resolution of the previous documented small pulmonary nodules, however, now he has 3 new nodules up to 1.8 cm.  I reviewed the images with the patient and his wife, for both the new CT from 06/30/2023 and compared it to the previous CT scan from 03/29/2023. I explained to them that new pulmonary nodule most likely are inflammatory in nature, it was not there 3 months ago. In the meantime, the previous nodules including the largest one in the right upper lobe pleural based has resolved without a trace. I recommended to have repeating CT of the chest with IV contrast in 3 months for reassessment. Patient and his wife voiced understanding and agreeable.  The patient had a CT scan examination of the chest on 12/15/2023 and had multiple new pulmonary nodules/ground glass density, and also had some resolution of previous nodules/densities. I reviewed the images with the patient together today on 12/22/2023 and it is probably inflammatory in nature. I do recommend having a chest CT scan in 3 months for follow-up evaluation as recommended by the radiologist. Patient voiced understanding.    His chest CT scan with IV contrast obtained on 4/1/2024 reported resolution and improvement of previous pulmonary densities, however there is a new right upper lobe pleural-based masslike parenchymal density measuring 2.0 x 3.1 cm.  I discussed with the patient today on 4/5/2024 and recommended to repeat chest CT scan without contrast in 3 months for reassessment.      2. COPD/emphysema.   The patient will continue to follow up with his pulmonologist, Dr. Childs.    3.   Hypertension-coronary artery disease.    /82 today.  Patient is on multiple medications.  We will continue follow-up with PCP for management.         PLAN:  Arrange a chest CT scan without contrast in 3 months for reassessment of new pulmonary nodular density.    I will see patient again in 3 months for follow-up with CBC.  Patient will continue to follow up with pulmonologist, Dr. Childs.  Patient will continue follow-up with primary care physician for management of hypertension.      I reviewed the CT scan images from 4/1/2024, and compared to those images of CT scan 12/18/2023,  I shared those with patient today.     I discussed with the patient about laboratory results and further management plan.  Patient voiced understanding and agreeable.            Adarsh Rangel MD PhD       CC:   MD Eduardo Carrillo MD Cooper Rapp, MD Mark Esterle, M.D.

## 2024-04-10 ENCOUNTER — TRANSCRIBE ORDERS (OUTPATIENT)
Dept: ADMINISTRATIVE | Facility: HOSPITAL | Age: 79
End: 2024-04-10
Payer: MEDICARE

## 2024-04-10 ENCOUNTER — APPOINTMENT (OUTPATIENT)
Dept: WOUND CARE | Facility: HOSPITAL | Age: 79
End: 2024-04-10
Payer: MEDICARE

## 2024-04-10 DIAGNOSIS — M79.661 PAIN IN RIGHT LOWER LEG: Primary | ICD-10-CM

## 2024-04-10 PROCEDURE — G0463 HOSPITAL OUTPT CLINIC VISIT: HCPCS

## 2024-04-11 ENCOUNTER — HOSPITAL ENCOUNTER (OUTPATIENT)
Dept: CARDIOLOGY | Facility: HOSPITAL | Age: 79
Discharge: HOME | End: 2024-04-11
Admitting: EMERGENCY MEDICINE
Payer: MEDICARE

## 2024-04-11 DIAGNOSIS — L97.311 ULCER OF ANKLE, RIGHT, LIMITED TO BREAKDOWN OF SKIN: ICD-10-CM

## 2024-04-11 DIAGNOSIS — M79.661 PAIN IN RIGHT LOWER LEG: ICD-10-CM

## 2024-04-11 LAB
BH CV LOWER ARTERIAL LEFT ABI RATIO: 1.07
BH CV LOWER ARTERIAL LEFT CALF RATIO: 1.2
BH CV LOWER ARTERIAL LEFT DORSALIS PEDIS SYS MAX: 147
BH CV LOWER ARTERIAL LEFT GREAT TOE SYS MAX: 114
BH CV LOWER ARTERIAL LEFT HIGH THIGH RATIO: 1.42
BH CV LOWER ARTERIAL LEFT HIGH THIGH SYS MAX: 196
BH CV LOWER ARTERIAL LEFT LOW THIGH RATIO: 1.23
BH CV LOWER ARTERIAL LEFT LOW THIGH SYS MAX: 170
BH CV LOWER ARTERIAL LEFT POPLITEAL SYS MAX: 166
BH CV LOWER ARTERIAL LEFT POST TIBIAL SYS MAX: 142
BH CV LOWER ARTERIAL LEFT TBI RATIO: 0.83
BH CV LOWER ARTERIAL RIGHT ABI RATIO: 0.59
BH CV LOWER ARTERIAL RIGHT CALF RATIO: 0.91
BH CV LOWER ARTERIAL RIGHT DORSALIS PEDIS SYS MAX: 75
BH CV LOWER ARTERIAL RIGHT GREAT TOE SYS MAX: 54
BH CV LOWER ARTERIAL RIGHT HIGH THIGH RATIO: 1.09
BH CV LOWER ARTERIAL RIGHT HIGH THIGH SYS MAX: 151
BH CV LOWER ARTERIAL RIGHT LOW THIGH RATIO: 1
BH CV LOWER ARTERIAL RIGHT LOW THIGH SYS MAX: 138
BH CV LOWER ARTERIAL RIGHT POPLITEAL SYS MAX: 126
BH CV LOWER ARTERIAL RIGHT POST TIBIAL SYS MAX: 82
BH CV LOWER ARTERIAL RIGHT TBI RATIO: 0.39
UPPER ARTERIAL LEFT ARM BRACHIAL SYS MAX: 138
UPPER ARTERIAL RIGHT ARM BRACHIAL SYS MAX: 133

## 2024-04-11 PROCEDURE — 93923 UPR/LXTR ART STDY 3+ LVLS: CPT

## 2024-04-15 ENCOUNTER — OFFICE VISIT (OUTPATIENT)
Dept: INTERNAL MEDICINE | Age: 79
End: 2024-04-15
Payer: MEDICARE

## 2024-04-15 VITALS
WEIGHT: 172 LBS | SYSTOLIC BLOOD PRESSURE: 122 MMHG | TEMPERATURE: 96.8 F | BODY MASS INDEX: 24.62 KG/M2 | HEIGHT: 70 IN | HEART RATE: 76 BPM | OXYGEN SATURATION: 94 % | DIASTOLIC BLOOD PRESSURE: 82 MMHG

## 2024-04-15 DIAGNOSIS — E11.51 TYPE 2 DIABETES MELLITUS WITH DIABETIC PERIPHERAL ANGIOPATHY WITHOUT GANGRENE, WITHOUT LONG-TERM CURRENT USE OF INSULIN: Primary | Chronic | ICD-10-CM

## 2024-04-15 DIAGNOSIS — G45.9 TIA (TRANSIENT ISCHEMIC ATTACK): Chronic | ICD-10-CM

## 2024-04-15 DIAGNOSIS — I10 PRIMARY HYPERTENSION: Chronic | ICD-10-CM

## 2024-04-15 DIAGNOSIS — I48.91 ATRIAL FIBRILLATION WITH RAPID VENTRICULAR RESPONSE: Chronic | ICD-10-CM

## 2024-04-15 DIAGNOSIS — J43.9 PULMONARY EMPHYSEMA, UNSPECIFIED EMPHYSEMA TYPE: Chronic | ICD-10-CM

## 2024-04-15 DIAGNOSIS — S81.801A OPEN WOUND OF RIGHT LOWER LEG, INITIAL ENCOUNTER: ICD-10-CM

## 2024-04-15 PROBLEM — R91.8 PULMONARY NODULES/LESIONS, MULTIPLE: Chronic | Status: ACTIVE | Noted: 2023-01-30

## 2024-04-15 PROBLEM — M48.02 NEUROFORAMINAL STENOSIS OF CERVICAL SPINE: Chronic | Status: ACTIVE | Noted: 2023-07-17

## 2024-04-15 LAB
CHOLEST SERPL-MCNC: 109 MG/DL (ref 0–200)
CHOLEST/HDLC SERPL: 2.66 {RATIO}
HBA1C MFR BLD: 6.5 % (ref 4.8–5.6)
HDLC SERPL-MCNC: 41 MG/DL (ref 40–60)
LDLC SERPL CALC-MCNC: 47 MG/DL (ref 0–100)
TRIGL SERPL-MCNC: 115 MG/DL (ref 0–150)
VLDLC SERPL CALC-MCNC: 21 MG/DL (ref 5–40)

## 2024-04-15 PROCEDURE — G2211 COMPLEX E/M VISIT ADD ON: HCPCS | Performed by: INTERNAL MEDICINE

## 2024-04-15 PROCEDURE — 3074F SYST BP LT 130 MM HG: CPT | Performed by: INTERNAL MEDICINE

## 2024-04-15 PROCEDURE — 1159F MED LIST DOCD IN RCRD: CPT | Performed by: INTERNAL MEDICINE

## 2024-04-15 PROCEDURE — 99214 OFFICE O/P EST MOD 30 MIN: CPT | Performed by: INTERNAL MEDICINE

## 2024-04-15 PROCEDURE — 1160F RVW MEDS BY RX/DR IN RCRD: CPT | Performed by: INTERNAL MEDICINE

## 2024-04-15 PROCEDURE — 3079F DIAST BP 80-89 MM HG: CPT | Performed by: INTERNAL MEDICINE

## 2024-04-15 RX ORDER — SACCHAROMYCES BOULARDII 250 MG
250 CAPSULE ORAL 2 TIMES DAILY
Qty: 30 CAPSULE | Refills: 0 | Status: SHIPPED | OUTPATIENT
Start: 2024-04-15 | End: 2024-04-25

## 2024-04-15 NOTE — ASSESSMENT & PLAN NOTE
BP Readings from Last 3 Encounters:   04/15/24 122/82   04/08/24 128/76   04/05/24 118/78      Continue current heart/blood pressure medications managed by cardiologist.

## 2024-04-15 NOTE — ASSESSMENT & PLAN NOTE
Lab Results   Component Value Date    HGBA1C 6.50 (H) 07/17/2023    HGBA1C 6.50 (H) 04/25/2023    HGBA1C 6.10 (H) 10/24/2022      Check A1c today. Continue metformin  mg daily.

## 2024-04-15 NOTE — PROGRESS NOTES
I N T E R N A L  M E D I C I N E    J U N O H  K I M,  M D      ENCOUNTER DATE:  04/15/2024    Aj Pattonb / 78 y.o. / male    CHIEF COMPLAINT / REASON FOR OFFICE VISIT     Diabetes, COPD, and Right leg wound      ASSESSMENT & PLAN     Problem List Items Addressed This Visit          High    Type 2 diabetes mellitus with diabetic peripheral angiopathy without gangrene, without long-term current use of insulin - Primary (Chronic)    Overview     **Complications of diabetes: peripheral neuropathy, peripheral arterial disease, and right leg wound.      Continue metformin  mg daily with dinner.          Current Assessment & Plan     Lab Results   Component Value Date    HGBA1C 6.50 (H) 07/17/2023    HGBA1C 6.50 (H) 04/25/2023    HGBA1C 6.10 (H) 10/24/2022      Check A1c today. Continue metformin  mg daily.          Relevant Medications    metFORMIN ER (GLUCOPHAGE-XR) 500 MG 24 hr tablet    Jardiance 10 MG tablet tablet    Other Relevant Orders    Hemoglobin A1c    Lipid Panel With / Chol / HDL Ratio    Open wound of right lower leg    Overview     *Managed by wound clinic          Current Assessment & Plan     Continue follow-up / instructions by wound clinic ([place])  Reviewed BLE arterial studies showing peripheral vascular disease of RLE.   He will likely be referred to vascular by wound clinic.          Relevant Medications    clindamycin (CLEOCIN) 300 MG capsule    mupirocin (BACTROBAN) 2 % ointment    saccharomyces boulardii (FLORASTOR) 250 MG capsule       Medium    COPD (chronic obstructive pulmonary disease) (Chronic)    Overview     *Esterle    PFT (5/2018): moderate obstruction with decreased diffusing capacity.          Current Assessment & Plan     Continue Trelegy inhaler daily          Relevant Medications    Fluticasone-Umeclidin-Vilant 200-62.5-25 MCG/INH aerosol powder     Fluticasone-Umeclidin-Vilant (Trelegy Ellipta) 200-62.5-25 MCG/ACT aerosol powder     Hypertension (Chronic)  "   Current Assessment & Plan     BP Readings from Last 3 Encounters:   04/15/24 122/82   04/08/24 128/76   04/05/24 118/78      Continue current heart/blood pressure medications managed by cardiologist.          Relevant Medications    metoprolol succinate XL (TOPROL-XL) 100 MG 24 hr tablet    spironolactone (ALDACTONE) 25 MG tablet    metoprolol succinate XL (TOPROL-XL) 50 MG 24 hr tablet    bumetanide (BUMEX) 1 MG tablet    Atrial fibrillation with rapid ventricular response (Chronic)    Overview     *Lasha         Current Assessment & Plan     Continue diltiazem CD and metoprolol succinate along with Pradaxa.         Relevant Medications    metoprolol succinate XL (TOPROL-XL) 100 MG 24 hr tablet    metoprolol succinate XL (TOPROL-XL) 50 MG 24 hr tablet    clopidogrel (PLAVIX) 75 MG tablet       Low    TIA (transient ischemic attack) (Chronic)    Overview     10/22/20: TIA with left facial droop and left hand weakness     Continue clopidogrel 75 mg daily and Pradaxa          Relevant Orders    Lipid Panel With / Chol / HDL Ratio     Orders Placed This Encounter   Procedures    Hemoglobin A1c    Lipid Panel With / Chol / HDL Ratio     New Medications Ordered This Visit   Medications    saccharomyces boulardii (FLORASTOR) 250 MG capsule     Sig: Take 1 capsule by mouth 2 (Two) Times a Day for 10 days.     Dispense:  30 capsule     Refill:  0       SUMMARY/DISCUSSION      Next Appointment with me: Visit date not found    Return in about 4 months (around 8/15/2024) for **SCHEDULE COMBINED AWV AND MEDICAL F/U**.      VITAL SIGNS     Vitals:    04/15/24 1319   BP: 122/82   Pulse: 76   Temp: 96.8 °F (36 °C)   SpO2: 94%   Weight: 78 kg (172 lb)   Height: 177.8 cm (70\")       BP Readings from Last 3 Encounters:   04/15/24 122/82   04/08/24 128/76   04/05/24 118/78     Wt Readings from Last 3 Encounters:   04/15/24 78 kg (172 lb)   04/08/24 81.6 kg (180 lb)   04/05/24 78.7 kg (173 lb 8 oz)     Body mass index is 24.68 " kg/m².    Blood pressure readings recorded on patient flowsheet:  Nay Blood Pressure Flowsheet Systolic Diastolic Pulse   4/29/2022   6:00  74 104   4/25/2022   6:00  73 81   4/22/2022   6:00  60 108   4/18/2022   6:00  61 97   4/15/2022   6:00  69 88   4/11/2022   6:00  76 85   4/8/2022   6:00 AM 98 62 87   4/4/2022   6:00  66 93   4/1/2022   6:00  61 91   3/28/2022   6:00  78 78         MEDICATIONS AT THE TIME OF OFFICE VISIT     Current Outpatient Medications on File Prior to Visit   Medication Sig    Accu-Chek Softclix Lancets lancets 1 each by Other route Every Other Day. Use as instructed    acetaminophen (TYLENOL) 325 MG tablet Take 2 tablets by mouth Every 4 (Four) Hours As Needed for Mild Pain .    ALPHAGAN P 0.1 % solution ophthalmic solution Administer 1 drop to both eyes 2 (two) times a day.    atorvastatin (LIPITOR) 80 MG tablet TAKE 1 TABLET EVERY NIGHT    bumetanide (BUMEX) 1 MG tablet bumetanide 1 mg tablet   TAKE 1 TABLET BY MOUTH EVERY DAY    clindamycin (CLEOCIN) 300 MG capsule Take 1 capsule by mouth 3 (Three) Times a Day for 10 days.    clopidogrel (PLAVIX) 75 MG tablet TAKE 1 TABLET EVERY DAY    dabigatran etexilate (Pradaxa) 150 MG capsu Take 1 capsule by mouth 2 (Two) Times a Day.    dorzolamide (TRUSOPT) 2 % ophthalmic solution Administer 1 drop to both eyes 2 (Two) Times a Day.    Dupixent 300 MG/2ML solution prefilled syringe     Fluticasone-Umeclidin-Vilant (Trelegy Ellipta) 200-62.5-25 MCG/ACT aerosol powder  Inhale.    Fluticasone-Umeclidin-Vilant 200-62.5-25 MCG/INH aerosol powder  Inhale 1 puff Daily. Just increased to this strength 3/21    glucose blood test strip Accu-Chek Dannielle Plus test strips   USE TO CHECK GLUCOSE QOD    glucose blood test strip Accu-Chek Dannielle Plus Meter   USE TO CHECK GLUCOSE QOD    Jardiance 10 MG tablet tablet 0 Refill(s)    metFORMIN ER (GLUCOPHAGE-XR) 500 MG 24 hr tablet TAKE 1 TABLET BY MOUTH DAILY  WITH DINNER    metoprolol succinate XL (TOPROL-XL) 100 MG 24 hr tablet Take 1 tablet by mouth Every Night. Take 100 mg at night and 50 mg in the morning    metoprolol succinate XL (TOPROL-XL) 50 MG 24 hr tablet Take 1 tablet by mouth Daily. Take 50 mg every morning and 100 mg every night    mupirocin (BACTROBAN) 2 % ointment Apply 1 Application topically to the appropriate area as directed 2 (Two) Times a Day for 14 days.    Myrbetriq 25 MG tablet sustained-release 24 hour 24 hr tablet     pantoprazole (PROTONIX) 40 MG EC tablet TAKE 1 TABLET TWICE DAILY    spironolactone (ALDACTONE) 25 MG tablet Take 1 tablet by mouth Daily.    Tafluprost, PF, (ZIOPTAN) 0.0015 % solution ophthalmic solution Administer 1 drop to both eyes Every Night.    tamsulosin (FLOMAX) 0.4 MG capsule 24 hr capsule Take 2 capsules by mouth Every Night.    [DISCONTINUED] saccharomyces boulardii (FLORASTOR) 250 MG capsule Take 1 capsule by mouth 2 (Two) Times a Day for 10 days.    amoxicillin (AMOXIL) 500 MG capsule TAKE 4 CAPSULES BY MOUTH 1 HOUR BEFORE APPOINTMENT THEN TAKE 1 CAPSULE BY MOUTH EVERY 8 HOURS UNTIL GONE.     No current facility-administered medications on file prior to visit.          HISTORY OF PRESENT ILLNESS     Seeing wound clinic for traumatic right lower leg wound incurred several weeks ago. He is on clindamycin currently to prevent infection. Recent arterial studies show likely peripheral vascular disease or right lower extremity. Diabetes remains stable overall. Blood pressure is stable on medications. Denies chest pain, LAWS or worsening palpitations.     Patient Care Team:  Neftali Amezcua MD as PCP - General (Internal Medicine)  Tahir Childs MD as Consulting Physician (Pulmonary Disease)  Aura Perry OD (Optometry)  Juan Colby MD as Consulting Physician (Cardiology)  Adarsh Rangel MD PhD as Consulting Physician (Hematology and Oncology)  Rebel Escobar MD as Consulting Physician  (Urology)  Danie Mcgrath MD as Consulting Physician (Ophthalmology)  John Queen MD as Consulting Physician (Wound Care)    REVIEW OF SYSTEMS     Review of Systems   Paresthesia and numbness / coolness of bilateral feet  GI negative   Negative for chest pain or worsening palpitations     PHYSICAL EXAMINATION     Physical Exam  Alert with normal thought and judgment.  Heart irregularly irregular   Lungs decreased breath sounds bilaterally without wheezing or rales, normal effort   Trace bilateral lower extremities edema   His lower extremities/feet are cool to the touch       REVIEWED DATA     Labs:     Lab Results   Component Value Date     04/05/2024    K 3.7 04/05/2024    CALCIUM 9.4 04/05/2024    AST 24 04/05/2024    ALT 14 04/05/2024    BUN 15 04/05/2024    CREATININE 1.02 04/05/2024    CREATININE 1.20 04/01/2024    CREATININE 0.98 12/22/2023    EGFRRESULT 89.4 10/24/2022       Lab Results   Component Value Date    HGBA1C 6.50 (H) 07/17/2023    HGBA1C 6.50 (H) 04/25/2023    HGBA1C 6.10 (H) 10/24/2022       Lab Results   Component Value Date    LDL 54 07/17/2023    LDL 49 10/24/2022    LDL 40 09/17/2021    HDL 46 07/17/2023    HDL 47 10/24/2022    TRIG 125 07/17/2023    TRIG 76 10/24/2022       Lab Results   Component Value Date    TSH 1.880 03/08/2021    TSH 2.45 10/14/2015       Lab Results   Component Value Date    WBC 9.12 04/05/2024    HGB 15.0 04/05/2024     04/05/2024       Lab Results   Component Value Date    MALBCRERATIO 77 (H) 07/17/2023           Imaging:           Medical Tests:           Summary of old records / correspondence / consultant report:           Request outside records:

## 2024-04-15 NOTE — ASSESSMENT & PLAN NOTE
Continue follow-up / instructions by wound clinic ([place])  Reviewed BLE arterial studies showing peripheral vascular disease of RLE.   He will likely be referred to vascular by wound clinic.

## 2024-04-17 ENCOUNTER — APPOINTMENT (OUTPATIENT)
Dept: WOUND CARE | Facility: HOSPITAL | Age: 79
End: 2024-04-17
Payer: MEDICARE

## 2024-04-17 PROCEDURE — G0463 HOSPITAL OUTPT CLINIC VISIT: HCPCS

## 2024-04-24 ENCOUNTER — APPOINTMENT (OUTPATIENT)
Dept: WOUND CARE | Facility: HOSPITAL | Age: 79
End: 2024-04-24
Payer: MEDICARE

## 2024-04-24 PROCEDURE — G0463 HOSPITAL OUTPT CLINIC VISIT: HCPCS

## 2024-05-08 ENCOUNTER — APPOINTMENT (OUTPATIENT)
Dept: WOUND CARE | Facility: HOSPITAL | Age: 79
End: 2024-05-08
Payer: MEDICARE

## 2024-05-15 ENCOUNTER — APPOINTMENT (OUTPATIENT)
Dept: WOUND CARE | Facility: HOSPITAL | Age: 79
End: 2024-05-15
Payer: MEDICARE

## 2024-05-15 PROCEDURE — G0463 HOSPITAL OUTPT CLINIC VISIT: HCPCS

## 2024-05-20 RX ORDER — CLOPIDOGREL BISULFATE 75 MG/1
TABLET ORAL
Qty: 90 TABLET | Refills: 1 | Status: SHIPPED | OUTPATIENT
Start: 2024-05-20

## 2024-05-23 ENCOUNTER — OFFICE VISIT (OUTPATIENT)
Age: 79
End: 2024-05-23
Payer: MEDICARE

## 2024-05-23 VITALS
BODY MASS INDEX: 25.77 KG/M2 | DIASTOLIC BLOOD PRESSURE: 79 MMHG | SYSTOLIC BLOOD PRESSURE: 116 MMHG | HEART RATE: 102 BPM | HEIGHT: 70 IN | WEIGHT: 180 LBS

## 2024-05-23 DIAGNOSIS — I70.232 ATHEROSCLEROSIS OF NATIVE ARTERY OF RIGHT LOWER EXTREMITY WITH ULCERATION OF CALF: ICD-10-CM

## 2024-05-23 DIAGNOSIS — G45.9 TIA (TRANSIENT ISCHEMIC ATTACK): Chronic | ICD-10-CM

## 2024-05-23 DIAGNOSIS — I73.9 CLAUDICATION: ICD-10-CM

## 2024-05-23 DIAGNOSIS — R09.89 BRUIT OF RIGHT CAROTID ARTERY: ICD-10-CM

## 2024-05-23 DIAGNOSIS — S81.801A OPEN WOUND OF RIGHT LOWER LEG, INITIAL ENCOUNTER: ICD-10-CM

## 2024-05-23 DIAGNOSIS — E11.51 TYPE 2 DIABETES MELLITUS WITH DIABETIC PERIPHERAL ANGIOPATHY WITHOUT GANGRENE, WITHOUT LONG-TERM CURRENT USE OF INSULIN: Primary | Chronic | ICD-10-CM

## 2024-05-23 PROBLEM — I70.209 ATHEROSCLEROTIC PERIPHERAL VASCULAR DISEASE WITH ULCERATION: Status: ACTIVE | Noted: 2024-05-23

## 2024-05-23 PROBLEM — L98.499 ATHEROSCLEROTIC PERIPHERAL VASCULAR DISEASE WITH ULCERATION: Status: ACTIVE | Noted: 2024-05-23

## 2024-05-23 NOTE — PROGRESS NOTES
Patient Name: Aj Salazar    MRN: [id number] Encounter Date: 05/23/2024      Consulting Service: Vascular Surgery    Referring Provider: John Queen MD       CHIEF COMPLAINT:  Chief Complaint   Patient presents with    Leg wound      Right, abnormal ROXANA       Subjective    HPI: Aj Salazar is a 78 y.o. male is being seen for evaluation/management of persistent right leg ulceration.  He is successfully healed 1 ulcer but is recurred and new 1 just from tape burns that have opened up an area.  Unfortunately when I took his dressing off I also cause a little skin injury and hope this does not turn into a problem.  I am not able to palpate any pulses foot level.  He has no breakdown of his toes or foot but the concern here is whether or not his peripheral vascular disease could be playing a role.  He does not claudicate very much because his pulmonary status and COPD keep him from walking distance.  He reports some nocturnal leg cramps but no absolute rest pain.  He has a history of TIA and has had his carotid checked in the distant past but not followed regularly.    PAST MEDICAL HISTORY:   Past Medical History:   Diagnosis Date    Alcohol abuse, in remission     Asthma copd    BPH (benign prostatic hypertrophy)     see doctors at [facility]    Cataract     CHF (congestive heart failure)     COPD (chronic obstructive pulmonary disease)     Depression     DJD (degenerative joint disease) of cervical spine     ED (erectile dysfunction)     [facility]    GERD (gastroesophageal reflux disease)     Glaucoma     History of prediabetes     Hx of colonic polyps     followed by GI ([place])    Hyperlipidemia     Hypertension     Influenza B 02/14/2013        Low back pain     Nocturnal hypoxia     Osteoarthritis     HIPS, HANDS, MULTIPLE SITES    Osteoarthritis 03/17/2016    Peripheral neuropathy     Permanent atrial fibrillation     Pneumonia     Pneumonia due to infectious organism 04/23/2023     Rectal bleeding 04/26/2017    Tendonitis of shoulder 11/07/2007    RIGHT SHOULDER/DELTOID INSERTION    TIA (transient ischemic attack) 10/2020    Ulcer of the stomach and intestine       PAST SURGICAL HISTORY:   Past Surgical History:   Procedure Laterality Date    APPENDECTOMY      CARDIOVASCULAR STRESS TEST N/A 10/20/2015    NML LEXISCAN CARDIOLITE PERFUSION STUDY, NO EVIDENCE OF ISCHEMIA, AREA OF HYPOPERFUSION OF THE INFERIOR WALL W/NORMAL WALL PERFUSION AND NML LEFT VENTRICULAR EJECTION FRACTION, MOST LIKELY ATTENUATION. DR.MICHAEL BOX    CATARACT EXTRACTION Bilateral 02/2023    COLONOSCOPY N/A 02/2017    COLONOSCOPY N/A 02/23/2011    4 POLYPS REMOVED, RESCOPE IN 5 YRS, DR. EAGLE    COLONOSCOPY N/A 03/08/2006    ERYTHEMOUS AND GRANULAR MUCOSA IN ILEOCECAL VALVE, NORMAL COLON, REPEAT IN 5 YRS,     ENDOSCOPY N/A 09/26/2018    normal esophagus, acute gastritis, multiple non-bleeding duodenal ulcers with pigmented material, H Pylori positive    HERNIA REPAIR Bilateral     INGUINAL    JOINT REPLACEMENT  11/2015    left hip    TOTAL HIP ARTHROPLASTY Left 11/06/2015    Dr Ankush Sky    TOTAL HIP ARTHROPLASTY Right 10/27/2014    DR.RIED SKY    VASECTOMY        FAMILY HISTORY:   Family History   Problem Relation Age of Onset    Cancer Mother         Bladder cancer    Colon cancer Mother     Ovarian cancer Mother     Alzheimer's disease Mother 70    Hyperlipidemia Father     Heart disease Father     Coronary artery disease Father     Hypertension Father     Stroke Sister         October 2016    Dementia Sister     Heart disease Brother     Alcohol abuse Brother     Heart disease Brother     Coronary artery disease Brother     Hypertension Brother     Stroke Brother     Arthritis Brother     Colon cancer Maternal Grandmother     Prostate cancer Neg Hx       SOCIAL HISTORY:   Social History     Tobacco Use    Smoking status: Former     Current packs/day: 0.00     Average packs/day: 2.0 packs/day  for 49.0 years (98.0 ttl pk-yrs)     Types: Cigarettes     Start date: 1961     Quit date: 2010     Years since quittin.4    Smokeless tobacco: Never    Tobacco comments:     long smoking history   Vaping Use    Vaping status: Never Used   Substance Use Topics    Alcohol use: No     Comment: 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; 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    Drug use: No     Comment: caffeine use       MEDICATIONS:   Current Outpatient Medications on File Prior to Visit   Medication Sig Dispense Refill    Accu-Chek Softclix Lancets lancets 1 each by Other route Every Other Day. Use as instructed 100 each 0    ALPHAGAN P 0.1 % solution ophthalmic solution Administer 1 drop to both eyes 2 (two) times a day.  6    atorvastatin (LIPITOR) 80 MG tablet TAKE 1 TABLET EVERY NIGHT 90 tablet 1    bumetanide (BUMEX) 1 MG tablet bumetanide 1 mg tablet   TAKE 1 TABLET BY MOUTH EVERY DAY      clopidogrel (PLAVIX) 75 MG tablet TAKE 1 TABLET EVERY DAY 90 tablet 1    COVID-19 mRNA Vac-Vanessa,Pf[employer], 30 MCG/0.3ML suspension prefilled syringe prefilled syringe Inject  into the appropriate muscle as directed by prescriber. 0.3 mL 0    dabigatran etexilate (Pradaxa) 150 MG capsu Take 1 capsule by mouth 2 (Two) Times a Day. 60 capsule 6    Dupixent 300 MG/2ML solution prefilled syringe       Fluticasone-Umeclidin-Vilant (Trelegy Ellipta) 200-62.5-25 MCG/ACT aerosol powder  Inhale.      glucose blood test strip Accu-Chek Dannielle Plus test strips   USE TO CHECK GLUCOSE QOD      glucose blood test strip Accu-Chek Dannielle Plus Meter   USE TO CHECK GLUCOSE QOD      Jardiance 10 MG tablet tablet 0 Refill(s)      metFORMIN ER (GLUCOPHAGE-XR) 500 MG 24 hr tablet TAKE 1 TABLET BY MOUTH DAILY WITH DINNER 90 tablet 1    metoprolol succinate XL (TOPROL-XL) 100 MG 24 hr tablet Take 1 tablet by mouth Every Night. Take 100 mg at night and 50 mg in the morning 30 tablet 0    metoprolol succinate XL (TOPROL-XL) 50 MG 24 hr tablet Take 1 tablet by mouth Daily.  "Take 50 mg every morning and 100 mg every night 30 tablet 0    Myrbetriq 25 MG tablet sustained-release 24 hour 24 hr tablet       pantoprazole (PROTONIX) 40 MG EC tablet TAKE 1 TABLET TWICE DAILY 180 tablet 1    spironolactone (ALDACTONE) 25 MG tablet Take 1 tablet by mouth Daily. 30 tablet 0    Tafluprost, PF, (ZIOPTAN) 0.0015 % solution ophthalmic solution Administer 1 drop to both eyes Every Night.      tamsulosin (FLOMAX) 0.4 MG capsule 24 hr capsule Take 2 capsules by mouth Every Night. 30 capsule      No current facility-administered medications on file prior to visit.       ALLERGIES: Bactrim [sulfamethoxazole-trimethoprim] and Sulfacetamide       Objective   Vitals:    05/23/24 0923   BP: 116/79   Pulse: 102   Weight: 81.6 kg (180 lb)   Height: 177.8 cm (70\")     Body mass index is 25.83 kg/m².          PHYSICAL EXAM:   Physical Exam  Constitutional:       Appearance: Normal appearance. He is normal weight.   HENT:      Head: Normocephalic and atraumatic.      Nose: Nose normal.   Eyes:      Extraocular Movements: Extraocular movements intact.      Pupils: Pupils are equal, round, and reactive to light.   Cardiovascular:      Rate and Rhythm: Normal rate.      Pulses:           Carotid pulses are 2+ on the right side with bruit and 2+ on the left side.       Radial pulses are 2+ on the right side and 2+ on the left side.        Femoral pulses are 1+ on the right side and 2+ on the left side.       Popliteal pulses are 0 on the right side and 2+ on the left side.        Dorsalis pedis pulses are detected w/ Doppler on the right side and 2+ on the left side.        Posterior tibial pulses are detected w/ Doppler on the right side and 2+ on the left side.      Heart sounds: Normal heart sounds.      Comments: Right lateral leg wound ulceration  Pulmonary:      Effort: Pulmonary effort is normal.      Breath sounds: Normal breath sounds.   Abdominal:      General: Abdomen is flat. Bowel sounds are normal.      " Palpations: Abdomen is soft.   Musculoskeletal:         General: Normal range of motion.      Cervical back: Normal range of motion and neck supple.      Right lower leg: No edema.      Left lower leg: No edema.   Skin:     General: Skin is warm and dry.   Neurological:      General: No focal deficit present.      Mental Status: He is alert and oriented to person, place, and time. Mental status is at baseline.   Psychiatric:         Mood and Affect: Mood normal.         Thought Content: Thought content normal.          Result Review   LABS:      Results Review:       I reviewed the patient's new clinical results.    The following radiologic or non-invasive studies have been reviewed by me: Lower extremity arterial testing with images reviewed by myself and by the patient  No radiology results for the last 30 days.                ASSESSMENT/PLAN:   Diagnoses and all orders for this visit:    1. Type 2 diabetes mellitus with diabetic peripheral angiopathy without gangrene, without long-term current use of insulin (Primary)    2. Open wound of right lower leg, initial encounter  -     Cancel: Doppler Arterial Lower Extremity Stress CAR; Future  -     CT Angio Abdominal Aorta Bilateral Iliofem Runoff With & Without Contrast; Future    3. TIA (transient ischemic attack)  -     Duplex Carotid Ultrasound CAR; Future    4. Atherosclerosis of native artery of right lower extremity with ulceration of calf  -     CT Angio Abdominal Aorta Bilateral Iliofem Runoff With & Without Contrast; Future    5. Claudication  -     Cancel: Doppler Arterial Lower Extremity Stress CAR; Future    6. Bruit of right carotid artery  -     Duplex Carotid Ultrasound CAR; Future       78 y.o. male with peripheral vascular disease in the right leg with ROXANA of 0.59 and toe pressures up at 0.39.  We are going to pursue a CTA with 3D reconstruction to see what options we have to revascularize and hopefully keep him from having persistence of these  wounds.  Will see him back 5 to 7 days after his CTA to go over the results and show him the images and make her decisions.  In the meantime with his bruit and history of TIAs we will check his carotids as well.  At this juncture if his wounds get worse he will give us call earlier.  He will stay away from compression to the area as I do not believe this is venous in nature at this time.  They will continue to use the SilvaSorb dressings from the wound care center on a regular basis.  Will see him back for CTA results and carotid scan and make a decision with him at that time.    I discussed the plan with the patient who is agreeable to the plan of care at this point. Thank you for this consult.   Follow Up  Return in about 2 weeks (around 6/6/2024).    Pasha Garrison MD   05/23/24

## 2024-05-27 ENCOUNTER — PATIENT MESSAGE (OUTPATIENT)
Dept: INTERNAL MEDICINE | Age: 79
End: 2024-05-27
Payer: MEDICARE

## 2024-05-27 DIAGNOSIS — E11.65 CONTROLLED TYPE 2 DIABETES MELLITUS WITH HYPERGLYCEMIA, WITHOUT LONG-TERM CURRENT USE OF INSULIN: ICD-10-CM

## 2024-05-28 RX ORDER — LANCETS
1 EACH MISCELLANEOUS DAILY
Qty: 100 EACH | Refills: 3 | Status: SHIPPED | OUTPATIENT
Start: 2024-05-28

## 2024-05-29 ENCOUNTER — APPOINTMENT (OUTPATIENT)
Dept: WOUND CARE | Facility: HOSPITAL | Age: 79
End: 2024-05-29
Payer: MEDICARE

## 2024-06-12 ENCOUNTER — APPOINTMENT (OUTPATIENT)
Dept: WOUND CARE | Facility: HOSPITAL | Age: 79
End: 2024-06-12
Payer: MEDICARE

## 2024-06-12 PROCEDURE — G0463 HOSPITAL OUTPT CLINIC VISIT: HCPCS

## 2024-06-18 ENCOUNTER — HOSPITAL ENCOUNTER (OUTPATIENT)
Facility: HOSPITAL | Age: 79
Discharge: HOME | End: 2024-06-18
Admitting: SURGERY
Payer: MEDICARE

## 2024-06-18 DIAGNOSIS — S81.801A OPEN WOUND OF RIGHT LOWER LEG, INITIAL ENCOUNTER: ICD-10-CM

## 2024-06-18 DIAGNOSIS — I70.232 ATHEROSCLEROSIS OF NATIVE ARTERY OF RIGHT LOWER EXTREMITY WITH ULCERATION OF CALF: ICD-10-CM

## 2024-06-18 PROCEDURE — 25510000001 IOPAMIDOL PER 1 ML: Performed by: SURGERY

## 2024-06-18 PROCEDURE — 75635 CT ANGIO ABDOMINAL ARTERIES: CPT

## 2024-06-18 RX ADMIN — IOPAMIDOL 100 ML: 755 INJECTION, SOLUTION INTRAVENOUS at 14:32

## 2024-06-20 ENCOUNTER — OFFICE VISIT (OUTPATIENT)
Age: 79
End: 2024-06-20
Payer: MEDICARE

## 2024-06-20 ENCOUNTER — HOSPITAL ENCOUNTER (OUTPATIENT)
Facility: HOSPITAL | Age: 79
Discharge: HOME | End: 2024-06-20
Admitting: SURGERY
Payer: MEDICARE

## 2024-06-20 VITALS
SYSTOLIC BLOOD PRESSURE: 122 MMHG | DIASTOLIC BLOOD PRESSURE: 72 MMHG | WEIGHT: 170 LBS | HEIGHT: 70 IN | BODY MASS INDEX: 24.34 KG/M2

## 2024-06-20 DIAGNOSIS — I73.9 CLAUDICATION: Primary | ICD-10-CM

## 2024-06-20 DIAGNOSIS — G45.9 TIA (TRANSIENT ISCHEMIC ATTACK): Chronic | ICD-10-CM

## 2024-06-20 DIAGNOSIS — R09.89 BRUIT OF RIGHT CAROTID ARTERY: ICD-10-CM

## 2024-06-20 DIAGNOSIS — I65.23 BILATERAL CAROTID ARTERY STENOSIS: ICD-10-CM

## 2024-06-20 DIAGNOSIS — I70.235 ATHEROSCLEROSIS OF NATIVE ARTERY OF RIGHT LOWER EXTREMITY WITH ULCERATION OF OTHER PART OF FOOT: ICD-10-CM

## 2024-06-20 LAB
BH CV XLRA MEAS LEFT CAROTID BULB EDV: -9.4 CM/SEC
BH CV XLRA MEAS LEFT CAROTID BULB PSV: -45.1 CM/SEC
BH CV XLRA MEAS LEFT DIST CCA EDV: -18.8 CM/SEC
BH CV XLRA MEAS LEFT DIST CCA PSV: -79.7 CM/SEC
BH CV XLRA MEAS LEFT DIST ICA EDV: -26.4 CM/SEC
BH CV XLRA MEAS LEFT DIST ICA PSV: -101.4 CM/SEC
BH CV XLRA MEAS LEFT ICA/CCA RATIO: 1.27
BH CV XLRA MEAS LEFT MID CCA EDV: -19.3 CM/SEC
BH CV XLRA MEAS LEFT MID CCA PSV: -89.7 CM/SEC
BH CV XLRA MEAS LEFT MID ICA EDV: -24 CM/SEC
BH CV XLRA MEAS LEFT MID ICA PSV: -81.5 CM/SEC
BH CV XLRA MEAS LEFT PROX CCA EDV: 16.4 CM/SEC
BH CV XLRA MEAS LEFT PROX CCA PSV: 95.6 CM/SEC
BH CV XLRA MEAS LEFT PROX ECA EDV: 10.6 CM/SEC
BH CV XLRA MEAS LEFT PROX ECA PSV: 69.2 CM/SEC
BH CV XLRA MEAS LEFT PROX ICA EDV: -25.8 CM/SEC
BH CV XLRA MEAS LEFT PROX ICA PSV: -97.3 CM/SEC
BH CV XLRA MEAS LEFT PROX SCLA PSV: 165 CM/SEC
BH CV XLRA MEAS LEFT VERTEBRAL A EDV: 15.2 CM/SEC
BH CV XLRA MEAS LEFT VERTEBRAL A PSV: 61.6 CM/SEC
BH CV XLRA MEAS RIGHT CAROTID BULB EDV: -14.9 CM/SEC
BH CV XLRA MEAS RIGHT CAROTID BULB PSV: -48.7 CM/SEC
BH CV XLRA MEAS RIGHT DIST CCA EDV: 19.9 CM/SEC
BH CV XLRA MEAS RIGHT DIST CCA PSV: 73.9 CM/SEC
BH CV XLRA MEAS RIGHT DIST ICA EDV: -31.4 CM/SEC
BH CV XLRA MEAS RIGHT DIST ICA PSV: -139.1 CM/SEC
BH CV XLRA MEAS RIGHT ICA/CCA RATIO: 1.96
BH CV XLRA MEAS RIGHT MID CCA EDV: 14.7 CM/SEC
BH CV XLRA MEAS RIGHT MID CCA PSV: 81.5 CM/SEC
BH CV XLRA MEAS RIGHT MID ICA EDV: -27.5 CM/SEC
BH CV XLRA MEAS RIGHT MID ICA PSV: -144.6 CM/SEC
BH CV XLRA MEAS RIGHT PROX CCA EDV: 13.5 CM/SEC
BH CV XLRA MEAS RIGHT PROX CCA PSV: 85.6 CM/SEC
BH CV XLRA MEAS RIGHT PROX ECA EDV: -19.9 CM/SEC
BH CV XLRA MEAS RIGHT PROX ECA PSV: -96.2 CM/SEC
BH CV XLRA MEAS RIGHT PROX ICA EDV: -12.2 CM/SEC
BH CV XLRA MEAS RIGHT PROX ICA PSV: -53 CM/SEC
BH CV XLRA MEAS RIGHT PROX SCLA PSV: 65.1 CM/SEC
BH CV XLRA MEAS RIGHT VERTEBRAL A EDV: 19.3 CM/SEC
BH CV XLRA MEAS RIGHT VERTEBRAL A PSV: 78 CM/SEC
LEFT ARM BP: NORMAL MMHG
RIGHT ARM BP: NORMAL MMHG

## 2024-06-20 PROCEDURE — 93880 EXTRACRANIAL BILAT STUDY: CPT

## 2024-06-20 RX ORDER — CILOSTAZOL 100 MG/1
100 TABLET ORAL 2 TIMES DAILY
Qty: 60 TABLET | Refills: 6 | Status: SHIPPED | OUTPATIENT
Start: 2024-06-20 | End: 2024-06-24 | Stop reason: SDUPTHER

## 2024-06-20 NOTE — PROGRESS NOTES
Patient Name: Aj Salazar    MRN: [id number] Encounter Date: 05/23/2024      Consulting Service: Vascular Surgery    Referring Provider: No ref. provider found       CHIEF COMPLAINT:  Chief Complaint   Patient presents with    Carotid Artery Disease     Follow up for CTD - CTA with runoff at [facility] 6/18/24       Subjective    HPI: Aj Salazar is a 78 y.o. male is being seen for evaluation/management of persistent right leg ulceration.  He is successfully healed 1 ulcer but is recurred and new 1 just from tape burns that have opened up an area.  Unfortunately when I took his dressing off I also cause a little skin injury and hope this does not turn into a problem.  I am not able to palpate any pulses foot level.  He has no breakdown of his toes or foot but the concern here is whether or not his peripheral vascular disease could be playing a role.  He does not claudicate very much because his pulmonary status and COPD keep him from walking distance.  He reports some nocturnal leg cramps but no absolute rest pain.  He has a history of TIA and has had his carotid checked in the distant past but not followed regularly.    6/20/2024: As above I reviewed this part of the note and have now rediscussed the situation with him in light of the CT angiogram which we have reviewed with images reviewed I also went back over his prior studies.  At this point in time his wounds have healed through the excellent care of the wound care center he is not in rest pain but he is claudicating still at 1 block which is no better or no worse than he was.  He has been walking but he has not pushed himself is much as I believe he could and he even stated that he did not give it is much for his he should have.  At this point in time he needs to walk at least 4 blocks a day into the pain each time.  He is willing to trial this.  Unfortunately for him he does not have a good after option for endovenous ablation with some significant  atherosclerotic disease in the common femoral and then occluded SFA from its origin to the above-knee popliteal all crossing this is possible the durability of intervention here is poor and bypass would probably be a better option but obviously doing it just for claudication and not for wounds that are now healed seems like a bigger step and he is willing to take and I understand.  He is going to trial Pletal and a walking protocol instead.    PAST MEDICAL HISTORY:   Past Medical History:   Diagnosis Date    Alcohol abuse, in remission     Asthma copd    BPH (benign prostatic hypertrophy)     see doctors at [facility]    Cataract     CHF (congestive heart failure)     COPD (chronic obstructive pulmonary disease)     Depression     DJD (degenerative joint disease) of cervical spine     ED (erectile dysfunction)     SEES DOCTOR AT [facility]    GERD (gastroesophageal reflux disease)     Glaucoma     History of prediabetes     Hx of colonic polyps     followed by GI ([facility])    Hyperlipidemia     Hypertension     Influenza B 02/14/2013        Low back pain     Nocturnal hypoxia     Osteoarthritis     HIPS, HANDS, MULTIPLE SITES    Osteoarthritis 03/17/2016    Peripheral neuropathy     Permanent atrial fibrillation     Pneumonia     Pneumonia due to infectious organism 04/23/2023    Rectal bleeding 04/26/2017    Tendonitis of shoulder 11/07/2007    RIGHT SHOULDER/DELTOID INSERTION    TIA (transient ischemic attack) 10/2020    Ulcer of the stomach and intestine       PAST SURGICAL HISTORY:   Past Surgical History:   Procedure Laterality Date    APPENDECTOMY      CARDIOVASCULAR STRESS TEST N/A 10/20/2015    NML LEXISCAN CARDIOLITE PERFUSION STUDY, NO EVIDENCE OF ISCHEMIA, AREA OF HYPOPERFUSION OF THE INFERIOR WALL W/NORMAL WALL PERFUSION AND NML LEFT VENTRICULAR EJECTION FRACTION, MOST LIKELY ATTENUATION. DR.MICHAEL BOX    CATARACT EXTRACTION Bilateral 02/2023    COLONOSCOPY N/A 02/2017    COLONOSCOPY N/A  2011    4 POLYPS REMOVED, RESCOPE IN 5 YRS, DR. EAGLE    COLONOSCOPY N/A 2006    ERYTHEMOUS AND GRANULAR MUCOSA IN ILEOCECAL VALVE, NORMAL COLON, REPEAT IN 5 YRS,     ENDOSCOPY N/A 2018    normal esophagus, acute gastritis, multiple non-bleeding duodenal ulcers with pigmented material, H Pylori positive    HERNIA REPAIR Bilateral     INGUINAL    JOINT REPLACEMENT  2015    left hip    TOTAL HIP ARTHROPLASTY Left 2015    Dr Ankush Sky    TOTAL HIP ARTHROPLASTY Right 10/27/2014    DR.RIED SKY    VASECTOMY        FAMILY HISTORY:   Family History   Problem Relation Age of Onset    Cancer Mother         Bladder cancer    Colon cancer Mother     Ovarian cancer Mother     Alzheimer's disease Mother 70    Hyperlipidemia Father     Heart disease Father     Coronary artery disease Father     Hypertension Father     Stroke Sister         2016    Dementia Sister     Heart disease Brother     Alcohol abuse Brother     Heart disease Brother     Coronary artery disease Brother     Hypertension Brother     Stroke Brother     Arthritis Brother     Colon cancer Maternal Grandmother     Prostate cancer Neg Hx       SOCIAL HISTORY:   Social History     Tobacco Use    Smoking status: Former     Current packs/day: 0.00     Average packs/day: 2.0 packs/day for 49.0 years (98.0 ttl pk-yrs)     Types: Cigarettes     Start date: 1961     Quit date: 2010     Years since quittin.4    Smokeless tobacco: Never    Tobacco comments:     long smoking history   Vaping Use    Vaping status: Never Used   Substance Use Topics    Alcohol use: No     Comment: 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; 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    Drug use: No     Comment: caffeine use       MEDICATIONS:   Current Outpatient Medications on File Prior to Visit   Medication Sig Dispense Refill    Accu-Chek Softclix Lancets lancets 1 each by Other route Daily. Check FBS once daily . E11.51 100 each 3    ALPHAGAN P 0.1 %  solution ophthalmic solution Administer 1 drop to both eyes 2 (two) times a day.  6    atorvastatin (LIPITOR) 80 MG tablet TAKE 1 TABLET EVERY NIGHT 90 tablet 1    bumetanide (BUMEX) 1 MG tablet bumetanide 1 mg tablet   TAKE 1 TABLET BY MOUTH EVERY DAY      clopidogrel (PLAVIX) 75 MG tablet TAKE 1 TABLET EVERY DAY 90 tablet 1    dabigatran etexilate (Pradaxa) 150 MG capsu Take 1 capsule by mouth 2 (Two) Times a Day. 60 capsule 6    Dupixent 300 MG/2ML solution prefilled syringe       Fluticasone-Umeclidin-Vilant (Trelegy Ellipta) 200-62.5-25 MCG/ACT aerosol powder  Inhale.      glucose blood test strip Check FBS once daily .DM2 /E11.51 100 each 3    Jardiance 10 MG tablet tablet 0 Refill(s)      metFORMIN ER (GLUCOPHAGE-XR) 500 MG 24 hr tablet TAKE 1 TABLET BY MOUTH DAILY WITH DINNER 90 tablet 1    metoprolol succinate XL (TOPROL-XL) 100 MG 24 hr tablet Take 1 tablet by mouth Every Night. Take 100 mg at night and 50 mg in the morning 30 tablet 0    metoprolol succinate XL (TOPROL-XL) 50 MG 24 hr tablet Take 1 tablet by mouth Daily. Take 50 mg every morning and 100 mg every night 30 tablet 0    Myrbetriq 25 MG tablet sustained-release 24 hour 24 hr tablet       pantoprazole (PROTONIX) 40 MG EC tablet TAKE 1 TABLET TWICE DAILY 180 tablet 1    spironolactone (ALDACTONE) 25 MG tablet Take 1 tablet by mouth Daily. 30 tablet 0    Tafluprost, PF, (ZIOPTAN) 0.0015 % solution ophthalmic solution Administer 1 drop to both eyes Every Night.      tamsulosin (FLOMAX) 0.4 MG capsule 24 hr capsule Take 2 capsules by mouth Every Night. 30 capsule     COVID-19 mRNA Vac-Vanessa,Pf[employer], 30 MCG/0.3ML suspension prefilled syringe prefilled syringe Inject  into the appropriate muscle as directed by prescriber. 0.3 mL 0    glucose blood test strip Accu-Chek Dannielle Plus Meter   USE TO CHECK GLUCOSE QOD       No current facility-administered medications on file prior to visit.       ALLERGIES: Bactrim [sulfamethoxazole-trimethoprim] and  "Sulfacetamide       Objective   Vitals:    06/20/24 1618 06/20/24 1620   BP: 120/76 122/72   BP Location: Right arm Left arm   Patient Position: Sitting Sitting   Cuff Size: Small Adult Small Adult   Weight: 77.1 kg (170 lb) 77.1 kg (170 lb)   Height: 177.8 cm (70\") 177.8 cm (70\")     Body mass index is 24.39 kg/m².          PHYSICAL EXAM:   Physical Exam  Constitutional:       Appearance: Normal appearance. He is normal weight.   HENT:      Head: Normocephalic and atraumatic.      Nose: Nose normal.   Eyes:      Extraocular Movements: Extraocular movements intact.      Pupils: Pupils are equal, round, and reactive to light.   Cardiovascular:      Rate and Rhythm: Normal rate.      Pulses:           Carotid pulses are 2+ on the right side with bruit and 2+ on the left side.       Radial pulses are 2+ on the right side and 2+ on the left side.        Femoral pulses are 1+ on the right side and 2+ on the left side.       Popliteal pulses are 0 on the right side and 2+ on the left side.        Dorsalis pedis pulses are detected w/ Doppler on the right side and 2+ on the left side.        Posterior tibial pulses are detected w/ Doppler on the right side and 2+ on the left side.      Heart sounds: Normal heart sounds.      Comments: Right lateral leg wound ulceration  Pulmonary:      Effort: Pulmonary effort is normal.      Breath sounds: Normal breath sounds.   Abdominal:      General: Abdomen is flat. Bowel sounds are normal.      Palpations: Abdomen is soft.   Musculoskeletal:         General: Normal range of motion.      Cervical back: Normal range of motion and neck supple.      Right lower leg: No edema.      Left lower leg: No edema.   Skin:     General: Skin is warm and dry.   Neurological:      General: No focal deficit present.      Mental Status: He is alert and oriented to person, place, and time. Mental status is at baseline.   Psychiatric:         Mood and Affect: Mood normal.         Thought Content: " Thought content normal.          Result Review   LABS:      Results Review:       I reviewed the patient's new clinical results.    The following radiologic or non-invasive studies have been reviewed by me: Lower extremity arterial testing with images reviewed by myself and by the patient  No radiology results for the last 30 days.                ASSESSMENT/PLAN:   Diagnoses and all orders for this visit:    1. Claudication (Primary)  -     Doppler Ankle Brachial Index Single Level CAR; Future    2. Atherosclerosis of native artery of right lower extremity with ulceration of other part of foot  -     Doppler Ankle Brachial Index Single Level CAR; Future  -     Duplex Vein Mapping Study Lower Extremity - Right CAR; Future    3. Bilateral carotid artery stenosis    Other orders  -     cilostazol (PLETAL) 100 MG tablet; Take 1 tablet by mouth 2 (Two) Times a Day.  Dispense: 60 tablet; Refill: 6         78 y.o. male with peripheral vascular disease in the right leg with ROXANA of 0.59 and toe pressures up at 0.39.  CTA does not show an easy endovascular fix.  He would likely need femoral-popliteal bypass and as such she is getting given a more effort on his walking protocol.  If this fails or things get worse or he has new ulcers performed and we will need to pursue bypass as such we will get a repeat repeat his ABIs and also check a vein mapping for bypass of his right leg when he is back in 3 months.  I written him for Pletal and he is going to trial this.  We did check his carotid scan last year and he is less than 50% stenosis bilaterally and these can be followed over time.  At this juncture we will see him back in 3 months for his ABIs and check on his walking and hopefully his could be going a lot further than 1 block at a time and his ABIs will hopefully be from 59% to more moderate range.  Will let you know when he is then.      I discussed the plan with the patient who is agreeable to the plan of care at this  point. Thank you for this consult.   Follow Up  Return in about 3 months (around 9/20/2024).    Pasha Garrison MD   06/20/24

## 2024-06-23 DIAGNOSIS — E11.9 TYPE 2 DIABETES MELLITUS WITHOUT COMPLICATION, WITHOUT LONG-TERM CURRENT USE OF INSULIN: ICD-10-CM

## 2024-06-24 ENCOUNTER — TELEPHONE (OUTPATIENT)
Age: 79
End: 2024-06-24
Payer: MEDICARE

## 2024-06-24 RX ORDER — METFORMIN HYDROCHLORIDE 500 MG/1
500 TABLET, EXTENDED RELEASE ORAL EVERY EVENING
Qty: 90 TABLET | Refills: 1 | Status: SHIPPED | OUTPATIENT
Start: 2024-06-24

## 2024-06-24 RX ORDER — CILOSTAZOL 100 MG/1
100 TABLET ORAL 2 TIMES DAILY
Qty: 60 TABLET | Refills: 6 | Status: SHIPPED | OUTPATIENT
Start: 2024-06-24

## 2024-06-24 NOTE — TELEPHONE ENCOUNTER
Pt stated that the RX that Garrison sent in on 06.20.24 for Cilostazol 100mg was never received by the pharmacy. Pt is requesting that it be sent to [address] [facility]. Pt is also wanting a call back whenever the medication is resent.

## 2024-06-25 ENCOUNTER — HOSPITAL ENCOUNTER (OUTPATIENT)
Dept: PET IMAGING | Facility: HOSPITAL | Age: 79
Discharge: HOME | End: 2024-06-25
Admitting: INTERNAL MEDICINE
Payer: MEDICARE

## 2024-06-25 DIAGNOSIS — R91.8 PULMONARY NODULES/LESIONS, MULTIPLE: ICD-10-CM

## 2024-06-25 PROCEDURE — 71250 CT THORAX DX C-: CPT

## 2024-06-28 ENCOUNTER — OFFICE VISIT (OUTPATIENT)
Dept: ONCOLOGY | Facility: CLINIC | Age: 79
End: 2024-06-28
Payer: MEDICARE

## 2024-06-28 ENCOUNTER — LAB (OUTPATIENT)
Dept: LAB | Facility: HOSPITAL | Age: 79
End: 2024-06-28
Payer: MEDICARE

## 2024-06-28 VITALS
BODY MASS INDEX: 24.08 KG/M2 | SYSTOLIC BLOOD PRESSURE: 124 MMHG | WEIGHT: 167.8 LBS | RESPIRATION RATE: 19 BRPM | HEART RATE: 117 BPM | OXYGEN SATURATION: 95 % | DIASTOLIC BLOOD PRESSURE: 67 MMHG | TEMPERATURE: 98.9 F

## 2024-06-28 DIAGNOSIS — R91.8 PULMONARY NODULES/LESIONS, MULTIPLE: ICD-10-CM

## 2024-06-28 DIAGNOSIS — R91.8 PULMONARY NODULES/LESIONS, MULTIPLE: Primary | ICD-10-CM

## 2024-06-28 LAB
BASOPHILS # BLD AUTO: 0.06 10*3/MM3 (ref 0–0.2)
BASOPHILS NFR BLD AUTO: 0.7 % (ref 0–1.5)
DEPRECATED RDW RBC AUTO: 49.6 FL (ref 37–54)
EOSINOPHIL # BLD AUTO: 0.28 10*3/MM3 (ref 0–0.4)
EOSINOPHIL NFR BLD AUTO: 3.1 % (ref 0.3–6.2)
ERYTHROCYTE [DISTWIDTH] IN BLOOD BY AUTOMATED COUNT: 16.7 % (ref 12.3–15.4)
HCT VFR BLD AUTO: 45.4 % (ref 37.5–51)
HGB BLD-MCNC: 14.6 G/DL (ref 13–17.7)
IMM GRANULOCYTES # BLD AUTO: 0.06 10*3/MM3 (ref 0–0.05)
IMM GRANULOCYTES NFR BLD AUTO: 0.7 % (ref 0–0.5)
LYMPHOCYTES # BLD AUTO: 1.69 10*3/MM3 (ref 0.7–3.1)
LYMPHOCYTES NFR BLD AUTO: 18.7 % (ref 19.6–45.3)
MCH RBC QN AUTO: 26.7 PG (ref 26.6–33)
MCHC RBC AUTO-ENTMCNC: 32.2 G/DL (ref 31.5–35.7)
MCV RBC AUTO: 83.2 FL (ref 79–97)
MONOCYTES # BLD AUTO: 0.94 10*3/MM3 (ref 0.1–0.9)
MONOCYTES NFR BLD AUTO: 10.4 % (ref 5–12)
NEUTROPHILS NFR BLD AUTO: 6.02 10*3/MM3 (ref 1.7–7)
NEUTROPHILS NFR BLD AUTO: 66.4 % (ref 42.7–76)
NRBC BLD AUTO-RTO: 0 /100 WBC (ref 0–0.2)
PLATELET # BLD AUTO: 287 10*3/MM3 (ref 140–450)
PMV BLD AUTO: 9.1 FL (ref 6–12)
RBC # BLD AUTO: 5.46 10*6/MM3 (ref 4.14–5.8)
WBC NRBC COR # BLD AUTO: 9.05 10*3/MM3 (ref 3.4–10.8)

## 2024-06-28 PROCEDURE — 36415 COLL VENOUS BLD VENIPUNCTURE: CPT

## 2024-06-28 PROCEDURE — 85025 COMPLETE CBC W/AUTO DIFF WBC: CPT

## 2024-06-28 NOTE — PROGRESS NOTES
.     REASON FOR FOLLOWUP:     Provide an opinion on any further workup or treatment of enlarging pulmonary nodules.                               HISTORY OF PRESENT ILLNESS:  The patient is a 78 y.o. year old male with hypertension, type 2 diabetes, coronary artery disease, benign prostate hypertrophy, who presented today for follow-up evaluation.    History of Present Illness  The patient presents for evaluation of pulmonary nodule after his chest CT scan on 6/25/2024.    The patient denies experiencing any cough, recent infections, or bronchitis. He acknowledges a slower pace in his daily activities, although he maintains a shorter pace during ambulation. He denies any weight loss. The patient acknowledges his baseline condition, however, he experiences exertional dyspnea. He denies any recent upper respiratory infections, cough, hemoptysis, fever, or other symptoms.    Results  Imaging  CT scan examination on 6/25/2024 reported resolution of the previous documented right upper lobe posterior pleural based pulmonary nodule, however, in the right upper lobe now there is new cluster of small pulmonary nodules.    Laboratory study today on 6/28/2024 reported normal CBC.      Past Medical History:   Diagnosis Date    Alcohol abuse, in remission     Asthma copd    BPH (benign prostatic hypertrophy)     see doctors at [facility]    Cataract     CHF (congestive heart failure)     COPD (chronic obstructive pulmonary disease)     Depression     DJD (degenerative joint disease) of cervical spine     ED (erectile dysfunction)     [facility]    GERD (gastroesophageal reflux disease)     Glaucoma     History of prediabetes     Hx of colonic polyps     followed by GI (K[facility])    Hyperlipidemia     Hypertension     Influenza B 02/14/2013        Low back pain     Nocturnal hypoxia     Osteoarthritis     HIPS, HANDS, MULTIPLE SITES    Osteoarthritis 03/17/2016    Peripheral neuropathy     Permanent atrial  fibrillation     Pneumonia     Pneumonia due to infectious organism 04/23/2023    Rectal bleeding 04/26/2017    Tendonitis of shoulder 11/07/2007    RIGHT SHOULDER/DELTOID INSERTION    TIA (transient ischemic attack) 10/2020    Ulcer of the stomach and intestine      Past Surgical History:   Procedure Laterality Date    APPENDECTOMY      CARDIOVASCULAR STRESS TEST N/A 10/20/2015    NML LEXISCAN CARDIOLITE PERFUSION STUDY, NO EVIDENCE OF ISCHEMIA, AREA OF HYPOPERFUSION OF THE INFERIOR WALL W/NORMAL WALL PERFUSION AND NML LEFT VENTRICULAR EJECTION FRACTION, MOST LIKELY ATTENUATION. DR.MICHAEL BOX    CATARACT EXTRACTION Bilateral 02/2023    COLONOSCOPY N/A 02/2017    COLONOSCOPY N/A 02/23/2011    4 POLYPS REMOVED, RESCOPE IN 5 YRS, DR. EAGLE    COLONOSCOPY N/A 03/08/2006    ERYTHEMOUS AND GRANULAR MUCOSA IN ILEOCECAL VALVE, NORMAL COLON, REPEAT IN 5 YRS,     ENDOSCOPY N/A 09/26/2018    normal esophagus, acute gastritis, multiple non-bleeding duodenal ulcers with pigmented material, H Pylori positive    HERNIA REPAIR Bilateral     INGUINAL    JOINT REPLACEMENT  11/2015    left hip    TOTAL HIP ARTHROPLASTY Left 11/06/2015    Dr Ankush Sky    TOTAL HIP ARTHROPLASTY Right 10/27/2014    DR.RIED SKY    VASECTOMY       MEDICAL ONCOLOGY HISTORY: The patient is a 77 y.o. year old male who presented on 2/7/2023 to the multimodality lung clinic at the [facility] for initial evaluation because of worsening pulmonary nodules. The patient is accompanied by his wife today.     This patient has history of cigarette smoking for more than 50 years, 2 packs per day and he quit about 15 years ago. The patient reports dry cough. He also has COPD for more than 5 years, he has exertional dyspnea. He reports that since last month he has become more short of breath, however denies hemoptysis.  He denies weight loss, no headaches or vision changes.     The patient  reports he is followed by pulmonologist Tahir Childs MD, and he has a scheduled PFT examination soon.     The patient reports he is diabetic, and has been on metformin with controlled glucose level. The patient also reports he has atrial fibrillation for quite some time, maybe started 8 years ago when he had hip surgery. He is on Plavix. He is not on any other blood thinner as I reviewed medicines. The patient is followed by Cardiology, Juan Colby MD. The patient also reports chronic trace edema in legs.     This patient had low dose CT scan for the chest due to risk for lung cancer with significant history of cigarette smoking. This study was done on 01/27/2023, and reported right upper lobe newly developed suspicious stellate 10 mm nodule. There is also a right upper lobe base lung nodule along the major fissure 15 mm spiculated nodule. There is also grouping of 4 sub-6 mm pulmonary nodules within the right upper lobe, and also another group of 5 sub-6 mm nodules within the right lower lobe. There was also benign calcified granuloma in the right lower lobe. Within the left upper lobe, there is a 6 mm pulmonary nodule.       I reviewed his chest CT scan images with Thoracic Surgeon, Eduardo Mcmullen MD, and we recommended to obtain PET scan examination for further evaluation because those lesions are highly suspicious for lung cancer. If those lesions are hypermetabolic, we will try to purse CT guided core needle biopsy. However I discussed with the patient and his wife that the nodules are relatively small, and is not immediately close to chest wall. It will be difficult to biopsy and has high risk for pneumothorax because this patient has evidence of emphysema on CT scan of the chest.      Patient had PET scan examination on 2/15/2023 reported photopenic pulmonary nodules except a newly developed hypermetabolic lesion 1.8 cm with SUV 3.1 cm at the lingular lobe.    I presented his case at the multimodality  conference on 2/16/2023, and we suspected the hypermetabolic lesion is infectious.  A conference recommended to treat him with antibiotics and then reassess with a chest CT scan in 5 to 6 weeks.  We started patient on Augmentin 875 mg twice a day for 10 days on 2/16/2023.    Today on 2/24/2023 patient reports no fever sweating chills.  No chest pains.  No cough no hemoptysis.     follow-up evaluation, after his chest CT scan examination on 03/29/2023.     The study reported a new irregular peripheral mass-like consolidation in the posterolateral right upper lobe measuring 2.1 x 2.0 cm. A 0.7 cm nodular opacity in the right upper lobe is less conspicuous compared to that from 02/15/2023 and has decreased in size from 01/27/2023. It was 1.1 cm. The previously noted focal consolidation in the posterior right upper lobe is smaller and less conspicuous. There was also a 0.4 cm solid nodule in the posterolateral right middle lobe which is also new. A 1.4 cm irregular ground-glass nodular opacity in the anterior left upper lobe has decreased in size and density compared to that from 02/15/2023. Previously was 1.8 cm.      The CT scan examination on 6/30/2023 reported interval resolution of the previous described area of mass-like consolidation in the right upper lobe as well as a previously described 4 mm nodule in the right middle lobe. However, 3 new irregular nodules are noted up to 1.8 cm, 1 in the right middle lobe and 2 in the superior segment of the left lower lobe.    The patient reports he has no productive cough, no hemoptysis.  Denies a fever sweating chills.  He does use CPAP machine at night. He has COPD/emphysema and sleep apnea.    Laboratory study today on 7/7/2023 reports slightly trending down hemoglobin 13.3 g/dL, normal WBC and the platelets.    The patient had a CT scan on 12/15/2023 and the study reported a new irregular density in the right upper lobe measuring about 2 cm. There is also a new small  ground glass opacity in the anterior right upper lobe. Previously noted right middle lobe opacity has resolved. There is also a new tiny nodular density in the left apex. There is a new 10 mm nodular density with an air bronchogram in the left upper lobe. Also, a small new ground glass opacity in the left upper lobe and the 7 mm left lower lobe nodule has resolved. There was stable emphysema and a stable mediastinal and hilar lymph node. Radiologist commented that the new findings are favored to be infectious/inflammatory, however, follow up CT scan in 3 months recommended.    Laboratory study today on 12/22/2023 reported normal WBC 7,370, neutrophils 4,710, lymphocytes 1,700, hemoglobin 13.9 g/dL and platelets 244,000. Chemistry labor is pending. Last BMP was on 11/16/2023 reported glucose 119, otherwise unremarkable.      MEDICATIONS    Current Outpatient Medications:     Accu-Chek Softclix Lancets lancets, 1 each by Other route Daily. Check FBS once daily . E11.51, Disp: 100 each, Rfl: 3    ALPHAGAN P 0.1 % solution ophthalmic solution, Administer 1 drop to both eyes 2 (two) times a day., Disp: , Rfl: 6    atorvastatin (LIPITOR) 80 MG tablet, TAKE 1 TABLET EVERY NIGHT, Disp: 90 tablet, Rfl: 1    bumetanide (BUMEX) 1 MG tablet, bumetanide 1 mg tablet  TAKE 1 TABLET BY MOUTH EVERY DAY, Disp: , Rfl:     cilostazol (PLETAL) 100 MG tablet, Take 1 tablet by mouth 2 (Two) Times a Day., Disp: 60 tablet, Rfl: 6    clopidogrel (PLAVIX) 75 MG tablet, TAKE 1 TABLET EVERY DAY, Disp: 90 tablet, Rfl: 1    dabigatran etexilate (Pradaxa) 150 MG capsu, Take 1 capsule by mouth 2 (Two) Times a Day., Disp: 60 capsule, Rfl: 6    Dupixent 300 MG/2ML solution prefilled syringe, , Disp: , Rfl:     Fluticasone-Umeclidin-Vilant (Trelegy Ellipta) 200-62.5-25 MCG/ACT aerosol powder , Inhale., Disp: , Rfl:     glucose blood test strip, Check FBS once daily .DM2 /E11.51, Disp: 100 each, Rfl: 3    Jardiance 10 MG tablet tablet, 0 Refill(s),  Disp: , Rfl:     metFORMIN ER (GLUCOPHAGE-XR) 500 MG 24 hr tablet, TAKE 1 TABLET EVERY DAY WITH DINNER, Disp: 90 tablet, Rfl: 1    metoprolol succinate XL (TOPROL-XL) 100 MG 24 hr tablet, Take 1 tablet by mouth Every Night. Take 100 mg at night and 50 mg in the morning, Disp: 30 tablet, Rfl: 0    metoprolol succinate XL (TOPROL-XL) 50 MG 24 hr tablet, Take 1 tablet by mouth Daily. Take 50 mg every morning and 100 mg every night, Disp: 30 tablet, Rfl: 0    Myrbetriq 25 MG tablet sustained-release 24 hour 24 hr tablet, , Disp: , Rfl:     pantoprazole (PROTONIX) 40 MG EC tablet, TAKE 1 TABLET TWICE DAILY, Disp: 180 tablet, Rfl: 1    spironolactone (ALDACTONE) 25 MG tablet, Take 1 tablet by mouth Daily., Disp: 30 tablet, Rfl: 0    Tafluprost, PF, (ZIOPTAN) 0.0015 % solution ophthalmic solution, Administer 1 drop to both eyes Every Night., Disp: , Rfl:     tamsulosin (FLOMAX) 0.4 MG capsule 24 hr capsule, Take 2 capsules by mouth Every Night., Disp: 30 capsule, Rfl:     COVID-19 mRNA Vac-Vanessa,Pfizer, 30 MCG/0.3ML suspension prefilled syringe prefilled syringe, Inject  into the appropriate muscle as directed by prescriber., Disp: 0.3 mL, Rfl: 0    glucose blood test strip, Accu-Chek Dannielle Plus Meter  USE TO CHECK GLUCOSE QOD, Disp: , Rfl:     ALLERGIES:     Allergies   Allergen Reactions    Bactrim [Sulfamethoxazole-Trimethoprim] Rash    Sulfacetamide Rash       SOCIAL HISTORY:       Social History     Socioeconomic History    Marital status:      Spouse name: Ara*    Number of children: 3   Tobacco Use    Smoking status: Former     Current packs/day: 0.00     Average packs/day: 2.0 packs/day for 49.0 years (98.0 ttl pk-yrs)     Types: Cigarettes     Start date: 1961     Quit date: 2010     Years since quittin.4    Smokeless tobacco: Never    Tobacco comments:     long smoking history   Vaping Use    Vaping status: Never Used   Substance and Sexual Activity    Alcohol use: No     Comment: stopped  drinking >20 yrs ago; 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    Drug use: No     Comment: caffeine use     Sexual activity: Defer         FAMILY HISTORY:  Family History   Problem Relation Age of Onset    Cancer Mother         Bladder cancer    Colon cancer Mother     Ovarian cancer Mother     Alzheimer's disease Mother 70    Hyperlipidemia Father     Heart disease Father     Coronary artery disease Father     Hypertension Father     Stroke Sister         October 2016    Dementia Sister     Heart disease Brother     Alcohol abuse Brother     Heart disease Brother     Coronary artery disease Brother     Hypertension Brother     Stroke Brother     Arthritis Brother     Colon cancer Maternal Grandmother     Prostate cancer Neg Hx        REVIEW OF SYSTEMS:  Review of Systems see HPI           Vitals:    06/28/24 0954   BP: 124/67   Pulse: 117   Resp: 19   Temp: 98.9 °F (37.2 °C)   SpO2: 95%   Weight: 76.1 kg (167 lb 12.8 oz)   PainSc: 0-No pain         4/5/2024     9:28 AM   Current Status   ECOG score 0     Physical Exam      GENERAL:  Well-developed, well-nourished elderly gentleman in no acute distress.    SKIN:  Warm, dry.  HEENT:  Normocephalic.   LYMPHATICS:  No cervical, supraclavicular adenopathy.  CHEST: Normal respiratory effort.  Lungs clear to auscultation. Good airflow.  CARDIAC:  Regular rate and rhythm. Normal S1,S2.  ABDOMEN:  Soft, nontender.  Bowel sounds normal.  EXTREMITIES:  No lower extremity edema.      RECENT LABS:  Lab Results   Component Value Date    WBC 9.05 06/28/2024    HGB 14.6 06/28/2024    HCT 45.4 06/28/2024    MCV 83.2 06/28/2024     06/28/2024     Lab Results   Component Value Date    NEUTROABS 6.02 06/28/2024     Lab Results   Component Value Date    GLUCOSE 119 (H) 04/05/2024    BUN 15 04/05/2024    CREATININE 1.02 04/05/2024    EGFRRESULT 89.4 10/24/2022    EGFR 75.7 04/05/2024    BCR 14.7 04/05/2024    K 3.7 04/05/2024    CO2 28.3 04/05/2024    CALCIUM 9.4 04/05/2024    PROTENTOTREF 6.1  10/24/2022    ALBUMIN 4.3 04/05/2024    BILITOT 1.1 04/05/2024    AST 24 04/05/2024    ALT 14 04/05/2024         IMAGING:  CT Chest Without Contrast Diagnostic  CT CHEST WO CONTRAST DIAGNOSTIC-     Radiation dose reduction techniques were utilized, including automated  exposure control and exposure modulation based on body size.     CLINICAL: Follow-up pulmonary nodules.     COMPARISON: CT scan of the chest dated 04/01/2024.     FINDINGS: The previously demonstrated pleural-based, mass-like area of  consolidation within the right lower lobe has considerably improved  within the interim with now only a vague area of groundglass opacity at  this location. Near complete resolving infectious/inflammatory process.  There is bleb and bullae formation with chronic pleural and parenchymal  change.     A wedge-shaped area of consolidation/partial collapse involving the  medial right middle lobe, is seen best on image #63.     A new stellate 2 cm focus of consolidation has developed within the  right upper lobe, see images 27 through 38. Epicenter on image #32. This  could be infectious/inflammatory or neoplastic. There are 2 satellite  nodules in the vicinity, on image #40, the smaller of the 2 was present  on the previous examination and not significantly changed, the other  measures 7-8 mm.     There is extensive bleb and bullae formation as before. No interval  change in the appearance of the left lung. Calcified benign granuloma  seen within the right lower lobe with associated calcified benign right  hilar lymph nodes. 2 stable noncalcified sub-6 mm right upper lobe  pulmonary nodules seen on image #32. There is a third in the vicinity,  unchanged, see image #29.     Cardiac size within normal limits, no pericardial abnormality. The  esophagus is satisfactory in course and caliber. There is coronary  artery calcification. There are small calcified and noncalcified  mediastinal and hilar lymph nodes as before.      CONCLUSION: There has been a mixed interval change. The pleural-based  nodular area of consolidation previously seen in the right lower lobe  has considerably improved with only nominal residual groundglass opacity  at this location. There is a new nodular focus of consolidation seen  within the right upper lobe. There are small stable sub-6 mm right upper  lobe pulmonary nodules also seen. There is a new wedge-shaped area of  consolidation/partial collapse within the medial right middle lobe.              This report was finalized on 6/27/2024 3:49 PM by Dr. Narciso Hooper M.D  on Workstation: [id number]         Assessment & Plan     Assessment & Plan      ASSESSMENT:     1.  Pulmonary nodules.   the patient has high risk for lung cancer with significant history of cigarette smoking about 100 pack year although he quit about 15 years ago.   Nevertheless the patient had screening chest CT scan on 01/27/2023 which reported 2 pulmonary nodules up to 15 mm, spiculated highly suspicious for lung cancer.   We recommended to have PET scan examination first and then could choose the site of biopsy. The patient is high risk for pneumothorax, however there is no other way to bypass the biopsy.   PET scan examination on 2/15/2023 reported photopenic pulmonary nodules except a newly developed hypermetabolic lesion 1.8 cm with SUV 3.1 cm at the lingular lobe.  His case was presented at the multimodality conference on 2/16/2023, and suspected the hypermetabolic lesion is infectious.  We started patient on Augmentin 875 mg twice a day for 10 days on 2/16/2023.  On 2/24/2023 patient reports no fever sweating chills.  No chest pains.  No cough no hemoptysis.  We recommended to have repeated chest CT scan in about 6 weeks for reassessment, as recommended by the multimodality conference.  Patient is agreeable.  On 3/29/2023 patient had a repeating chest CT scan examination.  I reviewed the images with the patient and his wife  today, 04/07/2023. Certainly, this right upper lobe consolidation is new, just developed in the past 6 to 8 weeks. He is symptomatic with cough and sweating, chills despite no fever. He does have some pleuritic chest pain also. I discussed with the patient and his wife, recommended to treat him as pneumonia with Augmentin for 10 days. I will obtain a CT scan in 3 months with IV contrast as recommended by radiologist. I do not think this is cancerous in nature, this is more of infection. Right now, there is no evidence for cancer recurrence.   CT scan examination on 6/30/2023 reported resolution of the previous documented small pulmonary nodules, however, now he has 3 new nodules up to 1.8 cm.  I reviewed the images with the patient and his wife, for both the new CT from 06/30/2023 and compared it to the previous CT scan from 03/29/2023. I explained to them that new pulmonary nodule most likely are inflammatory in nature, it was not there 3 months ago. In the meantime, the previous nodules including the largest one in the right upper lobe pleural based has resolved without a trace. I recommended to have repeating CT of the chest with IV contrast in 3 months for reassessment. Patient and his wife voiced understanding and agreeable.  The patient had a CT scan examination of the chest on 12/15/2023 and had multiple new pulmonary nodules/ground glass density, and also had some resolution of previous nodules/densities. I reviewed the images with the patient together today on 12/22/2023 and it is probably inflammatory in nature. I do recommend having a chest CT scan in 3 months for follow-up evaluation as recommended by the radiologist. Patient voiced understanding.    His chest CT scan with IV contrast obtained on 4/1/2024 reported resolution and improvement of previous pulmonary densities, however there is a new right upper lobe pleural-based masslike parenchymal density measuring 2.0 x 3.1 cm.  I discussed with the  patient on 4/5/2024 and recommended to repeat chest CT scan without contrast in 3 months for reassessment.    CT chest without contrast on 6/25/2024 reported resolution of the previous documented right upper lobe posterior pleural based pulmonary nodule, however, in the right upper lobe now there is new cluster of small pulmonary nodules.    The patient was informed today on 6/28/2024 that the new pulmonary nodules and resolution of the previous nodule are more indicative of inflammatory changes. Despite the absence of recent signs of infection such as fever, sweating, chills, or cough, it is unlikely that this is cancerous. A CT scan of the chest without contrast was recommended in 6 months for a follow-up evaluation.      2. COPD/emphysema.   The patient will continue to follow up with his pulmonologist, Dr. Childs.    3.  Hypertension-coronary artery disease.    /82 today.  Patient is on multiple medications.  We will continue follow-up with PCP for management.         PLAN:    Follow-up    Arrange chest CT scan without contrast in 6 months for reassessment of pulmonary nodules.    A follow-up appointment is scheduled for 1 week post-CT scan, during which laboratory studies, CBC and CMP will be conducted..  Patient will continue to follow up with pulmonologist, Dr. Childs.  Patient will continue follow-up with primary care physician for management of hypertension.      I discussed with the patient about laboratory results and further management plan.  Patient voiced understanding and agreeable.        Adarsh Rangel MD PhD       CC:   MD Eduardo Carrillo MD Cooper Rapp, MD Mark Esterle, M.D.

## 2024-07-03 ENCOUNTER — TELEPHONE (OUTPATIENT)
Dept: INTERNAL MEDICINE | Age: 79
End: 2024-07-03
Payer: MEDICARE

## 2024-07-03 DIAGNOSIS — E11.9 TYPE 2 DIABETES MELLITUS WITHOUT COMPLICATION, WITHOUT LONG-TERM CURRENT USE OF INSULIN: Primary | ICD-10-CM

## 2024-07-10 RX ORDER — LANCING DEVICE/LANCETS
KIT MISCELLANEOUS
Qty: 1 KIT | Refills: 2 | Status: SHIPPED | OUTPATIENT
Start: 2024-07-10

## 2024-07-10 RX ORDER — DIPHENHYD/PHENYLEPH/ACETAMINOP 25-650/30
LIQUID (ML) ORAL
Qty: 100 EACH | Refills: 1 | Status: CANCELLED | OUTPATIENT
Start: 2024-07-10

## 2024-07-10 RX ORDER — BLOOD SUGAR DIAGNOSTIC
STRIP MISCELLANEOUS
Qty: 100 EACH | Refills: 1 | Status: SHIPPED | OUTPATIENT
Start: 2024-07-10

## 2024-07-10 RX ORDER — BLOOD-GLUCOSE METER
1 EACH MISCELLANEOUS DAILY
Qty: 1 KIT | Refills: 0 | Status: SHIPPED | OUTPATIENT
Start: 2024-07-10

## 2024-07-12 RX ORDER — CILOSTAZOL 100 MG/1
100 TABLET ORAL 2 TIMES DAILY
Qty: 90 TABLET | Refills: 6 | Status: SHIPPED | OUTPATIENT
Start: 2024-07-12

## 2024-07-15 ENCOUNTER — PATIENT MESSAGE (OUTPATIENT)
Dept: INTERNAL MEDICINE | Age: 79
End: 2024-07-15
Payer: MEDICARE

## 2024-07-15 DIAGNOSIS — E11.65 CONTROLLED TYPE 2 DIABETES MELLITUS WITH HYPERGLYCEMIA, WITHOUT LONG-TERM CURRENT USE OF INSULIN: ICD-10-CM

## 2024-07-24 RX ORDER — LANCETS
1 EACH MISCELLANEOUS DAILY
Qty: 100 EACH | Refills: 3 | Status: SHIPPED | OUTPATIENT
Start: 2024-07-24

## 2024-07-24 NOTE — TELEPHONE ENCOUNTER
From: Aj Salazar  To: Neftali Amezcua  Sent: 7/15/2024 1:25 PM EDT  Subject: test strips and lancets    got a message from [facility] saying they need you to call for more info #[phone] i have no idea what the problem is thanks for your help

## 2024-07-25 ENCOUNTER — HOSPITAL ENCOUNTER (OUTPATIENT)
Facility: HOSPITAL | Age: 79
Setting detail: OBSERVATION
Discharge: HOME | End: 2024-07-26
Attending: EMERGENCY MEDICINE | Admitting: EMERGENCY MEDICINE
Payer: MEDICARE

## 2024-07-25 ENCOUNTER — APPOINTMENT (OUTPATIENT)
Dept: GENERAL RADIOLOGY | Facility: HOSPITAL | Age: 79
End: 2024-07-25
Payer: MEDICARE

## 2024-07-25 DIAGNOSIS — R07.9 CHEST PAIN, UNSPECIFIED TYPE: Primary | ICD-10-CM

## 2024-07-25 DIAGNOSIS — I48.91 ATRIAL FIBRILLATION, UNSPECIFIED TYPE: ICD-10-CM

## 2024-07-25 DIAGNOSIS — I65.23 BILATERAL CAROTID ARTERY STENOSIS: ICD-10-CM

## 2024-07-25 DIAGNOSIS — M79.602 LEFT ARM PAIN: ICD-10-CM

## 2024-07-25 LAB
ALBUMIN SERPL-MCNC: 4.1 G/DL (ref 3.5–5.2)
ALBUMIN/GLOB SERPL: 1.4 G/DL
ALP SERPL-CCNC: 119 U/L (ref 39–117)
ALT SERPL W P-5'-P-CCNC: 14 U/L (ref 1–41)
ANION GAP SERPL CALCULATED.3IONS-SCNC: 9 MMOL/L (ref 5–15)
AST SERPL-CCNC: 17 U/L (ref 1–40)
BASOPHILS # BLD AUTO: 0.03 10*3/MM3 (ref 0–0.2)
BASOPHILS NFR BLD AUTO: 0.4 % (ref 0–1.5)
BILIRUB SERPL-MCNC: 0.8 MG/DL (ref 0–1.2)
BUN SERPL-MCNC: 15 MG/DL (ref 8–23)
BUN/CREAT SERPL: 18.3 (ref 7–25)
CALCIUM SPEC-SCNC: 9.3 MG/DL (ref 8.6–10.5)
CHLORIDE SERPL-SCNC: 102 MMOL/L (ref 98–107)
CHOLEST SERPL-MCNC: 116 MG/DL (ref 0–200)
CO2 SERPL-SCNC: 26 MMOL/L (ref 22–29)
CREAT SERPL-MCNC: 0.82 MG/DL (ref 0.76–1.27)
DEPRECATED RDW RBC AUTO: 43.9 FL (ref 37–54)
EGFRCR SERPLBLD CKD-EPI 2021: 90.5 ML/MIN/1.73
EOSINOPHIL # BLD AUTO: 0.1 10*3/MM3 (ref 0–0.4)
EOSINOPHIL NFR BLD AUTO: 1.4 % (ref 0.3–6.2)
ERYTHROCYTE [DISTWIDTH] IN BLOOD BY AUTOMATED COUNT: 14.6 % (ref 12.3–15.4)
GEN 5 2HR TROPONIN T REFLEX: 25 NG/L
GLOBULIN UR ELPH-MCNC: 3 GM/DL
GLUCOSE BLDC GLUCOMTR-MCNC: 117 MG/DL (ref 70–130)
GLUCOSE BLDC GLUCOMTR-MCNC: 129 MG/DL (ref 70–130)
GLUCOSE SERPL-MCNC: 122 MG/DL (ref 65–99)
HCT VFR BLD AUTO: 43.4 % (ref 37.5–51)
HDLC SERPL-MCNC: 51 MG/DL (ref 40–60)
HGB BLD-MCNC: 13.8 G/DL (ref 13–17.7)
HOLD SPECIMEN: NORMAL
HOLD SPECIMEN: NORMAL
IMM GRANULOCYTES # BLD AUTO: 0.04 10*3/MM3 (ref 0–0.05)
IMM GRANULOCYTES NFR BLD AUTO: 0.5 % (ref 0–0.5)
LDLC SERPL CALC-MCNC: 51 MG/DL (ref 0–100)
LDLC/HDLC SERPL: 1.02 {RATIO}
LYMPHOCYTES # BLD AUTO: 1.6 10*3/MM3 (ref 0.7–3.1)
LYMPHOCYTES NFR BLD AUTO: 21.9 % (ref 19.6–45.3)
MCH RBC QN AUTO: 26 PG (ref 26.6–33)
MCHC RBC AUTO-ENTMCNC: 31.8 G/DL (ref 31.5–35.7)
MCV RBC AUTO: 81.7 FL (ref 79–97)
MONOCYTES # BLD AUTO: 0.68 10*3/MM3 (ref 0.1–0.9)
MONOCYTES NFR BLD AUTO: 9.3 % (ref 5–12)
NEUTROPHILS NFR BLD AUTO: 4.85 10*3/MM3 (ref 1.7–7)
NEUTROPHILS NFR BLD AUTO: 66.5 % (ref 42.7–76)
NRBC BLD AUTO-RTO: 0 /100 WBC (ref 0–0.2)
NT-PROBNP SERPL-MCNC: 318 PG/ML (ref 0–1800)
PLATELET # BLD AUTO: 306 10*3/MM3 (ref 140–450)
PMV BLD AUTO: 8.9 FL (ref 6–12)
POTASSIUM SERPL-SCNC: 4.1 MMOL/L (ref 3.5–5.2)
PROT SERPL-MCNC: 7.1 G/DL (ref 6–8.5)
RBC # BLD AUTO: 5.31 10*6/MM3 (ref 4.14–5.8)
SODIUM SERPL-SCNC: 137 MMOL/L (ref 136–145)
TRIGL SERPL-MCNC: 65 MG/DL (ref 0–150)
TROPONIN T DELTA: 2 NG/L
TROPONIN T SERPL HS-MCNC: 22 NG/L
TROPONIN T SERPL HS-MCNC: 23 NG/L
VLDLC SERPL-MCNC: 14 MG/DL (ref 5–40)
WBC NRBC COR # BLD AUTO: 7.3 10*3/MM3 (ref 3.4–10.8)
WHOLE BLOOD HOLD COAG: NORMAL
WHOLE BLOOD HOLD SPECIMEN: NORMAL

## 2024-07-25 PROCEDURE — 84484 ASSAY OF TROPONIN QUANT: CPT | Performed by: EMERGENCY MEDICINE

## 2024-07-25 PROCEDURE — 93005 ELECTROCARDIOGRAM TRACING: CPT

## 2024-07-25 PROCEDURE — 71045 X-RAY EXAM CHEST 1 VIEW: CPT

## 2024-07-25 PROCEDURE — 83880 ASSAY OF NATRIURETIC PEPTIDE: CPT | Performed by: EMERGENCY MEDICINE

## 2024-07-25 PROCEDURE — 80061 LIPID PANEL: CPT | Performed by: EMERGENCY MEDICINE

## 2024-07-25 PROCEDURE — G0378 HOSPITAL OBSERVATION PER HR: HCPCS

## 2024-07-25 PROCEDURE — 93010 ELECTROCARDIOGRAM REPORT: CPT | Performed by: INTERNAL MEDICINE

## 2024-07-25 PROCEDURE — 93005 ELECTROCARDIOGRAM TRACING: CPT | Performed by: EMERGENCY MEDICINE

## 2024-07-25 PROCEDURE — 99214 OFFICE O/P EST MOD 30 MIN: CPT | Performed by: INTERNAL MEDICINE

## 2024-07-25 PROCEDURE — 36415 COLL VENOUS BLD VENIPUNCTURE: CPT

## 2024-07-25 PROCEDURE — 80053 COMPREHEN METABOLIC PANEL: CPT | Performed by: EMERGENCY MEDICINE

## 2024-07-25 PROCEDURE — 85025 COMPLETE CBC W/AUTO DIFF WBC: CPT | Performed by: EMERGENCY MEDICINE

## 2024-07-25 PROCEDURE — 82948 REAGENT STRIP/BLOOD GLUCOSE: CPT

## 2024-07-25 PROCEDURE — 99285 EMERGENCY DEPT VISIT HI MDM: CPT

## 2024-07-25 RX ORDER — IBUPROFEN 600 MG/1
1 TABLET ORAL
Status: DISCONTINUED | OUTPATIENT
Start: 2024-07-25 | End: 2024-07-26 | Stop reason: HOSPADM

## 2024-07-25 RX ORDER — LIDOCAINE 4 G/G
1 PATCH TOPICAL
Status: DISCONTINUED | OUTPATIENT
Start: 2024-07-25 | End: 2024-07-26 | Stop reason: HOSPADM

## 2024-07-25 RX ORDER — SODIUM CHLORIDE 9 MG/ML
40 INJECTION, SOLUTION INTRAVENOUS AS NEEDED
Status: DISCONTINUED | OUTPATIENT
Start: 2024-07-25 | End: 2024-07-26 | Stop reason: HOSPADM

## 2024-07-25 RX ORDER — SODIUM CHLORIDE 0.9 % (FLUSH) 0.9 %
10 SYRINGE (ML) INJECTION EVERY 12 HOURS SCHEDULED
Status: DISCONTINUED | OUTPATIENT
Start: 2024-07-25 | End: 2024-07-26 | Stop reason: HOSPADM

## 2024-07-25 RX ORDER — SODIUM CHLORIDE 0.9 % (FLUSH) 0.9 %
10 SYRINGE (ML) INJECTION AS NEEDED
Status: DISCONTINUED | OUTPATIENT
Start: 2024-07-25 | End: 2024-07-26 | Stop reason: HOSPADM

## 2024-07-25 RX ORDER — TAMSULOSIN HYDROCHLORIDE 0.4 MG/1
0.8 CAPSULE ORAL NIGHTLY
Status: DISCONTINUED | OUTPATIENT
Start: 2024-07-25 | End: 2024-07-26 | Stop reason: HOSPADM

## 2024-07-25 RX ORDER — BUDESONIDE AND FORMOTEROL FUMARATE DIHYDRATE 160; 4.5 UG/1; UG/1
2 AEROSOL RESPIRATORY (INHALATION)
Status: DISCONTINUED | OUTPATIENT
Start: 2024-07-25 | End: 2024-07-26 | Stop reason: HOSPADM

## 2024-07-25 RX ORDER — INSULIN LISPRO 100 [IU]/ML
2-7 INJECTION, SOLUTION INTRAVENOUS; SUBCUTANEOUS
Status: DISCONTINUED | OUTPATIENT
Start: 2024-07-25 | End: 2024-07-26 | Stop reason: HOSPADM

## 2024-07-25 RX ORDER — DEXTROSE MONOHYDRATE 25 G/50ML
25 INJECTION, SOLUTION INTRAVENOUS
Status: DISCONTINUED | OUTPATIENT
Start: 2024-07-25 | End: 2024-07-26 | Stop reason: HOSPADM

## 2024-07-25 RX ORDER — SPIRONOLACTONE 25 MG/1
25 TABLET ORAL DAILY
Status: DISCONTINUED | OUTPATIENT
Start: 2024-07-25 | End: 2024-07-26 | Stop reason: HOSPADM

## 2024-07-25 RX ORDER — BRIMONIDINE TARTRATE 2 MG/ML
1 SOLUTION/ DROPS OPHTHALMIC 2 TIMES DAILY
Status: DISCONTINUED | OUTPATIENT
Start: 2024-07-25 | End: 2024-07-26 | Stop reason: HOSPADM

## 2024-07-25 RX ORDER — LATANOPROST 50 UG/ML
1 SOLUTION/ DROPS OPHTHALMIC NIGHTLY
Status: DISCONTINUED | OUTPATIENT
Start: 2024-07-25 | End: 2024-07-26 | Stop reason: HOSPADM

## 2024-07-25 RX ORDER — AMOXICILLIN 250 MG
2 CAPSULE ORAL 2 TIMES DAILY
Status: DISCONTINUED | OUTPATIENT
Start: 2024-07-25 | End: 2024-07-25

## 2024-07-25 RX ORDER — BUMETANIDE 2 MG/1
1 TABLET ORAL DAILY
Status: DISCONTINUED | OUTPATIENT
Start: 2024-07-26 | End: 2024-07-26 | Stop reason: HOSPADM

## 2024-07-25 RX ORDER — BISACODYL 5 MG/1
5 TABLET, DELAYED RELEASE ORAL DAILY PRN
Status: DISCONTINUED | OUTPATIENT
Start: 2024-07-25 | End: 2024-07-26 | Stop reason: HOSPADM

## 2024-07-25 RX ORDER — DABIGATRAN ETEXILATE 150 MG/1
150 CAPSULE ORAL 2 TIMES DAILY
Status: DISCONTINUED | OUTPATIENT
Start: 2024-07-25 | End: 2024-07-26 | Stop reason: HOSPADM

## 2024-07-25 RX ORDER — METOPROLOL SUCCINATE 50 MG/1
100 TABLET, EXTENDED RELEASE ORAL NIGHTLY
Status: DISCONTINUED | OUTPATIENT
Start: 2024-07-25 | End: 2024-07-26 | Stop reason: HOSPADM

## 2024-07-25 RX ORDER — ASPIRIN 325 MG
325 TABLET ORAL ONCE
Status: COMPLETED | OUTPATIENT
Start: 2024-07-25 | End: 2024-07-25

## 2024-07-25 RX ORDER — CLOPIDOGREL BISULFATE 75 MG/1
75 TABLET ORAL DAILY
Status: DISCONTINUED | OUTPATIENT
Start: 2024-07-25 | End: 2024-07-26 | Stop reason: HOSPADM

## 2024-07-25 RX ORDER — PANTOPRAZOLE SODIUM 40 MG/1
40 TABLET, DELAYED RELEASE ORAL 2 TIMES DAILY
Status: DISCONTINUED | OUTPATIENT
Start: 2024-07-25 | End: 2024-07-26 | Stop reason: HOSPADM

## 2024-07-25 RX ORDER — METOPROLOL SUCCINATE 50 MG/1
50 TABLET, EXTENDED RELEASE ORAL DAILY
Status: DISCONTINUED | OUTPATIENT
Start: 2024-07-25 | End: 2024-07-26 | Stop reason: HOSPADM

## 2024-07-25 RX ORDER — POLYETHYLENE GLYCOL 3350 17 G/17G
17 POWDER, FOR SOLUTION ORAL DAILY PRN
Status: DISCONTINUED | OUTPATIENT
Start: 2024-07-25 | End: 2024-07-26 | Stop reason: HOSPADM

## 2024-07-25 RX ORDER — METFORMIN HYDROCHLORIDE 500 MG/1
500 TABLET, EXTENDED RELEASE ORAL EVERY EVENING
Status: DISCONTINUED | OUTPATIENT
Start: 2024-07-25 | End: 2024-07-26 | Stop reason: HOSPADM

## 2024-07-25 RX ORDER — ACETAMINOPHEN 325 MG/1
650 TABLET ORAL EVERY 6 HOURS PRN
Status: DISCONTINUED | OUTPATIENT
Start: 2024-07-25 | End: 2024-07-26 | Stop reason: HOSPADM

## 2024-07-25 RX ORDER — BISACODYL 10 MG
10 SUPPOSITORY, RECTAL RECTAL DAILY PRN
Status: DISCONTINUED | OUTPATIENT
Start: 2024-07-25 | End: 2024-07-26 | Stop reason: HOSPADM

## 2024-07-25 RX ORDER — ATORVASTATIN CALCIUM 80 MG/1
80 TABLET, FILM COATED ORAL NIGHTLY
Status: DISCONTINUED | OUTPATIENT
Start: 2024-07-25 | End: 2024-07-26 | Stop reason: HOSPADM

## 2024-07-25 RX ORDER — NITROGLYCERIN 0.4 MG/1
0.4 TABLET SUBLINGUAL
Status: DISCONTINUED | OUTPATIENT
Start: 2024-07-25 | End: 2024-07-26 | Stop reason: HOSPADM

## 2024-07-25 RX ORDER — CILOSTAZOL 100 MG/1
100 TABLET ORAL 2 TIMES DAILY
Status: DISCONTINUED | OUTPATIENT
Start: 2024-07-25 | End: 2024-07-26 | Stop reason: HOSPADM

## 2024-07-25 RX ORDER — NICOTINE POLACRILEX 4 MG
15 LOZENGE BUCCAL
Status: DISCONTINUED | OUTPATIENT
Start: 2024-07-25 | End: 2024-07-26 | Stop reason: HOSPADM

## 2024-07-25 RX ORDER — OXYBUTYNIN CHLORIDE 5 MG/1
5 TABLET, EXTENDED RELEASE ORAL DAILY
Status: DISCONTINUED | OUTPATIENT
Start: 2024-07-25 | End: 2024-07-26 | Stop reason: HOSPADM

## 2024-07-25 RX ADMIN — DABIGATRAN ETEXILATE MESYLATE 150 MG: 150 CAPSULE ORAL at 20:46

## 2024-07-25 RX ADMIN — METOPROLOL SUCCINATE 100 MG: 50 TABLET, FILM COATED, EXTENDED RELEASE ORAL at 20:15

## 2024-07-25 RX ADMIN — OXYBUTYNIN CHLORIDE 5 MG: 5 TABLET, EXTENDED RELEASE ORAL at 20:15

## 2024-07-25 RX ADMIN — LATANOPROST 1 DROP: 50 SOLUTION OPHTHALMIC at 20:15

## 2024-07-25 RX ADMIN — BRIMONIDINE TARTRATE 1 DROP: 2 SOLUTION OPHTHALMIC at 20:46

## 2024-07-25 RX ADMIN — TAMSULOSIN HYDROCHLORIDE 0.8 MG: 0.4 CAPSULE ORAL at 20:14

## 2024-07-25 RX ADMIN — LIDOCAINE 1 PATCH: 4 PATCH TOPICAL at 16:31

## 2024-07-25 RX ADMIN — Medication 10 ML: at 15:12

## 2024-07-25 RX ADMIN — CILOSTAZOL 100 MG: 100 TABLET ORAL at 20:46

## 2024-07-25 RX ADMIN — Medication 10 ML: at 20:20

## 2024-07-25 RX ADMIN — PANTOPRAZOLE SODIUM 40 MG: 40 TABLET, DELAYED RELEASE ORAL at 20:15

## 2024-07-25 RX ADMIN — ACETAMINOPHEN 325MG 650 MG: 325 TABLET ORAL at 16:31

## 2024-07-25 RX ADMIN — ATORVASTATIN CALCIUM 80 MG: 80 TABLET, FILM COATED ORAL at 20:14

## 2024-07-25 RX ADMIN — ASPIRIN 325 MG: 325 TABLET ORAL at 08:26

## 2024-07-25 NOTE — PROGRESS NOTES
CAROLYN TOBAR Attestation Note     I supervised care provided by the midlevel provider. We have discussed this patient's history, physical exam, and treatment plan. I have reviewed the midlevel provider's note and I agree with the midlevel provider's findings and plan of care.   SHARED VISIT: This visit was performed by BOTH a physician and an APC. The substantive portion of the medical decision making was performed by this attesting physician who made or approved the management plan and takes responsibility for patient management. All studies in the APC note (if performed) were independently interpreted by me.   I have personally had a face to face encounter with the patient.   My personal findings are documented below:      Subjective  Pt is a 78 y.o. male admitted from Emergency Department for evaluation and treatment of chest pain.    Physical Exam  GENERAL: Alert and in NAD, Vitals reviewed-blood pressure 127/91  HENT: nares patent  EYES: no scleral icterus  CV: regular rhythm, regular rate-no murmur  RESPIRATORY: normal effort  ABDOMEN: soft, nontender to palp  MUSCULOSKELETAL: no deformity  NEURO: Strength sensation and coordination are grossly intact.  Speech and mentation are unremarkable  SKIN: warm, dry    Assessment/Plan  I discussed tx and evaluation of this patient with NARDA Mcpherson.  I did review ED workup including EKG x-ray and labs. High-sensitivity troponins flat in the 20s.  Appreciate cardiology consultation by Dr. Soria.  Plan echo and stress test.

## 2024-07-25 NOTE — ED PROVIDER NOTES
EMERGENCY DEPARTMENT ENCOUNTER  Room Number:  09/09  PCP: Neftali Amezcua MD  Independent Historians: Patient      HPI:  Chief Complaint: had concerns including Chest Pain.     A complete HPI/ROS/PMH/PSH/SH/FH are unobtainable due to: Nothing      Context: Aj Salazar is a 78 y.o. male with a medical history of COPD, hypertension, A-fib, chronic CHF, neuroforaminal stenosis of cervical spine, who presents to the ED c/o acute left chest pain as well as pain in his neck and radiating down the left arm.  He reports that the neck pain has been present for about a week now.  He denies any weakness in the upper extremity bilaterally but he has had some tingling in the left arm.  He reports minimal shortness of breath.  He denies nausea, vomiting, diaphoresis.  The chest pain is actually subsided some now.  He is still having some pain in the neck.  He denies any known injury or trauma.  He does note that he has arthritis in his neck.      Review of prior external notes (non-ED) -and- Review of prior external test results outside of this encounter: See ED course below        PAST MEDICAL HISTORY  Active Ambulatory Problems     Diagnosis Date Noted    COPD (chronic obstructive pulmonary disease) 03/17/2016    Hypertension 03/17/2016    Osteoarthritis 03/17/2016    History of smoking greater than 50 pack years 06/23/2017    BPH with obstruction/lower urinary tract symptoms 03/27/2018    Atrial fibrillation with rapid ventricular response     Type 2 diabetes mellitus with diabetic peripheral angiopathy without gangrene, without long-term current use of insulin 01/31/2019    TIA (transient ischemic attack) 10/22/2020    A-fib 03/07/2021    Acute on chronic systolic CHF (congestive heart failure) 03/08/2021    Gastroesophageal reflux disease 03/15/2021    Raynaud's disease without gangrene 12/07/2021    Acute pyelonephritis 05/06/2022    Chronic diastolic CHF (congestive heart failure) 05/06/2022    Alcohol dependence in  remission 05/06/2022    Personal history of transient ischemic attack (TIA), and cerebral infarction without residual deficits 05/06/2022    Lab test positive for detection of COVID-19 virus 05/26/2022    Pulmonary nodules/lesions, multiple 01/30/2023    Abnormal PET of left lung 03/04/2023    Neuroforaminal stenosis of cervical spine 07/17/2023    Open wound of right lower leg 04/15/2024    Atherosclerotic peripheral vascular disease with ulceration 05/23/2024    Claudication 05/23/2024    Bruit of right carotid artery 05/23/2024    Bilateral carotid artery stenosis 06/20/2024     Resolved Ambulatory Problems     Diagnosis Date Noted    Dehydration 09/25/2018    Hypotension 09/25/2018    Melena 09/25/2018    EMPERATRIZ (acute kidney injury) 09/25/2018    Hyponatremia 09/25/2018    Lactic acidosis 09/25/2018    Rash and nonspecific skin eruption 09/02/2020    Facial droop 10/23/2020    Acute respiratory failure with hypoxia 03/08/2021    Sepsis 03/09/2021    Sepsis 05/05/2022    Scabies 10/24/2022    Pneumonia due to infectious organism 04/23/2023     Past Medical History:   Diagnosis Date    Alcohol abuse, in remission     Asthma copd    BPH (benign prostatic hypertrophy)     Cataract     CHF (congestive heart failure)     Depression     DJD (degenerative joint disease) of cervical spine     ED (erectile dysfunction)     GERD (gastroesophageal reflux disease)     Glaucoma     History of prediabetes     Hx of colonic polyps     Hyperlipidemia     Influenza B 02/14/2013    Low back pain     Nocturnal hypoxia     Peripheral neuropathy     Permanent atrial fibrillation     Pneumonia     Rectal bleeding 04/26/2017    Tendonitis of shoulder 11/07/2007    Ulcer of the stomach and intestine          PAST SURGICAL HISTORY  Past Surgical History:   Procedure Laterality Date    APPENDECTOMY      CARDIOVASCULAR STRESS TEST N/A 10/20/2015    NML LEXISCAN CARDIOLITE PERFUSION STUDY, NO EVIDENCE OF ISCHEMIA, AREA OF HYPOPERFUSION OF THE  INFERIOR WALL W/NORMAL WALL PERFUSION AND NML LEFT VENTRICULAR EJECTION FRACTION, MOST LIKELY ATTENUATION. DR.MICHAEL BOX    CATARACT EXTRACTION Bilateral 2023    COLONOSCOPY N/A 2017    COLONOSCOPY N/A 2011    4 POLYPS REMOVED, RESCOPE IN 5 YRS, DR. EAGLE    COLONOSCOPY N/A 2006    ERYTHEMOUS AND GRANULAR MUCOSA IN ILEOCECAL VALVE, NORMAL COLON, REPEAT IN 5 YRS,     ENDOSCOPY N/A 2018    normal esophagus, acute gastritis, multiple non-bleeding duodenal ulcers with pigmented material, H Pylori positive    HERNIA REPAIR Bilateral     INGUINAL    JOINT REPLACEMENT  2015    left hip    TOTAL HIP ARTHROPLASTY Left 2015    Dr Ankush Sky    TOTAL HIP ARTHROPLASTY Right 10/27/2014    DR.RIED SKY    VASECTOMY           FAMILY HISTORY  Family History   Problem Relation Age of Onset    Cancer Mother         Bladder cancer    Colon cancer Mother     Ovarian cancer Mother     Alzheimer's disease Mother 70    Hyperlipidemia Father     Heart disease Father     Coronary artery disease Father     Hypertension Father     Stroke Sister         2016    Dementia Sister     Heart disease Brother     Alcohol abuse Brother     Heart disease Brother     Coronary artery disease Brother     Hypertension Brother     Stroke Brother     Arthritis Brother     Colon cancer Maternal Grandmother     Prostate cancer Neg Hx          SOCIAL HISTORY  Social History     Socioeconomic History    Marital status:      Spouse name: Ara*    Number of children: 3   Tobacco Use    Smoking status: Former     Current packs/day: 0.00     Average packs/day: 2.0 packs/day for 49.0 years (98.0 ttl pk-yrs)     Types: Cigarettes     Start date: 1961     Quit date: 2010     Years since quittin.5    Smokeless tobacco: Never    Tobacco comments:     long smoking history   Vaping Use    Vaping status: Never Used   Substance and Sexual Activity    Alcohol use: No     Comment: stopped  drinking >20 yrs ago; 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    Drug use: No     Comment: caffeine use     Sexual activity: Defer         ALLERGIES  Bactrim [sulfamethoxazole-trimethoprim] and Sulfacetamide      REVIEW OF SYSTEMS  Review of all 14 systems is negative other than stated in the HPI above.      PHYSICAL EXAM    I have reviewed the triage vital signs and nursing notes.    ED Triage Vitals   Temp Heart Rate Resp BP SpO2   07/25/24 0758 07/25/24 0758 07/25/24 0758 07/25/24 0811 07/25/24 0758   97.4 °F (36.3 °C) 107 16 137/90 94 %      Temp src Heart Rate Source Patient Position BP Location FiO2 (%)   07/25/24 0758 -- -- -- --   Tympanic             GENERAL: awake and alert, well-appearing, no acute distress  HENT: Normocephalic, atraumatic  EYES: no scleral icterus  CV: irregular rhythm, regular rate, 2+ radial pulses bilateral upper extremities.  RESPIRATORY: normal effort  ABDOMEN: soft, nondistended, nontender throughout  MUSCULOSKELETAL: no deformity.  There is mild lower cervical spine tenderness without step-off.  NEURO: alert, moves all extremities, follows commands.  Normal sensation to light touch throughout upper extremities bilaterally.  PSYCH: calm, cooperative  SKIN: Warm, dry          LAB RESULTS  Recent Results (from the past 24 hour(s))   ECG 12 Lead ED Triage Standing Order; Chest Pain    Collection Time: 07/25/24  8:05 AM   Result Value Ref Range    QT Interval 366 ms    QTC Interval 406 ms   Comprehensive Metabolic Panel    Collection Time: 07/25/24  8:19 AM    Specimen: Arm, Right; Blood   Result Value Ref Range    Glucose 122 (H) 65 - 99 mg/dL    BUN 15 8 - 23 mg/dL    Creatinine 0.82 0.76 - 1.27 mg/dL    Sodium 137 136 - 145 mmol/L    Potassium 4.1 3.5 - 5.2 mmol/L    Chloride 102 98 - 107 mmol/L    CO2 26.0 22.0 - 29.0 mmol/L    Calcium 9.3 8.6 - 10.5 mg/dL    Total Protein 7.1 6.0 - 8.5 g/dL    Albumin 4.1 3.5 - 5.2 g/dL    ALT (SGPT) 14 1 - 41 U/L    AST (SGOT) 17 1 - 40 U/L    Alkaline  Phosphatase 119 (H) 39 - 117 U/L    Total Bilirubin 0.8 0.0 - 1.2 mg/dL    Globulin 3.0 gm/dL    A/G Ratio 1.4 g/dL    BUN/Creatinine Ratio 18.3 7.0 - 25.0    Anion Gap 9.0 5.0 - 15.0 mmol/L    eGFR 90.5 >60.0 mL/min/1.73   High Sensitivity Troponin T    Collection Time: 07/25/24  8:19 AM    Specimen: Arm, Right; Blood   Result Value Ref Range    HS Troponin T 23 (H) <22 ng/L   Green Top (Gel)    Collection Time: 07/25/24  8:19 AM   Result Value Ref Range    Extra Tube Hold for add-ons.    Lavender Top    Collection Time: 07/25/24  8:19 AM   Result Value Ref Range    Extra Tube hold for add-on    Gold Top - SST    Collection Time: 07/25/24  8:19 AM   Result Value Ref Range    Extra Tube Hold for add-ons.    Light Blue Top    Collection Time: 07/25/24  8:19 AM   Result Value Ref Range    Extra Tube Hold for add-ons.    CBC Auto Differential    Collection Time: 07/25/24  8:19 AM    Specimen: Arm, Right; Blood   Result Value Ref Range    WBC 7.30 3.40 - 10.80 10*3/mm3    RBC 5.31 4.14 - 5.80 10*6/mm3    Hemoglobin 13.8 13.0 - 17.7 g/dL    Hematocrit 43.4 37.5 - 51.0 %    MCV 81.7 79.0 - 97.0 fL    MCH 26.0 (L) 26.6 - 33.0 pg    MCHC 31.8 31.5 - 35.7 g/dL    RDW 14.6 12.3 - 15.4 %    RDW-SD 43.9 37.0 - 54.0 fl    MPV 8.9 6.0 - 12.0 fL    Platelets 306 140 - 450 10*3/mm3    Neutrophil % 66.5 42.7 - 76.0 %    Lymphocyte % 21.9 19.6 - 45.3 %    Monocyte % 9.3 5.0 - 12.0 %    Eosinophil % 1.4 0.3 - 6.2 %    Basophil % 0.4 0.0 - 1.5 %    Immature Grans % 0.5 0.0 - 0.5 %    Neutrophils, Absolute 4.85 1.70 - 7.00 10*3/mm3    Lymphocytes, Absolute 1.60 0.70 - 3.10 10*3/mm3    Monocytes, Absolute 0.68 0.10 - 0.90 10*3/mm3    Eosinophils, Absolute 0.10 0.00 - 0.40 10*3/mm3    Basophils, Absolute 0.03 0.00 - 0.20 10*3/mm3    Immature Grans, Absolute 0.04 0.00 - 0.05 10*3/mm3    nRBC 0.0 0.0 - 0.2 /100 WBC   BNP    Collection Time: 07/25/24  8:19 AM    Specimen: Arm, Right; Blood   Result Value Ref Range    proBNP 318.0 0.0 -  1,800.0 pg/mL   High Sensitivity Troponin T 2Hr    Collection Time: 07/25/24 10:28 AM    Specimen: Arm, Left; Blood   Result Value Ref Range    HS Troponin T 25 (H) <22 ng/L    Troponin T Delta 2 >=-4 - <+4 ng/L       The above labs were ordered by me and independently reviewed by me.     RADIOLOGY  XR Chest 1 View    Result Date: 7/25/2024  XR CHEST 1 VW-  Clinical: Chest pain  FINDINGS: There is atherosclerotic calcification of the aorta. Azygos lobe noted. Cardiac size within normal limits. No pleural effusion or vascular congestion, the left lung is clear. Subtle right upper lobe infiltrate has developed. The remainder is unremarkable.  This report was finalized on 7/25/2024 8:34 AM by Dr. Narciso Hooper M.D on Workstation: [id number]       The above radiology studies were ordered by me.  See ED course for independent interpretations.     MEDICATIONS GIVEN IN ER  Medications   sodium chloride 0.9 % flush 10 mL (has no administration in time range)   nitroglycerin (NITROSTAT) SL tablet 0.4 mg (has no administration in time range)   sodium chloride 0.9 % flush 10 mL (has no administration in time range)   sodium chloride 0.9 % flush 10 mL (has no administration in time range)   sodium chloride 0.9 % infusion 40 mL (has no administration in time range)   sennosides-docusate (PERICOLACE) 8.6-50 MG per tablet 2 tablet (has no administration in time range)     And   polyethylene glycol (MIRALAX) packet 17 g (has no administration in time range)     And   bisacodyl (DULCOLAX) EC tablet 5 mg (has no administration in time range)     And   bisacodyl (DULCOLAX) suppository 10 mg (has no administration in time range)   aspirin tablet 325 mg (325 mg Oral Given 7/25/24 0826)         ORDERS PLACED DURING THIS VISIT:  Orders Placed This Encounter   Procedures    XR Chest 1 View    [place] Draw    Comprehensive Metabolic Panel    High Sensitivity Troponin T    CBC Auto Differential    BNP    High Sensitivity Troponin T 2Hr     Basic Metabolic Panel    CBC (No Diff)    Lipid Panel    Magnesium    High Sensitivity Troponin T    NPO Diet NPO Type: Strict NPO    Undress & Gown    Continuous Pulse Oximetry    Vital Signs Every 15 Minutes Until Stable, Then Every 4 Hours    Telemetry - Place Orders & Notify Provider of Results When Patient Experiences Acute Chest Pain, Dysrhythmia or Respiratory Distress    May Be Off Telemetry for Tests    Notify Physician For Unrelieved Chest Pain    Intake & Output    Weigh Patient    Saline Lock & Maintain IV Access    Inpatient Cardiology Consult    Oxygen Therapy- Nasal Cannula; Titrate 1-6 LPM Per SpO2; 90 - 95%    ECG 12 Lead ED Triage Standing Order; Chest Pain    ECG 12 Lead ED Triage Standing Order; Chest Pain    Telemetry Scan    Telemetry Scan    ECG 12 Lead Chest Pain    ECG 12 Lead Chest Pain    Adult Transthoracic Echo Complete W/ Cont if Necessary Per Protocol    Insert Peripheral IV    Insert Peripheral IV    Initiate ED Observation Status    CBC & Differential    Green Top (Gel)    Lavender Top    Gold Top - SST    Light Blue Top         OUTPATIENT MEDICATION MANAGEMENT:  Current Facility-Administered Medications Ordered in Epic   Medication Dose Route Frequency Provider Last Rate Last Admin    sennosides-docusate (PERICOLACE) 8.6-50 MG per tablet 2 tablet  2 tablet Oral BID AlseaSanjeev simons MD        And    polyethylene glycol (MIRALAX) packet 17 g  17 g Oral Daily PRN Sanjeev Meza MD        And    bisacodyl (DULCOLAX) EC tablet 5 mg  5 mg Oral Daily PRN Sanjeev Meza MD        And    bisacodyl (DULCOLAX) suppository 10 mg  10 mg Rectal Daily PRN Sanjeev Meza MD        nitroglycerin (NITROSTAT) SL tablet 0.4 mg  0.4 mg Sublingual Q5 Min PRN Sanjeev Meza MD        sodium chloride 0.9 % flush 10 mL  10 mL Intravenous PRN Pieter Neal MD        sodium chloride 0.9 % flush 10 mL  10 mL Intravenous Q12H Sanjeev Meza MD        sodium chloride 0.9 % flush 10 mL  10 mL  Intravenous PRN Sanjeev Meza MD        sodium chloride 0.9 % infusion 40 mL  40 mL Intravenous PRN Sanjeev Meza MD         Current Outpatient Medications Ordered in Epic   Medication Sig Dispense Refill    Accu-Chek Softclix Lancets lancets 1 each by Other route Daily. Check FBS once daily . E11.51 100 each 3    ALPHAGAN P 0.1 % solution ophthalmic solution Administer 1 drop to both eyes 2 (two) times a day.  6    atorvastatin (LIPITOR) 80 MG tablet TAKE 1 TABLET EVERY NIGHT 90 tablet 1    Blood Glucose Monitoring Suppl (Accu-Chek Guide Me) w/Device kit Use 1 Device Daily. Use to check FBS once daily . Dm2 with peripheral  neuropathy E11.51 1 kit 0    bumetanide (BUMEX) 1 MG tablet bumetanide 1 mg tablet   TAKE 1 TABLET BY MOUTH EVERY DAY      cilostazol (PLETAL) 100 MG tablet Take 1 tablet by mouth 2 (Two) Times a Day. 90 tablet 6    clopidogrel (PLAVIX) 75 MG tablet TAKE 1 TABLET EVERY DAY 90 tablet 1    dabigatran etexilate (Pradaxa) 150 MG capsu Take 1 capsule by mouth 2 (Two) Times a Day. 60 capsule 6    Fluticasone-Umeclidin-Vilant (Trelegy Ellipta) 200-62.5-25 MCG/ACT aerosol powder  Inhale.      glucose blood (Accu-Chek Guide) test strip Use to check FBS once daily . Dm2 with peripheral  neuropathy E11.51 100 each 1    glucose blood test strip Check FBS once daily .DM2 /E11.51 100 each 3    Jardiance 10 MG tablet tablet 0 Refill(s)      Lancets Misc. (Accu-Chek Softclix Lancet Dev) kit Use to check FBS once daily . Dm2 with peripheral  neuropathy E11.51 1 kit 2    metFORMIN ER (GLUCOPHAGE-XR) 500 MG 24 hr tablet TAKE 1 TABLET EVERY DAY WITH DINNER 90 tablet 1    metoprolol succinate XL (TOPROL-XL) 100 MG 24 hr tablet Take 1 tablet by mouth Every Night. Take 100 mg at night and 50 mg in the morning 30 tablet 0    metoprolol succinate XL (TOPROL-XL) 50 MG 24 hr tablet Take 1 tablet by mouth Daily. Take 50 mg every morning and 100 mg every night 30 tablet 0    Myrbetriq 25 MG tablet sustained-release 24  hour 24 hr tablet       pantoprazole (PROTONIX) 40 MG EC tablet TAKE 1 TABLET TWICE DAILY 180 tablet 1    spironolactone (ALDACTONE) 25 MG tablet Take 1 tablet by mouth Daily. 30 tablet 0    Tafluprost, PF, (ZIOPTAN) 0.0015 % solution ophthalmic solution Administer 1 drop to both eyes Every Night.      tamsulosin (FLOMAX) 0.4 MG capsule 24 hr capsule Take 2 capsules by mouth Every Night. 30 capsule     Dupixent 300 MG/2ML solution prefilled syringe            PROCEDURES  Procedures            PROGRESS, DATA ANALYSIS, CONSULTS, AND MEDICAL DECISION MAKING  All labs have been independently interpreted by me.  All radiology studies have been reviewed by me. All EKG's have been independently viewed and interpreted by me.  Discussion below represents my analysis of pertinent findings related to patient's condition, differential diagnosis, treatment plan and final disposition.    Differential diagnosis includes but is not limited to:  Acute coronary syndrome  Unstable angina  Pneumothorax  Cervical radiculopathy  Pneumonia  Pulmonary embolism      Clinical Scores: HEART Score: 6                  ED Course as of 07/25/24 1152   Thu Jul 25, 2024   0816 EKG          EKG time: 805  Rhythm/Rate: A-fib, 74  P waves and UT: N/A  QRS, axis: Normal axis  ST and T waves: No acute ischemic changes    Interpreted Contemporaneously by me, independently viewed  Similar compared to prior 6/2/2023       [JR]   0817 I reviewed prior stress test from 3/8/2021.  This was consistent with a low risk study.  Previous echo reviewed from 10/23/2020.  It showed EF 44%. [JR]   0848 I reviewed cardiology progress note from 6/4/2024.  Patient has a [id number] score of 4 and is anticoagulated on Pradaxa as well as clopidogrel.  He has a history of carotid artery stenosis.  He had complained of chest pain in 2014 and 2021 and had stress test which were both negative for any ischemia. [JR]   0922 HS Troponin T(!): 23 [JR]   1103 HS Troponin T(!): 25  [JR]   1103 Troponin T Delta: 2 [JR]   1152 Discussed with SHANIQUA Moody in the ED observation unit who agrees to admit on behalf of Dr. Meza. [JR]      ED Course User Index  [JR] Pieter Neal MD             AS OF 11:52 EDT VITALS:    BP - 104/64  HR - 82  TEMP - 97.4 °F (36.3 °C) (Tympanic)  O2 SATS - 93%    COMPLEXITY OF CARE  The patient requires admission.      Chronic or social conditions impacting patient care (Social Determinants of Health):     DIAGNOSIS  Final diagnoses:   Chest pain, unspecified type   Left arm pain   Atrial fibrillation, unspecified type   Bilateral carotid artery stenosis           DISPOSITION  Admit      Prescription drug monitoring program review:           Please note that portions of this document were completed with a voice recognition program.    Note Disclaimer: At [facility], we believe that sharing information builds trust and better relationships. You are receiving this note because you recently visited [facility]. It is possible you will see health information before a provider has talked with you about it. This kind of information can be easy to misunderstand. To help you fully understand what it means for your health, we urge you to discuss this note with your provider.         Pieter Neal MD  07/25/24 1152

## 2024-07-25 NOTE — CONSULTS
Date of Hospital Visit: [unfilled]ENC@  Encounter Provider: Sharri Dunne RN  Place of Service: [facility] CARDIOLOGY  Patient Name: Aj Salazar  :1945  Referral Provider: Sanjeev Meza MD    Chief complaint chest pain     History of Present Illness Aj Salazar is a 78 year old pt of Dr. Colby with [facility] with a history of COPD, HTN, Afib, CVA, carotid artery stenosis, heart failure, klebsiella pyelonephritis, COVID, chronic CHF and neuro foraminal stenosis.     In 10/14, pt had Afib after a hip replacement. An ECHO then showed normal LV systolic function and no significant valvular disease. He also had a perfusion stress test done that showed no evidence of ischemia. It was decided to not treat him any further for that.       Pt then presented in 2016 to ER for atypical chest pain . A stress test then was normal. In  he had GIB. EGD showed evidence of nonbleeding ulcer disease. His hemoglobin was stable and melena resolved. He was advised to stop anticoagulation for week and continue Protonix and Carafate. He presented in 2020 with left facial droop, slurred speech, and left arm weakness. MRI showed advanced small vessel disease. He was seen by neurology and not by cardiology and felt that it was either from his A. fib or the small vessel disease. He was to started on aspirin in addition to the Pradaxa. 2021 was admitted with increasing shortness of breath to [facility] and he appeared to have some heart failure. ProBNP was 1100, and he was tachycardic from Afib and it was felt he might have PNA. His metoprolol succinate was increased. He was also started on Aldactone. He was switched from lisinopril to losartan at some point. He had stress test then that showed no ischemia and normal LV function. He was admitted in 2022 for Klebsiella pyelonephritis and was seen by cardiology and was started on diltiazem for rate control.     Pt  had an ECHO in 7/2023 that showed left ventricular ejection fraction of 45% with some segmental wall motion abnormality, mildly dilated left atrium mild to moderate mitral regurgitation     Pt was seen by Dr. Colby on 6/4/24 for follow up and he was having nonspecific chest pain and he was to have a CT a of the coronary arteries but unfortunately because of his A-fib he was not able to have it. He had a CT scan without contrast. He reported chest pain that is fleeting lasts a few seconds about once or twice a week and is not really exertional. He gets short of breath walking 4 blocks. He denied any orthopnea or PND. He see vascular for a nonhealing wound on his left shin area. It was felt his pain was was atypical and no further evaluation was needed.     Pt presented to ER on 7/25/24 with complaints of left sided chest pain and separate episodes of left shoulder pain with radiation towards the left jaw. He reported neck pain has been going on for about a week. He denied any weakness in upper extremities but has had some tingling in left arm.  No history of cervical spine trauma.  He reports minimal shortness of breath. He denied any nausea, vomiting or diaphoresis. In ER pro . CXR showed . Subtle right upper lobe infiltrate has developed. EKG showed Afib 74, nonspecific repolarization abnormalities and minimally elevated troponin.  None of the atypical sounding chest discomfort was related to physical activity.  He walks 1/2-hour every morning with no angina or exertional dyspnea that is out of proportion to activity.    HPI was reviewed, updated and amended when necessary.    Stress test 3/8/21    Left ventricular ejection fraction is normal. (Calculated EF = 60%).  Myocardial perfusion imaging indicates a normal myocardial perfusion study with no evidence of ischemia.  Impressions are consistent with a low risk study.  Diaphragmatic attenuation artifact is present.         ECHO 10/23/20    Calculated  left ventricular EF = 44.3% Estimated left ventricular EF was in agreement with the calculated left ventricular EF.  Saline study was poorly visualized. No obvious bubbles crossed.  The aortic valve exhibits sclerosis. The aortic valve appears trileaflet.  There is mild, bileaflet mitral valve thickening present.  Trace aortic valve regurgitation is present  Mild mitral valve regurgitation is present.  Mild dilation of the aortic root is present.       Past Medical History:   Diagnosis Date    Alcohol abuse, in remission     Asthma copd    BPH (benign prostatic hypertrophy)     see doctors at [facility]    Cataract     CHF (congestive heart failure)     COPD (chronic obstructive pulmonary disease)     Depression     DJD (degenerative joint disease) of cervical spine     ED (erectile dysfunction)     [facility]    GERD (gastroesophageal reflux disease)     Glaucoma     History of prediabetes     Hx of colonic polyps     followed by GI ([facility])    Hyperlipidemia     Hypertension     Influenza B 02/14/2013        Low back pain     Nocturnal hypoxia     Osteoarthritis     HIPS, HANDS, MULTIPLE SITES    Osteoarthritis 03/17/2016    Peripheral neuropathy     Permanent atrial fibrillation     Pneumonia     Pneumonia due to infectious organism 04/23/2023    Rectal bleeding 04/26/2017    Tendonitis of shoulder 11/07/2007    RIGHT SHOULDER/DELTOID INSERTION    TIA (transient ischemic attack) 10/2020    Ulcer of the stomach and intestine        Past Surgical History:   Procedure Laterality Date    APPENDECTOMY      CARDIOVASCULAR STRESS TEST N/A 10/20/2015    NML LEXISCAN CARDIOLITE PERFUSION STUDY, NO EVIDENCE OF ISCHEMIA, AREA OF HYPOPERFUSION OF THE INFERIOR WALL W/NORMAL WALL PERFUSION AND NML LEFT VENTRICULAR EJECTION FRACTION, MOST LIKELY ATTENUATION. DR.MICHAEL BOX    CATARACT EXTRACTION Bilateral 02/2023    COLONOSCOPY N/A 02/2017    COLONOSCOPY N/A 02/23/2011    4 POLYPS REMOVED, RESCOPE IN 5  YRS, DR. EAGLE    COLONOSCOPY N/A 2006    ERYTHEMOUS AND GRANULAR MUCOSA IN ILEOCECAL VALVE, NORMAL COLON, REPEAT IN 5 YRS,     ENDOSCOPY N/A 2018    normal esophagus, acute gastritis, multiple non-bleeding duodenal ulcers with pigmented material, H Pylori positive    HERNIA REPAIR Bilateral     INGUINAL    JOINT REPLACEMENT  2015    left hip    TOTAL HIP ARTHROPLASTY Left 2015    Dr Ankush Sky    TOTAL HIP ARTHROPLASTY Right 10/27/2014    DR.RIED SKY    VASECTOMY         (Not in a hospital admission)      Current Meds  Scheduled Meds:senna-docusate sodium, 2 tablet, Oral, BID  sodium chloride, 10 mL, Intravenous, Q12H      Continuous Infusions:   PRN Meds:.  senna-docusate sodium **AND** polyethylene glycol **AND** bisacodyl **AND** bisacodyl    nitroglycerin    sodium chloride    sodium chloride    sodium chloride    Allergies as of 2024 - Reviewed 2024   Allergen Reaction Noted    Bactrim [sulfamethoxazole-trimethoprim] Rash 2018    Sulfacetamide Rash 2022       Social History     Socioeconomic History    Marital status:      Spouse name: Ara*    Number of children: 3   Tobacco Use    Smoking status: Former     Current packs/day: 0.00     Average packs/day: 2.0 packs/day for 49.0 years (98.0 ttl pk-yrs)     Types: Cigarettes     Start date: 1961     Quit date: 2010     Years since quittin.5    Smokeless tobacco: Never    Tobacco comments:     long smoking history   Vaping Use    Vaping status: Never Used   Substance and Sexual Activity    Alcohol use: No     Comment: 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; 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    Drug use: No     Comment: caffeine use     Sexual activity: Defer       Family History   Problem Relation Age of Onset    Cancer Mother         Bladder cancer    Colon cancer Mother     Ovarian cancer Mother     Alzheimer's disease Mother 70    Hyperlipidemia Father     Heart disease Father     Coronary artery  "disease Father     Hypertension Father     Stroke Sister         October 2016    Dementia Sister     Heart disease Brother     Alcohol abuse Brother     Heart disease Brother     Coronary artery disease Brother     Hypertension Brother     Stroke Brother     Arthritis Brother     Colon cancer Maternal Grandmother     Prostate cancer Neg Hx      Past surgical, medical, social and family history was reviewed, updated and amended when necessary.    Review of Systems   Constitutional: Negative for chills and fever.   HENT:  Negative for hoarse voice and sore throat.    Eyes:  Negative for double vision and photophobia.   Cardiovascular:  Positive for chest pain. Negative for leg swelling, near-syncope, orthopnea, palpitations, paroxysmal nocturnal dyspnea and syncope.   Respiratory:  Negative for cough and wheezing.    Skin:  Negative for poor wound healing and rash.   Musculoskeletal:  Negative for arthritis and joint swelling.   Gastrointestinal:  Negative for bloating, abdominal pain, hematemesis and hematochezia.   Neurological:  Negative for dizziness and focal weakness.   Psychiatric/Behavioral:  Negative for depression and suicidal ideas.             Objective:   Temp:  [97.4 °F (36.3 °C)] 97.4 °F (36.3 °C)  Heart Rate:  [] 82  Resp:  [16] 16  BP: (104-137)/(64-90) 104/64  Body mass index is 24.07 kg/m².  Flowsheet Rows      Flowsheet Row First Filed Value   Admission Height 177.8 cm (70\") Documented at 07/25/2024 0823   Admission Weight 76.1 kg (167 lb 12.3 oz) Documented at 07/25/2024 0823          Vitals:    07/25/24 1116   BP: 104/64   Pulse: 82   Resp:    Temp:    SpO2: 93%       Vitals reviewed.   Constitutional:       Appearance: Healthy appearance. Not in distress.   Neck:      Vascular: No JVR. JVD normal.   Pulmonary:      Effort: Pulmonary effort is normal.      Breath sounds: Normal breath sounds. No wheezing. No rhonchi. No rales.   Chest:      Chest wall: Not tender to palpatation. "   Cardiovascular:      PMI at left midclavicular line. Normal rate. Irregularly irregular rhythm. Normal S1. Normal S2.       Murmurs: There is no murmur.      No gallop.  No click. No rub.   Pulses:     Intact distal pulses.   Edema:     Peripheral edema absent.   Abdominal:      General: Bowel sounds are normal.      Palpations: Abdomen is soft.      Tenderness: There is no abdominal tenderness.   Musculoskeletal: Normal range of motion.         General: No tenderness. Skin:     General: Skin is warm and dry.   Neurological:      General: No focal deficit present.      Mental Status: Alert and oriented to person, place and time.                 Lab Review:      Results from last 7 days   Lab Units 07/25/24  0819   SODIUM mmol/L 137   POTASSIUM mmol/L 4.1   CHLORIDE mmol/L 102   CO2 mmol/L 26.0   BUN mg/dL 15   CREATININE mg/dL 0.82   CALCIUM mg/dL 9.3   BILIRUBIN mg/dL 0.8   ALK PHOS U/L 119*   ALT (SGPT) U/L 14   AST (SGOT) U/L 17   GLUCOSE mg/dL 122*     Results from last 7 days   Lab Units 07/25/24  1028 07/25/24  0819   HSTROP T ng/L 25* 23*     @LABRCNTbnp@  Results from last 7 days   Lab Units 07/25/24  0819   WBC 10*3/mm3 7.30   HEMOGLOBIN g/dL 13.8   HEMATOCRIT % 43.4   PLATELETS 10*3/mm3 306             @LABRCNTIP(chol,trig,hdl,ldl)                        I personally viewed and interpreted the patient's EKG/Telemetry data    Chest pain    Assessment and Plan:    Atypical chest pain -Dr. Colby was wanting to get a CT coronary angiogram however patient cannot get this disease and chronic atrial fibrillation.  He did have a calcium score with his PCP in May 2024 that was over 600.  Continue Plavix and statin.  We will plan for nuclear stress study in the morning.  Longstanding persistent atrial fibrillation -continue metoprolol and dabigatran.  Coronary calcium -moderately elevated risk associated with calcium score greater than 500 and patient who is already on Plavix and statin.  Continue same.   Testing as noted above.  Mixed hyperlipidemia  History of COPD and remote tobacco abuse  History of CVA on anticoagulation for atrial fibrillation and antiplatelet therapy    Aj Soria MD  07/25/24  12:18 EDT.  Time spent in reviewing chart, discussion and examination:

## 2024-07-25 NOTE — ED NOTES
"Nursing report ED to floor  Aj Salazar  78 y.o.  male    HPI :  HPI (Adult)  Stated Reason for Visit: left sided chest pain  History Obtained From: patient    Chief Complaint  Chief Complaint   Patient presents with    Chest Pain       Admitting doctor:   Sanjeev Meza MD    Admitting diagnosis:   The primary encounter diagnosis was Chest pain, unspecified type. Diagnoses of Left arm pain, Atrial fibrillation, unspecified type, and Bilateral carotid artery stenosis were also pertinent to this visit.    Code status:   Current Code Status       Date Active Code Status Order ID Comments User Context       Prior            Allergies:   Bactrim [sulfamethoxazole-trimethoprim] and Sulfacetamide    Isolation:   No active isolations    Intake and Output  No intake or output data in the 24 hours ending 07/25/24 1152    Weight:       07/25/24  0823   Weight: 76.1 kg (167 lb 12.3 oz)       Most recent vitals:   Vitals:    07/25/24 0816 07/25/24 0823 07/25/24 1016 07/25/24 1116   BP: 136/87  117/70 104/64   Pulse: 90  91 82   Resp:       Temp:       TempSrc:       SpO2: 98%  90% 93%   Weight:  76.1 kg (167 lb 12.3 oz)     Height:  177.8 cm (70\")         Active LDAs/IV Access:   Lines, Drains & Airways       Active LDAs       Name Placement date Placement time Site Days    Peripheral IV 07/25/24 0820 Right Antecubital 07/25/24  0820  Antecubital  less than 1                    Labs (abnormal labs have a star):   Labs Reviewed   COMPREHENSIVE METABOLIC PANEL - Abnormal; Notable for the following components:       Result Value    Glucose 122 (*)     Alkaline Phosphatase 119 (*)     All other components within normal limits    Narrative:     GFR Normal >60  Chronic Kidney Disease <60  Kidney Failure <15    The GFR formula is only valid for adults with stable renal function between ages 18 and 70.   TROPONIN - Abnormal; Notable for the following components:    HS Troponin T 23 (*)     All other components within normal limits "    Narrative:     High Sensitive Troponin T Reference Range:  <14.0 ng/L- Negative Female for AMI  <22.0 ng/L- Negative Male for AMI  >=14 - Abnormal Female indicating possible myocardial injury.  >=22 - Abnormal Male indicating possible myocardial injury.   Clinicians would have to utilize clinical acumen, EKG, Troponin, and serial changes to determine if it is an Acute Myocardial Infarction or myocardial injury due to an underlying chronic condition.        CBC WITH AUTO DIFFERENTIAL - Abnormal; Notable for the following components:    MCH 26.0 (*)     All other components within normal limits   HIGH SENSITIVITIY TROPONIN T 2HR - Abnormal; Notable for the following components:    HS Troponin T 25 (*)     All other components within normal limits    Narrative:     High Sensitive Troponin T Reference Range:  <14.0 ng/L- Negative Female for AMI  <22.0 ng/L- Negative Male for AMI  >=14 - Abnormal Female indicating possible myocardial injury.  >=22 - Abnormal Male indicating possible myocardial injury.   Clinicians would have to utilize clinical acumen, EKG, Troponin, and serial changes to determine if it is an Acute Myocardial Infarction or myocardial injury due to an underlying chronic condition.        BNP (IN-HOUSE) - Normal    Narrative:     This assay is used as an aid in the diagnosis of individuals suspected of having heart failure. It can be used as an aid in the diagnosis of acute decompensated heart failure (ADHF) in patients presenting with signs and symptoms of ADHF to the emergency department (ED). In addition, NT-proBNP of <300 pg/mL indicates ADHF is not likely.    Age Range Result Interpretation  NT-proBNP Concentration (pg/mL:      <50             Positive            >450                   Gray                 300-450                    Negative             <300    50-75           Positive            >900                  Gray                300-900                  Negative            <300      >75              Positive            >1800                  Gray                300-1800                  Negative            <300   RAINBOW DRAW    Narrative:     The following orders were created for panel order [place] Draw.  Procedure                               Abnormality         Status                     ---------                               -----------         ------                     Green Top (Gel)[[id number]]                                  Final result               Lavender Top[[id number]]                                     Final result               Gold Top - SST[[id number]]                                   Final result               Light Blue Top[[id number]]                                   Final result                 Please view results for these tests on the individual orders.   LIPID PANEL   TROPONIN   CBC AND DIFFERENTIAL    Narrative:     The following orders were created for panel order CBC & Differential.  Procedure                               Abnormality         Status                     ---------                               -----------         ------                     CBC Auto Differential[[id number]]        Abnormal            Final result                 Please view results for these tests on the individual orders.   GREEN TOP   LAVENDER TOP   GOLD TOP - SST   LIGHT BLUE TOP       EKG:   ECG 12 Lead ED Triage Standing Order; Chest Pain   Preliminary Result   HEART RATE=74  bpm   RR Buttrmws=268  ms   CO Interval=  ms   P Horizontal Axis=  deg   P Front Axis=  deg   QRSD Yzhrzcde=345  ms   QT Elmdbopz=438  ms   IDlR=899  ms   QRS Axis=94  deg   T Wave Axis=74  deg   - ABNORMAL ECG -   Atrial fibrillation   Nonspecific intraventricular conduction delay   Date and Time of Study:2024-07-25 08:05:15      Telemetry Scan   Final Result      Telemetry Scan   Final Result      ECG 12 Lead Chest Pain    (Results Pending)   ECG 12 Lead Chest Pain    (Results Pending)   ECG 12 Lead Chest Pain     (Results Pending)       Meds given in ED:   Medications   sodium chloride 0.9 % flush 10 mL (has no administration in time range)   nitroglycerin (NITROSTAT) SL tablet 0.4 mg (has no administration in time range)   sodium chloride 0.9 % flush 10 mL (has no administration in time range)   sodium chloride 0.9 % flush 10 mL (has no administration in time range)   sodium chloride 0.9 % infusion 40 mL (has no administration in time range)   sennosides-docusate (PERICOLACE) 8.6-50 MG per tablet 2 tablet (has no administration in time range)     And   polyethylene glycol (MIRALAX) packet 17 g (has no administration in time range)     And   bisacodyl (DULCOLAX) EC tablet 5 mg (has no administration in time range)     And   bisacodyl (DULCOLAX) suppository 10 mg (has no administration in time range)   aspirin tablet 325 mg (325 mg Oral Given 24 08)       Imaging results:  No radiology results for the last day    Ambulatory status:   - up ad karen    Social issues:   Social History     Socioeconomic History    Marital status:      Spouse name: Ara*    Number of children: 3   Tobacco Use    Smoking status: Former     Current packs/day: 0.00     Average packs/day: 2.0 packs/day for 49.0 years (98.0 ttl pk-yrs)     Types: Cigarettes     Start date: 1961     Quit date: 2010     Years since quittin.5    Smokeless tobacco: Never    Tobacco comments:     long smoking history   Vaping Use    Vaping status: Never Used   Substance and Sexual Activity    Alcohol use: No     Comment: 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; 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    Drug use: No     Comment: caffeine use     Sexual activity: Defer       Peripheral Neurovascular  Peripheral Neurovascular (Adult)  Peripheral Neurovascular WDL: .WDL except, all  Pulse Assessment: radial  LUE Neurovascular Assessment  Temperature LUE: warm  Color LUE: no discoloration  Sensation LUE: numbness present, tingling present, no tenderness  RUE Neurovascular  Assessment  Temperature RUE: warm  Color RUE: no discoloration  Sensation RUE: no tenderness, numbness present, tingling present  LLE Neurovascular Assessment  Temperature LLE: warm  Color LLE: no discoloration  Sensation LLE: no numbness, no tenderness, no tingling  RLE Neurovascular Assessment  Temperature RLE: warm  Color RLE: no discoloration  Sensation RLE: no numbness, no tenderness, no tingling    Neuro Cognitive  Neuro Cognitive (Adult)  Cognitive/Neuro/Behavioral WDL: .WDL except, all  Level of Consciousness: Alert  Orientation: oriented x 4  Additional Documentation: Dizziness Assessment (Group)  Dizziness Assessment  Dizziness Reported Symptoms: intermittent  Dizziness Occurs With: activity, position change    Learning  Learning Assessment (Adult)  Learning Readiness and Ability: sensory deficit noted (Patient wearing glasses at bedside.)    Respiratory  Respiratory WDL  Respiratory WDL: .WDL except, all (Patient has a history of COPD. Patient uses a CPAP machine at night.)  Rhythm/Pattern, Respiratory: shortness of breath, unlabored, pattern regular, depth regular  Expansion/Accessory Muscles/Retractions: no retractions, expansion symmetric, no use of accessory muscles    Abdominal Pain       Pain Assessments  Pain (Adult)  (0-10) Pain Rating: Rest: 3  Pain Location: chest  Pain Side/Orientation: left, anterior  Pain Description: aching, dull  Pain Radiation to: arm, left, neck, left, shoulder, left    NIH Stroke Scale       Radhika Staton RN  07/25/24 11:52 EDT

## 2024-07-25 NOTE — H&P
[facility]   HISTORY AND PHYSICAL    Patient Name: Aj Salazar  : 1945  MRN: [id number]  Primary Care Physician:  Neftali Amezcua MD  Date of admission: 2024    Subjective   Subjective     Chief Complaint:   Chief Complaint   Patient presents with    Chest Pain         HPI:    Aj Salazar is a 78 y.o. male with past medical history of hypertension, hyperlipidemia, carotid artery stenosis, PAD/PVD, BPH, CHF, COPD, GERD, TOBY on CPAP, A-fib, history of TIA, basal and squamous cell skin cancers, history of alcohol abuse in remission, and former smoker who is being admitted to the hospital for complaint of chest pain.  Patient reports that he has been having left-sided shoulder pain for several weeks and it has just progressively worsened and he went to a chiropractor on 2024 and his chiropractor kept asking if he had any chest pain and the patient said no.  Patient reports that he woke up around 5 AM on 2024 and he had dull chest pain that went to his left shoulder and left neck that nothing makes better or worse and has been continuous throughout the day with gradual mild improvement.  Patient denies any associated dyspnea, palpitations, or peripheral edema.  Patient does report associated nausea and general weakness.  Patient denies ever feeling any pains like this before.  Patient does report a significant family medical history of a father and 2 brothers with cardiac disease in their 40s and 50s.  Patient reports that he was recently started on Pletal per his vascular surgeon who is following him for both carotid artery stenosis as well as right femoral artery stenosis; if Pletal does not help his walking pain then patient will likely need bypass procedure on the right leg in the next 3 months.    Review of Systems   All systems were reviewed and negative except for: Chest pain    Personal History     Past Medical History:   Diagnosis Date    Alcohol abuse, in remission     Asthma  copd    BPH (benign prostatic hypertrophy)     see doctors at [facility]    Cataract     CHF (congestive heart failure)     COPD (chronic obstructive pulmonary disease)     Depression     DJD (degenerative joint disease) of cervical spine     ED (erectile dysfunction)     [facility]    GERD (gastroesophageal reflux disease)     Glaucoma     History of prediabetes     Hx of colonic polyps     followed by GI ([facility])    Hyperlipidemia     Hypertension     Influenza B 02/14/2013        Low back pain     Nocturnal hypoxia     Osteoarthritis     HIPS, HANDS, MULTIPLE SITES    Osteoarthritis 03/17/2016    Peripheral neuropathy     Permanent atrial fibrillation     Pneumonia     Pneumonia due to infectious organism 04/23/2023    Rectal bleeding 04/26/2017    Tendonitis of shoulder 11/07/2007    RIGHT SHOULDER/DELTOID INSERTION    TIA (transient ischemic attack) 10/2020    Ulcer of the stomach and intestine        Past Surgical History:   Procedure Laterality Date    APPENDECTOMY      CARDIOVASCULAR STRESS TEST N/A 10/20/2015    NML LEXISCAN CARDIOLITE PERFUSION STUDY, NO EVIDENCE OF ISCHEMIA, AREA OF HYPOPERFUSION OF THE INFERIOR WALL W/NORMAL WALL PERFUSION AND NML LEFT VENTRICULAR EJECTION FRACTION, MOST LIKELY ATTENUATION. DR.MICHAEL BOX    CATARACT EXTRACTION Bilateral 02/2023    COLONOSCOPY N/A 02/2017    COLONOSCOPY N/A 02/23/2011    4 POLYPS REMOVED, RESCOPE IN 5 YRS, DR. EAGLE    COLONOSCOPY N/A 03/08/2006    ERYTHEMOUS AND GRANULAR MUCOSA IN ILEOCECAL VALVE, NORMAL COLON, REPEAT IN 5 YRS,     ENDOSCOPY N/A 09/26/2018    normal esophagus, acute gastritis, multiple non-bleeding duodenal ulcers with pigmented material, H Pylori positive    HERNIA REPAIR Bilateral     INGUINAL    JOINT REPLACEMENT  11/2015    left hip    TOTAL HIP ARTHROPLASTY Left 11/06/2015    Dr Ankush Sky    TOTAL HIP ARTHROPLASTY Right 10/27/2014    DR.RIED SKY    VASECTOMY         Family History: family  history includes Alcohol abuse in his brother; Alzheimer's disease (age of onset: 70) in his mother; Arthritis in his brother; Cancer in his mother; Colon cancer in his maternal grandmother and mother; Coronary artery disease in his brother and father; Dementia in his sister; Heart disease in his brother, brother, and father; Hyperlipidemia in his father; Hypertension in his brother and father; Ovarian cancer in his mother; Stroke in his brother and sister. Otherwise pertinent FHx was reviewed and not pertinent to current issue.    Social History:  reports that he quit smoking about 14 years ago. His smoking use included cigarettes. He started smoking about 63 years ago. He has a 98 pack-year smoking history. He has been exposed to tobacco smoke. He has never used smokeless tobacco. He reports that he does not drink alcohol and does not use drugs.    Home Medications:  Accu-Chek Guide Me, Accu-Chek Softclix Lancet Dev, Accu-Chek Softclix Lancets, Dupilumab, Fluticasone-Umeclidin-Vilant, Mirabegron ER, Tafluprost (PF), atorvastatin, brimonidine, bumetanide, cilostazol, clopidogrel, dabigatran etexilate, empagliflozin, glucose blood, metFORMIN ER, metoprolol succinate XL, pantoprazole, spironolactone, and tamsulosin    Allergies:  Allergies   Allergen Reactions    Bactrim [Sulfamethoxazole-Trimethoprim] Rash    Sulfacetamide Rash       Objective   Objective     Vitals:   Temp:  [97.4 °F (36.3 °C)] 97.4 °F (36.3 °C)  Heart Rate:  [] 106  Resp:  [16] 16  BP: (104-137)/(64-90) 111/74  Physical Exam    Constitutional: Awake, alert, well-groomed, does not appear in any acute distress   Eyes: PERRL, sclerae anicteric, no conjunctival injection, EOMI   HENT: NCAT, mucous membranes moist, normal hearing   Neck: Supple, nontender, trachea midline   Respiratory: Clear to auscultation bilaterally, nonlabored respirations on room air and speaks in full sentences   Cardiovascular: Irregular rhythm with controlled rate, no  murmurs, palpable pedal pulses on the left side, right side does not have any palpable pedal pulses which is unchanged from his vascular appointment in June 20, 2024   Gastrointestinal: Positive bowel sounds, soft, nontender, nondistended   Musculoskeletal: No bilateral ankle edema, no clubbing or cyanosis to extremities   Psychiatric: Appropriate affect, cooperative   Neurologic: Oriented x 3, strength symmetric in all extremities, Cranial Nerves grossly intact to confrontation, speech clear   Skin: Elevated skin plaque on right arm that is concerning for squamous cell carcinoma    Result Review    Result Review:  I have personally reviewed the results from the time of this admission to 7/25/2024 13:49 EDT and agree with these findings:  [x]  Laboratory list / accordion  []  Microbiology  [x]  Radiology  [x]  EKG/Telemetry   []  Cardiology/Vascular   []  Pathology  []  Old records  []  Other:  Most notable findings include: Troponin 23 then 25, proBNP 318, potassium 4.1, serum creatinine 0.82, normal AST and ALT, cholesterol panel within normal limits, normal WBC at 7.3, hemoglobin 13.8.  Chest x-ray negative for any acute cardiopulmonary issues, but does show atherosclerotic calcification of the aorta.  EKG reviewed and shows rate controlled A-fib.      Assessment & Plan   Assessment / Plan     Brief Patient Summary:  Aj Salazar is a 78 y.o. male who is admitted for chest pain    Active Hospital Problems:  Active Hospital Problems    Diagnosis     **Chest pain      Plan:   Chest pain:  -Initial troponin 23 then 25; continue to trend  -EKG shows rate controlled A-fib, no acute ST changes  -Chest x-ray negative for acute issues but does show chronic atherosclerotic calcification of the aorta  -Continuous cardiac monitor  -Echo pending  -Cardiology consulted; planning for stress in the a.m.    Chronic hypertension  Chronic hyperlipidemia  Chronic A-fib  Carotid artery stenosis  PVD/PAD:  -Continue home  regimen    TOBY on CPAP:  -Okay to use home CPAP or supplemental O2 as needed for sleep apnea    GERD:  -Continue home regimen      VTE Prophylaxis:  Home Pradaxa        CODE STATUS:    Level Of Support Discussed With: Patient  Code Status (Patient has no pulse and is not breathing): CPR (Attempt to Resuscitate)  Medical Interventions (Patient has pulse or is breathing): Full Support    Admission Status:  I believe this patient meets observation status.    Electronically signed by Bita Mcpherson PA-C, 07/25/24, 1:49 PM EDT.        75 minutes has been spent by [facility] providers in the care of this patient while under observation status      I have worn appropriate PPE during this patient encounter, sanitized my hands both with entering and exiting patient's room.    I have discussed plan of care with patient including advance care plan and/or surrogate decision maker.  Patient advises that their wife Ara will be their primary surrogate decision maker

## 2024-07-26 ENCOUNTER — APPOINTMENT (OUTPATIENT)
Dept: NUCLEAR MEDICINE | Facility: HOSPITAL | Age: 79
End: 2024-07-26
Payer: MEDICARE

## 2024-07-26 ENCOUNTER — APPOINTMENT (OUTPATIENT)
Dept: CARDIOLOGY | Facility: HOSPITAL | Age: 79
End: 2024-07-26
Payer: MEDICARE

## 2024-07-26 ENCOUNTER — READMISSION MANAGEMENT (OUTPATIENT)
Dept: CALL CENTER | Facility: HOSPITAL | Age: 79
End: 2024-07-26
Payer: MEDICARE

## 2024-07-26 VITALS
HEART RATE: 97 BPM | BODY MASS INDEX: 23.91 KG/M2 | HEIGHT: 70 IN | OXYGEN SATURATION: 95 % | TEMPERATURE: 97.5 F | RESPIRATION RATE: 18 BRPM | DIASTOLIC BLOOD PRESSURE: 85 MMHG | WEIGHT: 167 LBS | SYSTOLIC BLOOD PRESSURE: 127 MMHG

## 2024-07-26 LAB
ANION GAP SERPL CALCULATED.3IONS-SCNC: 10 MMOL/L (ref 5–15)
AORTIC DIMENSIONLESS INDEX: 0.5 (DI)
BH CV ECHO MEAS - AO MAX PG: 4.6 MMHG
BH CV ECHO MEAS - AO MEAN PG: 2.5 MMHG
BH CV ECHO MEAS - AO ROOT DIAM: 3.4 CM
BH CV ECHO MEAS - AO V2 MAX: 106.9 CM/SEC
BH CV ECHO MEAS - AO V2 VTI: 19.3 CM
BH CV ECHO MEAS - AVA(I,D): 2.26 CM2
BH CV ECHO MEAS - EDV(CUBED): 184.2 ML
BH CV ECHO MEAS - EDV(MOD-SP2): 94 ML
BH CV ECHO MEAS - EDV(MOD-SP4): 163 ML
BH CV ECHO MEAS - EF(MOD-BP): 55.5 %
BH CV ECHO MEAS - EF(MOD-SP2): 48.9 %
BH CV ECHO MEAS - EF(MOD-SP4): 57.1 %
BH CV ECHO MEAS - ESV(CUBED): 51 ML
BH CV ECHO MEAS - ESV(MOD-SP2): 48 ML
BH CV ECHO MEAS - ESV(MOD-SP4): 70 ML
BH CV ECHO MEAS - FS: 34.8 %
BH CV ECHO MEAS - IVS/LVPW: 1.1 CM
BH CV ECHO MEAS - IVSD: 1.15 CM
BH CV ECHO MEAS - LAT PEAK E' VEL: 9.1 CM/SEC
BH CV ECHO MEAS - LV DIASTOLIC VOL/BSA (35-75): 84.7 CM2
BH CV ECHO MEAS - LV MASS(C)D: 256 GRAMS
BH CV ECHO MEAS - LV MAX PG: 1.52 MMHG
BH CV ECHO MEAS - LV MEAN PG: 0.69 MMHG
BH CV ECHO MEAS - LV SYSTOLIC VOL/BSA (12-30): 36.4 CM2
BH CV ECHO MEAS - LV V1 MAX: 61.7 CM/SEC
BH CV ECHO MEAS - LV V1 VTI: 9.4 CM
BH CV ECHO MEAS - LVIDD: 5.7 CM
BH CV ECHO MEAS - LVIDS: 3.7 CM
BH CV ECHO MEAS - LVOT AREA: 4.7 CM2
BH CV ECHO MEAS - LVOT DIAM: 2.43 CM
BH CV ECHO MEAS - LVPWD: 1.05 CM
BH CV ECHO MEAS - MED PEAK E' VEL: 7.2 CM/SEC
BH CV ECHO MEAS - MV DEC SLOPE: 641.5 CM/SEC2
BH CV ECHO MEAS - MV DEC TIME: 0.2 SEC
BH CV ECHO MEAS - MV E MAX VEL: 70.7 CM/SEC
BH CV ECHO MEAS - MV MAX PG: 3.2 MMHG
BH CV ECHO MEAS - MV MEAN PG: 1.18 MMHG
BH CV ECHO MEAS - MV P1/2T: 46.4 MSEC
BH CV ECHO MEAS - MV V2 VTI: 15.2 CM
BH CV ECHO MEAS - MVA(P1/2T): 4.7 CM2
BH CV ECHO MEAS - MVA(VTI): 2.9 CM2
BH CV ECHO MEAS - PA ACC TIME: 0.08 SEC
BH CV ECHO MEAS - PA V2 MAX: 93.8 CM/SEC
BH CV ECHO MEAS - RV MAX PG: 2.46 MMHG
BH CV ECHO MEAS - RV V1 MAX: 78.4 CM/SEC
BH CV ECHO MEAS - RV V1 VTI: 12.9 CM
BH CV ECHO MEAS - SV(LVOT): 43.5 ML
BH CV ECHO MEAS - SV(MOD-SP2): 46 ML
BH CV ECHO MEAS - SV(MOD-SP4): 93 ML
BH CV ECHO MEAS - SVI(LVOT): 22.6 ML/M2
BH CV ECHO MEAS - SVI(MOD-SP2): 23.9 ML/M2
BH CV ECHO MEAS - SVI(MOD-SP4): 48.4 ML/M2
BH CV ECHO MEASUREMENTS AVERAGE E/E' RATIO: 8.67
BH CV REST NUCLEAR ISOTOPE DOSE: 11.6 MCI
BH CV STRESS COMMENTS STAGE 1: NORMAL
BH CV STRESS DOSE REGADENOSON STAGE 1: 0.4
BH CV STRESS DURATION MIN STAGE 1: 0
BH CV STRESS DURATION SEC STAGE 1: 10
BH CV STRESS NUCLEAR ISOTOPE DOSE: 32.3 MCI
BH CV STRESS PROTOCOL 1: NORMAL
BH CV STRESS RECOVERY BP: NORMAL MMHG
BH CV STRESS RECOVERY HR: 94 BPM
BH CV STRESS STAGE 1: 1
BUN SERPL-MCNC: 16 MG/DL (ref 8–23)
BUN/CREAT SERPL: 17.4 (ref 7–25)
CALCIUM SPEC-SCNC: 8.9 MG/DL (ref 8.6–10.5)
CHLORIDE SERPL-SCNC: 103 MMOL/L (ref 98–107)
CO2 SERPL-SCNC: 23 MMOL/L (ref 22–29)
CREAT SERPL-MCNC: 0.92 MG/DL (ref 0.76–1.27)
DEPRECATED RDW RBC AUTO: 44 FL (ref 37–54)
EGFRCR SERPLBLD CKD-EPI 2021: 85.7 ML/MIN/1.73
ERYTHROCYTE [DISTWIDTH] IN BLOOD BY AUTOMATED COUNT: 14.8 % (ref 12.3–15.4)
GLUCOSE BLDC GLUCOMTR-MCNC: 105 MG/DL (ref 70–130)
GLUCOSE BLDC GLUCOMTR-MCNC: 113 MG/DL (ref 70–130)
GLUCOSE SERPL-MCNC: 115 MG/DL (ref 65–99)
HCT VFR BLD AUTO: 41.7 % (ref 37.5–51)
HGB BLD-MCNC: 13.1 G/DL (ref 13–17.7)
LEFT ATRIUM VOLUME INDEX: 25.6 ML/M2
LV EF NUC BP: 62 %
MAGNESIUM SERPL-MCNC: 2.4 MG/DL (ref 1.6–2.4)
MAXIMAL PREDICTED HEART RATE: 143 BPM
MCH RBC QN AUTO: 25.6 PG (ref 26.6–33)
MCHC RBC AUTO-ENTMCNC: 31.4 G/DL (ref 31.5–35.7)
MCV RBC AUTO: 81.4 FL (ref 79–97)
PERCENT MAX PREDICTED HR: 76.92 %
PLATELET # BLD AUTO: 292 10*3/MM3 (ref 140–450)
PMV BLD AUTO: 9 FL (ref 6–12)
POTASSIUM SERPL-SCNC: 3.6 MMOL/L (ref 3.5–5.2)
QT INTERVAL: 328 MS
QT INTERVAL: 366 MS
QTC INTERVAL: 406 MS
QTC INTERVAL: 408 MS
RBC # BLD AUTO: 5.12 10*6/MM3 (ref 4.14–5.8)
SODIUM SERPL-SCNC: 136 MMOL/L (ref 136–145)
STRESS BASELINE BP: NORMAL MMHG
STRESS BASELINE HR: 91 BPM
STRESS PERCENT HR: 90 %
STRESS POST PEAK BP: NORMAL MMHG
STRESS POST PEAK HR: 110 BPM
STRESS TARGET HR: 122 BPM
WBC NRBC COR # BLD AUTO: 7.28 10*3/MM3 (ref 3.4–10.8)

## 2024-07-26 PROCEDURE — 85027 COMPLETE CBC AUTOMATED: CPT | Performed by: EMERGENCY MEDICINE

## 2024-07-26 PROCEDURE — 0 TECHNETIUM TETROFOSMIN KIT: Performed by: INTERNAL MEDICINE

## 2024-07-26 PROCEDURE — G0378 HOSPITAL OBSERVATION PER HR: HCPCS

## 2024-07-26 PROCEDURE — 25010000002 REGADENOSON 0.4 MG/5ML SOLUTION: Performed by: INTERNAL MEDICINE

## 2024-07-26 PROCEDURE — 25510000001 PERFLUTREN (DEFINITY) 8.476 MG IN SODIUM CHLORIDE (PF) 0.9 % 10 ML INJECTION: Performed by: EMERGENCY MEDICINE

## 2024-07-26 PROCEDURE — 82948 REAGENT STRIP/BLOOD GLUCOSE: CPT

## 2024-07-26 PROCEDURE — 93017 CV STRESS TEST TRACING ONLY: CPT

## 2024-07-26 PROCEDURE — A9502 TC99M TETROFOSMIN: HCPCS | Performed by: INTERNAL MEDICINE

## 2024-07-26 PROCEDURE — 78452 HT MUSCLE IMAGE SPECT MULT: CPT

## 2024-07-26 PROCEDURE — 80048 BASIC METABOLIC PNL TOTAL CA: CPT | Performed by: EMERGENCY MEDICINE

## 2024-07-26 PROCEDURE — 0 TECHNETIUM TETROFOSMIN KIT: Performed by: EMERGENCY MEDICINE

## 2024-07-26 PROCEDURE — 93306 TTE W/DOPPLER COMPLETE: CPT

## 2024-07-26 PROCEDURE — A9502 TC99M TETROFOSMIN: HCPCS | Performed by: EMERGENCY MEDICINE

## 2024-07-26 PROCEDURE — 99214 OFFICE O/P EST MOD 30 MIN: CPT | Performed by: NURSE PRACTITIONER

## 2024-07-26 PROCEDURE — 78452 HT MUSCLE IMAGE SPECT MULT: CPT | Performed by: INTERNAL MEDICINE

## 2024-07-26 PROCEDURE — 93016 CV STRESS TEST SUPVJ ONLY: CPT | Performed by: INTERNAL MEDICINE

## 2024-07-26 PROCEDURE — 93306 TTE W/DOPPLER COMPLETE: CPT | Performed by: INTERNAL MEDICINE

## 2024-07-26 PROCEDURE — 83735 ASSAY OF MAGNESIUM: CPT | Performed by: EMERGENCY MEDICINE

## 2024-07-26 PROCEDURE — 93018 CV STRESS TEST I&R ONLY: CPT | Performed by: INTERNAL MEDICINE

## 2024-07-26 RX ORDER — REGADENOSON 0.08 MG/ML
0.4 INJECTION, SOLUTION INTRAVENOUS
Status: COMPLETED | OUTPATIENT
Start: 2024-07-26 | End: 2024-07-26

## 2024-07-26 RX ADMIN — REGADENOSON 0.4 MG: 0.08 INJECTION, SOLUTION INTRAVENOUS at 08:15

## 2024-07-26 RX ADMIN — TETROFOSMIN 1 DOSE: 1.38 INJECTION, POWDER, LYOPHILIZED, FOR SOLUTION INTRAVENOUS at 08:15

## 2024-07-26 RX ADMIN — Medication 10 ML: at 10:23

## 2024-07-26 RX ADMIN — METOPROLOL SUCCINATE 50 MG: 50 TABLET, FILM COATED, EXTENDED RELEASE ORAL at 10:21

## 2024-07-26 RX ADMIN — TETROFOSMIN 1 DOSE: 1.38 INJECTION, POWDER, LYOPHILIZED, FOR SOLUTION INTRAVENOUS at 06:08

## 2024-07-26 RX ADMIN — LIDOCAINE 1 PATCH: 4 PATCH TOPICAL at 10:21

## 2024-07-26 RX ADMIN — PERFLUTREN 2 ML: 6.52 INJECTION, SUSPENSION INTRAVENOUS at 13:16

## 2024-07-26 NOTE — OUTREACH NOTE
Prep Survey      Flowsheet Row Responses   [facility] patient discharged from? [place]   Is LACE score < 7 ? Yes   Eligibility [facility]   Date of Admission 07/25/24   Date of Discharge 07/26/24   Discharge diagnosis Chest pain   Does the patient have one of the following disease processes/diagnoses(primary or secondary)? Other   Prep survey completed? Yes            Lindsay HELMS - Registered Nurse

## 2024-07-26 NOTE — PROGRESS NOTES
"    Patient Name: Aj Salazar  :1945  78 y.o.      Patient Care Team:  Neftali Amezcua MD as PCP - General (Internal Medicine)  Tahir Childs MD as Consulting Physician (Pulmonary Disease)  Aura Perry, FERNY (Optometry)  Juan Colby MD as Consulting Physician (Cardiology)  Adarsh Rangel MD PhD as Consulting Physician (Hematology and Oncology)  Rebel Escobar MD as Consulting Physician (Urology)  Danie Mcgrath MD as Consulting Physician (Ophthalmology)  John Queen MD as Consulting Physician (Wound Care)    Chief Complaint: follow up chest pain    Interval History:    Stress today without ischemia. Resting in bed. Ice on neck. Has started going to chiropractor recently.    Objective   Vital Signs  Temp:  [97.5 °F (36.4 °C)-98 °F (36.7 °C)] 97.7 °F (36.5 °C)  Heart Rate:  [] 85  Resp:  [16-18] 18  BP: (104-169)/(64-91) 169/82  No intake or output data in the 24 hours ending 24 1039  Flowsheet Rows      Flowsheet Row First Filed Value   Admission Height 177.8 cm (70\") Documented at 2024 0823   Admission Weight 76.1 kg (167 lb 12.3 oz) Documented at 2024 0823            Physical Exam:   General Appearance:    Alert, cooperative, in no acute distress   Lungs:     Clear to auscultation.  Normal respiratory effort and rate.      Heart:    Regular rhythm and normal rate, normal S1 and S2, no murmurs, gallops or rubs.     Chest Wall:    No abnormalities observed   Abdomen:     Soft, nontender, positive bowel sounds.     Extremities:   no cyanosis, clubbing or edema.  No marked joint deformities.  Adequate musculoskeletal strength.       Results Review:    Results from last 7 days   Lab Units 24  0504   SODIUM mmol/L 136   POTASSIUM mmol/L 3.6   CHLORIDE mmol/L 103   CO2 mmol/L 23.0   BUN mg/dL 16   CREATININE mg/dL 0.92   GLUCOSE mg/dL 115*   CALCIUM mg/dL 8.9     Results from last 7 days   Lab Units 24  1509 24  1028 " 07/25/24  0819   HSTROP T ng/L 22* 25* 23*     Results from last 7 days   Lab Units 07/26/24  0504   WBC 10*3/mm3 7.28   HEMOGLOBIN g/dL 13.1   HEMATOCRIT % 41.7   PLATELETS 10*3/mm3 292         Results from last 7 days   Lab Units 07/26/24  0504   MAGNESIUM mg/dL 2.4     Results from last 7 days   Lab Units 07/25/24  1028   CHOLESTEROL mg/dL 116   TRIGLYCERIDES mg/dL 65   HDL CHOL mg/dL 51   LDL CHOL mg/dL 51               Medication Review:   atorvastatin, 80 mg, Oral, Nightly  brimonidine, 1 drop, Both Eyes, BID  budesonide-formoterol, 2 puff, Inhalation, BID - RT   And  tiotropium bromide monohydrate, 2 puff, Inhalation, Daily - RT  bumetanide, 1 mg, Oral, Daily  cilostazol, 100 mg, Oral, BID  clopidogrel, 75 mg, Oral, Daily  dabigatran etexilate, 150 mg, Oral, BID  empagliflozin, 10 mg, Oral, Daily  insulin lispro, 2-7 Units, Subcutaneous, 4x Daily AC & at Bedtime  latanoprost, 1 drop, Both Eyes, Nightly  Lidocaine, 1 patch, Transdermal, Q24H  metFORMIN ER, 500 mg, Oral, Q PM  metoprolol succinate XL, 100 mg, Oral, Nightly  metoprolol succinate XL, 50 mg, Oral, Daily  oxybutynin XL, 5 mg, Oral, Daily  pantoprazole, 40 mg, Oral, BID  sodium chloride, 10 mL, Intravenous, Q12H  spironolactone, 25 mg, Oral, Daily  tamsulosin, 0.8 mg, Oral, Nightly              Assessment & Plan   Atypical chest discomfort, unable to do CT coronary angio due to AF.   Permanent atrial fibrillation on metoprolol and dabigatran  Coronary artery calcium  Hyperlipidemia  COPD  History of stroke, on dabigatran and clopidogrel    Stress test this morning without evidence of ischemia.   Echocardiogram pending, if unremarkable - he can go home today.     SHANIQUA Vanegas  [facility]  07/26/24  10:39 EDT

## 2024-07-26 NOTE — PLAN OF CARE
Goal Outcome Evaluation:  Plan of Care Reviewed With: patient        Progress: no change  Outcome Evaluation: Assumed care of patient. Patient is alert and oriented x4, up ad karen, maintained on supplemental INO2 at 2L after midnight (history of TOBY-wears cpap at home). VSS. Patient denies any chest discomfort during shift. Maintained on NPO after midnight. Cardio following. Pending AM labs/echo/nuclear stress test this morning. Promoted rest and sleep. Plan of care ongoing.

## 2024-07-26 NOTE — DISCHARGE SUMMARY
ED OBSERVATION PROGRESS/DISCHARGE SUMMARY    Date of Admission: 7/25/2024   LOS: 0 days   PCP: Neftali Amezcua MD    Final Diagnosis chest pain      Subjective     Hospital Outcome: Aj Salazar is a 78 y.o. male with past medical history of hypertension, hyperlipidemia, carotid artery stenosis, PAD/PVD, BPH, CHF, COPD, GERD, TOBY on CPAP, A-fib, history of TIA, basal and squamous cell skin cancers, history of alcohol abuse in remission, and former smoker who is being admitted to the hospital for complaint of chest pain.  Patient reports that he has been having left-sided shoulder pain for several weeks and it has just progressively worsened and he went to a chiropractor on 7/24/2024 and his chiropractor kept asking if he had any chest pain and the patient said no.  Patient reports that he woke up around 5 AM on 7/25/2024 and he had dull chest pain that went to his left shoulder and left neck that nothing makes better or worse and has been continuous throughout the day with gradual mild improvement.  Patient denies any associated dyspnea, palpitations, or peripheral edema.  Patient does report associated nausea and general weakness.  Patient denies ever feeling any pains like this before.  Patient does report a significant family medical history of a father and 2 brothers with cardiac disease in their 40s and 50s.  Patient reports that he was recently started on Pletal per his vascular surgeon who is following him for both carotid artery stenosis as well as right femoral artery stenosis; if Pletal does not help his walking pain then patient will likely need bypass procedure on the right leg in the next 3 months.     7/26/2024: Echo shows normal systolic function, indeterminate diastolic function, no aortic valve regurg or stenosis present.  Cardiology ordered a stress test which was consistent with a low risk study.  Patient okay for discharge home from cardiology standpoint.  All labs and imaging findings discussed  with patient as well as specialist recommendations and patient is agreeable for discharge home at this time.    ROS:  General: no fevers, chills  Respiratory: no cough, dyspnea  Cardiovascular: no chest pain, palpitations  Abdomen: No abdominal pain, nausea, vomiting, or diarrhea  Neurologic: No focal weakness    Objective   Physical Exam:  I have reviewed the vital signs.  Temp:  [97.4 °F (36.3 °C)-98 °F (36.7 °C)] 97.6 °F (36.4 °C)  Heart Rate:  [] 77  Resp:  [16-17] 16  BP: (104-150)/(64-91) 132/77  General Appearance:    Alert, cooperative, no distress  Head:    Normocephalic, atraumatic, normal hearing  Eyes:    Sclerae anicteric, EOMI  Neck:   Supple, nontender  Lungs: Clear to auscultation bilaterally, respirations unlabored  Heart: Regular rate and rhythm, S1 and S2 normal, no murmur, rub or gallop  Abdomen:  Soft, non-tender, bowel sounds active, nondistended  Extremities: No clubbing, cyanosis, or edema to lower extremities  Pulses:  2+ and symmetric in distal lower extremities  Skin: No rashes   Neurologic: Oriented x3, Normal strength to extremities    Results Review:    I have reviewed the labs, radiology results and diagnostic studies.    Results from last 7 days   Lab Units 07/26/24  0504   WBC 10*3/mm3 7.28   HEMOGLOBIN g/dL 13.1   HEMATOCRIT % 41.7   PLATELETS 10*3/mm3 292     Results from last 7 days   Lab Units 07/26/24  0504 07/25/24  0819   SODIUM mmol/L 136 137   POTASSIUM mmol/L 3.6 4.1   CHLORIDE mmol/L 103 102   CO2 mmol/L 23.0 26.0   BUN mg/dL 16 15   CREATININE mg/dL 0.92 0.82   CALCIUM mg/dL 8.9 9.3   BILIRUBIN mg/dL  --  0.8   ALK PHOS U/L  --  119*   ALT (SGPT) U/L  --  14   AST (SGOT) U/L  --  17   GLUCOSE mg/dL 115* 122*     Imaging Results (Last 24 Hours)       Procedure Component Value Units Date/Time    XR Chest 1 View [[id number]] Collected: 07/25/24 0833     Updated: 07/25/24 0838    Narrative:      XR CHEST 1 VW-     Clinical: Chest pain     FINDINGS: There is  atherosclerotic calcification of the aorta. Azygos  lobe noted. Cardiac size within normal limits. No pleural effusion or  vascular congestion, the left lung is clear. Subtle right upper lobe  infiltrate has developed. The remainder is unremarkable.     This report was finalized on 7/25/2024 8:34 AM by Dr. Narciso Hooper M.D  on Workstation: [id number]               I have reviewed the medications.  ---------------------------------------------------------------------------------------------  Assessment & Plan   Assessment/Problem List    Chest pain      Plan:  Chest pain:  -Initial troponin 23 then 25; continue to trend  -EKG shows rate controlled A-fib, no acute ST changes  -Chest x-ray negative for acute issues but does show chronic atherosclerotic calcification of the aorta  -Continuous cardiac monitor  -Echo shows normal systolic function, indeterminate diastolic function, no aortic valve regurg or stenosis present.    -Cardiology ordered a stress test which was consistent with a low risk study.   - Patient okay for discharge home from cardiology standpoint.      Chronic hypertension  Chronic hyperlipidemia  Chronic A-fib  Carotid artery stenosis  PVD/PAD:  -Continue home regimen     TOBY on CPAP:  -Okay to use home CPAP or supplemental O2 as needed for sleep apnea     GERD:  -Continue home regimen    Disposition: Discharged to home    Follow-up after Discharge: PCP in 1 to 2 weeks, cardiology at next regularly scheduled follow-up appointment    This note will serve as a discharge summary    Bita Mcpherson PA-C 07/26/24 07:33 EDT    I have worn appropriate PPE during this patient encounter, sanitized my hands both with entering and exiting patient's room.      37 minutes has been spent by [facility] providers in the care of this patient while under observation status

## 2024-07-27 ENCOUNTER — APPOINTMENT (OUTPATIENT)
Dept: GENERAL RADIOLOGY | Facility: HOSPITAL | Age: 79
End: 2024-07-27
Payer: MEDICARE

## 2024-07-27 ENCOUNTER — HOSPITAL ENCOUNTER (OUTPATIENT)
Facility: HOSPITAL | Age: 79
Setting detail: OBSERVATION
Discharge: HOME | End: 2024-07-29
Attending: STUDENT IN AN ORGANIZED HEALTH CARE EDUCATION/TRAINING PROGRAM | Admitting: HOSPITALIST
Payer: MEDICARE

## 2024-07-27 ENCOUNTER — APPOINTMENT (OUTPATIENT)
Dept: CT IMAGING | Facility: HOSPITAL | Age: 79
End: 2024-07-27
Payer: MEDICARE

## 2024-07-27 DIAGNOSIS — M54.9 ACUTE RIGHT-SIDED BACK PAIN, UNSPECIFIED BACK LOCATION: Primary | ICD-10-CM

## 2024-07-27 DIAGNOSIS — R07.9 CHEST PAIN, UNSPECIFIED TYPE: ICD-10-CM

## 2024-07-27 DIAGNOSIS — M48.02 CERVICAL STENOSIS OF SPINE: ICD-10-CM

## 2024-07-27 DIAGNOSIS — M25.512 ACUTE PAIN OF LEFT SHOULDER: ICD-10-CM

## 2024-07-27 LAB
ALBUMIN SERPL-MCNC: 4.1 G/DL (ref 3.5–5.2)
ALBUMIN/GLOB SERPL: 1.3 G/DL
ALP SERPL-CCNC: 122 U/L (ref 39–117)
ALT SERPL W P-5'-P-CCNC: 15 U/L (ref 1–41)
ANION GAP SERPL CALCULATED.3IONS-SCNC: 12 MMOL/L (ref 5–15)
APTT PPP: 42.2 SECONDS (ref 22.7–35.4)
AST SERPL-CCNC: 18 U/L (ref 1–40)
BASOPHILS # BLD AUTO: 0.04 10*3/MM3 (ref 0–0.2)
BASOPHILS NFR BLD AUTO: 0.4 % (ref 0–1.5)
BILIRUB SERPL-MCNC: 0.9 MG/DL (ref 0–1.2)
BUN SERPL-MCNC: 14 MG/DL (ref 8–23)
BUN/CREAT SERPL: 15.4 (ref 7–25)
CALCIUM SPEC-SCNC: 9.2 MG/DL (ref 8.6–10.5)
CHLORIDE SERPL-SCNC: 102 MMOL/L (ref 98–107)
CO2 SERPL-SCNC: 22 MMOL/L (ref 22–29)
CREAT SERPL-MCNC: 0.91 MG/DL (ref 0.76–1.27)
DEPRECATED RDW RBC AUTO: 45 FL (ref 37–54)
EGFRCR SERPLBLD CKD-EPI 2021: 86.8 ML/MIN/1.73
EOSINOPHIL # BLD AUTO: 0.11 10*3/MM3 (ref 0–0.4)
EOSINOPHIL NFR BLD AUTO: 1.1 % (ref 0.3–6.2)
ERYTHROCYTE [DISTWIDTH] IN BLOOD BY AUTOMATED COUNT: 15.1 % (ref 12.3–15.4)
GLOBULIN UR ELPH-MCNC: 3.2 GM/DL
GLUCOSE SERPL-MCNC: 141 MG/DL (ref 65–99)
HCT VFR BLD AUTO: 43.4 % (ref 37.5–51)
HGB BLD-MCNC: 13.8 G/DL (ref 13–17.7)
HOLD SPECIMEN: NORMAL
HOLD SPECIMEN: NORMAL
IMM GRANULOCYTES # BLD AUTO: 0.05 10*3/MM3 (ref 0–0.05)
IMM GRANULOCYTES NFR BLD AUTO: 0.5 % (ref 0–0.5)
INR PPP: 1.33 (ref 0.9–1.1)
LYMPHOCYTES # BLD AUTO: 1.33 10*3/MM3 (ref 0.7–3.1)
LYMPHOCYTES NFR BLD AUTO: 13.5 % (ref 19.6–45.3)
MCH RBC QN AUTO: 26 PG (ref 26.6–33)
MCHC RBC AUTO-ENTMCNC: 31.8 G/DL (ref 31.5–35.7)
MCV RBC AUTO: 81.9 FL (ref 79–97)
MONOCYTES # BLD AUTO: 0.95 10*3/MM3 (ref 0.1–0.9)
MONOCYTES NFR BLD AUTO: 9.6 % (ref 5–12)
NEUTROPHILS NFR BLD AUTO: 7.39 10*3/MM3 (ref 1.7–7)
NEUTROPHILS NFR BLD AUTO: 74.9 % (ref 42.7–76)
NRBC BLD AUTO-RTO: 0 /100 WBC (ref 0–0.2)
PLATELET # BLD AUTO: 308 10*3/MM3 (ref 140–450)
PMV BLD AUTO: 8.7 FL (ref 6–12)
POTASSIUM SERPL-SCNC: 3.9 MMOL/L (ref 3.5–5.2)
PROT SERPL-MCNC: 7.3 G/DL (ref 6–8.5)
PROTHROMBIN TIME: 16.7 SECONDS (ref 11.7–14.2)
RBC # BLD AUTO: 5.3 10*6/MM3 (ref 4.14–5.8)
SODIUM SERPL-SCNC: 136 MMOL/L (ref 136–145)
TROPONIN T SERPL HS-MCNC: 21 NG/L
WBC NRBC COR # BLD AUTO: 9.87 10*3/MM3 (ref 3.4–10.8)
WHOLE BLOOD HOLD COAG: NORMAL
WHOLE BLOOD HOLD SPECIMEN: NORMAL

## 2024-07-27 PROCEDURE — 80053 COMPREHEN METABOLIC PANEL: CPT | Performed by: STUDENT IN AN ORGANIZED HEALTH CARE EDUCATION/TRAINING PROGRAM

## 2024-07-27 PROCEDURE — 25010000002 MORPHINE PER 10 MG: Performed by: STUDENT IN AN ORGANIZED HEALTH CARE EDUCATION/TRAINING PROGRAM

## 2024-07-27 PROCEDURE — 71275 CT ANGIOGRAPHY CHEST: CPT

## 2024-07-27 PROCEDURE — 85025 COMPLETE CBC W/AUTO DIFF WBC: CPT | Performed by: STUDENT IN AN ORGANIZED HEALTH CARE EDUCATION/TRAINING PROGRAM

## 2024-07-27 PROCEDURE — 85610 PROTHROMBIN TIME: CPT | Performed by: STUDENT IN AN ORGANIZED HEALTH CARE EDUCATION/TRAINING PROGRAM

## 2024-07-27 PROCEDURE — 25510000001 IOPAMIDOL PER 1 ML: Performed by: STUDENT IN AN ORGANIZED HEALTH CARE EDUCATION/TRAINING PROGRAM

## 2024-07-27 PROCEDURE — 93010 ELECTROCARDIOGRAM REPORT: CPT | Performed by: INTERNAL MEDICINE

## 2024-07-27 PROCEDURE — 74174 CTA ABD&PLVS W/CONTRAST: CPT

## 2024-07-27 PROCEDURE — 93005 ELECTROCARDIOGRAM TRACING: CPT | Performed by: STUDENT IN AN ORGANIZED HEALTH CARE EDUCATION/TRAINING PROGRAM

## 2024-07-27 PROCEDURE — 84484 ASSAY OF TROPONIN QUANT: CPT | Performed by: STUDENT IN AN ORGANIZED HEALTH CARE EDUCATION/TRAINING PROGRAM

## 2024-07-27 PROCEDURE — 96374 THER/PROPH/DIAG INJ IV PUSH: CPT

## 2024-07-27 PROCEDURE — 93005 ELECTROCARDIOGRAM TRACING: CPT

## 2024-07-27 PROCEDURE — 99285 EMERGENCY DEPT VISIT HI MDM: CPT

## 2024-07-27 PROCEDURE — 85730 THROMBOPLASTIN TIME PARTIAL: CPT | Performed by: STUDENT IN AN ORGANIZED HEALTH CARE EDUCATION/TRAINING PROGRAM

## 2024-07-27 PROCEDURE — 71045 X-RAY EXAM CHEST 1 VIEW: CPT

## 2024-07-27 RX ORDER — MORPHINE SULFATE 2 MG/ML
4 INJECTION, SOLUTION INTRAMUSCULAR; INTRAVENOUS ONCE
Status: COMPLETED | OUTPATIENT
Start: 2024-07-27 | End: 2024-07-27

## 2024-07-27 RX ORDER — SODIUM CHLORIDE 0.9 % (FLUSH) 0.9 %
10 SYRINGE (ML) INJECTION AS NEEDED
Status: DISCONTINUED | OUTPATIENT
Start: 2024-07-27 | End: 2024-07-29 | Stop reason: HOSPADM

## 2024-07-27 RX ORDER — ASPIRIN 325 MG
325 TABLET ORAL ONCE
Status: COMPLETED | OUTPATIENT
Start: 2024-07-27 | End: 2024-07-27

## 2024-07-27 RX ADMIN — IOPAMIDOL 95 ML: 755 INJECTION, SOLUTION INTRAVENOUS at 23:22

## 2024-07-27 RX ADMIN — ASPIRIN 325 MG: 325 TABLET ORAL at 22:31

## 2024-07-27 RX ADMIN — MORPHINE SULFATE 4 MG: 2 INJECTION, SOLUTION INTRAMUSCULAR; INTRAVENOUS at 22:32

## 2024-07-28 ENCOUNTER — APPOINTMENT (OUTPATIENT)
Dept: MRI IMAGING | Facility: HOSPITAL | Age: 79
End: 2024-07-28
Payer: MEDICARE

## 2024-07-28 PROBLEM — M54.9 BACK PAIN: Status: ACTIVE | Noted: 2024-07-28

## 2024-07-28 LAB
ANION GAP SERPL CALCULATED.3IONS-SCNC: 12.7 MMOL/L (ref 5–15)
BUN SERPL-MCNC: 12 MG/DL (ref 8–23)
BUN/CREAT SERPL: 13.8 (ref 7–25)
CALCIUM SPEC-SCNC: 8.6 MG/DL (ref 8.6–10.5)
CHLORIDE SERPL-SCNC: 100 MMOL/L (ref 98–107)
CO2 SERPL-SCNC: 23.3 MMOL/L (ref 22–29)
CREAT SERPL-MCNC: 0.87 MG/DL (ref 0.76–1.27)
DEPRECATED RDW RBC AUTO: 45.1 FL (ref 37–54)
EGFRCR SERPLBLD CKD-EPI 2021: 88.9 ML/MIN/1.73
ERYTHROCYTE [DISTWIDTH] IN BLOOD BY AUTOMATED COUNT: 15.1 % (ref 12.3–15.4)
GEN 5 2HR TROPONIN T REFLEX: 22 NG/L
GLUCOSE BLDC GLUCOMTR-MCNC: 106 MG/DL (ref 70–130)
GLUCOSE BLDC GLUCOMTR-MCNC: 146 MG/DL (ref 70–130)
GLUCOSE BLDC GLUCOMTR-MCNC: 175 MG/DL (ref 70–130)
GLUCOSE BLDC GLUCOMTR-MCNC: 91 MG/DL (ref 70–130)
GLUCOSE SERPL-MCNC: 106 MG/DL (ref 65–99)
HCT VFR BLD AUTO: 41.4 % (ref 37.5–51)
HGB BLD-MCNC: 12.8 G/DL (ref 13–17.7)
MCH RBC QN AUTO: 25.4 PG (ref 26.6–33)
MCHC RBC AUTO-ENTMCNC: 30.9 G/DL (ref 31.5–35.7)
MCV RBC AUTO: 82.3 FL (ref 79–97)
PLATELET # BLD AUTO: 269 10*3/MM3 (ref 140–450)
PMV BLD AUTO: 9 FL (ref 6–12)
POTASSIUM SERPL-SCNC: 3.5 MMOL/L (ref 3.5–5.2)
QT INTERVAL: 343 MS
QT INTERVAL: 373 MS
QTC INTERVAL: 414 MS
QTC INTERVAL: 473 MS
RBC # BLD AUTO: 5.03 10*6/MM3 (ref 4.14–5.8)
SODIUM SERPL-SCNC: 136 MMOL/L (ref 136–145)
TROPONIN T DELTA: 1 NG/L
TROPONIN T SERPL HS-MCNC: 21 NG/L
WBC NRBC COR # BLD AUTO: 7.69 10*3/MM3 (ref 3.4–10.8)

## 2024-07-28 PROCEDURE — 72156 MRI NECK SPINE W/O & W/DYE: CPT

## 2024-07-28 PROCEDURE — 94640 AIRWAY INHALATION TREATMENT: CPT

## 2024-07-28 PROCEDURE — 25010000002 METHYLPREDNISOLONE PER 125 MG: Performed by: HOSPITALIST

## 2024-07-28 PROCEDURE — 99214 OFFICE O/P EST MOD 30 MIN: CPT | Performed by: INTERNAL MEDICINE

## 2024-07-28 PROCEDURE — 63710000001 INSULIN LISPRO (HUMAN) PER 5 UNITS: Performed by: NURSE PRACTITIONER

## 2024-07-28 PROCEDURE — G0378 HOSPITAL OBSERVATION PER HR: HCPCS

## 2024-07-28 PROCEDURE — 25010000002 HYDROMORPHONE PER 4 MG: Performed by: EMERGENCY MEDICINE

## 2024-07-28 PROCEDURE — 25010000002 MORPHINE PER 10 MG: Performed by: NURSE PRACTITIONER

## 2024-07-28 PROCEDURE — 80048 BASIC METABOLIC PNL TOTAL CA: CPT | Performed by: NURSE PRACTITIONER

## 2024-07-28 PROCEDURE — 93005 ELECTROCARDIOGRAM TRACING: CPT | Performed by: NURSE PRACTITIONER

## 2024-07-28 PROCEDURE — 36415 COLL VENOUS BLD VENIPUNCTURE: CPT | Performed by: NURSE PRACTITIONER

## 2024-07-28 PROCEDURE — 94799 UNLISTED PULMONARY SVC/PX: CPT

## 2024-07-28 PROCEDURE — 96376 TX/PRO/DX INJ SAME DRUG ADON: CPT

## 2024-07-28 PROCEDURE — A9577 INJ MULTIHANCE: HCPCS | Performed by: HOSPITALIST

## 2024-07-28 PROCEDURE — 84484 ASSAY OF TROPONIN QUANT: CPT | Performed by: STUDENT IN AN ORGANIZED HEALTH CARE EDUCATION/TRAINING PROGRAM

## 2024-07-28 PROCEDURE — 25010000002 HYDROMORPHONE 1 MG/ML SOLUTION: Performed by: EMERGENCY MEDICINE

## 2024-07-28 PROCEDURE — 85027 COMPLETE CBC AUTOMATED: CPT | Performed by: NURSE PRACTITIONER

## 2024-07-28 PROCEDURE — 93010 ELECTROCARDIOGRAM REPORT: CPT | Performed by: INTERNAL MEDICINE

## 2024-07-28 PROCEDURE — 96375 TX/PRO/DX INJ NEW DRUG ADDON: CPT

## 2024-07-28 PROCEDURE — 84484 ASSAY OF TROPONIN QUANT: CPT | Performed by: NURSE PRACTITIONER

## 2024-07-28 PROCEDURE — 0 GADOBENATE DIMEGLUMINE 529 MG/ML SOLUTION: Performed by: HOSPITALIST

## 2024-07-28 PROCEDURE — 72157 MRI CHEST SPINE W/O & W/DYE: CPT

## 2024-07-28 PROCEDURE — 82948 REAGENT STRIP/BLOOD GLUCOSE: CPT

## 2024-07-28 RX ORDER — BUDESONIDE AND FORMOTEROL FUMARATE DIHYDRATE 160; 4.5 UG/1; UG/1
2 AEROSOL RESPIRATORY (INHALATION)
Status: DISCONTINUED | OUTPATIENT
Start: 2024-07-28 | End: 2024-07-29 | Stop reason: HOSPADM

## 2024-07-28 RX ORDER — DEXTROSE MONOHYDRATE 25 G/50ML
25 INJECTION, SOLUTION INTRAVENOUS
Status: DISCONTINUED | OUTPATIENT
Start: 2024-07-28 | End: 2024-07-29 | Stop reason: HOSPADM

## 2024-07-28 RX ORDER — ACETAMINOPHEN 160 MG/5ML
650 SOLUTION ORAL EVERY 4 HOURS PRN
Status: DISCONTINUED | OUTPATIENT
Start: 2024-07-28 | End: 2024-07-29 | Stop reason: HOSPADM

## 2024-07-28 RX ORDER — SPIRONOLACTONE 25 MG/1
25 TABLET ORAL DAILY
Status: DISCONTINUED | OUTPATIENT
Start: 2024-07-28 | End: 2024-07-29 | Stop reason: HOSPADM

## 2024-07-28 RX ORDER — METOPROLOL SUCCINATE 100 MG/1
100 TABLET, EXTENDED RELEASE ORAL NIGHTLY
Status: DISCONTINUED | OUTPATIENT
Start: 2024-07-28 | End: 2024-07-29 | Stop reason: HOSPADM

## 2024-07-28 RX ORDER — ATORVASTATIN CALCIUM 80 MG/1
80 TABLET, FILM COATED ORAL NIGHTLY
Status: DISCONTINUED | OUTPATIENT
Start: 2024-07-28 | End: 2024-07-29 | Stop reason: HOSPADM

## 2024-07-28 RX ORDER — PANTOPRAZOLE SODIUM 40 MG/1
40 TABLET, DELAYED RELEASE ORAL 2 TIMES DAILY
Status: DISCONTINUED | OUTPATIENT
Start: 2024-07-28 | End: 2024-07-29 | Stop reason: HOSPADM

## 2024-07-28 RX ORDER — POTASSIUM CHLORIDE 750 MG/1
40 TABLET, FILM COATED, EXTENDED RELEASE ORAL ONCE
Status: COMPLETED | OUTPATIENT
Start: 2024-07-28 | End: 2024-07-28

## 2024-07-28 RX ORDER — ACETAMINOPHEN 650 MG/1
650 SUPPOSITORY RECTAL EVERY 4 HOURS PRN
Status: DISCONTINUED | OUTPATIENT
Start: 2024-07-28 | End: 2024-07-29 | Stop reason: HOSPADM

## 2024-07-28 RX ORDER — SODIUM CHLORIDE 9 MG/ML
40 INJECTION, SOLUTION INTRAVENOUS AS NEEDED
Status: DISCONTINUED | OUTPATIENT
Start: 2024-07-28 | End: 2024-07-29 | Stop reason: HOSPADM

## 2024-07-28 RX ORDER — METHYLPREDNISOLONE SODIUM SUCCINATE 125 MG/2ML
INJECTION, POWDER, LYOPHILIZED, FOR SOLUTION INTRAMUSCULAR; INTRAVENOUS
Status: ACTIVE
Start: 2024-07-28 | End: 2024-07-28

## 2024-07-28 RX ORDER — MORPHINE SULFATE 2 MG/ML
2 INJECTION, SOLUTION INTRAMUSCULAR; INTRAVENOUS
Status: DISCONTINUED | OUTPATIENT
Start: 2024-07-28 | End: 2024-07-29 | Stop reason: HOSPADM

## 2024-07-28 RX ORDER — CLOPIDOGREL BISULFATE 75 MG/1
75 TABLET ORAL DAILY
Status: DISCONTINUED | OUTPATIENT
Start: 2024-07-28 | End: 2024-07-29 | Stop reason: HOSPADM

## 2024-07-28 RX ORDER — AMOXICILLIN 250 MG
2 CAPSULE ORAL 2 TIMES DAILY PRN
Status: DISCONTINUED | OUTPATIENT
Start: 2024-07-28 | End: 2024-07-29 | Stop reason: HOSPADM

## 2024-07-28 RX ORDER — INSULIN LISPRO 100 [IU]/ML
2-7 INJECTION, SOLUTION INTRAVENOUS; SUBCUTANEOUS
Status: DISCONTINUED | OUTPATIENT
Start: 2024-07-28 | End: 2024-07-29 | Stop reason: HOSPADM

## 2024-07-28 RX ORDER — BUMETANIDE 1 MG/1
1 TABLET ORAL DAILY
Status: DISCONTINUED | OUTPATIENT
Start: 2024-07-28 | End: 2024-07-29 | Stop reason: HOSPADM

## 2024-07-28 RX ORDER — HYDROMORPHONE HYDROCHLORIDE 1 MG/ML
0.5 INJECTION, SOLUTION INTRAMUSCULAR; INTRAVENOUS; SUBCUTANEOUS ONCE
Status: COMPLETED | OUTPATIENT
Start: 2024-07-28 | End: 2024-07-28

## 2024-07-28 RX ORDER — ACETAMINOPHEN 325 MG/1
650 TABLET ORAL EVERY 4 HOURS PRN
Status: DISCONTINUED | OUTPATIENT
Start: 2024-07-28 | End: 2024-07-29 | Stop reason: HOSPADM

## 2024-07-28 RX ORDER — NICOTINE POLACRILEX 4 MG
15 LOZENGE BUCCAL
Status: DISCONTINUED | OUTPATIENT
Start: 2024-07-28 | End: 2024-07-29 | Stop reason: HOSPADM

## 2024-07-28 RX ORDER — POLYETHYLENE GLYCOL 3350 17 G/17G
17 POWDER, FOR SOLUTION ORAL DAILY PRN
Status: DISCONTINUED | OUTPATIENT
Start: 2024-07-28 | End: 2024-07-29 | Stop reason: HOSPADM

## 2024-07-28 RX ORDER — BRIMONIDINE TARTRATE 2 MG/ML
1 SOLUTION/ DROPS OPHTHALMIC 2 TIMES DAILY
Status: DISCONTINUED | OUTPATIENT
Start: 2024-07-28 | End: 2024-07-29 | Stop reason: HOSPADM

## 2024-07-28 RX ORDER — SODIUM CHLORIDE 0.9 % (FLUSH) 0.9 %
10 SYRINGE (ML) INJECTION EVERY 12 HOURS SCHEDULED
Status: DISCONTINUED | OUTPATIENT
Start: 2024-07-28 | End: 2024-07-29 | Stop reason: HOSPADM

## 2024-07-28 RX ORDER — METHYLPREDNISOLONE SODIUM SUCCINATE 125 MG/2ML
60 INJECTION, POWDER, LYOPHILIZED, FOR SOLUTION INTRAMUSCULAR; INTRAVENOUS ONCE
Status: COMPLETED | OUTPATIENT
Start: 2024-07-28 | End: 2024-07-28

## 2024-07-28 RX ORDER — DABIGATRAN ETEXILATE 150 MG/1
150 CAPSULE ORAL 2 TIMES DAILY
Status: DISCONTINUED | OUTPATIENT
Start: 2024-07-28 | End: 2024-07-29 | Stop reason: HOSPADM

## 2024-07-28 RX ORDER — CALCIUM CARBONATE 500 MG/1
2 TABLET, CHEWABLE ORAL 2 TIMES DAILY PRN
Status: DISCONTINUED | OUTPATIENT
Start: 2024-07-28 | End: 2024-07-29 | Stop reason: HOSPADM

## 2024-07-28 RX ORDER — IBUPROFEN 600 MG/1
1 TABLET ORAL
Status: DISCONTINUED | OUTPATIENT
Start: 2024-07-28 | End: 2024-07-29 | Stop reason: HOSPADM

## 2024-07-28 RX ORDER — BISACODYL 5 MG/1
5 TABLET, DELAYED RELEASE ORAL DAILY PRN
Status: DISCONTINUED | OUTPATIENT
Start: 2024-07-28 | End: 2024-07-29 | Stop reason: HOSPADM

## 2024-07-28 RX ORDER — LATANOPROST 50 UG/ML
1 SOLUTION/ DROPS OPHTHALMIC NIGHTLY
Status: DISCONTINUED | OUTPATIENT
Start: 2024-07-28 | End: 2024-07-29 | Stop reason: HOSPADM

## 2024-07-28 RX ORDER — CILOSTAZOL 100 MG/1
100 TABLET ORAL 2 TIMES DAILY
Status: DISCONTINUED | OUTPATIENT
Start: 2024-07-28 | End: 2024-07-29 | Stop reason: HOSPADM

## 2024-07-28 RX ORDER — SODIUM CHLORIDE 0.9 % (FLUSH) 0.9 %
10 SYRINGE (ML) INJECTION AS NEEDED
Status: DISCONTINUED | OUTPATIENT
Start: 2024-07-28 | End: 2024-07-29 | Stop reason: HOSPADM

## 2024-07-28 RX ORDER — HYDROCODONE BITARTRATE AND ACETAMINOPHEN 5; 325 MG/1; MG/1
1 TABLET ORAL EVERY 6 HOURS PRN
Status: DISCONTINUED | OUTPATIENT
Start: 2024-07-28 | End: 2024-07-29 | Stop reason: HOSPADM

## 2024-07-28 RX ORDER — TAMSULOSIN HYDROCHLORIDE 0.4 MG/1
0.8 CAPSULE ORAL NIGHTLY
Status: DISCONTINUED | OUTPATIENT
Start: 2024-07-28 | End: 2024-07-29 | Stop reason: HOSPADM

## 2024-07-28 RX ORDER — BISACODYL 10 MG
10 SUPPOSITORY, RECTAL RECTAL DAILY PRN
Status: DISCONTINUED | OUTPATIENT
Start: 2024-07-28 | End: 2024-07-29 | Stop reason: HOSPADM

## 2024-07-28 RX ORDER — NITROGLYCERIN 0.4 MG/1
0.4 TABLET SUBLINGUAL
Status: DISCONTINUED | OUTPATIENT
Start: 2024-07-28 | End: 2024-07-29 | Stop reason: HOSPADM

## 2024-07-28 RX ADMIN — INSULIN LISPRO 2 UNITS: 100 INJECTION, SOLUTION INTRAVENOUS; SUBCUTANEOUS at 21:42

## 2024-07-28 RX ADMIN — HYDROMORPHONE HYDROCHLORIDE 0.5 MG: 1 INJECTION, SOLUTION INTRAMUSCULAR; INTRAVENOUS; SUBCUTANEOUS at 00:42

## 2024-07-28 RX ADMIN — HYDROCODONE BITARTRATE AND ACETAMINOPHEN 1 TABLET: 5; 325 TABLET ORAL at 17:45

## 2024-07-28 RX ADMIN — POTASSIUM CHLORIDE 40 MEQ: 750 TABLET, EXTENDED RELEASE ORAL at 10:51

## 2024-07-28 RX ADMIN — PANTOPRAZOLE SODIUM 40 MG: 40 TABLET, DELAYED RELEASE ORAL at 10:48

## 2024-07-28 RX ADMIN — BUDESONIDE AND FORMOTEROL FUMARATE DIHYDRATE 2 PUFF: 160; 4.5 AEROSOL RESPIRATORY (INHALATION) at 11:20

## 2024-07-28 RX ADMIN — HYDROCODONE BITARTRATE AND ACETAMINOPHEN 1 TABLET: 5; 325 TABLET ORAL at 11:53

## 2024-07-28 RX ADMIN — BUMETANIDE 1 MG: 1 TABLET ORAL at 11:45

## 2024-07-28 RX ADMIN — Medication 10 ML: at 21:45

## 2024-07-28 RX ADMIN — BRIMONIDINE TARTRATE 1 DROP: 2 SOLUTION/ DROPS OPHTHALMIC at 21:43

## 2024-07-28 RX ADMIN — CILOSTAZOL 100 MG: 100 TABLET ORAL at 21:46

## 2024-07-28 RX ADMIN — TAMSULOSIN HYDROCHLORIDE 0.8 MG: 0.4 CAPSULE ORAL at 21:42

## 2024-07-28 RX ADMIN — BUDESONIDE AND FORMOTEROL FUMARATE DIHYDRATE 2 PUFF: 160; 4.5 AEROSOL RESPIRATORY (INHALATION) at 21:58

## 2024-07-28 RX ADMIN — HYDROMORPHONE HYDROCHLORIDE 1 MG: 1 INJECTION, SOLUTION INTRAMUSCULAR; INTRAVENOUS; SUBCUTANEOUS at 01:42

## 2024-07-28 RX ADMIN — DABIGATRAN ETEXILATE MESYLATE 150 MG: 150 CAPSULE ORAL at 21:44

## 2024-07-28 RX ADMIN — Medication 10 ML: at 08:34

## 2024-07-28 RX ADMIN — BRIMONIDINE TARTRATE 1 DROP: 2 SOLUTION/ DROPS OPHTHALMIC at 11:46

## 2024-07-28 RX ADMIN — ATORVASTATIN CALCIUM 80 MG: 80 TABLET, FILM COATED ORAL at 21:42

## 2024-07-28 RX ADMIN — SPIRONOLACTONE 25 MG: 25 TABLET, FILM COATED ORAL at 11:45

## 2024-07-28 RX ADMIN — CILOSTAZOL 100 MG: 100 TABLET ORAL at 11:45

## 2024-07-28 RX ADMIN — METOPROLOL SUCCINATE 100 MG: 100 TABLET, EXTENDED RELEASE ORAL at 21:42

## 2024-07-28 RX ADMIN — DABIGATRAN ETEXILATE MESYLATE 150 MG: 150 CAPSULE ORAL at 11:46

## 2024-07-28 RX ADMIN — CLOPIDOGREL BISULFATE 75 MG: 75 TABLET, FILM COATED ORAL at 10:48

## 2024-07-28 RX ADMIN — PANTOPRAZOLE SODIUM 40 MG: 40 TABLET, DELAYED RELEASE ORAL at 21:42

## 2024-07-28 RX ADMIN — LATANOPROST 1 DROP: 50 SOLUTION OPHTHALMIC at 21:43

## 2024-07-28 RX ADMIN — MORPHINE SULFATE 2 MG: 2 INJECTION, SOLUTION INTRAMUSCULAR; INTRAVENOUS at 07:38

## 2024-07-28 RX ADMIN — GADOBENATE DIMEGLUMINE 15 ML: 529 INJECTION, SOLUTION INTRAVENOUS at 16:02

## 2024-07-28 RX ADMIN — METHYLPREDNISOLONE SODIUM SUCCINATE 60 MG: 125 INJECTION INTRAMUSCULAR; INTRAVENOUS at 10:48

## 2024-07-28 NOTE — PLAN OF CARE
Goal Outcome Evaluation:  Plan of Care Reviewed With: patient           Outcome Evaluation: Cardiology consult this shift. 1x dose of IV solu-medrol given. MRI cervical and thoracic spine completed. Neurosurgery consulted. PRN norco given x2. Pt is Afib on the monitor.

## 2024-07-28 NOTE — ED PROVIDER NOTES
EMERGENCY DEPARTMENT ENCOUNTER    Room Number:  P588/1  PCP: Neftali Amezcua MD  Patient Care Team:  Neftali Amezcua MD as PCP - General (Internal Medicine)  Tahir Childs MD as Consulting Physician (Pulmonary Disease)  Aura Perry OD (Optometry)  Juan Colby MD as Consulting Physician (Cardiology)  Adarsh Rangel MD PhD as Consulting Physician (Hematology and Oncology)  Rebel Escobar MD as Consulting Physician (Urology)  Danie Mcgrath MD as Consulting Physician (Ophthalmology)  John Queen MD as Consulting Physician (Wound Care)   Independent Historians: Patient    HPI:  Chief Complaint: Chest and lower back pain    A complete HPI/ROS/PMH/PSH/SH/FH are unobtainable due to: None    Chronic or social conditions impacting patient care (Social Determinants of Health): None  (Financial Resource Strain / Food Insecurity / Transportation Needs / Physical Activity / Stress / Social Connections / Intimate Partner Violence / Housing Stability)    Context: Aj Salazar is a 78 y.o. male who presents to the ED c/o acute left chest and left neck and left jaw pain which radiates to his left shoulder as well as some right lower back pain for the past 8 hours.  States that there transient and brief describes it as a sharp feeling.  Worse with deep breath.  Patient was discharged from the hospital yesterday after undergoing cardiac workup including a low risk stress test.  Patient has history of atrial fibrillation.    Review of prior external notes (non-ED) -and- Review of prior external test results outside of this encounter: I reviewed patient's discharge summary from yesterday    Prescription drug monitoring program review:         PAST MEDICAL HISTORY  Active Ambulatory Problems     Diagnosis Date Noted    COPD (chronic obstructive pulmonary disease) 03/17/2016    Hypertension 03/17/2016    Osteoarthritis 03/17/2016    History of smoking greater than 50 pack years 06/23/2017     BPH with obstruction/lower urinary tract symptoms 03/27/2018    Atrial fibrillation with rapid ventricular response     Type 2 diabetes mellitus with diabetic peripheral angiopathy without gangrene, without long-term current use of insulin 01/31/2019    TIA (transient ischemic attack) 10/22/2020    A-fib 03/07/2021    Acute on chronic systolic CHF (congestive heart failure) 03/08/2021    Gastroesophageal reflux disease 03/15/2021    Raynaud's disease without gangrene 12/07/2021    Acute pyelonephritis 05/06/2022    Chronic diastolic CHF (congestive heart failure) 05/06/2022    Alcohol dependence in remission 05/06/2022    Personal history of transient ischemic attack (TIA), and cerebral infarction without residual deficits 05/06/2022    Lab test positive for detection of COVID-19 virus 05/26/2022    Pulmonary nodules/lesions, multiple 01/30/2023    Abnormal PET of left lung 03/04/2023    Neuroforaminal stenosis of cervical spine 07/17/2023    Open wound of right lower leg 04/15/2024    Atherosclerotic peripheral vascular disease with ulceration 05/23/2024    Claudication 05/23/2024    Bruit of right carotid artery 05/23/2024    Bilateral carotid artery stenosis 06/20/2024    Chest pain 07/25/2024     Resolved Ambulatory Problems     Diagnosis Date Noted    Dehydration 09/25/2018    Hypotension 09/25/2018    Melena 09/25/2018    EMPERATRIZ (acute kidney injury) 09/25/2018    Hyponatremia 09/25/2018    Lactic acidosis 09/25/2018    Rash and nonspecific skin eruption 09/02/2020    Facial droop 10/23/2020    Acute respiratory failure with hypoxia 03/08/2021    Sepsis 03/09/2021    Sepsis 05/05/2022    Scabies 10/24/2022    Pneumonia due to infectious organism 04/23/2023     Past Medical History:   Diagnosis Date    Alcohol abuse, in remission     Asthma copd    BPH (benign prostatic hypertrophy)     Cataract     CHF (congestive heart failure)     Depression     DJD (degenerative joint disease) of cervical spine     ED  (erectile dysfunction)     GERD (gastroesophageal reflux disease)     Glaucoma     History of prediabetes     Hx of colonic polyps     Hyperlipidemia     Influenza B 02/14/2013    Low back pain     Nocturnal hypoxia     Peripheral neuropathy     Permanent atrial fibrillation     Pneumonia     Rectal bleeding 04/26/2017    Tendonitis of shoulder 11/07/2007    Ulcer of the stomach and intestine          PAST SURGICAL HISTORY  Past Surgical History:   Procedure Laterality Date    APPENDECTOMY      CARDIOVASCULAR STRESS TEST N/A 10/20/2015    NML LEXISCAN CARDIOLITE PERFUSION STUDY, NO EVIDENCE OF ISCHEMIA, AREA OF HYPOPERFUSION OF THE INFERIOR WALL W/NORMAL WALL PERFUSION AND NML LEFT VENTRICULAR EJECTION FRACTION, MOST LIKELY ATTENUATION. DR.MICHAEL BOX    CATARACT EXTRACTION Bilateral 02/2023    COLONOSCOPY N/A 02/2017    COLONOSCOPY N/A 02/23/2011    4 POLYPS REMOVED, RESCOPE IN 5 YRS, DR. EAGLE    COLONOSCOPY N/A 03/08/2006    ERYTHEMOUS AND GRANULAR MUCOSA IN ILEOCECAL VALVE, NORMAL COLON, REPEAT IN 5 YRS,     ENDOSCOPY N/A 09/26/2018    normal esophagus, acute gastritis, multiple non-bleeding duodenal ulcers with pigmented material, H Pylori positive    HERNIA REPAIR Bilateral     INGUINAL    JOINT REPLACEMENT  11/2015    left hip    TOTAL HIP ARTHROPLASTY Left 11/06/2015    Dr Ankush Sky    TOTAL HIP ARTHROPLASTY Right 10/27/2014    DR.RIED SKY    VASECTOMY           FAMILY HISTORY  Family History   Problem Relation Age of Onset    Cancer Mother         Bladder cancer    Colon cancer Mother     Ovarian cancer Mother     Alzheimer's disease Mother 70    Hyperlipidemia Father     Heart disease Father     Coronary artery disease Father     Hypertension Father     Stroke Sister         October 2016    Dementia Sister     Heart disease Brother     Alcohol abuse Brother     Heart disease Brother     Coronary artery disease Brother     Hypertension Brother     Stroke Brother     Arthritis Brother      Colon cancer Maternal Grandmother     Prostate cancer Neg Hx          SOCIAL HISTORY  Social History     Socioeconomic History    Marital status:      Spouse name: Ara*    Number of children: 3   Tobacco Use    Smoking status: Former     Current packs/day: 0.00     Average packs/day: 2.0 packs/day for 49.0 years (98.0 ttl pk-yrs)     Types: Cigarettes     Start date: 1961     Quit date: 2010     Years since quittin.5     Passive exposure: Past    Smokeless tobacco: Never    Tobacco comments:     long smoking history   Vaping Use    Vaping status: Never Used   Substance and Sexual Activity    Alcohol use: No     Comment: 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; 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    Drug use: No     Comment: caffeine use     Sexual activity: Defer         ALLERGIES  Bactrim [sulfamethoxazole-trimethoprim] and Sulfacetamide        REVIEW OF SYSTEMS  Review of Systems  Included in HPI  All systems reviewed and negative except for those discussed in HPI.      PHYSICAL EXAM    I have reviewed the triage vital signs and nursing notes.    ED Triage Vitals   Temp Heart Rate Resp BP SpO2   24 2143 24 2143 243 24   98.1 °F (36.7 °C) 118 18 153/99 93 %      Temp src Heart Rate Source Patient Position BP Location FiO2 (%)   -- -- 24 --     Sitting Left arm        Physical Exam  GENERAL: alert, no acute distress  SKIN: Warm, dry  HENT: Normocephalic, atraumatic  EYES: no scleral icterus  CV: regular rhythm, regular rate  RESPIRATORY: normal effort, lungs clear  ABDOMEN: soft, nontender, nondistended  MUSCULOSKELETAL: no deformity  NEURO: alert, moves all extremities, follows commands                                                               LAB RESULTS  Recent Results (from the past 24 hour(s))   ECG 12 Lead ED Triage Standing Order; Chest Pain    Collection Time: 24  9:52 PM   Result Value Ref Range    QT Interval 343 ms    QTC  Interval 473 ms   Comprehensive Metabolic Panel    Collection Time: 07/27/24  9:59 PM    Specimen: Blood   Result Value Ref Range    Glucose 141 (H) 65 - 99 mg/dL    BUN 14 8 - 23 mg/dL    Creatinine 0.91 0.76 - 1.27 mg/dL    Sodium 136 136 - 145 mmol/L    Potassium 3.9 3.5 - 5.2 mmol/L    Chloride 102 98 - 107 mmol/L    CO2 22.0 22.0 - 29.0 mmol/L    Calcium 9.2 8.6 - 10.5 mg/dL    Total Protein 7.3 6.0 - 8.5 g/dL    Albumin 4.1 3.5 - 5.2 g/dL    ALT (SGPT) 15 1 - 41 U/L    AST (SGOT) 18 1 - 40 U/L    Alkaline Phosphatase 122 (H) 39 - 117 U/L    Total Bilirubin 0.9 0.0 - 1.2 mg/dL    Globulin 3.2 gm/dL    A/G Ratio 1.3 g/dL    BUN/Creatinine Ratio 15.4 7.0 - 25.0    Anion Gap 12.0 5.0 - 15.0 mmol/L    eGFR 86.8 >60.0 mL/min/1.73   High Sensitivity Troponin T    Collection Time: 07/27/24  9:59 PM    Specimen: Blood   Result Value Ref Range    HS Troponin T 21 <22 ng/L   Green Top (Gel)    Collection Time: 07/27/24  9:59 PM   Result Value Ref Range    Extra Tube Hold for add-ons.    Lavender Top    Collection Time: 07/27/24  9:59 PM   Result Value Ref Range    Extra Tube hold for add-on    Gold Top - SST    Collection Time: 07/27/24  9:59 PM   Result Value Ref Range    Extra Tube Hold for add-ons.    Light Blue Top    Collection Time: 07/27/24  9:59 PM   Result Value Ref Range    Extra Tube Hold for add-ons.    CBC Auto Differential    Collection Time: 07/27/24  9:59 PM    Specimen: Blood   Result Value Ref Range    WBC 9.87 3.40 - 10.80 10*3/mm3    RBC 5.30 4.14 - 5.80 10*6/mm3    Hemoglobin 13.8 13.0 - 17.7 g/dL    Hematocrit 43.4 37.5 - 51.0 %    MCV 81.9 79.0 - 97.0 fL    MCH 26.0 (L) 26.6 - 33.0 pg    MCHC 31.8 31.5 - 35.7 g/dL    RDW 15.1 12.3 - 15.4 %    RDW-SD 45.0 37.0 - 54.0 fl    MPV 8.7 6.0 - 12.0 fL    Platelets 308 140 - 450 10*3/mm3    Neutrophil % 74.9 42.7 - 76.0 %    Lymphocyte % 13.5 (L) 19.6 - 45.3 %    Monocyte % 9.6 5.0 - 12.0 %    Eosinophil % 1.1 0.3 - 6.2 %    Basophil % 0.4 0.0 - 1.5 %     Immature Grans % 0.5 0.0 - 0.5 %    Neutrophils, Absolute 7.39 (H) 1.70 - 7.00 10*3/mm3    Lymphocytes, Absolute 1.33 0.70 - 3.10 10*3/mm3    Monocytes, Absolute 0.95 (H) 0.10 - 0.90 10*3/mm3    Eosinophils, Absolute 0.11 0.00 - 0.40 10*3/mm3    Basophils, Absolute 0.04 0.00 - 0.20 10*3/mm3    Immature Grans, Absolute 0.05 0.00 - 0.05 10*3/mm3    nRBC 0.0 0.0 - 0.2 /100 WBC   Protime-INR    Collection Time: 07/27/24  9:59 PM    Specimen: Blood   Result Value Ref Range    Protime 16.7 (H) 11.7 - 14.2 Seconds    INR 1.33 (H) 0.90 - 1.10   aPTT    Collection Time: 07/27/24  9:59 PM    Specimen: Blood   Result Value Ref Range    PTT 42.2 (H) 22.7 - 35.4 seconds   High Sensitivity Troponin T 2Hr    Collection Time: 07/28/24 12:09 AM    Specimen: Blood   Result Value Ref Range    HS Troponin T 22 (H) <22 ng/L    Troponin T Delta 1 >=-4 - <+4 ng/L   ECG 12 Lead Chest Pain    Collection Time: 07/28/24  5:17 AM   Result Value Ref Range    QT Interval 373 ms    QTC Interval 414 ms       I ordered the above labs and independently reviewed the results.        RADIOLOGY  CT Angiogram Abdomen Pelvis    Result Date: 7/28/2024  Patient: JENNIFER GALINDO  Time Out: 00:19 Exam(s): CTA ABDOMEN + PELVIS EXAM:   CT Angiography Abdomen and Pelvis With Intravenous Contrast CLINICAL HISTORY:    Reason for exam: concern for acute aortic syndrome. TECHNIQUE:   Axial computed tomographic angiography images of the abdomen and pelvis with intravenous contrast.  CTDI is 11.47 mGy and DLP is 760.6 mGy-cm.  This CT exam was performed according to the principle of ALARA (As Low As Reasonably Achievable) by using one or more of the following dose reduction techniques: automated exposure control, adjustment of the mA and or kV according to patient size, and or use of iterative reconstruction technique.   3D and MIP reconstructed images were created and reviewed. COMPARISON:   No relevant prior studies available. FINDINGS:  VASCULATURE:   Aorta:  The  abdominal aorta is mildly calcified and slightly ectatic measuring 2.5 cm in diameter.  There is no aneurysm or dissection.   Celiac trunk and mesenteric arteries:  Calcified plaque with 20% stenosis of the celiac artery.   Renal arteries:  Mild bilateral renal artery stenosis.   Iliac arteries:  No acute findings.  No occlusion or significant stenosis.   Lung bases:  Unremarkable.  No mass.  No consolidation.  ABDOMEN:   Liver:  Unremarkable.  No mass.   Gallbladder and bile ducts:  Unremarkable.  No calcified stones.  No ductal dilation.   Pancreas:  Unremarkable.  No ductal dilation.  No mass.   Spleen:  Unremarkable.  No splenomegaly.   Adrenals:  Unremarkable.  No mass.   Kidneys and ureters:  Unremarkable.  No hydronephrosis.  No solid mass.   Stomach and bowel:  Bowel loops are nondilated.  No acute inflammatory changes are seen involving the bowel.  No mucosal thickening.  PELVIS:   Appendix:  No findings to suggest acute appendicitis.   Bladder:  Unremarkable.  No mass.   Reproductive:  Unremarkable as visualized.  ABDOMEN and PELVIS:   Intraperitoneal space:  Unremarkable.  No significant fluid collection.  No free air.   Bones joints:  Mild calcified plaque throughout the iliac arteries without significant stenosis.  Metallic artifact from bilateral hip arthroplasties.  No acute fracture or subluxation is seen.  Mild multilevel degenerative changes throughout the spine.  No acute fracture or subluxation.   Soft tissues:  Unremarkable.   Lymph nodes:  Unremarkable.  No enlarged lymph nodes. IMPRESSION:     1.  The abdominal aorta is mildly calcified and slightly ectatic measuring 2.5 cm in diameter.  There is no aneurysm or dissection. 2.  Bowel loops are nondilated.  No acute inflammatory changes are seen involving the bowel.  No acute process is seen within the abdomen or pelvis.     Electronically signed by En Kc MD on 07-28-24 at 0019    XR Chest 1 View    Result Date: 7/28/2024  EMERGENCY  PORTABLE VIEW OF THE CHEST ON 07/27/2024  CLINICAL HISTORY: Chest pain and left shoulder pain.  This is correlated to a portable chest x-ray just 2 days ago on 07/25/2024.  FINDINGS: The cardiomediastinal silhouette is mildly enlarged. There is perhaps some mild vascular engorgement. There is minimal prominence of the interstitial markings in the periphery of the lower lung zones, right greater than left, that may be chronic. There is stable minimal vague opacity in the right upper lung zone when compared to 07/25/2024 that could be scar or minimal pneumonic infiltrate. There has been interval development of some mild elevation of the left hemidiaphragm and atelectasis at the left lung base; otherwise, the lungs are clear. The costophrenic angles are sharp.      1. Since prior chest x-ray 2 days ago on 07/25/2024, the patient has developed some mild elevation of the left hemidiaphragm, atelectasis at the the left lung base. Otherwise, no convincing interval change.  2. There is stable minimal opacity in the right upper lung zone, could be scar or minimal pneumonic infiltrate.  3. There is mild enlargement of the cardiomediastinal silhouette and minimal vascular engorgement. The interstitial markings in the periphery of the lower lung zones is minimally prominent and I favor it is chronic over minimal interstitial edema and the remainder of the chest x-ray is unremarkable.  This report was finalized on 7/28/2024 12:16 AM by Dr. Darrion Villarreal M.D on Workstation: [id number]      CT Angiogram Chest    Result Date: 7/28/2024  Patient: JENNIFER GALINDO  Time Out: 00:12 Exam(s): CTA CHEST EXAM:   CT Angiography Chest With Intravenous Contrast CLINICAL HISTORY:    Reason for exam: concern for acute aortic syndrome. TECHNIQUE:   Axial computed tomographic angiography images of the chest with intravenous contrast.  CTDI is 11.47 mGy and DLP is 760.6 mGy-cm.  This CT exam was performed according to the principle of ROXANN (As  Low As Reasonably Achievable) by using one or more of the following dose reduction techniques: automated exposure control, adjustment of the mA and or kV according to patient size, and or use of iterative reconstruction technique.   3D and MIP reconstructed images were created and reviewed. COMPARISON:   No relevant prior studies available. FINDINGS:   Pulmonary arteries:  The pulmonary arterial tree is well opacified with contrast.  No pulmonary embolism is identified.   Aorta:  The thoracic aorta is mildly calcified but nondilated.  There is no aneurysm or dissection.  There is 30% stenosis of the proximal left subclavian artery.   Lungs:  Moderate emphysema throughout both lungs with scattered fine linear scarring.  No acute appearing infiltrate or consolidation is seen.   Pleural space:  Unremarkable.  No significant effusion.  No pneumothorax.   Heart:  The heart is mildly enlarged.  Moderate coronary calcification is present.  No pericardial effusion.  No evidence of RV dysfunction.   Bones joints:  Mild to moderate degenerative changes throughout the spine.  No fracture or destructive bone lesion is seen.  No dislocation.   Soft tissues:  Unremarkable.   Lymph nodes:  Unremarkable.  No enlarged lymph nodes. IMPRESSION:     1.  The thoracic aorta is mildly calcified but nondilated.  There is no aneurysm or dissection.  There is 30% stenosis of the proximal left subclavian artery. 2.  The pulmonary arterial tree is well opacified with contrast.  No pulmonary embolism is identified. 3.  Moderate emphysema throughout both lungs with scattered fine linear scarring.  No acute appearing infiltrate or consolidation is seen. 4.  The heart is mildly enlarged.  Moderate coronary calcification is present.  No pericardial effusion.     Electronically signed by En Kc MD on 07-28-24 at 0012     I ordered the above noted radiological studies. Reviewed by me and discussed with radiologist.  See dictation for official  radiology interpretation.      PROCEDURES    Procedures      MEDICATIONS GIVEN IN ER    Medications   sodium chloride 0.9 % flush 10 mL (has no administration in time range)   sodium chloride 0.9 % flush 10 mL (has no administration in time range)   sodium chloride 0.9 % flush 10 mL (has no administration in time range)   sodium chloride 0.9 % infusion 40 mL (has no administration in time range)   nitroglycerin (NITROSTAT) SL tablet 0.4 mg (has no administration in time range)   acetaminophen (TYLENOL) tablet 650 mg (has no administration in time range)     Or   acetaminophen (TYLENOL) 160 MG/5ML oral solution 650 mg (has no administration in time range)     Or   acetaminophen (TYLENOL) suppository 650 mg (has no administration in time range)   sennosides-docusate (PERICOLACE) 8.6-50 MG per tablet 2 tablet (has no administration in time range)     And   polyethylene glycol (MIRALAX) packet 17 g (has no administration in time range)     And   bisacodyl (DULCOLAX) EC tablet 5 mg (has no administration in time range)     And   bisacodyl (DULCOLAX) suppository 10 mg (has no administration in time range)   calcium carbonate (TUMS) chewable tablet 500 mg (200 mg elemental) (has no administration in time range)   HYDROcodone-acetaminophen (NORCO) 5-325 MG per tablet 1 tablet (has no administration in time range)   morphine injection 2 mg (has no administration in time range)   dextrose (GLUTOSE) oral gel 15 g (has no administration in time range)   dextrose (D50W) (25 g/50 mL) IV injection 25 g (has no administration in time range)   glucagon (GLUCAGEN) injection 1 mg (has no administration in time range)   Insulin Lispro (humaLOG) injection 2-7 Units (has no administration in time range)   aspirin tablet 325 mg (325 mg Oral Given 7/27/24 2231)   morphine injection 4 mg (4 mg Intravenous Given 7/27/24 2232)   iopamidol (ISOVUE-370) 76 % injection 100 mL (95 mL Intravenous Given 7/27/24 2322)   HYDROmorphone (DILAUDID)  injection 0.5 mg (0.5 mg Intravenous Given 7/28/24 0042)   HYDROmorphone (DILAUDID) injection 1 mg (1 mg Intravenous Given 7/28/24 0142)         ORDERS PLACED DURING THIS VISIT:  Orders Placed This Encounter   Procedures    XR Chest 1 View    CT Angiogram Chest    CT Angiogram Abdomen Pelvis    [place] Draw    Comprehensive Metabolic Panel    High Sensitivity Troponin T    CBC Auto Differential    Protime-INR    aPTT    High Sensitivity Troponin T 2Hr    Basic Metabolic Panel    CBC (No Diff)    High Sensitivity Troponin T    NPO Diet NPO Type: Strict NPO    Undress & Gown    Continuous Pulse Oximetry    Vital Signs    Intake & Output    Weigh Patient    Oral Care    Place Sequential Compression Device    Maintain Sequential Compression Device    Maintain IV Access    Telemetry - Place Orders & Notify Provider of Results When Patient Experiences Acute Chest Pain, Dysrhythmia or Respiratory Distress    May Be Off Telemetry for Tests    Code Status and Medical Interventions: CPR (Attempt to Resuscitate); Full Support    [facility] (on-call MD unless specified) Details    Inpatient Cardiology Consult    PT Consult: Eval & Treat Functional Mobility Below Baseline    Oxygen Therapy- Nasal Cannula; Titrate 1-6 LPM Per SpO2; 90 - 95%    POC Glucose 4x Daily Before Meals & at Bedtime    ECG 12 Lead ED Triage Standing Order; Chest Pain    ECG 12 Lead ED Triage Standing Order; Chest Pain    ECG 12 Lead Chest Pain    Insert Peripheral IV    Insert Peripheral IV    Initiate Observation Status    CBC & Differential    Green Top (Gel)    Lavender Top    Gold Top - SST    Light Blue Top         PROGRESS, DATA ANALYSIS, CONSULTS, AND MEDICAL DECISION MAKING    All labs have been independently interpreted by me.  All radiology studies have been reviewed by me and discussed with radiologist dictating the report.   EKG's independently viewed and interpreted by me.  Discussion below represents my analysis of pertinent findings related to  patient's condition, differential diagnosis, treatment plan and final disposition.    My differential diagnosis for chest pain includes but is not limited to:  Muscle strain, costochondritis, myositis, pleurisy, rib fracture, intercostal neuritis, herpes zoster, tumor, myocardial infarction, coronary syndrome, unstable angina, angina, aortic dissection, mitral valve prolapse, pericarditis, palpitations, pulmonary embolus, pneumonia, pneumothorax, lung cancer, GERD, esophagitis, esophageal spasm  .    Clinical Scores:              ED Course as of 07/28/24 0552   Sat Jul 27, 2024   2212 First look:  Patient presented emergency department with chest pain that radiates to his left neck and left back, also radiates to his low back.  Pain started suddenly today.  Just left the hospital.  Pain is not like the symptoms he was having when he was admitted.  No other interval change.  No injury.  Plan for labs and CT angiogram to assess for acute aortic syndrome. [FS]   2317 HS Troponin T: 21 [TJ]   2317 WBC: 9.87 [TJ]   2317 Hemoglobin: 13.8 [TJ]   2317 Platelets: 308 [TJ]   2317 Creatinine: 0.91 [TJ]   2317 Potassium: 3.9 [TJ]   2347 EKG          EKG time: 2152  Rhythm/Rate: Atrial fibrillation rate 114  P waves and NM: A-fib  QRS, axis: Low voltage  ST and T waves: Nonspecific    Interpreted Contemporaneously by me, independently viewed   [TJ]   Sun Jul 28, 2024   0129 Reevaluation: Patient is still fairly uncomfortable.  I am difficult time controlling his pain.  Patient's troponins are reassuring.  Patient's CTA does not show any acute pathology.  I discussed with patient and his family.  We will admit for pain control and possible cardiology consult. [TJ]      ED Course User Index  [FS] Elton Cordova MD  [TJ] Pedro Black MD               PPE: The patient wore a mask and I wore an N95 mask throughout the entire patient encounter.       AS OF 05:52 EDT VITALS:    BP - 131/88  HR - 87  TEMP - 97.5 °F (36.4 °C)  (Oral)  O2 SATS - 92%        DIAGNOSIS  Final diagnoses:   Acute right-sided back pain, unspecified back location   Acute pain of left shoulder   Chest pain, unspecified type         DISPOSITION  ED Disposition       ED Disposition   Decision to Admit    Condition   --    Comment   Level of Care: Telemetry [5]   Diagnosis: Back pain [058692]   Admitting Physician: REMINGTON STEINBERG [[id number]]                    Note Disclaimer: At [facility], we believe that sharing information builds trust and better relationships. You are receiving this note because you recently visited [facility]. It is possible you will see health information before a provider has talked with you about it. This kind of information can be easy to misunderstand. To help you fully understand what it means for your health, we urge you to discuss this note with your provider.         Pedro Black MD  07/28/24 0552

## 2024-07-28 NOTE — H&P
HISTORY AND PHYSICAL   [facility]        Date of Admission: 2024  Patient Identification:  Name: Aj Salazar  Age: 78 y.o.  Sex: male  :  1945  MRN: [id number]                     Primary Care Physician: Neftali Amezcua MD    Chief Complaint: Chest/back pain    History of Present Illness:    is a wonderfully pleasant 78-year-old male who was recently discharged from the ER/observation unit for chest pain.  He was seen by cardiology and had a stress test as well as an echo and he can see reports for clarification but they were essentially unremarkable.  Patient comes back with similar complaints but his pain seems to be involving more to the backside.  He denies any trauma or any fevers.  He denies any loss of muscle function or strength saddle anesthesias or bowel incontinence.  He is right-hand dominant and still feels strong in that arm and he is not complain of any radicular type pain but it sounds as if he is having posterior paravertebral or chest wall discomfort.  His pain is worse with certain movements and bending.  He has taken oral and IV pain meds to this point with some relief.    Past Medical History:  Past Medical History:   Diagnosis Date    Alcohol abuse, in remission     Asthma copd    BPH (benign prostatic hypertrophy)     see doctors at [facility]    Cataract     CHF (congestive heart failure)     COPD (chronic obstructive pulmonary disease)     Depression     DJD (degenerative joint disease) of cervical spine     ED (erectile dysfunction)     [facility]    GERD (gastroesophageal reflux disease)     Glaucoma     History of prediabetes     Hx of colonic polyps     followed by GI (K[facility])    Hyperlipidemia     Hypertension     Influenza B 2013        Low back pain     Nocturnal hypoxia     Osteoarthritis     HIPS, HANDS, MULTIPLE SITES    Osteoarthritis 2016    Peripheral neuropathy     Permanent atrial fibrillation     Pneumonia      Pneumonia due to infectious organism 04/23/2023    Rectal bleeding 04/26/2017    Tendonitis of shoulder 11/07/2007    RIGHT SHOULDER/DELTOID INSERTION    TIA (transient ischemic attack) 10/2020    Ulcer of the stomach and intestine      Past Surgical History:  Past Surgical History:   Procedure Laterality Date    APPENDECTOMY      CARDIOVASCULAR STRESS TEST N/A 10/20/2015    NML LEXISCAN CARDIOLITE PERFUSION STUDY, NO EVIDENCE OF ISCHEMIA, AREA OF HYPOPERFUSION OF THE INFERIOR WALL W/NORMAL WALL PERFUSION AND NML LEFT VENTRICULAR EJECTION FRACTION, MOST LIKELY ATTENUATION. DR.MICHAEL BOX    CATARACT EXTRACTION Bilateral 02/2023    COLONOSCOPY N/A 02/2017    COLONOSCOPY N/A 02/23/2011    4 POLYPS REMOVED, RESCOPE IN 5 YRS, DR. EAGLE    COLONOSCOPY N/A 03/08/2006    ERYTHEMOUS AND GRANULAR MUCOSA IN ILEOCECAL VALVE, NORMAL COLON, REPEAT IN 5 YRS,     ENDOSCOPY N/A 09/26/2018    normal esophagus, acute gastritis, multiple non-bleeding duodenal ulcers with pigmented material, H Pylori positive    HERNIA REPAIR Bilateral     INGUINAL    JOINT REPLACEMENT  11/2015    left hip    TOTAL HIP ARTHROPLASTY Left 11/06/2015    Dr Ankush Sky    TOTAL HIP ARTHROPLASTY Right 10/27/2014    DR.RIED SKY    VASECTOMY        Home Meds:  Medications Prior to Admission   Medication Sig Dispense Refill Last Dose    ALPHAGAN P 0.1 % solution ophthalmic solution Administer 1 drop to both eyes 2 (two) times a day.  6 7/28/2024 at 0700    atorvastatin (LIPITOR) 80 MG tablet TAKE 1 TABLET EVERY NIGHT 90 tablet 1 7/27/2024 at 2100    bumetanide (BUMEX) 1 MG tablet bumetanide 1 mg tablet   TAKE 1 TABLET BY MOUTH EVERY DAY   7/28/2024 at 0700    clopidogrel (PLAVIX) 75 MG tablet TAKE 1 TABLET EVERY DAY 90 tablet 1 7/28/2024 at 0700    dabigatran etexilate (Pradaxa) 150 MG capsu Take 1 capsule by mouth 2 (Two) Times a Day. 60 capsule 6 7/28/2024 at 0700    Jardiance 10 MG tablet tablet 0 Refill(s)   7/28/2024 at 0700     metFORMIN ER (GLUCOPHAGE-XR) 500 MG 24 hr tablet TAKE 1 TABLET EVERY DAY WITH DINNER 90 tablet 1 7/27/2024 at 2100    metoprolol succinate XL (TOPROL-XL) 100 MG 24 hr tablet Take 1 tablet by mouth Every Night. Take 100 mg at night and 50 mg in the morning 30 tablet 0 7/27/2024 at 2100    metoprolol succinate XL (TOPROL-XL) 50 MG 24 hr tablet Take 1 tablet by mouth Daily. Take 50 mg every morning and 100 mg every night 30 tablet 0 7/27/2024 at 0700    Myrbetriq 25 MG tablet sustained-release 24 hour 24 hr tablet    7/27/2024 at 2100    spironolactone (ALDACTONE) 25 MG tablet Take 1 tablet by mouth Daily. 30 tablet 0 7/27/2024 at 0700    Tafluprost, PF, (ZIOPTAN) 0.0015 % solution ophthalmic solution Administer 1 drop to both eyes Every Night.   7/27/2024 at 2100    tamsulosin (FLOMAX) 0.4 MG capsule 24 hr capsule Take 2 capsules by mouth Every Night. 30 capsule  7/27/2024 at 2100    Accu-Chek Softclix Lancets lancets 1 each by Other route Daily. Check FBS once daily . E11.51 100 each 3     Blood Glucose Monitoring Suppl (Accu-Chek Guide Me) w/Device kit Use 1 Device Daily. Use to check FBS once daily . Dm2 with peripheral  neuropathy E11.51 1 kit 0     cilostazol (PLETAL) 100 MG tablet Take 1 tablet by mouth 2 (Two) Times a Day. 90 tablet 6     Dupixent 300 MG/2ML solution prefilled syringe        Fluticasone-Umeclidin-Vilant (Trelegy Ellipta) 200-62.5-25 MCG/ACT aerosol powder  Inhale.       glucose blood (Accu-Chek Guide) test strip Use to check FBS once daily . Dm2 with peripheral  neuropathy E11.51 100 each 1     glucose blood test strip Check FBS once daily .DM2 /E11.51 100 each 3     Lancets Misc. (Accu-Chek Softclix Lancet Dev) kit Use to check FBS once daily . Dm2 with peripheral  neuropathy E11.51 1 kit 2     pantoprazole (PROTONIX) 40 MG EC tablet TAKE 1 TABLET TWICE DAILY 180 tablet 1        Allergies:  Allergies   Allergen Reactions    Bactrim [Sulfamethoxazole-Trimethoprim] Rash    Sulfacetamide Rash      Immunizations:  Immunization History   Administered Date(s) Administered    ABRYSVO (RSV, 60+ or pregnant women 32-36 wks) 2023    COVID-19 (MODERNA) 1st,2nd,3rd Dose Monovalent 2021, 2021, 2021    COVID-19 (MODERNA) BIVALENT 12+YRS 2022    COVID-19 (UNSPECIFIED) 2021    COVID-19 F23 (PFIZER) 12YRS+ (COMIRNATY) 04/15/2024    FluMist 2-49yrs 2017    Fluad Quad 65+ 2020    Fluzone (or Fluarix & Flulaval for VFC) >6mos 2022    Fluzone High Dose =>65 Years (Vaxcare ONLY) 2018, 2019, 2021    Fluzone High-Dose 65+yrs 2022, 2023    Hepatitis A 2019, 2019    Influenza Injectable Mdck Pf Quad 2022    Influenza LAIV (Nasal) 10/21/2015    Influenza TIV (IM) 2016    Pneumococcal Conjugate 13-Valent (PCV13) 2015    Pneumococcal Polysaccharide (PPSV23) 2013, 2021    Shingrix 2019, 2020    TD Preservative Free (Tenivac) 2023    Td (TDVAX) 2012    Zostavax 2014     Social History:   Social History     Social History Narrative    Not on file     Social History     Socioeconomic History    Marital status:      Spouse name: Ara*    Number of children: 3   Tobacco Use    Smoking status: Former     Current packs/day: 0.00     Average packs/day: 2.0 packs/day for 49.0 years (98.0 ttl pk-yrs)     Types: Cigarettes     Start date: 1961     Quit date: 2010     Years since quittin.5     Passive exposure: Past    Smokeless tobacco: Never    Tobacco comments:     long smoking history   Vaping Use    Vaping status: Never Used   Substance and Sexual Activity    Alcohol use: No     Comment: 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; 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    Drug use: No     Comment: caffeine use     Sexual activity: Defer       Family History:  Family History   Problem Relation Age of Onset    Cancer Mother         Bladder cancer    Colon cancer Mother     Ovarian cancer Mother   "   Alzheimer's disease Mother 70    Hyperlipidemia Father     Heart disease Father     Coronary artery disease Father     Hypertension Father     Stroke Sister         2016    Dementia Sister     Heart disease Brother     Alcohol abuse Brother     Heart disease Brother     Coronary artery disease Brother     Hypertension Brother     Stroke Brother     Arthritis Brother     Colon cancer Maternal Grandmother     Prostate cancer Neg Hx         Review of Systems  See history of present illness and past medical history.  Patient denies fever chills night sweats nausea vomiting admits to chest wall pain.  Denies any palpitation cough or shortness of breath above baseline.  Denies any dysuria bruising bleeding or focal loss of function and/or consciousness.  Denies missing any routine medications. Remainder of ROS is negative.    Objective:  T Max 24 hrs: Temp (24hrs), Av.6 °F (36.4 °C), Min:97.3 °F (36.3 °C), Max:98.1 °F (36.7 °C)    Vitals Ranges:   Temp:  [97.3 °F (36.3 °C)-98.1 °F (36.7 °C)] 97.3 °F (36.3 °C)  Heart Rate:  [] 82  Resp:  [18] 18  BP: (105-153)/(63-99) 123/75      Exam:  /75 (BP Location: Right arm, Patient Position: Lying)   Pulse 82   Temp 97.3 °F (36.3 °C) (Oral)   Resp 18   Ht 177.8 cm (70\")   Wt 75.8 kg (167 lb 1.7 oz)   SpO2 98%   BMI 23.98 kg/m²     General Appearance:    Alert, cooperative, fluent speech, sitting in bedside chair/no acute distress, conversational pleasant, alert and oriented x 3, nontoxic in appearance, no family present   Head:    Normocephalic, without obvious abnormality, atraumatic   Eyes:    PERRL, conjunctivae/corneas clear, EOM's intact, both eyes   Ears:    Normal external ear canals, both ears   Nose:   Nares normal, septum midline, mucosa normal, no drainage    or sinus tenderness   Throat:   Lips, mucosa, and tongue normal   Neck:   Supple, no JVD       Lungs:     Clear to auscultation bilaterally, respirations unlabored   Chest Wall:    " No tenderness or deformity    Heart:    Regular rate and rhythm, S1 and S2 normal   Abdomen:     Soft, nontender, bowel sounds active all four quadrants   Extremities: Moving all with no signs of muscle weakness, no edema with chronic stasis changes   Pulses:   2+ and symmetric all extremities   Skin: Scattered SKs       Neurologic:   CNII-XII intact, no focal deficit      .    Data Review:  Labs in chart were reviewed.             Imaging Results (All)       Procedure Component Value Units Date/Time    CT Angiogram Abdomen Pelvis [[id number]] Collected: 07/28/24 0019     Updated: 07/28/24 0019    Narrative:        Patient: JENNIFER GALINDO  Time Out: 00:19  Exam(s): CTA ABDOMEN + PELVIS     EXAM:    CT Angiography Abdomen and Pelvis With Intravenous Contrast    CLINICAL HISTORY:     Reason for exam: concern for acute aortic syndrome.    TECHNIQUE:    Axial computed tomographic angiography images of the abdomen and pelvis   with intravenous contrast.  CTDI is 11.47 mGy and DLP is 760.6 mGy-cm.    This CT exam was performed according to the principle of ALARA (As Low As   Reasonably Achievable) by using one or more of the following dose   reduction techniques: automated exposure control, adjustment of the mA   and or kV according to patient size, and or use of iterative   reconstruction technique.    3D and MIP reconstructed images were created and reviewed.    COMPARISON:    No relevant prior studies available.    FINDINGS:     VASCULATURE:    Aorta:  The abdominal aorta is mildly calcified and slightly ectatic   measuring 2.5 cm in diameter.  There is no aneurysm or dissection.    Celiac trunk and mesenteric arteries:  Calcified plaque with 20%   stenosis of the celiac artery.    Renal arteries:  Mild bilateral renal artery stenosis.    Iliac arteries:  No acute findings.  No occlusion or significant   stenosis.      Lung bases:  Unremarkable.  No mass.  No consolidation.     ABDOMEN:    Liver:  Unremarkable.  No  mass.    Gallbladder and bile ducts:  Unremarkable.  No calcified stones.  No   ductal dilation.    Pancreas:  Unremarkable.  No ductal dilation.  No mass.    Spleen:  Unremarkable.  No splenomegaly.    Adrenals:  Unremarkable.  No mass.    Kidneys and ureters:  Unremarkable.  No hydronephrosis.  No solid mass.    Stomach and bowel:  Bowel loops are nondilated.  No acute inflammatory   changes are seen involving the bowel.  No mucosal thickening.     PELVIS:    Appendix:  No findings to suggest acute appendicitis.    Bladder:  Unremarkable.  No mass.    Reproductive:  Unremarkable as visualized.     ABDOMEN and PELVIS:    Intraperitoneal space:  Unremarkable.  No significant fluid collection.    No free air.    Bones joints:  Mild calcified plaque throughout the iliac arteries   without significant stenosis.  Metallic artifact from bilateral hip   arthroplasties.  No acute fracture or subluxation is seen.  Mild   multilevel degenerative changes throughout the spine.  No acute fracture   or subluxation.    Soft tissues:  Unremarkable.    Lymph nodes:  Unremarkable.  No enlarged lymph nodes.    IMPRESSION:       1.  The abdominal aorta is mildly calcified and slightly ectatic   measuring 2.5 cm in diameter.  There is no aneurysm or dissection.  2.  Bowel loops are nondilated.  No acute inflammatory changes are seen   involving the bowel.  No acute process is seen within the abdomen or   pelvis.      Impression:          Electronically signed by En Kc MD on 07-28-24 at 0019    XR Chest 1 View [[id number]] Collected: 07/27/24 2246     Updated: 07/28/24 0019    Narrative:      EMERGENCY PORTABLE VIEW OF THE CHEST ON 07/27/2024     CLINICAL HISTORY: Chest pain and left shoulder pain.     This is correlated to a portable chest x-ray just 2 days ago on  07/25/2024.     FINDINGS: The cardiomediastinal silhouette is mildly enlarged. There is  perhaps some mild vascular engorgement. There is minimal prominence of  the  interstitial markings in the periphery of the lower lung zones,  right greater than left, that may be chronic. There is stable minimal  vague opacity in the right upper lung zone when compared to 07/25/2024  that could be scar or minimal pneumonic infiltrate. There has been  interval development of some mild elevation of the left hemidiaphragm  and atelectasis at the left lung base; otherwise, the lungs are clear.  The costophrenic angles are sharp.       Impression:      1. Since prior chest x-ray 2 days ago on 07/25/2024, the patient has  developed some mild elevation of the left hemidiaphragm, atelectasis at  the the left lung base. Otherwise, no convincing interval change.     2. There is stable minimal opacity in the right upper lung zone, could  be scar or minimal pneumonic infiltrate.     3. There is mild enlargement of the cardiomediastinal silhouette and  minimal vascular engorgement. The interstitial markings in the periphery  of the lower lung zones is minimally prominent and I favor it is chronic  over minimal interstitial edema and the remainder of the chest x-ray is  unremarkable.     This report was finalized on 7/28/2024 12:16 AM by Dr. Darrion Villarreal M.D  on Workstation: [id number]       CT Angiogram Chest [[id number]] Collected: 07/28/24 0013     Updated: 07/28/24 0013    Narrative:        Patient: JENNIFER GALINDO  Time Out: 00:12  Exam(s): CTA CHEST     EXAM:    CT Angiography Chest With Intravenous Contrast    CLINICAL HISTORY:     Reason for exam: concern for acute aortic syndrome.    TECHNIQUE:    Axial computed tomographic angiography images of the chest with   intravenous contrast.  CTDI is 11.47 mGy and DLP is 760.6 mGy-cm.  This   CT exam was performed according to the principle of ALARA (As Low As   Reasonably Achievable) by using one or more of the following dose   reduction techniques: automated exposure control, adjustment of the mA   and or kV according to patient size, and or use of  iterative   reconstruction technique.    3D and MIP reconstructed images were created and reviewed.    COMPARISON:    No relevant prior studies available.    FINDINGS:    Pulmonary arteries:  The pulmonary arterial tree is well opacified with   contrast.  No pulmonary embolism is identified.    Aorta:  The thoracic aorta is mildly calcified but nondilated.  There   is no aneurysm or dissection.  There is 30% stenosis of the proximal left   subclavian artery.    Lungs:  Moderate emphysema throughout both lungs with scattered fine   linear scarring.  No acute appearing infiltrate or consolidation is seen.    Pleural space:  Unremarkable.  No significant effusion.  No   pneumothorax.    Heart:  The heart is mildly enlarged.  Moderate coronary calcification   is present.  No pericardial effusion.  No evidence of RV dysfunction.    Bones joints:  Mild to moderate degenerative changes throughout the   spine.  No fracture or destructive bone lesion is seen.  No dislocation.    Soft tissues:  Unremarkable.    Lymph nodes:  Unremarkable.  No enlarged lymph nodes.    IMPRESSION:       1.  The thoracic aorta is mildly calcified but nondilated.  There is no   aneurysm or dissection.  There is 30% stenosis of the proximal left   subclavian artery.  2.  The pulmonary arterial tree is well opacified with contrast.  No   pulmonary embolism is identified.  3.  Moderate emphysema throughout both lungs with scattered fine linear   scarring.  No acute appearing infiltrate or consolidation is seen.  4.  The heart is mildly enlarged.  Moderate coronary calcification is   present.  No pericardial effusion.      Impression:          Electronically signed by En Kc MD on 07-28-24 at 0012              Assessment:  Active Hospital Problems    Diagnosis  POA    **Chest pain [R07.9]  Yes    Back pain [M54.9]  Yes    Alcohol dependence in remission [F10.21]  Yes    Gastroesophageal reflux disease [K21.9]  Yes    A-fib [I48.91]  Yes     Type 2 diabetes mellitus with diabetic peripheral angiopathy without gangrene, without long-term current use of insulin [E11.51]  Yes    History of smoking greater than 50 pack years [Z87.891]  Not Applicable    COPD (chronic obstructive pulmonary disease) [J44.9]  Yes    Hypertension [I10]  Yes      Resolved Hospital Problems   No resolved problems to display.       Plan:    All of his chest versus back pain sounds as if it is body wall and musculoskeletal in etiology/PMR.   -Recently had echo and stress which were unremarkable.  I will assess cardiology to see again to ensure no additional workup such as cath is indicated given his comorbidities/diabetes   -Otherwise I am going to start to treat this like it is musculoskeletal he and initiate 60 mg of steroid.  I am going to further evaluate the cervical and thoracic areas with MRI.  Will check ESR and CRP in a.m. and anticipate clinical improvement with empiric treatment   -PAF-RC-no AC prior to admission   -Spironolactone and Bumex continued   -Nothing radicular by history   -Continue as needed pain control utilizing oral and reserving IV for severe breakthrough pain if needed   -Labs otherwise overall unremarkable for acute issue.  Given additional 40 mill equivalents of K for low normal value      DM2 with circulatory disorder/hyperglycemia   -Anticipate reactive trends due to steroids and will adjust accordingly   -Current BS only 106   -SSI for now we will consider basal pending trends    PAD/PVD on Pletal and Plavix    GERD on PPI    BPH on Flomax.  Okay with home use of Myrbetriq as it is nonformulary    COPD without acute exacerbation    SCDs for additional prophylaxis          Further recommendations to follow as clinical course unfolds        Koko Julian MD  7/28/2024  08:03 EDT

## 2024-07-28 NOTE — CONSULTS
Patient Name: Aj Salazar  :1945  78 y.o.    Date of Admission: 2024  Date of Consultation:  24  Encounter Provider: Charlie Proctor III, MD  Place of Service: [facility] CARDIOLOGY  Referring Provider: Pedro BARFIELD MD  Patient Care Team:  Neftali Amezcua MD as PCP - General (Internal Medicine)  Tahir Childs MD as Consulting Physician (Pulmonary Disease)  Aura Perry OD (Optometry)  Juan Colby MD as Consulting Physician (Cardiology)  Adarsh Rangel MD PhD as Consulting Physician (Hematology and Oncology)  Rebel Escobar MD as Consulting Physician (Urology)  Danie cMgrath MD as Consulting Physician (Ophthalmology)  John Queen MD as Consulting Physician (Wound Care)      Chief complaint: Left shoulder, right back pain    History of Present Illness:    Aj Salazar is a 78 year old pt of Dr. Colby with [facility] with a history of COPD, HTN, Afib, CVA, carotid artery stenosis, heart failure, klebsiella pyelonephritis, COVID, chronic CHF and neuro foraminal stenosis.     Patient presented to the emergency room yesterday with left shoulder and left arm pain as well as right posterior thoracic discomfort.    Patient states that he has pain in his left arm and left shoulder that is been persistent for close to 24 hours now.  Not associated with activity or exercise.  It is never gone away.  Pain medicine has made it less prominent.  No change with movement of the left arm.  No change in the left arm or shoulder discomfort with deep breath.  He also has right posterior thoracic discomfort that does get worse with a deep breath.    Patient was seen in the emergency room, EKG showed A-fib which is permanent and unchanged, serial troponins have been unremarkable.  He had a chest and abdominal CT scan, reviewed in detail below.    Patient was just admitted on 725 with similar discomfort.  He had a chest CT scan  that showed no significant abnormality, coronary artery calcification was noted which has been previously documented.  He had a stress test that showed no ischemia, an echocardiogram that was normal.  These were reviewed in detail below.    Prior history summarized cooperatives on 2 days ago:  In 10/14, pt had Afib after a hip replacement. An ECHO then showed normal LV systolic function and no significant valvular disease. He also had a perfusion stress test done that showed no evidence of ischemia. It was decided to not treat him any further for that.         Pt then presented in 9/2016 to ER for atypical chest pain . A stress test then was normal. In 9/18 he had GIB. EGD showed evidence of nonbleeding ulcer disease. His hemoglobin was stable and melena resolved. He was advised to stop anticoagulation for week and continue Protonix and Carafate. He presented in October 2020 with left facial droop, slurred speech, and left arm weakness. MRI showed advanced small vessel disease. He was seen by neurology and not by cardiology and felt that it was either from his A. fib or the small vessel disease. He was to started on aspirin in addition to the Pradaxa. March 2021 was admitted with increasing shortness of breath to [facility] and he appeared to have some heart failure. ProBNP was 1100, and he was tachycardic from Afib and it was felt he might have PNA. His metoprolol succinate was increased. He was also started on Aldactone. He was switched from lisinopril to losartan at some point. He had stress test then that showed no ischemia and normal LV function. He was admitted in 5/2022 for Klebsiella pyelonephritis and was seen by cardiology and was started on diltiazem for rate control.     Past Medical History:   Diagnosis Date    Alcohol abuse, in remission     Asthma copd    BPH (benign prostatic hypertrophy)     see doctors at [facility]    Cataract     CHF (congestive heart failure)     COPD (chronic  obstructive pulmonary disease)     Depression     DJD (degenerative joint disease) of cervical spine     ED (erectile dysfunction)     [facility]    GERD (gastroesophageal reflux disease)     Glaucoma     History of prediabetes     Hx of colonic polyps     followed by GI ([facility])    Hyperlipidemia     Hypertension     Influenza B 02/14/2013        Low back pain     Nocturnal hypoxia     Osteoarthritis     HIPS, HANDS, MULTIPLE SITES    Osteoarthritis 03/17/2016    Peripheral neuropathy     Permanent atrial fibrillation     Pneumonia     Pneumonia due to infectious organism 04/23/2023    Rectal bleeding 04/26/2017    Tendonitis of shoulder 11/07/2007    RIGHT SHOULDER/DELTOID INSERTION    TIA (transient ischemic attack) 10/2020    Ulcer of the stomach and intestine        Past Surgical History:   Procedure Laterality Date    APPENDECTOMY      CARDIOVASCULAR STRESS TEST N/A 10/20/2015    NML LEXISCAN CARDIOLITE PERFUSION STUDY, NO EVIDENCE OF ISCHEMIA, AREA OF HYPOPERFUSION OF THE INFERIOR WALL W/NORMAL WALL PERFUSION AND NML LEFT VENTRICULAR EJECTION FRACTION, MOST LIKELY ATTENUATION. DR.MICHAEL BOX    CATARACT EXTRACTION Bilateral 02/2023    COLONOSCOPY N/A 02/2017    COLONOSCOPY N/A 02/23/2011    4 POLYPS REMOVED, RESCOPE IN 5 YRS, DR. EAGLE    COLONOSCOPY N/A 03/08/2006    ERYTHEMOUS AND GRANULAR MUCOSA IN ILEOCECAL VALVE, NORMAL COLON, REPEAT IN 5 YRS,     ENDOSCOPY N/A 09/26/2018    normal esophagus, acute gastritis, multiple non-bleeding duodenal ulcers with pigmented material, H Pylori positive    HERNIA REPAIR Bilateral     INGUINAL    JOINT REPLACEMENT  11/2015    left hip    TOTAL HIP ARTHROPLASTY Left 11/06/2015    Dr Ankush Sky    TOTAL HIP ARTHROPLASTY Right 10/27/2014    DR.RIED SKY    VASECTOMY           Prior to Admission medications    Medication Sig Start Date End Date Taking? Authorizing Provider   ALPHAGAN P 0.1 % solution ophthalmic solution  Administer 1 drop to both eyes 2 (two) times a day. 7/5/18  Yes Pietro Parham MD   atorvastatin (LIPITOR) 80 MG tablet TAKE 1 TABLET EVERY NIGHT 3/18/24  Yes Neftali Amezcua MD   bumetanide (BUMEX) 1 MG tablet bumetanide 1 mg tablet   TAKE 1 TABLET BY MOUTH EVERY DAY   Yes Pietro Parham MD   clopidogrel (PLAVIX) 75 MG tablet TAKE 1 TABLET EVERY DAY 5/20/24  Yes Neftali Amezcua MD   dabigatran etexilate (Pradaxa) 150 MG capsu Take 1 capsule by mouth 2 (Two) Times a Day. 10/29/20  Yes Juan Colby MD   Jardiance 10 MG tablet tablet 0 Refill(s) 12/20/23  Yes Pietro Parham MD   metFORMIN ER (GLUCOPHAGE-XR) 500 MG 24 hr tablet TAKE 1 TABLET EVERY DAY WITH DINNER 6/24/24  Yes Neftali Amezcua MD   metoprolol succinate XL (TOPROL-XL) 100 MG 24 hr tablet Take 1 tablet by mouth Every Night. Take 100 mg at night and 50 mg in the morning 3/10/21  Yes Yeyo Valladares MD   metoprolol succinate XL (TOPROL-XL) 50 MG 24 hr tablet Take 1 tablet by mouth Daily. Take 50 mg every morning and 100 mg every night 3/10/21  Yes Yeyo Valladares MD   Myrbetriq 25 MG tablet sustained-release 24 hour 24 hr tablet  9/23/22  Yes Pietro Parham MD   spironolactone (ALDACTONE) 25 MG tablet Take 1 tablet by mouth Daily. 3/10/21  Yes Yeyo Valladares MD   Tafluprost, PF, (ZIOPTAN) 0.0015 % solution ophthalmic solution Administer 1 drop to both eyes Every Night.   Yes Pietro Parham MD   tamsulosin (FLOMAX) 0.4 MG capsule 24 hr capsule Take 2 capsules by mouth Every Night. 7/31/18  Yes Neftali Amezcua MD   Accu-Chek Softclix Lancets lancets 1 each by Other route Daily. Check FBS once daily . E11.51 7/24/24   Michelle Naqvi APRN   Blood Glucose Monitoring Suppl (Accu-Chek Guide Me) w/Device kit Use 1 Device Daily. Use to check FBS once daily . Dm2 with peripheral  neuropathy E11.51 7/10/24   Neftali Amezcua MD   cilostazol (PLETAL) 100 MG tablet Take 1 tablet by mouth 2 (Two) Times a Day. 7/12/24    Ayala Zurita APRN   Dupixent 300 MG/2ML solution prefilled syringe  23   Provider, MD Pietro   Fluticasone-Umeclidin-Vilant (Trelegy Ellipta) 200-62.5-25 MCG/ACT aerosol powder  Inhale.    Provider, MD Pietro   glucose blood (Accu-Chek Guide) test strip Use to check FBS once daily . Dm2 with peripheral  neuropathy E11.51 7/10/24   Neftali Amezcua MD   glucose blood test strip Check FBS once daily .DM2 /E11.51 24   Neftali Amezcua MD   Lancets Misc. (Accu-Chek Softclix Lancet Dev) kit Use to check FBS once daily . Dm2 with peripheral  neuropathy E11.51 7/10/24   Neftali Amezcua MD   pantoprazole (PROTONIX) 40 MG EC tablet TAKE 1 TABLET TWICE DAILY 24   Neftali Amezcua MD       Allergies   Allergen Reactions    Bactrim [Sulfamethoxazole-Trimethoprim] Rash    Sulfacetamide Rash       Social History     Socioeconomic History    Marital status:      Spouse name: Ara*    Number of children: 3   Tobacco Use    Smoking status: Former     Current packs/day: 0.00     Average packs/day: 2.0 packs/day for 49.0 years (98.0 ttl pk-yrs)     Types: Cigarettes     Start date: 1961     Quit date: 2010     Years since quittin.5     Passive exposure: Past    Smokeless tobacco: Never    Tobacco comments:     long smoking history   Vaping Use    Vaping status: Never Used   Substance and Sexual Activity    Alcohol use: No     Comment: 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; 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    Drug use: No     Comment: caffeine use     Sexual activity: Defer       Family History   Problem Relation Age of Onset    Cancer Mother         Bladder cancer    Colon cancer Mother     Ovarian cancer Mother     Alzheimer's disease Mother 70    Hyperlipidemia Father     Heart disease Father     Coronary artery disease Father     Hypertension Father     Stroke Sister         2016    Dementia Sister     Heart disease Brother     Alcohol abuse Brother     Heart disease Brother     Coronary artery  disease Brother     Hypertension Brother     Stroke Brother     Arthritis Brother     Colon cancer Maternal Grandmother     Prostate cancer Neg Hx        REVIEW OF SYSTEMS:   All systems reviewed.  Pertinent positives identified in HPI.  All other systems are negative.      Objective:     Vitals:    07/28/24 0031 07/28/24 0301 07/28/24 0422 07/28/24 0731   BP: 105/64 124/69 131/88 123/75   BP Location:   Right arm Right arm   Patient Position:   Lying Lying   Pulse: 97 87  82   Resp:   18 18   Temp:   97.5 °F (36.4 °C) 97.3 °F (36.3 °C)   TempSrc:   Oral Oral   SpO2: 93% 92%  98%   Weight:       Height:         Body mass index is 23.98 kg/m².    Physical Exam:  General Appearance:    Alert, cooperative, in no acute distress   Head:    Normocephalic, without obvious abnormality   Eyes:            Lids and lashes normal, conjunctivae and sclerae normal, no icterus, no pallor, corneas clear   Ears:    Ears appear intact with no abnormalities noted   Throat:   No oral lesions, oral mucosa moist   Neck:   No adenopathy, supple, trachea midline, no thyromegaly, no carotid bruit, no JVD   Back:     No kyphosis present, no erythema or scars, no tenderness to palpation    Lungs:     Clear to auscultation,respirations regular, even and unlabored    Heart:    Regular rhythm and normal rate, normal S1 and S2, no murmur, no gallop, no rub, no click   Chest Wall:    No abnormalities observed   Abdomen:     Normal bowel sounds, no masses, no organomegaly, soft        non-tender, non-distended, no guarding   Extremities:   Moves all extremities well, no edema, no cyanosis, no redness   Pulses:  Bilateral carotids brisk   Skin:  Psychiatric:   No bleeding or rash    Alert and oriented, normal mood and affect         Lab Review:     Results from last 7 days   Lab Units 07/28/24  0432 07/27/24  2159   SODIUM mmol/L 136 136   POTASSIUM mmol/L 3.5 3.9   CHLORIDE mmol/L 100 102   CO2 mmol/L 23.3 22.0   BUN mg/dL 12 14   CREATININE mg/dL  0.87 0.91   CALCIUM mg/dL 8.6 9.2   BILIRUBIN mg/dL  --  0.9   ALK PHOS U/L  --  122*   ALT (SGPT) U/L  --  15   AST (SGOT) U/L  --  18   GLUCOSE mg/dL 106* 141*     Results from last 7 days   Lab Units 07/28/24  0432 07/28/24  0009 07/27/24  2159   HSTROP T ng/L 21 22* 21     Results from last 7 days   Lab Units 07/28/24  0432   WBC 10*3/mm3 7.69   HEMOGLOBIN g/dL 12.8*   HEMATOCRIT % 41.4   PLATELETS 10*3/mm3 269     Results from last 7 days   Lab Units 07/27/24  2159   INR  1.33*   APTT seconds 42.2*     Results from last 7 days   Lab Units 07/26/24  0504   MAGNESIUM mg/dL 2.4     Results from last 7 days   Lab Units 07/25/24  1028   CHOLESTEROL mg/dL 116   TRIGLYCERIDES mg/dL 65   HDL CHOL mg/dL 51   LDL CHOL mg/dL 51               I personally viewed and interpreted the patient's EKG/Telemetry data.      Current Facility-Administered Medications:     acetaminophen (TYLENOL) tablet 650 mg, 650 mg, Oral, Q4H PRN **OR** acetaminophen (TYLENOL) 160 MG/5ML oral solution 650 mg, 650 mg, Oral, Q4H PRN **OR** acetaminophen (TYLENOL) suppository 650 mg, 650 mg, Rectal, Q4H PRN, Ade Mendez APRN    sennosides-docusate (PERICOLACE) 8.6-50 MG per tablet 2 tablet, 2 tablet, Oral, BID PRN **AND** polyethylene glycol (MIRALAX) packet 17 g, 17 g, Oral, Daily PRN **AND** bisacodyl (DULCOLAX) EC tablet 5 mg, 5 mg, Oral, Daily PRN **AND** bisacodyl (DULCOLAX) suppository 10 mg, 10 mg, Rectal, Daily PRN, Ade Mendez APRN    calcium carbonate (TUMS) chewable tablet 500 mg (200 mg elemental), 2 tablet, Oral, BID PRN, Ade Mendez APRN    dextrose (D50W) (25 g/50 mL) IV injection 25 g, 25 g, Intravenous, Q15 Min PRN, Ade Mendez APRN    dextrose (GLUTOSE) oral gel 15 g, 15 g, Oral, Q15 Min PRN, Ade Mendez APRN    glucagon (GLUCAGEN) injection 1 mg, 1 mg, Intramuscular, Q15 Min PRN, Ade Mendez APRN    HYDROcodone-acetaminophen (NORCO) 5-325 MG per tablet 1 tablet, 1 tablet,  Oral, Q6H PRN, Ade Mendez APRN    Insulin Lispro (humaLOG) injection 2-7 Units, 2-7 Units, Subcutaneous, 4x Daily AC & at Bedtime, Ade Mendez APRN    morphine injection 2 mg, 2 mg, Intravenous, Q3H PRN, Ade Mendez APRN, 2 mg at 07/28/24 0738    nitroglycerin (NITROSTAT) SL tablet 0.4 mg, 0.4 mg, Sublingual, Q5 Min PRN, Ade Mendez APRN    sodium chloride 0.9 % flush 10 mL, 10 mL, Intravenous, PRN, Elton Cordova MD    sodium chloride 0.9 % flush 10 mL, 10 mL, Intravenous, Q12H, Ade Mendez APRN    sodium chloride 0.9 % flush 10 mL, 10 mL, Intravenous, PRN, Ade Mendez APRN    sodium chloride 0.9 % infusion 40 mL, 40 mL, Intravenous, PRN, Ade Mendez APRN    Assessment and Plan:       Active Hospital Problems    Diagnosis  POA    **Chest pain [R07.9]  Yes    Back pain [M54.9]  Yes    Alcohol dependence in remission [F10.21]  Yes    Gastroesophageal reflux disease [K21.9]  Yes    A-fib [I48.91]  Yes    Type 2 diabetes mellitus with diabetic peripheral angiopathy without gangrene, without long-term current use of insulin [E11.51]  Yes    History of smoking greater than 50 pack years [Z87.891]  Not Applicable    COPD (chronic obstructive pulmonary disease) [J44.9]  Yes    Hypertension [I10]  Yes      Resolved Hospital Problems   No resolved problems to display.     1.  Left arm and shoulder discomfort, eval per primary team.  2.  Right posterior thoracic pain, worse with deep breath, evaluation underway  3.  Permanent atrial fibrillation, continue rate control and anticoagulation, stable  4.  Patient just underwent evaluation for if there was a cardiac contribution to his thoracic discomforts.  Both the left shoulder and arm discomfort as well as the right posterior thoracic discomfort would be very atypical for a cardiac etiology, but he was investigated with EKG/serial troponins/echo/stress test with no abnormality seen.  Patient does not meet  guideline recommendations for any additional cardiac testing at this time  5.  Coronary calcification-risk factor modification  6.  Mixed hyperlipidemia on lipid-lowering therapy  7.  History of COPD with remote tobacco abuse  8.  History of CVA on chronic anticoagulation.    Charlie Proctor III, MD  07/28/24  08:11 EDT

## 2024-07-28 NOTE — SIGNIFICANT NOTE
07/28/24 1015   OTHER   Discipline physical therapist   Rehab Time/Intention   Session Not Performed   (Pt reports no changes in mobility from baseline. Slight dizziness due to medication, however no unsteadiness noted with gait in room. No acute needs at this time and PT to sign off. Nsg notified.)

## 2024-07-28 NOTE — ED NOTES
.Nursing report ED to floor  Aj Salazar  78 y.o.  male    HPI :  HPI (Adult)  Stated Reason for Visit: Pt to ED via PV w/ son from home. Pt reports he has intermittent shooting pain on left side of chest, left neck pain, left jaw pain , left shoulder pain, and right sided back pain x 8 hours ago. Pt states he was DC'd yesterday afternoon. Pt ambulatory to triage. Hx a-fib.  History Obtained From: patient  Precipitating Event(s): recent illness    Chief Complaint  Chief Complaint   Patient presents with    Chest Pain    Neck Pain       Admitting doctor:   Brittni Louis MD    Admitting diagnosis:   The primary encounter diagnosis was Acute right-sided back pain, unspecified back location. Diagnoses of Acute pain of left shoulder and Chest pain, unspecified type were also pertinent to this visit.    Code status:   Current Code Status       Date Active Code Status Order ID Comments User Context       7/28/2024 0229 CPR (Attempt to Resuscitate) [id number]  Ade Mendez APRN ED        Question Answer    Code Status (Patient has no pulse and is not breathing) CPR (Attempt to Resuscitate)    Medical Interventions (Patient has pulse or is breathing) Full Support                    Allergies:   Bactrim [sulfamethoxazole-trimethoprim] and Sulfacetamide    Isolation:   No active isolations    Intake and Output  No intake or output data in the 24 hours ending 07/28/24 0317    Weight:       07/27/24  2143   Weight: 75.8 kg (167 lb 1.7 oz)       Most recent vitals:   Vitals:    07/27/24 2301 07/27/24 2331 07/28/24 0031 07/28/24 0301   BP: 116/68 115/63 105/64 124/69   BP Location:       Patient Position:       Pulse: 93 109 97 87   Resp:       Temp:       SpO2: 94% 91% 93% 92%   Weight:       Height:           Active LDAs/IV Access:   Lines, Drains & Airways       Active LDAs       Name Placement date Placement time Site Days    Peripheral IV 07/27/24 2159 Left;Posterior Forearm 07/27/24 2159  Forearm  less than  1                    Labs (abnormal labs have a star):   Labs Reviewed   COMPREHENSIVE METABOLIC PANEL - Abnormal; Notable for the following components:       Result Value    Glucose 141 (*)     Alkaline Phosphatase 122 (*)     All other components within normal limits    Narrative:     GFR Normal >60  Chronic Kidney Disease <60  Kidney Failure <15    The GFR formula is only valid for adults with stable renal function between ages 18 and 70.   CBC WITH AUTO DIFFERENTIAL - Abnormal; Notable for the following components:    MCH 26.0 (*)     Lymphocyte % 13.5 (*)     Neutrophils, Absolute 7.39 (*)     Monocytes, Absolute 0.95 (*)     All other components within normal limits   PROTIME-INR - Abnormal; Notable for the following components:    Protime 16.7 (*)     INR 1.33 (*)     All other components within normal limits   APTT - Abnormal; Notable for the following components:    PTT 42.2 (*)     All other components within normal limits   HIGH SENSITIVITIY TROPONIN T 2HR - Abnormal; Notable for the following components:    HS Troponin T 22 (*)     All other components within normal limits    Narrative:     High Sensitive Troponin T Reference Range:  <14.0 ng/L- Negative Female for AMI  <22.0 ng/L- Negative Male for AMI  >=14 - Abnormal Female indicating possible myocardial injury.  >=22 - Abnormal Male indicating possible myocardial injury.   Clinicians would have to utilize clinical acumen, EKG, Troponin, and serial changes to determine if it is an Acute Myocardial Infarction or myocardial injury due to an underlying chronic condition.        TROPONIN - Normal    Narrative:     High Sensitive Troponin T Reference Range:  <14.0 ng/L- Negative Female for AMI  <22.0 ng/L- Negative Male for AMI  >=14 - Abnormal Female indicating possible myocardial injury.  >=22 - Abnormal Male indicating possible myocardial injury.   Clinicians would have to utilize clinical acumen, EKG, Troponin, and serial changes to determine if it is  an Acute Myocardial Infarction or myocardial injury due to an underlying chronic condition.        RAINBOW DRAW    Narrative:     The following orders were created for panel order [facility] Draw.  Procedure                               Abnormality         Status                     ---------                               -----------         ------                     Green Top (Gel)[[id number]]                                  Final result               Lavender Top[[id number]]                                     Final result               Gold Top - SST[[id number]]                                   Final result               Light Blue Top[[id number]]                                   Final result                 Please view results for these tests on the individual orders.   BASIC METABOLIC PANEL   CBC (NO DIFF)   TROPONIN   POCT GLUCOSE FINGERSTICK   POCT GLUCOSE FINGERSTICK   POCT GLUCOSE FINGERSTICK   POCT GLUCOSE FINGERSTICK   CBC AND DIFFERENTIAL    Narrative:     The following orders were created for panel order CBC & Differential.  Procedure                               Abnormality         Status                     ---------                               -----------         ------                     CBC Auto Differential[[id number]]        Abnormal            Final result                 Please view results for these tests on the individual orders.   GREEN TOP   LAVENDER TOP   GOLD TOP - SST   LIGHT BLUE TOP       EKG:   ECG 12 Lead ED Triage Standing Order; Chest Pain   Preliminary Result   HEART UVCR=243  bpm   RR Aimcablj=991  ms   WA Interval=  ms   P Horizontal Axis=  deg   P Front Axis=  deg   QRSD Interval=83  ms   QT Shrodlzc=305  ms   QKgE=260  ms   QRS Axis=2  deg   T Wave Axis=-38  deg   - ABNORMAL ECG -   Atrial fibrillation   Ventricular premature complex   Low voltage, extremity and precordial leads   Anteroseptal infarct, old   Lead(s) aVF were not used for morphology analysis   Baseline wander  in lead(s) I,aVR,aVL   Date and Time of Study:2024-07-27 21:52:43      ECG 12 Lead Chest Pain    (Results Pending)       Meds given in ED:   Medications   sodium chloride 0.9 % flush 10 mL (has no administration in time range)   sodium chloride 0.9 % flush 10 mL (has no administration in time range)   sodium chloride 0.9 % flush 10 mL (has no administration in time range)   sodium chloride 0.9 % infusion 40 mL (has no administration in time range)   nitroglycerin (NITROSTAT) SL tablet 0.4 mg (has no administration in time range)   acetaminophen (TYLENOL) tablet 650 mg (has no administration in time range)     Or   acetaminophen (TYLENOL) 160 MG/5ML oral solution 650 mg (has no administration in time range)     Or   acetaminophen (TYLENOL) suppository 650 mg (has no administration in time range)   sennosides-docusate (PERICOLACE) 8.6-50 MG per tablet 2 tablet (has no administration in time range)     And   polyethylene glycol (MIRALAX) packet 17 g (has no administration in time range)     And   bisacodyl (DULCOLAX) EC tablet 5 mg (has no administration in time range)     And   bisacodyl (DULCOLAX) suppository 10 mg (has no administration in time range)   calcium carbonate (TUMS) chewable tablet 500 mg (200 mg elemental) (has no administration in time range)   HYDROcodone-acetaminophen (NORCO) 5-325 MG per tablet 1 tablet (has no administration in time range)   morphine injection 2 mg (has no administration in time range)   dextrose (GLUTOSE) oral gel 15 g (has no administration in time range)   dextrose (D50W) (25 g/50 mL) IV injection 25 g (has no administration in time range)   glucagon (GLUCAGEN) injection 1 mg (has no administration in time range)   Insulin Lispro (humaLOG) injection 2-7 Units (has no administration in time range)   aspirin tablet 325 mg (325 mg Oral Given 7/27/24 2231)   morphine injection 4 mg (4 mg Intravenous Given 7/27/24 2232)   iopamidol (ISOVUE-370) 76 % injection 100 mL (95 mL  Intravenous Given 24 2322)   HYDROmorphone (DILAUDID) injection 0.5 mg (0.5 mg Intravenous Given 24 0042)   HYDROmorphone (DILAUDID) injection 1 mg (1 mg Intravenous Given 24 0142)       Imaging results:  CT Angiogram Abdomen Pelvis    Result Date: 2024  Electronically signed by En Kc MD on 24 at 0019    XR Chest 1 View    Result Date: 2024  1. Since prior chest x-ray 2 days ago on 2024, the patient has developed some mild elevation of the left hemidiaphragm, atelectasis at the the left lung base. Otherwise, no convincing interval change.  2. There is stable minimal opacity in the right upper lung zone, could be scar or minimal pneumonic infiltrate.  3. There is mild enlargement of the cardiomediastinal silhouette and minimal vascular engorgement. The interstitial markings in the periphery of the lower lung zones is minimally prominent and I favor it is chronic over minimal interstitial edema and the remainder of the chest x-ray is unremarkable.  This report was finalized on 2024 12:16 AM by Dr. Darrion Villarreal M.D on Workstation: [id number]      CT Angiogram Chest    Result Date: 2024  Electronically signed by En Kc MD on 24 at 0012     Ambulatory status:   - Standby assist    Social issues:   Social History     Socioeconomic History    Marital status:      Spouse name: Ara*    Number of children: 3   Tobacco Use    Smoking status: Former     Current packs/day: 0.00     Average packs/day: 2.0 packs/day for 49.0 years (98.0 ttl pk-yrs)     Types: Cigarettes     Start date: 1961     Quit date: 2010     Years since quittin.5     Passive exposure: Past    Smokeless tobacco: Never    Tobacco comments:     long smoking history   Vaping Use    Vaping status: Never Used   Substance and Sexual Activity    Alcohol use: No     Comment: 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; 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    Drug use: No     Comment: caffeine use      Sexual activity: Defer       Peripheral Neurovascular  Peripheral Neurovascular (Adult)  Peripheral Neurovascular WDL: WDL    Neuro Cognitive  Neuro Cognitive (Adult)  Cognitive/Neuro/Behavioral WDL: WDL    Learning  Learning Assessment (Adult)  Learning Readiness and Ability: no barriers identified    Respiratory  Respiratory WDL  Respiratory WDL: WDL    Abdominal Pain       Pain Assessments  Pain (Adult)  (0-10) Pain Rating: Rest: 8  Pain Location: chest, shoulder  Pain Side/Orientation: left  Pain Onset/Duration: x 8 hours  Pain Description: intermittent, aching, throbbing  Pain Assessment Additional Detail  Pain Onset/Duration: x 8 hours    NIH Stroke Scale       Mary Grace Loemli RN  07/28/24 03:17 EDT

## 2024-07-29 ENCOUNTER — TELEPHONE (OUTPATIENT)
Dept: NEUROSURGERY | Facility: CLINIC | Age: 79
End: 2024-07-29
Payer: MEDICARE

## 2024-07-29 ENCOUNTER — TRANSITIONAL CARE MANAGEMENT TELEPHONE ENCOUNTER (OUTPATIENT)
Dept: CALL CENTER | Facility: HOSPITAL | Age: 79
End: 2024-07-29
Payer: MEDICARE

## 2024-07-29 ENCOUNTER — READMISSION MANAGEMENT (OUTPATIENT)
Dept: CALL CENTER | Facility: HOSPITAL | Age: 79
End: 2024-07-29
Payer: MEDICARE

## 2024-07-29 VITALS
OXYGEN SATURATION: 100 % | WEIGHT: 167.11 LBS | HEART RATE: 94 BPM | TEMPERATURE: 97.9 F | RESPIRATION RATE: 20 BRPM | HEIGHT: 70 IN | BODY MASS INDEX: 23.92 KG/M2 | SYSTOLIC BLOOD PRESSURE: 133 MMHG | DIASTOLIC BLOOD PRESSURE: 70 MMHG

## 2024-07-29 PROBLEM — M48.02 CERVICAL STENOSIS OF SPINE: Status: ACTIVE | Noted: 2024-07-29

## 2024-07-29 PROBLEM — G95.20 CERVICAL CORD COMPRESSION WITH MYELOPATHY: Status: ACTIVE | Noted: 2024-07-29

## 2024-07-29 PROBLEM — R07.9 CHEST PAIN: Status: RESOLVED | Noted: 2024-07-25 | Resolved: 2024-07-29

## 2024-07-29 LAB
CRP SERPL-MCNC: 2.6 MG/DL (ref 0–0.5)
ERYTHROCYTE [SEDIMENTATION RATE] IN BLOOD: 12 MM/HR (ref 0–20)
GLUCOSE BLDC GLUCOMTR-MCNC: 106 MG/DL (ref 70–130)
GLUCOSE BLDC GLUCOMTR-MCNC: 109 MG/DL (ref 70–130)

## 2024-07-29 PROCEDURE — 94760 N-INVAS EAR/PLS OXIMETRY 1: CPT

## 2024-07-29 PROCEDURE — G0378 HOSPITAL OBSERVATION PER HR: HCPCS

## 2024-07-29 PROCEDURE — 82948 REAGENT STRIP/BLOOD GLUCOSE: CPT

## 2024-07-29 PROCEDURE — 94761 N-INVAS EAR/PLS OXIMETRY MLT: CPT

## 2024-07-29 PROCEDURE — 94799 UNLISTED PULMONARY SVC/PX: CPT

## 2024-07-29 PROCEDURE — 85652 RBC SED RATE AUTOMATED: CPT | Performed by: HOSPITALIST

## 2024-07-29 PROCEDURE — 94664 DEMO&/EVAL PT USE INHALER: CPT

## 2024-07-29 PROCEDURE — 86140 C-REACTIVE PROTEIN: CPT | Performed by: HOSPITALIST

## 2024-07-29 RX ORDER — METHYLPREDNISOLONE 4 MG/1
TABLET ORAL
Qty: 21 TABLET | Refills: 0 | Status: SHIPPED | OUTPATIENT
Start: 2024-07-29

## 2024-07-29 RX ORDER — HYDROCODONE BITARTRATE AND ACETAMINOPHEN 5; 325 MG/1; MG/1
1 TABLET ORAL EVERY 6 HOURS PRN
Qty: 12 TABLET | Refills: 0 | Status: SHIPPED | OUTPATIENT
Start: 2024-07-29

## 2024-07-29 RX ORDER — METHOCARBAMOL 500 MG/1
500 TABLET, FILM COATED ORAL 3 TIMES DAILY PRN
Qty: 12 TABLET | Refills: 0 | Status: SHIPPED | OUTPATIENT
Start: 2024-07-29

## 2024-07-29 RX ADMIN — Medication 10 ML: at 09:41

## 2024-07-29 RX ADMIN — TIOTROPIUM BROMIDE INHALATION SPRAY 2 PUFF: 3.12 SPRAY, METERED RESPIRATORY (INHALATION) at 08:17

## 2024-07-29 RX ADMIN — PANTOPRAZOLE SODIUM 40 MG: 40 TABLET, DELAYED RELEASE ORAL at 09:41

## 2024-07-29 RX ADMIN — BUDESONIDE AND FORMOTEROL FUMARATE DIHYDRATE 2 PUFF: 160; 4.5 AEROSOL RESPIRATORY (INHALATION) at 08:18

## 2024-07-29 RX ADMIN — BRIMONIDINE TARTRATE 1 DROP: 2 SOLUTION/ DROPS OPHTHALMIC at 09:41

## 2024-07-29 RX ADMIN — HYDROCODONE BITARTRATE AND ACETAMINOPHEN 1 TABLET: 5; 325 TABLET ORAL at 00:17

## 2024-07-29 RX ADMIN — BUMETANIDE 1 MG: 1 TABLET ORAL at 09:41

## 2024-07-29 RX ADMIN — SPIRONOLACTONE 25 MG: 25 TABLET, FILM COATED ORAL at 09:41

## 2024-07-29 RX ADMIN — HYDROCODONE BITARTRATE AND ACETAMINOPHEN 1 TABLET: 5; 325 TABLET ORAL at 07:05

## 2024-07-29 RX ADMIN — CILOSTAZOL 100 MG: 100 TABLET ORAL at 09:40

## 2024-07-29 RX ADMIN — DABIGATRAN ETEXILATE MESYLATE 150 MG: 150 CAPSULE ORAL at 09:41

## 2024-07-29 RX ADMIN — CLOPIDOGREL BISULFATE 75 MG: 75 TABLET, FILM COATED ORAL at 09:41

## 2024-07-29 NOTE — DISCHARGE SUMMARY
Date of Discharge:  7/29/2024    PCP: Neftali Amezcua MD    Discharge Diagnosis:   Active Hospital Problems    Diagnosis  POA    **Cervical stenosis of spine [M48.02]  Unknown    Back pain [M54.9]  Yes    Alcohol dependence in remission [F10.21]  Yes    Gastroesophageal reflux disease [K21.9]  Yes    A-fib [I48.91]  Yes    Type 2 diabetes mellitus with diabetic peripheral angiopathy without gangrene, without long-term current use of insulin [E11.51]  Yes    History of smoking greater than 50 pack years [Z87.891]  Not Applicable    COPD (chronic obstructive pulmonary disease) [J44.9]  Yes    Hypertension [I10]  Yes      Resolved Hospital Problems    Diagnosis Date Resolved POA    Chest pain [R07.9] 07/29/2024 Yes          Consults       Date and Time Order Name Status Description    7/28/2024  5:54 PM Inpatient Neurosurgery Consult      7/28/2024  2:29 AM Inpatient Cardiology Consult Completed     7/28/2024  1:26 AM [facility] (on-call MD unless specified) Details      7/25/2024 11:51 AM Inpatient Cardiology Consult Completed           Hospital Course  78 y.o. male who was just discharged the day prior from observation unit for similar complaints.  At that time cardiology ruled out cardiac issues and patient came directly back more so complaining of similar pain but it seemed more chest wall/back related so I checked MRI of thoracic and cervical spine and I empirically post him with high-dose steroid.  He got significant relief with use of IV steroid.  Fortunately for him he has had no significant reactive hyperglycemia so I feel more comfortable with dosing steroids post discharge.  I consulted neurosurgery after I saw the results and their note is currently pending but I reached out to the nurse practitioner and their plan is to have him back in the office in the next week or so and they are going to talk about surgical intervention.  Overall he is clinically much better than he was when he got here and I will follow [facility]  recommendations as they want a Medrol Dosepak, pain medication and muscle relaxers at discharge.  Patient is proactive to go home and has stable vitals and is otherwise ambulating independently.  He has a wife at home to give him support and assistance if he needs but otherwise declines any additional home health necessity.  All questions answered the patient the best my ability and he is thankful for the care received while here while minimal to the above plan.      Temp:  [97.3 °F (36.3 °C)-98.2 °F (36.8 °C)] 97.9 °F (36.6 °C)  Heart Rate:  [] 83  Resp:  [16-20] 20  BP: (130-151)/(76-88) 130/88  Body mass index is 23.98 kg/m².    Physical Exam  HENT:      Head: Normocephalic.      Nose: Nose normal.      Mouth/Throat:      Mouth: Mucous membranes are moist.      Pharynx: Oropharynx is clear.   Cardiovascular:      Rate and Rhythm: Normal rate and regular rhythm.   Pulmonary:      Effort: Pulmonary effort is normal. No respiratory distress.      Breath sounds: Normal breath sounds.   Abdominal:      General: Bowel sounds are normal. There is no distension.      Palpations: Abdomen is soft.      Tenderness: There is no abdominal tenderness.   Musculoskeletal:         General: No swelling.   Skin:     General: Skin is warm and dry.   Neurological:      Mental Status: He is alert and oriented to person, place, and time.      Cranial Nerves: No cranial nerve deficit.       Disposition: Home or Self Care       Discharge Medications        New Medications        Instructions Start Date   HYDROcodone-acetaminophen 5-325 MG per tablet  Commonly known as: NORCO   1 tablet, Oral, Every 6 Hours PRN      methocarbamol 500 MG tablet  Commonly known as: ROBAXIN   500 mg, Oral, 3 Times Daily PRN      methylPREDNISolone 4 MG dose pack  Commonly known as: MEDROL   Take as directed on package instructions.             Continue These Medications        Instructions Start Date   Accu-Chek Guide Me w/Device kit   1 Device, Does  not apply, Daily, Use to check FBS once daily . 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 Dm2 with peripheral  neuropathy E11.51      Accu-Chek Softclix Lancets lancets   1 each, Other, Daily, Check FBS once daily . E11.51      Alphagan P 0.1 % solution ophthalmic solution  Generic drug: brimonidine   1 drop, Both Eyes, 2 times daily      atorvastatin 80 MG tablet  Commonly known as: LIPITOR   80 mg, Nightly      bumetanide 1 MG tablet  Commonly known as: BUMEX   bumetanide 1 mg tablet   TAKE 1 TABLET BY MOUTH EVERY DAY      cilostazol 100 MG tablet  Commonly known as: PLETAL   100 mg, Oral, 2 Times Daily      clopidogrel 75 MG tablet  Commonly known as: PLAVIX   TAKE 1 TABLET EVERY DAY      dabigatran etexilate 150 MG capsu  Commonly known as: Pradaxa   150 mg, Oral, 2 Times Daily      Dupixent 300 MG/2ML solution prefilled syringe  Generic drug: Dupilumab       glucose blood test strip   Check FBS once daily .DM2 /E11.51      Accu-Chek Guide test strip  Generic drug: glucose blood   Use to check FBS once daily . Dm2 with peripheral  neuropathy E11.51      Jardiance 10 MG tablet tablet  Generic drug: empagliflozin   0 Refill(s)      metFORMIN  MG 24 hr tablet  Commonly known as: GLUCOPHAGE-XR   500 mg, Oral, Every Evening      metoprolol succinate  MG 24 hr tablet  Commonly known as: TOPROL-XL   100 mg, Oral, Nightly, Take 100 mg at night and 50 mg in the morning      metoprolol succinate XL 50 MG 24 hr tablet  Commonly known as: TOPROL-XL   50 mg, Oral, Daily, Take 50 mg every morning and 100 mg every night      Myrbetriq 25 MG tablet sustained-release 24 hour 24 hr tablet  Generic drug: Mirabegron ER   No dose, route, or frequency recorded.      pantoprazole 40 MG EC tablet  Commonly known as: PROTONIX   TAKE 1 TABLET TWICE DAILY      spironolactone 25 MG tablet  Commonly known as: ALDACTONE   25 mg, Oral, Daily      Tafluprost (PF)  0.0015 % solution ophthalmic solution  Commonly known as: ZIOPTAN   1 drop, Both Eyes, Nightly      tamsulosin 0.4 MG capsule 24 hr capsule  Commonly known as: FLOMAX   2 capsules, Oral, Nightly      Trelegy Ellipta 200-62.5-25 MCG/ACT inhaler  Generic drug: Fluticasone-Umeclidin-Vilant   Inhalation                 Follow-up Information       Neftali Amezcua MD .    Specialty: Internal Medicine  Contact information:  [facility] [address] [place] [address]  [phone]                            Future Appointments   Date Time Provider Department Center   8/26/2024  8:45 AM Neftali Amezcua MD [facility]  [facility]   10/3/2024 10:30 AM [facility] OP VAS RM 3 [facility]   10/3/2024 11:00 AM [facility] OP VAS RM 3 [facility]   10/3/2024 11:30 AM Pasha Garrison MD [facility]   12/19/2024 12:00 PM [facility] CT 2 BH [facility] CT [facility]   12/27/2024 11:00 AM LAB CHAIR 2 [facility] [place]  [facility] [place]   12/27/2024 11:20 AM Adarsh Rangel MD PhD K [facility] [place]        Koko Julian MD  [facility]  07/29/24    Discharge time spent greater than 30 minutes.

## 2024-07-29 NOTE — PLAN OF CARE
Goal Outcome Evaluation:  Plan of Care Reviewed With: patient           Outcome Evaluation: pt discharged home with spouse                               Problem: Adult Inpatient Plan of Care  Goal: Plan of Care Review  Outcome: Met  Flowsheets (Taken 7/29/2024 1231)  Plan of Care Reviewed With: patient  Outcome Evaluation: pt discharged home with spouse  Goal: Patient-Specific Goal (Individualized)  Outcome: Met  Goal: Absence of Hospital-Acquired Illness or Injury  Outcome: Met  Intervention: Identify and Manage Fall Risk  Recent Flowsheet Documentation  Taken 7/29/2024 1230 by Valerie Yates RN  Safety Promotion/Fall Prevention:   activity supervised   assistive device/personal items within reach   clutter free environment maintained   fall prevention program maintained   nonskid shoes/slippers when out of bed   room organization consistent   safety round/check completed  Taken 7/29/2024 1058 by Valerie Yates RN  Safety Promotion/Fall Prevention:   assistive device/personal items within reach   clutter free environment maintained   nonskid shoes/slippers when out of bed   room organization consistent   safety round/check completed  Intervention: Prevent and Manage VTE (Venous Thromboembolism) Risk  Recent Flowsheet Documentation  Taken 7/29/2024 1230 by Valerie Yates RN  Activity Management: activity encouraged  Taken 7/29/2024 1058 by Valerie Yates RN  Activity Management:   activity encouraged   up ad karen  Intervention: Prevent Infection  Recent Flowsheet Documentation  Taken 7/29/2024 1230 by Valerie Yates RN  Infection Prevention: single patient room provided  Taken 7/29/2024 1058 by Valerie Yates RN  Infection Prevention: single patient room provided  Goal: Optimal Comfort and Wellbeing  Outcome: Met  Goal: Readiness for Transition of Care  Outcome: Met

## 2024-07-29 NOTE — PLAN OF CARE
Goal Outcome Evaluation:               Pt. A&O*4, VSS, A-Fib on the monitor (controlled).  PIV-CDI, Awaiting Neurosurgery consult today.  PRN pain meds given per order.  Pt updated on POC,  POC ongoing.

## 2024-07-29 NOTE — TELEPHONE ENCOUNTER
07/29/24 IN BASKET MESSAGE REQUEST TO BOOK HOSPITAL   FOLLOW UP (E.R. 07.27.24)  PT HAS APPOINTMENT WITH DR. HAUSER ON 08/08/24 AT 10:30 A.M.  I CALLED PT TO INFORM  I MAILED APPOINTMENT REMINDER TO ADDRESS WE HAVE ON FILE FOR PT.

## 2024-07-29 NOTE — OUTREACH NOTE
Prep Survey      Flowsheet Row Responses   [facility] [place] patient discharged from? [facility]   Is LACE score < 7 ? No   Eligibility [facility]   Date of Admission 07/27/24   Date of Discharge 07/29/24   Discharge diagnosis Cervical stenosis of spine   Does the patient have one of the following disease processes/diagnoses(primary or secondary)? Other   Prep survey completed? Yes            Lindsay HELMS - Registered Nurse

## 2024-07-29 NOTE — CONSULTS
[facility] NEUROSURGERY CONSULT NOTE    Patient name: Aj Salazar  Referring Provider: Koko Julian MD   Reason for Consultation: Cord compression at C5-6    Patient Care Team:  Neftali Amezcua MD as PCP - General (Internal Medicine)  Tahir Childs MD as Consulting Physician (Pulmonary Disease)  Aura Perry OD (Optometry)  Juan Colby MD as Consulting Physician (Cardiology)  Adarsh Rangel MD PhD as Consulting Physician (Hematology and Oncology)  Rebel Escobar MD as Consulting Physician (Urology)  Danie Mcgrath MD as Consulting Physician (Ophthalmology)  John Queen MD as Consulting Physician (Wound Care)    Chief complaint: Left-sided neck pain and shoulder pain    Subjective .     History of present illness:    Patient is a 78 y.o.  male with hypertension, history of smoking greater than 50 years, DM 2, A-fib, GERD, alcohol dependence in remission, OA, heart failure, Raynaud's disease, history of pyelonephritis, history of TIA who was previously seen for chest pain and was cleared by cardiology.  He presented again on 7/27 for similar pain involving more of his back.  Today he reports pain in the left-sided neck and shoulder.  CT imaging showed cervical spinal cord compression at C5-6.  He denies numbness, tingling, pain or weakness in the upper or lower extremities besides the left shoulder pain  He denies gait instability, saddle anesthesia, and bowel or bladder dysfunction.    Review of Systems  Review of Systems   Gastrointestinal:         Denies bowel issues   Genitourinary:  Negative for enuresis.   Musculoskeletal:  Positive for neck pain. Negative for back pain.   Neurological:  Negative for weakness and numbness.       History  PAST MEDICAL HISTORY  Past Medical History:   Diagnosis Date    Alcohol abuse, in remission     Asthma copd    BPH (benign prostatic hypertrophy)     see doctors at [facility]    Cataract     CHF (congestive heart failure)     COPD  (chronic obstructive pulmonary disease)     Depression     DJD (degenerative joint disease) of cervical spine     ED (erectile dysfunction)     [facility]    GERD (gastroesophageal reflux disease)     Glaucoma     History of prediabetes     Hx of colonic polyps     followed by GI ([facility])    Hyperlipidemia     Hypertension     Influenza B 02/14/2013        Low back pain     Nocturnal hypoxia     Osteoarthritis     HIPS, HANDS, MULTIPLE SITES    Osteoarthritis 03/17/2016    Peripheral neuropathy     Permanent atrial fibrillation     Pneumonia     Pneumonia due to infectious organism 04/23/2023    Rectal bleeding 04/26/2017    Tendonitis of shoulder 11/07/2007    RIGHT SHOULDER/DELTOID INSERTION    TIA (transient ischemic attack) 10/2020    Ulcer of the stomach and intestine        PAST SURGICAL HISTORY  Past Surgical History:   Procedure Laterality Date    APPENDECTOMY      CARDIOVASCULAR STRESS TEST N/A 10/20/2015    NML LEXISCAN CARDIOLITE PERFUSION STUDY, NO EVIDENCE OF ISCHEMIA, AREA OF HYPOPERFUSION OF THE INFERIOR WALL W/NORMAL WALL PERFUSION AND NML LEFT VENTRICULAR EJECTION FRACTION, MOST LIKELY ATTENUATION. DR.MICHAEL BOX    CATARACT EXTRACTION Bilateral 02/2023    COLONOSCOPY N/A 02/2017    COLONOSCOPY N/A 02/23/2011    4 POLYPS REMOVED, RESCOPE IN 5 YRS, DR. EAGLE    COLONOSCOPY N/A 03/08/2006    ERYTHEMOUS AND GRANULAR MUCOSA IN ILEOCECAL VALVE, NORMAL COLON, REPEAT IN 5 YRS,     ENDOSCOPY N/A 09/26/2018    normal esophagus, acute gastritis, multiple non-bleeding duodenal ulcers with pigmented material, H Pylori positive    HERNIA REPAIR Bilateral     INGUINAL    JOINT REPLACEMENT  11/2015    left hip    TOTAL HIP ARTHROPLASTY Left 11/06/2015    Dr Ankush Sky    TOTAL HIP ARTHROPLASTY Right 10/27/2014    DR.RIED SKY    VASECTOMY         FAMILY HISTORY  Family History   Problem Relation Age of Onset    Cancer Mother         Bladder cancer    Colon cancer  Mother     Ovarian cancer Mother     Alzheimer's disease Mother 70    Hyperlipidemia Father     Heart disease Father     Coronary artery disease Father     Hypertension Father     Stroke Sister         2016    Dementia Sister     Heart disease Brother     Alcohol abuse Brother     Heart disease Brother     Coronary artery disease Brother     Hypertension Brother     Stroke Brother     Arthritis Brother     Colon cancer Maternal Grandmother     Prostate cancer Neg Hx        SOCIAL HISTORY  Social History     Tobacco Use    Smoking status: Former     Current packs/day: 0.00     Average packs/day: 2.0 packs/day for 49.0 years (98.0 ttl pk-yrs)     Types: Cigarettes     Start date: 1961     Quit date: 2010     Years since quittin.5     Passive exposure: Past    Smokeless tobacco: Never    Tobacco comments:     long smoking history   Vaping Use    Vaping status: Never Used   Substance Use Topics    Alcohol use: No     Comment: 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; 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    Drug use: No     Comment: caffeine use          Allergies:  Bactrim [sulfamethoxazole-trimethoprim] and Sulfacetamide    MEDICATIONS:  Medications Prior to Admission   Medication Sig Dispense Refill Last Dose    ALPHAGAN P 0.1 % solution ophthalmic solution Administer 1 drop to both eyes 2 (two) times a day.  6 2024 at 0700    atorvastatin (LIPITOR) 80 MG tablet TAKE 1 TABLET EVERY NIGHT 90 tablet 1 2024 at 2100    bumetanide (BUMEX) 1 MG tablet bumetanide 1 mg tablet   TAKE 1 TABLET BY MOUTH EVERY DAY   2024 at 0700    clopidogrel (PLAVIX) 75 MG tablet TAKE 1 TABLET EVERY DAY 90 tablet 1 2024 at 0700    dabigatran etexilate (Pradaxa) 150 MG capsu Take 1 capsule by mouth 2 (Two) Times a Day. 60 capsule 6 2024 at 0700    Jardiance 10 MG tablet tablet 0 Refill(s)   2024 at 0700    metFORMIN ER (GLUCOPHAGE-XR) 500 MG 24 hr tablet TAKE 1 TABLET EVERY DAY WITH DINNER 90 tablet 1 2024 at 2100     metoprolol succinate XL (TOPROL-XL) 100 MG 24 hr tablet Take 1 tablet by mouth Every Night. Take 100 mg at night and 50 mg in the morning 30 tablet 0 7/27/2024 at 2100    metoprolol succinate XL (TOPROL-XL) 50 MG 24 hr tablet Take 1 tablet by mouth Daily. Take 50 mg every morning and 100 mg every night 30 tablet 0 7/27/2024 at 0700    Myrbetriq 25 MG tablet sustained-release 24 hour 24 hr tablet    7/27/2024 at 2100    spironolactone (ALDACTONE) 25 MG tablet Take 1 tablet by mouth Daily. 30 tablet 0 7/27/2024 at 0700    Tafluprost, PF, (ZIOPTAN) 0.0015 % solution ophthalmic solution Administer 1 drop to both eyes Every Night.   7/27/2024 at 2100    tamsulosin (FLOMAX) 0.4 MG capsule 24 hr capsule Take 2 capsules by mouth Every Night. 30 capsule  7/27/2024 at 2100    Accu-Chek Softclix Lancets lancets 1 each by Other route Daily. Check FBS once daily . E11.51 100 each 3     Blood Glucose Monitoring Suppl (Accu-Chek Guide Me) w/Device kit Use 1 Device Daily. Use to check FBS once daily . Dm2 with peripheral  neuropathy E11.51 1 kit 0     cilostazol (PLETAL) 100 MG tablet Take 1 tablet by mouth 2 (Two) Times a Day. 90 tablet 6     Dupixent 300 MG/2ML solution prefilled syringe        Fluticasone-Umeclidin-Vilant (Trelegy Ellipta) 200-62.5-25 MCG/ACT aerosol powder  Inhale.       glucose blood (Accu-Chek Guide) test strip Use to check FBS once daily . Dm2 with peripheral  neuropathy E11.51 100 each 1     glucose blood test strip Check FBS once daily .DM2 /E11.51 100 each 3     Lancets Misc. (Accu-Chek Softclix Lancet Dev) kit Use to check FBS once daily . Dm2 with peripheral  neuropathy E11.51 1 kit 2     pantoprazole (PROTONIX) 40 MG EC tablet TAKE 1 TABLET TWICE DAILY 180 tablet 1        Objective     Results Review:  LABS:    Admission on 07/27/2024   Component Date Value Ref Range Status    QT Interval 07/27/2024 343  ms Final    QTC Interval 07/27/2024 473  ms Final    Glucose 07/27/2024 141 (H)  65 - 99 mg/dL  Final    BUN 07/27/2024 14  8 - 23 mg/dL Final    Creatinine 07/27/2024 0.91  0.76 - 1.27 mg/dL Final    Sodium 07/27/2024 136  136 - 145 mmol/L Final    Potassium 07/27/2024 3.9  3.5 - 5.2 mmol/L Final    Chloride 07/27/2024 102  98 - 107 mmol/L Final    CO2 07/27/2024 22.0  22.0 - 29.0 mmol/L Final    Calcium 07/27/2024 9.2  8.6 - 10.5 mg/dL Final    Total Protein 07/27/2024 7.3  6.0 - 8.5 g/dL Final    Albumin 07/27/2024 4.1  3.5 - 5.2 g/dL Final    ALT (SGPT) 07/27/2024 15  1 - 41 U/L Final    AST (SGOT) 07/27/2024 18  1 - 40 U/L Final    Alkaline Phosphatase 07/27/2024 122 (H)  39 - 117 U/L Final    Total Bilirubin 07/27/2024 0.9  0.0 - 1.2 mg/dL Final    Globulin 07/27/2024 3.2  gm/dL Final    A/G Ratio 07/27/2024 1.3  g/dL Final    BUN/Creatinine Ratio 07/27/2024 15.4  7.0 - 25.0 Final    Anion Gap 07/27/2024 12.0  5.0 - 15.0 mmol/L Final    eGFR 07/27/2024 86.8  >60.0 mL/min/1.73 Final    HS Troponin T 07/27/2024 21  <22 ng/L Final    Extra Tube 07/27/2024 Hold for add-ons.   Final    Auto resulted.    Extra Tube 07/27/2024 hold for add-on   Final    Auto resulted    Extra Tube 07/27/2024 Hold for add-ons.   Final    Auto resulted.    Extra Tube 07/27/2024 Hold for add-ons.   Final    Auto resulted    WBC 07/27/2024 9.87  3.40 - 10.80 10*3/mm3 Final    RBC 07/27/2024 5.30  4.14 - 5.80 10*6/mm3 Final    Hemoglobin 07/27/2024 13.8  13.0 - 17.7 g/dL Final    Hematocrit 07/27/2024 43.4  37.5 - 51.0 % Final    MCV 07/27/2024 81.9  79.0 - 97.0 fL Final    MCH 07/27/2024 26.0 (L)  26.6 - 33.0 pg Final    MCHC 07/27/2024 31.8  31.5 - 35.7 g/dL Final    RDW 07/27/2024 15.1  12.3 - 15.4 % Final    RDW-SD 07/27/2024 45.0  37.0 - 54.0 fl Final    MPV 07/27/2024 8.7  6.0 - 12.0 fL Final    Platelets 07/27/2024 308  140 - 450 10*3/mm3 Final    Neutrophil % 07/27/2024 74.9  42.7 - 76.0 % Final    Lymphocyte % 07/27/2024 13.5 (L)  19.6 - 45.3 % Final    Monocyte % 07/27/2024 9.6  5.0 - 12.0 % Final    Eosinophil %  07/27/2024 1.1  0.3 - 6.2 % Final    Basophil % 07/27/2024 0.4  0.0 - 1.5 % Final    Immature Grans % 07/27/2024 0.5  0.0 - 0.5 % Final    Neutrophils, Absolute 07/27/2024 7.39 (H)  1.70 - 7.00 10*3/mm3 Final    Lymphocytes, Absolute 07/27/2024 1.33  0.70 - 3.10 10*3/mm3 Final    Monocytes, Absolute 07/27/2024 0.95 (H)  0.10 - 0.90 10*3/mm3 Final    Eosinophils, Absolute 07/27/2024 0.11  0.00 - 0.40 10*3/mm3 Final    Basophils, Absolute 07/27/2024 0.04  0.00 - 0.20 10*3/mm3 Final    Immature Grans, Absolute 07/27/2024 0.05  0.00 - 0.05 10*3/mm3 Final    nRBC 07/27/2024 0.0  0.0 - 0.2 /100 WBC Final    Protime 07/27/2024 16.7 (H)  11.7 - 14.2 Seconds Final    INR 07/27/2024 1.33 (H)  0.90 - 1.10 Final    PTT 07/27/2024 42.2 (H)  22.7 - 35.4 seconds Final    HS Troponin T 07/28/2024 22 (H)  <22 ng/L Final    Troponin T Delta 07/28/2024 1  >=-4 - <+4 ng/L Final    Glucose 07/28/2024 106 (H)  65 - 99 mg/dL Final    BUN 07/28/2024 12  8 - 23 mg/dL Final    Creatinine 07/28/2024 0.87  0.76 - 1.27 mg/dL Final    Sodium 07/28/2024 136  136 - 145 mmol/L Final    Potassium 07/28/2024 3.5  3.5 - 5.2 mmol/L Final    Chloride 07/28/2024 100  98 - 107 mmol/L Final    CO2 07/28/2024 23.3  22.0 - 29.0 mmol/L Final    Calcium 07/28/2024 8.6  8.6 - 10.5 mg/dL Final    BUN/Creatinine Ratio 07/28/2024 13.8  7.0 - 25.0 Final    Anion Gap 07/28/2024 12.7  5.0 - 15.0 mmol/L Final    eGFR 07/28/2024 88.9  >60.0 mL/min/1.73 Final    WBC 07/28/2024 7.69  3.40 - 10.80 10*3/mm3 Final    RBC 07/28/2024 5.03  4.14 - 5.80 10*6/mm3 Final    Hemoglobin 07/28/2024 12.8 (L)  13.0 - 17.7 g/dL Final    Hematocrit 07/28/2024 41.4  37.5 - 51.0 % Final    MCV 07/28/2024 82.3  79.0 - 97.0 fL Final    MCH 07/28/2024 25.4 (L)  26.6 - 33.0 pg Final    MCHC 07/28/2024 30.9 (L)  31.5 - 35.7 g/dL Final    RDW 07/28/2024 15.1  12.3 - 15.4 % Final    RDW-SD 07/28/2024 45.1  37.0 - 54.0 fl Final    MPV 07/28/2024 9.0  6.0 - 12.0 fL Final    Platelets 07/28/2024 269   140 - 450 10*3/mm3 Final    HS Troponin T 07/28/2024 21  <22 ng/L Final    QT Interval 07/28/2024 373  ms Final    QTC Interval 07/28/2024 414  ms Final    Glucose 07/28/2024 106  70 - 130 mg/dL Final    Glucose 07/28/2024 91  70 - 130 mg/dL Final    Glucose 07/28/2024 146 (H)  70 - 130 mg/dL Final    Glucose 07/28/2024 175 (H)  70 - 130 mg/dL Final    Sed Rate 07/29/2024 12  0 - 20 mm/hr Final    C-Reactive Protein 07/29/2024 2.60 (H)  0.00 - 0.50 mg/dL Final    Glucose 07/29/2024 109  70 - 130 mg/dL Final    Glucose 07/29/2024 106  70 - 130 mg/dL Final       DIAGNOSTICS:  MRI examination of the cervical spine with and without contrast:     The study is hampered by patient motion.     There is a grade 1 retrolisthesis of C5 on C6 estimated to be  approximately 3 mm. There is moderate loss of disc height and disc  desiccation at C5-6 and mild loss of disc height and disc desiccation at  C4-5. Increased signal intensity is appreciated involving the cervical  cord from the inferior aspect of C4 to the mid body of C6 on the  sagittal T2 sequence.     C2-3: There is fusion of the facets to the left.     C3-4: There is severe facet degenerative disease to the left. Severe  foraminal stenosis is present on the left secondary to facet  hypertrophy, uncovertebral degenerative disease and extension of a small  broad-based disc osteophyte complex into the neural foramen. The  disc-osteophyte complexes more prominent to the left. Abuts and  minimally flattens the ventral surface of the cord.     C4-5: Mild to moderate facet degenerative disease is present on the left  which contributes to mild to moderate foraminal stenosis. A mild central  broad-based disc osteophyte complex is present which abuts and mildly  flattens the ventral surface of the cord, slightly more prominent to the  left and contributing to moderate canal stenosis. Cerebrospinal fluid is  effaced ventral and dorsal to the cord.     C5-6: There is severe  spinal stenosis with cord compression secondary to  a mild broad-based disc osteophyte complex and the retrolisthesis of C5  upon C6. There is severe foraminal stenosis bilaterally secondary to  uncovertebral degenerative disease.     C6-7: A left lateral disc osteophyte complex is present which approaches  but does not appear to deform the cord. It may involve the ventral nerve  root on the left. There is moderate to severe foraminal stenosis on the  left and mild to moderate foraminal stenosis on the right secondary to  uncovertebral degenerative disease and extension of the disc osteophyte  complex into the neural foramen.     C7-T1: There is no evidence of a disc bulge or herniation.     After contrast administration there was no evidence of abnormal  enhancement.     IMPRESSION:  1.  There is severe spinal stenosis and cord compression at C5-6  secondary to 3 mm of retrolisthesis of C5 upon C6 and a mild broad-based  disc osteophyte complex. There is increased signal intensity involving  the cord on the sagittal T2 sequence extending from the inferior aspect  of C4 to the mid body of C6. This is nonspecific and likely represents  myelomalacia although acute edema cannot be excluded. Clinical  correlation is required.  2.  There is no evidence of a focal herniation. Multilevel degenerative  disease is present as described in detail above including there is  severe spinal stenosis at C5-6, moderate canal stenosis at C4-5 and  multilevel facet degenerative disease and foraminal stenosis. The  foraminal stenosis is more prominent on the left. There is no evidence  of abnormal enhancement after contrast administration. See above.     MRI EXAMINATION OF THE THORACIC SPINE WITH AND WITHOUT CONTRAST:     The alignment of the thoracic spine is within normal limits. There is  mild loss of disc height and disc desiccation from T2-T9. Signal  intensity within the thoracic cord is normal on the sagittal and axial  T2  sequences. Mild facet degenerative disease is present bilaterally at  T2/T3 and T3/T4. Mild right paracentral disc osteophyte complex is  present at T4-5 and T5-6, more prominent at T5-6 where there is mild  flattening of the ventral surface of the cord. A smaller central disc  osteophyte complex is present at T6/T7. At T7/T8 and T8/T9 central disc  osteophyte complexes are present which abut and mildly flattens the  ventral surface of the cord. After contrast administration there was no  evidence of abnormal enhancement.     IMPRESSION: There is no evidence of a compression fracture, of cord  signal abnormality, of cord compression or of abnormal enhancement.  Multilevel degenerative disease involving the thoracic spine is noted as  described above including multilevel disc osteophyte complexes which  abut and mildly flattens the ventral surface of the cord as described in  detail above. There is no evidence of a focal herniation. See above.      I reviewed the imaging of the thoracic and cervical spine on the date of 7/29/2024 and discussed findings with Dr. Mancuso on the date of 7/29/2024 who has reviewed the imaging as well.  Results Review:   I reviewed the patient's new clinical results.  I personally viewed and interpreted the patient's labs, medications and chart.    Vital Signs   Temp:  [97.3 °F (36.3 °C)-98.2 °F (36.8 °C)] 97.9 °F (36.6 °C)  Heart Rate:  [] 94  Resp:  [16-20] 20  BP: (130-151)/(70-88) 133/70    Physical Exam:  Physical Exam  Constitutional:       General: He is not in acute distress.     Appearance: Normal appearance. He is not ill-appearing, toxic-appearing or diaphoretic.   HENT:      Head: Normocephalic.      Nose: Nose normal.      Mouth/Throat:      Mouth: Mucous membranes are moist.      Pharynx: Oropharynx is clear.   Eyes:      Extraocular Movements: Extraocular movements intact.      Conjunctiva/sclera: Conjunctivae normal.   Cardiovascular:      Rate and Rhythm: Normal  rate.   Pulmonary:      Effort: Pulmonary effort is normal.   Musculoskeletal:         General: Normal range of motion.      Cervical back: Tenderness present. No bony tenderness.   Skin:     General: Skin is warm.   Neurological:      Mental Status: He is alert and oriented to person, place, and time.      Gait: Gait is intact.      Deep Tendon Reflexes:      Reflex Scores:       Tricep reflexes are 3+ on the right side and 3+ on the left side.       Bicep reflexes are 3+ on the right side and 3+ on the left side.       Brachioradialis reflexes are 3+ on the right side and 3+ on the left side.       Patellar reflexes are 3+ on the right side and 3+ on the left side.       Achilles reflexes are 3+ on the right side and 3+ on the left side.  Psychiatric:         Mood and Affect: Mood normal.         Speech: Speech normal.         Behavior: Behavior normal.         Thought Content: Thought content normal.         Judgment: Judgment normal.       Neurologic Exam     Mental Status   Oriented to person, place, and time.   Attention: normal. Concentration: normal.   Speech: speech is normal   Level of consciousness: alert  Knowledge: consistent with education.     Cranial Nerves     CN VIII   Hearing: intact    Motor Exam   Muscle bulk: normal  Overall muscle tone: normal  Right arm tone: normal  Left arm tone: normal  Right leg tone: normal  Left leg tone: normal    Strength   Right deltoid: 5/5  Left deltoid: 5/5  Right biceps: 5/5  Left biceps: 5/5  Right triceps: 5/5  Left triceps: 5/5  Right wrist flexion: 5/5  Left wrist flexion: 5/5  Right wrist extension: 5/5  Left wrist extension: 5/5  Right interossei: 5/5  Left interossei: 5/5  Right iliopsoas: 5/5  Left iliopsoas: 5/5  Right quadriceps: 5/5  Left quadriceps: 5/5  Right hamstrin/5  Left hamstrin/5  Right anterior tibial: 5/5  Left anterior tibial: 5/5  Right posterior tibial: 5/5  Left posterior tibial: 5/5  Right gastroc: 5/5  Left gastroc:  5/5    Sensory Exam   Light touch normal.     Gait, Coordination, and Reflexes     Gait  Gait: normal    Reflexes   Right brachioradialis: 3+  Left brachioradialis: 3+  Right biceps: 3+  Left biceps: 3+  Right triceps: 3+  Left triceps: 3+  Right patellar: 3+  Left patellar: 3+  Right achilles: 3+  Left achilles: 3+  Right Novoa: present (Present only minimally)  Left Novoa: absent  Right ankle clonus: absent  Left ankle clonus: absent      Assessment & Plan       Cervical stenosis of spine    COPD (chronic obstructive pulmonary disease)    Hypertension    History of smoking greater than 50 pack years    Type 2 diabetes mellitus with diabetic peripheral angiopathy without gangrene, without long-term current use of insulin    A-fib    Gastroesophageal reflux disease    Alcohol dependence in remission    Back pain    Cervical cord compression with myelopathy    This is a 78-year-old female with acute left-sided neck pain and shoulder pain.  He denies any upper or lower extremity weakness however on exam there is note of diffuse hyperreflexia with some very mild Elsa sign on his right hand.  Cervical and thoracic spine completed with most concerning findings of cervical 5-6 severe stenosis with cord compression and cord signal from C4-6.  Dr. Mancuso also came and examined  and spoke with the patient to discuss options.  He feels that he will likely need cervical decompression in the near future.  The patient is desiring to follow-up in the outpatient setting to discuss surgery.  We will have our office set up an outpatient evaluation by Dr. Mancuso next week on Thursday or Friday.    If the patient has any new or worsening issues he will need to let us know immediately.  He was also cautioned to be very careful as any significant injury could cause significant damage to his spinal cord and possibly even paralysis.  He understands the significance of his issues and plans to follow-up with us next week.   "Neurosurgery recommends he discharge home with a steroid pack, muscle relaxers and pain medication.    PLAN:     Plan made for outpatient neurosurgical evaluation next week  Recommend discharge home with Medrol Dosepak, muscle relaxers, pain medication  Neurosurgery will sign off at this time    I discussed the patient's findings and my recommendations with patient and Dr. Mancuso    During patient visit, I utilized appropriate personal protective equipment including mask and gloves.  Mask used was standard procedure mask. Appropriate PPE was worn during the entire visit.  Hand hygiene was completed before and after.     Johnny Carlos, APRN  07/29/24  12:53 EDT    \"Dictated utilizing Dragon dictation\".    "

## 2024-07-29 NOTE — PROGRESS NOTES
Results of MRI noted, no active cardiac issues, we will sign off.  Low cardiac risk of any surgical intervention is indicated.  Please call if we can help in any way.

## 2024-07-29 NOTE — OUTREACH NOTE
Call Center TCM Note      Flowsheet Row Responses   [facility] patient discharged from? [place]   Does the patient have one of the following disease processes/diagnoses(primary or secondary)? Other   TCM attempt successful? No   Unsuccessful attempts Attempt 1   Change in Health Status Readmitted            Tracy Ceja RN    7/29/2024, 07:11 EDT

## 2024-07-30 ENCOUNTER — TRANSITIONAL CARE MANAGEMENT TELEPHONE ENCOUNTER (OUTPATIENT)
Dept: CALL CENTER | Facility: HOSPITAL | Age: 79
End: 2024-07-30
Payer: MEDICARE

## 2024-07-30 ENCOUNTER — TELEPHONE (OUTPATIENT)
Dept: NEUROSURGERY | Facility: CLINIC | Age: 79
End: 2024-07-30
Payer: MEDICARE

## 2024-07-30 NOTE — OUTREACH NOTE
Call Center TCM Note      Flowsheet Row Responses   [facility] patient discharged from? [facility]   Does the patient have one of the following disease processes/diagnoses(primary or secondary)? Other   TCM attempt successful? Yes   Call start time 1021   Call end time 1029   Discharge diagnosis Cervical stenosis of spine   Person spoke with today (if not patient) and relationship pt   Meds reviewed with patient/caregiver? Yes   Is the patient having any side effects they believe may be caused by any medication additions or changes? No   Does the patient have all medications ordered at discharge? Yes   Is the patient taking all medications as directed (includes completed medication regime)? Yes   Comments Neurosurgery 8/8/24 10:30   Does the patient have an appointment with their PCP within 7-14 days of discharge? Yes  [8/6/2024 10:45 AM]   Psychosocial issues? No   Did the patient receive a copy of their discharge instructions? Yes   Nursing interventions Reviewed instructions with patient   What is the patient's perception of their health status since discharge? Improving   Is the patient/caregiver able to teach back signs and symptoms related to disease process for when to call PCP? Yes   Is the patient/caregiver able to teach back signs and symptoms related to disease process for when to call 911? Yes   Is the patient/caregiver able to teach back the hierarchy of who to call/visit for symptoms/problems? PCP, Specialist, Home health nurse, Urgent Care, ED, 911 Yes   If the patient is a current smoker, are they able to teach back resources for cessation? Not a smoker   TCM call completed? Yes   Wrap up additional comments Pt has continued neck pain, and has fu appt with Neurosurgeon. Reviewed AVS/meds with pt. Questions/concerns addressed. Pt verified PCP/Neurosurgeon fu appts   Call end time 1029   Would this patient benefit from a Referral to [facility] Social Work? No   Is the patient interested in additional  calls from an ambulatory ? No            Nissa Bertrand RN    7/30/2024, 10:32 EDT

## 2024-07-30 NOTE — TELEPHONE ENCOUNTER
I called patient and relayed message from karo to him. He voiced understanding. I advised that I sent a message through [employer] if he needs to look back at what we discussed.

## 2024-07-30 NOTE — TELEPHONE ENCOUNTER
Patient should take muscle relaxer and pain medication as needed for discomfort.  His sparing use is best.  He can also use over-the-counter Tylenol.  He should complete the steroid pack.  I would recommend against the use of anti-inflammatory such as Aleve, ibuprofen, Advil, Motrin.  It could cause GI upset along with some steroid and sometimes can delay surgery due to bleeding risk.  With regard to restrictions, I would avoid any strenuous or impact activities (running, jumping, riding mowers, skiing, boating-anything that causes the body to bounce), lifting more than 15 pounds or lifting overhead more than 5 pounds.

## 2024-07-30 NOTE — TELEPHONE ENCOUNTER
"Caller: Aj Salazar \"AMRIT\"    Relationship: Self    Best call back number: [phone]    What is the best time to reach you: ANYTIME    Who are you requesting to speak with (clinical staff, provider,  specific staff member): CLINICAL STAFF    Do you know the name of the person who called: NA    What was the call regarding: PATIENT CALLED AND STATES THAT HE HAS A FEW QUESTIONS-PATIENT IS ASKING ABOUT RESTRICTIONS AS FAR AS ACTIVITY-PATIENT ALSO HAS QUESTIONS ABOUT HIS MEDICATION AND SHOULD HE FINISH ALL OF HIS MEDICATION-PT STATES THAT HE IS TAKING HIS PAIN MEDICATION SPARINGLY AS HE DOES NOT HAVE MANY-PT IS ASKING FOR A CALL BACK ASAP THANK YOU    Is it okay if the provider responds through [employer]: NO    "

## 2024-07-30 NOTE — CASE MANAGEMENT/SOCIAL WORK
Case Management Discharge Note      Final Note: Home---no needs per private auto         Selected Continued Care - Discharged on 7/29/2024 Admission date: 7/27/2024 - Discharge disposition: Home or Self Care      Destination    No services have been selected for the patient.                Durable Medical Equipment    No services have been selected for the patient.                Dialysis/Infusion    No services have been selected for the patient.                Home Medical Care    No services have been selected for the patient.                Therapy    No services have been selected for the patient.                Community Resources    No services have been selected for the patient.                Community & DME    No services have been selected for the patient.                    Transportation Services  Private: Car    Final Discharge Disposition Code: 01 - home or self-care

## 2024-07-30 NOTE — TELEPHONE ENCOUNTER
Patient was seen in the hospital yesterday. He is scheduled to come into the office on 08-08-24 to discuss surgical options.    Does patient have any restrictions on activity due to his condition?    He was also given Muscle relaxer's and pain medication and would like to know if he should take them all as prescribed as the hospitalist gave him a small quantity. He was also given a MDP.

## 2024-08-06 ENCOUNTER — TELEMEDICINE (OUTPATIENT)
Dept: INTERNAL MEDICINE | Age: 79
End: 2024-08-06
Payer: MEDICARE

## 2024-08-06 DIAGNOSIS — E11.51 TYPE 2 DIABETES MELLITUS WITH DIABETIC PERIPHERAL ANGIOPATHY WITHOUT GANGRENE, WITHOUT LONG-TERM CURRENT USE OF INSULIN: Chronic | ICD-10-CM

## 2024-08-06 DIAGNOSIS — M48.02 CERVICAL STENOSIS OF SPINE: Primary | ICD-10-CM

## 2024-08-06 DIAGNOSIS — Z86.16 HISTORY OF COVID-19: ICD-10-CM

## 2024-08-06 NOTE — PROGRESS NOTES
I N T E R N A L  M E D I C I N E  ALLEN NORRIS, APRN      ENCOUNTER DATE:  08/06/2024    Aj Salazar / 78 y.o. / male        You have chosen to receive care through a telehealth visit.  Do you consent to use a video/audio connection for your medical care today? Yes    Location of patient and provider are in [place]       CHIEF COMPLAINT / REASON FOR OFFICE VISIT     TELEHEALTH ENCOUNTER:  cervical stenosis      ASSESSMENT & PLAN     Diagnoses and all orders for this visit:    1. Cervical stenosis of spine (Primary)    2. Type 2 diabetes mellitus with diabetic peripheral angiopathy without gangrene, without long-term current use of insulin  Overview:  **Complications of diabetes: peripheral neuropathy, peripheral arterial disease, and right leg wound.      Continue metformin  mg daily with dinner.       3. History of COVID-19          SUMMARY/DISCUSSION  Glucoses remain well controlled after recent steroid pack completion.  Cervical pain has greatly improved.  Follow up with neurosurgery as scheduled in 2 days.  Visit ER for any acutely worsening neurological symptoms.  Schedule routine follow up with cardiology office.  He has nearly fully recovered from recent COVID-19 illness.  Consider Mucinex DM for any lingering cough/ congestion.        Time spent: 20 minutes    Next Appointment with me: Visit date not found    No follow-ups on file.        HISTORY OF PRESENT ILLNESS     Patient unable to connect to video feed for today's visit due to technological difficulties.  He consented to proceed with appointment via telephone.      Date of admission/discharge: July 25-26, 2024  Hospital: [facility]   Principle Dx: Chest Pain  Secondary Dx: HTN, HLD, Carotid artery stenosis, PAD/ PVD, BPH, CHF, COPD, GERD, TOBY on CPAP, Afib, TIA, basal and squamous cell skin cancers, former smoker  History prior to hospitalization: Presented to ER after waking at up at 5 AM on July 25, 2024 with acute dull chest pain  radiating to left shoulder and left neck, accompanied by nausea and generalized weakness.  No associated dyspnea, palpitations or lower extremity swelling.  Followed by vascular surgery for carotid artery stenosis and right femoral artery stenosis.  Plan is for possible right leg bypass procedure if Pletal does not help with walking pain in next 3 months.  Evaluation/Treatment: EKG showed rate controlled Afib.  Initial troponin 23 then 25.  Chest XR negative acute.  July 2024 ECHO showed normal systolic function, indeterminate diastolic function, no aortic valve regurg or stenosis present.  Stress test was low risk.  Course: Discharged home with recommendation to follow up with cardiology as scheduled. He plans to reach out to office to schedule.    Date of admission/discharge: July 27-29, 2024  Hospital: [facility]   Principle Dx: Cervical stenosis of spine  Secondary Dx: Back pain, HTN, HLD, Carotid artery stenosis, PAD/ PVD, BPH, CHF, COPD, GERD, TOBY on CPAP, Afib, TIA, basal and squamous cell skin cancers, former smoker  History prior to hospitalization: Presented to ER next day after being discharged from hospital with similar symptoms, which seemed more chest wall/ back related.    Evaluation/Treatment: MRI Cervical Spine showed severe spinal stenosis and cord compression at C5-6.  MRI Thoracic Spine of multilevel degenerative disease.  Treated with IV steroids with symptom improvement.    Course: Discharged with Medrol dose pack which he completed with benefit.  Using prescribed hydrocodone rarely PRN, pain is tolerable.  Pain is felt most left cervical spine/ shoulder area.  Tried muscle relaxer without benefit so no longer taking.  No fever, chills, chest pain, dyspnea, extremity weakness, numbness, tingling.  No changes in bowel/ bladder, saddle anesthesia.  Ambulating well.  Eating, drinking, urinating well with normal bowel movements.  Scheduled for neurosurgery, Dr. Mancuso, follow up on August  8, 2024.  Fasting glucoses are averaging 110.    Incidentally tested positive for COVID-19 this past Saturday.  Minor cough had been going on for about 1 week prior to testing.  Symptoms have greatly improved.          REVIEWED DATA     Labs:           Imaging:           Medical Tests:           Summary of old records / correspondence / consultant report:           Request outside records:         Template created by Adelso Amezcua MD

## 2024-08-08 ENCOUNTER — READMISSION MANAGEMENT (OUTPATIENT)
Dept: CALL CENTER | Facility: HOSPITAL | Age: 79
End: 2024-08-08
Payer: MEDICARE

## 2024-08-08 ENCOUNTER — TELEPHONE (OUTPATIENT)
Dept: NEUROSURGERY | Facility: CLINIC | Age: 79
End: 2024-08-08

## 2024-08-08 DIAGNOSIS — M48.02 CERVICAL STENOSIS OF SPINE: ICD-10-CM

## 2024-08-08 RX ORDER — HYDROCODONE BITARTRATE AND ACETAMINOPHEN 5; 325 MG/1; MG/1
1 TABLET ORAL EVERY 6 HOURS PRN
Qty: 20 TABLET | Refills: 0 | Status: SHIPPED | OUTPATIENT
Start: 2024-08-08

## 2024-08-08 NOTE — OUTREACH NOTE
Medical Week 2 Survey      Flowsheet Row Responses   [facility] patient discharged from? [facility]   Does the patient have one of the following disease processes/diagnoses(primary or secondary)? Other   Week 2 attempt successful? Yes   Call start time 1736   Discharge diagnosis Cervical stenosis of spine   Call end time 1749   Meds reviewed with patient/caregiver? Yes   Is the patient having any side effects they believe may be caused by any medication additions or changes? No   Does the patient have all medications ordered at discharge? Yes   Is the patient taking all medications as directed (includes completed medication regime)? Yes   Does the patient have a primary care provider?  Yes   Does the patient have an appointment with their PCP within 7 days of discharge? Yes   Has the patient kept scheduled appointments due by today? Yes  [televisit]   Comments Neurosurgery 8/8/24 10:30, postponed due to + COVID   Has home health visited the patient within 72 hours of discharge? N/A   Psychosocial issues? No   Comments states COVID symptoms decreasing   Did the patient receive a copy of their discharge instructions? Yes   Nursing interventions Reviewed instructions with patient   What is the patient's perception of their health status since discharge? Improving   Is the patient/caregiver able to teach back signs and symptoms related to disease process for when to call PCP? Yes   Is the patient/caregiver able to teach back signs and symptoms related to disease process for when to call 911? Yes   Is the patient/caregiver able to teach back the hierarchy of who to call/visit for symptoms/problems? PCP, Specialist, Home health nurse, Urgent Care, ED, 911 Yes   Additional teach back comments has been following restrictions, no lifting,  staying active in anticipation of cervical stenosis surgery soon, has been + for COVID, condition improving   Week 2 Call Completed? Yes   Call end time 1749            Shea Payne  Registered Nurse

## 2024-08-08 NOTE — TELEPHONE ENCOUNTER
08/08/24 PT CALLED TO RESCHEDULE TODAYS APPOINTMENT. HE IS NOW BOOKED FOR HOSPTIAL FOLLOW UP ON 08/13/24 AT 09:45 A.M. HE STATED HE TESTED POSITIVE FOR COVID.    HE IS IN NEED OF A REFILL FOR OXYCODONE 5/325 THAT WAS PRESCRIBED TO HIM FROM THE HOSPITALIST AND ASKED IF DR HAUSER WOULD BE COMFORTABLE CALLING ANOTHER PRESCRIPTION.     HE HAS 1 LEFT    PHARMACY: [facility] PHONE NUMBER: [phone]

## 2024-08-09 NOTE — PROGRESS NOTES
"Subjective   History of Present Illness: Aj Salazar is a 78 y.o. male is here today for follow-up to discuss surgery after being seen in the [facility] for left sided neck pain and shoulder.  He underwent an extensive cardiac evaluation because of the left-sided shoulder pain and was found to have fairly significant spondylosis in the neck which we are asked to address.  His left shoulder pain is not improved with any of the conservative management.  His symptoms began spontaneously this summer and he continues to have left neck pain and left shoulder pain.  He does note some early morning imbalance or discoordination that improves through the day.  He denies any weakness or numbness in the distal arms or legs.    History of Present Illness    Tobacco Use: Medium Risk (8/13/2024)    Patient History     Smoking Tobacco Use: Former     Smokeless Tobacco Use: Never     Passive Exposure: Past        The following portions of the patient's history were reviewed and updated as appropriate: allergies, current medications, past family history, past medical history, past social history, past surgical history and problem list.    Review of Systems    Objective     Vitals:    08/13/24 0942   BP: 122/74   Weight: 75.8 kg (167 lb)   Height: 177.8 cm (70\")     Body mass index is 23.96 kg/m².      Physical Exam  Neurologic Exam    Physical Exam:     CONSTITUTIONAL:  appears well developed, well-nourished and in no acute distress.     NECK: the neck is supple and symmetric. The trachea is midline with no masses.       PULMONARY: Respiratory effort is normal with no increased work of breathing or signs of respiratory distress.     CARDIOVASCULAR: Pedal pulses are +2/4 bilaterally. Examination of the extremities shows no edema or varicosities.     MUSCULOSKELETAL: Gait normal, good range of motion of the left shoulder despite he guards it from radicular pain     SKIN: The skin is warm, dry and intact.       NEUROLOGIC:   Cranial " Nerves 2-12 intact  Normal motor strength noted although he guards the left shoulder for pain. Muscle bulk and tone are normal.  Sensory exam is normal to fine touch to confrontational testing bilaterally  Reflexes on the right side demonstrates 3/4 Triceps Reflex, 3/4 Biceps Reflex, 3/4 Brachioradialis Reflex, 3/4 Knee Jerk Reflex, 3/4 Ankle Jerk Reflex and no ankle clonus on the right.   Reflexes on the left side demonstrates 3/4 Triceps Reflex, 3/4 Biceps Reflex, 3/4 Brachioradialis Reflex, 3/4 Knee Jerk Reflex, 3/4 Ankle Jerk Reflex and no ankle clonus on the left.  Superficial/Primitive Reflexes: Right positive Novoa's sign, negative on left, Babinski negative, and Clonus signs all negative.  No coordination deficit observed.  Radicular testing showed a negative Don (ROSE) test and negative straight leg raise.  Cortical function is intact and without deficits. Speech is normal.     PSYCHIATRIC: oriented to person, place and time. Patient's mood and affect are normal.    Assessment & Plan   Independent Review of Radiographic Studies:      I personally reviewed the images from the following studies.    MRI of the cervical spine done on July 28, 2020 for [facility] demonstrates severe spinal stenosis at C4-C5 and C5-C6 with cord compression and signal change in the cord between those 2 levels.  There is slight retrolisthesis of C5 on C6 contributing to the stenosis.              Medical Decision Making:      He does have quicker reflexes than he would expect in a patient of his age and he has a unilateral Elsa sign, although subtle, in the right hand.  His symptomatology of pain in the shoulder is likely referable to the C4-C5 spondylitic change but he certainly has very concerning cervical stenosis with cord contact and signal change in the cord at C5-C6.  His clinical exam is quite mild compared to the appearance of his cervical stenosis.  I do believe he is symptomatic although mildly from the  cervical stenosis and there is really no role for any treatment other than surgery.   I do not think physical therapy will provide along lasting results and there is some risk to some of the stretching exercises on the neck.  There is no role in this scenario for interventional pain management given the amount of stenosis and cord compromise seen on the MRI.  Reviewed with he and his family an anterior cervical partial corpectomy at C5 for the purposes of C4-C5 and C5-C6 anterior cervical discectomy and fusion with allograft bone and metal instrumentation.     The patient understands that there are inherent risks to surgery including bleeding, infection, anesthetic risk, death, heart attack, stroke, damage to nerves, weakness, numbness, paralysis, failure to improve, need for further surgery, CSF leak, recurrence, persistent pain, and any other unforeseen complications.  He and his family copperhead and had opportunity to ask further questions.    Return for follow-up after surgery.    Diagnoses and all orders for this visit:    1. Cervical cord compression with myelopathy (Primary)  -     Case Request; Standing  -     ceFAZolin (ANCEF) 2 g in sodium chloride 0.9 % 100 mL IVPB  -     Case Request    2. Cervical stenosis of spine  -     Case Request; Standing  -     ceFAZolin (ANCEF) 2 g in sodium chloride 0.9 % 100 mL IVPB  -     Case Request  -     HYDROcodone-acetaminophen (NORCO) 5-325 MG per tablet; Take 1 tablet by mouth Every 6 (Six) Hours As Needed for Moderate Pain or Mild Pain.  Dispense: 20 tablet; Refill: 0    3. Neuroforaminal stenosis of cervical spine  -     Case Request; Standing  -     ceFAZolin (ANCEF) 2 g in sodium chloride 0.9 % 100 mL IVPB  -     Case Request    Other orders  -     Follow Anesthesia Guidelines / Protocol; Future  -     Follow Anesthesia Guidelines / Protocol; Standing  -     Verify NPO Status; Standing  -     SCD (Sequential Compression Device) - To Be Placed on Patient in  Pre-Op; Standing  -     Provide Patient With Instructions on NPO Status; Future  -     Initiate Observation Status; Standing             Pedro Mancuso MD FACS FAANS  Neurological Surgery

## 2024-08-12 DIAGNOSIS — K21.9 GASTROESOPHAGEAL REFLUX DISEASE, UNSPECIFIED WHETHER ESOPHAGITIS PRESENT: ICD-10-CM

## 2024-08-13 ENCOUNTER — OFFICE VISIT (OUTPATIENT)
Dept: NEUROSURGERY | Facility: CLINIC | Age: 79
End: 2024-08-13
Payer: MEDICARE

## 2024-08-13 VITALS
SYSTOLIC BLOOD PRESSURE: 122 MMHG | BODY MASS INDEX: 23.91 KG/M2 | DIASTOLIC BLOOD PRESSURE: 74 MMHG | HEIGHT: 70 IN | WEIGHT: 167 LBS

## 2024-08-13 DIAGNOSIS — M48.02 NEUROFORAMINAL STENOSIS OF CERVICAL SPINE: Chronic | ICD-10-CM

## 2024-08-13 DIAGNOSIS — M48.02 CERVICAL STENOSIS OF SPINE: ICD-10-CM

## 2024-08-13 DIAGNOSIS — G95.20 CERVICAL CORD COMPRESSION WITH MYELOPATHY: Primary | ICD-10-CM

## 2024-08-13 PROBLEM — M47.12 CERVICAL SPONDYLOSIS WITH MYELOPATHY: Status: ACTIVE | Noted: 2024-08-13

## 2024-08-13 RX ORDER — PANTOPRAZOLE SODIUM 40 MG/1
TABLET, DELAYED RELEASE ORAL
Qty: 180 TABLET | Refills: 1 | Status: SHIPPED | OUTPATIENT
Start: 2024-08-13

## 2024-08-13 RX ORDER — HYDROCODONE BITARTRATE AND ACETAMINOPHEN 5; 325 MG/1; MG/1
1 TABLET ORAL EVERY 6 HOURS PRN
Qty: 20 TABLET | Refills: 0 | Status: SHIPPED | OUTPATIENT
Start: 2024-08-13 | End: 2024-08-16 | Stop reason: SDUPTHER

## 2024-08-14 PROCEDURE — 99285 EMERGENCY DEPT VISIT HI MDM: CPT

## 2024-08-15 ENCOUNTER — READMISSION MANAGEMENT (OUTPATIENT)
Dept: CALL CENTER | Facility: HOSPITAL | Age: 79
End: 2024-08-15
Payer: MEDICARE

## 2024-08-15 ENCOUNTER — HOSPITAL ENCOUNTER (INPATIENT)
Facility: HOSPITAL | Age: 79
LOS: 1 days | Discharge: HOME | DRG: 074 | End: 2024-08-15
Attending: EMERGENCY MEDICINE | Admitting: HOSPITALIST
Payer: MEDICARE

## 2024-08-15 VITALS
RESPIRATION RATE: 18 BRPM | HEART RATE: 105 BPM | TEMPERATURE: 97.3 F | BODY MASS INDEX: 24.68 KG/M2 | HEIGHT: 70 IN | SYSTOLIC BLOOD PRESSURE: 124 MMHG | WEIGHT: 172.4 LBS | OXYGEN SATURATION: 100 % | DIASTOLIC BLOOD PRESSURE: 68 MMHG

## 2024-08-15 DIAGNOSIS — R52 INTRACTABLE PAIN: ICD-10-CM

## 2024-08-15 DIAGNOSIS — M54.12 CERVICAL RADICULOPATHY: Primary | ICD-10-CM

## 2024-08-15 PROBLEM — M54.2 NECK PAIN: Status: ACTIVE | Noted: 2024-08-15

## 2024-08-15 LAB
ANION GAP SERPL CALCULATED.3IONS-SCNC: 10.7 MMOL/L (ref 5–15)
BUN SERPL-MCNC: 12 MG/DL (ref 8–23)
BUN/CREAT SERPL: 14.3 (ref 7–25)
CALCIUM SPEC-SCNC: 9 MG/DL (ref 8.6–10.5)
CHLORIDE SERPL-SCNC: 103 MMOL/L (ref 98–107)
CO2 SERPL-SCNC: 24.3 MMOL/L (ref 22–29)
CREAT SERPL-MCNC: 0.84 MG/DL (ref 0.76–1.27)
DEPRECATED RDW RBC AUTO: 43.8 FL (ref 37–54)
EGFRCR SERPLBLD CKD-EPI 2021: 89.8 ML/MIN/1.73
ERYTHROCYTE [DISTWIDTH] IN BLOOD BY AUTOMATED COUNT: 14.9 % (ref 12.3–15.4)
GLUCOSE BLDC GLUCOMTR-MCNC: 118 MG/DL (ref 70–130)
GLUCOSE BLDC GLUCOMTR-MCNC: 123 MG/DL (ref 70–130)
GLUCOSE SERPL-MCNC: 114 MG/DL (ref 65–99)
HCT VFR BLD AUTO: 39.5 % (ref 37.5–51)
HGB BLD-MCNC: 12.4 G/DL (ref 13–17.7)
MCH RBC QN AUTO: 25.5 PG (ref 26.6–33)
MCHC RBC AUTO-ENTMCNC: 31.4 G/DL (ref 31.5–35.7)
MCV RBC AUTO: 81.1 FL (ref 79–97)
PLATELET # BLD AUTO: 255 10*3/MM3 (ref 140–450)
PMV BLD AUTO: 9.1 FL (ref 6–12)
POTASSIUM SERPL-SCNC: 4.1 MMOL/L (ref 3.5–5.2)
RBC # BLD AUTO: 4.87 10*6/MM3 (ref 4.14–5.8)
SODIUM SERPL-SCNC: 138 MMOL/L (ref 136–145)
WBC NRBC COR # BLD AUTO: 9.59 10*3/MM3 (ref 3.4–10.8)

## 2024-08-15 PROCEDURE — 85027 COMPLETE CBC AUTOMATED: CPT | Performed by: EMERGENCY MEDICINE

## 2024-08-15 PROCEDURE — 94799 UNLISTED PULMONARY SVC/PX: CPT

## 2024-08-15 PROCEDURE — 25010000002 HYDROMORPHONE 1 MG/ML SOLUTION: Performed by: EMERGENCY MEDICINE

## 2024-08-15 PROCEDURE — 25010000002 METHYLPREDNISOLONE PER 125 MG: Performed by: EMERGENCY MEDICINE

## 2024-08-15 PROCEDURE — 63710000001 PREDNISONE PER 1 MG: Performed by: EMERGENCY MEDICINE

## 2024-08-15 PROCEDURE — 82948 REAGENT STRIP/BLOOD GLUCOSE: CPT

## 2024-08-15 PROCEDURE — 99232 SBSQ HOSP IP/OBS MODERATE 35: CPT

## 2024-08-15 PROCEDURE — 94664 DEMO&/EVAL PT USE INHALER: CPT

## 2024-08-15 PROCEDURE — 80048 BASIC METABOLIC PNL TOTAL CA: CPT | Performed by: EMERGENCY MEDICINE

## 2024-08-15 PROCEDURE — 94640 AIRWAY INHALATION TREATMENT: CPT

## 2024-08-15 RX ORDER — NITROGLYCERIN 0.4 MG/1
0.4 TABLET SUBLINGUAL
Status: DISCONTINUED | OUTPATIENT
Start: 2024-08-15 | End: 2024-08-15 | Stop reason: HOSPADM

## 2024-08-15 RX ORDER — POLYETHYLENE GLYCOL 3350 17 G/17G
17 POWDER, FOR SOLUTION ORAL DAILY PRN
Status: DISCONTINUED | OUTPATIENT
Start: 2024-08-15 | End: 2024-08-15 | Stop reason: HOSPADM

## 2024-08-15 RX ORDER — MORPHINE SULFATE 2 MG/ML
2 INJECTION, SOLUTION INTRAMUSCULAR; INTRAVENOUS EVERY 4 HOURS PRN
Status: DISCONTINUED | OUTPATIENT
Start: 2024-08-15 | End: 2024-08-15 | Stop reason: HOSPADM

## 2024-08-15 RX ORDER — SODIUM CHLORIDE 9 MG/ML
40 INJECTION, SOLUTION INTRAVENOUS AS NEEDED
Status: DISCONTINUED | OUTPATIENT
Start: 2024-08-15 | End: 2024-08-15 | Stop reason: HOSPADM

## 2024-08-15 RX ORDER — BISACODYL 5 MG/1
5 TABLET, DELAYED RELEASE ORAL DAILY PRN
Status: DISCONTINUED | OUTPATIENT
Start: 2024-08-15 | End: 2024-08-15 | Stop reason: HOSPADM

## 2024-08-15 RX ORDER — ACETAMINOPHEN 160 MG/5ML
650 SOLUTION ORAL EVERY 4 HOURS PRN
Status: DISCONTINUED | OUTPATIENT
Start: 2024-08-15 | End: 2024-08-15 | Stop reason: HOSPADM

## 2024-08-15 RX ORDER — PREDNISONE 20 MG/1
50 TABLET ORAL ONCE
Status: COMPLETED | OUTPATIENT
Start: 2024-08-15 | End: 2024-08-15

## 2024-08-15 RX ORDER — ONDANSETRON 2 MG/ML
4 INJECTION INTRAMUSCULAR; INTRAVENOUS EVERY 6 HOURS PRN
Status: DISCONTINUED | OUTPATIENT
Start: 2024-08-15 | End: 2024-08-15 | Stop reason: HOSPADM

## 2024-08-15 RX ORDER — NICOTINE POLACRILEX 4 MG
15 LOZENGE BUCCAL
Status: DISCONTINUED | OUTPATIENT
Start: 2024-08-15 | End: 2024-08-15 | Stop reason: HOSPADM

## 2024-08-15 RX ORDER — ATORVASTATIN CALCIUM 80 MG/1
80 TABLET, FILM COATED ORAL NIGHTLY
Status: DISCONTINUED | OUTPATIENT
Start: 2024-08-15 | End: 2024-08-15 | Stop reason: HOSPADM

## 2024-08-15 RX ORDER — TRALOKINUMAB-LDRM 150 MG/ML
150 INJECTION, SOLUTION SUBCUTANEOUS
COMMUNITY
Start: 2024-08-14 | End: 2024-08-15 | Stop reason: HOSPADM

## 2024-08-15 RX ORDER — ACETAMINOPHEN 650 MG/1
650 SUPPOSITORY RECTAL EVERY 4 HOURS PRN
Status: DISCONTINUED | OUTPATIENT
Start: 2024-08-15 | End: 2024-08-15 | Stop reason: HOSPADM

## 2024-08-15 RX ORDER — AMOXICILLIN 250 MG
2 CAPSULE ORAL 2 TIMES DAILY PRN
Status: DISCONTINUED | OUTPATIENT
Start: 2024-08-15 | End: 2024-08-15 | Stop reason: HOSPADM

## 2024-08-15 RX ORDER — IBUPROFEN 600 MG/1
1 TABLET ORAL
Status: DISCONTINUED | OUTPATIENT
Start: 2024-08-15 | End: 2024-08-15 | Stop reason: HOSPADM

## 2024-08-15 RX ORDER — SODIUM CHLORIDE 0.9 % (FLUSH) 0.9 %
10 SYRINGE (ML) INJECTION EVERY 12 HOURS SCHEDULED
Status: DISCONTINUED | OUTPATIENT
Start: 2024-08-15 | End: 2024-08-15 | Stop reason: HOSPADM

## 2024-08-15 RX ORDER — ACETAMINOPHEN 325 MG/1
650 TABLET ORAL EVERY 4 HOURS PRN
Status: DISCONTINUED | OUTPATIENT
Start: 2024-08-15 | End: 2024-08-15 | Stop reason: HOSPADM

## 2024-08-15 RX ORDER — BUDESONIDE AND FORMOTEROL FUMARATE DIHYDRATE 160; 4.5 UG/1; UG/1
2 AEROSOL RESPIRATORY (INHALATION)
Status: DISCONTINUED | OUTPATIENT
Start: 2024-08-15 | End: 2024-08-15 | Stop reason: HOSPADM

## 2024-08-15 RX ORDER — TAMSULOSIN HYDROCHLORIDE 0.4 MG/1
0.8 CAPSULE ORAL NIGHTLY
Status: DISCONTINUED | OUTPATIENT
Start: 2024-08-15 | End: 2024-08-15 | Stop reason: HOSPADM

## 2024-08-15 RX ORDER — METOPROLOL SUCCINATE 50 MG/1
100 TABLET, EXTENDED RELEASE ORAL NIGHTLY
Status: DISCONTINUED | OUTPATIENT
Start: 2024-08-15 | End: 2024-08-15 | Stop reason: HOSPADM

## 2024-08-15 RX ORDER — METHOCARBAMOL 750 MG/1
750 TABLET, FILM COATED ORAL ONCE
Status: COMPLETED | OUTPATIENT
Start: 2024-08-15 | End: 2024-08-15

## 2024-08-15 RX ORDER — ONDANSETRON 4 MG/1
4 TABLET, ORALLY DISINTEGRATING ORAL EVERY 6 HOURS PRN
Status: DISCONTINUED | OUTPATIENT
Start: 2024-08-15 | End: 2024-08-15 | Stop reason: HOSPADM

## 2024-08-15 RX ORDER — CILOSTAZOL 100 MG/1
100 TABLET ORAL 2 TIMES DAILY
Status: DISCONTINUED | OUTPATIENT
Start: 2024-08-15 | End: 2024-08-15 | Stop reason: HOSPADM

## 2024-08-15 RX ORDER — SODIUM CHLORIDE 0.9 % (FLUSH) 0.9 %
10 SYRINGE (ML) INJECTION AS NEEDED
Status: DISCONTINUED | OUTPATIENT
Start: 2024-08-15 | End: 2024-08-15 | Stop reason: HOSPADM

## 2024-08-15 RX ORDER — SPIRONOLACTONE 25 MG/1
25 TABLET ORAL DAILY
Status: DISCONTINUED | OUTPATIENT
Start: 2024-08-15 | End: 2024-08-15 | Stop reason: HOSPADM

## 2024-08-15 RX ORDER — OXYCODONE HYDROCHLORIDE AND ACETAMINOPHEN 5; 325 MG/1; MG/1
1 TABLET ORAL EVERY 4 HOURS PRN
Status: DISCONTINUED | OUTPATIENT
Start: 2024-08-15 | End: 2024-08-15 | Stop reason: HOSPADM

## 2024-08-15 RX ORDER — LIDOCAINE 4 G/G
1 PATCH TOPICAL ONCE
Status: COMPLETED | OUTPATIENT
Start: 2024-08-15 | End: 2024-08-15

## 2024-08-15 RX ORDER — BUMETANIDE 2 MG/1
1 TABLET ORAL DAILY
Status: DISCONTINUED | OUTPATIENT
Start: 2024-08-15 | End: 2024-08-15 | Stop reason: HOSPADM

## 2024-08-15 RX ORDER — PANTOPRAZOLE SODIUM 40 MG/1
40 TABLET, DELAYED RELEASE ORAL 2 TIMES DAILY
Status: DISCONTINUED | OUTPATIENT
Start: 2024-08-15 | End: 2024-08-15 | Stop reason: HOSPADM

## 2024-08-15 RX ORDER — DEXTROSE MONOHYDRATE 25 G/50ML
25 INJECTION, SOLUTION INTRAVENOUS
Status: DISCONTINUED | OUTPATIENT
Start: 2024-08-15 | End: 2024-08-15 | Stop reason: HOSPADM

## 2024-08-15 RX ORDER — METHYLPREDNISOLONE SODIUM SUCCINATE 125 MG/2ML
60 INJECTION, POWDER, LYOPHILIZED, FOR SOLUTION INTRAMUSCULAR; INTRAVENOUS EVERY 8 HOURS
Status: DISCONTINUED | OUTPATIENT
Start: 2024-08-15 | End: 2024-08-15 | Stop reason: HOSPADM

## 2024-08-15 RX ORDER — METOPROLOL SUCCINATE 50 MG/1
50 TABLET, EXTENDED RELEASE ORAL DAILY
Status: DISCONTINUED | OUTPATIENT
Start: 2024-08-15 | End: 2024-08-15 | Stop reason: HOSPADM

## 2024-08-15 RX ORDER — BISACODYL 10 MG
10 SUPPOSITORY, RECTAL RECTAL DAILY PRN
Status: DISCONTINUED | OUTPATIENT
Start: 2024-08-15 | End: 2024-08-15 | Stop reason: HOSPADM

## 2024-08-15 RX ORDER — METHOCARBAMOL 750 MG/1
750 TABLET, FILM COATED ORAL 4 TIMES DAILY
Status: DISCONTINUED | OUTPATIENT
Start: 2024-08-15 | End: 2024-08-15 | Stop reason: HOSPADM

## 2024-08-15 RX ADMIN — PANTOPRAZOLE SODIUM 40 MG: 40 TABLET, DELAYED RELEASE ORAL at 10:01

## 2024-08-15 RX ADMIN — METOPROLOL SUCCINATE 50 MG: 50 TABLET, FILM COATED, EXTENDED RELEASE ORAL at 10:02

## 2024-08-15 RX ADMIN — BUDESONIDE AND FORMOTEROL FUMARATE DIHYDRATE 2 PUFF: 160; 4.5 AEROSOL RESPIRATORY (INHALATION) at 10:24

## 2024-08-15 RX ADMIN — HYDROMORPHONE HYDROCHLORIDE 1 MG: 1 INJECTION, SOLUTION INTRAMUSCULAR; INTRAVENOUS; SUBCUTANEOUS at 04:09

## 2024-08-15 RX ADMIN — LIDOCAINE 1 PATCH: 4 PATCH TOPICAL at 02:33

## 2024-08-15 RX ADMIN — EMPAGLIFLOZIN 10 MG: 10 TABLET, FILM COATED ORAL at 09:58

## 2024-08-15 RX ADMIN — CILOSTAZOL 100 MG: 100 TABLET ORAL at 10:01

## 2024-08-15 RX ADMIN — BUMETANIDE 1 MG: 2 TABLET ORAL at 09:59

## 2024-08-15 RX ADMIN — METHOCARBAMOL TABLETS 750 MG: 750 TABLET, COATED ORAL at 15:10

## 2024-08-15 RX ADMIN — TIOTROPIUM BROMIDE INHALATION SPRAY 2 PUFF: 3.12 SPRAY, METERED RESPIRATORY (INHALATION) at 10:25

## 2024-08-15 RX ADMIN — PREDNISONE 50 MG: 20 TABLET ORAL at 02:33

## 2024-08-15 RX ADMIN — SPIRONOLACTONE 25 MG: 25 TABLET, FILM COATED ORAL at 10:02

## 2024-08-15 RX ADMIN — OXYCODONE HYDROCHLORIDE AND ACETAMINOPHEN 1 TABLET: 5; 325 TABLET ORAL at 10:00

## 2024-08-15 RX ADMIN — HYDROMORPHONE HYDROCHLORIDE 1 MG: 1 INJECTION, SOLUTION INTRAMUSCULAR; INTRAVENOUS; SUBCUTANEOUS at 02:33

## 2024-08-15 RX ADMIN — Medication 10 ML: at 10:09

## 2024-08-15 RX ADMIN — OXYCODONE HYDROCHLORIDE AND ACETAMINOPHEN 1 TABLET: 5; 325 TABLET ORAL at 15:18

## 2024-08-15 RX ADMIN — METHYLPREDNISOLONE SODIUM SUCCINATE 60 MG: 125 INJECTION, POWDER, FOR SOLUTION INTRAMUSCULAR; INTRAVENOUS at 10:10

## 2024-08-15 RX ADMIN — METHOCARBAMOL TABLETS 750 MG: 750 TABLET, COATED ORAL at 09:58

## 2024-08-15 RX ADMIN — METHOCARBAMOL TABLETS 750 MG: 750 TABLET, COATED ORAL at 02:34

## 2024-08-15 NOTE — H&P
[facility]   HISTORY AND PHYSICAL    Patient Name: Aj Salazar  : 1945  MRN: [id number]  Primary Care Physician:  Neftali Amezcua MD  Date of admission: 8/15/2024    Subjective   Subjective     Chief Complaint:   Chief Complaint   Patient presents with    Shoulder Pain         HPI:    Aj Salazar is a 78 y.o. male with a past medical history including but not limited to atrial fibrillation, CHF, COPD, hypertension, type 2 diabetes, sleep apnea on CPAP who presents to [facility] ER with worsened neck and left shoulder pain.  Patient states he he saw the neurosurgeon a couple of days ago to get surgery scheduled for his neck however tonight when he was going to bed he had a severe increase in pain.  He states he has been dealing with pain for several weeks now but it has been manageable up until tonight.  He states the pain is in the left side of the neck radiates to the left shoulder but does not radiate down the left arm.  He does report some intermittent numbness and tingling of the left fingers.  He denies any fevers.  Denies any cough or sore throat.  Denies chest pain shortness of breath.  Denies abdominal pain and nausea.  Denies blurred or double vision.      Review of Systems   All systems were reviewed and negative except for: what is mentioned above in the HPI.    Personal History     Past Medical History:   Diagnosis Date    Alcohol abuse, in remission     Asthma copd    BPH (benign prostatic hypertrophy)     see doctors at [facility]    Cataract     CHF (congestive heart failure)     COPD (chronic obstructive pulmonary disease)     Depression     DJD (degenerative joint disease) of cervical spine     ED (erectile dysfunction)     [facility]    GERD (gastroesophageal reflux disease)     Glaucoma     History of prediabetes     Hx of colonic polyps     followed by GI ([facility])    Hyperlipidemia     Hypertension     Influenza B 2013        Low back pain      Nocturnal hypoxia     Osteoarthritis     HIPS, HANDS, MULTIPLE SITES    Osteoarthritis 03/17/2016    Peripheral neuropathy     Permanent atrial fibrillation     Pneumonia     Pneumonia due to infectious organism 04/23/2023    Rectal bleeding 04/26/2017    Tendonitis of shoulder 11/07/2007    RIGHT SHOULDER/DELTOID INSERTION    TIA (transient ischemic attack) 10/2020    Ulcer of the stomach and intestine        Past Surgical History:   Procedure Laterality Date    APPENDECTOMY      CARDIOVASCULAR STRESS TEST N/A 10/20/2015    NML LEXISCAN CARDIOLITE PERFUSION STUDY, NO EVIDENCE OF ISCHEMIA, AREA OF HYPOPERFUSION OF THE INFERIOR WALL W/NORMAL WALL PERFUSION AND NML LEFT VENTRICULAR EJECTION FRACTION, MOST LIKELY ATTENUATION. DR.MICHAEL BOX    CATARACT EXTRACTION Bilateral 02/2023    COLONOSCOPY N/A 02/2017    COLONOSCOPY N/A 02/23/2011    4 POLYPS REMOVED, RESCOPE IN 5 YRS, DR. EAGLE    COLONOSCOPY N/A 03/08/2006    ERYTHEMOUS AND GRANULAR MUCOSA IN ILEOCECAL VALVE, NORMAL COLON, REPEAT IN 5 YRS,     ENDOSCOPY N/A 09/26/2018    normal esophagus, acute gastritis, multiple non-bleeding duodenal ulcers with pigmented material, H Pylori positive    HERNIA REPAIR Bilateral     INGUINAL    JOINT REPLACEMENT  11/2015    left hip    TOTAL HIP ARTHROPLASTY Left 11/06/2015    Dr Ankush Sky    TOTAL HIP ARTHROPLASTY Right 10/27/2014    DR.RIED SKY    VASECTOMY         Family History: family history includes Alcohol abuse in his brother; Alzheimer's disease (age of onset: 70) in his mother; Arthritis in his brother; Cancer in his mother; Colon cancer in his maternal grandmother and mother; Coronary artery disease in his brother and father; Dementia in his sister; Heart disease in his brother, brother, and father; Hyperlipidemia in his father; Hypertension in his brother and father; Ovarian cancer in his mother; Stroke in his brother and sister. Otherwise pertinent FHx was reviewed and not pertinent to  current issue.    Social History:  reports that he quit smoking about 14 years ago. His smoking use included cigarettes. He started smoking about 63 years ago. He has a 98 pack-year smoking history. He has been exposed to tobacco smoke. He has never used smokeless tobacco. He reports that he does not drink alcohol and does not use drugs.    Home Medications:  Accu-Chek Guide Me, Accu-Chek Softclix Lancet Dev, Accu-Chek Softclix Lancets, Fluticasone-Umeclidin-Vilant, HYDROcodone-acetaminophen, Mirabegron ER, Tafluprost (PF), atorvastatin, brimonidine, bumetanide, cilostazol, clopidogrel, dabigatran etexilate, empagliflozin, glucose blood, metFORMIN ER, methocarbamol, metoprolol succinate XL, pantoprazole, spironolactone, and tamsulosin    Allergies:  Allergies   Allergen Reactions    Bactrim [Sulfamethoxazole-Trimethoprim] Rash    Sulfacetamide Rash       Objective   Objective     Vitals:   Temp:  [96.6 °F (35.9 °C)] 96.6 °F (35.9 °C)  Heart Rate:  [95-98] 98  Resp:  [18] 18  BP: (136-141)/() 136/86  Physical Exam    Constitutional: 78-year-old male in no acute distress on room air   Eyes: PERRLA, sclerae anicteric, no conjunctival injection   HENT: NCAT, mucous membranes moist   Neck: Supple, no thyromegaly, no lymphadenopathy, trachea midline, there is point cervical tenderness at C4-5, C5-C6, and diffuse cervical paraspinal tenderness and of the posterior left shoulder   Respiratory: Mild wheezing bilaterally, no rhonchi or rales, nonlabored respirations    Cardiovascular: RRR, no murmurs, rubs, or gallops, palpable pedal pulses bilaterally   Gastrointestinal: Positive bowel sounds, soft, nontender, nondistended   Musculoskeletal: No bilateral ankle edema, no clubbing or cyanosis to extremities.  Equal  strength bilaterally.  Strength 5 out of 5 in bilateral upper extremities   Psychiatric: Appropriate affect, cooperative   Neurologic: Oriented x 3, strength symmetric in all extremities, Cranial Nerves  grossly intact to confrontation, speech clear   Skin: No rashes     Result Review    Result Review:  I have personally reviewed the results from the time of this admission to 8/15/2024 04:29 EDT and agree with these findings:  []  Laboratory list / accordion  []  Microbiology  []  Radiology  []  EKG/Telemetry   []  Cardiology/Vascular   []  Pathology  [x]  Old records  []  Other:  Most notable findings include:     MRI Cervical Spine With & Without Contrast    Result Date: 7/28/2024  MRI CERVICAL SPINE W WO CONTRAST-, MRI THORACIC SPINE W WO CONTRAST-  HISTORY:  pain; M54.9-Dorsalgia, unspecified; M25.512-Pain in left shoulder; R07.9-Chest pain, unspecified  COMPARISON: No prior MRI examination of the cervical or thoracic spine is available for comparison  MRI examination of the cervical spine with and without contrast:  The study is hampered by patient motion.  There is a grade 1 retrolisthesis of C5 on C6 estimated to be approximately 3 mm. There is moderate loss of disc height and disc desiccation at C5-6 and mild loss of disc height and disc desiccation at C4-5. Increased signal intensity is appreciated involving the cervical cord from the inferior aspect of C4 to the mid body of C6 on the sagittal T2 sequence.  C2-3: There is fusion of the facets to the left.  C3-4: There is severe facet degenerative disease to the left. Severe foraminal stenosis is present on the left secondary to facet hypertrophy, uncovertebral degenerative disease and extension of a small broad-based disc osteophyte complex into the neural foramen. The disc-osteophyte complexes more prominent to the left. Abuts and minimally flattens the ventral surface of the cord.  C4-5: Mild to moderate facet degenerative disease is present on the left which contributes to mild to moderate foraminal stenosis. A mild central broad-based disc osteophyte complex is present which abuts and mildly flattens the ventral surface of the cord, slightly more  prominent to the left and contributing to moderate canal stenosis. Cerebrospinal fluid is effaced ventral and dorsal to the cord.  C5-6: There is severe spinal stenosis with cord compression secondary to a mild broad-based disc osteophyte complex and the retrolisthesis of C5 upon C6. There is severe foraminal stenosis bilaterally secondary to uncovertebral degenerative disease.  C6-7: A left lateral disc osteophyte complex is present which approaches but does not appear to deform the cord. It may involve the ventral nerve root on the left. There is moderate to severe foraminal stenosis on the left and mild to moderate foraminal stenosis on the right secondary to uncovertebral degenerative disease and extension of the disc osteophyte complex into the neural foramen.  C7-T1: There is no evidence of a disc bulge or herniation.  After contrast administration there was no evidence of abnormal enhancement.      1.  There is severe spinal stenosis and cord compression at C5-6 secondary to 3 mm of retrolisthesis of C5 upon C6 and a mild broad-based disc osteophyte complex. There is increased signal intensity involving the cord on the sagittal T2 sequence extending from the inferior aspect of C4 to the mid body of C6. This is nonspecific and likely represents myelomalacia although acute edema cannot be excluded. Clinical correlation is required. 2.  There is no evidence of a focal herniation. Multilevel degenerative disease is present as described in detail above including there is severe spinal stenosis at C5-6, moderate canal stenosis at C4-5 and multilevel facet degenerative disease and foraminal stenosis. The foraminal stenosis is more prominent on the left. There is no evidence of abnormal enhancement after contrast administration. See above.  MRI EXAMINATION OF THE THORACIC SPINE WITH AND WITHOUT CONTRAST:  The alignment of the thoracic spine is within normal limits. There is mild loss of disc height and disc  desiccation from T2-T9. Signal intensity within the thoracic cord is normal on the sagittal and axial T2 sequences. Mild facet degenerative disease is present bilaterally at T2/T3 and T3/T4. Mild right paracentral disc osteophyte complex is present at T4-5 and T5-6, more prominent at T5-6 where there is mild flattening of the ventral surface of the cord. A smaller central disc osteophyte complex is present at T6/T7. At T7/T8 and T8/T9 central disc osteophyte complexes are present which abut and mildly flattens the ventral surface of the cord. After contrast administration there was no evidence of abnormal enhancement.  IMPRESSION: There is no evidence of a compression fracture, of cord signal abnormality, of cord compression or of abnormal enhancement. Multilevel degenerative disease involving the thoracic spine is noted as described above including multilevel disc osteophyte complexes which abut and mildly flattens the ventral surface of the cord as described in detail above. There is no evidence of a focal herniation. See above.  The above information was called to and discussed with Dr. Julian on 7/28/2024 at 1740 hours.   This report was finalized on 7/28/2024 5:47 PM by Dr. Yeyo Figueroa M.D on Workstation: [id number]      MRI Thoracic Spine With & Without Contrast    Result Date: 7/28/2024  MRI CERVICAL SPINE W WO CONTRAST-, MRI THORACIC SPINE W WO CONTRAST-  HISTORY:  pain; M54.9-Dorsalgia, unspecified; M25.512-Pain in left shoulder; R07.9-Chest pain, unspecified  COMPARISON: No prior MRI examination of the cervical or thoracic spine is available for comparison  MRI examination of the cervical spine with and without contrast:  The study is hampered by patient motion.  There is a grade 1 retrolisthesis of C5 on C6 estimated to be approximately 3 mm. There is moderate loss of disc height and disc desiccation at C5-6 and mild loss of disc height and disc desiccation at C4-5. Increased signal intensity is  appreciated involving the cervical cord from the inferior aspect of C4 to the mid body of C6 on the sagittal T2 sequence.  C2-3: There is fusion of the facets to the left.  C3-4: There is severe facet degenerative disease to the left. Severe foraminal stenosis is present on the left secondary to facet hypertrophy, uncovertebral degenerative disease and extension of a small broad-based disc osteophyte complex into the neural foramen. The disc-osteophyte complexes more prominent to the left. Abuts and minimally flattens the ventral surface of the cord.  C4-5: Mild to moderate facet degenerative disease is present on the left which contributes to mild to moderate foraminal stenosis. A mild central broad-based disc osteophyte complex is present which abuts and mildly flattens the ventral surface of the cord, slightly more prominent to the left and contributing to moderate canal stenosis. Cerebrospinal fluid is effaced ventral and dorsal to the cord.  C5-6: There is severe spinal stenosis with cord compression secondary to a mild broad-based disc osteophyte complex and the retrolisthesis of C5 upon C6. There is severe foraminal stenosis bilaterally secondary to uncovertebral degenerative disease.  C6-7: A left lateral disc osteophyte complex is present which approaches but does not appear to deform the cord. It may involve the ventral nerve root on the left. There is moderate to severe foraminal stenosis on the left and mild to moderate foraminal stenosis on the right secondary to uncovertebral degenerative disease and extension of the disc osteophyte complex into the neural foramen.  C7-T1: There is no evidence of a disc bulge or herniation.  After contrast administration there was no evidence of abnormal enhancement.      1.  There is severe spinal stenosis and cord compression at C5-6 secondary to 3 mm of retrolisthesis of C5 upon C6 and a mild broad-based disc osteophyte complex. There is increased signal intensity  involving the cord on the sagittal T2 sequence extending from the inferior aspect of C4 to the mid body of C6. This is nonspecific and likely represents myelomalacia although acute edema cannot be excluded. Clinical correlation is required. 2.  There is no evidence of a focal herniation. Multilevel degenerative disease is present as described in detail above including there is severe spinal stenosis at C5-6, moderate canal stenosis at C4-5 and multilevel facet degenerative disease and foraminal stenosis. The foraminal stenosis is more prominent on the left. There is no evidence of abnormal enhancement after contrast administration. See above.  MRI EXAMINATION OF THE THORACIC SPINE WITH AND WITHOUT CONTRAST:  The alignment of the thoracic spine is within normal limits. There is mild loss of disc height and disc desiccation from T2-T9. Signal intensity within the thoracic cord is normal on the sagittal and axial T2 sequences. Mild facet degenerative disease is present bilaterally at T2/T3 and T3/T4. Mild right paracentral disc osteophyte complex is present at T4-5 and T5-6, more prominent at T5-6 where there is mild flattening of the ventral surface of the cord. A smaller central disc osteophyte complex is present at T6/T7. At T7/T8 and T8/T9 central disc osteophyte complexes are present which abut and mildly flattens the ventral surface of the cord. After contrast administration there was no evidence of abnormal enhancement.  IMPRESSION: There is no evidence of a compression fracture, of cord signal abnormality, of cord compression or of abnormal enhancement. Multilevel degenerative disease involving the thoracic spine is noted as described above including multilevel disc osteophyte complexes which abut and mildly flattens the ventral surface of the cord as described in detail above. There is no evidence of a focal herniation. See above.  The above information was called to and discussed with Dr. Julian on 7/28/2024  at 1740 hours.   This report was finalized on 7/28/2024 5:47 PM by Dr. Yeyo Figueroa M.D on Workstation: [id number]      24 8:34 AM by Dr. Narciso Hooper M.D on Workstation: [id number]           Assessment & Plan   Assessment / Plan     Brief Patient Summary:  Aj Salazar is a 78 y.o. male who is being admitted to the observation unit with intractable left cervical radiculopathy due to known cervical cord compression with myelopathy    Active Hospital Problems:  Active Hospital Problems    Diagnosis     **Cervical cord compression with myelopathy     Left cervical radiculopathy     Cervical stenosis of spine      Plan:       Intractable pain, cervical stenosis  Left cervical radiculopathy  -Patient with known severe spinal stenosis at C4-5 and C5-6 with cord compression and signal change  -Patient is scheduled for surgery with Dr. Mancuso 9/9/2024  -IV steroids  -Multimodal pain management  -Neurosurgery consulted  -N.p.o.    Atrial fibrillation  CHF  Carotid artery stenosis  Hypertension  -Chronic conditions  -Patient appears euvolemic on exam  -Hold home Plavix and Pradaxa at this time until further recommendations from neurosurgery  -Continue statin  -Continue Bumex and spironolactone  -Continue metoprolol  -Telemetry monitoring    Type 2 diabetes  -Hold home metformin  -Sliding scale insulin and Accu-Cheks with meals and at bedtime    COPD  -No acute exacerbation noted  -Continue home inhalers  -Albuterol as needed    VTE Prophylaxis:  Mechanical VTE prophylaxis orders are present.        CODE STATUS:    Code Status (Patient has no pulse and is not breathing): CPR (Attempt to Resuscitate)  Medical Interventions (Patient has pulse or is breathing): Full Support    Admission Status:  I believe this patient meets observation status.    76 minutes have been spent by [facility] providers in the care of this patient while under observation status.      Appropriate PPE worn  during patient encounter.  Hand hygeine performed before and after seeing the patient.      Electronically signed by Laya Hernandez PA-C, 08/15/24, 4:23 AM EDT.

## 2024-08-15 NOTE — PLAN OF CARE
Goal Outcome Evaluation:      Patient was originally admitted to the hospital for possible surgery in the next few days but that was later retracted and he will have surgery on 8/21. Discharged to home, will discontinue his plavix and pletel until after surgery. Reviewed all d/c medications and instructions with patient. Able to teach back all instructions given. IV removed with tip intact. Ambulated off of unit with daughter with steady gait noted.

## 2024-08-15 NOTE — DISCHARGE SUMMARY
PHYSICIAN DISCHARGE SUMMARY                                                                        [facility]    Patient Identification:  Name: Aj Salazar  Age: 78 y.o.  Sex: male  :  1945  MRN: [id number]  Primary Care Physician: Neftali Amezcua MD    Admit date: 8/15/2024  Discharge date and time:8/15/2024  Discharged Condition: good    Discharge Diagnoses:  Active Hospital Problems    Diagnosis  POA    **Cervical cord compression with myelopathy [G95.20]  Yes    Left cervical radiculopathy [M54.12]  Yes    Neck pain [M54.2]  Yes    Cervical stenosis of spine [M48.02]  Yes    Neuroforaminal stenosis of cervical spine [M48.02]  Unknown    A-fib [I48.91]  Yes      Resolved Hospital Problems   No resolved problems to display.          PMHX:   Past Medical History:   Diagnosis Date    Alcohol abuse, in remission     Asthma copd    BPH (benign prostatic hypertrophy)     see doctors at [facility]    Cataract     CHF (congestive heart failure)     COPD (chronic obstructive pulmonary disease)     Depression     DJD (degenerative joint disease) of cervical spine     ED (erectile dysfunction)     [facility]    GERD (gastroesophageal reflux disease)     Glaucoma     History of prediabetes     Hx of colonic polyps     followed by GI (Kyle)    Hyperlipidemia     Hypertension     Influenza B 2013        Low back pain     Nocturnal hypoxia     Osteoarthritis     HIPS, HANDS, MULTIPLE SITES    Osteoarthritis 2016    Peripheral neuropathy     Permanent atrial fibrillation     Pneumonia     Pneumonia due to infectious organism 2023    Rectal bleeding 2017    Tendonitis of shoulder 2007    RIGHT SHOULDER/DELTOID INSERTION    TIA (transient ischemic attack) 10/2020    Ulcer of the stomach and intestine      PSHX:   Past Surgical History:   Procedure Laterality Date    APPENDECTOMY       CARDIOVASCULAR STRESS TEST N/A 10/20/2015    NML LEXISCAN CARDIOLITE PERFUSION STUDY, NO EVIDENCE OF ISCHEMIA, AREA OF HYPOPERFUSION OF THE INFERIOR WALL W/NORMAL WALL PERFUSION AND NML LEFT VENTRICULAR EJECTION FRACTION, MOST LIKELY ATTENUATION. DR.MICHAEL BOX    CATARACT EXTRACTION Bilateral 02/2023    COLONOSCOPY N/A 02/2017    COLONOSCOPY N/A 02/23/2011    4 POLYPS REMOVED, RESCOPE IN 5 YRS, DR. EAGLE    COLONOSCOPY N/A 03/08/2006    ERYTHEMOUS AND GRANULAR MUCOSA IN ILEOCECAL VALVE, NORMAL COLON, REPEAT IN 5 YRS,     ENDOSCOPY N/A 09/26/2018    normal esophagus, acute gastritis, multiple non-bleeding duodenal ulcers with pigmented material, H Pylori positive    HERNIA REPAIR Bilateral     INGUINAL    JOINT REPLACEMENT  11/2015    left hip    TOTAL HIP ARTHROPLASTY Left 11/06/2015    Dr Ankush Sky    TOTAL HIP ARTHROPLASTY Right 10/27/2014    DR.RIED SKY    VASECTOMY         [facility] Course: Aj Salazar     is a 78 y.o. male with a medical history of chronic neck pain, COPD, hypertension, CHF, and Raynaud's presents to the emergency department complaining of severe left-sided neck and shoulder pain that began tonight.  Patient reports this pain has been manageable but tonight worsened.  He denies any precipitating factors.  Patient reports that he is having neck surgery in a few weeks for severe spinal stenosis in his neck.  He says he took a hydrocodone tonight with no relief.  He says he has had some intermittent numbness and tingling down into the arm but denies any weakness.  He denies headaches or dizziness.  He denies chest pain or shortness of breath.  The patient was initially admitted to the observation unit but neurosurgery felt that they were needed to keep the patient in the hospital and do surgery sooner than rather wait till later and medicine was consulted to admit the patient to the hospital.  Unfortunately they could not do surgery until next Wednesday and the patient  had to be off his anticoagulation for at least for 5 days.  Subsequently neurosurgery said to go ahead and let the patient go home and come back for surgery next week on Wednesday.  He will continue with holding his anticoagulation.                 Consults       Date and Time Order Name Status Description    8/15/2024  4:23 AM Inpatient Neurosurgery Consult Completed     7/28/2024  5:54 PM Inpatient Neurosurgery Consult Completed     7/28/2024  2:29 AM Inpatient Cardiology Consult Completed     7/25/2024 11:51 AM Inpatient Cardiology Consult Completed           Results from last 7 days   Lab Units 08/15/24  0524   WBC 10*3/mm3 9.59   HEMOGLOBIN g/dL 12.4*   HEMATOCRIT % 39.5   PLATELETS 10*3/mm3 255     Results from last 7 days   Lab Units 08/15/24  0524   SODIUM mmol/L 138   POTASSIUM mmol/L 4.1   CHLORIDE mmol/L 103   CO2 mmol/L 24.3   BUN mg/dL 12   CREATININE mg/dL 0.84   GLUCOSE mg/dL 114*   CALCIUM mg/dL 9.0     Significant Diagnostic Studies:   WBC   Date Value Ref Range Status   08/15/2024 9.59 3.40 - 10.80 10*3/mm3 Final     Hemoglobin   Date Value Ref Range Status   08/15/2024 12.4 (L) 13.0 - 17.7 g/dL Final     Hematocrit   Date Value Ref Range Status   08/15/2024 39.5 37.5 - 51.0 % Final     Platelets   Date Value Ref Range Status   08/15/2024 255 140 - 450 10*3/mm3 Final     Sodium   Date Value Ref Range Status   08/15/2024 138 136 - 145 mmol/L Final     Potassium   Date Value Ref Range Status   08/15/2024 4.1 3.5 - 5.2 mmol/L Final     Comment:     Slight hemolysis detected by analyzer. Result may be falsely elevated.     Chloride   Date Value Ref Range Status   08/15/2024 103 98 - 107 mmol/L Final     CO2   Date Value Ref Range Status   08/15/2024 24.3 22.0 - 29.0 mmol/L Final     BUN   Date Value Ref Range Status   08/15/2024 12 8 - 23 mg/dL Final     Creatinine   Date Value Ref Range Status   08/15/2024 0.84 0.76 - 1.27 mg/dL Final     Glucose   Date Value Ref Range Status   08/15/2024 114 (H) 65 -  "99 mg/dL Final     Calcium   Date Value Ref Range Status   08/15/2024 9.0 8.6 - 10.5 mg/dL Final     No results found for: \"AST\", \"ALT\", \"ALKPHOS\"  No results found for: \"APTT\", \"INR\"  No results found for: \"COLORU\", \"CLARITYU\", \"SPECGRAV\", \"PHUR\", \"PROTEINUR\", \"GLUCOSEU\", \"KETONESU\", \"BLOODU\", \"NITRITE\", \"LEUKOCYTESUR\", \"BILIRUBINUR\", \"UROBILINOGEN\", \"RBCUA\", \"WBCUA\", \"BACTERIA\", \"UACOMMENT\"  No results found for: \"TROPONINT\", \"TROPONINI\", \"BNP\"  No components found for: \"HGBA1C;2\"  No components found for: \"TSH;2\"  Imaging Results (All)       None          Lab Results (last 7 days)       Procedure Component Value Units Date/Time    POC Glucose Once [[id number]]  (Normal) Collected: 08/15/24 0810    Specimen: Blood Updated: 08/15/24 0811     Glucose 123 mg/dL     Basic Metabolic Panel [[id number]]  (Abnormal) Collected: 08/15/24 0524    Specimen: Blood from Arm, Left Updated: 08/15/24 0712     Glucose 114 mg/dL      BUN 12 mg/dL      Creatinine 0.84 mg/dL      Sodium 138 mmol/L      Potassium 4.1 mmol/L      Comment: Slight hemolysis detected by analyzer. Result may be falsely elevated.        Chloride 103 mmol/L      CO2 24.3 mmol/L      Calcium 9.0 mg/dL      BUN/Creatinine Ratio 14.3     Anion Gap 10.7 mmol/L      eGFR 89.8 mL/min/1.73     Narrative:      GFR Normal >60  Chronic Kidney Disease <60  Kidney Failure <15    The GFR formula is only valid for adults with stable renal function between ages 18 and 70.    CBC (No Diff) [[id number]]  (Abnormal) Collected: 08/15/24 0524    Specimen: Blood from Arm, Left Updated: 08/15/24 0648     WBC 9.59 10*3/mm3      RBC 4.87 10*6/mm3      Hemoglobin 12.4 g/dL      Hematocrit 39.5 %      MCV 81.1 fL      MCH 25.5 pg      MCHC 31.4 g/dL      RDW 14.9 %      RDW-SD 43.8 fl      MPV 9.1 fL      Platelets 255 10*3/mm3     POC Glucose Once [[id number]]  (Normal) Collected: 08/15/24 0602    Specimen: Blood Updated: 08/15/24 0604     Glucose 118 mg/dL           /68 (BP " "Location: Left arm, Patient Position: Lying)   Pulse 105   Temp 97.3 °F (36.3 °C) (Oral)   Resp 18   Ht 177.8 cm (70\")   Wt 78.2 kg (172 lb 6.4 oz)   SpO2 100%   BMI 24.74 kg/m²     Discharge Exam:  General Appearance:    Alert, cooperative, no distress                          Head:    Normocephalic, without obvious abnormality, atraumatic                          Eyes:                            Throat:   Lips, tongue, gums normal                          Neck:   Supple, symmetrical, trachea midline, no JVD                        Lungs:     Clear to auscultation bilaterally, respirations unlabored                Chest Wall:    No tenderness or deformity                        Heart:    Regular rate and rhythm, S1 and S2 normal, no murmur,no  Rub  or gallop                  Abdomen:     Soft, non-tender, bowel sounds active, no masses, no organomegaly                  Extremities:   Extremities normal, atraumatic, no cyanosis or edema                             Skin:   Skin is warm and dry,  no rashes or palpable lesions                  Neurologic:   no focal deficits noted     Disposition:  Home    Activity as tolerated    Diet as tolerated  Diet Order   Procedures    Diet: Diabetic; Consistent Carbohydrate; Fluid Consistency: Thin (IDDSI 0)       Patient Instructions:      Discharge Medications        Continue These Medications        Instructions Start Date   Accu-Chek Guide Me w/Device kit   1 Device, Does not apply, Daily, Use to check FBS once daily . 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 Dm2 with peripheral  neuropathy E11.51      Accu-Chek Softclix Lancets lancets   1 each, Other, Daily, Check FBS once daily . E11.51      Alphagan P 0.1 % solution ophthalmic solution  Generic drug: brimonidine   1 drop, Both Eyes, 2 times daily      atorvastatin 80 MG tablet  Commonly known as: LIPITOR   80 mg, Nightly      bumetanide 1 MG " tablet  Commonly known as: BUMEX   bumetanide 1 mg tablet   TAKE 1 TABLET BY MOUTH EVERY DAY      glucose blood test strip   Check FBS once daily .DM2 /E11.51      Accu-Chek Guide test strip  Generic drug: glucose blood   Use to check FBS once daily . 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      metFORMIN  MG 24 hr tablet  Commonly known as: GLUCOPHAGE-XR   500 mg, Oral, Every Evening      methocarbamol 500 MG tablet  Commonly known as: ROBAXIN   500 mg, Oral, 3 Times Daily PRN      metoprolol succinate  MG 24 hr tablet  Commonly known as: TOPROL-XL   100 mg, Oral, Nightly, Take 100 mg at night and 50 mg in the morning      metoprolol succinate XL 50 MG 24 hr tablet  Commonly known as: TOPROL-XL   50 mg, Oral, Daily, Take 50 mg every morning and 100 mg every night      Myrbetriq 25 MG tablet sustained-release 24 hour 24 hr tablet  Generic drug: Mirabegron ER   25 mg, Oral, Nightly      pantoprazole 40 MG EC tablet  Commonly known as: PROTONIX   TAKE 1 TABLET TWICE DAILY      spironolactone 25 MG tablet  Commonly known as: ALDACTONE   25 mg, Oral, Daily      Tafluprost (PF) 0.0015 % solution ophthalmic solution  Commonly known as: ZIOPTAN   1 drop, Both Eyes, Nightly      tamsulosin 0.4 MG capsule 24 hr capsule  Commonly known as: FLOMAX   2 capsules, Oral, Nightly      Trelegy Ellipta 200-62.5-25 MCG/ACT inhaler  Generic drug: Fluticasone-Umeclidin-Vilant   Inhalation             Stop These Medications      Adbry 150 MG/ML solution prefilled syringe  Generic drug: Tralokinumab-ldrm     cilostazol 100 MG tablet  Commonly known as: PLETAL     clopidogrel 75 MG tablet  Commonly known as: PLAVIX     dabigatran etexilate 150 MG capsu  Commonly known as: Pradaxa            Future Appointments   Date Time  Provider Department Center   8/26/2024  8:45 AM Neftali Amezcua MD [facility]  [facility]   9/4/2024  1:00 PM [facility] PAT 3 BH [facility] PAT [facility]   9/26/2024  8:30 AM Pedro Mancuso MD [facility]   10/3/2024 10:30 AM [facility] OP VAS RM 3 BH [facility] OVKR [facility]   10/3/2024 11:00 AM [facility] OP VAS RM 3 BH [facility] OVKR [facility]   10/3/2024 11:30 AM Pasha Garrison MD [facility]   12/19/2024 12:00 PM [facility] CT 2 BH [facility] CT [facility]   12/27/2024 11:00 AM LAB CHAIR 2 CBC [facility]   12/27/2024 11:20 AM Adarsh Rangel MD PhD [facility] CBC [facility]      Follow-up Information       Pedro Mancuso MD Follow up.    Specialty: Neurosurgery  Why: Follow-up for surgery on Wednesday and continue to hold anticoagulation until after surgery.  Contact information:  [address]  [place] [address]  [phone]               Neftali Amezcua MD .    Specialty: Internal Medicine  Contact information:  [facility] [facility] [address]  [place] [address]  [phone]                           Discharge Order (From admission, onward)       Start     Ordered    08/15/24 1441  Discharge patient  Once        Expected Discharge Date: 08/15/24   Discharge Disposition: Home or Self Care   Physician of Record for Attribution - Please select from Treatment Team: WIL CHRISTIAN [[id number]]   Review needed by CMO to determine Physician of Record: No      Question Answer Comment   Physician of Record for Attribution - Please select from Treatment Team WIL CHRISTIAN    Review needed by CMO to determine Physician of Record No        08/15/24 1442                    Total time spent discharging patient including evaluation,post hospitalization follow up,  medication and post hospitalization instructions and education total time exceeds 30 minutes.    Signed:  Wil Christian MD  8/15/2024  14:42 EDT

## 2024-08-15 NOTE — CASE MANAGEMENT/SOCIAL WORK
Discharge Planning Assessment  [facility]     Patient Name: Aj Salazar  MRN: [id number]  Today's Date: 8/15/2024    Admit Date: 8/15/2024    Plan: Home with family pending functional status after surgery.   Discharge Needs Assessment       Row Name 08/15/24 1106       Living Environment    People in Home spouse    Current Living Arrangements home    Primary Care Provided by self    Provides Primary Care For no one    Family Caregiver if Needed spouse    Family Caregiver Names Ara [phone]    Able to Return to Prior Arrangements yes       Resource/Environmental Concerns    Resource/Environmental Concerns none       Transition Planning    Patient/Family Anticipates Transition to home with family    Patient/Family Anticipated Services at Transition none    Transportation Anticipated family or friend will provide       Discharge Needs Assessment    Readmission Within the Last 30 Days no previous admission in last 30 days    Equipment Currently Used at Home cpap    Concerns to be Addressed discharge planning                   Discharge Plan       Row Name 08/15/24 1101       Plan    Plan Home with family pending functional status after surgery.    Patient/Family in Agreement with Plan yes    Plan Comments MOON letter checked. Met with patient at bedside, introduced self and explained CCP role. Verified facesheet and PCP-Neftali Amezcua. Patient lives at home with spouse-Ara [phone] and home has 4 steps to enter. Patient was ind with ADLS and driving prior to admission. Patient has used [facility] in the past and denies h/o SNF. [facility] uses [facility] pharmacy on [facility] and reports no difficulty affording medications. CCP notified patient will likely have back surger early next week. CCP to follow for functional status post-op. Geovany PINEDA RN                  Continued Care and Services - Admitted Since 8/15/2024    No active coordination exists for this encounter.           Demographic Summary       Row Name 08/15/24 0945       General Information    Admission Type observation    Referral Source admission list    Reason for Consult discharge planning    Preferred Language English                   Functional Status       Row Name 08/15/24 1106       Functional Status    Usual Activity Tolerance good    Current Activity Tolerance good       Functional Status, IADL    Medications independent    Meal Preparation independent    Housekeeping independent    Laundry independent    Shopping independent       Mental Status    General Appearance WDL WDL                   Psychosocial    No documentation.                  Abuse/Neglect    No documentation.                  Legal    No documentation.                  Substance Abuse    No documentation.                  Patient Forms    No documentation.                     Geovany Kilpatrick RN

## 2024-08-15 NOTE — OUTREACH NOTE
Medical Week 3 Survey      Flowsheet Row Responses   [facility] patient discharged from? [place]   Does the patient have one of the following disease processes/diagnoses(primary or secondary)? Other   Week 3 attempt successful? No   Unsuccessful attempts Attempt 1   Revoke Readmitted            Theodora GATES - Registered Nurse

## 2024-08-15 NOTE — ED PROVIDER NOTES
MD ATTESTATION NOTE    SHARED VISIT: This visit was performed by BOTH a physician and an APC. The substantive portion of the medical decision making was performed by this attesting physician who made or approved the management plan and takes responsibility for patient management. All studies documented in the APC note (if performed) were independently interpreted by me.    The [facility] and I have discussed this patient's history, physical exam, MDM, and treatment plan.  I have reviewed the documentation and personally had a face to face interaction with the patient. The attached note describes my personal findings.      Aj Salazar is a 78 y.o. male who presents to the ED c/o acute left-sided neck and shoulder pain.  Patient is scheduled for neck surgery March 9 for severe spinal stenosis in his cervical spine.  Patient took his hydrocodone tonight without relief.  Reports some numbness and tingling that radiates down his left arm no weakness no fevers or chills no chest pain.    On exam:  GENERAL: not distressed  HENT: nares patent  EYES: no scleral icterus  CV: regular rhythm, regular rate  RESPIRATORY: normal effort  ABDOMEN: soft  MUSCULOSKELETAL: no deformity  NEURO: alert, moves all extremities, follows commands  SKIN: warm, dry    Labs  Recent Results (from the past 24 hour(s))   POC Glucose Once    Collection Time: 08/15/24  6:02 AM    Specimen: Blood   Result Value Ref Range    Glucose 118 70 - 130 mg/dL       Radiology  No Radiology Exams Resulted Within Past 24 Hours    Medications given in the ED:  Medications   Lidocaine 4 % 1 patch (1 patch Transdermal Medication Applied 8/15/24 0233)   sodium chloride 0.9 % flush 10 mL (has no administration in time range)   sodium chloride 0.9 % flush 10 mL (has no administration in time range)   sodium chloride 0.9 % infusion 40 mL (has no administration in time range)   ondansetron ODT (ZOFRAN-ODT) disintegrating tablet 4 mg (has no administration in time range)      Or   ondansetron (ZOFRAN) injection 4 mg (has no administration in time range)   nitroglycerin (NITROSTAT) SL tablet 0.4 mg (has no administration in time range)   acetaminophen (TYLENOL) tablet 650 mg (has no administration in time range)     Or   acetaminophen (TYLENOL) 160 MG/5ML oral solution 650 mg (has no administration in time range)     Or   acetaminophen (TYLENOL) suppository 650 mg (has no administration in time range)   sennosides-docusate (PERICOLACE) 8.6-50 MG per tablet 2 tablet (has no administration in time range)     And   polyethylene glycol (MIRALAX) packet 17 g (has no administration in time range)     And   bisacodyl (DULCOLAX) EC tablet 5 mg (has no administration in time range)     And   bisacodyl (DULCOLAX) suppository 10 mg (has no administration in time range)   methocarbamol (ROBAXIN) tablet 750 mg (has no administration in time range)   methylPREDNISolone sodium succinate (SOLU-Medrol) injection 60 mg (has no administration in time range)   morphine injection 2 mg (has no administration in time range)   oxyCODONE-acetaminophen (PERCOCET) 5-325 MG per tablet 1 tablet (has no administration in time range)   dextrose (GLUTOSE) oral gel 15 g (has no administration in time range)   dextrose (D50W) (25 g/50 mL) IV injection 25 g (has no administration in time range)   glucagon (GLUCAGEN) injection 1 mg (has no administration in time range)   insulin regular (humuLIN R,novoLIN R) injection 2-9 Units ( Subcutaneous Not Given 8/15/24 0605)   atorvastatin (LIPITOR) tablet 80 mg (has no administration in time range)   bumetanide (BUMEX) tablet 1 mg (has no administration in time range)   cilostazol (PLETAL) tablet 100 mg (has no administration in time range)   budesonide-formoterol (SYMBICORT) 160-4.5 MCG/ACT inhaler 2 puff (has no administration in time range)     And   tiotropium (SPIRIVA RESPIMAT) 2.5 mcg/act aerosol solution inhaler (has no administration in time range)   empagliflozin  (JARDIANCE) tablet 10 mg (has no administration in time range)   metoprolol succinate XL (TOPROL-XL) 24 hr tablet 100 mg (has no administration in time range)   metoprolol succinate XL (TOPROL-XL) 24 hr tablet 50 mg (has no administration in time range)   pantoprazole (PROTONIX) EC tablet 40 mg (has no administration in time range)   spironolactone (ALDACTONE) tablet 25 mg (has no administration in time range)   tamsulosin (FLOMAX) 24 hr capsule 0.8 mg (has no administration in time range)   HYDROmorphone (DILAUDID) injection 1 mg (1 mg Intramuscular Given 8/15/24 0233)   predniSONE (DELTASONE) tablet 50 mg (50 mg Oral Given 8/15/24 0233)   methocarbamol (ROBAXIN) tablet 750 mg (750 mg Oral Given 8/15/24 0234)   HYDROmorphone (DILAUDID) injection 1 mg (1 mg Intravenous Given 8/15/24 0409)       Orders placed during this visit:  Orders Placed This Encounter   Procedures    CBC (No Diff)    Basic Metabolic Panel    NPO Diet NPO Type: Sips with Meds    Intake & Output    Weigh Patient    Oral Care    Place Sequential Compression Device    Maintain Sequential Compression Device    Maintain IV Access    Telemetry - Place Orders & Notify Provider of Results When Patient Experiences Acute Chest Pain, Dysrhythmia or Respiratory Distress    May Be Off Telemetry for Tests    Vital Signs    Continuous Pulse Oximetry    Activity - Ad Adela    Notify Provider (With Default Parameters)    Code Status and Medical Interventions: CPR (Attempt to Resuscitate); Full Support    Inpatient Neurosurgery Consult    POC Glucose 4x Daily Before Meals & at Bedtime    POC Glucose Once    Telemetry Scan    Insert Peripheral IV    Initiate ED Observation Status       Medical Decision Making:  ED Course as of 08/15/24 0621   Thu Aug 15, 2024   0207 I discussed the case with Dr. Black and he agrees to evaluate the patient at the bedside.    [CC]   7059 Rechecked on patient: He has not gotten much relief from the pain medication.  This was only  given about 30 minutes ago.  Will continue to monitor. [CC]   0478 Rechecked on patient: He still had no improvement with initial dose of medication.  Will order IV meds and plan for admission to the observation unit. [CC]   0174 Spoke with ANAYELI Bledsoe with Observation Unit.  Reviewed history, exam, results, treatments.  She agrees admit the patient.      [CC]      ED Course User Index  [CC] Chiara Puga PA-C       Differential diagnosis:  Spinal stenosis, muscle skeletal pain, fracture    Diagnosis  Final diagnoses:   Cervical radiculopathy   Intractable pain          Pedro Black MD  08/15/24 0621

## 2024-08-15 NOTE — PROGRESS NOTES
ED OBSERVATION PROGRESS/DISCHARGE SUMMARY    Date of Admission: 8/15/2024   LOS: 0 days   PCP: Neftali Amezcua MD    Subjective: Neck pain has significantly improved since arrival    Hospital Outcome:     Aj Salazar is a 78 y.o. male who is being admitted to the observation unit with intractable left cervical radiculopathy due to known cervical cord compression with myelopathy.  Neurosurgery was consulted and has evaluated the patient.  Plan for C4-6 cervical decompression and fusion early next week.  Neurosurgery has asked that we admit the patient to the hospitalist service.     ROS:  General: no fevers, chills  Respiratory: no cough, dyspnea  Cardiovascular: no chest pain, palpitations  Abdomen: No abdominal pain, nausea, vomiting, or diarrhea  Neurologic: No focal weakness    Objective   Physical Exam:  I have reviewed the vital signs.  Temp:  [96.6 °F (35.9 °C)-97.7 °F (36.5 °C)] 97.3 °F (36.3 °C)  Heart Rate:  [] 110  Resp:  [18] 18  BP: (136-146)/() 138/80  General Appearance:    Alert, cooperative, no distress  Head:    Normocephalic, atraumatic  Eyes:    Sclerae anicteric  Neck:   Supple, no mass  Lungs: Clear to auscultation bilaterally, respirations unlabored  Heart: Regular rate and rhythm, S1 and S2 normal, no murmur, rub or gallop  Abdomen:  Soft, nontender, bowel sounds active, nondistended  Extremities: No clubbing, cyanosis, or edema to lower extremities  Pulses:  2+ and symmetric in distal lower extremities  Skin: No rashes   Neurologic: Oriented x3, Normal strength to extremities    Results Review:    I have reviewed the labs, radiology results and diagnostic studies.    Results from last 7 days   Lab Units 08/15/24  0524   WBC 10*3/mm3 9.59   HEMOGLOBIN g/dL 12.4*   HEMATOCRIT % 39.5   PLATELETS 10*3/mm3 255     Results from last 7 days   Lab Units 08/15/24  0524   SODIUM mmol/L 138   POTASSIUM mmol/L 4.1   CHLORIDE mmol/L 103   CO2 mmol/L 24.3   BUN mg/dL 12   CREATININE mg/dL 0.84    CALCIUM mg/dL 9.0   GLUCOSE mg/dL 114*     Imaging Results (Last 24 Hours)       ** No results found for the last 24 hours. **            I have reviewed the medications.  ---------------------------------------------------------------------------------------------  Assessment & Plan   Assessment/Problem List    Cervical cord compression with myelopathy    Cervical stenosis of spine    Left cervical radiculopathy    Neck pain    Plan:    Intractable pain, cervical stenosis  Left cervical radiculopathy  -Patient with known severe spinal stenosis at C4-5 and C5-6 with cord compression and signal change  -Patient of Dr. Mancuso  -IV steroids  -Multimodal pain management  -Neurosurgery consulted  -Plan for surgery on Monday, spoke with hospitalist, they will assume care  -Will need to hold Plavix and Pletal for surgery     Atrial fibrillation  CHF  Carotid artery stenosis  Hypertension  -Chronic conditions  -Patient appears euvolemic on exam  -Hold home Plavix and Pradaxa at this time until further recommendations from neurosurgery  -Continue statin  -Continue Bumex and spironolactone  -Continue metoprolol  -Telemetry monitoring     Type 2 diabetes  -Hold home metformin  -Sliding scale insulin and Accu-Cheks with meals and at bedtime     COPD  -No acute exacerbation noted  -Continue home inhalers  -Albuterol as needed     VTE Prophylaxis:  Mechanical VTE prophylaxis orders are present.    Disposition: Admit to hospitalist service    This note will serve as a progress/transfer note    Veronica Beverly, APRN 08/15/24 10:55 EDT    I have worn appropriate PPE during this patient encounter, sanitized my hands both with entering and exiting patient's room.      51 minutes has been spent by [facility] providers in the care of this patient while under observation status

## 2024-08-15 NOTE — OUTREACH NOTE
Prep Survey      Flowsheet Row Responses   [facility] patient discharged from? [place]   Is LACE score < 7 ? No   Eligibility [facility]   Date of Admission 08/15/24   Date of Discharge 08/15/24   Discharge Disposition Home or Self Care   Discharge diagnosis Cervical cord compression with myelopathy   Does the patient have one of the following disease processes/diagnoses(primary or secondary)? Other   Does the patient have Home health ordered? No   Is there a DME ordered? No   Prep survey completed? Yes            Nicolasa PINEDA - Registered Nurse

## 2024-08-15 NOTE — CONSULTS
CONSULT NOTE    INTERNAL MEDICINE   [facility]       Patient Identification:  Name: Aj Salazar  Age: 78 y.o.  Sex: male  :  1945  MRN: [id number]             Date of Consultation:  8/15/2024        Primary Care Physician: Neftali Amezcua MD                               Requesting Physician: Dr Smith  Reason for Consultation: Take over attending for medical management for inpatient admission    Chief Complaint: Severe shoulder pain    History of Present Illness:      The patient  is a 78 y.o. male with a medical history of chronic neck pain, COPD, hypertension, CHF, and Raynaud's presents to the emergency department complaining of severe left-sided neck and shoulder pain that began tonight.  Patient reports this pain has been manageable but tonight worsened.  He denies any precipitating factors.  Patient reports that he is having neck surgery in a few weeks for severe spinal stenosis in his neck.  He says he took a hydrocodone tonight with no relief.  He says he has had some intermittent numbness and tingling down into the arm but denies any weakness.  He denies headaches or dizziness.  He denies chest pain or shortness of breath.  The patient was initially admitted to the observation unit but neurosurgery felt that they were needed to keep the patient in the hospital and do surgery sooner than rather wait till later and medicine was consulted to admit the patient to the hospital.  Unfortunately they could not do surgery until next Wednesday and the patient had to be off his anticoagulation for at least for 5 days.  Subsequently neurosurgery said to go ahead and let the patient go home and come back for surgery next week on Wednesday.  He will continue with holding his anticoagulation.      Past Medical History:  Past Medical History:   Diagnosis Date    Alcohol abuse, in remission     Asthma copd    BPH (benign prostatic hypertrophy)     see doctors at [facility]    Cataract     CHF  (congestive heart failure)     COPD (chronic obstructive pulmonary disease)     Depression     DJD (degenerative joint disease) of cervical spine     ED (erectile dysfunction)     [facility]    GERD (gastroesophageal reflux disease)     Glaucoma     History of prediabetes     Hx of colonic polyps     followed by GI ([facility])    Hyperlipidemia     Hypertension     Influenza B 02/14/2013        Low back pain     Nocturnal hypoxia     Osteoarthritis     HIPS, HANDS, MULTIPLE SITES    Osteoarthritis 03/17/2016    Peripheral neuropathy     Permanent atrial fibrillation     Pneumonia     Pneumonia due to infectious organism 04/23/2023    Rectal bleeding 04/26/2017    Tendonitis of shoulder 11/07/2007    RIGHT SHOULDER/DELTOID INSERTION    TIA (transient ischemic attack) 10/2020    Ulcer of the stomach and intestine      Past Surgical History:  Past Surgical History:   Procedure Laterality Date    APPENDECTOMY      CARDIOVASCULAR STRESS TEST N/A 10/20/2015    NML LEXISCAN CARDIOLITE PERFUSION STUDY, NO EVIDENCE OF ISCHEMIA, AREA OF HYPOPERFUSION OF THE INFERIOR WALL W/NORMAL WALL PERFUSION AND NML LEFT VENTRICULAR EJECTION FRACTION, MOST LIKELY ATTENUATION. DR.MICHAEL BOX    CATARACT EXTRACTION Bilateral 02/2023    COLONOSCOPY N/A 02/2017    COLONOSCOPY N/A 02/23/2011    4 POLYPS REMOVED, RESCOPE IN 5 YRS, DR. EAGLE    COLONOSCOPY N/A 03/08/2006    ERYTHEMOUS AND GRANULAR MUCOSA IN ILEOCECAL VALVE, NORMAL COLON, REPEAT IN 5 YRS,     ENDOSCOPY N/A 09/26/2018    normal esophagus, acute gastritis, multiple non-bleeding duodenal ulcers with pigmented material, H Pylori positive    HERNIA REPAIR Bilateral     INGUINAL    JOINT REPLACEMENT  11/2015    left hip    TOTAL HIP ARTHROPLASTY Left 11/06/2015    Dr Ankush Sky    TOTAL HIP ARTHROPLASTY Right 10/27/2014    DR.RIED SKY    VASECTOMY        Home Meds:  Medications Prior to Admission   Medication Sig Dispense Refill Last Dose     Adbry 150 MG/ML solution prefilled syringe Inject 150 mg under the skin into the appropriate area as directed Every 14 (Fourteen) Days.   8/14/2024 at 0600    ALPHAGAN P 0.1 % solution ophthalmic solution Administer 1 drop to both eyes 2 (two) times a day.  6 8/14/2024 at 2200    atorvastatin (LIPITOR) 80 MG tablet TAKE 1 TABLET EVERY NIGHT 90 tablet 1 8/14/2024 at 2200    bumetanide (BUMEX) 1 MG tablet bumetanide 1 mg tablet   TAKE 1 TABLET BY MOUTH EVERY DAY   8/14/2024 at 0800    cilostazol (PLETAL) 100 MG tablet Take 1 tablet by mouth 2 (Two) Times a Day. 90 tablet 6 8/14/2024 at 2100    clopidogrel (PLAVIX) 75 MG tablet TAKE 1 TABLET EVERY DAY 90 tablet 1 8/14/2024 at 2100    dabigatran etexilate (Pradaxa) 150 MG capsu Take 1 capsule by mouth 2 (Two) Times a Day. 60 capsule 6 8/14/2024 at 2100    Fluticasone-Umeclidin-Vilant (Trelegy Ellipta) 200-62.5-25 MCG/ACT aerosol powder  Inhale.   8/14/2024 at 0730    HYDROcodone-acetaminophen (NORCO) 5-325 MG per tablet Take 1 tablet by mouth Every 6 (Six) Hours As Needed for Moderate Pain or Mild Pain. 20 tablet 0 8/14/2024 at 2200    Jardiance 10 MG tablet tablet Take 1 tablet by mouth Daily.   8/14/2024 at 0730    metFORMIN ER (GLUCOPHAGE-XR) 500 MG 24 hr tablet TAKE 1 TABLET EVERY DAY WITH DINNER 90 tablet 1 8/14/2024 at 1800    methocarbamol (ROBAXIN) 500 MG tablet Take 1 tablet by mouth 3 (Three) Times a Day As Needed for Muscle Spasms. 12 tablet 0 8/13/2024    metoprolol succinate XL (TOPROL-XL) 100 MG 24 hr tablet Take 1 tablet by mouth Every Night. Take 100 mg at night and 50 mg in the morning 30 tablet 0 8/14/2024 at 2200    metoprolol succinate XL (TOPROL-XL) 50 MG 24 hr tablet Take 1 tablet by mouth Daily. Take 50 mg every morning and 100 mg every night 30 tablet 0 8/14/2024 at 0730    Myrbetriq 25 MG tablet sustained-release 24 hour 24 hr tablet Take 1 tablet by mouth Every Night.   8/14/2024 at 2100    pantoprazole (PROTONIX) 40 MG EC tablet TAKE 1 TABLET  TWICE DAILY 180 tablet 1 8/14/2024 at 2100    spironolactone (ALDACTONE) 25 MG tablet Take 1 tablet by mouth Daily. 30 tablet 0 8/14/2024 at 0700    Tafluprost, PF, (ZIOPTAN) 0.0015 % solution ophthalmic solution Administer 1 drop to both eyes Every Night.   8/14/2024 at 2100    tamsulosin (FLOMAX) 0.4 MG capsule 24 hr capsule Take 2 capsules by mouth Every Night. 30 capsule  8/14/2024 at 2100    Accu-Chek Softclix Lancets lancets 1 each by Other route Daily. Check FBS once daily . E11.51 100 each 3     Blood Glucose Monitoring Suppl (Accu-Chek Guide Me) w/Device kit Use 1 Device Daily. Use to check FBS once daily . Dm2 with peripheral  neuropathy E11.51 1 kit 0     glucose blood (Accu-Chek Guide) test strip Use to check FBS once daily . Dm2 with peripheral  neuropathy E11.51 100 each 1     glucose blood test strip Check FBS once daily .DM2 /E11.51 100 each 3     Lancets Misc. (Accu-Chek Softclix Lancet Dev) kit Use to check FBS once daily . Dm2 with peripheral  neuropathy E11.51 1 kit 2      Current Meds:     Current Facility-Administered Medications:     acetaminophen (TYLENOL) tablet 650 mg, 650 mg, Oral, Q4H PRN **OR** acetaminophen (TYLENOL) 160 MG/5ML oral solution 650 mg, 650 mg, Oral, Q4H PRN **OR** acetaminophen (TYLENOL) suppository 650 mg, 650 mg, Rectal, Q4H PRN, Dante Ibarra MD    atorvastatin (LIPITOR) tablet 80 mg, 80 mg, Oral, Nightly, Laya Hernandez, PA-C    sennosides-docusate (PERICOLACE) 8.6-50 MG per tablet 2 tablet, 2 tablet, Oral, BID PRN **AND** polyethylene glycol (MIRALAX) packet 17 g, 17 g, Oral, Daily PRN **AND** bisacodyl (DULCOLAX) EC tablet 5 mg, 5 mg, Oral, Daily PRN **AND** bisacodyl (DULCOLAX) suppository 10 mg, 10 mg, Rectal, Daily PRN, Dante Ibarra MD    budesonide-formoterol (SYMBICORT) 160-4.5 MCG/ACT inhaler 2 puff, 2 puff, Inhalation, BID - RT, 2 puff at 08/15/24 1024 **AND** tiotropium (SPIRIVA RESPIMAT) 2.5 mcg/act aerosol solution inhaler, 2 puff, Inhalation,  Daily - RT, Laya Hernandez, PA-C, 2 puff at 08/15/24 1025    bumetanide (BUMEX) tablet 1 mg, 1 mg, Oral, Daily, Uyen Hernandezlin JELLY PA-C, 1 mg at 08/15/24 0959    cilostazol (PLETAL) tablet 100 mg, 100 mg, Oral, BID, Uyen Hernandezlin JELLY, PA-C, 100 mg at 08/15/24 1001    dextrose (D50W) (25 g/50 mL) IV injection 25 g, 25 g, Intravenous, Q15 Min PRN, Laya Hernandez PA-C    dextrose (GLUTOSE) oral gel 15 g, 15 g, Oral, Q15 Min PRN, Laya Hernandez PA-C    empagliflozin (JARDIANCE) tablet 10 mg, 10 mg, Oral, Daily, Uyen Hernandezlin R, PA-C, 10 mg at 08/15/24 0958    glucagon (GLUCAGEN) injection 1 mg, 1 mg, Intramuscular, Q15 Min PRN, Laya Hernandez PA-C    insulin regular (humuLIN R,novoLIN R) injection 2-9 Units, 2-9 Units, Subcutaneous, Q6H, Laya Hernandez PA-ADAN    Lidocaine 4 % 1 patch, 1 patch, Transdermal, Once, Pedro Black MD, 1 patch at 08/15/24 0233    methocarbamol (ROBAXIN) tablet 750 mg, 750 mg, Oral, 4x Daily, Dante Ibarra MD, 750 mg at 08/15/24 0958    methylPREDNISolone sodium succinate (SOLU-Medrol) injection 60 mg, 60 mg, Intravenous, Q8H, Dante Ibarra MD, 60 mg at 08/15/24 1010    metoprolol succinate XL (TOPROL-XL) 24 hr tablet 100 mg, 100 mg, Oral, Nightly, Laya Hernandez PA-ADAN    metoprolol succinate XL (TOPROL-XL) 24 hr tablet 50 mg, 50 mg, Oral, Daily, Laya Hernandez PA-C, 50 mg at 08/15/24 1002    morphine injection 2 mg, 2 mg, Intravenous, Q4H PRN, Dante Ibarra MD    nitroglycerin (NITROSTAT) SL tablet 0.4 mg, 0.4 mg, Sublingual, Q5 Min PRN, Dante Ibarra MD    ondansetron ODT (ZOFRAN-ODT) disintegrating tablet 4 mg, 4 mg, Oral, Q6H PRN **OR** ondansetron (ZOFRAN) injection 4 mg, 4 mg, Intravenous, Q6H PRN, Dante Ibarra MD    oxyCODONE-acetaminophen (PERCOCET) 5-325 MG per tablet 1 tablet, 1 tablet, Oral, Q4H PRN, Dante Ibarra MD, 1 tablet at 08/15/24 1000    pantoprazole (PROTONIX) EC tablet 40 mg, 40 mg, Oral,  BID, Laya Hernandez, PA-C, 40 mg at 08/15/24 1001    sodium chloride 0.9 % flush 10 mL, 10 mL, Intravenous, Q12H, Dante Ibarra MD, 10 mL at 08/15/24 1009    sodium chloride 0.9 % flush 10 mL, 10 mL, Intravenous, PRN, Dante Ibarra MD    sodium chloride 0.9 % infusion 40 mL, 40 mL, Intravenous, PRN, Dante Ibarra MD    spironolactone (ALDACTONE) tablet 25 mg, 25 mg, Oral, Daily, Laya Hernandez PA-C, 25 mg at 08/15/24 1002    tamsulosin (FLOMAX) 24 hr capsule 0.8 mg, 0.8 mg, Oral, Nightly, Laya Hernandez PA-C  Allergies:  Allergies   Allergen Reactions    Bactrim [Sulfamethoxazole-Trimethoprim] Rash    Sulfacetamide Rash     Social History:   Social History     Socioeconomic History    Marital status:      Spouse name: Ara*    Number of children: 3   Tobacco Use    Smoking status: Former     Current packs/day: 0.00     Average packs/day: 2.0 packs/day for 49.0 years (98.0 ttl pk-yrs)     Types: Cigarettes     Start date: 1961     Quit date: 2010     Years since quittin.6     Passive exposure: Past    Smokeless tobacco: Never    Tobacco comments:     long smoking history   Vaping Use    Vaping status: Never Used   Substance and Sexual Activity    Alcohol use: No     Comment: 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; 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    Drug use: No     Comment: caffeine use     Sexual activity: Defer     Family History:  Family History   Problem Relation Age of Onset    Cancer Mother         Bladder cancer    Colon cancer Mother     Ovarian cancer Mother     Alzheimer's disease Mother 70    Hyperlipidemia Father     Heart disease Father     Coronary artery disease Father     Hypertension Father     Stroke Sister         2016    Dementia Sister     Heart disease Brother     Alcohol abuse Brother     Heart disease Brother     Coronary artery disease Brother     Hypertension Brother     Stroke Brother     Arthritis Brother     Colon cancer Maternal Grandmother      "Prostate cancer Neg Hx           Review of Systems  See history of present illness and past medical history.  Patient denies headache, dizziness, syncope, falls, trauma, change in vision, change in hearing, change in taste, changes in weight, changes in appetite, focal weakness, numbness, or paresthesia.  Patient denies chest pain, palpitations, dyspnea, orthopnea, PND, cough, sinus pressure, rhinorrhea, epistaxis, hemoptysis, nausea, vomiting,hematemesis, diarrhea, constipation or hematochezia. Denies cold or heat intolerance, polydipsia, polyuria, polyphagia. Denies hematuria, pyuria, dysuria, hesitancy, frequency or urgency.   Denies fever, chills, sweats, night sweats.  Denies missing any routine medications. Remainder of ROS is negative.      Vitals:   /68 (BP Location: Left arm, Patient Position: Lying)   Pulse 105   Temp 97.3 °F (36.3 °C) (Oral)   Resp 18   Ht 177.8 cm (70\")   Wt 78.2 kg (172 lb 6.4 oz)   SpO2 100%   BMI 24.74 kg/m²   I/O:   Intake/Output Summary (Last 24 hours) at 8/15/2024 1255  Last data filed at 8/15/2024 0533  Gross per 24 hour   Intake 0 ml   Output --   Net 0 ml     Exam:  General Appearance:    Alert, cooperative, no distress, appears stated age   Head:    Normocephalic, without obvious abnormality, atraumatic   Eyes:    PERRL, conjunctivae/corneas clear, EOM's intact, both eyes   Ears:    Normal external ear canals, both ears   Nose:   Nares normal, septum midline, mucosa normal, no drainage    or sinus tenderness   Throat:   Lips, tongue, gums normal; oral mucosa pink and moist   Neck:   Supple, symmetrical, trachea midline, no adenopathy;     thyroid:  no enlargement/tenderness/nodules; no carotid    bruit or JVD   Back:     Symmetric, no curvature, ROM normal, no CVA tenderness   Lungs:     Clear to auscultation bilaterally, respirations unlabored   Chest Wall:    No tenderness or deformity    Heart:    Regular rate and rhythm, S1 and S2 normal, no murmur, rub   or " "gallop   Abdomen:     Soft, nontender, bowel sounds active all four quadrants,     no masses, no hepatomegaly, no splenomegaly   Extremities:   Extremities normal, atraumatic, no cyanosis or edema   Pulses:   Pulses palpable in all extremities; symmetric all extremities   Skin:   Skin color normal, Skin is warm and dry,  no rashes or palpable lesions   Neurologic:   CNII-XII intact, motor strength grossly intact, sensation grossly intact to light touch, no focal deficits noted       Data Review:  WBC   Date Value Ref Range Status   08/15/2024 9.59 3.40 - 10.80 10*3/mm3 Final     Hemoglobin   Date Value Ref Range Status   08/15/2024 12.4 (L) 13.0 - 17.7 g/dL Final     Hematocrit   Date Value Ref Range Status   08/15/2024 39.5 37.5 - 51.0 % Final     Platelets   Date Value Ref Range Status   08/15/2024 255 140 - 450 10*3/mm3 Final     Sodium   Date Value Ref Range Status   08/15/2024 138 136 - 145 mmol/L Final     Potassium   Date Value Ref Range Status   08/15/2024 4.1 3.5 - 5.2 mmol/L Final     Comment:     Slight hemolysis detected by analyzer. Result may be falsely elevated.     Chloride   Date Value Ref Range Status   08/15/2024 103 98 - 107 mmol/L Final     CO2   Date Value Ref Range Status   08/15/2024 24.3 22.0 - 29.0 mmol/L Final     BUN   Date Value Ref Range Status   08/15/2024 12 8 - 23 mg/dL Final     Creatinine   Date Value Ref Range Status   08/15/2024 0.84 0.76 - 1.27 mg/dL Final     Glucose   Date Value Ref Range Status   08/15/2024 114 (H) 65 - 99 mg/dL Final     Calcium   Date Value Ref Range Status   08/15/2024 9.0 8.6 - 10.5 mg/dL Final     No results found for: \"AST\", \"ALT\", \"ALKPHOS\"  No results found for: \"APTT\", \"INR\"  No results found for: \"COLORU\", \"CLARITYU\", \"SPECGRAV\", \"PHUR\", \"PROTEINUR\", \"GLUCOSEU\", \"KETONESU\", \"BLOODU\", \"NITRITE\", \"LEUKOCYTESUR\", \"BILIRUBINUR\", \"UROBILINOGEN\", \"RBCUA\", \"WBCUA\", \"BACTERIA\", \"UACOMMENT\"  No results found for: \"TROPONINT\", \"TROPONINI\", \"BNP\"  No " "components found for: \"HGBA1C;2\"  No components found for: \"TSH;2\"            Imaging Results (Last 7 Days)       ** No results found for the last 168 hours. **          Past Medical History:   Diagnosis Date    Alcohol abuse, in remission     Asthma copd    BPH (benign prostatic hypertrophy)     see doctors at [facility]    Cataract     CHF (congestive heart failure)     COPD (chronic obstructive pulmonary disease)     Depression     DJD (degenerative joint disease) of cervical spine     ED (erectile dysfunction)     [facility]    GERD (gastroesophageal reflux disease)     Glaucoma     History of prediabetes     Hx of colonic polyps     followed by GI ([facility])    Hyperlipidemia     Hypertension     Influenza B 02/14/2013        Low back pain     Nocturnal hypoxia     Osteoarthritis     HIPS, HANDS, MULTIPLE SITES    Osteoarthritis 03/17/2016    Peripheral neuropathy     Permanent atrial fibrillation     Pneumonia     Pneumonia due to infectious organism 04/23/2023    Rectal bleeding 04/26/2017    Tendonitis of shoulder 11/07/2007    RIGHT SHOULDER/DELTOID INSERTION    TIA (transient ischemic attack) 10/2020    Ulcer of the stomach and intestine        Assessment:  Active Hospital Problems    Diagnosis  POA    **Cervical cord compression with myelopathy [G95.20]  Yes    Left cervical radiculopathy [M54.12]  Yes    Neck pain [M54.2]  Yes    Cervical stenosis of spine [M48.02]  Yes      Resolved Hospital Problems   No resolved problems to display.       Plan:  Medicine will take over as attending on the patient for admission but subsequently neurosurgery says he can discharge and will also do the discharge.  He will continue holding his anticoagulation follow-up for surgery on Wednesday.    Wil Christian MD   8/15/2024  12:55 EDT   "

## 2024-08-15 NOTE — PLAN OF CARE
Goal Outcome Evaluation:      Patient admitted to observation unit for further evaluation and treatment of left neck pain radiating to left shoulder and intermittent numbness and tingling sensation of the lt fingers. Alert and oriented x 4. Vital signs stable. NPO except meds. Neurosurgery consultation. A. Fib on the monitor. No signs of acute distress. Will monitor.

## 2024-08-15 NOTE — PROGRESS NOTES
[facility] THORACIC/LUMBAR NEUROSURGERY PROGRESS NOTE      CC: neck pain      Subjective     Interval History: Patient is well-known to our service and saw Dr. Mancuso in the office Tuesday to discuss surgery for cervical stenosis and myelopathy and had plans for cervical decompression and fusion in September. He was prescribed hydrocodone at that time for pain control.  Patient reports to the ED today with complaints of increased left-sided neck and shoulder pain with some mild intermittent finger numbness.  States that has been taking only half of his prescribed hydrocodone in addition to Tylenol in hopes to make them last until his surgery in September.  He denies any gait or balance disturbance or weakness. Anticaogulated with plavix, last does yesterday.     Objective     Vital signs in last 24 hours:  Temp:  [96.6 °F (35.9 °C)-97.7 °F (36.5 °C)] 97.3 °F (36.3 °C)  Heart Rate:  [] 105  Resp:  [18] 18  BP: (124-146)/() 124/68    Intake/Output this shift:  No intake/output data recorded.    LABS:  Results from last 7 days   Lab Units 08/15/24  0524   WBC 10*3/mm3 9.59   HEMOGLOBIN g/dL 12.4*   HEMATOCRIT % 39.5   PLATELETS 10*3/mm3 255      Results from last 7 days   Lab Units 08/15/24  0524   SODIUM mmol/L 138   POTASSIUM mmol/L 4.1   CHLORIDE mmol/L 103   CO2 mmol/L 24.3   BUN mg/dL 12   CREATININE mg/dL 0.84   GLUCOSE mg/dL 114*   CALCIUM mg/dL 9.0        IMAGING STUDIES:  No new imaging    I personally viewed and interpreted the patient's chart.    Meds reviewed/changed: Yes    Current Facility-Administered Medications:     acetaminophen (TYLENOL) tablet 650 mg, 650 mg, Oral, Q4H PRN **OR** acetaminophen (TYLENOL) 160 MG/5ML oral solution 650 mg, 650 mg, Oral, Q4H PRN **OR** acetaminophen (TYLENOL) suppository 650 mg, 650 mg, Rectal, Q4H PRN, Dante Ibarra MD    atorvastatin (LIPITOR) tablet 80 mg, 80 mg, Oral, Nightly, Laya Hernandez, PA-C    sennosides-docusate (PERICOLACE) 8.6-50 MG per  tablet 2 tablet, 2 tablet, Oral, BID PRN **AND** polyethylene glycol (MIRALAX) packet 17 g, 17 g, Oral, Daily PRN **AND** bisacodyl (DULCOLAX) EC tablet 5 mg, 5 mg, Oral, Daily PRN **AND** bisacodyl (DULCOLAX) suppository 10 mg, 10 mg, Rectal, Daily PRN, Dante Ibarra MD    budesonide-formoterol (SYMBICORT) 160-4.5 MCG/ACT inhaler 2 puff, 2 puff, Inhalation, BID - RT, 2 puff at 08/15/24 1024 **AND** tiotropium (SPIRIVA RESPIMAT) 2.5 mcg/act aerosol solution inhaler, 2 puff, Inhalation, Daily - RT, Adia Hernandezitlin R, PA-C, 2 puff at 08/15/24 1025    bumetanide (BUMEX) tablet 1 mg, 1 mg, Oral, Daily, David Laya R, PA-C, 1 mg at 08/15/24 0959    cilostazol (PLETAL) tablet 100 mg, 100 mg, Oral, BID, David, Laya R, PA-C, 100 mg at 08/15/24 1001    dextrose (D50W) (25 g/50 mL) IV injection 25 g, 25 g, Intravenous, Q15 Min PRN, David Laya R, PA-C    dextrose (GLUTOSE) oral gel 15 g, 15 g, Oral, Q15 Min PRN, David Laay R, PA-C    empagliflozin (JARDIANCE) tablet 10 mg, 10 mg, Oral, Daily, David, Laya R, PA-C, 10 mg at 08/15/24 0958    glucagon (GLUCAGEN) injection 1 mg, 1 mg, Intramuscular, Q15 Min PRN, David Lyaa R, PA-C    insulin regular (humuLIN R,novoLIN R) injection 2-9 Units, 2-9 Units, Subcutaneous, Q6H, Adia Hernandezitlin R, PA-C    Lidocaine 4 % 1 patch, 1 patch, Transdermal, Once, Pedro Black MD, 1 patch at 08/15/24 0233    methocarbamol (ROBAXIN) tablet 750 mg, 750 mg, Oral, 4x Daily, Dante Ibarra MD, 750 mg at 08/15/24 0958    methylPREDNISolone sodium succinate (SOLU-Medrol) injection 60 mg, 60 mg, Intravenous, Q8H, Dante Ibarra MD, 60 mg at 08/15/24 1010    metoprolol succinate XL (TOPROL-XL) 24 hr tablet 100 mg, 100 mg, Oral, Nightly, Laya Hernandez PA-C    metoprolol succinate XL (TOPROL-XL) 24 hr tablet 50 mg, 50 mg, Oral, Daily, Laya Hernandez PA-C, 50 mg at 08/15/24 1002    morphine injection 2 mg, 2 mg, Intravenous, Q4H  PRN, Dante Ibarra MD    nitroglycerin (NITROSTAT) SL tablet 0.4 mg, 0.4 mg, Sublingual, Q5 Min PRN, Dante Ibarra MD    ondansetron ODT (ZOFRAN-ODT) disintegrating tablet 4 mg, 4 mg, Oral, Q6H PRN **OR** ondansetron (ZOFRAN) injection 4 mg, 4 mg, Intravenous, Q6H PRN, Dante Ibarra MD    oxyCODONE-acetaminophen (PERCOCET) 5-325 MG per tablet 1 tablet, 1 tablet, Oral, Q4H PRN, Dante Ibarra MD, 1 tablet at 08/15/24 1000    pantoprazole (PROTONIX) EC tablet 40 mg, 40 mg, Oral, BID, Laya Hernandez PA-C, 40 mg at 08/15/24 1001    sodium chloride 0.9 % flush 10 mL, 10 mL, Intravenous, Q12H, Dante Ibarra MD, 10 mL at 08/15/24 1009    sodium chloride 0.9 % flush 10 mL, 10 mL, Intravenous, PRN, Dante Ibarra MD    sodium chloride 0.9 % infusion 40 mL, 40 mL, Intravenous, PRN, Dante Ibarra MD    spironolactone (ALDACTONE) tablet 25 mg, 25 mg, Oral, Daily, Laya Hernandez PA-C, 25 mg at 08/15/24 1002    tamsulosin (FLOMAX) 24 hr capsule 0.8 mg, 0.8 mg, Oral, Nightly, Laya Hernandez PA-C     Physical Exam:    General:   Awake, alert. Daughter at bedside  Neck:    Supple and symmetric. Trachea midline  Motor:    Normal muscle strength, bulk and tone in lower extremities.  No fasciculations, rigidity, spasticity, or abnormal movements.  Reflexes:   + Hoffmans on the right, negative on the left. Reflexes 3/4 throughout  Sensation:   Slightly decreased sensation left fingers  Station and Gait:             Deferred   Extremities:   Warm/dry      Assessment & Plan     ASSESSMENT:      Cervical cord compression with myelopathy    A-fib    Neuroforaminal stenosis of cervical spine    Cervical stenosis of spine    Left cervical radiculopathy    Neck pain    Patient presents with increased left-sided neck and shoulder pain with some intermittent numbness. He was seen by Dr. Mancuso in the office Tuesday for surgery discussion related to his cervical stenosis at C4-C6 with cord compression  "myelopathy. Surgery was planned for September states he was only taking half of his prescribed pain medication to hold him off until surgery date. He has received IV pain medication and reports improvement and would like to move surgery up based on his progressive pain. We were able to move his surgery date up to Wednesday 8/21. In the meantime he may discharge home and continue taking his prescribed pain medication as originally planned. He has had pre-op work-up and been cleared for surgery by cardiology. He is to hold plavix and cilostazol starting immediately. Discussed with Veronica Shaw, observation provider, and patient.        PLAN:   -Hold plavix and cilostazol until renewed by neurosurgery  -C4-6 cervical decompression and fusion anticipated for Wednesday 8/21  -pain control      I discussed the patient's findings and my recommendations with patient, family, nursing staff, and Dr. Mancuso    During patient visit, I utilized appropriate personal protective equipment including mask and gloves.  Mask used was standard procedure mask. Appropriate PPE was worn during the entire visit.  Hand hygiene was completed before and after.      LOS: 0 days       Racquel Mohamud, APRN  8/15/2024  13:30 EDT    \"Dictated utilizing Dragon dictation\".     "

## 2024-08-15 NOTE — ED PROVIDER NOTES
EMERGENCY DEPARTMENT ENCOUNTER  Room Number:  01/01  PCP: Neftali Amezcua MD  Independent Historians: Patient      HPI:  Chief Complaint: had concerns including Shoulder Pain.     A complete HPI/ROS/PMH/PSH/SH/FH are unobtainable due to: None    Chronic or social conditions impacting patient care (Social Determinants of Health): None      Context: Aj Salazar is a 78 y.o. male with a medical history of chronic neck pain, COPD, hypertension, CHF, and Raynaud's presents to the emergency department complaining of severe left-sided neck and shoulder pain that began tonight.  Patient reports this pain has been manageable but tonight worsened.  He denies any precipitating factors.  Patient reports that he is having neck surgery in a few weeks for severe spinal stenosis in his neck.  He says he took a hydrocodone tonight with no relief.  He says he has had some intermittent numbness and tingling down into the arm but denies any weakness.  He denies headaches or dizziness.  He denies chest pain or shortness of breath.      Review of prior external notes (non-ED) -and- Review of prior external test results outside of this encounter:   Patient is scheduled for anterior cervical partial corpectomy at C5 for the purposes of C4-C5, C5-C6 discectomy and fusion using allograft cadaver bone metallic instrumentation    I reviewed the MRI from 7/28/2024, this was performed because the patient was having left shoulder and neck pain.  It showed severe spinal stenosis and cord compression at C5-C6 and secondary to 3 cm retrolisthesis of C5 upon C6 and a mod broad-based disc osteophyte complex.      PAST MEDICAL HISTORY  Active Ambulatory Problems     Diagnosis Date Noted    COPD (chronic obstructive pulmonary disease) 03/17/2016    Hypertension 03/17/2016    Osteoarthritis 03/17/2016    History of smoking greater than 50 pack years 06/23/2017    BPH with obstruction/lower urinary tract symptoms 03/27/2018    Atrial fibrillation with  rapid ventricular response     Type 2 diabetes mellitus with diabetic peripheral angiopathy without gangrene, without long-term current use of insulin 01/31/2019    TIA (transient ischemic attack) 10/22/2020    A-fib 03/07/2021    Acute on chronic systolic CHF (congestive heart failure) 03/08/2021    Gastroesophageal reflux disease 03/15/2021    Raynaud's disease without gangrene 12/07/2021    Acute pyelonephritis 05/06/2022    Chronic diastolic CHF (congestive heart failure) 05/06/2022    Alcohol dependence in remission 05/06/2022    Personal history of transient ischemic attack (TIA), and cerebral infarction without residual deficits 05/06/2022    Lab test positive for detection of COVID-19 virus 05/26/2022    Pulmonary nodules/lesions, multiple 01/30/2023    Abnormal PET of left lung 03/04/2023    Neuroforaminal stenosis of cervical spine 07/17/2023    Open wound of right lower leg 04/15/2024    Atherosclerotic peripheral vascular disease with ulceration 05/23/2024    Claudication 05/23/2024    Bruit of right carotid artery 05/23/2024    Bilateral carotid artery stenosis 06/20/2024    Back pain 07/28/2024    Cervical stenosis of spine 07/29/2024    Cervical cord compression with myelopathy 07/29/2024    Cervical spondylosis with myelopathy 08/13/2024     Resolved Ambulatory Problems     Diagnosis Date Noted    Dehydration 09/25/2018    Hypotension 09/25/2018    Melena 09/25/2018    EMPERATRIZ (acute kidney injury) 09/25/2018    Hyponatremia 09/25/2018    Lactic acidosis 09/25/2018    Rash and nonspecific skin eruption 09/02/2020    Facial droop 10/23/2020    Acute respiratory failure with hypoxia 03/08/2021    Sepsis 03/09/2021    Sepsis 05/05/2022    Scabies 10/24/2022    Pneumonia due to infectious organism 04/23/2023    Chest pain 07/25/2024     Past Medical History:   Diagnosis Date    Alcohol abuse, in remission     Asthma copd    BPH (benign prostatic hypertrophy)     Cataract     CHF (congestive heart failure)      Depression     DJD (degenerative joint disease) of cervical spine     ED (erectile dysfunction)     GERD (gastroesophageal reflux disease)     Glaucoma     History of prediabetes     Hx of colonic polyps     Hyperlipidemia     Influenza B 02/14/2013    Low back pain     Nocturnal hypoxia     Peripheral neuropathy     Permanent atrial fibrillation     Pneumonia     Rectal bleeding 04/26/2017    Tendonitis of shoulder 11/07/2007    Ulcer of the stomach and intestine          PAST SURGICAL HISTORY  Past Surgical History:   Procedure Laterality Date    APPENDECTOMY      CARDIOVASCULAR STRESS TEST N/A 10/20/2015    NML LEXISCAN CARDIOLITE PERFUSION STUDY, NO EVIDENCE OF ISCHEMIA, AREA OF HYPOPERFUSION OF THE INFERIOR WALL W/NORMAL WALL PERFUSION AND NML LEFT VENTRICULAR EJECTION FRACTION, MOST LIKELY ATTENUATION. DR.MICHAEL BOX    CATARACT EXTRACTION Bilateral 02/2023    COLONOSCOPY N/A 02/2017    COLONOSCOPY N/A 02/23/2011    4 POLYPS REMOVED, RESCOPE IN 5 YRS, DR. EAGLE    COLONOSCOPY N/A 03/08/2006    ERYTHEMOUS AND GRANULAR MUCOSA IN ILEOCECAL VALVE, NORMAL COLON, REPEAT IN 5 YRS,     ENDOSCOPY N/A 09/26/2018    normal esophagus, acute gastritis, multiple non-bleeding duodenal ulcers with pigmented material, H Pylori positive    HERNIA REPAIR Bilateral     INGUINAL    JOINT REPLACEMENT  11/2015    left hip    TOTAL HIP ARTHROPLASTY Left 11/06/2015    Dr Ankush Sky    TOTAL HIP ARTHROPLASTY Right 10/27/2014    DR.RIED SKY    VASECTOMY           FAMILY HISTORY  Family History   Problem Relation Age of Onset    Cancer Mother         Bladder cancer    Colon cancer Mother     Ovarian cancer Mother     Alzheimer's disease Mother 70    Hyperlipidemia Father     Heart disease Father     Coronary artery disease Father     Hypertension Father     Stroke Sister         October 2016    Dementia Sister     Heart disease Brother     Alcohol abuse Brother     Heart disease Brother     Coronary artery  disease Brother     Hypertension Brother     Stroke Brother     Arthritis Brother     Colon cancer Maternal Grandmother     Prostate cancer Neg Hx          SOCIAL HISTORY  Social History     Socioeconomic History    Marital status:      Spouse name: Ara*    Number of children: 3   Tobacco Use    Smoking status: Former     Current packs/day: 0.00     Average packs/day: 2.0 packs/day for 49.0 years (98.0 ttl pk-yrs)     Types: Cigarettes     Start date: 1961     Quit date: 2010     Years since quittin.6     Passive exposure: Past    Smokeless tobacco: Never    Tobacco comments:     long smoking history   Vaping Use    Vaping status: Never Used   Substance and Sexual Activity    Alcohol use: No     Comment: 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; 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    Drug use: No     Comment: caffeine use     Sexual activity: Defer         ALLERGIES  Bactrim [sulfamethoxazole-trimethoprim] and Sulfacetamide      REVIEW OF SYSTEMS  Included in HPI  All systems reviewed and negative except for those discussed in HPI.      PHYSICAL EXAM    I have reviewed the triage vital signs and nursing notes.    ED Triage Vitals   Temp Heart Rate Resp BP SpO2   08/15/24 0001 08/15/24 0001 08/15/24 0001 08/15/24 0004 08/15/24 0001   96.6 °F (35.9 °C) 95 18 (!) 141/109 93 %      Temp src Heart Rate Source Patient Position BP Location FiO2 (%)   08/15/24 0001 -- -- -- --   Tympanic           Physical Exam  Constitutional:       Appearance: Normal appearance.   HENT:      Head: Normocephalic and atraumatic.      Mouth/Throat:      Mouth: Mucous membranes are moist.   Eyes:      Extraocular Movements: Extraocular movements intact.      Pupils: Pupils are equal, round, and reactive to light.   Cardiovascular:      Rate and Rhythm: Normal rate and regular rhythm.      Pulses: Normal pulses.      Heart sounds: Normal heart sounds.   Pulmonary:      Effort: Pulmonary effort is normal. No respiratory distress.      Breath  sounds: Normal breath sounds.   Abdominal:      General: Abdomen is flat. There is no distension.      Palpations: Abdomen is soft.      Tenderness: There is no abdominal tenderness.   Musculoskeletal:         General: Normal range of motion.      Cervical back: Normal range of motion and neck supple.      Comments: Tenderness to palpation across the left trapezius. Full range of motion intact, no snuffbox tenderness. Positive thumbs up/okay sign/fingers abduct/fingers abduct, sensation intact light touch radial/medial/ulnar nerve distribution, 2+ radial and ulnar pulses, brisk cap refill all digits.     Skin:     General: Skin is warm and dry.      Capillary Refill: Capillary refill takes less than 2 seconds.   Neurological:      General: No focal deficit present.      Mental Status: He is alert and oriented to person, place, and time.   Psychiatric:         Mood and Affect: Mood normal.         Behavior: Behavior normal.             MEDICATIONS GIVEN IN ER  Medications   Lidocaine 4 % 1 patch (1 patch Transdermal Medication Applied 8/15/24 0233)   sodium chloride 0.9 % flush 10 mL (has no administration in time range)   sodium chloride 0.9 % flush 10 mL (has no administration in time range)   sodium chloride 0.9 % infusion 40 mL (has no administration in time range)   ondansetron ODT (ZOFRAN-ODT) disintegrating tablet 4 mg (has no administration in time range)     Or   ondansetron (ZOFRAN) injection 4 mg (has no administration in time range)   nitroglycerin (NITROSTAT) SL tablet 0.4 mg (has no administration in time range)   acetaminophen (TYLENOL) tablet 650 mg (has no administration in time range)     Or   acetaminophen (TYLENOL) 160 MG/5ML oral solution 650 mg (has no administration in time range)     Or   acetaminophen (TYLENOL) suppository 650 mg (has no administration in time range)   sennosides-docusate (PERICOLACE) 8.6-50 MG per tablet 2 tablet (has no administration in time range)     And   polyethylene  glycol (MIRALAX) packet 17 g (has no administration in time range)     And   bisacodyl (DULCOLAX) EC tablet 5 mg (has no administration in time range)     And   bisacodyl (DULCOLAX) suppository 10 mg (has no administration in time range)   methocarbamol (ROBAXIN) tablet 750 mg (has no administration in time range)   methylPREDNISolone sodium succinate (SOLU-Medrol) injection 60 mg (has no administration in time range)   morphine injection 2 mg (has no administration in time range)   oxyCODONE-acetaminophen (PERCOCET) 5-325 MG per tablet 1 tablet (has no administration in time range)   HYDROmorphone (DILAUDID) injection 1 mg (1 mg Intramuscular Given 8/15/24 0233)   predniSONE (DELTASONE) tablet 50 mg (50 mg Oral Given 8/15/24 0233)   methocarbamol (ROBAXIN) tablet 750 mg (750 mg Oral Given 8/15/24 0234)   HYDROmorphone (DILAUDID) injection 1 mg (1 mg Intravenous Given 8/15/24 0409)           OUTPATIENT MEDICATION MANAGEMENT:  Current Facility-Administered Medications Ordered in Epic   Medication Dose Route Frequency Provider Last Rate Last Admin    acetaminophen (TYLENOL) tablet 650 mg  650 mg Oral Q4H PRN Dante Ibarra MD        Or    acetaminophen (TYLENOL) 160 MG/5ML oral solution 650 mg  650 mg Oral Q4H PRN Dante Ibarra MD        Or    acetaminophen (TYLENOL) suppository 650 mg  650 mg Rectal Q4H PRN Dante Ibarra MD        sennosides-docusate (PERICOLACE) 8.6-50 MG per tablet 2 tablet  2 tablet Oral BID PRN Dante Ibarra MD        And    polyethylene glycol (MIRALAX) packet 17 g  17 g Oral Daily PRN Dante Ibarra MD        And    bisacodyl (DULCOLAX) EC tablet 5 mg  5 mg Oral Daily PRN Dante Ibarra MD        And    bisacodyl (DULCOLAX) suppository 10 mg  10 mg Rectal Daily PRN Dante Ibarra MD        Lidocaine 4 % 1 patch  1 patch Transdermal Once Pedro Black MD   1 patch at 08/15/24 0233    methocarbamol (ROBAXIN) tablet 750 mg  750 mg Oral 4x Daily Dante Ibarra MD         methylPREDNISolone sodium succinate (SOLU-Medrol) injection 60 mg  60 mg Intravenous Q8H Dante Ibarra MD        morphine injection 2 mg  2 mg Intravenous Q4H PRN Dante Ibarra MD        nitroglycerin (NITROSTAT) SL tablet 0.4 mg  0.4 mg Sublingual Q5 Min PRN Dante Ibarra MD        ondansetron ODT (ZOFRAN-ODT) disintegrating tablet 4 mg  4 mg Oral Q6H PRN Dante Ibarra MD        Or    ondansetron (ZOFRAN) injection 4 mg  4 mg Intravenous Q6H PRN Dante Ibarra MD        oxyCODONE-acetaminophen (PERCOCET) 5-325 MG per tablet 1 tablet  1 tablet Oral Q4H PRN Dante Ibarra MD        sodium chloride 0.9 % flush 10 mL  10 mL Intravenous Q12H Dante Ibarra MD        sodium chloride 0.9 % flush 10 mL  10 mL Intravenous PRN aDnte Ibarra MD        sodium chloride 0.9 % infusion 40 mL  40 mL Intravenous PRN Dante Ibarra MD         Current Outpatient Medications Ordered in Epic   Medication Sig Dispense Refill    Accu-Chek Softclix Lancets lancets 1 each by Other route Daily. Check FBS once daily . E11.51 100 each 3    ALPHAGAN P 0.1 % solution ophthalmic solution Administer 1 drop to both eyes 2 (two) times a day.  6    atorvastatin (LIPITOR) 80 MG tablet TAKE 1 TABLET EVERY NIGHT 90 tablet 1    Blood Glucose Monitoring Suppl (Accu-Chek Guide Me) w/Device kit Use 1 Device Daily. Use to check FBS once daily . Dm2 with peripheral  neuropathy E11.51 1 kit 0    bumetanide (BUMEX) 1 MG tablet bumetanide 1 mg tablet   TAKE 1 TABLET BY MOUTH EVERY DAY      cilostazol (PLETAL) 100 MG tablet Take 1 tablet by mouth 2 (Two) Times a Day. 90 tablet 6    clopidogrel (PLAVIX) 75 MG tablet TAKE 1 TABLET EVERY DAY 90 tablet 1    dabigatran etexilate (Pradaxa) 150 MG capsu Take 1 capsule by mouth 2 (Two) Times a Day. 60 capsule 6    Fluticasone-Umeclidin-Vilant (Trelegy Ellipta) 200-62.5-25 MCG/ACT aerosol powder  Inhale.      glucose blood (Accu-Chek Guide) test strip Use to check FBS once daily . Dm2 with  peripheral  neuropathy E11.51 100 each 1    glucose blood test strip Check FBS once daily .DM2 /E11.51 100 each 3    HYDROcodone-acetaminophen (NORCO) 5-325 MG per tablet Take 1 tablet by mouth Every 6 (Six) Hours As Needed for Moderate Pain or Mild Pain. 20 tablet 0    Jardiance 10 MG tablet tablet 0 Refill(s)      Lancets Misc. (Accu-Chek Softclix Lancet Dev) kit Use to check FBS once daily . Dm2 with peripheral  neuropathy E11.51 1 kit 2    metFORMIN ER (GLUCOPHAGE-XR) 500 MG 24 hr tablet TAKE 1 TABLET EVERY DAY WITH DINNER 90 tablet 1    methocarbamol (ROBAXIN) 500 MG tablet Take 1 tablet by mouth 3 (Three) Times a Day As Needed for Muscle Spasms. 12 tablet 0    metoprolol succinate XL (TOPROL-XL) 100 MG 24 hr tablet Take 1 tablet by mouth Every Night. Take 100 mg at night and 50 mg in the morning 30 tablet 0    metoprolol succinate XL (TOPROL-XL) 50 MG 24 hr tablet Take 1 tablet by mouth Daily. Take 50 mg every morning and 100 mg every night 30 tablet 0    Myrbetriq 25 MG tablet sustained-release 24 hour 24 hr tablet       pantoprazole (PROTONIX) 40 MG EC tablet TAKE 1 TABLET TWICE DAILY 180 tablet 1    spironolactone (ALDACTONE) 25 MG tablet Take 1 tablet by mouth Daily. 30 tablet 0    Tafluprost, PF, (ZIOPTAN) 0.0015 % solution ophthalmic solution Administer 1 drop to both eyes Every Night.      tamsulosin (FLOMAX) 0.4 MG capsule 24 hr capsule Take 2 capsules by mouth Every Night. 30 capsule              PROGRESS, DATA ANALYSIS, CONSULTS, AND MEDICAL DECISION MAKING  ORDERS PLACED DURING THIS VISIT:  Orders Placed This Encounter   Procedures    CBC (No Diff)    Basic Metabolic Panel    NPO Diet NPO Type: Sips with Meds    Intake & Output    Weigh Patient    Oral Care    Place Sequential Compression Device    Maintain Sequential Compression Device    Maintain IV Access    Telemetry - Place Orders & Notify Provider of Results When Patient Experiences Acute Chest Pain, Dysrhythmia or Respiratory Distress    May  Be Off Telemetry for Tests    Vital Signs    Continuous Pulse Oximetry    Activity - Ad Adela    Notify Provider (With Default Parameters)    Code Status and Medical Interventions: CPR (Attempt to Resuscitate); Full Support    Inpatient Neurosurgery Consult    Insert Peripheral IV    Initiate ED Observation Status       All labs have been independently interpreted by me.  All radiology studies have been reviewed by me. All EKG's have been independently viewed and interpreted by me.  Discussion below represents my analysis of pertinent findings related to patient's condition, differential diagnosis, treatment plan and final disposition.    Differential diagnosis includes but is not limited to:   My differential diagnosis for back pain includes but is not limited to:  Musculoskeletal strain, contusion, retroperitoneal hematoma, disc protrusion, vertebral fracture, transverse process fracture, rib fracture, facet syndrome, sacroiliac joint strain, sciatica, renal injury, splenic injury, pancreatic injury, osteoarthritis, lumbar spondylosis, spinal stenosis, ankylosing spondylitis, sacroiliac joint inflammation, pancreatitis, perforated peptic ulcer, diverticulitis, endometriosis, chronic PID, epidural abscess, osteomyelitis, retroperitoneal abscess, pyelonephritis, pneumonia, subphrenic abscess, tuberculosis, neurofibroma, meningioma, multiple myeloma, lymphoma, metastatic cancer, primary cancer, AAA, aortic dissection, spinal ischemia, referred pain, ureterolithiasis      ED Course:  ED Course as of 08/15/24 0427   u Aug 15, 2024   0207 I discussed the case with Dr. Black and he agrees to evaluate the patient at the bedside.    [CC]   0258 Rechecked on patient: He has not gotten much relief from the pain medication.  This was only given about 30 minutes ago.  Will continue to monitor. [CC]   0359 Rechecked on patient: He still had no improvement with initial dose of medication.  Will order IV meds and plan for  admission to the observation unit. [CC]   0359 Spoke with ANAYELI Bledsoe with Observation Unit.  Reviewed history, exam, results, treatments.  She agrees admit the patient.      [CC]      ED Course User Index  [CC] Chiara Puga PA-C           AS OF 04:27 EDT VITALS:    BP - 136/86  HR - 98  TEMP - 96.6 °F (35.9 °C) (Tympanic)  O2 SATS - 94%      MDM:  Patient is a 78-year-old male with a history of significant cervical degenerative changes and spinal stenosis who presents emergency department today with intractable left trapezius/shoulder pain.  On arrival here in the emergency department vitals are reassuring, he is afebrile.  My exam the patient is tender to palpation along the trapezius.  He is neurovascularly intact distally.  Suspect his pain is from his cervical radiculopathy.  Patient recently had an MRI which showed significant disc disease and patient is scheduled for surgical decompression in a few weeks.  I attempted to control his pain here in the emergency department was unsuccessful.  Patient will be admitted to the observation unit for pain control and neurosurgery consultation.  He is stable at the time admission.        DIAGNOSIS  Final diagnoses:   Cervical radiculopathy   Intractable pain         DISPOSITION  ED Disposition       ED Disposition   Decision to Admit    Condition   --    Comment   --                  Please note that portions of this document were completed with a voice recognition program.    Note Disclaimer: At [facility], we believe that sharing information builds trust and better relationships. You are receiving this note because you recently visited [facility]. It is possible you will see health information before a provider has talked with you about it. This kind of information can be easy to misunderstand. To help you fully understand what it means for your health, we urge you to discuss this note with your provider.     Chiara Puga PA-C  08/15/24 0429

## 2024-08-16 ENCOUNTER — TRANSITIONAL CARE MANAGEMENT TELEPHONE ENCOUNTER (OUTPATIENT)
Dept: CALL CENTER | Facility: HOSPITAL | Age: 79
End: 2024-08-16
Payer: MEDICARE

## 2024-08-16 ENCOUNTER — TELEPHONE (OUTPATIENT)
Dept: NEUROSURGERY | Facility: CLINIC | Age: 79
End: 2024-08-16
Payer: MEDICARE

## 2024-08-16 DIAGNOSIS — M48.02 CERVICAL STENOSIS OF SPINE: ICD-10-CM

## 2024-08-16 RX ORDER — HYDROCODONE BITARTRATE AND ACETAMINOPHEN 5; 325 MG/1; MG/1
1 TABLET ORAL EVERY 6 HOURS PRN
Qty: 20 TABLET | Refills: 0 | Status: SHIPPED | OUTPATIENT
Start: 2024-08-16

## 2024-08-16 NOTE — TELEPHONE ENCOUNTER
08/16/24 I SPOKE WITH PT THIS DAY.  HE IS AWARE TO GO TO PRE ADMISSION TESTING ON 08/19/24 AT 09:30 A.M.  POST OP IS 09/06/24 AT 08:45 A.M.  SURGERY DATE IS: 08/21/24. PT IS AWARE TO BE AT THE [facility]   [facility] NO LATER THAN 09:30 A.M. ON 08/21/24  PT AWARE THAT SURGERY AUTH HAS BEEN EXPEDITED WITH [facility] AND  IS PENDING AT THIS TIME.

## 2024-08-16 NOTE — CASE MANAGEMENT/SOCIAL WORK
Case Management Discharge Note      Final Note: Home         Selected Continued Care - Discharged on 8/15/2024 Admission date: 8/15/2024 - Discharge disposition: Home or Self Care      Destination    No services have been selected for the patient.                Durable Medical Equipment    No services have been selected for the patient.                Dialysis/Infusion    No services have been selected for the patient.                Home Medical Care    No services have been selected for the patient.                Therapy    No services have been selected for the patient.                Community Resources    No services have been selected for the patient.                Community & DME    No services have been selected for the patient.                         Final Discharge Disposition Code: 01 - home or self-care

## 2024-08-16 NOTE — OUTREACH NOTE
Call Center TCM Note      Flowsheet Row Responses   [facility] patient discharged from? [place]   Does the patient have one of the following disease processes/diagnoses(primary or secondary)? Other   TCM attempt successful? Yes   Call start time 1301   Call end time 1303   Discharge diagnosis Cervical cord compression with myelopathy   Meds reviewed with patient/caregiver? Yes   Is the patient having any side effects they believe may be caused by any medication additions or changes? No   Does the patient have all medications ordered at discharge? N/A   Is the patient taking all medications as directed (includes completed medication regime)? Yes   Comments Annual medicare wellness with PCP 8/26/24 @ 8:45am. Post Op appt. 9/6/24 @ 8:45am.   Does the patient have an appointment with their PCP within 7-14 days of discharge? Yes   Has home health visited the patient within 72 hours of discharge? N/A   Psychosocial issues? No   Did the patient receive a copy of their discharge instructions? Yes   Nursing interventions Reviewed instructions with patient   What is the patient's perception of their health status since discharge? Improving   Is the patient/caregiver able to teach back signs and symptoms related to disease process for when to call PCP? Yes   Is the patient/caregiver able to teach back signs and symptoms related to disease process for when to call 911? Yes   Is the patient/caregiver able to teach back the hierarchy of who to call/visit for symptoms/problems? PCP, Specialist, Home health nurse, Urgent Care, ED, 911 Yes   If the patient is a current smoker, are they able to teach back resources for cessation? Not a smoker   TCM call completed? Yes   Call end time 1303   Would this patient benefit from a Referral to Amb Social Work? No   Is the patient interested in additional calls from an ambulatory ? No            Palak Greco RN    8/16/2024, 13:06 EDT

## 2024-08-16 NOTE — TELEPHONE ENCOUNTER
S/w patient and informed Dr. Mancuso sent refill to his pharmacy --------------------------------------------------    Patient called and stated that Racquel came to see him before he was discharged home and she asked if he would have enough pain meds to last him until surgery, patient states that he only has enough to last him through Sunday. I advised him that we would check with Racquel and find out if she would like to refill his medication and we would call him back and let him know.

## 2024-08-19 ENCOUNTER — TELEPHONE (OUTPATIENT)
Dept: NEUROSURGERY | Facility: CLINIC | Age: 79
End: 2024-08-19
Payer: MEDICARE

## 2024-08-19 NOTE — TELEPHONE ENCOUNTER
"08/19/24 I RECEIVED VOICE MAIL FROM [employer] AT 5:30 P.M. ON 08/16/24 THAT MORE INFORMATION IS NEEDED.  I RECEIVED LETTER FROM [employer] THAT STATES \"MISSING  INFORMATION\". ACCOUNT SHOWS THAT I FAXED CLINICALS 8 TIMES. 8 TIMES THE FAX FAILED DESPITE MY CALL REQUESTING ANOTHER FAX.  I CALLED [facility]. I WAS INFORMED THAT SURGERY WAS DENIED AND COULD NOT GET AN EXPLANATION AS TO WHY.  I WAS FORMED TO CALL [employer] [employer] [facility]AL. I RECEIVED FAX OF DENIAL FROM [facility]. I SCANNED TO PT ACCOUNT. I WAS INFORMED THAT I NEEDED TO REACH OUT TO [facility]. I INFORMED [employer] THAT [facility] SENT ME TO [facility].  [facility] INFORMED I NEED TO SEND CLINICALS THAT IS APPEARS TO BE THE ONLY ISSUE.  I INFORMED OF THE ABOVE INFORMATION.  THEY PROVIDED ME FAX NUMBER: [phone] TO FAX CLINICALS. THIS FAX APPEARS TO HAVE SUBMITTED.  I SHARED THE NEED TO HAVE THIS EXPEDITED. [facility] IS AWARE SURGERY IS ON 08/21/24  CALL REF NUMBER: [id number]  "

## 2024-08-20 ENCOUNTER — PREP FOR SURGERY (OUTPATIENT)
Dept: OTHER | Facility: HOSPITAL | Age: 79
End: 2024-08-20
Payer: MEDICARE

## 2024-08-20 ENCOUNTER — HOSPITAL ENCOUNTER (OUTPATIENT)
Facility: HOSPITAL | Age: 79
Discharge: HOME | End: 2024-08-22
Attending: NEUROLOGICAL SURGERY | Admitting: NEUROLOGICAL SURGERY
Payer: MEDICARE

## 2024-08-20 ENCOUNTER — TELEPHONE (OUTPATIENT)
Dept: NEUROSURGERY | Facility: CLINIC | Age: 79
End: 2024-08-20
Payer: MEDICARE

## 2024-08-20 DIAGNOSIS — G95.20 CERVICAL CORD COMPRESSION WITH MYELOPATHY: ICD-10-CM

## 2024-08-20 DIAGNOSIS — R52 INTRACTABLE PAIN: ICD-10-CM

## 2024-08-20 DIAGNOSIS — G95.20 CERVICAL CORD COMPRESSION WITH MYELOPATHY: Primary | ICD-10-CM

## 2024-08-20 DIAGNOSIS — M48.02 CERVICAL STENOSIS OF SPINE: ICD-10-CM

## 2024-08-20 DIAGNOSIS — M48.02 NEUROFORAMINAL STENOSIS OF CERVICAL SPINE: Chronic | ICD-10-CM

## 2024-08-20 LAB — GLUCOSE BLDC GLUCOMTR-MCNC: 87 MG/DL (ref 70–130)

## 2024-08-20 PROCEDURE — 94640 AIRWAY INHALATION TREATMENT: CPT

## 2024-08-20 PROCEDURE — G0379 DIRECT REFER HOSPITAL OBSERV: HCPCS

## 2024-08-20 PROCEDURE — 94799 UNLISTED PULMONARY SVC/PX: CPT

## 2024-08-20 PROCEDURE — G0378 HOSPITAL OBSERVATION PER HR: HCPCS

## 2024-08-20 PROCEDURE — 82948 REAGENT STRIP/BLOOD GLUCOSE: CPT

## 2024-08-20 RX ORDER — LATANOPROST 50 UG/ML
1 SOLUTION/ DROPS OPHTHALMIC NIGHTLY
Status: DISCONTINUED | OUTPATIENT
Start: 2024-08-20 | End: 2024-08-21 | Stop reason: SDUPTHER

## 2024-08-20 RX ORDER — ACETAMINOPHEN 160 MG/5ML
650 SOLUTION ORAL EVERY 4 HOURS PRN
Status: DISCONTINUED | OUTPATIENT
Start: 2024-08-20 | End: 2024-08-21

## 2024-08-20 RX ORDER — BRIMONIDINE TARTRATE 2 MG/ML
1 SOLUTION/ DROPS OPHTHALMIC 2 TIMES DAILY
Status: DISCONTINUED | OUTPATIENT
Start: 2024-08-20 | End: 2024-08-21 | Stop reason: SDUPTHER

## 2024-08-20 RX ORDER — BISACODYL 5 MG/1
5 TABLET, DELAYED RELEASE ORAL DAILY PRN
Status: DISCONTINUED | OUTPATIENT
Start: 2024-08-20 | End: 2024-08-22 | Stop reason: HOSPADM

## 2024-08-20 RX ORDER — BISACODYL 10 MG
10 SUPPOSITORY, RECTAL RECTAL DAILY PRN
Status: CANCELLED | OUTPATIENT
Start: 2024-08-20

## 2024-08-20 RX ORDER — AMOXICILLIN 250 MG
2 CAPSULE ORAL 2 TIMES DAILY PRN
Status: DISCONTINUED | OUTPATIENT
Start: 2024-08-20 | End: 2024-08-22 | Stop reason: HOSPADM

## 2024-08-20 RX ORDER — HYDROMORPHONE HYDROCHLORIDE 1 MG/ML
0.5 INJECTION, SOLUTION INTRAMUSCULAR; INTRAVENOUS; SUBCUTANEOUS EVERY 4 HOURS PRN
Status: DISCONTINUED | OUTPATIENT
Start: 2024-08-20 | End: 2024-08-21

## 2024-08-20 RX ORDER — HYDROCODONE BITARTRATE AND ACETAMINOPHEN 5; 325 MG/1; MG/1
1 TABLET ORAL EVERY 4 HOURS PRN
Status: CANCELLED | OUTPATIENT
Start: 2024-08-20 | End: 2024-08-25

## 2024-08-20 RX ORDER — ONDANSETRON 2 MG/ML
4 INJECTION INTRAMUSCULAR; INTRAVENOUS EVERY 6 HOURS PRN
Status: DISCONTINUED | OUTPATIENT
Start: 2024-08-20 | End: 2024-08-21 | Stop reason: SDUPTHER

## 2024-08-20 RX ORDER — POLYETHYLENE GLYCOL 3350 17 G/17G
17 POWDER, FOR SOLUTION ORAL DAILY PRN
Status: CANCELLED | OUTPATIENT
Start: 2024-08-20

## 2024-08-20 RX ORDER — ACETAMINOPHEN 325 MG/1
650 TABLET ORAL EVERY 4 HOURS PRN
Status: DISCONTINUED | OUTPATIENT
Start: 2024-08-20 | End: 2024-08-21

## 2024-08-20 RX ORDER — BISACODYL 10 MG
10 SUPPOSITORY, RECTAL RECTAL DAILY PRN
Status: DISCONTINUED | OUTPATIENT
Start: 2024-08-20 | End: 2024-08-22 | Stop reason: HOSPADM

## 2024-08-20 RX ORDER — HYDROMORPHONE HYDROCHLORIDE 1 MG/ML
0.5 INJECTION, SOLUTION INTRAMUSCULAR; INTRAVENOUS; SUBCUTANEOUS EVERY 4 HOURS PRN
Status: CANCELLED | OUTPATIENT
Start: 2024-08-20 | End: 2024-08-25

## 2024-08-20 RX ORDER — SODIUM CHLORIDE 0.9 % (FLUSH) 0.9 %
10 SYRINGE (ML) INJECTION AS NEEDED
Status: DISCONTINUED | OUTPATIENT
Start: 2024-08-20 | End: 2024-08-22 | Stop reason: HOSPADM

## 2024-08-20 RX ORDER — SODIUM CHLORIDE 0.9 % (FLUSH) 0.9 %
10 SYRINGE (ML) INJECTION EVERY 12 HOURS SCHEDULED
Status: DISCONTINUED | OUTPATIENT
Start: 2024-08-20 | End: 2024-08-22 | Stop reason: HOSPADM

## 2024-08-20 RX ORDER — SODIUM CHLORIDE 0.9 % (FLUSH) 0.9 %
10 SYRINGE (ML) INJECTION AS NEEDED
Status: CANCELLED | OUTPATIENT
Start: 2024-08-20

## 2024-08-20 RX ORDER — BISACODYL 5 MG/1
5 TABLET, DELAYED RELEASE ORAL DAILY PRN
Status: CANCELLED | OUTPATIENT
Start: 2024-08-20

## 2024-08-20 RX ORDER — ACETAMINOPHEN 650 MG/1
650 SUPPOSITORY RECTAL EVERY 4 HOURS PRN
Status: DISCONTINUED | OUTPATIENT
Start: 2024-08-20 | End: 2024-08-21

## 2024-08-20 RX ORDER — NALOXONE HCL 0.4 MG/ML
0.4 VIAL (ML) INJECTION
Status: CANCELLED | OUTPATIENT
Start: 2024-08-20

## 2024-08-20 RX ORDER — NALOXONE HCL 0.4 MG/ML
0.4 VIAL (ML) INJECTION
Status: DISCONTINUED | OUTPATIENT
Start: 2024-08-20 | End: 2024-08-21

## 2024-08-20 RX ORDER — ONDANSETRON 2 MG/ML
4 INJECTION INTRAMUSCULAR; INTRAVENOUS EVERY 6 HOURS PRN
Status: CANCELLED | OUTPATIENT
Start: 2024-08-20

## 2024-08-20 RX ORDER — ACETAMINOPHEN 650 MG/1
650 SUPPOSITORY RECTAL EVERY 4 HOURS PRN
Status: CANCELLED | OUTPATIENT
Start: 2024-08-20

## 2024-08-20 RX ORDER — PANTOPRAZOLE SODIUM 40 MG/1
40 TABLET, DELAYED RELEASE ORAL 2 TIMES DAILY
Status: DISCONTINUED | OUTPATIENT
Start: 2024-08-20 | End: 2024-08-21 | Stop reason: SDUPTHER

## 2024-08-20 RX ORDER — AMOXICILLIN 250 MG
2 CAPSULE ORAL 2 TIMES DAILY PRN
Status: CANCELLED | OUTPATIENT
Start: 2024-08-20

## 2024-08-20 RX ORDER — ACETAMINOPHEN 325 MG/1
650 TABLET ORAL EVERY 4 HOURS PRN
Status: CANCELLED | OUTPATIENT
Start: 2024-08-20

## 2024-08-20 RX ORDER — MIRABEGRON 25 MG/1
25 TABLET, FILM COATED, EXTENDED RELEASE ORAL DAILY
Status: DISCONTINUED | OUTPATIENT
Start: 2024-08-21 | End: 2024-08-22 | Stop reason: HOSPADM

## 2024-08-20 RX ORDER — METOPROLOL SUCCINATE 100 MG/1
100 TABLET, EXTENDED RELEASE ORAL NIGHTLY
Status: DISCONTINUED | OUTPATIENT
Start: 2024-08-20 | End: 2024-08-21 | Stop reason: SDUPTHER

## 2024-08-20 RX ORDER — SODIUM CHLORIDE 9 MG/ML
40 INJECTION, SOLUTION INTRAVENOUS AS NEEDED
Status: DISCONTINUED | OUTPATIENT
Start: 2024-08-20 | End: 2024-08-22 | Stop reason: HOSPADM

## 2024-08-20 RX ORDER — BUMETANIDE 1 MG/1
1 TABLET ORAL DAILY
Status: DISCONTINUED | OUTPATIENT
Start: 2024-08-21 | End: 2024-08-21 | Stop reason: SDUPTHER

## 2024-08-20 RX ORDER — TAMSULOSIN HYDROCHLORIDE 0.4 MG/1
0.4 CAPSULE ORAL DAILY
Status: DISCONTINUED | OUTPATIENT
Start: 2024-08-21 | End: 2024-08-21 | Stop reason: SDUPTHER

## 2024-08-20 RX ORDER — SODIUM CHLORIDE 9 MG/ML
40 INJECTION, SOLUTION INTRAVENOUS AS NEEDED
Status: CANCELLED | OUTPATIENT
Start: 2024-08-20

## 2024-08-20 RX ORDER — METOPROLOL SUCCINATE 50 MG/1
50 TABLET, EXTENDED RELEASE ORAL
Status: DISCONTINUED | OUTPATIENT
Start: 2024-08-21 | End: 2024-08-21 | Stop reason: SDUPTHER

## 2024-08-20 RX ORDER — BUDESONIDE AND FORMOTEROL FUMARATE DIHYDRATE 160; 4.5 UG/1; UG/1
2 AEROSOL RESPIRATORY (INHALATION)
Status: DISCONTINUED | OUTPATIENT
Start: 2024-08-20 | End: 2024-08-22 | Stop reason: HOSPADM

## 2024-08-20 RX ORDER — ACETAMINOPHEN 160 MG/5ML
650 SOLUTION ORAL EVERY 4 HOURS PRN
Status: CANCELLED | OUTPATIENT
Start: 2024-08-20

## 2024-08-20 RX ORDER — SODIUM CHLORIDE 0.9 % (FLUSH) 0.9 %
10 SYRINGE (ML) INJECTION EVERY 12 HOURS SCHEDULED
Status: CANCELLED | OUTPATIENT
Start: 2024-08-20

## 2024-08-20 RX ORDER — METHOCARBAMOL 500 MG/1
500 TABLET, FILM COATED ORAL 3 TIMES DAILY PRN
Status: DISCONTINUED | OUTPATIENT
Start: 2024-08-20 | End: 2024-08-21 | Stop reason: SDUPTHER

## 2024-08-20 RX ORDER — HYDROCODONE BITARTRATE AND ACETAMINOPHEN 5; 325 MG/1; MG/1
1 TABLET ORAL EVERY 4 HOURS PRN
Status: DISCONTINUED | OUTPATIENT
Start: 2024-08-20 | End: 2024-08-21

## 2024-08-20 RX ORDER — SPIRONOLACTONE 25 MG/1
25 TABLET ORAL DAILY
Status: DISCONTINUED | OUTPATIENT
Start: 2024-08-21 | End: 2024-08-21 | Stop reason: SDUPTHER

## 2024-08-20 RX ORDER — POLYETHYLENE GLYCOL 3350 17 G/17G
17 POWDER, FOR SOLUTION ORAL DAILY PRN
Status: DISCONTINUED | OUTPATIENT
Start: 2024-08-20 | End: 2024-08-22 | Stop reason: HOSPADM

## 2024-08-20 RX ORDER — ATORVASTATIN CALCIUM 80 MG/1
80 TABLET, FILM COATED ORAL NIGHTLY
Status: DISCONTINUED | OUTPATIENT
Start: 2024-08-20 | End: 2024-08-21 | Stop reason: SDUPTHER

## 2024-08-20 RX ORDER — METFORMIN HCL 500 MG
500 TABLET, EXTENDED RELEASE 24 HR ORAL
Status: DISCONTINUED | OUTPATIENT
Start: 2024-08-21 | End: 2024-08-21 | Stop reason: SDUPTHER

## 2024-08-20 RX ADMIN — LATANOPROST 1 DROP: 50 SOLUTION OPHTHALMIC at 22:57

## 2024-08-20 RX ADMIN — METOPROLOL SUCCINATE 100 MG: 100 TABLET, EXTENDED RELEASE ORAL at 21:44

## 2024-08-20 RX ADMIN — BRIMONIDINE TARTRATE 1 DROP: 2 SOLUTION OPHTHALMIC at 22:57

## 2024-08-20 RX ADMIN — HYDROCODONE BITARTRATE AND ACETAMINOPHEN 1 TABLET: 5; 325 TABLET ORAL at 20:28

## 2024-08-20 RX ADMIN — BUDESONIDE AND FORMOTEROL FUMARATE DIHYDRATE 2 PUFF: 160; 4.5 AEROSOL RESPIRATORY (INHALATION) at 23:37

## 2024-08-20 RX ADMIN — PANTOPRAZOLE SODIUM 40 MG: 40 TABLET, DELAYED RELEASE ORAL at 21:44

## 2024-08-20 RX ADMIN — Medication 10 ML: at 21:44

## 2024-08-20 RX ADMIN — ATORVASTATIN CALCIUM 80 MG: 80 TABLET, FILM COATED ORAL at 21:44

## 2024-08-20 NOTE — PLAN OF CARE
Goal Outcome Evaluation:  Plan of Care Reviewed With: patient        Progress: no change  Outcome Evaluation: Patient arrived to floor in stable condition.  Patient is alert x4.  Patient complains of left sided neck pain 7/10.  VSS stable IV placed.  Will continue to monitor.

## 2024-08-20 NOTE — TELEPHONE ENCOUNTER
08/20/24 I CALLED [facility] APPEALS ([phone]) TO CHECK ON STATUS OF APPEAL. I WAS TRANSFERRED.  I WAS INFORMED THAT THEY DO NOT SEE THE APPEAL REQUEST AND THE FAX NUMBER [phone]  THAT I WAS GIVEN TO FAX CLINICALS IS TO A DIFFERENT DEPT AND SHE CAN NOT SEE THE REQUEST NOR THE FAX OF CLINICALS.  CALLER WITH [facility] STARTED ANOTHER APPEAL. I FAXED TO THE EXPEDITED GRIEVANCE AND APPEAL THROUGH [employer]. THEIR FAX IS: [phone].  I FAXED CLINICALS, AGAIN. THE CALLER IS AWARE SURGERY IS TOMORROW (08/21/24).  [facility] DENIED TRACKING [id number] WAS DENIED BECAUSE THEY DID NOT RECEIVE THE CLINICALS THAT I FAXED 8 TIMES.  REF NUMBER: [id number]

## 2024-08-20 NOTE — H&P
"Subjective   History of Present Illness: Aj Salazar is a 78 y.o. male is here today for  surgery after being seen in the [facility] for left sided neck pain and shoulder.  He underwent an extensive cardiac evaluation because of the left-sided shoulder pain and was found to have fairly significant spondylosis in the neck which we are asked to address.  His left shoulder pain is not improved with any of the conservative management.  His symptoms began spontaneously this summer and he continues to have left neck pain and left shoulder pain.  He does note some early morning imbalance or discoordination that improves through the day.  He denies any weakness or numbness in the distal arms or legs.    He was readmitted about 5 days ago for intractable left shoulder pain medicated and rescheduled and moved up surgery for this day to try to expedite his treatment.    This document is an updated document from my office visits formulated as a history and physical for the purposes of surgery.    History of Present Illness    Tobacco Use: Medium Risk (8/13/2024)    Patient History     Smoking Tobacco Use: Former     Smokeless Tobacco Use: Never     Passive Exposure: Past        The following portions of the patient's history were reviewed and updated as appropriate: allergies, current medications, past family history, past medical history, past social history, past surgical history and problem list.    Review of Systems    Objective     Vitals:    08/13/24 0942   BP: 122/74   Weight: 75.8 kg (167 lb)   Height: 177.8 cm (70\")     Body mass index is 23.96 kg/m².      Physical Exam  Neurologic Exam    Physical Exam:     CONSTITUTIONAL:  appears well developed, well-nourished and in no acute distress.     NECK: the neck is supple and symmetric. The trachea is midline with no masses.       PULMONARY: Respiratory effort is normal with no increased work of breathing or signs of respiratory distress.     CARDIOVASCULAR: Pedal " pulses are +2/4 bilaterally. Examination of the extremities shows no edema or varicosities.     MUSCULOSKELETAL: Gait normal, good range of motion of the left shoulder despite he guards it from radicular pain     SKIN: The skin is warm, dry and intact.       NEUROLOGIC:   Cranial Nerves 2-12 intact  Normal motor strength noted although he guards the left shoulder for pain. Muscle bulk and tone are normal.  Sensory exam is normal to fine touch to confrontational testing bilaterally  Reflexes on the right side demonstrates 3/4 Triceps Reflex, 3/4 Biceps Reflex, 3/4 Brachioradialis Reflex, 3/4 Knee Jerk Reflex, 3/4 Ankle Jerk Reflex and no ankle clonus on the right.   Reflexes on the left side demonstrates 3/4 Triceps Reflex, 3/4 Biceps Reflex, 3/4 Brachioradialis Reflex, 3/4 Knee Jerk Reflex, 3/4 Ankle Jerk Reflex and no ankle clonus on the left.  Superficial/Primitive Reflexes: Right positive Novoa's sign, negative on left, Babinski negative, and Clonus signs all negative.  No coordination deficit observed.  Radicular testing showed a negative Don (ROSE) test and negative straight leg raise.  Cortical function is intact and without deficits. Speech is normal.     PSYCHIATRIC: oriented to person, place and time. Patient's mood and affect are normal.    Assessment & Plan   Independent Review of Radiographic Studies:      I personally reviewed the images from the following studies.    MRI of the cervical spine done on July 28, 2020 for [facility] demonstrates severe spinal stenosis at C4-C5 and C5-C6 with cord compression and signal change in the cord between those 2 levels.  There is slight retrolisthesis of C5 on C6 contributing to the stenosis.              Medical Decision Making:      He does have quicker reflexes than he would expect in a patient of his age and he has a unilateral Elsa sign, although subtle, in the right hand.  His symptomatology of pain in the shoulder is likely referable to the  C4-C5 spondylitic change but he certainly has very concerning cervical stenosis with cord contact and signal change in the cord at C5-C6.  His clinical exam is quite mild compared to the appearance of his cervical stenosis.  I do believe he is symptomatic although mildly from the cervical stenosis and there is really no role for any treatment other than surgery.   I do not think physical therapy will provide along lasting results and there is some risk to some of the stretching exercises on the neck.  There is no role in this scenario for interventional pain management given the amount of stenosis and cord compromise seen on the MRI.  Reviewed with he and his family an anterior cervical partial corpectomy at C5 for the purposes of C4-C5 and C5-C6 anterior cervical discectomy and fusion with allograft bone and metal instrumentation.     The patient understands that there are inherent risks to surgery including bleeding, infection, anesthetic risk, death, heart attack, stroke, damage to nerves, weakness, numbness, paralysis, failure to improve, need for further surgery, CSF leak, recurrence, persistent pain, and any other unforeseen complications.  He and his family copperhead and had opportunity to ask further questions.    Return for follow-up after surgery.    Diagnoses and all orders for this visit:    1. Cervical cord compression with myelopathy (Primary)  -     Case Request; Standing  -     ceFAZolin (ANCEF) 2 g in sodium chloride 0.9 % 100 mL IVPB  -     Case Request    2. Cervical stenosis of spine  -     Case Request; Standing  -     ceFAZolin (ANCEF) 2 g in sodium chloride 0.9 % 100 mL IVPB  -     Case Request  -     HYDROcodone-acetaminophen (NORCO) 5-325 MG per tablet; Take 1 tablet by mouth Every 6 (Six) Hours As Needed for Moderate Pain or Mild Pain.  Dispense: 20 tablet; Refill: 0    3. Neuroforaminal stenosis of cervical spine  -     Case Request; Standing  -     ceFAZolin (ANCEF) 2 g in sodium  chloride 0.9 % 100 mL IVPB  -     Case Request    Other orders  -     Follow Anesthesia Guidelines / Protocol; Future  -     Follow Anesthesia Guidelines / Protocol; Standing  -     Verify NPO Status; Standing  -     SCD (Sequential Compression Device) - To Be Placed on Patient in Pre-Op; Standing  -     Provide Patient With Instructions on NPO Status; Future  -     Initiate Observation Status; Standing             Pedro Mancuso MD FACS FAANS  Neurological Surgery

## 2024-08-21 ENCOUNTER — ANESTHESIA EVENT (OUTPATIENT)
Dept: PERIOP | Facility: HOSPITAL | Age: 79
End: 2024-08-21
Payer: MEDICARE

## 2024-08-21 ENCOUNTER — TELEPHONE (OUTPATIENT)
Dept: NEUROSURGERY | Facility: CLINIC | Age: 79
End: 2024-08-21

## 2024-08-21 ENCOUNTER — APPOINTMENT (OUTPATIENT)
Dept: GENERAL RADIOLOGY | Facility: HOSPITAL | Age: 79
End: 2024-08-21
Payer: MEDICARE

## 2024-08-21 ENCOUNTER — ANESTHESIA (OUTPATIENT)
Dept: PERIOP | Facility: HOSPITAL | Age: 79
End: 2024-08-21
Payer: MEDICARE

## 2024-08-21 LAB
GLUCOSE BLDC GLUCOMTR-MCNC: 112 MG/DL (ref 70–130)
GLUCOSE BLDC GLUCOMTR-MCNC: 132 MG/DL (ref 70–130)
GLUCOSE BLDC GLUCOMTR-MCNC: 90 MG/DL (ref 70–130)
GLUCOSE BLDC GLUCOMTR-MCNC: 93 MG/DL (ref 70–130)

## 2024-08-21 PROCEDURE — 25810000003 LACTATED RINGERS PER 1000 ML

## 2024-08-21 PROCEDURE — 63081 REMOVE VERT BODY DCMPRN CRVL: CPT | Performed by: SPECIALIST/TECHNOLOGIST, OTHER

## 2024-08-21 PROCEDURE — A9270 NON-COVERED ITEM OR SERVICE: HCPCS | Performed by: NEUROLOGICAL SURGERY

## 2024-08-21 PROCEDURE — 25010000002 HYDROMORPHONE PER 4 MG

## 2024-08-21 PROCEDURE — 25010000002 DEXAMETHASONE SODIUM PHOSPHATE 20 MG/5ML SOLUTION

## 2024-08-21 PROCEDURE — 25810000003 SODIUM CHLORIDE 0.9 % SOLUTION 250 ML FLEX CONT

## 2024-08-21 PROCEDURE — 76000 FLUOROSCOPY <1 HR PHYS/QHP: CPT

## 2024-08-21 PROCEDURE — 25810000003 LACTATED RINGERS PER 1000 ML: Performed by: ANESTHESIOLOGY

## 2024-08-21 PROCEDURE — 63710000001 METFORMIN ER 500 MG TABLET SUSTAINED-RELEASE 24 HOUR: Performed by: NEUROLOGICAL SURGERY

## 2024-08-21 PROCEDURE — 63710000001 ACETAMINOPHEN 325 MG TABLET: Performed by: NEUROLOGICAL SURGERY

## 2024-08-21 PROCEDURE — 63710000001 EMPAGLIFLOZIN 10 MG TABLET: Performed by: NEUROLOGICAL SURGERY

## 2024-08-21 PROCEDURE — 25010000002 PHENYLEPHRINE 10 MG/ML SOLUTION 5 ML VIAL

## 2024-08-21 PROCEDURE — 22585 ARTHRD ANT NTRBD MIN DSC EA: CPT | Performed by: NEUROLOGICAL SURGERY

## 2024-08-21 PROCEDURE — C1713 ANCHOR/SCREW BN/BN,TIS/BN: HCPCS | Performed by: NEUROLOGICAL SURGERY

## 2024-08-21 PROCEDURE — 63710000001 HYDROCODONE-ACETAMINOPHEN 5-325 MG TABLET: Performed by: NEUROLOGICAL SURGERY

## 2024-08-21 PROCEDURE — 94761 N-INVAS EAR/PLS OXIMETRY MLT: CPT

## 2024-08-21 PROCEDURE — 82948 REAGENT STRIP/BLOOD GLUCOSE: CPT

## 2024-08-21 PROCEDURE — 63710000001 PANTOPRAZOLE 40 MG TABLET DELAYED-RELEASE: Performed by: NEUROLOGICAL SURGERY

## 2024-08-21 PROCEDURE — 63710000001 SPIRONOLACTONE 25 MG TABLET: Performed by: NEUROLOGICAL SURGERY

## 2024-08-21 PROCEDURE — 25010000002 ONDANSETRON PER 1 MG

## 2024-08-21 PROCEDURE — 25810000003 LACTATED RINGERS PER 1000 ML: Performed by: NEUROLOGICAL SURGERY

## 2024-08-21 PROCEDURE — 22585 ARTHRD ANT NTRBD MIN DSC EA: CPT | Performed by: SPECIALIST/TECHNOLOGIST, OTHER

## 2024-08-21 PROCEDURE — 20931 SP BONE ALGRFT STRUCT ADD-ON: CPT | Performed by: NEUROLOGICAL SURGERY

## 2024-08-21 PROCEDURE — 96374 THER/PROPH/DIAG INJ IV PUSH: CPT

## 2024-08-21 PROCEDURE — 25010000002 KETOROLAC TROMETHAMINE PER 15 MG: Performed by: NEUROLOGICAL SURGERY

## 2024-08-21 PROCEDURE — 25010000002 FENTANYL CITRATE (PF) 50 MCG/ML SOLUTION

## 2024-08-21 PROCEDURE — 63710000001 BUMETANIDE 1 MG TABLET: Performed by: NEUROLOGICAL SURGERY

## 2024-08-21 PROCEDURE — G0378 HOSPITAL OBSERVATION PER HR: HCPCS

## 2024-08-21 PROCEDURE — 22554 ARTHRD ANT NTRBD MIN DSC CRV: CPT | Performed by: NEUROLOGICAL SURGERY

## 2024-08-21 PROCEDURE — 63710000001 TAMSULOSIN 0.4 MG CAPSULE: Performed by: NEUROLOGICAL SURGERY

## 2024-08-21 PROCEDURE — 22845 INSERT SPINE FIXATION DEVICE: CPT | Performed by: NEUROLOGICAL SURGERY

## 2024-08-21 PROCEDURE — 25010000002 FENTANYL CITRATE (PF) 50 MCG/ML SOLUTION: Performed by: ANESTHESIOLOGY

## 2024-08-21 PROCEDURE — 25010000002 SODIUM CHLORIDE 0.9 % WITH KCL 20 MEQ 20-0.9 MEQ/L-% SOLUTION: Performed by: NEUROLOGICAL SURGERY

## 2024-08-21 PROCEDURE — 25010000002 HYDROMORPHONE PER 4 MG: Performed by: NEUROLOGICAL SURGERY

## 2024-08-21 PROCEDURE — 63081 REMOVE VERT BODY DCMPRN CRVL: CPT | Performed by: NEUROLOGICAL SURGERY

## 2024-08-21 PROCEDURE — 63710000001 OXYBUTYNIN XL 5 MG TABLET SUSTAINED-RELEASE 24 HOUR: Performed by: NEUROLOGICAL SURGERY

## 2024-08-21 PROCEDURE — 94760 N-INVAS EAR/PLS OXIMETRY 1: CPT

## 2024-08-21 PROCEDURE — 25010000002 SUGAMMADEX 200 MG/2ML SOLUTION

## 2024-08-21 PROCEDURE — 22845 INSERT SPINE FIXATION DEVICE: CPT | Performed by: SPECIALIST/TECHNOLOGIST, OTHER

## 2024-08-21 PROCEDURE — 94664 DEMO&/EVAL PT USE INHALER: CPT

## 2024-08-21 PROCEDURE — 25010000002 PROPOFOL 200 MG/20ML EMULSION

## 2024-08-21 PROCEDURE — 25010000002 ESMOLOL 100 MG/10ML SOLUTION

## 2024-08-21 PROCEDURE — 63710000001 METHOCARBAMOL 500 MG TABLET: Performed by: NEUROLOGICAL SURGERY

## 2024-08-21 PROCEDURE — 25010000002 CEFAZOLIN PER 500 MG: Performed by: NEUROLOGICAL SURGERY

## 2024-08-21 PROCEDURE — 63710000001 LATANOPROST 0.005 % SOLUTION 2.5 ML BOTTLE: Performed by: NEUROLOGICAL SURGERY

## 2024-08-21 PROCEDURE — 63710000001 BRIMONIDINE 0.2 % SOLUTION 5 ML BOTTLE: Performed by: NEUROLOGICAL SURGERY

## 2024-08-21 PROCEDURE — 22554 ARTHRD ANT NTRBD MIN DSC CRV: CPT | Performed by: SPECIALIST/TECHNOLOGIST, OTHER

## 2024-08-21 PROCEDURE — 94799 UNLISTED PULMONARY SVC/PX: CPT

## 2024-08-21 PROCEDURE — 63710000001 ATORVASTATIN 80 MG TABLET: Performed by: NEUROLOGICAL SURGERY

## 2024-08-21 DEVICE — PLT SKYLINE 2LVL 34MM: Type: IMPLANTABLE DEVICE | Site: SPINE CERVICAL | Status: FUNCTIONAL

## 2024-08-21 DEVICE — ALLOGRFT BONE MATRIGRAFT DOWEL CLWRD PRESERV 13MM: Type: IMPLANTABLE DEVICE | Site: SPINE CERVICAL | Status: FUNCTIONAL

## 2024-08-21 DEVICE — FLOSEAL WITH RECOTHROM - 10ML.
Type: IMPLANTABLE DEVICE | Site: SPINE CERVICAL | Status: FUNCTIONAL
Brand: FLOSEAL HEMOSTATIC MATRIX

## 2024-08-21 DEVICE — WAX,BONE,NATURAL
Type: IMPLANTABLE DEVICE | Site: SPINE CERVICAL | Status: FUNCTIONAL
Brand: MEDLINE INDUSTRIES

## 2024-08-21 DEVICE — SCRW SKYLINE VAR SD 14MM: Type: IMPLANTABLE DEVICE | Site: SPINE CERVICAL | Status: FUNCTIONAL

## 2024-08-21 RX ORDER — HYDROCODONE BITARTRATE AND ACETAMINOPHEN 5; 325 MG/1; MG/1
1 TABLET ORAL EVERY 4 HOURS PRN
Status: DISCONTINUED | OUTPATIENT
Start: 2024-08-21 | End: 2024-08-22 | Stop reason: HOSPADM

## 2024-08-21 RX ORDER — LIDOCAINE HYDROCHLORIDE 20 MG/ML
INJECTION, SOLUTION INFILTRATION; PERINEURAL AS NEEDED
Status: DISCONTINUED | OUTPATIENT
Start: 2024-08-21 | End: 2024-08-21 | Stop reason: SURG

## 2024-08-21 RX ORDER — SODIUM CHLORIDE 9 MG/ML
40 INJECTION, SOLUTION INTRAVENOUS AS NEEDED
Status: DISCONTINUED | OUTPATIENT
Start: 2024-08-21 | End: 2024-08-22 | Stop reason: HOSPADM

## 2024-08-21 RX ORDER — ESMOLOL HYDROCHLORIDE 10 MG/ML
INJECTION INTRAVENOUS AS NEEDED
Status: DISCONTINUED | OUTPATIENT
Start: 2024-08-21 | End: 2024-08-21 | Stop reason: SURG

## 2024-08-21 RX ORDER — ONDANSETRON 2 MG/ML
4 INJECTION INTRAMUSCULAR; INTRAVENOUS ONCE AS NEEDED
Status: DISCONTINUED | OUTPATIENT
Start: 2024-08-21 | End: 2024-08-21 | Stop reason: HOSPADM

## 2024-08-21 RX ORDER — METFORMIN HCL 500 MG
500 TABLET, EXTENDED RELEASE 24 HR ORAL EVERY EVENING
Status: DISCONTINUED | OUTPATIENT
Start: 2024-08-21 | End: 2024-08-22 | Stop reason: HOSPADM

## 2024-08-21 RX ORDER — FLUMAZENIL 0.1 MG/ML
0.2 INJECTION INTRAVENOUS AS NEEDED
Status: DISCONTINUED | OUTPATIENT
Start: 2024-08-21 | End: 2024-08-21 | Stop reason: HOSPADM

## 2024-08-21 RX ORDER — PANTOPRAZOLE SODIUM 40 MG/1
40 TABLET, DELAYED RELEASE ORAL 2 TIMES DAILY
Status: DISCONTINUED | OUTPATIENT
Start: 2024-08-21 | End: 2024-08-22 | Stop reason: HOSPADM

## 2024-08-21 RX ORDER — DEXMEDETOMIDINE HYDROCHLORIDE 100 UG/ML
INJECTION, SOLUTION INTRAVENOUS AS NEEDED
Status: DISCONTINUED | OUTPATIENT
Start: 2024-08-21 | End: 2024-08-21 | Stop reason: SURG

## 2024-08-21 RX ORDER — SODIUM CHLORIDE 0.9 % (FLUSH) 0.9 %
10 SYRINGE (ML) INJECTION AS NEEDED
Status: DISCONTINUED | OUTPATIENT
Start: 2024-08-21 | End: 2024-08-22 | Stop reason: HOSPADM

## 2024-08-21 RX ORDER — ONDANSETRON 2 MG/ML
INJECTION INTRAMUSCULAR; INTRAVENOUS AS NEEDED
Status: DISCONTINUED | OUTPATIENT
Start: 2024-08-21 | End: 2024-08-21 | Stop reason: SURG

## 2024-08-21 RX ORDER — FENTANYL CITRATE 50 UG/ML
INJECTION, SOLUTION INTRAMUSCULAR; INTRAVENOUS AS NEEDED
Status: DISCONTINUED | OUTPATIENT
Start: 2024-08-21 | End: 2024-08-21 | Stop reason: SURG

## 2024-08-21 RX ORDER — NALOXONE HCL 0.4 MG/ML
0.2 VIAL (ML) INJECTION AS NEEDED
Status: DISCONTINUED | OUTPATIENT
Start: 2024-08-21 | End: 2024-08-21 | Stop reason: HOSPADM

## 2024-08-21 RX ORDER — PROMETHAZINE HYDROCHLORIDE 25 MG/1
25 SUPPOSITORY RECTAL ONCE AS NEEDED
Status: DISCONTINUED | OUTPATIENT
Start: 2024-08-21 | End: 2024-08-21 | Stop reason: HOSPADM

## 2024-08-21 RX ORDER — ROCURONIUM BROMIDE 10 MG/ML
INJECTION, SOLUTION INTRAVENOUS AS NEEDED
Status: DISCONTINUED | OUTPATIENT
Start: 2024-08-21 | End: 2024-08-21 | Stop reason: SURG

## 2024-08-21 RX ORDER — KETOROLAC TROMETHAMINE 15 MG/ML
15 INJECTION, SOLUTION INTRAMUSCULAR; INTRAVENOUS EVERY 6 HOURS PRN
Status: DISCONTINUED | OUTPATIENT
Start: 2024-08-21 | End: 2024-08-22 | Stop reason: HOSPADM

## 2024-08-21 RX ORDER — OXYBUTYNIN CHLORIDE 5 MG/1
5 TABLET, EXTENDED RELEASE ORAL DAILY
Status: DISCONTINUED | OUTPATIENT
Start: 2024-08-21 | End: 2024-08-22 | Stop reason: HOSPADM

## 2024-08-21 RX ORDER — PROMETHAZINE HYDROCHLORIDE 25 MG/1
25 TABLET ORAL ONCE AS NEEDED
Status: DISCONTINUED | OUTPATIENT
Start: 2024-08-21 | End: 2024-08-21 | Stop reason: HOSPADM

## 2024-08-21 RX ORDER — HYDROMORPHONE HYDROCHLORIDE 1 MG/ML
0.25 INJECTION, SOLUTION INTRAMUSCULAR; INTRAVENOUS; SUBCUTANEOUS
Status: DISCONTINUED | OUTPATIENT
Start: 2024-08-21 | End: 2024-08-21 | Stop reason: HOSPADM

## 2024-08-21 RX ORDER — METOPROLOL SUCCINATE 50 MG/1
50 TABLET, EXTENDED RELEASE ORAL DAILY
Status: DISCONTINUED | OUTPATIENT
Start: 2024-08-21 | End: 2024-08-22 | Stop reason: HOSPADM

## 2024-08-21 RX ORDER — PROPOFOL 10 MG/ML
INJECTION, EMULSION INTRAVENOUS AS NEEDED
Status: DISCONTINUED | OUTPATIENT
Start: 2024-08-21 | End: 2024-08-21 | Stop reason: SURG

## 2024-08-21 RX ORDER — METOPROLOL SUCCINATE 100 MG/1
100 TABLET, EXTENDED RELEASE ORAL NIGHTLY
Status: DISCONTINUED | OUTPATIENT
Start: 2024-08-21 | End: 2024-08-22 | Stop reason: HOSPADM

## 2024-08-21 RX ORDER — LABETALOL HYDROCHLORIDE 5 MG/ML
5 INJECTION, SOLUTION INTRAVENOUS
Status: DISCONTINUED | OUTPATIENT
Start: 2024-08-21 | End: 2024-08-21 | Stop reason: HOSPADM

## 2024-08-21 RX ORDER — ONDANSETRON 2 MG/ML
4 INJECTION INTRAMUSCULAR; INTRAVENOUS EVERY 6 HOURS PRN
Status: DISCONTINUED | OUTPATIENT
Start: 2024-08-21 | End: 2024-08-22 | Stop reason: HOSPADM

## 2024-08-21 RX ORDER — EPHEDRINE SULFATE 50 MG/ML
5 INJECTION, SOLUTION INTRAVENOUS ONCE AS NEEDED
Status: DISCONTINUED | OUTPATIENT
Start: 2024-08-21 | End: 2024-08-21 | Stop reason: HOSPADM

## 2024-08-21 RX ORDER — DROPERIDOL 2.5 MG/ML
0.62 INJECTION, SOLUTION INTRAMUSCULAR; INTRAVENOUS
Status: DISCONTINUED | OUTPATIENT
Start: 2024-08-21 | End: 2024-08-21 | Stop reason: HOSPADM

## 2024-08-21 RX ORDER — HYDROCODONE BITARTRATE AND ACETAMINOPHEN 5; 325 MG/1; MG/1
1 TABLET ORAL ONCE AS NEEDED
Status: DISCONTINUED | OUTPATIENT
Start: 2024-08-21 | End: 2024-08-21 | Stop reason: HOSPADM

## 2024-08-21 RX ORDER — SODIUM CHLORIDE, SODIUM LACTATE, POTASSIUM CHLORIDE, CALCIUM CHLORIDE 600; 310; 30; 20 MG/100ML; MG/100ML; MG/100ML; MG/100ML
INJECTION, SOLUTION INTRAVENOUS CONTINUOUS PRN
Status: DISCONTINUED | OUTPATIENT
Start: 2024-08-21 | End: 2024-08-21 | Stop reason: SURG

## 2024-08-21 RX ORDER — SODIUM CHLORIDE AND POTASSIUM CHLORIDE 150; 900 MG/100ML; MG/100ML
75 INJECTION, SOLUTION INTRAVENOUS CONTINUOUS
Status: DISCONTINUED | OUTPATIENT
Start: 2024-08-21 | End: 2024-08-22 | Stop reason: HOSPADM

## 2024-08-21 RX ORDER — FENTANYL CITRATE 50 UG/ML
50 INJECTION, SOLUTION INTRAMUSCULAR; INTRAVENOUS ONCE AS NEEDED
Status: COMPLETED | OUTPATIENT
Start: 2024-08-21 | End: 2024-08-21

## 2024-08-21 RX ORDER — MIDAZOLAM HYDROCHLORIDE 1 MG/ML
0.5 INJECTION INTRAMUSCULAR; INTRAVENOUS
Status: DISCONTINUED | OUTPATIENT
Start: 2024-08-21 | End: 2024-08-21 | Stop reason: HOSPADM

## 2024-08-21 RX ORDER — FENTANYL CITRATE 50 UG/ML
25 INJECTION, SOLUTION INTRAMUSCULAR; INTRAVENOUS
Status: DISCONTINUED | OUTPATIENT
Start: 2024-08-21 | End: 2024-08-21 | Stop reason: HOSPADM

## 2024-08-21 RX ORDER — HYDROCODONE BITARTRATE AND ACETAMINOPHEN 7.5; 325 MG/1; MG/1
1 TABLET ORAL EVERY 4 HOURS PRN
Status: DISCONTINUED | OUTPATIENT
Start: 2024-08-21 | End: 2024-08-21 | Stop reason: HOSPADM

## 2024-08-21 RX ORDER — ACETAMINOPHEN 160 MG/5ML
650 SOLUTION ORAL EVERY 8 HOURS
Status: DISCONTINUED | OUTPATIENT
Start: 2024-08-21 | End: 2024-08-22 | Stop reason: HOSPADM

## 2024-08-21 RX ORDER — ONDANSETRON 4 MG/1
4 TABLET, ORALLY DISINTEGRATING ORAL EVERY 6 HOURS PRN
Status: DISCONTINUED | OUTPATIENT
Start: 2024-08-21 | End: 2024-08-22 | Stop reason: HOSPADM

## 2024-08-21 RX ORDER — SODIUM CHLORIDE, SODIUM LACTATE, POTASSIUM CHLORIDE, CALCIUM CHLORIDE 600; 310; 30; 20 MG/100ML; MG/100ML; MG/100ML; MG/100ML
9 INJECTION, SOLUTION INTRAVENOUS CONTINUOUS
Status: DISCONTINUED | OUTPATIENT
Start: 2024-08-21 | End: 2024-08-22 | Stop reason: HOSPADM

## 2024-08-21 RX ORDER — SODIUM CHLORIDE 0.9 % (FLUSH) 0.9 %
3 SYRINGE (ML) INJECTION EVERY 12 HOURS SCHEDULED
Status: DISCONTINUED | OUTPATIENT
Start: 2024-08-21 | End: 2024-08-21 | Stop reason: HOSPADM

## 2024-08-21 RX ORDER — SODIUM CHLORIDE 0.9 % (FLUSH) 0.9 %
3 SYRINGE (ML) INJECTION EVERY 12 HOURS SCHEDULED
Status: DISCONTINUED | OUTPATIENT
Start: 2024-08-21 | End: 2024-08-22 | Stop reason: HOSPADM

## 2024-08-21 RX ORDER — LIDOCAINE HYDROCHLORIDE 10 MG/ML
0.5 INJECTION, SOLUTION INFILTRATION; PERINEURAL ONCE AS NEEDED
Status: DISCONTINUED | OUTPATIENT
Start: 2024-08-21 | End: 2024-08-21 | Stop reason: HOSPADM

## 2024-08-21 RX ORDER — LATANOPROST 50 UG/ML
1 SOLUTION/ DROPS OPHTHALMIC NIGHTLY
Status: DISCONTINUED | OUTPATIENT
Start: 2024-08-21 | End: 2024-08-22 | Stop reason: HOSPADM

## 2024-08-21 RX ORDER — SPIRONOLACTONE 25 MG/1
25 TABLET ORAL DAILY
Status: DISCONTINUED | OUTPATIENT
Start: 2024-08-21 | End: 2024-08-22 | Stop reason: HOSPADM

## 2024-08-21 RX ORDER — IPRATROPIUM BROMIDE AND ALBUTEROL SULFATE 2.5; .5 MG/3ML; MG/3ML
3 SOLUTION RESPIRATORY (INHALATION) ONCE AS NEEDED
Status: DISCONTINUED | OUTPATIENT
Start: 2024-08-21 | End: 2024-08-21 | Stop reason: HOSPADM

## 2024-08-21 RX ORDER — BRIMONIDINE TARTRATE 2 MG/ML
1 SOLUTION/ DROPS OPHTHALMIC 2 TIMES DAILY
Status: DISCONTINUED | OUTPATIENT
Start: 2024-08-21 | End: 2024-08-22 | Stop reason: HOSPADM

## 2024-08-21 RX ORDER — ATORVASTATIN CALCIUM 80 MG/1
80 TABLET, FILM COATED ORAL NIGHTLY
Status: DISCONTINUED | OUTPATIENT
Start: 2024-08-21 | End: 2024-08-22 | Stop reason: HOSPADM

## 2024-08-21 RX ORDER — MAGNESIUM HYDROXIDE 1200 MG/15ML
LIQUID ORAL AS NEEDED
Status: DISCONTINUED | OUTPATIENT
Start: 2024-08-21 | End: 2024-08-21 | Stop reason: HOSPADM

## 2024-08-21 RX ORDER — TAMSULOSIN HYDROCHLORIDE 0.4 MG/1
0.8 CAPSULE ORAL NIGHTLY
Status: DISCONTINUED | OUTPATIENT
Start: 2024-08-21 | End: 2024-08-22 | Stop reason: HOSPADM

## 2024-08-21 RX ORDER — DIPHENHYDRAMINE HYDROCHLORIDE 50 MG/ML
12.5 INJECTION INTRAMUSCULAR; INTRAVENOUS
Status: DISCONTINUED | OUTPATIENT
Start: 2024-08-21 | End: 2024-08-21 | Stop reason: HOSPADM

## 2024-08-21 RX ORDER — ACETAMINOPHEN 650 MG/1
650 SUPPOSITORY RECTAL EVERY 8 HOURS
Status: DISCONTINUED | OUTPATIENT
Start: 2024-08-21 | End: 2024-08-22 | Stop reason: HOSPADM

## 2024-08-21 RX ORDER — SODIUM CHLORIDE 0.9 % (FLUSH) 0.9 %
3-10 SYRINGE (ML) INJECTION AS NEEDED
Status: DISCONTINUED | OUTPATIENT
Start: 2024-08-21 | End: 2024-08-21 | Stop reason: HOSPADM

## 2024-08-21 RX ORDER — METHOCARBAMOL 500 MG/1
500 TABLET, FILM COATED ORAL 3 TIMES DAILY PRN
Status: DISCONTINUED | OUTPATIENT
Start: 2024-08-21 | End: 2024-08-22 | Stop reason: HOSPADM

## 2024-08-21 RX ORDER — BUMETANIDE 1 MG/1
1 TABLET ORAL DAILY
Status: DISCONTINUED | OUTPATIENT
Start: 2024-08-21 | End: 2024-08-22 | Stop reason: HOSPADM

## 2024-08-21 RX ORDER — ACETAMINOPHEN 325 MG/1
650 TABLET ORAL EVERY 8 HOURS
Status: DISCONTINUED | OUTPATIENT
Start: 2024-08-21 | End: 2024-08-22 | Stop reason: HOSPADM

## 2024-08-21 RX ORDER — DEXAMETHASONE SODIUM PHOSPHATE 4 MG/ML
INJECTION, SOLUTION INTRA-ARTICULAR; INTRALESIONAL; INTRAMUSCULAR; INTRAVENOUS; SOFT TISSUE AS NEEDED
Status: DISCONTINUED | OUTPATIENT
Start: 2024-08-21 | End: 2024-08-21 | Stop reason: SURG

## 2024-08-21 RX ORDER — HYDRALAZINE HYDROCHLORIDE 20 MG/ML
5 INJECTION INTRAMUSCULAR; INTRAVENOUS
Status: DISCONTINUED | OUTPATIENT
Start: 2024-08-21 | End: 2024-08-21 | Stop reason: HOSPADM

## 2024-08-21 RX ADMIN — BUMETANIDE 1 MG: 1 TABLET ORAL at 20:28

## 2024-08-21 RX ADMIN — DEXAMETHASONE SODIUM PHOSPHATE 10 MG: 4 INJECTION, SOLUTION INTRAMUSCULAR; INTRAVENOUS at 13:30

## 2024-08-21 RX ADMIN — LATANOPROST 1 DROP: 50 SOLUTION OPHTHALMIC at 20:26

## 2024-08-21 RX ADMIN — HYDROMORPHONE HYDROCHLORIDE 0.25 MG: 1 INJECTION, SOLUTION INTRAMUSCULAR; INTRAVENOUS; SUBCUTANEOUS at 16:04

## 2024-08-21 RX ADMIN — POTASSIUM CHLORIDE AND SODIUM CHLORIDE 75 ML/HR: 900; 150 INJECTION, SOLUTION INTRAVENOUS at 20:31

## 2024-08-21 RX ADMIN — BRIMONIDINE TARTRATE 1 DROP: 2 SOLUTION OPHTHALMIC at 20:26

## 2024-08-21 RX ADMIN — Medication 10 ML: at 20:25

## 2024-08-21 RX ADMIN — METOPROLOL SUCCINATE 50 MG: 50 TABLET, FILM COATED, EXTENDED RELEASE ORAL at 08:28

## 2024-08-21 RX ADMIN — SPIRONOLACTONE 25 MG: 25 TABLET ORAL at 20:32

## 2024-08-21 RX ADMIN — HYDROMORPHONE HYDROCHLORIDE 0.5 MG: 1 INJECTION, SOLUTION INTRAMUSCULAR; INTRAVENOUS; SUBCUTANEOUS at 09:44

## 2024-08-21 RX ADMIN — TIOTROPIUM BROMIDE INHALATION SPRAY 2 PUFF: 3.12 SPRAY, METERED RESPIRATORY (INHALATION) at 07:49

## 2024-08-21 RX ADMIN — METOPROLOL TARTRATE 2 MG: 1 INJECTION, SOLUTION INTRAVENOUS at 15:58

## 2024-08-21 RX ADMIN — SUGAMMADEX 200 MG: 100 INJECTION, SOLUTION INTRAVENOUS at 15:27

## 2024-08-21 RX ADMIN — FENTANYL CITRATE 25 MCG: 50 INJECTION, SOLUTION INTRAMUSCULAR; INTRAVENOUS at 16:14

## 2024-08-21 RX ADMIN — SODIUM CHLORIDE 2 G: 900 INJECTION INTRAVENOUS at 13:09

## 2024-08-21 RX ADMIN — METFORMIN HYDROCHLORIDE 500 MG: 500 TABLET, EXTENDED RELEASE ORAL at 20:29

## 2024-08-21 RX ADMIN — FENTANYL CITRATE 25 MCG: 50 INJECTION, SOLUTION INTRAMUSCULAR; INTRAVENOUS at 16:04

## 2024-08-21 RX ADMIN — KETOROLAC TROMETHAMINE 15 MG: 15 INJECTION, SOLUTION INTRAMUSCULAR; INTRAVENOUS at 16:07

## 2024-08-21 RX ADMIN — ESMOLOL HYDROCHLORIDE 10 MG: 100 INJECTION, SOLUTION INTRAVENOUS at 14:07

## 2024-08-21 RX ADMIN — Medication 3 ML: at 20:30

## 2024-08-21 RX ADMIN — DEXMEDETOMIDINE HCL 8 MCG: 100 INJECTION INTRAVENOUS at 14:50

## 2024-08-21 RX ADMIN — SODIUM CHLORIDE, POTASSIUM CHLORIDE, SODIUM LACTATE AND CALCIUM CHLORIDE: 600; 310; 30; 20 INJECTION, SOLUTION INTRAVENOUS at 13:13

## 2024-08-21 RX ADMIN — ATORVASTATIN CALCIUM 80 MG: 80 TABLET, FILM COATED ORAL at 20:26

## 2024-08-21 RX ADMIN — SODIUM CHLORIDE, POTASSIUM CHLORIDE, SODIUM LACTATE AND CALCIUM CHLORIDE 9 ML/HR: 600; 310; 30; 20 INJECTION, SOLUTION INTRAVENOUS at 11:16

## 2024-08-21 RX ADMIN — ROCURONIUM BROMIDE 20 MG: 10 INJECTION, SOLUTION INTRAVENOUS at 14:32

## 2024-08-21 RX ADMIN — ONDANSETRON 4 MG: 2 INJECTION INTRAMUSCULAR; INTRAVENOUS at 13:30

## 2024-08-21 RX ADMIN — EMPAGLIFLOZIN 10 MG: 10 TABLET, FILM COATED ORAL at 20:29

## 2024-08-21 RX ADMIN — PROPOFOL 150 MG: 10 INJECTION, EMULSION INTRAVENOUS at 13:19

## 2024-08-21 RX ADMIN — LIDOCAINE HYDROCHLORIDE 100 MG: 20 INJECTION, SOLUTION INFILTRATION; PERINEURAL at 13:19

## 2024-08-21 RX ADMIN — HYDROMORPHONE HYDROCHLORIDE 0.25 MG: 1 INJECTION, SOLUTION INTRAMUSCULAR; INTRAVENOUS; SUBCUTANEOUS at 15:58

## 2024-08-21 RX ADMIN — ESMOLOL HYDROCHLORIDE 10 MG: 100 INJECTION, SOLUTION INTRAVENOUS at 14:21

## 2024-08-21 RX ADMIN — ESMOLOL HYDROCHLORIDE 10 MG: 100 INJECTION, SOLUTION INTRAVENOUS at 14:11

## 2024-08-21 RX ADMIN — ROCURONIUM BROMIDE 20 MG: 10 INJECTION, SOLUTION INTRAVENOUS at 15:00

## 2024-08-21 RX ADMIN — PHENYLEPHRINE HYDROCHLORIDE 0.5 MCG/KG/MIN: 10 INJECTION, SOLUTION INTRAVENOUS at 13:39

## 2024-08-21 RX ADMIN — DEXMEDETOMIDINE HCL 8 MCG: 100 INJECTION INTRAVENOUS at 14:54

## 2024-08-21 RX ADMIN — PANTOPRAZOLE SODIUM 40 MG: 40 TABLET, DELAYED RELEASE ORAL at 20:30

## 2024-08-21 RX ADMIN — DEXMEDETOMIDINE HCL 8 MCG: 100 INJECTION INTRAVENOUS at 14:43

## 2024-08-21 RX ADMIN — FENTANYL CITRATE 25 MCG: 50 INJECTION, SOLUTION INTRAMUSCULAR; INTRAVENOUS at 15:58

## 2024-08-21 RX ADMIN — DEXMEDETOMIDINE HCL 8 MCG: 100 INJECTION INTRAVENOUS at 14:46

## 2024-08-21 RX ADMIN — ESMOLOL HYDROCHLORIDE 20 MG: 100 INJECTION, SOLUTION INTRAVENOUS at 15:00

## 2024-08-21 RX ADMIN — ACETAMINOPHEN 325MG 650 MG: 325 TABLET ORAL at 20:27

## 2024-08-21 RX ADMIN — HYDROCODONE BITARTRATE AND ACETAMINOPHEN 1 TABLET: 5; 325 TABLET ORAL at 20:29

## 2024-08-21 RX ADMIN — TAMSULOSIN HYDROCHLORIDE 0.8 MG: 0.4 CAPSULE ORAL at 20:26

## 2024-08-21 RX ADMIN — ROCURONIUM BROMIDE 10 MG: 10 INJECTION, SOLUTION INTRAVENOUS at 14:04

## 2024-08-21 RX ADMIN — BUDESONIDE AND FORMOTEROL FUMARATE DIHYDRATE 2 PUFF: 160; 4.5 AEROSOL RESPIRATORY (INHALATION) at 07:49

## 2024-08-21 RX ADMIN — FENTANYL CITRATE 50 MCG: 50 INJECTION, SOLUTION INTRAMUSCULAR; INTRAVENOUS at 11:15

## 2024-08-21 RX ADMIN — FENTANYL CITRATE 50 MCG: 50 INJECTION, SOLUTION INTRAMUSCULAR; INTRAVENOUS at 13:47

## 2024-08-21 RX ADMIN — OXYBUTYNIN CHLORIDE 5 MG: 5 TABLET, EXTENDED RELEASE ORAL at 20:30

## 2024-08-21 RX ADMIN — ROCURONIUM BROMIDE 70 MG: 10 INJECTION, SOLUTION INTRAVENOUS at 13:20

## 2024-08-21 RX ADMIN — FENTANYL CITRATE 25 MCG: 50 INJECTION, SOLUTION INTRAMUSCULAR; INTRAVENOUS at 16:09

## 2024-08-21 RX ADMIN — HYDROMORPHONE HYDROCHLORIDE 0.25 MG: 1 INJECTION, SOLUTION INTRAMUSCULAR; INTRAVENOUS; SUBCUTANEOUS at 16:14

## 2024-08-21 RX ADMIN — HYDROMORPHONE HYDROCHLORIDE 0.25 MG: 1 INJECTION, SOLUTION INTRAMUSCULAR; INTRAVENOUS; SUBCUTANEOUS at 16:09

## 2024-08-21 RX ADMIN — HYDROCODONE BITARTRATE AND ACETAMINOPHEN 1 TABLET: 5; 325 TABLET ORAL at 03:13

## 2024-08-21 RX ADMIN — FENTANYL CITRATE 50 MCG: 50 INJECTION, SOLUTION INTRAMUSCULAR; INTRAVENOUS at 14:02

## 2024-08-21 RX ADMIN — DEXMEDETOMIDINE HCL 8 MCG: 100 INJECTION INTRAVENOUS at 14:36

## 2024-08-21 RX ADMIN — METHOCARBAMOL TABLETS 500 MG: 500 TABLET, COATED ORAL at 16:58

## 2024-08-21 NOTE — OP NOTE
Anterior Cervical Partial Corpectomy C5 with Allograft and Instrumented Fusion Procedure Note    Aj Pattonta  8/21/2024  [id number]    SURGEON  Pedro Mancuso MD    ASSISTANT:  Ade Muñoz CSA was present throughout the entire surgery to provide intraoperative suction, retraction, suturing, and irrigation to promote visualization by the operative surgeon and assist with opening and closure.  The skilled assistance was necessary for the success of this case.  Our practice does not have a relationship with neurosurgical residents.    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 Vertebral corpectomy (vertebral body resection), partial, anterior approach with decompression of spinal cord, nerve roots(s), at C5  2. Arthrodesis, anterior interbody technique, at C4C5  3. Arthrodesis, anterior interbody technique, at C5C6   4. Anterior titanium instrumentation from C4 to C6, using [facility] [place] [facility] [place] instrumentation  5. Structural allograft bone dowel placement  6. Microdissection technique with the use of the operating microscope    Spinal Surgery Levels Completed:2 Levels     Anesthesia: General    Staff:   Circulator: Jovita Ma RN; Terri Nuñez RN  Scrub Person: Susanna Salgado  Vendor Representative: Jamey Sheppard  Assistant: Ade Muñoz CSA    Estimated Blood Loss:  100ml    Specimens: None               Drains: None    Findings: Severe central stenosis due to disc osteophyte complex and posterior longitudinal ligament hypertrophy both in the central canal and to the lateral recesses and neuroforamen bilaterally at C4-C5 and C5-C6.  There is also a disc extrusion along the C5 nerve root to the left at C4-C5.    Complications: None immediate    Narrative Description:    Positioning: After operative consent the patient was taken to the operating room in stable condition and placed under general endotracheal anesthesia. Once adequate anesthesia was established the patient was kept supine on the operating table with a shoulder roll beneath their shoulders. Their neck was placed in a slightly extended position to expose the anterior neck which was prepped and draped in the usual sterile fashion. All pressure points were padded along the extremities to protect neurologic function.    Approach: Using intraoperative fluoroscopy the anterior cervical spine was localized. The incision was made in the mid neck on the right and taken through the platysma layer. Blunt dissection was then used to expose the anterior cervical spine medial to the carotid artery and lateral to the esophagus. A needle was placed in C5C6 to confirm the level fluoroscopically. The Trimline retractor was placed beneath the longus colli muscles bilaterally. The distraction pins, of 14 mm depth were placed at C4 and C6 and distraction turned against those disc spaces.     Operating Microscope: The operating microscope was draped using sterile technique and brought into the field and the rest of the operation was performed under operative magnification with microdissection technique and micro-illumination with the aid of the operating microscope.    Cervical Discectomy/Decompression:   A #11 blade was used to sharply incise the disc spaces and a pituitary rongeur was used to remove the discs. A 12.5mm cylindrical drill bit was used with a [employer]  Cam pneumatic drill to perform a Cloward style partial corpectomy at C5 (60%). The end plates were prepared at C4 and C6 with 30% removal of bone at those levels.  The microscope was then used to perform microdissection along bilateral neural foramen.  Severe central stenosis due to disc osteophyte complex and hypertrophied posterior longitudinal ligament was found along the central canal and bilateral C5 and C6 nerve roots at C4C5 and C5-C6.  Using microdissection a disc extrusion was also identified to the left at C4-C5 likely contributing to the patient's intractable left shoulder pain.  The wound was copiously irrigated and Floseal was used for hemostasis.  Using microscopic visualization the nerve hook passed freely along the neuroforamina bilaterally at each level and the central canal had been adequately decompressed as well.    Bone Dowel Arthodesis:  13mm bone allograft dowels were fashioned to fit the corpectomy defect and affixed by compressing across the grafts. The distraction pins were removed and the residual holes were filled with bone wax.    Anterior Instrumentation:  A 34 mm [place] [employer]ine [place] plate was fashioned to fit the anterior spine across C4 to  C6 and fixed into position with six 14 mm screws (two at each vertebral level). The tightening cam was locked into position. Fluoroscopy documented placement of the plate.    Closure: The deep platysma was reapproximated with the 2-O Vicryl suture and the superficial skin was closed with 3-O Vicryl suture. Steri-strips were applied and the needle and sponge counts were correct at the end of the case. The patient was transported to postop recovery in stable condition.    Pedro Mancuso MD     Date: 8/21/2024  Time: 15:37 EDT

## 2024-08-21 NOTE — ANESTHESIA PROCEDURE NOTES
Airway  Urgency: elective    Date/Time: 8/21/2024 1:23 PM  Airway not difficult    General Information and Staff    Patient location during procedure: OR  CRNA/CAA: Akash Lopez CRNA    Indications and Patient Condition  Indications for airway management: airway protection    Preoxygenated: yes  MILS maintained throughout  Mask difficulty assessment: 1 - vent by mask    Final Airway Details  Final airway type: endotracheal airway      Successful airway: ETT  Cuffed: yes   Successful intubation technique: video laryngoscopy  Endotracheal tube insertion site: oral  Blade: Kenny  Blade size: D  ETT size (mm): 7.5  Cormack-Lehane Classification: grade I - full view of glottis  Placement verified by: chest auscultation and capnometry   Cuff volume (mL): 10  Measured from: lips  ETT/EBT  to teeth (cm): 23  ETT/EBT  to lips (cm): 23  Number of attempts at approach: 1  Assessment: lips, teeth, and gum same as pre-op and atraumatic intubation

## 2024-08-21 NOTE — PLAN OF CARE
Problem: Adult Inpatient Plan of Care  Goal: Plan of Care Review  Outcome: Ongoing, Progressing  Flowsheets (Taken 8/21/2024 0413)  Plan of Care Reviewed With: patient  Goal: Patient-Specific Goal (Individualized)  Outcome: Ongoing, Progressing  Goal: Absence of Hospital-Acquired Illness or Injury  Outcome: Ongoing, Progressing  Intervention: Identify and Manage Fall Risk  Recent Flowsheet Documentation  Taken 8/21/2024 0202 by Jannie Dunne RN  Safety Promotion/Fall Prevention:   activity supervised   assistive device/personal items within reach   clutter free environment maintained   safety round/check completed  Taken 8/21/2024 0000 by Jannie Dunne RN  Safety Promotion/Fall Prevention:   activity supervised   assistive device/personal items within reach   clutter free environment maintained   safety round/check completed  Taken 8/20/2024 2200 by Jannie Dunne RN  Safety Promotion/Fall Prevention:   activity supervised   assistive device/personal items within reach   clutter free environment maintained   safety round/check completed  Taken 8/20/2024 2000 by Jannie Dunne RN  Safety Promotion/Fall Prevention: safety round/check completed  Intervention: Prevent Skin Injury  Recent Flowsheet Documentation  Taken 8/21/2024 0202 by Jannie Dunne RN  Body Position: position changed independently  Taken 8/21/2024 0000 by Jannie Dunne RN  Body Position: position changed independently  Taken 8/20/2024 2200 by Jannie Dunne RN  Body Position: position changed independently  Taken 8/20/2024 2000 by Jannie Dunne RN  Body Position: position changed independently  Skin Protection: adhesive use limited  Intervention: Prevent and Manage VTE (Venous Thromboembolism) Risk  Recent Flowsheet Documentation  Taken 8/21/2024 0000 by Jnanie Dunne RN  VTE Prevention/Management:   bilateral   sequential compression devices on  Taken 8/20/2024 2000 by Jannie Dunne RN  VTE Prevention/Management:   bilateral    sequential compression devices on  Range of Motion: active ROM (range of motion) encouraged  Intervention: Prevent Infection  Recent Flowsheet Documentation  Taken 8/20/2024 2000 by Jannie Dunne RN  Infection Prevention: single patient room provided  Goal: Optimal Comfort and Wellbeing  Outcome: Ongoing, Progressing  Intervention: Provide Person-Centered Care  Recent Flowsheet Documentation  Taken 8/20/2024 2000 by Jannie Dunne RN  Trust Relationship/Rapport:   care explained   choices provided  Goal: Readiness for Transition of Care  Outcome: Ongoing, Progressing     Problem: COPD (Chronic Obstructive Pulmonary Disease) Comorbidity  Goal: Maintenance of COPD Symptom Control  Outcome: Ongoing, Progressing  Intervention: Maintain COPD-Symptom Control  Recent Flowsheet Documentation  Taken 8/21/2024 0202 by Jannie Dunne RN  Medication Review/Management: medications reviewed  Taken 8/21/2024 0000 by Jannie Dunne RN  Medication Review/Management: medications reviewed  Taken 8/20/2024 2200 by Jannie Dunne RN  Medication Review/Management: medications reviewed  Taken 8/20/2024 2000 by Jannie Dunne RN  Supportive Measures: active listening utilized  Medication Review/Management: medications reviewed     Problem: Diabetes Comorbidity  Goal: Blood Glucose Level Within Targeted Range  Outcome: Ongoing, Progressing     Problem: Heart Failure Comorbidity  Goal: Maintenance of Heart Failure Symptom Control  Outcome: Ongoing, Progressing  Intervention: Maintain Heart Failure-Management  Recent Flowsheet Documentation  Taken 8/21/2024 0202 by Jannie Dunne RN  Medication Review/Management: medications reviewed  Taken 8/21/2024 0000 by Jannie Dunne RN  Medication Review/Management: medications reviewed  Taken 8/20/2024 2200 by Jannie Dunne RN  Medication Review/Management: medications reviewed  Taken 8/20/2024 2000 by Jannie Dunne RN  Medication Review/Management: medications reviewed     Problem:  Hypertension Comorbidity  Goal: Blood Pressure in Desired Range  Outcome: Ongoing, Progressing  Intervention: Maintain Blood Pressure Management  Recent Flowsheet Documentation  Taken 8/21/2024 0202 by Jannie Dunne RN  Medication Review/Management: medications reviewed  Taken 8/21/2024 0000 by Jannie Dunne RN  Medication Review/Management: medications reviewed  Taken 8/20/2024 2200 by Jannie Dunne RN  Medication Review/Management: medications reviewed  Taken 8/20/2024 2000 by Jannie Dunne RN  Medication Review/Management: medications reviewed     Problem: Osteoarthritis Comorbidity  Goal: Maintenance of Osteoarthritis Symptom Control  Outcome: Ongoing, Progressing  Intervention: Maintain Osteoarthritis Symptom Control  Recent Flowsheet Documentation  Taken 8/21/2024 0202 by Jannie Dunne RN  Medication Review/Management: medications reviewed  Taken 8/21/2024 0000 by Jannie Dunne RN  Medication Review/Management: medications reviewed  Taken 8/20/2024 2200 by Jannie Dunne RN  Medication Review/Management: medications reviewed  Taken 8/20/2024 2000 by Jannie Dunne RN  Medication Review/Management: medications reviewed     Problem: Pain Chronic (Persistent) (Comorbidity Management)  Goal: Acceptable Pain Control and Functional Ability  Outcome: Ongoing, Progressing  Intervention: Manage Persistent Pain  Recent Flowsheet Documentation  Taken 8/21/2024 0202 by Jannie Dunne RN  Medication Review/Management: medications reviewed  Taken 8/21/2024 0000 by Jannie Dunne RN  Medication Review/Management: medications reviewed  Taken 8/20/2024 2200 by Jannie Dunne RN  Medication Review/Management: medications reviewed  Taken 8/20/2024 2000 by Jannie Dunne RN  Sleep/Rest Enhancement: awakenings minimized  Medication Review/Management: medications reviewed  Intervention: Optimize Psychosocial Wellbeing  Recent Flowsheet Documentation  Taken 8/20/2024 2000 by Jannie Dunne RN  Supportive Measures:  active listening utilized  Diversional Activities: television  Family/Support System Care: self-care encouraged   Goal Outcome Evaluation:  Plan of Care Reviewed With: patient

## 2024-08-21 NOTE — INTERVAL H&P NOTE
H&P reviewed.  Since the H&P was done yesterday morning the patient required admission last night which is the third admission in less than 2 weeks for intractable pain in order to expedite getting his surgery done today.

## 2024-08-21 NOTE — DISCHARGE INSTRUCTIONS
[facility]  [address], [address]  [place] [address]  Phone:  [phone]  Fax:  [phone]    Dr. Pedro Mancuso MD FACS FAANS        ** May resume Plavix on Saturday 8/24/2024    Cervical Discectomy Post-Operative Instructions        It will be normal to have a sore throat and have some difficulty swallowing food for a couple of weeks following her surgery.  See your surgeon if this seems to be getting worse rather than better.  A few days of hoarseness is also typical.    You may resume your usual diet as you tolerate    No lifting overhead, although you may place your arms above your head.  DO NOT lift anything over five (5) pounds.  Walking is allowed and encouraged. There are no restrictions on sitting or climbing stairs, but refrain from overly strenuous activity.  You may notice that a constant position (standing or sitting) greater than 45 minutes may tend to make you more sore and therefore it is recommended that you change positions frequently. Do not drive until you are seen back for your first postoperative visit, although you may be a passenger in a car.      If you were given a cervical collar, it was given to you for comfort and if it does not help you do not need to wear it    Refrain from any sexual activity until after your first evaluation at the office.     Remove your dressing the day after your surgery and leave it off.  You may shower and pat the incision dry, but do not soak your wound in water such as a swimming pool, hot tub or lake.   Avoid direct sunlight to the wound for at least three (3) months.  You may apply ice to the surgical site for 15 to 20 minutes each hour while awake for the first few days following surgery.  Simply put the ice in a plastic bag in place a towel between the bag of ice and your skin.    You have Steri-Strips applied to the skin edges of your incision.  They should fall off in 7 to 10 days, however, if they do not fall off by the  10th day you may remove them.    You will leave the hospital with prescriptions for pain medicine and a muscle relaxer.  These medications must be taken as prescribed only.  If a refill of your pain medication is required, in order to have this medication refilled, you must contact the office 3 days (72 hrs) prior to running out, but the amount prescribed is generally enough to last until the first post-operative visit.      A small amount of bleeding from the incision during the first few days is not unusual.       You should have a post-operative visit approximately 2 weeks after surgery.  If you do not have a scheduled appointment, call the office at [phone] to schedule one.  We will discuss return to work at your first post-operative visit, but you can expect to be off of work for an average of 6 weeks.     Notify the office if there are any problems, such as:    Excessive neck or arm pain  Persistent temperature of 101.5° F (38.6 ° C) or greater that is not relieved by Tylenol.  Excessive bleeding, redness, heat, swelling or pus around the incision   If you cannot swallow, have difficulty breathing, have chest pain or shortness of breath, call 161.   Your pain is not controlled with medication  You seem to be getting worse rather than better    Thank you.

## 2024-08-21 NOTE — TELEPHONE ENCOUNTER
08/21/24 I CALLED [employer] APPEAL CASE NUMBER [id number]   IS STILL PENDING REVIEW.  REF NUMBER FOR CALL: [id number]

## 2024-08-21 NOTE — ANESTHESIA PREPROCEDURE EVALUATION
Anesthesia Evaluation     Patient summary reviewed and Nursing notes reviewed   NPO Solid Status: > 8 hours  NPO Liquid Status: > 2 hours           Airway   Mallampati: III  TM distance: <3 FB  Neck ROM: limited  Possible difficult intubation  Comment: Decreased neck extension  Dental    (+) partials    Comment: Upper and lower partials    Pulmonary - normal exam    breath sounds clear to auscultation  (+) a smoker Former, COPD, asthma,sleep apnea on CPAP    ROS comment: Nocturnal hypoxia  Cardiovascular     ECG reviewed  Rhythm: irregular  Rate: normal    (+) hypertension 2 medications or greater, dysrhythmias Atrial Fib, CHF , PVD, hyperlipidemia,  carotid artery disease carotid bilateral    ROS comment: Permanent afib with hx RVR/EF 56% by ECHO 7/24/normal stress test 7/24  PE comment: Afib/normal VR    Neuro/Psych  (+) TIA, numbness, psychiatric history Depression    ROS Comment: Peripheral neuropathy  GI/Hepatic/Renal/Endo    (+) GERD, GI bleeding , renal disease-, diabetes mellitus type 2    Musculoskeletal     (+) back pain, neck pain      ROS comment: Cervical DDD with cervical spinal stenosis  Abdominal  - normal exam   Substance History   (+) alcohol use      Comment: Alcohol dependence, in remission   OB/GYN          Other   arthritis,   history of cancer      Other Comment: Skin CA                Anesthesia Plan    ASA 3     general     (CMAC for intubation)  intravenous induction     Anesthetic plan, risks, benefits, and alternatives have been provided, discussed and informed consent has been obtained with: patient.    CODE STATUS:    Level Of Support Discussed With: Patient  Code Status (Patient has no pulse and is not breathing): CPR (Attempt to Resuscitate)  Medical Interventions (Patient has pulse or is breathing): Full Support

## 2024-08-21 NOTE — ANESTHESIA POSTPROCEDURE EVALUATION
"Patient: Aj Salazar    Procedure Summary       Date: 08/21/24 Room / Location: [address] MAIN OR    Anesthesia Start: 1313 Anesthesia Stop: 1545    Procedure: Anterior cervical partial corpectomy at cervical 5 for the purposes of cervical 4 cervical 5 and cervical 5 cervical 6 discectomy and fusion using allograft cadaver bone and metallic instrumentation (Bilateral: Spine Cervical) Diagnosis:       Cervical cord compression with myelopathy      Cervical stenosis of spine      Neuroforaminal stenosis of cervical spine      (Cervical cord compression with myelopathy [G95.20])      (Cervical stenosis of spine [M48.02])      (Neuroforaminal stenosis of cervical spine [M48.02])    Surgeons: Pedro Mancuso MD Provider: Eb Velasquez MD    Anesthesia Type: general ASA Status: 3            Anesthesia Type: general    Vitals  Vitals Value Taken Time   /87 08/21/24 1615   Temp 37.1 °C (98.7 °F) 08/21/24 1540   Pulse 88 08/21/24 1622   Resp 18 08/21/24 1615   SpO2 97 % 08/21/24 1622   Vitals shown include unfiled device data.        Post Anesthesia Care and Evaluation    Pain management: adequate    Airway patency: patent  Anesthetic complications: No anesthetic complications    Cardiovascular status: acceptable  Respiratory status: acceptable  Hydration status: acceptable    Comments: /87   Pulse 98   Temp 37.1 °C (98.7 °F) (Oral)   Resp 18   Ht 175.3 cm (69.02\")   Wt 75.8 kg (167 lb 1.7 oz)   SpO2 96%   BMI 24.67 kg/m²       "

## 2024-08-22 ENCOUNTER — READMISSION MANAGEMENT (OUTPATIENT)
Dept: CALL CENTER | Facility: HOSPITAL | Age: 79
End: 2024-08-22
Payer: MEDICARE

## 2024-08-22 VITALS
TEMPERATURE: 98.1 F | DIASTOLIC BLOOD PRESSURE: 83 MMHG | BODY MASS INDEX: 24.75 KG/M2 | WEIGHT: 167.11 LBS | HEART RATE: 51 BPM | HEIGHT: 69 IN | OXYGEN SATURATION: 93 % | RESPIRATION RATE: 16 BRPM | SYSTOLIC BLOOD PRESSURE: 142 MMHG

## 2024-08-22 LAB — GLUCOSE BLDC GLUCOMTR-MCNC: 129 MG/DL (ref 70–130)

## 2024-08-22 PROCEDURE — A9270 NON-COVERED ITEM OR SERVICE: HCPCS | Performed by: NEUROLOGICAL SURGERY

## 2024-08-22 PROCEDURE — 63710000001 ACETAMINOPHEN 325 MG TABLET: Performed by: NEUROLOGICAL SURGERY

## 2024-08-22 PROCEDURE — G0378 HOSPITAL OBSERVATION PER HR: HCPCS

## 2024-08-22 PROCEDURE — 63710000001 OXYBUTYNIN XL 5 MG TABLET SUSTAINED-RELEASE 24 HOUR: Performed by: NEUROLOGICAL SURGERY

## 2024-08-22 PROCEDURE — 63710000001 PANTOPRAZOLE 40 MG TABLET DELAYED-RELEASE: Performed by: NEUROLOGICAL SURGERY

## 2024-08-22 PROCEDURE — 63710000001 METOPROLOL SUCCINATE XL 50 MG TABLET SUSTAINED-RELEASE 24 HOUR: Performed by: NEUROLOGICAL SURGERY

## 2024-08-22 PROCEDURE — 63710000001 SPIRONOLACTONE 25 MG TABLET: Performed by: NEUROLOGICAL SURGERY

## 2024-08-22 PROCEDURE — 63710000001 EMPAGLIFLOZIN 10 MG TABLET: Performed by: NEUROLOGICAL SURGERY

## 2024-08-22 PROCEDURE — 63710000001 HYDROCODONE-ACETAMINOPHEN 5-325 MG TABLET: Performed by: NEUROLOGICAL SURGERY

## 2024-08-22 PROCEDURE — 63710000001 METHOCARBAMOL 500 MG TABLET: Performed by: NEUROLOGICAL SURGERY

## 2024-08-22 PROCEDURE — 63710000001 BUMETANIDE 1 MG TABLET: Performed by: NEUROLOGICAL SURGERY

## 2024-08-22 PROCEDURE — 82948 REAGENT STRIP/BLOOD GLUCOSE: CPT

## 2024-08-22 PROCEDURE — 63710000001 MIRABEGRON ER 25 MG TABLET SUSTAINED-RELEASE 24 HOUR: Performed by: NEUROLOGICAL SURGERY

## 2024-08-22 RX ORDER — HYDROCODONE BITARTRATE AND ACETAMINOPHEN 5; 325 MG/1; MG/1
1 TABLET ORAL EVERY 6 HOURS PRN
Qty: 20 TABLET | Refills: 0 | Status: SHIPPED | OUTPATIENT
Start: 2024-08-22

## 2024-08-22 RX ORDER — METHOCARBAMOL 500 MG/1
500 TABLET, FILM COATED ORAL 3 TIMES DAILY PRN
Qty: 60 TABLET | Refills: 2 | Status: SHIPPED | OUTPATIENT
Start: 2024-08-22 | End: 2024-08-30 | Stop reason: SDUPTHER

## 2024-08-22 RX ADMIN — HYDROCODONE BITARTRATE AND ACETAMINOPHEN 1 TABLET: 5; 325 TABLET ORAL at 08:17

## 2024-08-22 RX ADMIN — MIRABEGRON 25 MG: 25 TABLET, FILM COATED, EXTENDED RELEASE ORAL at 08:17

## 2024-08-22 RX ADMIN — PANTOPRAZOLE SODIUM 40 MG: 40 TABLET, DELAYED RELEASE ORAL at 08:17

## 2024-08-22 RX ADMIN — ACETAMINOPHEN 325MG 650 MG: 325 TABLET ORAL at 02:26

## 2024-08-22 RX ADMIN — EMPAGLIFLOZIN 10 MG: 10 TABLET, FILM COATED ORAL at 08:17

## 2024-08-22 RX ADMIN — METOPROLOL SUCCINATE 50 MG: 50 TABLET, FILM COATED, EXTENDED RELEASE ORAL at 08:18

## 2024-08-22 RX ADMIN — HYDROCODONE BITARTRATE AND ACETAMINOPHEN 1 TABLET: 5; 325 TABLET ORAL at 02:26

## 2024-08-22 RX ADMIN — BRIMONIDINE TARTRATE 1 DROP: 2 SOLUTION OPHTHALMIC at 08:18

## 2024-08-22 RX ADMIN — BUMETANIDE 1 MG: 1 TABLET ORAL at 08:17

## 2024-08-22 RX ADMIN — ACETAMINOPHEN 325MG 650 MG: 325 TABLET ORAL at 08:20

## 2024-08-22 RX ADMIN — SPIRONOLACTONE 25 MG: 25 TABLET ORAL at 08:17

## 2024-08-22 RX ADMIN — METHOCARBAMOL TABLETS 500 MG: 500 TABLET, COATED ORAL at 08:17

## 2024-08-22 RX ADMIN — OXYBUTYNIN CHLORIDE 5 MG: 5 TABLET, EXTENDED RELEASE ORAL at 08:18

## 2024-08-22 NOTE — OUTREACH NOTE
Prep Survey      Flowsheet Row Responses   [facility] patient discharged from? [facility]   Is LACE score < 7 ? No   Eligibility [facility]   Date of Admission 08/20/24   Date of Discharge 08/22/24   Discharge Disposition Home or Self Care   Discharge diagnosis Cervical stenosis of spinal canal, Anterior cervical partial corpectomy at cervical 5 for the purposes of cervical 4 cervical 5 and cervical 5 cervical 6 discectomy and fusion using allograft cadaver bone and metallic instrumentation   Does the patient have one of the following disease processes/diagnoses(primary or secondary)? General Surgery   Does the patient have Home health ordered? No   Is there a DME ordered? No   Prep survey completed? Yes            Nicolasa PINEDA - Registered Nurse

## 2024-08-22 NOTE — PLAN OF CARE
Problem: Adult Inpatient Plan of Care  Goal: Plan of Care Review  Outcome: Ongoing, Progressing  Flowsheets (Taken 8/22/2024 0243)  Plan of Care Reviewed With: patient  Goal: Patient-Specific Goal (Individualized)  Outcome: Ongoing, Progressing  Goal: Absence of Hospital-Acquired Illness or Injury  Outcome: Ongoing, Progressing  Intervention: Identify and Manage Fall Risk  Recent Flowsheet Documentation  Taken 8/22/2024 0000 by Jannie Dunne RN  Safety Promotion/Fall Prevention:   activity supervised   assistive device/personal items within reach   clutter free environment maintained   safety round/check completed  Taken 8/21/2024 2200 by Jannie Dunne RN  Safety Promotion/Fall Prevention:   activity supervised   assistive device/personal items within reach   clutter free environment maintained   safety round/check completed  Taken 8/21/2024 2000 by Jannie Dunne RN  Safety Promotion/Fall Prevention: safety round/check completed  Intervention: Prevent Skin Injury  Recent Flowsheet Documentation  Taken 8/22/2024 0000 by Jannie Dunne RN  Body Position: position changed independently  Taken 8/21/2024 2200 by Jannie Dunne RN  Body Position: position changed independently  Taken 8/21/2024 2000 by Jannie Dunne RN  Body Position: position changed independently  Skin Protection: adhesive use limited  Intervention: Prevent and Manage VTE (Venous Thromboembolism) Risk  Recent Flowsheet Documentation  Taken 8/21/2024 2000 by Jannie Dunne RN  VTE Prevention/Management:   bilateral   sequential compression devices on  Range of Motion: active ROM (range of motion) encouraged  Intervention: Prevent Infection  Recent Flowsheet Documentation  Taken 8/21/2024 2000 by Jannie Dunne RN  Infection Prevention: single patient room provided  Goal: Optimal Comfort and Wellbeing  Outcome: Ongoing, Progressing  Intervention: Provide Person-Centered Care  Recent Flowsheet Documentation  Taken 8/21/2024 2000 by Vibha  HATTIE Valderrama  Trust Relationship/Rapport:   choices provided   care explained  Goal: Readiness for Transition of Care  Outcome: Ongoing, Progressing     Problem: COPD (Chronic Obstructive Pulmonary Disease) Comorbidity  Goal: Maintenance of COPD Symptom Control  Outcome: Ongoing, Progressing  Intervention: Maintain COPD-Symptom Control  Recent Flowsheet Documentation  Taken 8/22/2024 0000 by Jannie Dunne RN  Medication Review/Management: medications reviewed  Taken 8/21/2024 2200 by Jannie Dunne RN  Medication Review/Management: medications reviewed  Taken 8/21/2024 2000 by Jannie Dunne RN  Supportive Measures: active listening utilized  Medication Review/Management: medications reviewed     Problem: Diabetes Comorbidity  Goal: Blood Glucose Level Within Targeted Range  Outcome: Ongoing, Progressing     Problem: Heart Failure Comorbidity  Goal: Maintenance of Heart Failure Symptom Control  Outcome: Ongoing, Progressing  Intervention: Maintain Heart Failure-Management  Recent Flowsheet Documentation  Taken 8/22/2024 0000 by Jannie Dunne RN  Medication Review/Management: medications reviewed  Taken 8/21/2024 2200 by Jannie Dunne RN  Medication Review/Management: medications reviewed  Taken 8/21/2024 2000 by Jannie Dunne RN  Medication Review/Management: medications reviewed     Problem: Hypertension Comorbidity  Goal: Blood Pressure in Desired Range  Outcome: Ongoing, Progressing  Intervention: Maintain Blood Pressure Management  Recent Flowsheet Documentation  Taken 8/22/2024 0000 by Jannie Dunne RN  Medication Review/Management: medications reviewed  Taken 8/21/2024 2200 by Jannie Dunne RN  Medication Review/Management: medications reviewed  Taken 8/21/2024 2000 by Jannie Dunne RN  Medication Review/Management: medications reviewed     Problem: Osteoarthritis Comorbidity  Goal: Maintenance of Osteoarthritis Symptom Control  Outcome: Ongoing, Progressing  Intervention: Maintain  Osteoarthritis Symptom Control  Recent Flowsheet Documentation  Taken 8/22/2024 0000 by Jannie Dunne RN  Medication Review/Management: medications reviewed  Taken 8/21/2024 2200 by Jannie Dunne RN  Medication Review/Management: medications reviewed  Taken 8/21/2024 2000 by Jannie Dunne RN  Medication Review/Management: medications reviewed     Problem: Pain Chronic (Persistent) (Comorbidity Management)  Goal: Acceptable Pain Control and Functional Ability  Outcome: Ongoing, Progressing  Intervention: Manage Persistent Pain  Recent Flowsheet Documentation  Taken 8/22/2024 0000 by Jannie Dunne RN  Medication Review/Management: medications reviewed  Taken 8/21/2024 2200 by Jannie Dunne RN  Medication Review/Management: medications reviewed  Taken 8/21/2024 2000 by Jannie Dunne RN  Sleep/Rest Enhancement: awakenings minimized  Medication Review/Management: medications reviewed  Intervention: Optimize Psychosocial Wellbeing  Recent Flowsheet Documentation  Taken 8/21/2024 2000 by Jannie Dunne RN  Supportive Measures: active listening utilized  Diversional Activities: television  Family/Support System Care: self-care encouraged     Problem: Fall Injury Risk  Goal: Absence of Fall and Fall-Related Injury  Outcome: Ongoing, Progressing  Intervention: Identify and Manage Contributors  Recent Flowsheet Documentation  Taken 8/22/2024 0000 by Jannie Dunne RN  Medication Review/Management: medications reviewed  Taken 8/21/2024 2200 by Jannie Dunne RN  Medication Review/Management: medications reviewed  Taken 8/21/2024 2000 by Jannie Dunne RN  Medication Review/Management: medications reviewed  Intervention: Promote Injury-Free Environment  Recent Flowsheet Documentation  Taken 8/22/2024 0000 by Jannie Dunne RN  Safety Promotion/Fall Prevention:   activity supervised   assistive device/personal items within reach   clutter free environment maintained   safety round/check completed  Taken 8/21/2024  2200 by Jannie Dunne, RN  Safety Promotion/Fall Prevention:   activity supervised   assistive device/personal items within reach   clutter free environment maintained   safety round/check completed  Taken 8/21/2024 2000 by Jannie Dunne, RN  Safety Promotion/Fall Prevention: safety round/check completed   Goal Outcome Evaluation:  Plan of Care Reviewed With: patient

## 2024-08-22 NOTE — DISCHARGE SUMMARY
NEUROSURGERY DISCHARGE SUMMARY     Aj Salazar  1945  [id number]    Date of Discharge:  8/22/2024    Discharge Diagnosis:   Cervical stenosis of spinal canal    Neuroforaminal stenosis of cervical spine    Cervical stenosis of spine    Cervical cord compression with myelopathy    Cervical spondylosis with myelopathy      Presenting Problem/History of Present Illness  Active Hospital Problems    Diagnosis  POA    **Cervical stenosis of spinal canal [M48.02]  Yes    Cervical spondylosis with myelopathy [M47.12]  Yes    Cervical cord compression with myelopathy [G95.20]  Yes    Cervical stenosis of spine [M48.02]  Yes    Neuroforaminal stenosis of cervical spine [M48.02]  Yes      Resolved Hospital Problems   No resolved problems to display.        Hospital Course  Patient is a 78 y.o. 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  See the operative report for details dated 8/21/2024.  Postoperatively the patient had immediate relief of the radicular pain into the left shoulder and is very happy on postop day 1 that he is essentially pain-free.  He says he has expected soreness and incisional pain but feels very good following surgery.  He has been able to ambulate to the restroom and passes urine without difficulty.  He describes no swallowing difficulty.  He is breathing comfortably.    **Patient has been informed that he should resume his Plavix medication on Saturday, August 24    Procedures Performed    Procedure(s):  Anterior cervical partial corpectomy at cervical 5 for the purposes of cervical 4 cervical 5 and cervical 5 cervical 6 discectomy and fusion using allograft cadaver bone and metallic instrumentation  -------------------       Consults:   Consults       Date and Time Order Name Status Description    8/15/2024  4:23 AM Inpatient  Neurosurgery Consult Completed     7/28/2024  5:54 PM Inpatient Neurosurgery Consult Completed     7/28/2024  2:29 AM Inpatient Cardiology Consult Completed     7/25/2024 11:51 AM Inpatient Cardiology Consult Completed             Pertinent Test Results: radiology: X-Ray: Intraoperative fluoroscopy documents position of his anterior construct from C4-C6.    Condition on Discharge: Stable    Vital Signs  Temp:  [97.5 °F (36.4 °C)-98.7 °F (37.1 °C)] 98.3 °F (36.8 °C)  Heart Rate:  [] 117  Resp:  [16-20] 18  BP: (112-150)/(63-98) 128/82  Body mass index is 24.67 kg/m².    Physical Exam:     Alert and oriented x 3  Sitting up comfortably in bed  Motor exams normal in the upper and lower extremities including bilateral shoulders  Sensory exams intact in all dermatomes of the upper extremities  Cervical incisions intact with Steri-Strips without any drainage on the overnight dressing.  There is no hematoma and the trachea is in the midline  His voice does not sound hoarse    Discharge Disposition  Home or Self Care    Discharge Medications     Discharge Medications        Continue These Medications        Instructions Start Date   Accu-Chek Guide Me w/Device kit   1 Device, Does not apply, Daily, Use to check FBS once daily . 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 Dm2 with peripheral  neuropathy E11.51      Accu-Chek Softclix Lancets lancets   1 each, Other, Daily, Check FBS once daily . E11.51      Alphagan P 0.1 % solution ophthalmic solution  Generic drug: brimonidine   1 drop, Both Eyes, 2 times daily      atorvastatin 80 MG tablet  Commonly known as: LIPITOR   80 mg, Nightly      bumetanide 1 MG tablet  Commonly known as: BUMEX   bumetanide 1 mg tablet   TAKE 1 TABLET BY MOUTH EVERY DAY      glucose blood test strip   Check FBS once daily .DM2 /E11.51      Accu-Chek Guide test strip  Generic drug: glucose blood   Use to check FBS once daily  . 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      metFORMIN  MG 24 hr tablet  Commonly known as: GLUCOPHAGE-XR   500 mg, Oral, Every Evening      methocarbamol 500 MG tablet  Commonly known as: ROBAXIN   500 mg, Oral, 3 Times Daily PRN      metoprolol succinate  MG 24 hr tablet  Commonly known as: TOPROL-XL   100 mg, Oral, Nightly, Take 100 mg at night and 50 mg in the morning      metoprolol succinate XL 50 MG 24 hr tablet  Commonly known as: TOPROL-XL   50 mg, Oral, Daily, Take 50 mg every morning and 100 mg every night      Myrbetriq 25 MG tablet sustained-release 24 hour 24 hr tablet  Generic drug: Mirabegron ER   25 mg, Oral, Nightly      pantoprazole 40 MG EC tablet  Commonly known as: PROTONIX   TAKE 1 TABLET TWICE DAILY      spironolactone 25 MG tablet  Commonly known as: ALDACTONE   25 mg, Oral, Daily      Tafluprost (PF) 0.0015 % solution ophthalmic solution  Commonly known as: ZIOPTAN   1 drop, Both Eyes, Nightly      tamsulosin 0.4 MG capsule 24 hr capsule  Commonly known as: FLOMAX   2 capsules, Oral, Nightly      Trelegy Ellipta 200-62.5-25 MCG/ACT inhaler  Generic drug: Fluticasone-Umeclidin-Vilant   Inhalation           **Patient has been informed that he should resume his Plavix medication on Saturday, August 24    Discharge Diet:  Regular    Activity at Discharge: See detailed discharge instructions    Follow-up Appointments  Future Appointments   Date Time Provider Department Center   8/26/2024  8:45 AM Neftali Amezcua MD [facility]   9/6/2024  8:45 AM Pedro Mancuso MD [facility]   10/3/2024 10:30 AM [facility] VAS RM 3 BH [facility]   10/3/2024 11:00 AM [facility] VAS RM 3 BH [facility]   10/3/2024 11:30 AM Pasha Garrison MD [facility]   12/19/2024 12:00 PM [facility] CT  2  [facility] CT [facility]   12/27/2024 11:00 AM LAB CHAIR 2 JAYSON QUESADA  [facility]   12/27/2024 11:20 AM Adarsh Rangel MD PhD [facility] [place]         Call for: Patient instructed to call for fever >101.5, chills, wound swelling/drainage/redness, change in neurologic status including but not limited to pain unresponsive to medical management, increased difficulty swallowing or breathing.  New weakness or numbness    Test Results Pending at Discharge  None     ePdro Mancuso MD  08/22/24  07:41 EDT

## 2024-08-23 ENCOUNTER — TRANSITIONAL CARE MANAGEMENT TELEPHONE ENCOUNTER (OUTPATIENT)
Dept: CALL CENTER | Facility: HOSPITAL | Age: 79
End: 2024-08-23
Payer: MEDICARE

## 2024-08-23 ENCOUNTER — TELEPHONE (OUTPATIENT)
Dept: NEUROSURGERY | Facility: CLINIC | Age: 79
End: 2024-08-23
Payer: MEDICARE

## 2024-08-23 NOTE — TELEPHONE ENCOUNTER
08/23/24 I SPOKE WITH  WITH [employer] APPEALS.  HE SAID HE CAN NOT SEE MY FAX SUBMISSIONS. I EXPLAINED  I REFAXED TO THEM THE CHANGE OF SURGERY DATE AND INPT STATUS. HE SAID, HE CAN NOT SEE THAT ON HIS END. HE ENCOURAGED ME TO CALL AUTH/PROVIDER DEPT [phone] TO START A NEW AUTH, TO UPDATED CHANGES.  I WAS TRANSFERRED TO THE PHONE NUMBER HE PROVIDED.  I WAS TOLD BY CALLER THAT THEIR SYSTEM IS DOWN AND TO CALL BACK AFTER 24 HOURS.    SAID, THIS NEW AUTHORIZATION WILL NOT VOID OUT THE OLD.

## 2024-08-23 NOTE — CASE MANAGEMENT/SOCIAL WORK
Case Management Discharge Note      Final Note: home         Selected Continued Care - Discharged on 8/22/2024 Admission date: 8/20/2024 - Discharge disposition: Home or Self Care      Destination    No services have been selected for the patient.                Durable Medical Equipment    No services have been selected for the patient.                Dialysis/Infusion    No services have been selected for the patient.                Home Medical Care    No services have been selected for the patient.                Therapy    No services have been selected for the patient.                Community Resources    No services have been selected for the patient.                Community & DME    No services have been selected for the patient.                         Final Discharge Disposition Code: 01 - home or self-care

## 2024-08-23 NOTE — OUTREACH NOTE
Call Center TCM Note      Flowsheet Row Responses   [facility] patient discharged from? [place]   Does the patient have one of the following disease processes/diagnoses(primary or secondary)? General Surgery   TCM attempt successful? Yes   Call start time 1443   Call end time 1447   Discharge diagnosis Cervical stenosis of spinal canal, Anterior cervical partial corpectomy at cervical 5 for the purposes of cervical 4 cervical 5 and cervical 5 cervical 6 discectomy and fusion using allograft cadaver bone and metallic instrumentation   Person spoke with today (if not patient) and relationship Patient   Medication alerts for this patient Resume plavix on Saturday 8/24   Meds reviewed with patient/caregiver? Yes   Does the patient have all medications related to this admission filled (includes all antibiotics, pain medications, etc.) Yes   Is the patient taking all medications as directed (includes completed medication regime)? Yes   Comments PCP Dr Amezcua. Patient has [employer] [facility] appt in place for 8/26  845am. Message routed to office.   Does the patient have an appointment with their PCP within 7-14 days of discharge? Other  [Patient has other type appt within the timeframe.]   Nursing Interventions Confirmed date/time of appointment, Routed TCM call to PCP office   Has home health visited the patient within 72 hours of discharge? N/A   Psychosocial issues? No   Did the patient receive a copy of their discharge instructions? Yes   Nursing interventions Reviewed instructions with patient   What is the patient's perception of their health status since discharge? Improving   Is the patient /caregiver able to teach back basic post-op care? Keep incision areas clean,dry and protected, Lifting as instructed by MD in discharge instructions, Drive as instructed by MD in discharge instructions   Is the patient/caregiver able to teach back signs and symptoms of incisional infection? Increased redness, swelling  or pain at the incisonal site, Increased drainage or bleeding   Is the patient/caregiver able to teach back steps to recovery at home? Set small, achievable goals for return to baseline health, Rest and rebuild strength, gradually increase activity, Eat a well-balance diet   If the patient is a current smoker, are they able to teach back resources for cessation? Not a smoker   Is the patient/caregiver able to teach back the hierarchy of who to call/visit for symptoms/problems? PCP, Specialist, Home health nurse, Urgent Care, ED, 911 Yes   TCM call completed? Yes   Wrap up additional comments Post op appt with neurosurgery 9/6   Call end time [id number]   Would this patient benefit from a Referral to [facility] Social Work? No   Is the patient interested in additional calls from an ambulatory ? No            Gloria Short RN    8/23/2024, 14:52 EDT

## 2024-08-23 NOTE — TELEPHONE ENCOUNTER
08/23/24 I SPOKE WITH  WITH [employer] [phone]  HE INFORMED THAT AUTHORIZATION WAS OBTAINED FOR HOSP INPT OR OBS CARE FOR EVALUATION.  THIS WAS APPROVED   AUTH NUMBER: [id number]  NO FURTHER INFORMATION IS NEEDED  DATE RANGE: 08/20/24 TO 08/22/24

## 2024-08-26 ENCOUNTER — OFFICE VISIT (OUTPATIENT)
Dept: INTERNAL MEDICINE | Age: 79
End: 2024-08-26
Payer: MEDICARE

## 2024-08-26 VITALS
HEART RATE: 50 BPM | SYSTOLIC BLOOD PRESSURE: 102 MMHG | WEIGHT: 159 LBS | HEIGHT: 69 IN | BODY MASS INDEX: 23.55 KG/M2 | DIASTOLIC BLOOD PRESSURE: 64 MMHG | OXYGEN SATURATION: 99 % | TEMPERATURE: 96.9 F

## 2024-08-26 DIAGNOSIS — G45.9 TIA (TRANSIENT ISCHEMIC ATTACK): Chronic | ICD-10-CM

## 2024-08-26 DIAGNOSIS — K21.9 GASTROESOPHAGEAL REFLUX DISEASE, UNSPECIFIED WHETHER ESOPHAGITIS PRESENT: Chronic | ICD-10-CM

## 2024-08-26 DIAGNOSIS — E11.51 TYPE 2 DIABETES MELLITUS WITH DIABETIC PERIPHERAL ANGIOPATHY WITHOUT GANGRENE, WITHOUT LONG-TERM CURRENT USE OF INSULIN: Chronic | ICD-10-CM

## 2024-08-26 DIAGNOSIS — I10 PRIMARY HYPERTENSION: Chronic | ICD-10-CM

## 2024-08-26 DIAGNOSIS — Z00.00 MEDICARE ANNUAL WELLNESS VISIT, SUBSEQUENT: Primary | ICD-10-CM

## 2024-08-26 DIAGNOSIS — J43.9 PULMONARY EMPHYSEMA, UNSPECIFIED EMPHYSEMA TYPE: Chronic | ICD-10-CM

## 2024-08-26 DIAGNOSIS — I48.91 ATRIAL FIBRILLATION WITH RAPID VENTRICULAR RESPONSE: Chronic | ICD-10-CM

## 2024-08-26 PROBLEM — S81.801A OPEN WOUND OF RIGHT LOWER LEG: Status: RESOLVED | Noted: 2024-04-15 | Resolved: 2024-08-26

## 2024-08-26 PROCEDURE — 3078F DIAST BP <80 MM HG: CPT | Performed by: INTERNAL MEDICINE

## 2024-08-26 PROCEDURE — 1170F FXNL STATUS ASSESSED: CPT | Performed by: INTERNAL MEDICINE

## 2024-08-26 PROCEDURE — G0439 PPPS, SUBSEQ VISIT: HCPCS | Performed by: INTERNAL MEDICINE

## 2024-08-26 PROCEDURE — 96160 PT-FOCUSED HLTH RISK ASSMT: CPT | Performed by: INTERNAL MEDICINE

## 2024-08-26 PROCEDURE — 99214 OFFICE O/P EST MOD 30 MIN: CPT | Performed by: INTERNAL MEDICINE

## 2024-08-26 PROCEDURE — 1126F AMNT PAIN NOTED NONE PRSNT: CPT | Performed by: INTERNAL MEDICINE

## 2024-08-26 PROCEDURE — 3074F SYST BP LT 130 MM HG: CPT | Performed by: INTERNAL MEDICINE

## 2024-08-26 RX ORDER — ATORVASTATIN CALCIUM 80 MG/1
80 TABLET, FILM COATED ORAL NIGHTLY
COMMUNITY
Start: 2024-08-26

## 2024-08-26 NOTE — PROGRESS NOTES
I N T E R N A L  M E D I C I N E    J U N O H  K I M,  M D      ENCOUNTER DATE:  08/26/2024    Aj Salazar / 78 y.o. / male      MEDICARE ANNUAL WELLNESS VISIT       Chief Complaint:    Chief Complaint   Patient presents with    Medicare Wellness-subsequent     12/13/22         Patient's general assessment of his health since a year ago:     - Compared to one year ago, he feels his physical health is:   STABLE/SAME    - Compared to one year ago, he feels his mental health is:  STABLE/SAME    Recent Hospitalization (within past 365 days) (NO unless indicated)  Yes at [facility] in August for anterior cervical discectomy w/ fusion       Patient Care Team:    Patient Care Team:  Neftali Amezcua MD as PCP - General (Internal Medicine)  Tahir Childs MD as Consulting Physician (Pulmonary Disease)  Aura Perry OD (Optometry)  Juan Colby MD as Consulting Physician (Cardiology)  Rebel Escobar MD as Consulting Physician (Urology)  Danie Mcgrath MD as Consulting Physician (Ophthalmology)  John Queen MD as Consulting Physician (Wound Care)  Pedro Mancuso MD as Surgeon (Neurosurgery)  Adarsh Rangel MD PhD as Consulting Physician (Hematology and Oncology)  Pasha Garrison MD as Surgeon (Vascular Surgery)    Allergies:  Bactrim [sulfamethoxazole-trimethoprim] and Sulfacetamide    Medications:  Current Outpatient Medications on File Prior to Visit   Medication Sig Dispense Refill    Accu-Chek Softclix Lancets lancets 1 each by Other route Daily. Check FBS once daily . E11.51 100 each 3    ALPHAGAN P 0.1 % solution ophthalmic solution Administer 1 drop to both eyes 2 (two) times a day.  6    atorvastatin (LIPITOR) 80 MG tablet Take 1 tablet by mouth Every Night.      Blood Glucose Monitoring Suppl (Accu-Chek Guide Me) w/Device kit Use 1 Device Daily. Use to check FBS once daily . Dm2 with peripheral  neuropathy E11.51 1 kit 0    bumetanide (BUMEX)  1 MG tablet bumetanide 1 mg tablet   TAKE 1 TABLET BY MOUTH EVERY DAY      Fluticasone-Umeclidin-Vilant (Trelegy Ellipta) 200-62.5-25 MCG/ACT aerosol powder  Inhale.      glucose blood (Accu-Chek Guide) test strip Use to check FBS once daily . Dm2 with peripheral  neuropathy E11.51 100 each 1    glucose blood test strip Check FBS once daily .DM2 /E11.51 100 each 3    HYDROcodone-acetaminophen (NORCO) 5-325 MG per tablet Take 1 tablet by mouth Every 6 (Six) Hours As Needed for Moderate Pain or Mild Pain. 20 tablet 0    Jardiance 10 MG tablet tablet Take 1 tablet by mouth Daily.      Lancets Misc. (Accu-Chek Softclix Lancet Dev) kit Use to check FBS once daily . Dm2 with peripheral  neuropathy E11.51 1 kit 2    metFORMIN ER (GLUCOPHAGE-XR) 500 MG 24 hr tablet TAKE 1 TABLET EVERY DAY WITH DINNER 90 tablet 1    methocarbamol (ROBAXIN) 500 MG tablet Take 1 tablet by mouth 3 (Three) Times a Day As Needed for Muscle Spasms. 60 tablet 2    metoprolol succinate XL (TOPROL-XL) 100 MG 24 hr tablet Take 1 tablet by mouth Every Night. Take 100 mg at night and 50 mg in the morning 30 tablet 0    metoprolol succinate XL (TOPROL-XL) 50 MG 24 hr tablet Take 1 tablet by mouth Daily. Take 50 mg every morning and 100 mg every night 30 tablet 0    Myrbetriq 25 MG tablet sustained-release 24 hour 24 hr tablet Take 1 tablet by mouth Every Night.      pantoprazole (PROTONIX) 40 MG EC tablet TAKE 1 TABLET TWICE DAILY 180 tablet 1    spironolactone (ALDACTONE) 25 MG tablet Take 1 tablet by mouth Daily. 30 tablet 0    Tafluprost, PF, (ZIOPTAN) 0.0015 % solution ophthalmic solution Administer 1 drop to both eyes Every Night.      tamsulosin (FLOMAX) 0.4 MG capsule 24 hr capsule Take 2 capsules by mouth Every Night. 30 capsule     [DISCONTINUED] atorvastatin (LIPITOR) 80 MG tablet TAKE 1 TABLET EVERY NIGHT 90 tablet 1     No current facility-administered medications on file prior to visit.          Opioid medication/s are on active medication  list.  and I have evaluated his active treatment plan and pain score trends (see table).  There were no vitals filed for this visit.  I have reviewed the chart for potential of high risk medication and harmful drug interactions in the elderly.         Opioid Pain Assessment: Pain Severity / Control: Rare / No need for pain medication  Indication(s): Surgical pain       HPI for other active medical problems:     S/p anterior cervical discectomy with fusion on 8/21/2024 without immediate complications.  He has stopped taking hydrocodone recently and is willing to take Tylenol as needed for pain.  He has had some constipation which is improving.    Diabetes remains stable on metformin  mg.  He is also taking Jardiance 10 mg daily.  Needs labs checked today.    COPD remains stable on Trelegy inhaler and followed by pulmonologist.    Hypertension remains stable on metoprolol  mg nightly and 50 mg in the morning along with spironolactone 25 mg.    He is on atorvastatin 40 mg for hyperlipidemia.    He sees his cardiologist for atrial fibrillation.  Denies chest pain, dyspnea on exertion or increased heart palpitations.  Denies any recurrent neurologic symptoms or changes.  He has history of TIA in the past.      HISTORY     PFSH:     The following portions of the patient's history were reviewed and updated as appropriate: Allergies / Current Medications / Past Medical History / Surgical History / Social History / Family History    Problem List:  Patient Active Problem List   Diagnosis    COPD (chronic obstructive pulmonary disease)    Hypertension    Osteoarthritis    History of smoking greater than 50 pack years    BPH with obstruction/lower urinary tract symptoms    Atrial fibrillation with rapid ventricular response    Type 2 diabetes mellitus with diabetic peripheral angiopathy without gangrene, without long-term current use of insulin    TIA (transient ischemic attack)    A-fib    Acute on chronic systolic  CHF (congestive heart failure)    Gastroesophageal reflux disease    Raynaud's disease without gangrene    Acute pyelonephritis    Chronic diastolic CHF (congestive heart failure)    Alcohol dependence in remission    Personal history of transient ischemic attack (TIA), and cerebral infarction without residual deficits    Lab test positive for detection of COVID-19 virus    Pulmonary nodules/lesions, multiple    Abnormal PET of left lung    Neuroforaminal stenosis of cervical spine    Atherosclerotic peripheral vascular disease with ulceration    Claudication    Bruit of right carotid artery    Bilateral carotid artery stenosis    Back pain    Cervical stenosis of spine    Cervical cord compression with myelopathy    Cervical spondylosis with myelopathy    Left cervical radiculopathy    Neck pain    Intractable pain    Cervical stenosis of spinal canal       Past Medical History:  Past Medical History:   Diagnosis Date    Alcohol abuse, in remission     Asthma copd    BPH (benign prostatic hypertrophy)     see doctors at [facility]    Cancer     SKIN SEVERAL TIMES    Cataract     CHF (congestive heart failure)     COPD (chronic obstructive pulmonary disease)     Depression     Diabetes mellitus     DJD (degenerative joint disease) of cervical spine     ED (erectile dysfunction)     [facility]    GERD (gastroesophageal reflux disease)     Glaucoma     History of prediabetes     Hx of colonic polyps     followed by GI ([facility])    Hyperlipidemia     Hypertension     Influenza B 02/14/2013        Low back pain     Nocturnal hypoxia     Open wound of right lower leg 04/15/2024    *Managed by wound clinic       Osteoarthritis     HIPS, HANDS, MULTIPLE SITES    Osteoarthritis 03/17/2016    Peripheral neuropathy     Permanent atrial fibrillation     Pneumonia     Pneumonia due to infectious organism 04/23/2023    Rectal bleeding 04/26/2017    Sleep apnea     USES CPAP    Tendonitis of shoulder 11/07/2007     RIGHT SHOULDER/DELTOID INSERTION    TIA (transient ischemic attack) 10/2020    Ulcer of the stomach and intestine        Past Surgical History:  Past Surgical History:   Procedure Laterality Date    ANTERIOR CERVICAL DISCECTOMY W/ FUSION Bilateral 2024    Procedure: Anterior cervical partial corpectomy at cervical 5 for the purposes of cervical 4 cervical 5 and cervical 5 cervical 6 discectomy and fusion using allograft cadaver bone and metallic instrumentation;  Surgeon: Pedro Mancuso MD;  Location: [facility];  Service: Neurosurgery;  Laterality: Bilateral;    APPENDECTOMY      CARDIOVASCULAR STRESS TEST N/A 10/20/2015    NML LEXISCAN CARDIOLITE PERFUSION STUDY, NO EVIDENCE OF ISCHEMIA, AREA OF HYPOPERFUSION OF THE INFERIOR WALL W/NORMAL WALL PERFUSION AND NML LEFT VENTRICULAR EJECTION FRACTION, MOST LIKELY ATTENUATION. DR.MICHAEL BOX    CATARACT EXTRACTION Bilateral 2023    COLONOSCOPY N/A 2017    COLONOSCOPY N/A 2011    4 POLYPS REMOVED, RESCOPE IN 5 YRS, DR. EAGLE    COLONOSCOPY N/A 2006    ERYTHEMOUS AND GRANULAR MUCOSA IN ILEOCECAL VALVE, NORMAL COLON, REPEAT IN 5 YRS,     ENDOSCOPY N/A 2018    normal esophagus, acute gastritis, multiple non-bleeding duodenal ulcers with pigmented material, H Pylori positive    HERNIA REPAIR Bilateral     INGUINAL    JOINT REPLACEMENT  2015    left hip    TOTAL HIP ARTHROPLASTY Left 2015    Dr Ankush Sky    TOTAL HIP ARTHROPLASTY Right 10/27/2014    DR.RIED SKY    VASECTOMY         Social History:  Social History     Socioeconomic History    Marital status:      Spouse name: Ara*    Number of children: 3   Tobacco Use    Smoking status: Former     Current packs/day: 0.00     Average packs/day: 2.0 packs/day for 49.0 years (98.0 ttl pk-yrs)     Types: Cigarettes     Start date: 1961     Quit date: 2010     Years since quittin.6     Passive exposure: Past    Smokeless tobacco:  "Never    Tobacco comments:     long smoking history   Vaping Use    Vaping status: Never Used   Substance and Sexual Activity    Alcohol use: No     Comment: 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; 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    Drug use: No     Comment: caffeine use     Sexual activity: Defer       Family History:  Family History   Problem Relation Age of Onset    Cancer Mother         Bladder cancer    Colon cancer Mother     Ovarian cancer Mother     Alzheimer's disease Mother 70    Hyperlipidemia Father     Heart disease Father     Coronary artery disease Father     Hypertension Father     Stroke Sister         October 2016    Dementia Sister     Heart disease Brother     Alcohol abuse Brother     Heart disease Brother     Coronary artery disease Brother     Hypertension Brother     Stroke Brother     Arthritis Brother     Colon cancer Maternal Grandmother     Prostate cancer Neg Hx     Malig Hyperthermia Neg Hx          PATIENT ASSESSMENT     Vitals:  Vitals:    08/26/24 0849   BP: 102/64   Pulse: 50   Temp: 96.9 °F (36.1 °C)   SpO2: 99%   Weight: 72.1 kg (159 lb)   Height: 175.3 cm (69.02\")       BP Readings from Last 3 Encounters:   08/26/24 102/64   08/22/24 142/83   08/19/24 129/65     Wt Readings from Last 3 Encounters:   08/26/24 72.1 kg (159 lb)   08/20/24 75.8 kg (167 lb 1.7 oz)   08/19/24 74.8 kg (165 lb)      Body mass index is 23.47 kg/m².    Blood pressure readings recorded on patient flowsheet:  [employer] Blood Pressure Flowsheet Systolic Diastolic Pulse   4/29/2022   6:00  74 104   4/25/2022   6:00  73 81   4/22/2022   6:00  60 108   4/18/2022   6:00  61 97   4/15/2022   6:00  69 88   4/11/2022   6:00  76 85   4/8/2022   6:00 AM 98 62 87   4/4/2022   6:00  66 93   4/1/2022   6:00  61 91   3/28/2022   6:00  78 78           Review of Systems:    Review of Systems  Constitutional neg except per HPI   Resp neg  CV neg   GI negative    negative   Neuro negative " for change  No significant change in mood    Physical Exam:    Physical Exam  General: No acute distress.   Psych: Normal thought and judgment.   Cardiovascular Rate: normal. Rhythm: regular. Heart sounds: normal.    Pulm/Chest: Effort normal, breath sounds normal.   Neck: surgical wound is intact / clean (steristrips)   Gait: steady     Reviewed Data:    Labs:   Lab Results   Component Value Date     08/15/2024    K 4.1 08/15/2024    CALCIUM 9.0 08/15/2024    AST 18 07/27/2024    ALT 15 07/27/2024    BUN 12 08/15/2024    CREATININE 0.84 08/15/2024    CREATININE 0.87 07/28/2024    CREATININE 0.91 07/27/2024    EGFRIFNONA 87 09/17/2021    EGFRIFAFRI 105 09/17/2021       Lab Results   Component Value Date    HGBA1C 6.50 (H) 04/15/2024    HGBA1C 6.50 (H) 07/17/2023    HGBA1C 6.50 (H) 04/25/2023    MICROALBUR 58.9 07/17/2023       Lab Results   Component Value Date    LDL 51 07/25/2024    LDL 47 04/15/2024    LDL 54 07/17/2023    HDL 51 07/25/2024    TRIG 65 07/25/2024    CHOLHDLRATIO 2.66 04/15/2024       Lab Results   Component Value Date    TSH 1.880 03/08/2021          Lab Results   Component Value Date    WBC 9.59 08/15/2024    HGB 12.4 (L) 08/15/2024    HGB 12.8 (L) 07/28/2024    HGB 13.8 07/27/2024     08/15/2024                   Lab Results   Component Value Date    PSA 7.8 (H) 04/30/2018    PSA 9.650 (H) 03/27/2018       Imaging:                Medical Tests:              Summary of old records / correspondence / consultant report:             Request outside records:           SCREENING ASSESSMENT      Screening for Glaucoma:  Previous screening for glaucoma?: Yes    Hearing Loss Screen:  Finger Rub Hearing Test (right ear): Passed  Finger Rub Hearing Test (left ear): Passed    Urinary Incontinence Screen:  Episodes of urinary incontinence? : NO    Depression Screen:      8/26/2024     9:00 AM   PHQ-2/PHQ-9 Depression Screening   Little Interest or Pleasure in Doing Things 0-->not at all    Feeling Down, Depressed or Hopeless 1-->several days   PHQ-9: Brief Depression Severity Measure Score 1        PHQ-2: 1 (Not depressed)    PHQ-9: 1-4 (Minimal Depression)         FUNCTIONAL, FALL RISK, & COGNITIVE SCREENING (components below):     A) Functional and cognitive status based on patient responses:        8/26/2024     8:55 AM   Functional & Cognitive Status   Do you have difficulty preparing food and eating? No   Do you have difficulty bathing yourself, getting dressed or grooming yourself? No   Do you have difficulty using the toilet? No   Do you have difficulty moving around from place to place? No   Do you have trouble with steps or getting out of a bed or a chair? No   Current Diet Well Balanced Diet   Dental Exam Up to date   Eye Exam Up to date   Exercise (times per week) 7 times per week   Current Exercises Include Walking   Do you need help using the phone?  No   Are you deaf or do you have serious difficulty hearing?  No   Do you need help to go to places out of walking distance? No   Do you need help shopping? No   Do you need help preparing meals?  No   Do you need help with housework?  No   Do you need help with laundry? No   Do you need help taking your medications? No   Do you need help managing money? No   Do you ever drive or ride in a car without wearing a seat belt? No   Have you felt unusual stress, anger or loneliness in the last month? No   Who do you live with? Spouse   If you need help, do you have trouble finding someone available to you? No   Have you been bothered in the last four weeks by sexual problems? No   Do you have difficulty concentrating, remembering or making decisions? Yes       B) Assessment of Fall Risk:    Fall Risk Assessment was completed, and patient is at moderate risk for falls.    Need for further evaluation of gait, strength, and balance? : YES    Timed Up and Go (TUG): 9 seconds   (>= 12 seconds indicates high risk for falling)    Observable  abnormalities included:   slow cautious pace  NORMAL BALANCE    C. Assessment of Cognitive Function:    Mini-Cog Test:     1) Registration (3 objects): YES   2) Clock Draw: Passed? : Yes   3) Number of objects recalled: 1 (MA)     Further evaluation required? :  Cognitive change compared to baseline which may be related to recent surgery and medications (will need close follow-up)    **OVERALL ASSESSMENT OF FUNCTIONAL ABILITY**  (Assessment of ability to perform ADL's (showering/bathing, using toilet, dressing, feeding self, moving self around) and IADL's (use telephone, shop, prepare food, housekeep, do laundry, transport independently, take medications independently, and handle finances)    DEGREE OF FUNCTIONAL IMPAIRMENT:   MODERATE (based on assessment noted above)    ABILITY TO LIVE INDEPENDENTLY:   Capable of living with spouse/partner/family  and Current living arrangement is appropriate      COUNSELING       A. Identification of Health Risk Factors:    Risk factors include: cardiovascular risk factors, cognitive impairment, increased fall risk, and chronic pain      B. Age-Appropriate Screening Schedule:  (Refer to the list below for future screening recommendations based on patient's age, sex and/or medical conditions. Orders for these recommended tests are listed in the plan section. The patient has been provided with a written plan)    Health Maintenance Topics  Health Maintenance   Topic Date Due    ANNUAL WELLNESS VISIT  12/13/2023    URINE MICROALBUMIN  07/17/2024    COVID-19 Vaccine (7 - 2023-24 season) 08/15/2024    INFLUENZA VACCINE  08/01/2024    HEMOGLOBIN A1C  10/15/2024    DIABETIC EYE EXAM  05/24/2025    LIPID PANEL  07/25/2025    LUNG CANCER SCREENING  07/27/2025    TDAP/TD VACCINES (4 - Td or Tdap) 09/18/2033    HEPATITIS C SCREENING  Completed    RSV Vaccine - Adults  Completed    Pneumococcal Vaccine 65+  Completed    ZOSTER VACCINE  Completed    COLORECTAL CANCER SCREENING  Discontinued        Health Maintenance Topics Due or Over-Due  Health Maintenance Due   Topic Date Due    ANNUAL WELLNESS VISIT  12/13/2023    URINE MICROALBUMIN  07/17/2024    COVID-19 Vaccine (7 - 2023-24 season) 08/15/2024    INFLUENZA VACCINE  08/01/2024    HEMOGLOBIN A1C  10/15/2024         C. Advanced Care Planning:    Advance Care Planning   ACP discussion was held with the patient during this visit. Patient has an advance directive in EMR which is still valid.        D. Patient Self-Management and Personalized Health Advice:    He has been provided with PERSONALIZED COUNSELING/INFORMATION (AVS educational information) about:     -- optimizing diet/nutrition plans, reducing risk for cardiac/vascular events with pre-existing disease, fall prevention, evidence of cognitive problems and need for ongoing surveillance for progressive changes, mental health concerns (depression/anxiety), polypharmacy concerns, side effects of medications, and management of chronic pain with focus on minimizing use of narcotic pain medications      He has been recommended for the following PREVENTATIVE SERVICES which has been performed today, will be ordered today or ordered/performed on upcoming follow-up visit:     LIFESTYLE PREVENTATIVE MEASURES  NUTRITION counseling provided, EYE exam for DIABETES recommended annually , CARDIOVASCULAR disease risk reduction counseling performed, FALL RISK assessment / plan of care completed, URINARY incontinence assessment done    CARDIOVASCULAR SCREENING  Has established history of CV disease    CANCER SCREENING  COLORECTAL cancer: Colonoscopy/Cologuard discussed , LUNG CANCER screening discussed and low dose CT recommended annually, SKIN CANCER: recommended regular self exam and use of sunscreen , PROSTATE CANCER: Screening by UROLOGIST    MISC SCREENING  N/A    VACCINATION/IMMUNIZATION  vaccination for INFLUENZA administered/recommended/discussed , COVID-19 vaccination discussed/recommended      E.  Miscellaneous Items: [id number]    Aspirin use counseling:  Not taking clopidogrel since spine surgery     Discussed BMI with him. The BMI is in the acceptable range    Reviewed use of high risk medication in the elderly: YES    Reviewed for potential of harmful drug interactions in the elderly: YES        WRAP UP       Assessment & Plan:    1) MEDICARE ANNUAL WELLNESS VISIT    2) OTHER MEDICAL CONDITIONS ADDRESSED TODAY:            Problem List Items Addressed This Visit          High    Type 2 diabetes mellitus with diabetic peripheral angiopathy without gangrene, without long-term current use of insulin (Chronic)    Overview     **Complications of diabetes: peripheral neuropathy, peripheral arterial disease, and right leg wound.      Continue metformin  mg daily with dinner.          Relevant Medications    Jardiance 10 MG tablet tablet    metFORMIN ER (GLUCOPHAGE-XR) 500 MG 24 hr tablet    Other Relevant Orders    Microalbumin / Creatinine Urine Ratio - Urine, Clean Catch    Hemoglobin A1c       Medium    COPD (chronic obstructive pulmonary disease) (Chronic)    Overview     *Followed by pulmonologist     PFT (5/2018): moderate obstruction with decreased diffusing capacity.          Relevant Medications    Fluticasone-Umeclidin-Vilant (Trelegy Ellipta) 200-62.5-25 MCG/ACT aerosol powder     Hypertension (Chronic)    Relevant Medications    metoprolol succinate XL (TOPROL-XL) 100 MG 24 hr tablet    spironolactone (ALDACTONE) 25 MG tablet    metoprolol succinate XL (TOPROL-XL) 50 MG 24 hr tablet    bumetanide (BUMEX) 1 MG tablet    Atrial fibrillation with rapid ventricular response (Chronic)    Overview     *Imburgia         Relevant Medications    metoprolol succinate XL (TOPROL-XL) 100 MG 24 hr tablet    metoprolol succinate XL (TOPROL-XL) 50 MG 24 hr tablet       Low    TIA (transient ischemic attack) (Chronic)    Overview     10/22/20: TIA with left facial droop and left hand weakness          Relevant  Medications    atorvastatin (LIPITOR) 80 MG tablet    Gastroesophageal reflux disease (Chronic)    Overview     Continue pantoprazole 40 mg twice daily         Relevant Medications    pantoprazole (PROTONIX) 40 MG EC tablet     Other Visit Diagnoses       Medicare annual wellness visit, subsequent    -  Primary                      Orders Placed This Encounter   Procedures    Microalbumin / Creatinine Urine Ratio - Urine, Clean Catch    Hemoglobin A1c       Discussion / Summary:        Medications as of TODAY:              Current Outpatient Medications   Medication Sig Dispense Refill    Accu-Chek Softclix Lancets lancets 1 each by Other route Daily. Check FBS once daily . E11.51 100 each 3    ALPHAGAN P 0.1 % solution ophthalmic solution Administer 1 drop to both eyes 2 (two) times a day.  6    atorvastatin (LIPITOR) 80 MG tablet Take 1 tablet by mouth Every Night.      Blood Glucose Monitoring Suppl (Accu-Chek Guide Me) w/Device kit Use 1 Device Daily. Use to check FBS once daily . Dm2 with peripheral  neuropathy E11.51 1 kit 0    bumetanide (BUMEX) 1 MG tablet bumetanide 1 mg tablet   TAKE 1 TABLET BY MOUTH EVERY DAY      Fluticasone-Umeclidin-Vilant (Trelegy Ellipta) 200-62.5-25 MCG/ACT aerosol powder  Inhale.      glucose blood (Accu-Chek Guide) test strip Use to check FBS once daily . Dm2 with peripheral  neuropathy E11.51 100 each 1    glucose blood test strip Check FBS once daily .DM2 /E11.51 100 each 3    HYDROcodone-acetaminophen (NORCO) 5-325 MG per tablet Take 1 tablet by mouth Every 6 (Six) Hours As Needed for Moderate Pain or Mild Pain. 20 tablet 0    Jardiance 10 MG tablet tablet Take 1 tablet by mouth Daily.      Lancets Misc. (Accu-Chek Softclix Lancet Dev) kit Use to check FBS once daily . Dm2 with peripheral  neuropathy E11.51 1 kit 2    metFORMIN ER (GLUCOPHAGE-XR) 500 MG 24 hr tablet TAKE 1 TABLET EVERY DAY WITH DINNER 90 tablet 1    methocarbamol (ROBAXIN) 500 MG tablet Take 1 tablet by mouth  3 (Three) Times a Day As Needed for Muscle Spasms. 60 tablet 2    metoprolol succinate XL (TOPROL-XL) 100 MG 24 hr tablet Take 1 tablet by mouth Every Night. Take 100 mg at night and 50 mg in the morning 30 tablet 0    metoprolol succinate XL (TOPROL-XL) 50 MG 24 hr tablet Take 1 tablet by mouth Daily. Take 50 mg every morning and 100 mg every night 30 tablet 0    Myrbetriq 25 MG tablet sustained-release 24 hour 24 hr tablet Take 1 tablet by mouth Every Night.      pantoprazole (PROTONIX) 40 MG EC tablet TAKE 1 TABLET TWICE DAILY 180 tablet 1    spironolactone (ALDACTONE) 25 MG tablet Take 1 tablet by mouth Daily. 30 tablet 0    Tafluprost, PF, (ZIOPTAN) 0.0015 % solution ophthalmic solution Administer 1 drop to both eyes Every Night.      tamsulosin (FLOMAX) 0.4 MG capsule 24 hr capsule Take 2 capsules by mouth Every Night. 30 capsule      No current facility-administered medications for this visit.         FOLLOW-UP:            Return in about 4 months (around 12/26/2024) for Reassess chronic medical problems, **SCHEDULE COMB AWV/MED FU FOR NEXT YR (30 MIN)**.              Future Appointments   Date Time Provider Department Center   9/6/2024  8:45 AM Pedro Mancuso MD [facility]   10/3/2024 10:30 AM [facility] OP VAS RM 3 [facility]   10/3/2024 11:00 AM [facility] OP VAS RM 3 [facility]   10/3/2024 11:30 AM Pasha Garrison MD [facility]   12/19/2024 12:00 PM [facility] CT 2 BH [facility]   12/27/2024 11:00 AM LAB CHAIR 2 [facility]  [facility]   12/27/2024 11:20 AM Adarsh Rangel MD PhD [facility]   1/6/2025  9:00 AM Neftali Amezcua MD [facility]   8/28/2025  9:00 AM Neftali Amezcua MD [facility]           After Visit Summary (AVS) including the Personalized Prevention  Plan Services (PPPS) was either printed and given to the patient at check-out today and/or sent to [employer] for review.

## 2024-08-27 ENCOUNTER — TELEPHONE (OUTPATIENT)
Dept: NEUROSURGERY | Facility: CLINIC | Age: 79
End: 2024-08-27
Payer: MEDICARE

## 2024-08-27 LAB
ALBUMIN/CREAT UR: 71 MG/G CREAT (ref 0–29)
CREAT UR-MCNC: 49.2 MG/DL
HBA1C MFR BLD: 6.5 % (ref 4.8–5.6)
MICROALBUMIN UR-MCNC: 34.7 UG/ML

## 2024-08-27 NOTE — PROGRESS NOTES
"Subjective   History of Present Illness: Aj Salazar is a 78 y.o. male is here today for follow-up. Mr. Salazar is two weeks out from having a C5 corpectomy and C4-6 ACDF on 08-21-24.    Today, Mr. Salazar reports typical postop pain between the shoulder blades. He does reports trouble with swallowing but states it is improving.  He still gets a dysesthetic sensation over the left deltoid.  Recall he required readmission last week and an MRI was done to make sure there was no hematoma which showed an adequate postop result.    History of Present Illness    Tobacco Use: Medium Risk (9/6/2024)    Patient History     Smoking Tobacco Use: Former     Smokeless Tobacco Use: Never     Passive Exposure: Past        The following portions of the patient's history were reviewed and updated as appropriate: allergies, current medications, past family history, past medical history, past social history, past surgical history and problem list.    Review of Systems    Objective     Vitals:    09/06/24 0835   BP: 130/88   Weight: 71.7 kg (158 lb)   Height: 175.3 cm (69\")     Body mass index is 23.33 kg/m².      Physical Exam  Neurologic Exam    Physical Exam:    CONSTITUTIONAL:  appears well developed, well-nourished and in no acute distress.    NECK: the neck is supple and symmetric. The trachea is midline with no masses or incisional hematoma.  Decreased range of motion from stiffness in the neck.    PULMONARY: Respiratory effort is normal with no increased work of breathing or signs of respiratory distress.    CARDIOVASCULAR: . Examination of the extremities shows no edema or varicosities.    MUSCULOSKELETAL: Gait normal    SKIN: The skin is warm, dry and intact.  Anterior neck incision healed well without erythema, induration or drainage    NEUROLOGIC:   Normal motor strength noted including the deltoid, tricep, bicep and  strength. Muscle bulk and tone are normal.  Sensory exam is normal to fine touch including the C5 " and C6 dermatomes on the left  Cortical function is intact and without deficits. Speech is normal.    PSYCHIATRIC: oriented to person, place and time. Patient's mood and affect are normal.    Assessment & Plan   Independent Review of Radiographic Studies:      I personally reviewed the images from the following studies.    Cervical radiographs done last week on August 29, 2024 at [facility] document postoperative instrumentation changes from C4-C6 with hardware in expected position and without any hardware complication.  Prevertebral soft tissue thickening.    MRI of the cervical spine done in August 29, 2024 at [facility] reveals decompression of the spinal canal and resolution of the cord compression from C4-C6 as expected postop.  No hardware complication and no hematoma.        Medical Decision Making:      The patient can now lift up to 25 lbs. and may soak the incision in a bath or pool if desired. They will arrange Physical Therapy 2-3 times per week for 3 weeks to initiate a home exercise plan. It is safe for the patient to drive if they are comfortable driving and not consuming narcotic pain medications. Follow up is arranged following PT. All questions were reviewed and answered.    Return in about 4 weeks (around 10/4/2024) for discussion of Physical Therapy results.    Diagnoses and all orders for this visit:    1. S/P neck surgery, follow-up exam (Primary)  -     Ambulatory Referral to Physical Therapy for Evaluation & Treatment  -     XR Spine Cervical Complete 4 or 5 View; Future    2. Cervical cord compression with myelopathy  -     Ambulatory Referral to Physical Therapy for Evaluation & Treatment  -     XR Spine Cervical Complete 4 or 5 View; Future    3. Neck pain  -     Ambulatory Referral to Physical Therapy for Evaluation & Treatment  -     XR Spine Cervical Complete 4 or 5 View; Future    4. Cervical stenosis of spine  -     Ambulatory Referral to Physical Therapy for Evaluation &  Treatment  -     XR Spine Cervical Complete 4 or 5 View; Future  -     HYDROcodone-acetaminophen (NORCO) 5-325 MG per tablet; Take 1 tablet by mouth Every 4 (Four) Hours As Needed for Moderate Pain or Mild Pain.  Dispense: 20 tablet; Refill: 0    Other orders  -     meloxicam (MOBIC) 15 MG tablet; Take 1 tablet by mouth Daily.  Dispense: 31 tablet; Refill: 5             Pedro Mancuso MD FACS [facility]  Neurological Surgery

## 2024-08-27 NOTE — TELEPHONE ENCOUNTER
08/27/24 I CALLED [employer]. I'M LOCKED OUT AND RESET WAS NECESSARY.   RETRO AUTH REQUEST TRACKING NUMBER [id number] SHOWS PENDING CLINICAL REVIEW. PLEASE INCLUDE DOCUMENTATION.  I HAD ALREADY FAXED THE DOCUMENT MANUALLY, SEE MY OTHER NOTES ON THIS ACCOUNT.  I ATTEMPTED TO ATTACH A FILE AND I WAS UNABLE TO ATTACH THE FILE.   I CALLED [employer] BACK WHILE I WAS ON THE LINE THE DOCUMENTS LOADED. IT APPEARS THE  PORTAL WAS SLOW TO DOWNLOAD THE DOCUMENTS.  DOCUMENTS SUCCESSFULLY SUBMITTED.

## 2024-08-28 ENCOUNTER — TELEPHONE (OUTPATIENT)
Dept: NEUROSURGERY | Facility: CLINIC | Age: 79
End: 2024-08-28
Payer: MEDICARE

## 2024-08-28 ENCOUNTER — HOSPITAL ENCOUNTER (OUTPATIENT)
Facility: HOSPITAL | Age: 79
Setting detail: OBSERVATION
Discharge: HOME | End: 2024-08-29
Attending: EMERGENCY MEDICINE | Admitting: EMERGENCY MEDICINE
Payer: MEDICARE

## 2024-08-28 DIAGNOSIS — M54.12 CERVICAL RADICULOPATHY: Primary | ICD-10-CM

## 2024-08-28 DIAGNOSIS — M54.2 NECK PAIN: ICD-10-CM

## 2024-08-28 PROCEDURE — 99285 EMERGENCY DEPT VISIT HI MDM: CPT

## 2024-08-28 PROCEDURE — 96375 TX/PRO/DX INJ NEW DRUG ADDON: CPT

## 2024-08-28 PROCEDURE — 96374 THER/PROPH/DIAG INJ IV PUSH: CPT

## 2024-08-28 NOTE — TELEPHONE ENCOUNTER
He had a C5 corpectomy and C4-6 AcDFon 08-21-24.    He reports that last night or early this morning he started experiencing left shoulder pain. He denies numbness, tingling and weakness. He did stop taking his pain meds Sunday morning. However, he had one at 11 am today.  I advised him to take the muscle relaxer too as he has not been taking that. I told him that sometimes it is normal to have these symptoms as he just had neck surgery. He also reports that he is having a hard time swallowing but he is not choking. I advised him that it is normal as there may be some inflammation from his neck being cut on.     I told him I would send a message to you.

## 2024-08-29 ENCOUNTER — APPOINTMENT (OUTPATIENT)
Dept: MRI IMAGING | Facility: HOSPITAL | Age: 79
End: 2024-08-29
Payer: MEDICARE

## 2024-08-29 ENCOUNTER — READMISSION MANAGEMENT (OUTPATIENT)
Dept: CALL CENTER | Facility: HOSPITAL | Age: 79
End: 2024-08-29
Payer: MEDICARE

## 2024-08-29 ENCOUNTER — APPOINTMENT (OUTPATIENT)
Dept: GENERAL RADIOLOGY | Facility: HOSPITAL | Age: 79
End: 2024-08-29
Payer: MEDICARE

## 2024-08-29 ENCOUNTER — APPOINTMENT (OUTPATIENT)
Dept: CT IMAGING | Facility: HOSPITAL | Age: 79
End: 2024-08-29
Payer: MEDICARE

## 2024-08-29 VITALS
DIASTOLIC BLOOD PRESSURE: 104 MMHG | WEIGHT: 158 LBS | RESPIRATION RATE: 18 BRPM | TEMPERATURE: 97.9 F | SYSTOLIC BLOOD PRESSURE: 123 MMHG | OXYGEN SATURATION: 95 % | HEIGHT: 69 IN | HEART RATE: 117 BPM | BODY MASS INDEX: 23.4 KG/M2

## 2024-08-29 LAB
ANION GAP SERPL CALCULATED.3IONS-SCNC: 12.5 MMOL/L (ref 5–15)
BUN SERPL-MCNC: 14 MG/DL (ref 8–23)
BUN/CREAT SERPL: 18.2 (ref 7–25)
CALCIUM SPEC-SCNC: 8.9 MG/DL (ref 8.6–10.5)
CHLORIDE SERPL-SCNC: 104 MMOL/L (ref 98–107)
CO2 SERPL-SCNC: 23.5 MMOL/L (ref 22–29)
CREAT SERPL-MCNC: 0.77 MG/DL (ref 0.76–1.27)
DEPRECATED RDW RBC AUTO: 48.1 FL (ref 37–54)
EGFRCR SERPLBLD CKD-EPI 2021: 92.2 ML/MIN/1.73
ERYTHROCYTE [DISTWIDTH] IN BLOOD BY AUTOMATED COUNT: 16 % (ref 12.3–15.4)
GEN 5 2HR TROPONIN T REFLEX: 19 NG/L
GLUCOSE SERPL-MCNC: 110 MG/DL (ref 65–99)
HCT VFR BLD AUTO: 41.4 % (ref 37.5–51)
HGB BLD-MCNC: 13 G/DL (ref 13–17.7)
MAGNESIUM SERPL-MCNC: 2.1 MG/DL (ref 1.6–2.4)
MCH RBC QN AUTO: 25.9 PG (ref 26.6–33)
MCHC RBC AUTO-ENTMCNC: 31.4 G/DL (ref 31.5–35.7)
MCV RBC AUTO: 82.6 FL (ref 79–97)
NT-PROBNP SERPL-MCNC: 548 PG/ML (ref 0–1800)
PLATELET # BLD AUTO: 308 10*3/MM3 (ref 140–450)
PMV BLD AUTO: 9 FL (ref 6–12)
POTASSIUM SERPL-SCNC: 3.2 MMOL/L (ref 3.5–5.2)
POTASSIUM SERPL-SCNC: 4.1 MMOL/L (ref 3.5–5.2)
QT INTERVAL: 318 MS
QTC INTERVAL: 452 MS
RBC # BLD AUTO: 5.01 10*6/MM3 (ref 4.14–5.8)
SODIUM SERPL-SCNC: 140 MMOL/L (ref 136–145)
TROPONIN T DELTA: -1 NG/L
TROPONIN T SERPL HS-MCNC: 20 NG/L
WBC NRBC COR # BLD AUTO: 10.56 10*3/MM3 (ref 3.4–10.8)

## 2024-08-29 PROCEDURE — A9577 INJ MULTIHANCE: HCPCS | Performed by: EMERGENCY MEDICINE

## 2024-08-29 PROCEDURE — 25010000002 DIAZEPAM PER 5 MG: Performed by: EMERGENCY MEDICINE

## 2024-08-29 PROCEDURE — 25010000002 ONDANSETRON PER 1 MG: Performed by: EMERGENCY MEDICINE

## 2024-08-29 PROCEDURE — 72040 X-RAY EXAM NECK SPINE 2-3 VW: CPT

## 2024-08-29 PROCEDURE — 96376 TX/PRO/DX INJ SAME DRUG ADON: CPT

## 2024-08-29 PROCEDURE — G0378 HOSPITAL OBSERVATION PER HR: HCPCS

## 2024-08-29 PROCEDURE — 25010000002 MORPHINE PER 10 MG: Performed by: EMERGENCY MEDICINE

## 2024-08-29 PROCEDURE — 83880 ASSAY OF NATRIURETIC PEPTIDE: CPT | Performed by: PHYSICIAN ASSISTANT

## 2024-08-29 PROCEDURE — 85027 COMPLETE CBC AUTOMATED: CPT | Performed by: PHYSICIAN ASSISTANT

## 2024-08-29 PROCEDURE — 71275 CT ANGIOGRAPHY CHEST: CPT

## 2024-08-29 PROCEDURE — 93005 ELECTROCARDIOGRAM TRACING: CPT | Performed by: PHYSICIAN ASSISTANT

## 2024-08-29 PROCEDURE — 25810000003 SODIUM CHLORIDE 0.9 % SOLUTION: Performed by: PHYSICIAN ASSISTANT

## 2024-08-29 PROCEDURE — 84132 ASSAY OF SERUM POTASSIUM: CPT | Performed by: PHYSICIAN ASSISTANT

## 2024-08-29 PROCEDURE — 96375 TX/PRO/DX INJ NEW DRUG ADDON: CPT

## 2024-08-29 PROCEDURE — 0 GADOBENATE DIMEGLUMINE 529 MG/ML SOLUTION: Performed by: EMERGENCY MEDICINE

## 2024-08-29 PROCEDURE — 80048 BASIC METABOLIC PNL TOTAL CA: CPT | Performed by: PHYSICIAN ASSISTANT

## 2024-08-29 PROCEDURE — 25010000002 HYDROMORPHONE 1 MG/ML SOLUTION: Performed by: PHYSICIAN ASSISTANT

## 2024-08-29 PROCEDURE — 84484 ASSAY OF TROPONIN QUANT: CPT | Performed by: PHYSICIAN ASSISTANT

## 2024-08-29 PROCEDURE — 93010 ELECTROCARDIOGRAM REPORT: CPT | Performed by: INTERNAL MEDICINE

## 2024-08-29 PROCEDURE — 72156 MRI NECK SPINE W/O & W/DYE: CPT

## 2024-08-29 PROCEDURE — 83735 ASSAY OF MAGNESIUM: CPT | Performed by: NURSE PRACTITIONER

## 2024-08-29 PROCEDURE — 25510000001 IOPAMIDOL PER 1 ML: Performed by: EMERGENCY MEDICINE

## 2024-08-29 RX ORDER — BUMETANIDE 2 MG/1
1 TABLET ORAL DAILY
Status: DISCONTINUED | OUTPATIENT
Start: 2024-08-29 | End: 2024-08-29 | Stop reason: HOSPADM

## 2024-08-29 RX ORDER — POLYETHYLENE GLYCOL 3350 17 G/17G
17 POWDER, FOR SOLUTION ORAL DAILY PRN
Status: DISCONTINUED | OUTPATIENT
Start: 2024-08-29 | End: 2024-08-29 | Stop reason: HOSPADM

## 2024-08-29 RX ORDER — TAMSULOSIN HYDROCHLORIDE 0.4 MG/1
0.8 CAPSULE ORAL NIGHTLY
Status: DISCONTINUED | OUTPATIENT
Start: 2024-08-29 | End: 2024-08-29 | Stop reason: HOSPADM

## 2024-08-29 RX ORDER — METHOCARBAMOL 750 MG/1
750 TABLET, FILM COATED ORAL EVERY 8 HOURS PRN
Status: DISCONTINUED | OUTPATIENT
Start: 2024-08-29 | End: 2024-08-29 | Stop reason: HOSPADM

## 2024-08-29 RX ORDER — METOPROLOL SUCCINATE 50 MG/1
50 TABLET, EXTENDED RELEASE ORAL DAILY
Status: DISCONTINUED | OUTPATIENT
Start: 2024-08-29 | End: 2024-08-29 | Stop reason: HOSPADM

## 2024-08-29 RX ORDER — SPIRONOLACTONE 25 MG/1
25 TABLET ORAL DAILY
Status: DISCONTINUED | OUTPATIENT
Start: 2024-08-29 | End: 2024-08-29 | Stop reason: HOSPADM

## 2024-08-29 RX ORDER — CLOPIDOGREL BISULFATE 75 MG/1
75 TABLET ORAL
COMMUNITY

## 2024-08-29 RX ORDER — SODIUM CHLORIDE 9 MG/ML
40 INJECTION, SOLUTION INTRAVENOUS AS NEEDED
Status: DISCONTINUED | OUTPATIENT
Start: 2024-08-29 | End: 2024-08-29 | Stop reason: HOSPADM

## 2024-08-29 RX ORDER — AMOXICILLIN 250 MG
2 CAPSULE ORAL 2 TIMES DAILY PRN
Status: DISCONTINUED | OUTPATIENT
Start: 2024-08-29 | End: 2024-08-29 | Stop reason: HOSPADM

## 2024-08-29 RX ORDER — POTASSIUM CHLORIDE 750 MG/1
40 TABLET, FILM COATED, EXTENDED RELEASE ORAL EVERY 4 HOURS
Status: COMPLETED | OUTPATIENT
Start: 2024-08-29 | End: 2024-08-29

## 2024-08-29 RX ORDER — SODIUM CHLORIDE 0.9 % (FLUSH) 0.9 %
10 SYRINGE (ML) INJECTION AS NEEDED
Status: DISCONTINUED | OUTPATIENT
Start: 2024-08-29 | End: 2024-08-29 | Stop reason: HOSPADM

## 2024-08-29 RX ORDER — BUDESONIDE AND FORMOTEROL FUMARATE DIHYDRATE 160; 4.5 UG/1; UG/1
2 AEROSOL RESPIRATORY (INHALATION)
Status: DISCONTINUED | OUTPATIENT
Start: 2024-08-29 | End: 2024-08-29 | Stop reason: HOSPADM

## 2024-08-29 RX ORDER — BISACODYL 5 MG/1
5 TABLET, DELAYED RELEASE ORAL DAILY PRN
Status: DISCONTINUED | OUTPATIENT
Start: 2024-08-29 | End: 2024-08-29 | Stop reason: HOSPADM

## 2024-08-29 RX ORDER — IOPAMIDOL 755 MG/ML
100 INJECTION, SOLUTION INTRAVASCULAR
Status: COMPLETED | OUTPATIENT
Start: 2024-08-29 | End: 2024-08-29

## 2024-08-29 RX ORDER — METHOCARBAMOL 500 MG/1
500 TABLET, FILM COATED ORAL 3 TIMES DAILY PRN
Status: DISCONTINUED | OUTPATIENT
Start: 2024-08-29 | End: 2024-08-29 | Stop reason: HOSPADM

## 2024-08-29 RX ORDER — METOPROLOL SUCCINATE 50 MG/1
100 TABLET, EXTENDED RELEASE ORAL NIGHTLY
Status: DISCONTINUED | OUTPATIENT
Start: 2024-08-29 | End: 2024-08-29 | Stop reason: HOSPADM

## 2024-08-29 RX ORDER — HYDROCODONE BITARTRATE AND ACETAMINOPHEN 7.5; 325 MG/1; MG/1
1 TABLET ORAL EVERY 6 HOURS PRN
Qty: 30 TABLET | Refills: 0 | Status: SHIPPED | OUTPATIENT
Start: 2024-08-29 | End: 2024-08-30 | Stop reason: SDUPTHER

## 2024-08-29 RX ORDER — LIDOCAINE 4 G/G
1 PATCH TOPICAL ONCE
Status: COMPLETED | OUTPATIENT
Start: 2024-08-29 | End: 2024-08-29

## 2024-08-29 RX ORDER — MIRABEGRON 25 MG/1
25 TABLET, FILM COATED, EXTENDED RELEASE ORAL NIGHTLY
COMMUNITY

## 2024-08-29 RX ORDER — ATORVASTATIN CALCIUM 80 MG/1
80 TABLET, FILM COATED ORAL NIGHTLY
Status: DISCONTINUED | OUTPATIENT
Start: 2024-08-29 | End: 2024-08-29 | Stop reason: HOSPADM

## 2024-08-29 RX ORDER — BISACODYL 10 MG
10 SUPPOSITORY, RECTAL RECTAL DAILY PRN
Status: DISCONTINUED | OUTPATIENT
Start: 2024-08-29 | End: 2024-08-29 | Stop reason: HOSPADM

## 2024-08-29 RX ORDER — NITROGLYCERIN 0.4 MG/1
0.4 TABLET SUBLINGUAL
Status: DISCONTINUED | OUTPATIENT
Start: 2024-08-29 | End: 2024-08-29 | Stop reason: HOSPADM

## 2024-08-29 RX ORDER — ONDANSETRON 2 MG/ML
4 INJECTION INTRAMUSCULAR; INTRAVENOUS ONCE
Status: COMPLETED | OUTPATIENT
Start: 2024-08-29 | End: 2024-08-29

## 2024-08-29 RX ORDER — SODIUM CHLORIDE 0.9 % (FLUSH) 0.9 %
10 SYRINGE (ML) INJECTION EVERY 12 HOURS SCHEDULED
Status: DISCONTINUED | OUTPATIENT
Start: 2024-08-29 | End: 2024-08-29 | Stop reason: HOSPADM

## 2024-08-29 RX ORDER — ONDANSETRON 2 MG/ML
4 INJECTION INTRAMUSCULAR; INTRAVENOUS EVERY 6 HOURS PRN
Status: DISCONTINUED | OUTPATIENT
Start: 2024-08-29 | End: 2024-08-29 | Stop reason: HOSPADM

## 2024-08-29 RX ORDER — MORPHINE SULFATE 2 MG/ML
4 INJECTION, SOLUTION INTRAMUSCULAR; INTRAVENOUS ONCE
Status: COMPLETED | OUTPATIENT
Start: 2024-08-29 | End: 2024-08-29

## 2024-08-29 RX ORDER — DIAZEPAM 10 MG/2ML
2.5 INJECTION, SOLUTION INTRAMUSCULAR; INTRAVENOUS ONCE
Status: COMPLETED | OUTPATIENT
Start: 2024-08-29 | End: 2024-08-29

## 2024-08-29 RX ORDER — LIDOCAINE 4 G/G
1 PATCH TOPICAL
Status: DISCONTINUED | OUTPATIENT
Start: 2024-08-29 | End: 2024-08-29 | Stop reason: HOSPADM

## 2024-08-29 RX ORDER — ACETAMINOPHEN 325 MG/1
650 TABLET ORAL EVERY 4 HOURS PRN
Status: DISCONTINUED | OUTPATIENT
Start: 2024-08-29 | End: 2024-08-29 | Stop reason: HOSPADM

## 2024-08-29 RX ADMIN — HYDROMORPHONE HYDROCHLORIDE 1 MG: 1 INJECTION, SOLUTION INTRAMUSCULAR; INTRAVENOUS; SUBCUTANEOUS at 10:38

## 2024-08-29 RX ADMIN — DIAZEPAM 2.5 MG: 5 INJECTION, SOLUTION INTRAMUSCULAR; INTRAVENOUS at 00:29

## 2024-08-29 RX ADMIN — BUMETANIDE 1 MG: 2 TABLET ORAL at 10:28

## 2024-08-29 RX ADMIN — ONDANSETRON 4 MG: 2 INJECTION, SOLUTION INTRAMUSCULAR; INTRAVENOUS at 00:26

## 2024-08-29 RX ADMIN — HYDROMORPHONE HYDROCHLORIDE 1 MG: 1 INJECTION, SOLUTION INTRAMUSCULAR; INTRAVENOUS; SUBCUTANEOUS at 06:09

## 2024-08-29 RX ADMIN — MORPHINE SULFATE 4 MG: 2 INJECTION, SOLUTION INTRAMUSCULAR; INTRAVENOUS at 00:28

## 2024-08-29 RX ADMIN — IOPAMIDOL 95 ML: 755 INJECTION, SOLUTION INTRAVENOUS at 07:19

## 2024-08-29 RX ADMIN — Medication 10 ML: at 02:43

## 2024-08-29 RX ADMIN — Medication 10 ML: at 10:39

## 2024-08-29 RX ADMIN — GADOBENATE DIMEGLUMINE 14 ML: 529 INJECTION, SOLUTION INTRAVENOUS at 09:53

## 2024-08-29 RX ADMIN — POTASSIUM CHLORIDE 40 MEQ: 750 TABLET, EXTENDED RELEASE ORAL at 04:25

## 2024-08-29 RX ADMIN — POTASSIUM CHLORIDE 40 MEQ: 750 TABLET, EXTENDED RELEASE ORAL at 10:30

## 2024-08-29 RX ADMIN — METOPROLOL TARTRATE 5 MG: 1 INJECTION, SOLUTION INTRAVENOUS at 03:07

## 2024-08-29 RX ADMIN — Medication 10 ML: at 10:28

## 2024-08-29 RX ADMIN — SODIUM CHLORIDE 500 ML: 9 INJECTION, SOLUTION INTRAVENOUS at 06:32

## 2024-08-29 RX ADMIN — METOPROLOL SUCCINATE 50 MG: 50 TABLET, FILM COATED, EXTENDED RELEASE ORAL at 10:30

## 2024-08-29 RX ADMIN — HYDROMORPHONE HYDROCHLORIDE 1 MG: 1 INJECTION, SOLUTION INTRAMUSCULAR; INTRAVENOUS; SUBCUTANEOUS at 02:42

## 2024-08-29 RX ADMIN — LIDOCAINE 1 PATCH: 4 PATCH TOPICAL at 00:31

## 2024-08-29 RX ADMIN — SPIRONOLACTONE 25 MG: 25 TABLET, FILM COATED ORAL at 10:30

## 2024-08-29 NOTE — OUTREACH NOTE
Prep Survey      Flowsheet Row Responses   [facility] facility patient discharged from? [facility]   Is LACE score < 7 ? No   Eligibility [facility]   Date of Admission 08/28/24   Date of Discharge 08/29/24   Discharge Disposition Home or Self Care   Discharge diagnosis ANTERIOR CERVICAL DISCECTOMY W/ FUSION   Does the patient have one of the following disease processes/diagnoses(primary or secondary)? General Surgery   Does the patient have Home health ordered? No   Is there a DME ordered? No   Prep survey completed? Yes            RADHA CARROLL - Registered Nurse

## 2024-08-29 NOTE — CONSULTS
NEUROSURGERY CONSULT      Aj Salazar  1945  [id number]    Referring Provider: Cipriano Conway [place] [address]  Reason for Consultation: Neck pain    Patient Care Team:  Neftali Amezcua MD as PCP - General (Internal Medicine)  Tahir Childs MD as Consulting Physician (Pulmonary Disease)  Aura Perry, FERNY (Optometry)  Juan Colby MD as Consulting Physician (Cardiology)  Rebel Escobar MD as Consulting Physician (Urology)  Danie Mcgrath MD as Consulting Physician (Ophthalmology)  John Queen MD as Consulting Physician (Wound Care)  Pedro Mancuso MD as Surgeon (Neurosurgery)  Pasha Garrison MD as Surgeon (Vascular Surgery)    Chief Complaint: Neck pain    Subjective .     History of Present Illness: Postop day 8 anterior partial corpectomy of cervical 5 for the purposes of anterior cervical discectomy fusion at cervical 4 cervical 5 and cervical 5 cervical 6.  Patient was recovering well from the surgery when he developed neck and left shoulder pain over the last 24 hours.  His pain medication was not relieving the pain and he contacted the office for advice.  Combining pain medication muscle relaxer did not alleviate the pain and he presented to the emergency room last night.  He also noted some tingling in the hands.  Since surgery he has had some discomfort with swallowing but he is able to swallow food and does not sound hoarse this morning.  Patient's pain is better overnight in the observation unit.    Review of Systems  Review of Systems   Constitutional:  Negative for activity change and fever.   Musculoskeletal:  Positive for neck pain.   Neurological:  Negative for dizziness, weakness, numbness and headaches.   All other systems reviewed and are negative.      History  Past Medical History:   Diagnosis Date    Alcohol abuse, in remission     Asthma copd    BPH (benign prostatic hypertrophy)     see doctors at [facility]     Cancer     SKIN SEVERAL TIMES    Cataract     CHF (congestive heart failure)     COPD (chronic obstructive pulmonary disease)     Depression     Diabetes mellitus     DJD (degenerative joint disease) of cervical spine     ED (erectile dysfunction)     [facility]    GERD (gastroesophageal reflux disease)     Glaucoma     History of prediabetes     Hx of colonic polyps     followed by GI ([facility])    Hyperlipidemia     Hypertension     Influenza B 02/14/2013        Low back pain     Nocturnal hypoxia     Open wound of right lower leg 04/15/2024    *Managed by wound clinic       Osteoarthritis     HIPS, HANDS, MULTIPLE SITES    Osteoarthritis 03/17/2016    Peripheral neuropathy     Permanent atrial fibrillation     Pneumonia     Pneumonia due to infectious organism 04/23/2023    Rectal bleeding 04/26/2017    Sleep apnea     USES CPAP    Tendonitis of shoulder 11/07/2007    RIGHT SHOULDER/DELTOID INSERTION    TIA (transient ischemic attack) 10/2020    Ulcer of the stomach and intestine    ,   Past Surgical History:   Procedure Laterality Date    ANTERIOR CERVICAL DISCECTOMY W/ FUSION Bilateral 8/21/2024    Procedure: Anterior cervical partial corpectomy at cervical 5 for the purposes of cervical 4 cervical 5 and cervical 5 cervical 6 discectomy and fusion using allograft cadaver bone and metallic instrumentation;  Surgeon: Pedro Mancuso MD;  Location: [facility];  Service: Neurosurgery;  Laterality: Bilateral;    APPENDECTOMY      CARDIOVASCULAR STRESS TEST N/A 10/20/2015    NML LEXISCAN CARDIOLITE PERFUSION STUDY, NO EVIDENCE OF ISCHEMIA, AREA OF HYPOPERFUSION OF THE INFERIOR WALL W/NORMAL WALL PERFUSION AND NML LEFT VENTRICULAR EJECTION FRACTION, MOST LIKELY ATTENUATION. DR.MICHAEL BOX    CATARACT EXTRACTION Bilateral 02/2023    COLONOSCOPY N/A 02/2017    COLONOSCOPY N/A 02/23/2011    4 POLYPS REMOVED, RESCOPE IN 5 YRS, DR. EAGLE    COLONOSCOPY N/A 03/08/2006     ERYTHEMOUS AND GRANULAR MUCOSA IN ILEOCECAL VALVE, NORMAL COLON, REPEAT IN 5 YRS,     ENDOSCOPY N/A 2018    normal esophagus, acute gastritis, multiple non-bleeding duodenal ulcers with pigmented material, H Pylori positive    HERNIA REPAIR Bilateral     INGUINAL    JOINT REPLACEMENT  2015    left hip    TOTAL HIP ARTHROPLASTY Left 2015    Dr Ankush Sky    TOTAL HIP ARTHROPLASTY Right 10/27/2014    DR.RIED SKY    VASECTOMY     ,   Family History   Problem Relation Age of Onset    Cancer Mother         Bladder cancer    Colon cancer Mother     Ovarian cancer Mother     Alzheimer's disease Mother 70    Hyperlipidemia Father     Heart disease Father     Coronary artery disease Father     Hypertension Father     Stroke Sister         2016    Dementia Sister     Heart disease Brother     Alcohol abuse Brother     Heart disease Brother     Coronary artery disease Brother     Hypertension Brother     Stroke Brother     Arthritis Brother     Colon cancer Maternal Grandmother     Prostate cancer Neg Hx     Malig Hyperthermia Neg Hx    ,   Social History     Socioeconomic History    Marital status:      Spouse name: Ara*    Number of children: 3   Tobacco Use    Smoking status: Former     Current packs/day: 0.00     Average packs/day: 2.0 packs/day for 49.0 years (98.0 ttl pk-yrs)     Types: Cigarettes     Start date: 1961     Quit date: 2010     Years since quittin.6     Passive exposure: Past    Smokeless tobacco: Never    Tobacco comments:     long smoking history   Vaping Use    Vaping status: Never Used   Substance and Sexual Activity    Alcohol use: No     Comment: 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; 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    Drug use: No     Comment: caffeine use     Sexual activity: Defer     E-cigarette/Vaping    E-cigarette/Vaping Use Never User     Passive Exposure No     Counseling Given No      E-cigarette/Vaping Substances    Nicotine No     THC No     CBD No      Flavoring No      E-cigarette/Vaping Devices    Disposable No     Pre-filled or Refillable Cartridge No     Refillable Tank No     Pre-filled Pod No          ,   Medications Prior to Admission   Medication Sig Dispense Refill Last Dose    Accu-Chek Softclix Lancets lancets 1 each by Other route Daily. Check FBS once daily . E11.51 100 each 3 8/29/2024    ALPHAGAN P 0.1 % solution ophthalmic solution Administer 1 drop to both eyes 2 (two) times a day.  6 8/29/2024    atorvastatin (LIPITOR) 80 MG tablet Take 1 tablet by mouth Every Night.   8/28/2024    Blood Glucose Monitoring Suppl (Accu-Chek Guide Me) w/Device kit Use 1 Device Daily. Use to check FBS once daily . Dm2 with peripheral  neuropathy E11.51 1 kit 0 8/29/2024    bumetanide (BUMEX) 1 MG tablet bumetanide 1 mg tablet   TAKE 1 TABLET BY MOUTH EVERY DAY   8/28/2024    Fluticasone-Umeclidin-Vilant (Trelegy Ellipta) 200-62.5-25 MCG/ACT aerosol powder  Inhale.   8/28/2024    glucose blood (Accu-Chek Guide) test strip Use to check FBS once daily . Dm2 with peripheral  neuropathy E11.51 100 each 1 8/29/2024    glucose blood test strip Check FBS once daily .DM2 /E11.51 100 each 3 8/29/2024    HYDROcodone-acetaminophen (NORCO) 5-325 MG per tablet Take 1 tablet by mouth Every 6 (Six) Hours As Needed for Moderate Pain or Mild Pain. 20 tablet 0 8/29/2024    Jardiance 10 MG tablet tablet Take 1 tablet by mouth Daily.   8/28/2024    Lancets Misc. (Accu-Chek Softclix Lancet Dev) kit Use to check FBS once daily . Dm2 with peripheral  neuropathy E11.51 1 kit 2 8/29/2024    metFORMIN ER (GLUCOPHAGE-XR) 500 MG 24 hr tablet TAKE 1 TABLET EVERY DAY WITH DINNER 90 tablet 1 8/28/2024    methocarbamol (ROBAXIN) 500 MG tablet Take 1 tablet by mouth 3 (Three) Times a Day As Needed for Muscle Spasms. 60 tablet 2 8/28/2024    metoprolol succinate XL (TOPROL-XL) 100 MG 24 hr tablet Take 1 tablet by mouth Every Night. Take 100 mg at night and 50 mg in the morning 30 tablet 0  8/28/2024    metoprolol succinate XL (TOPROL-XL) 50 MG 24 hr tablet Take 1 tablet by mouth Daily. Take 50 mg every morning and 100 mg every night 30 tablet 0 8/28/2024    Mirabegron ER (MYRBETRIQ) 25 MG tablet sustained-release 24 hour 24 hr tablet Take 1 tablet by mouth Every Night.   8/27/2024    spironolactone (ALDACTONE) 25 MG tablet Take 1 tablet by mouth Daily. 30 tablet 0 8/28/2024    Tafluprost, PF, (ZIOPTAN) 0.0015 % solution ophthalmic solution Administer 1 drop to both eyes Every Night.   8/28/2024    tamsulosin (FLOMAX) 0.4 MG capsule 24 hr capsule Take 2 capsules by mouth Every Night. 30 capsule  8/28/2024   , Scheduled Meds:  atorvastatin, 80 mg, Oral, Nightly  budesonide-formoterol, 2 puff, Inhalation, BID - RT   And  tiotropium bromide monohydrate, 2 puff, Inhalation, Daily - RT  bumetanide, 1 mg, Oral, Daily  Lidocaine, 1 patch, Transdermal, Once  Lidocaine, 1 patch, Transdermal, Q24H  metoprolol succinate XL, 100 mg, Oral, Nightly  metoprolol succinate XL, 50 mg, Oral, Daily  sodium chloride, 10 mL, Intravenous, Q12H  spironolactone, 25 mg, Oral, Daily  tamsulosin, 0.8 mg, Oral, Nightly    , Continuous Infusions:   , PRN Meds:    acetaminophen    senna-docusate sodium **AND** polyethylene glycol **AND** bisacodyl **AND** bisacodyl    Calcium Replacement - Follow Nurse / BPA Driven Protocol    Magnesium Standard Dose Replacement - Follow Nurse / BPA Driven Protocol    melatonin    methocarbamol    methocarbamol    nitroglycerin    ondansetron    Phosphorus Replacement - Follow Nurse / BPA Driven Protocol    Potassium Replacement - Follow Nurse / BPA Driven Protocol    sodium chloride    sodium chloride, and Allergies:  Bactrim [sulfamethoxazole-trimethoprim] and Sulfacetamide    Tobacco Use: Medium Risk (8/29/2024)    Patient History     Smoking Tobacco Use: Former     Smokeless Tobacco Use: Never     Passive Exposure: Past        Objective     Vital Signs   Temp:  [97.9 °F (36.6 °C)-98.8 °F (37.1  °C)] 97.9 °F (36.6 °C)  Heart Rate:  [] 117  Resp:  [16-18] 18  BP: (123-174)/() 123/104  Body mass index is 23.33 kg/m².    Physical Exam    CONSTITUTIONAL: This 78 year old right handed  male appears comfortable with the head of bed elevated 30 degrees his head on a pillow talking with the nurse as I entered the room.    HEAD & FACE: the head and face are symmetric, normocephalic and atraumatic.    EYES: Inspection of the conjunctivae and lids reveals no swelling, erythema or discharge.  Pupils are round, equal and reactive to light and there is no scleral icterus.    EARS, NOSE, MOUTH & THROAT: On inspection, the ears, nose and oral cavity are within normal limits.    NECK: The neck is supple and symmetric. The trachea is midline with no masses.  Steri-Strips are in place and there is no hematoma subcutaneously    PULMONARY: Respiratory effort is normal with no increased work of breathing or signs of respiratory distress.    CARDIOVASCULAR:  Examination of the extremities shows no edema or varicosities.    MUSCULOSKELETAL: Patient does have increased pain with range of motion of the neck,    SKIN: The skin is warm, dry and intact.  Skin incision on the right is clean, dry and intact with Steri-Strips in place and no subcutaneous hematoma    NEUROLOGIC:    Cranial Nerves 2 through 12 grossly intact     Normal motor strength noted patient does not appear to be guarding the left upper extremity like it was preoperatively. Muscle bulk and tone are normal.  Sensory exam is normal to fine touch to confrontational testing bilaterally.  Reflexes on the right side demonstrates 2/4 Triceps Reflex, 3/4 Biceps Reflex, 2/4 Brachioradialis Reflex, 2/4 Knee Jerk Reflex, 2/4 Ankle Jerk Reflex and no ankle clonus on the right.  Overall reflexes appear to be less prominent at the preop  Reflexes on the left side demonstrates 2/4 Triceps Reflex, 3/4 Biceps Reflex, 2/4 Brachioradialis Reflex, 2/4 Knee Jerk  Reflex, 2/4 Ankle Jerk Reflex and no ankle clonus on the left.  Overall reflexes appear to be less prominent than preop  Superficial/Primitive Reflexes: Right positive Elsa sign as per preop, negative on the left, Babinski negative and clonus negative  No coordination deficit observed in the upper extremities.  Cortical function is intact and without deficits. Speech is normal and his voice is not hoarse.    PSYCHIATRIC: Oriented to person, place and time. Patient's mood and affect are normal.      Results Review:   I reviewed the patient's new clinical results.    Images:    Imaging pending    Lab Results (last 24 hours)       Procedure Component Value Units Date/Time    Basic Metabolic Panel [[id number]]  (Abnormal) Collected: 08/29/24 0334    Specimen: Blood Updated: 08/29/24 0411     Glucose 110 mg/dL      BUN 14 mg/dL      Creatinine 0.77 mg/dL      Sodium 140 mmol/L      Potassium 3.2 mmol/L      Chloride 104 mmol/L      CO2 23.5 mmol/L      Calcium 8.9 mg/dL      BUN/Creatinine Ratio 18.2     Anion Gap 12.5 mmol/L      eGFR 92.2 mL/min/1.73     Narrative:      GFR Normal >60  Chronic Kidney Disease <60  Kidney Failure <15    The GFR formula is only valid for adults with stable renal function between ages 18 and 70.    BNP [[id number]]  (Normal) Collected: 08/29/24 0334    Specimen: Blood Updated: 08/29/24 0411     proBNP 548.0 pg/mL     Narrative:      This assay is used as an aid in the diagnosis of individuals suspected of having heart failure. It can be used as an aid in the diagnosis of acute decompensated heart failure (ADHF) in patients presenting with signs and symptoms of ADHF to the emergency department (ED). In addition, NT-proBNP of <300 pg/mL indicates ADHF is not likely.    Age Range Result Interpretation  NT-proBNP Concentration (pg/mL:      <50             Positive            >450                   Gray                 300-450                    Negative             <300    50-75            Positive            >900                  Gray                300-900                  Negative            <300      >75             Positive            >1800                  Gray                300-1800                  Negative            <300    CBC (No Diff) [[id number]]  (Abnormal) Collected: 08/29/24 0334    Specimen: Blood Updated: 08/29/24 0349     WBC 10.56 10*3/mm3      RBC 5.01 10*6/mm3      Hemoglobin 13.0 g/dL      Hematocrit 41.4 %      MCV 82.6 fL      MCH 25.9 pg      MCHC 31.4 g/dL      RDW 16.0 %      RDW-SD 48.1 fl      MPV 9.0 fL      Platelets 308 10*3/mm3     High Sensitivity Troponin T [[id number]]  (Normal) Collected: 08/29/24 0334    Specimen: Blood Updated: 08/29/24 0411     HS Troponin T 20 ng/L     Narrative:      High Sensitive Troponin T Reference Range:  <14.0 ng/L- Negative Female for AMI  <22.0 ng/L- Negative Male for AMI  >=14 - Abnormal Female indicating possible myocardial injury.  >=22 - Abnormal Male indicating possible myocardial injury.   Clinicians would have to utilize clinical acumen, EKG, Troponin, and serial changes to determine if it is an Acute Myocardial Infarction or myocardial injury due to an underlying chronic condition.         High Sensitivity Troponin T 2Hr [[id number]]  (Normal) Collected: 08/29/24 0616    Specimen: Blood Updated: 08/29/24 0653     HS Troponin T 19 ng/L      Troponin T Delta -1 ng/L     Narrative:      High Sensitive Troponin T Reference Range:  <14.0 ng/L- Negative Female for AMI  <22.0 ng/L- Negative Male for AMI  >=14 - Abnormal Female indicating possible myocardial injury.  >=22 - Abnormal Male indicating possible myocardial injury.   Clinicians would have to utilize clinical acumen, EKG, Troponin, and serial changes to determine if it is an Acute Myocardial Infarction or myocardial injury due to an underlying chronic condition.         Potassium [[id number]]  (Normal) Collected: 08/29/24 0616    Specimen: Blood Updated: 08/29/24 0855      Potassium 4.1 mmol/L      Comment: Slight hemolysis detected by analyzer. Result may be falsely elevated.       Magnesium [[id number]]  (Normal) Collected: 08/29/24 0616    Specimen: Blood Updated: 08/29/24 0729     Magnesium 2.1 mg/dL             Assessment & Plan       Neck pain      Patient is admitted with recurrent pain following extensive surgery last week.  He was placed back on Plavix last weekend and certainly in this scenario where after restarting antiplatelet agents following a major surgery we need to rule out any hemorrhagic or structural complication.  His exam is reassuring that neurologically he is at least the same or maybe even a little bit better than he was preop.  The ER provider already ordered an MRI of the cervical spine which we will follow-up on but I will also order x-rays to assess the bony anatomy since he has a fresh instrumented construct in the neck.    I discussed the patient's findings and my recommendations with patient    Pedro Mancuso MD  08/29/24  11:32 EDT    MRI done after my initial evaluation reveals decompression of the cord at C4-C6 without evidence of any hematoma or failed construct.  He still has significant spondylosis and facet arthropathy throughout the cervical spine.  It appears that his pattern of neck pain is likely multifactorial in the postoperative period with fairly significant arthritic change even beyond the surgical levels.  Plain films show good position of the hardware and bone grafts consistent with where they were placed intraoperatively.    I think the patient needs medication adjustment with restarting his pain medication and I will do so at a higher dose (Norco 7.5) in hopes that he will be able to wean off the pain medication again over the next few days.  He can continue the muscle relaxer and if medically appropriate he can take an anti-inflammatory in addition to the pain medication and muscle relaxer.  He should go back to using ice  similar to what he uses using immediately following surgery which may be able to help as well.  I will see him back in the office next week as scheduled.

## 2024-08-29 NOTE — ED PROVIDER NOTES
MD ATTESTATION NOTE    The [facility] and I have discussed this patient's history, physical exam, and treatment plan.  I have reviewed the documentation and personally had a face to face interaction with the patient. I affirm the documentation and agree with the treatment and plan.  The attached note describes my personal findings.        SHARED APC FACE TO FACE: I performed a substantive part of the MDM during the patient's E/M visit. I personally evaluated and examined the patient. I personally made or approved the documented management plan and acknowledge its risk of complications.        Brief HPI: Patient presents for evaluation of neck pain.  Patient just had neck surgery.  Seem to be doing well had pain well-controlled.  Patient states pain started getting worse again tonight.  Patient has some tingling into his arms.  Patient has no difficulty swallowing or neck swelling.    PHYSICAL EXAM  ED Triage Vitals   Temp Heart Rate Resp BP SpO2   08/28/24 2206 08/28/24 2206 08/28/24 2206 08/28/24 2206 08/28/24 2206   97.9 °F (36.6 °C) 100 16 (!) 174/106 98 %      Temp src Heart Rate Source Patient Position BP Location FiO2 (%)   -- 08/28/24 2310 08/28/24 2310 08/28/24 2310 --    Monitor Lying Left arm          GENERAL: no acute distress  HENT: nares patent  EYES: no scleral icterus  CV: regular rhythm, normal rate  RESPIRATORY: normal effort  ABDOMEN: soft  MUSCULOSKELETAL: no deformity  NEURO: alert, moves all extremities, follows commands  PSYCH:  calm, cooperative  SKIN: warm, dry    Vital signs and nursing notes reviewed.    ED Course as of 08/29/24 0840   Thu Aug 29, 2024   0003 I discussed the case with Dr. Conway and he agrees to evaluate the patient at the bedside.    [CC]   0035 Spoke with Issa Torres PA-C with Observation Unit.  Reviewed history, exam, results, treatments.  He agrees admit the patient.      [CC]      ED Course User Index  [CC] Chiara Puga PA-C         Plan: admit       Cipriano Conway,  MD  08/29/24 0840

## 2024-08-29 NOTE — OUTREACH NOTE
General Surgery Week 2 Survey      Flowsheet Row Responses   [facility] patient discharged from? [place]   Does the patient have one of the following disease processes/diagnoses(primary or secondary)? General Surgery   Week 2 attempt successful? No   Unsuccessful attempts Attempt 1   Revoke Readmitted            Theodora H - Registered Nurse

## 2024-08-29 NOTE — ED TRIAGE NOTES
To ER via EMS from home.  Pt had c-spine surgery for spinal stenosis on 8/21.  Tonight pt had sudden onset neck pain, bilat shoulder pain, with tingling in bilat hands.      Pt has taken Norco and Robaxin without relief.

## 2024-08-29 NOTE — DISCHARGE SUMMARY
ED OBSERVATION PROGRESS/DISCHARGE SUMMARY    Date of Admission: 8/28/2024   LOS: 0 days   PCP: Neftali Amezcua MD    Final Diagnosis: Postoperative neck pain    Hospital Outcome:     Mr. Salazar was admitted to the observation unit for further management due to postoperative neck pain.  He is postop day 8 anterior partial corpectomy of cervical 5 for the purposes of anterior cervical discectomy fusion at cervical 4 cervical 5 and cervical 5 cervical 6.  He says he was doing well until yesterday.  MRI and x-rays of his cervical spine which shows expected postoperative changes, no evidence of hemorrhage or structural complication.  Orthopedic surgery was consulted and has evaluated the patient.  He has been cleared to discharge home.  He will follow-up with Dr. Mancuso as scheduled in the office.    ROS:  General: no fevers, chills  Respiratory: no cough, dyspnea  Cardiovascular: no chest pain, palpitations  Abdomen: No abdominal pain, nausea, vomiting, or diarrhea  Neurologic: No focal weakness  MS: Neck pain    Objective   Physical Exam:  I have reviewed the vital signs.  Temp:  [97.9 °F (36.6 °C)-98.8 °F (37.1 °C)] 97.9 °F (36.6 °C)  Heart Rate:  [] 117  Resp:  [16-18] 18  BP: (123-174)/() 123/104  General Appearance:    Alert, cooperative, no distress  Head:    Normocephalic, atraumatic  Eyes:    Sclerae anicteric  Neck:   Supple, no mass  Lungs: Clear to auscultation bilaterally, respirations unlabored  Heart: Regular rate and rhythm, S1 and S2 normal, no murmur, rub or gallop  Abdomen:  Soft, nontender, bowel sounds active, nondistended  Extremities: No clubbing, cyanosis, or edema to lower extremities  Pulses:  2+ and symmetric in distal lower extremities  Skin: No rashes   Neurologic: Oriented x3, Normal strength to extremities    Results Review:    I have reviewed the labs, radiology results and diagnostic studies.    Results from last 7 days   Lab Units 08/29/24  0334   WBC 10*3/mm3 10.56    HEMOGLOBIN g/dL 13.0   HEMATOCRIT % 41.4   PLATELETS 10*3/mm3 308     Results from last 7 days   Lab Units 08/29/24  0616 08/29/24  0334   SODIUM mmol/L  --  140   POTASSIUM mmol/L 4.1 3.2*   CHLORIDE mmol/L  --  104   CO2 mmol/L  --  23.5   BUN mg/dL  --  14   CREATININE mg/dL  --  0.77   CALCIUM mg/dL  --  8.9   GLUCOSE mg/dL  --  110*     Imaging Results (Last 24 Hours)       Procedure Component Value Units Date/Time    XR Spine Cervical 2 or 3 View [[id number]] Collected: 08/29/24 1200     Updated: 08/29/24 1229    Narrative:      XR SPINE CERVICAL 2 OR 3 VW-     DATE OF EXAM: 8/29/2024 11:24 AM     INDICATION: Follow-up anterior cervical surgery. Neck pain.     COMPARISON: MRI 8/29/2024 and 7/28/2024. CT chest 8/29/2024.  Intraoperative fluoroscopic image 8/21/2024.     TECHNIQUE: 3 views of the cervical spine were obtained.     FINDINGS:  C6 through T1 are partially obscured on the lateral and swimmer's views.  C1 and C2 are partially obscured on the AP view. Mild reversal of the  normal cervical lordosis, which could be related to patient positioning.  Postoperative changes from ACDF at C4-C5 and C5-C6 with the hardware in  its expected position and without evidence of hardware complications.  Stable mild likely chronic/degenerative grade 1 anterolisthesis of C3 on  C4. The visualized cervical vertebral bodies otherwise demonstrate  fairly well preserved height and alignment with no evidence of acute  fracture of the cervical spine. Fusion of the left C2-C3 facets.  Moderate to severe hypertrophic degenerative changes at the anterior  median atlantoaxial joint. C2 and C3 disc spaces are fairly  well-maintained. Prevertebral soft tissue thickening. Normal variant  azygos fissure with biapical pleural-parenchymal scarring. Calcified  atherosclerotic disease in each carotid bulb and proximal ICA.       Impression:         1. Mild limited study demonstrating no radiographic evidence of acute  fracture of the  cervical spine. Could correlate with CT if clinically  indicated.  2. Postoperative changes from ACDF from C4-C6 with the hardware in its  expected position and without evidence of hardware complications.  3. Prevertebral soft tissue thickening. Correlate with the recent MRI  results.  4. Stable likely chronic/degenerative grade 1 anterolisthesis of C3 on  C4.  5. Moderate to severe hypertrophic degenerative changes at the anterior  median atlantoaxial joint.     This report was finalized on 8/29/2024 12:25 PM by Russel Oviedo MD on  Workstation: [id number]       CT Angiogram Chest [[id number]] Collected: 08/29/24 0815     Updated: 08/29/24 1218    Addenda:        ADDENDUM: In addition to the above findings, there is a nodular density  in the left upper lobe at the horizontal level of the cyndi that  measures 16 x 8 mm and this was not evident on previous CT approximately  1 month ago on 07/27/2024. This could be inflammatory or infectious in  etiology though is indeterminate and recommend followup with chest CT in  6 to 8 weeks. At that time, the small right upper lobe nodules and a 6  mm left lower lobe noncalcified nodule on axial image 85 could also be  followed.      Findings of this addendum discussed with Veronica Shaw in the observation  unit on 08/29/2024 12:07 p.m.     This report was finalized on 8/29/2024 12:15 PM by Dante Waters M.D  on Workstation: [id number]     Signed: 08/29/24 1215 by Dante Waters MD    Narrative:      CT ANGIOGRAM OF THE CHEST. MULTIPLE CORONAL, SAGITTAL, AND 3-D  RECONSTRUCTIONS.     HISTORY: Neck pain, upper back pain. Recent surgery.     TECHNIQUE: Radiation dose reduction techniques were utilized, including  automated exposure control and exposure modulation based on body size.   CT angiogram of the chest was performed following the administration of  IV contrast. Coronal, sagittal, and 3-D reconstruction images were  obtained.      COMPARISON: CT angiogram chest  07/27/2024.     FINDINGS:  There are no intrapulmonary arterial filling defects to diagnose a  pulmonary embolus. Aortic vascular calcifications are present and there  are extensive coronary arterial calcifications. The heart size is  enlarged.     There is advanced pulmonary emphysema. Apical pleural-parenchymal  scarring is present. There is an azygos fissure. Within the right upper  lobe on axial images 50 there are 2 small, 6 mm nodular opacities which  may represent scarring and appears slightly smaller than on the exam  07/27/2024. There is a calcified right lower lobe pulmonary nodule. No  new pulmonary nodule is evident.     Imaging through the upper abdomen appears within normal limits. There is  stranding within the prevertebral fat and within the fat adjacent to the  thyroid gland related to recent cervical spine surgery.       Impression:      1. No evidence for pulmonary thromboembolic disease.  2. Advanced pulmonary emphysema. 2 subcentimeter nodular opacities right  upper lobe appear slightly smaller than on the exam 07/27/2024 [axial  image 50]. These could be followed on subsequent exam.  3. Atherosclerotic disease with extensive coronary arterial  calcifications. Cardiomegaly.  4. Postsurgical changes in the lower neck with stranding in the fat  adjacent to the thyroid gland and in the prevertebral space.     This report was finalized on 8/29/2024 8:53 AM by Dante Waters M.D  on Workstation: [id number]       MRI Cervical Spine With & Without Contrast [[id number]] Collected: 08/29/24 1158     Updated: 08/29/24 1158    Narrative:      STAT MRI OF THE CERVICAL SPINE WITH AND WITHOUT CONTRAST ON 08/29/2024     CLINICAL HISTORY: This patient is status post cervical spine surgery on  08/21/2024 during which there was an anterior cervical discectomy and  fusion procedure from C4-C6. Patient developed some neck pain yesterday  and has bilateral tingling in the fingertips.     TECHNIQUE: Sagittal T1,  proton density and gradient echo T2 and fast  spin echo T2 and postcontrast sagittal T1 and postcontrast sagittal  fat-suppressed T1-weighted images were obtained of the cervical spine.  In addition axial T1 and gradient echo T2 and fast spin echo T2 and  postcontrast axial T1-weighted images were obtained from the skull base  down to the T1 thoracic level.     This is correlated to preoperative MRI of the cervical spine from  [facility] on 07/28/2024.     FINDINGS: The craniocervical junction is normal in appearance.     At C1-2 there are minimal arthritic changes at the atlantodental  interval. Otherwise the C1-2 level is normal in appearance.     At C2-3 there is mild-moderate left facet overgrowth. Some bony fusion  across the left facet joint. There is mild left foraminal narrowing. The  disc space and right facets and uncovertebral joints are normal and  there is no canal or right foraminal narrowing.     At C3-4 there is moderate to severe left facet overgrowth. The right  facets are normal. There is minimal 1 to 2 mm degenerative  anterolisthesis of C3 on C4 minimal posterior disc bulge. There is  minimal canal narrowing there is some mild uncovertebral joint  hypertrophy contributing to mild right bony foraminal narrowing. Left  facet and uncovertebral joints spurs severely narrow the left neural  foramen could affect the exiting left C4 nerve. This is unchanged.     On 08/21/2024, the patient underwent an anterior cervical discectomy and  fusion procedure from C4-C7. There is placement of an anterior plate  from the anterior mid body of C4 down to the anterior inferior body of  C6 anchored by screws to the C4, C5 and C6 vertebrae. There is a disc  spacer in the C4-5 and C5-6 disc spaces. There is some T2 hyperintensity  compatible fluid signal in the C4-5 disc space and extending along the  posterior body of C5 felt to be postoperative in nature. There is a  lesser degree of fluid in the  posterior CT 5 6 disc space and wrapping  posterior to the C6 vertebrae. There is some rim-enhancing fluid in the  prevertebral region from C4-C6 that is probably rim-enhancing  postoperative fluid and there is exuberant prevertebral soft tissue  swelling and some prevertebral fluid from C2 down to C7 that is also  related to the recent surgery. Superimposed infection is cannot be  excluded but is unlikely. The fluid in the posterior C4-5 disc space and  wrapping posterior to the C5 vertebrae indents the ventral thecal sac  contacts the ventral surface of the cord in combination with the  thickened dura effaces the thecal sac and abuts the ventral surface cord  mild moderately narrowing the canal at C4-5 and C5-6. There is some  mild-moderate bilateral facet overgrowth and some uncovertebral joint  hypertrophy and there is moderate bilateral foraminal narrowing at C4-5.  At C5-6, there is mild bilateral facet overgrowth and prominent  uncovertebral joint spurring into the foramina. There is severe  bilateral bony foraminal narrowing at C5-6. This could affect the  exiting C6 nerve roots bilaterally. 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 There remains some faint T2 high signal in the  cord at this level.     At C6-7 there is disc space narrowing degenerative endplate change left  paracentral spurring mildly narrowing the left side of the canal. There  is uncovertebral joint spurring into the foramina. There is mild to  moderate right and there is prominent uncovertebral joint spurring in  the left foramen with severe left foraminal narrowing that could affect  the exiting left C7 nerve root.     At C7-T1 the posterior disc margin facets are normal with no canal or  foraminal  narrowing.       Impression:      1. Since preoperative MRI 07/28/2024 this patient's undergone anterior  cervical discectomy and fusion procedure from C4-C6 performed on  08/21/2024, 8 days ago. There is an anterior plate extending from the  anterior mid body of C4 to the anterior inferior body is C6 anchored by  screws in the C4, C5 and C6 vertebrae and there is some fluid signal in  the posterior aspect of the C4-5 and C5-6 disc spaces that tracks  posterior to this interbody of C4 and the body of C5 and superior body  of C6 and there is a rind of rim-enhancing fluid anterior to the C4-C6  vertebrae that is all probably postoperative fluid from the recent  surgery just 8 days ago. Superimposed infection cannot be entirely  excluded and of course is needs to be correlated clinically. There is  exuberant edema in the prevertebral soft tissues from C2 to C7 also  likely to the related to the recent postoperative change. There has been  improvement in the canal narrowing at C4-5 and C5-6 but there remains  some combination of the fluid in the enhancing dura indents the thecal  sac and abuts and minimally deforms the ventral surface cord mild  moderately narrowing the canal from C4-C6. At C4-5 there is  mild-moderate bilateral facet overgrowth and some uncovertebral joint  spurring and there is moderate bilateral foraminal narrowing at C4-5  that could affect the exiting C5 nerve roots and at C5-6 there is mild  bilateral facet overgrowth prominent uncovertebral joint spurring into  the foramen that could potentially affect the exiting C6 nerve roots.  Preoperatively there is T2 high signal in the central substance of the  right and left sides of the cord extending from the superior body to C5  cervical level the inferior body of the C6 level likely representing  some myelomalacia from the cervical spondylotic myelopathy. There is  some residual faint T2 high signal in the substance of the cord from  C4-C6. This is  unchanged.  2. At C3-4 there is minimal canal narrowing and there is uncovertebral  joint and facet spurring into the foramina and there is mild right and  there is severe left bony foraminal narrowing that could affect the  exiting left C4 nerve root.  3. At C6-7 there is left paracentral spurring mildly narrowing the left  side of the canal and uncovertebral joint spurring in the foramen and  there is mild-moderate right and there is moderate to severe left bony  foraminal narrowing that could affect the exiting left C7 nerve root.  The results were discussed with Dr. Fontaine from neurosurgery by  telephone on 08/29/2024 at 10:40 a.m.               I have reviewed the medications.  ---------------------------------------------------------------------------------------------  Assessment & Plan   Assessment/Problem List    Neck pain    Plan:    Neck pain  Status post anterior cervical discectomy with fusion on 8/21/2024.  -Neurosurgery consulted and evaluated patient  -MRI and x-ray imaging cervical spine shows expected postoperative changes, no evidence of structural complication or hemorrhage  -CTA chest negative for pulmonary embolism, subcentimeter pulmonary nodules noted  -Hydrocodone as needed  -Follow-up with neurosurgery as scheduled    Pulmonary nodules  -CT chest shows advanced pulmonary emphysema.  2 subcentimeter nodular opacities right upper lobe appears slightly smaller than on exam 7/27/2024.  Follow-up imaging recommended.  -He already sees Dr. Espinoza with oncology, he will continue to follow     Hx of atrial fibrillation  -High sensitive troponin was 20, 19 with delta of -1  -EKG nonischemic  -CTA chest negative for PE     Diabetes mellitus type 2  -A1c 6.5  -Continue home regimen     Hyperlipidemia  -Continue statin     Essential hypertension, chronic, poorly controlled  -Continue home Bumex, metoprolol, Aldactone      History of COPD  -Continue home inhalers     BPH  -Continue home Flomax      GERD  -Continue home PPI     VTE Prophylaxis: SCDs    Disposition: Home    Follow-up after Discharge: Neurosurgery    This note will serve as a discharge summary    Veronica Beverly, APRN 08/29/24 12:37 EDT    I have worn appropriate PPE during this patient encounter, sanitized my hands both with entering and exiting patient's room.    32 minutes has been spent by [facility] providers in the care of this patient while under observation status

## 2024-08-29 NOTE — H&P
[facility]   HISTORY AND PHYSICAL    Patient Name: Aj Salazar  : 1945  MRN: [id number]  Primary Care Physician:  Neftali Amezcua MD  Date of admission: 2024    Subjective   Subjective     Chief Complaint:   Chief Complaint   Patient presents with    Neck Pain         HPI:    Aj Salazar is a 78 y.o. male who comes in complaining of multiple complaints including neck pain.  Patient states that he had neck surgery with , about 1 week ago.  Patient states that he had been feeling fine and having little to no pain until yesterday.  Patient states pain became significantly worse throughout the day and started to have tingling into his fingertips.  Patient states it affected all of his fingertips bilaterally.  Patient states the pain will be at the base of the neck and go down into his upper back.  Patient states that he felt lightheaded earlier today with an episode of pain but denies any lightheaded or dizziness currently.  Patient states his neck pain is severe even after morphine in the ER in which she rates about a 9 out of 10.  Patient denies any shortness of breath or vomiting but does report some nausea.  Patient denies any fever, chills, cough or abdominal pain.  Patient denies any urinary or bowel dysfunction or saddle anesthesia or numbness to lower extremities.      In the ED, no labs or imaging performed.  Patient given Valium and morphine and lidocaine patch as well as Zofran in ED.    Review of Systems   All systems were reviewed and negative except for: as per HPI    Personal History     Past Medical History:   Diagnosis Date    Alcohol abuse, in remission     Asthma copd    BPH (benign prostatic hypertrophy)     see doctors at [facility]    Cancer     SKIN SEVERAL TIMES    Cataract     CHF (congestive heart failure)     COPD (chronic obstructive pulmonary disease)     Depression     Diabetes mellitus     DJD (degenerative joint disease) of cervical spine     ED (erectile  dysfunction)     [facility]    GERD (gastroesophageal reflux disease)     Glaucoma     History of prediabetes     Hx of colonic polyps     followed by GI ([facility])    Hyperlipidemia     Hypertension     Influenza B 02/14/2013        Low back pain     Nocturnal hypoxia     Open wound of right lower leg 04/15/2024    *Managed by wound clinic       Osteoarthritis     HIPS, HANDS, MULTIPLE SITES    Osteoarthritis 03/17/2016    Peripheral neuropathy     Permanent atrial fibrillation     Pneumonia     Pneumonia due to infectious organism 04/23/2023    Rectal bleeding 04/26/2017    Sleep apnea     USES CPAP    Tendonitis of shoulder 11/07/2007    RIGHT SHOULDER/DELTOID INSERTION    TIA (transient ischemic attack) 10/2020    Ulcer of the stomach and intestine        Past Surgical History:   Procedure Laterality Date    ANTERIOR CERVICAL DISCECTOMY W/ FUSION Bilateral 8/21/2024    Procedure: Anterior cervical partial corpectomy at cervical 5 for the purposes of cervical 4 cervical 5 and cervical 5 cervical 6 discectomy and fusion using allograft cadaver bone and metallic instrumentation;  Surgeon: Pedro Mancuso MD;  Location: [facility];  Service: Neurosurgery;  Laterality: Bilateral;    APPENDECTOMY      CARDIOVASCULAR STRESS TEST N/A 10/20/2015    NML LEXISCAN CARDIOLITE PERFUSION STUDY, NO EVIDENCE OF ISCHEMIA, AREA OF HYPOPERFUSION OF THE INFERIOR WALL W/NORMAL WALL PERFUSION AND NML LEFT VENTRICULAR EJECTION FRACTION, MOST LIKELY ATTENUATION. DR.MICHAEL BOX    CATARACT EXTRACTION Bilateral 02/2023    COLONOSCOPY N/A 02/2017    COLONOSCOPY N/A 02/23/2011    4 POLYPS REMOVED, RESCOPE IN 5 YRS, DR. EAGLE    COLONOSCOPY N/A 03/08/2006    ERYTHEMOUS AND GRANULAR MUCOSA IN ILEOCECAL VALVE, NORMAL COLON, REPEAT IN 5 YRS,     ENDOSCOPY N/A 09/26/2018    normal esophagus, acute gastritis, multiple non-bleeding duodenal ulcers with pigmented material, H Pylori positive     HERNIA REPAIR Bilateral     INGUINAL    JOINT REPLACEMENT  11/2015    left hip    TOTAL HIP ARTHROPLASTY Left 11/06/2015    Dr Ankush Sky    TOTAL HIP ARTHROPLASTY Right 10/27/2014    DR.RIED SKY    VASECTOMY         Family History: family history includes Alcohol abuse in his brother; Alzheimer's disease (age of onset: 70) in his mother; Arthritis in his brother; Cancer in his mother; Colon cancer in his maternal grandmother and mother; Coronary artery disease in his brother and father; Dementia in his sister; Heart disease in his brother, brother, and father; Hyperlipidemia in his father; Hypertension in his brother and father; Ovarian cancer in his mother; Stroke in his brother and sister. Otherwise pertinent FHx was reviewed and not pertinent to current issue.    Social History:  reports that he quit smoking about 14 years ago. His smoking use included cigarettes. He started smoking about 63 years ago. He has a 98 pack-year smoking history. He has been exposed to tobacco smoke. He has never used smokeless tobacco. He reports that he does not drink alcohol and does not use drugs.    Home Medications:  Accu-Chek Guide Me, Accu-Chek Softclix Lancet Dev, Accu-Chek Softclix Lancets, Fluticasone-Umeclidin-Vilant, HYDROcodone-acetaminophen, Mirabegron ER, Tafluprost (PF), atorvastatin, brimonidine, bumetanide, empagliflozin, glucose blood, metFORMIN ER, methocarbamol, metoprolol succinate XL, pantoprazole, spironolactone, and tamsulosin    Allergies:  Allergies   Allergen Reactions    Bactrim [Sulfamethoxazole-Trimethoprim] Rash    Sulfacetamide Rash       Objective   Objective     Vitals:   Temp:  [97.9 °F (36.6 °C)-98.2 °F (36.8 °C)] 98.2 °F (36.8 °C)  Heart Rate:  [] 86  Resp:  [16-18] 18  BP: (131-174)/() 161/96  Flow (L/min):  [1] 1  Physical Exam    Constitutional: Awake, alert   Eyes: PERRLA, sclerae anicteric, no conjunctival injection   HENT: NCAT, mucous membranes moist   Neck: Supple, no  thyromegaly, no lymphadenopathy, trachea midline   Respiratory: Clear to auscultation bilaterally, nonlabored respirations    Cardiovascular: RRR, no murmurs, rubs, or gallops, palpable pedal pulses bilaterally   Gastrointestinal: Positive bowel sounds, soft, nontender, nondistended   Musculoskeletal: neck pain, otherwise, No bilateral ankle edema, no clubbing or cyanosis to extremities   Psychiatric: Appropriate affect, cooperative   Neurologic: Oriented x 3, strength symmetric in all extremities, Cranial Nerves grossly intact to confrontation, speech clear   Skin: No rashes     Result Review    Result Review:  I have personally reviewed the results from the time of this admission to 8/29/2024 01:37 EDT and agree with these findings:  []  Laboratory list / accordion  []  Microbiology  []  Radiology  []  EKG/Telemetry   []  Cardiology/Vascular   []  Pathology  [x]  Old records  []  Other:        Assessment & Plan   Assessment / Plan     Brief Patient Summary:  Aj Salazar is a 78 y.o. male who comes in complaining of neck pain    Active Hospital Problems:  Active Hospital Problems    Diagnosis     **Neck pain      Plan:     Neck pain  Status post anterior cervical discectomy with fusion on 8/21/2024.  -No labs or imaging performed in ED.    -Patient given Valium and morphine and lidocaine patch as well as Zofran in ED.  -Neurosurgery consult  -MRI cervical spine with and without contrast  -CTA chest ordered to rule out PE  -Check CBC and BMP  -Pain control, lidocaine patch, muscle relaxer  -N.p.o.    Hx of afib  -tachycardic in -120s  -Had just been restarted on Pradaxa about 2 days ago per patient  -hold pradaxa until input from neurosurgery  -check EKG, troponin      Diabetes mellitus type 2  -SSI, Accu-Cheks 3 times daily AC  -Check A1c  -Hold home metformin, Jardiance    Hyperlipidemia  -Continue statin    Essential hypertension, chronic, poorly controlled  -Continue home Bumex, metoprolol, Aldactone      History of COPD  -Continue home inhalers    BPH  -Continue home Flomax    GERD  -Continue home PPI        VTE Prophylaxis: SCDs  Mechanical VTE prophylaxis orders are present.        CODE STATUS:    Code Status (Patient has no pulse and is not breathing): CPR (Attempt to Resuscitate)  Medical Interventions (Patient has pulse or is breathing): Full Support    Admission Status:  I believe this patient meets observation status.    78 minutes have been spent by [facility] providers in the care of this patient while under observation status.      Appropriate PPE worn during patient encounter.  Hand hygeine performed before and after seeing the patient.      Electronically signed by NARDA Moon, 08/29/24, 12:37 AM EDT.

## 2024-08-29 NOTE — ED PROVIDER NOTES
EMERGENCY DEPARTMENT ENCOUNTER  Room Number:  122/1  PCP: Neftali Amezcua MD  Independent Historians: Patient      HPI:  Chief Complaint: had concerns including Neck Pain.     A complete HPI/ROS/PMH/PSH/SH/FH are unobtainable due to: None    Chronic or social conditions impacting patient care (Social Determinants of Health): None      Context: Aj Salazar is a 78 y.o. male with a medical history of hypertension, A-fib, COPD, CHF, and TIA presents emergency department complaining of neck pain that worsened tonight.  Patient had an anterior cervical partial corpectomy at C4-C5, C5-C6, and C6-C7 and a fusion using allograft cadaver bone metallic instrumentation on 8/21/2024 with Dr. Mancuso and says his pain has been well-controlled since surgery until this morning.  He reports that he has only been taking ibuprofen and Tylenol but today needed oxycodone.  He says it has not helped.  He reports the pain is in the posterior aspect of his neck and across the shoulders.  He had surgery via anterior approach.  He reports that he has had difficulty swallowing since surgery as his throat has been very sore but denies that this has worsened.  He denies any chest pain or shortness of breath.  He denies numbness, tingling, or weakness in the extremities.      Review of prior external notes (non-ED) -and- Review of prior external test results outside of this encounter:   Neurosurgeon: Dr. Mancuso    08/21/2024 - Anterior cervical partial corpectomy at cervical 5 for the purposes of cervical 4 cervical 5 and cervical 5 cervical 6 discectomy and fusion using allograft cadaver bone and metallic instrumentation     I reviewed labs from 8/26/2024, hemoglobin A1c 6.5    PAST MEDICAL HISTORY  Active Ambulatory Problems     Diagnosis Date Noted    COPD (chronic obstructive pulmonary disease) 03/17/2016    Hypertension 03/17/2016    Osteoarthritis 03/17/2016    History of smoking greater than 50 pack years 06/23/2017    BPH with  obstruction/lower urinary tract symptoms 03/27/2018    Atrial fibrillation with rapid ventricular response     Type 2 diabetes mellitus with diabetic peripheral angiopathy without gangrene, without long-term current use of insulin 01/31/2019    TIA (transient ischemic attack) 10/22/2020    A-fib 03/07/2021    Acute on chronic systolic CHF (congestive heart failure) 03/08/2021    Gastroesophageal reflux disease 03/15/2021    Raynaud's disease without gangrene 12/07/2021    Acute pyelonephritis 05/06/2022    Chronic diastolic CHF (congestive heart failure) 05/06/2022    Alcohol dependence in remission 05/06/2022    Personal history of transient ischemic attack (TIA), and cerebral infarction without residual deficits 05/06/2022    Lab test positive for detection of COVID-19 virus 05/26/2022    Pulmonary nodules/lesions, multiple 01/30/2023    Abnormal PET of left lung 03/04/2023    Neuroforaminal stenosis of cervical spine 07/17/2023    Atherosclerotic peripheral vascular disease with ulceration 05/23/2024    Claudication 05/23/2024    Bruit of right carotid artery 05/23/2024    Bilateral carotid artery stenosis 06/20/2024    Back pain 07/28/2024    Cervical stenosis of spine 07/29/2024    Cervical cord compression with myelopathy 07/29/2024    Cervical spondylosis with myelopathy 08/13/2024    Left cervical radiculopathy 08/15/2024    Neck pain 08/15/2024    Intractable pain 08/20/2024    Cervical stenosis of spinal canal 08/20/2024     Resolved Ambulatory Problems     Diagnosis Date Noted    Dehydration 09/25/2018    Hypotension 09/25/2018    Melena 09/25/2018    EMPERATRIZ (acute kidney injury) 09/25/2018    Hyponatremia 09/25/2018    Lactic acidosis 09/25/2018    Rash and nonspecific skin eruption 09/02/2020    Facial droop 10/23/2020    Acute respiratory failure with hypoxia 03/08/2021    Sepsis 03/09/2021    Sepsis 05/05/2022    Scabies 10/24/2022    Pneumonia due to infectious organism 04/23/2023    Open wound of right  lower leg 04/15/2024    Chest pain 07/25/2024     Past Medical History:   Diagnosis Date    Alcohol abuse, in remission     Asthma copd    BPH (benign prostatic hypertrophy)     Cancer     Cataract     CHF (congestive heart failure)     Depression     Diabetes mellitus     DJD (degenerative joint disease) of cervical spine     ED (erectile dysfunction)     GERD (gastroesophageal reflux disease)     Glaucoma     History of prediabetes     Hx of colonic polyps     Hyperlipidemia     Influenza B 02/14/2013    Low back pain     Nocturnal hypoxia     Peripheral neuropathy     Permanent atrial fibrillation     Pneumonia     Rectal bleeding 04/26/2017    Sleep apnea     Tendonitis of shoulder 11/07/2007    Ulcer of the stomach and intestine          PAST SURGICAL HISTORY  Past Surgical History:   Procedure Laterality Date    ANTERIOR CERVICAL DISCECTOMY W/ FUSION Bilateral 8/21/2024    Procedure: Anterior cervical partial corpectomy at cervical 5 for the purposes of cervical 4 cervical 5 and cervical 5 cervical 6 discectomy and fusion using allograft cadaver bone and metallic instrumentation;  Surgeon: Perdo Mancuso MD;  Location: [facility];  Service: Neurosurgery;  Laterality: Bilateral;    APPENDECTOMY      CARDIOVASCULAR STRESS TEST N/A 10/20/2015    NML LEXISCAN CARDIOLITE PERFUSION STUDY, NO EVIDENCE OF ISCHEMIA, AREA OF HYPOPERFUSION OF THE INFERIOR WALL W/NORMAL WALL PERFUSION AND NML LEFT VENTRICULAR EJECTION FRACTION, MOST LIKELY ATTENUATION. DR.MICHAEL BOX    CATARACT EXTRACTION Bilateral 02/2023    COLONOSCOPY N/A 02/2017    COLONOSCOPY N/A 02/23/2011    4 POLYPS REMOVED, RESCOPE IN 5 YRS, DR. EAGLE    COLONOSCOPY N/A 03/08/2006    ERYTHEMOUS AND GRANULAR MUCOSA IN ILEOCECAL VALVE, NORMAL COLON, REPEAT IN 5 YRS,     ENDOSCOPY N/A 09/26/2018    normal esophagus, acute gastritis, multiple non-bleeding duodenal ulcers with pigmented material, H Pylori positive    HERNIA REPAIR  Bilateral     INGUINAL    JOINT REPLACEMENT  2015    left hip    TOTAL HIP ARTHROPLASTY Left 2015    Dr Ankush Sky    TOTAL HIP ARTHROPLASTY Right 10/27/2014    DR.RIED SKY    VASECTOMY           FAMILY HISTORY  Family History   Problem Relation Age of Onset    Cancer Mother         Bladder cancer    Colon cancer Mother     Ovarian cancer Mother     Alzheimer's disease Mother 70    Hyperlipidemia Father     Heart disease Father     Coronary artery disease Father     Hypertension Father     Stroke Sister         2016    Dementia Sister     Heart disease Brother     Alcohol abuse Brother     Heart disease Brother     Coronary artery disease Brother     Hypertension Brother     Stroke Brother     Arthritis Brother     Colon cancer Maternal Grandmother     Prostate cancer Neg Hx     Malig Hyperthermia Neg Hx          SOCIAL HISTORY  Social History     Socioeconomic History    Marital status:      Spouse name: Ara*    Number of children: 3   Tobacco Use    Smoking status: Former     Current packs/day: 0.00     Average packs/day: 2.0 packs/day for 49.0 years (98.0 ttl pk-yrs)     Types: Cigarettes     Start date: 1961     Quit date: 2010     Years since quittin.6     Passive exposure: Past    Smokeless tobacco: Never    Tobacco comments:     long smoking history   Vaping Use    Vaping status: Never Used   Substance and Sexual Activity    Alcohol use: No     Comment: 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; 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    Drug use: No     Comment: caffeine use     Sexual activity: Defer         ALLERGIES  Bactrim [sulfamethoxazole-trimethoprim] and Sulfacetamide      REVIEW OF SYSTEMS  Included in HPI  All systems reviewed and negative except for those discussed in HPI.      PHYSICAL EXAM    I have reviewed the triage vital signs and nursing notes.    ED Triage Vitals   Temp Heart Rate Resp BP SpO2   24 2206 24 2206 24 2206 24 2206 24 220   97.9 °F  (36.6 °C) 100 16 (!) 174/106 98 %      Temp src Heart Rate Source Patient Position BP Location FiO2 (%)   -- 08/28/24 2310 08/28/24 2310 08/28/24 2310 --    Monitor Lying Left arm        Physical Exam  Constitutional:       Appearance: Normal appearance.   HENT:      Head: Normocephalic and atraumatic.      Mouth/Throat:      Mouth: Mucous membranes are moist.   Eyes:      Extraocular Movements: Extraocular movements intact.      Pupils: Pupils are equal, round, and reactive to light.   Neck:      Comments: Steri-Strips are still in place over the anterior right surgical incision.  Wound is clean dry and intact.  No signs of infection.  No large fluid collection noted.  Patient is handling oral secretions well.  Normal tone of voice.  Cardiovascular:      Rate and Rhythm: Normal rate and regular rhythm.      Pulses: Normal pulses.      Heart sounds: Normal heart sounds.   Pulmonary:      Effort: Pulmonary effort is normal. No respiratory distress.      Breath sounds: Normal breath sounds.   Abdominal:      General: Abdomen is flat. There is no distension.      Palpations: Abdomen is soft.      Tenderness: There is no abdominal tenderness.   Musculoskeletal:         General: Normal range of motion.      Cervical back: Normal range of motion and neck supple.      Comments: Midline posterior cervical tenderness to palpation tenderness to palpation across the paraspinal musculature across bilateral trapezii.   strength intact bilaterally.  Light touch intact distally.  2+ radial pulses.   Skin:     General: Skin is warm and dry.      Capillary Refill: Capillary refill takes less than 2 seconds.   Neurological:      General: No focal deficit present.      Mental Status: He is alert and oriented to person, place, and time.   Psychiatric:         Mood and Affect: Mood normal.         Behavior: Behavior normal.           LAB RESULTS  Recent Results (from the past 24 hour(s))   ECG 12 Lead Tachycardia    Collection Time:  08/29/24  3:42 AM   Result Value Ref Range    QT Interval 318 ms    QTC Interval 452 ms         RADIOLOGY  No Radiology Exams Resulted Within Past 24 Hours      MEDICATIONS GIVEN IN ER  Medications   Lidocaine 4 % 1 patch (1 patch Transdermal Medication Applied 8/29/24 0031)   sodium chloride 0.9 % flush 10 mL (10 mL Intravenous Given 8/29/24 0243)   sodium chloride 0.9 % flush 10 mL (has no administration in time range)   sodium chloride 0.9 % infusion 40 mL (has no administration in time range)   acetaminophen (TYLENOL) tablet 650 mg (has no administration in time range)   sennosides-docusate (PERICOLACE) 8.6-50 MG per tablet 2 tablet (has no administration in time range)     And   polyethylene glycol (MIRALAX) packet 17 g (has no administration in time range)     And   bisacodyl (DULCOLAX) EC tablet 5 mg (has no administration in time range)     And   bisacodyl (DULCOLAX) suppository 10 mg (has no administration in time range)   Potassium Replacement - Follow Nurse / BPA Driven Protocol (has no administration in time range)   Magnesium Standard Dose Replacement - Follow Nurse / BPA Driven Protocol (has no administration in time range)   Phosphorus Replacement - Follow Nurse / BPA Driven Protocol (has no administration in time range)   Calcium Replacement - Follow Nurse / BPA Driven Protocol (has no administration in time range)   ondansetron (ZOFRAN) injection 4 mg (has no administration in time range)   melatonin tablet 5 mg (has no administration in time range)   HYDROmorphone (DILAUDID) injection 1 mg (1 mg Intravenous Given 8/29/24 0242)   nitroglycerin (NITROSTAT) SL tablet 0.4 mg (has no administration in time range)   morphine injection 4 mg (4 mg Intravenous Given 8/29/24 0028)   ondansetron (ZOFRAN) injection 4 mg (4 mg Intravenous Given 8/29/24 0026)   diazePAM (VALIUM) injection 2.5 mg (2.5 mg Intravenous Given 8/29/24 0029)   metoprolol tartrate (LOPRESSOR) injection 5 mg (5 mg Intravenous Given  8/29/24 0307)           OUTPATIENT MEDICATION MANAGEMENT:  Current Facility-Administered Medications Ordered in Epic   Medication Dose Route Frequency Provider Last Rate Last Admin    acetaminophen (TYLENOL) tablet 650 mg  650 mg Oral Q4H PRN Issa Torres PA        sennosides-docusate (PERICOLACE) 8.6-50 MG per tablet 2 tablet  2 tablet Oral BID PRN Issa Torres PA        And    polyethylene glycol (MIRALAX) packet 17 g  17 g Oral Daily PRN Issa Torres PA        And    bisacodyl (DULCOLAX) EC tablet 5 mg  5 mg Oral Daily PRN Issa Torres PA        And    bisacodyl (DULCOLAX) suppository 10 mg  10 mg Rectal Daily PRN Issa Torres PA        Calcium Replacement - Follow Nurse / BPA Driven Protocol   Does not apply PRN Issa Torres PA        HYDROmorphone (DILAUDID) injection 1 mg  1 mg Intravenous Q3H PRN Issa Torres PA   1 mg at 08/29/24 0242    Lidocaine 4 % 1 patch  1 patch Transdermal Once Cipriano Conway MD   1 patch at 08/29/24 0031    Magnesium Standard Dose Replacement - Follow Nurse / BPA Driven Protocol   Does not apply PRN Issa Torres PA        melatonin tablet 5 mg  5 mg Oral Nightly PRN Issa Torres PA        nitroglycerin (NITROSTAT) SL tablet 0.4 mg  0.4 mg Sublingual Q5 Min PRN Issa Torres PA        ondansetron (ZOFRAN) injection 4 mg  4 mg Intravenous Q6H PRN Issa Torres PA        Phosphorus Replacement - Follow Nurse / BPA Driven Protocol   Does not apply PRN Issa Torres PA        Potassium Replacement - Follow Nurse / BPA Driven Protocol   Does not apply PRN Issa Torres PA        sodium chloride 0.9 % flush 10 mL  10 mL Intravenous Q12H Issa Torres PA   10 mL at 08/29/24 0243    sodium chloride 0.9 % flush 10 mL  10 mL Intravenous PRN Issa Torres PA        sodium chloride 0.9 % infusion 40 mL  40 mL Intravenous PRN Issa Torres PA         No current [facility]-ordered outpatient medications on file.           PROGRESS, DATA ANALYSIS, CONSULTS, AND MEDICAL DECISION MAKING  ORDERS PLACED DURING THIS  VISIT:  Orders Placed This Encounter   Procedures    MRI Cervical Spine With & Without Contrast    CT Angiogram Chest    Basic Metabolic Panel    BNP    CBC (No Diff)    High Sensitivity Troponin T    NPO Diet NPO Type: Strict NPO    Vital Signs    Notify Provider (With Default Parameters)    Activity - Ad Adela    Intake & Output    Weigh Patient    Oral Care    Place Sequential Compression Device    Maintain Sequential Compression Device    Continuous Pulse Oximetry    Maintain IV Access    Telemetry - Place Orders & Notify Provider of Results When Patient Experiences Acute Chest Pain, Dysrhythmia or Respiratory Distress    May Be Off Telemetry for Tests    Code Status and Medical Interventions: CPR (Attempt to Resuscitate); Full Support    Inpatient Neurosurgery Consult    PT Consult: Eval & Treat Functional Mobility Below Baseline, Post Surgery Care    ECG 12 Lead Tachycardia    Insert Peripheral IV    Initiate ED Observation Status       All labs have been independently interpreted by me.  All radiology studies have been reviewed by me. All EKG's have been independently viewed and interpreted by me.  Discussion below represents my analysis of pertinent findings related to patient's condition, differential diagnosis, treatment plan and final disposition.    Differential diagnosis includes but is not limited to:   My differential diagnosis for back pain includes but is not limited to:  Musculoskeletal strain, contusion, retroperitoneal hematoma, disc protrusion, vertebral fracture, transverse process fracture, rib fracture, facet syndrome, sacroiliac joint strain, sciatica, renal injury, splenic injury, pancreatic injury, osteoarthritis, lumbar spondylosis, spinal stenosis, ankylosing spondylitis, sacroiliac joint inflammation, pancreatitis, perforated peptic ulcer, diverticulitis, endometriosis, chronic PID, epidural abscess, osteomyelitis, retroperitoneal abscess, pyelonephritis, pneumonia, subphrenic abscess,  tuberculosis, neurofibroma, meningioma, multiple myeloma, lymphoma, metastatic cancer, primary cancer, AAA, aortic dissection, spinal ischemia, referred pain, ureterolithiasis      ED Course:  ED Course as of 08/29/24 0344   u Aug 29, 2024   0003 I discussed the case with Dr. Conway and he agrees to evaluate the patient at the bedside.    [CC]   0035 Spoke with Issa Torres PA-C with Observation Unit.  Reviewed history, exam, results, treatments.  He agrees admit the patient.      [CC]      ED Course User Index  [CC] Chiara Puga PA-C           AS OF 03:44 EDT VITALS:    BP - 168/88  HR - (!) 132  TEMP - 98.8 °F (37.1 °C) (Oral)  O2 SATS - 96%      MDM:  Patient is a 78-year-old male who presents the emergency department today with sudden onset neck pain that began today.  Patient is 7 days postop from a cervical spine surgery which she said he has done extremely well postoperatively.  He says he is only required Tylenol and ibuprofen until today.  He denies any new trauma or injury.  He reports that he has tingling in the arms.  On arrival here in the emergency department vitals are reassuring, he is afebrile.  His heart rate did elevate while here in the ED which may be related to his pain.  He does also have a history of A-fib.   I did consider evaluation with CT but given the patient's paresthesias and recent surgery I believe he would benefit from MRI imaging.  I am unable to obtain MRI emergently here in the ED and patient will require admission to the observation unit for pain control and MRI and neurosurgery consultation.  He is stable at the time of admission.      COMPLEXITY OF CARE  The patient requires admission.        DIAGNOSIS  Final diagnoses:   Cervical radiculopathy         DISPOSITION  ED Disposition       ED Disposition   Decision to Admit    Condition   --    Comment   --                    Please note that portions of this document were completed with a voice recognition  program.    Note Disclaimer: At [facility], we believe that sharing information builds trust and better relationships. You are receiving this note because you recently visited [facility]. It is possible you will see health information before a provider has talked with you about it. This kind of information can be easy to misunderstand. To help you fully understand what it means for your health, we urge you to discuss this note with your provider.     Chiara Puga PA-C  08/29/24 0346

## 2024-08-29 NOTE — ED NOTES
Nursing report ED to floor  Aj Salazar  78 y.o.  male    HPI :  HPI (Adult)  Stated Reason for Visit: To ER via EMS from home.  Pt had c-spine surgery for spinal stenosis on 8/21.  Tonight pt had sudden onset neck pain, bilat shoulder pain, with tingling in bilat hands.  History Obtained From: patient, EMS    Chief Complaint  Chief Complaint   Patient presents with    Neck Pain       Admitting doctor:   Escobar Huang MD    Admitting diagnosis:   The encounter diagnosis was Cervical radiculopathy.    Code status:   Current Code Status       Date Active Code Status Order ID Comments User Context       8/29/2024 0045 CPR (Attempt to Resuscitate) [id number]  Issa Torres PA ED        Question Answer    Code Status (Patient has no pulse and is not breathing) CPR (Attempt to Resuscitate)    Medical Interventions (Patient has pulse or is breathing) Full Support                    Allergies:   Bactrim [sulfamethoxazole-trimethoprim] and Sulfacetamide    Isolation:   No active isolations    Intake and Output  No intake or output data in the 24 hours ending 08/29/24 0047    Weight:       08/28/24 2206   Weight: 72.1 kg (158 lb 15.2 oz)       Most recent vitals:   Vitals:    08/29/24 0031 08/29/24 0034 08/29/24 0037 08/29/24 0045   BP: 131/81      BP Location:       Patient Position:       Pulse: (!) 124  117 112   Resp:       Temp:       SpO2: 100% (!) 76% 95% 96%   Weight:       Height:           Active LDAs/IV Access:   Lines, Drains & Airways       Active LDAs       Name Placement date Placement time Site Days    Peripheral IV 08/29/24 0025 Anterior;Left Forearm 08/29/24  0025  Forearm  less than 1                    Labs (abnormal labs have a star):   Labs Reviewed   BASIC METABOLIC PANEL   CBC (NO DIFF)       EKG:   No orders to display       Meds given in ED:   Medications   Lidocaine 4 % 1 patch (1 patch Transdermal Medication Applied 8/29/24 0031)   sodium chloride 0.9 % flush 10 mL (has no administration in time  range)   sodium chloride 0.9 % flush 10 mL (has no administration in time range)   sodium chloride 0.9 % infusion 40 mL (has no administration in time range)   acetaminophen (TYLENOL) tablet 650 mg (has no administration in time range)   sennosides-docusate (PERICOLACE) 8.6-50 MG per tablet 2 tablet (has no administration in time range)     And   polyethylene glycol (MIRALAX) packet 17 g (has no administration in time range)     And   bisacodyl (DULCOLAX) EC tablet 5 mg (has no administration in time range)     And   bisacodyl (DULCOLAX) suppository 10 mg (has no administration in time range)   Potassium Replacement - Follow Nurse / BPA Driven Protocol (has no administration in time range)   Magnesium Standard Dose Replacement - Follow Nurse / BPA Driven Protocol (has no administration in time range)   Phosphorus Replacement - Follow Nurse / BPA Driven Protocol (has no administration in time range)   Calcium Replacement - Follow Nurse / BPA Driven Protocol (has no administration in time range)   ondansetron (ZOFRAN) injection 4 mg (has no administration in time range)   melatonin tablet 5 mg (has no administration in time range)   morphine injection 4 mg (4 mg Intravenous Given 24 0028)   ondansetron (ZOFRAN) injection 4 mg (4 mg Intravenous Given 246)   diazePAM (VALIUM) injection 2.5 mg (2.5 mg Intravenous Given 249)       Imaging results:  No radiology results for the last day    Ambulatory status:   - bedrest    Social issues:   Social History     Socioeconomic History    Marital status:      Spouse name: Ara*    Number of children: 3   Tobacco Use    Smoking status: Former     Current packs/day: 0.00     Average packs/day: 2.0 packs/day for 49.0 years (98.0 ttl pk-yrs)     Types: Cigarettes     Start date: 1961     Quit date: 2010     Years since quittin.6     Passive exposure: Past    Smokeless tobacco: Never    Tobacco comments:     long smoking history   Vaping Use     Vaping status: Never Used   Substance and Sexual Activity    Alcohol use: No     Comment: 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; 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    Drug use: No     Comment: caffeine use     Sexual activity: Defer       Peripheral Neurovascular  Peripheral Neurovascular (Adult)  Peripheral Neurovascular WDL: .WDL except, neurovascular assessment upper  LUE Neurovascular Assessment  Temperature LUE: warm  Color LUE: no discoloration  Sensation LUE: numbness present, tingling present  RUE Neurovascular Assessment  Temperature RUE: warm  Color RUE: no discoloration  Sensation RUE: numbness present, tingling present    Neuro Cognitive  Neuro Cognitive (Adult)  Cognitive/Neuro/Behavioral WDL: WDL, orientation  Orientation: oriented x 4  Hand /Ankle Strength  Hand , Left: moderate  Hand , Right: moderate    Learning  Learning Assessment (Adult)  Learning Readiness and Ability: sensory deficit noted (glasses)    Respiratory  Respiratory WDL  Respiratory WDL: .WDL except, rhythm/pattern  Rhythm/Pattern, Respiratory: shortness of breath (Hx COPD)    Abdominal Pain       Pain Assessments  Pain (Adult)  (0-10) Pain Rating: Rest: 9  Pain Location: neck    NIH Stroke Scale       Taylor uDnn RN  08/29/24 00:47 EDT

## 2024-08-29 NOTE — PROGRESS NOTES
Discharge Planning Assessment  [facility]     Patient Name: Aj Salazar  MRN: [id number]  Today's Date: 8/29/2024    Admit Date: 8/28/2024    Plan: Home   Discharge Needs Assessment    No documentation.                  Discharge Plan       Row Name 08/29/24 1639       Plan    Plan Home    Final Discharge Disposition Code 01 - home or self-care    Final Note Home                  Continued Care and Services - Discharged on 8/29/2024 Admission date: 8/28/2024 - Discharge disposition: Home or Self Care   No active coordination exists for this encounter.       Expected Discharge Date and Time       Expected Discharge Date Expected Discharge Time    Aug 29, 2024            Demographic Summary    No documentation.                  Functional Status    No documentation.                  Psychosocial    No documentation.                  Abuse/Neglect    No documentation.                  Legal    No documentation.                  Substance Abuse    No documentation.                  Patient Forms    No documentation.                     Caitlin Syed RN

## 2024-08-29 NOTE — PLAN OF CARE
Goal Outcome Evaluation:     Patient admitted for for neck pain.  Patient had recent neck surgery and area is covered with steri-strips.  He has intermittent pain, meds given.  Patient able to rest between care.  Neuro consult today.

## 2024-08-29 NOTE — PLAN OF CARE
Goal Outcome Evaluation:            Pt. 78 yr old AOX4 and able to verbalized needs. IV removed. Discharged summary provided and education completed. Pt instructed to  his medication at his pharmacy of choice. Pt instructed to follow up with his PCP and Neurosurgery. Pt gathered all his belongings and he did not have any other questions at the time of discharged. Pt left observation unit via private vehicle.

## 2024-08-29 NOTE — PROGRESS NOTES
Pt mated to the observation unit from the emergency department with increasing neck and back pain after having a recent cervical fusion with neurosurgery.  Does have a history of A-fib on Pradaxa, had been offered surgery but has not since restarted.  No significant chest pain or shortness of breath.  Has been tachycardic.  Pain not significantly proved overnight despite pain medications.  Denies any current numbness or weakness of the extremities.     On exam,   General: No acute distress, nontoxic  HEENT: Mucous membranes moist, atraumatic, EOMI  Neck: Full ROM  Pulm: Symmetric chest rise, nonlabored, lungs CTAB  Cardiovascular: Irregularly irregular tachycardia, intact distal pulses  GI: Soft, nontender, nondistended, no rebound, no guarding, bowel sounds present  MSK: Full ROM, no deformity  Skin: Warm, dry  Neuro: Awake, alert, oriented x 4, GCS 15, 5 out of 5 strength and sensation bilateral upper and lower extremities, moving all extremities, no focal deficits  Psych: Calm, cooperative          Plan: Awaiting MRI of the cervical spine, CT of the chest to rule out PE although think this is less likely I think it is reasonable to rule out.  Patient with recent surgery off of anticoagulation and has increasing upper back discomfort.  Again, I think PE less likely but reasonable to rule out.  Awaiting neurosurgery consultation recommendations, ongoing pain control, and reevaluation.  Labs are otherwise reassuring, mild hypokalemia that would be repleted as needed.         MD Attestation Note    SHARED VISIT: This visit was performed by BOTH a physician and an APC. The substantive portion of the medical decision making was performed by this attesting physician who made or approved the management plan and takes responsibility for patient management. All studies in the APC note (if performed) were independently interpreted by me.

## 2024-08-29 NOTE — SIGNIFICANT NOTE
08/29/24 1150   OTHER   Discipline physical therapist   Therapy Assessment/Plan (PT)   Criteria for Skilled Interventions Met (PT) no;does not meet criteria for skilled intervention  (Screened patient for PT eval. Noted AMPAC of 24 and patient currently denies any mobility issues. Patient and family at bedside in agreement PT eval not warranted- will sign off.)

## 2024-08-30 ENCOUNTER — TELEPHONE (OUTPATIENT)
Dept: NEUROSURGERY | Facility: CLINIC | Age: 79
End: 2024-08-30
Payer: MEDICARE

## 2024-08-30 ENCOUNTER — TRANSITIONAL CARE MANAGEMENT TELEPHONE ENCOUNTER (OUTPATIENT)
Dept: CALL CENTER | Facility: HOSPITAL | Age: 79
End: 2024-08-30
Payer: MEDICARE

## 2024-08-30 DIAGNOSIS — M54.2 NECK PAIN: ICD-10-CM

## 2024-08-30 RX ORDER — METHOCARBAMOL 500 MG/1
500 TABLET, FILM COATED ORAL 3 TIMES DAILY PRN
Qty: 60 TABLET | Refills: 2 | Status: SHIPPED | OUTPATIENT
Start: 2024-08-30

## 2024-08-30 RX ORDER — HYDROCODONE BITARTRATE AND ACETAMINOPHEN 7.5; 325 MG/1; MG/1
1 TABLET ORAL EVERY 6 HOURS PRN
Qty: 30 TABLET | Refills: 0 | Status: SHIPPED | OUTPATIENT
Start: 2024-08-30

## 2024-08-30 NOTE — TELEPHONE ENCOUNTER
08/30/24 pt called his [facility] rx on jon[facility] constantine/prem constantine temp closed.  He was discharged 08/29/24. surgery was on 08/21/24   He needs the follow rx filled:   Muscle relaxer,methocarbamol 500 mg  Hydrocodone 5/325    Please call to [facility] pharmacy: [phone]      Please call pt at: [phone]

## 2024-08-30 NOTE — TELEPHONE ENCOUNTER
Patient called returning a call from [place]. Per last encounter, Rx was sent to the [facility] pharmacy, patient stated he understood and will pick it from there.

## 2024-08-30 NOTE — OUTREACH NOTE
Call Center TCM Note      Flowsheet Row Responses   [facility] patient discharged from? [employer]   Does the patient have one of the following disease processes/diagnoses(primary or secondary)? General Surgery   TCM attempt successful? Yes   Call start time 1008   Call end time 1011   Discharge diagnosis Postoperative neck pain (recent cervical discectomy w/fusion)   Person spoke with today (if not patient) and relationship Patient   Meds reviewed with patient/caregiver? Yes   Does the patient have all medications related to this admission filled (includes all antibiotics, pain medications, etc.) Yes   Is the patient taking all medications as directed (includes completed medication regime)? Yes   Medication comments Patient reports that he will take pain meds as prescribed. He reports that he just tried to get off the pain meds too quickly.   Comments PCP Dr Amezcua. Patient declined need for another PCP appt with call today. He was seen in the office this week by PCP on Monday. Patient reports that he will follow up with surgeon on 9/6. Denies any needs right now from primary care.   Does the patient have an appointment with their PCP within 7-14 days of discharge? No   Nursing Interventions Patient declined scheduling/rescheduling appointment at this time, Routed TCM call to PCP office, Patient desires to follow up with specialty only   Has home health visited the patient within 72 hours of discharge? N/A   Psychosocial issues? No   Did the patient receive a copy of their discharge instructions? Yes   Nursing interventions Reviewed instructions with patient   What is the patient's perception of their health status since discharge? Improving   Nursing interventions Nurse provided patient education   Is the patient/caregiver able to teach back steps to recovery at home? Set small, achievable goals for return to baseline health, Rest and rebuild strength, gradually increase activity   If the patient is a current  smoker, are they able to teach back resources for cessation? Not a smoker   Is the patient/caregiver able to teach back the hierarchy of who to call/visit for symptoms/problems? PCP, Specialist, Home health nurse, Urgent Care, ED, 911 Yes   TCM call completed? Yes   Wrap up additional comments Post op appt with neurosurgery 9/6   Call end time 1011   Would this patient benefit from a Referral to [facility] Social Work? No   Is the patient interested in additional calls from an ambulatory ? No            Gloria Short RN    8/30/2024, 10:15 EDT

## 2024-09-06 ENCOUNTER — OFFICE VISIT (OUTPATIENT)
Dept: NEUROSURGERY | Facility: CLINIC | Age: 79
End: 2024-09-06
Payer: MEDICARE

## 2024-09-06 ENCOUNTER — TELEPHONE (OUTPATIENT)
Dept: NEUROSURGERY | Facility: CLINIC | Age: 79
End: 2024-09-06

## 2024-09-06 VITALS
HEIGHT: 69 IN | BODY MASS INDEX: 23.4 KG/M2 | SYSTOLIC BLOOD PRESSURE: 130 MMHG | WEIGHT: 158 LBS | DIASTOLIC BLOOD PRESSURE: 88 MMHG

## 2024-09-06 DIAGNOSIS — Z09 S/P NECK SURGERY, FOLLOW-UP EXAM: Primary | ICD-10-CM

## 2024-09-06 DIAGNOSIS — G95.20 CERVICAL CORD COMPRESSION WITH MYELOPATHY: ICD-10-CM

## 2024-09-06 DIAGNOSIS — M48.02 CERVICAL STENOSIS OF SPINE: ICD-10-CM

## 2024-09-06 DIAGNOSIS — M54.2 NECK PAIN: ICD-10-CM

## 2024-09-06 PROCEDURE — 99024 POSTOP FOLLOW-UP VISIT: CPT | Performed by: NEUROLOGICAL SURGERY

## 2024-09-06 PROCEDURE — 1160F RVW MEDS BY RX/DR IN RCRD: CPT | Performed by: NEUROLOGICAL SURGERY

## 2024-09-06 PROCEDURE — 3079F DIAST BP 80-89 MM HG: CPT | Performed by: NEUROLOGICAL SURGERY

## 2024-09-06 PROCEDURE — 1159F MED LIST DOCD IN RCRD: CPT | Performed by: NEUROLOGICAL SURGERY

## 2024-09-06 PROCEDURE — 3075F SYST BP GE 130 - 139MM HG: CPT | Performed by: NEUROLOGICAL SURGERY

## 2024-09-06 RX ORDER — MELOXICAM 15 MG/1
15 TABLET ORAL DAILY
Qty: 31 TABLET | Refills: 5 | Status: SHIPPED | OUTPATIENT
Start: 2024-09-06

## 2024-09-06 RX ORDER — HYDROCODONE BITARTRATE AND ACETAMINOPHEN 5; 325 MG/1; MG/1
1 TABLET ORAL EVERY 4 HOURS PRN
Qty: 20 TABLET | Refills: 0 | Status: SHIPPED | OUTPATIENT
Start: 2024-09-06

## 2024-09-06 RX ORDER — MELOXICAM 15 MG/1
15 TABLET ORAL DAILY
Qty: 31 TABLET | Refills: 5 | Status: CANCELLED | OUTPATIENT
Start: 2024-09-06

## 2024-09-06 NOTE — TELEPHONE ENCOUNTER
Patient stated that his pharmacy would not be able to get his prescription for Meloxicam 15 MG filled until Monday. Patient stated that he wants to get started on it right away so he would like to see if we can send the prescription for Meloxicam 15 MG to be filled at the [facility] on [address] so that he can get it sooner, please call patient and advise, thanks!

## 2024-09-09 ENCOUNTER — TELEPHONE (OUTPATIENT)
Dept: NEUROSURGERY | Facility: CLINIC | Age: 79
End: 2024-09-09
Payer: MEDICARE

## 2024-09-09 ENCOUNTER — TREATMENT (OUTPATIENT)
Dept: PHYSICAL THERAPY | Facility: CLINIC | Age: 79
End: 2024-09-09
Payer: MEDICARE

## 2024-09-09 DIAGNOSIS — Z98.890 HISTORY OF NECK SURGERY: ICD-10-CM

## 2024-09-09 DIAGNOSIS — M54.2 CHRONIC NECK PAIN: Primary | ICD-10-CM

## 2024-09-09 DIAGNOSIS — R29.898 DECREASED RANGE OF MOTION OF NECK: ICD-10-CM

## 2024-09-09 DIAGNOSIS — G89.29 CHRONIC NECK PAIN: Primary | ICD-10-CM

## 2024-09-09 PROCEDURE — 97535 SELF CARE MNGMENT TRAINING: CPT

## 2024-09-09 PROCEDURE — 97161 PT EVAL LOW COMPLEX 20 MIN: CPT

## 2024-09-09 PROCEDURE — 97110 THERAPEUTIC EXERCISES: CPT

## 2024-09-09 NOTE — TELEPHONE ENCOUNTER
Physical therapist called and I received a message inquiring about instructions however after reviewing the physical therapy note, there are some things there you may want to review.  The patient may be performing activities that are outside the postop recommendations.  If you need me to contact the patient, please just let me know.    I have attached the physical therapist on this message as well if you would like to just respond there.

## 2024-09-09 NOTE — PROGRESS NOTES
Physical Therapy Initial Evaluation and Plan of Care  [facility] [address], [address]  [place] [address]     Patient: Aj Salazar   : 1945  Referring practitioner: Pedro Mancuso MD  Date of Initial Visit: 2024  Today's Date: 2024  Patient seen for 1 sessions  PT Clinic location: [address], [address]  [address]          Visit Diagnoses:    ICD-10-CM ICD-9-CM   1. Chronic neck pain  M54.2 723.1    G89.29 338.29   2. History of neck surgery  Z98.890 V15.29   3. Decreased range of motion of neck  R29.898 723.8       Subjective Questionnaire: NDI:22%    Subjective Evaluation    History of Present Illness  Date of surgery: 2024  Mechanism of injury: On  I had an Anterior cervical partial corpectomy at cervical 5 for the purposes of cervical 4 cervical 5 and cervical 5 cervical 6 discectomy and fusion using allograft cadaver bone and metallic instrumentation. I went back to the hospital around  due to increased neck pain. Now the pain is more manageable. I am taking extra strength Tylenol and quit taking hydrocodone last Friday. I have been driving because the surgeon said it was up to me after stopping the pain medication. I have been driving short distances and this seems to be going well.   Before surgery I have had pain in the neck and shoulders for many years. Within the last couple months the pain in the shoulders and neck got bad enough that I needed to do something about it. After the surgery I have some pain at the base of the skull that radiates out into both shoulders. On the L side I have some pain on the outside of my shoulder.   Dr. Mancuso gave me a small neck brace but told me I only had to wear this if I needed. He did not give me and post operative range of motion precautions. He did have me lifting nothing higher than 5 pounds but last Friday he bumped this  up to 25 pounds. I really didn't have any precautions/ restrictions after surgery. I have gotten back to walking and it feels pretty good.   I am having a hard time with cooking, cleaning, going to the grocery, mopping, loading the , vacuuming, and other daily activities. Lately I have not been having as much difficulty with sleeping. I seem to be able to sleep about 5 to 6 hours. I am sleeping in bed now and it is going well.  I am not having any numbness or tingling in my arms.  I started with a liquid diet but have transitioned to more of a normal diet. The swallowing is starting to be normal, but is still a little difficult.   I try to ice 3-4 times a day.   I have another follow up in a couple weeks on .      Patient Occupation: Retired Pain  Current pain ratin  At best pain ratin  At worst pain ratin (usually at the end of the day)  Location: posterior base of the skull  Quality: dull ache  Relieving factors: ice, rest, relaxation and medications  Aggravating factors: lifting, overhead activity, sleeping, prolonged positioning, outstretched reach and repetitive movement    Social Support  Lives in: one-story house (stairs getting in to the house)    Patient Goals  Patient goals for therapy: decreased pain, increased strength, independence with ADLs/IADLs and return to sport/leisure activities  Patient goal: I want to be able to drive and got to the kid's sporting events and to improve my neck range of motion.       Medical history: COPD, hypertension,  Type 2 diabetes, CHF, and A-fib. See chart for further detail.   Therapy Precautions: No lifting over 25 pounds and limited OH.      Objective          Palpation   Left   Tenderness of the cervical interspinals, deltoid, infraspinatus, levator scapulae, scalenes, sternocleidomastoid, suboccipitals, subscapularis, supraspinatus and upper trapezius.     Right Tenderness of the cervical interspinals, deltoid, infraspinatus, levator  scapulae, scalenes, sternocleidomastoid, suboccipitals, subscapularis, supraspinatus and upper trapezius.     Tenderness   Cervical Spine   Tenderness in the left scapula and right scapula.     Active Range of Motion   Cervical/Thoracic Spine   Cervical    Flexion: 10 degrees with pain  Extension: 25 degrees   Left rotation: 35 degrees with pain  Right rotation: 45 degrees     Strength/Myotome Testing     Left Shoulder     Planes of Motion   Flexion: 4   Abduction: 4   External rotation at 0°: 4+   Internal rotation at 0°: 4+     Right Shoulder     Planes of Motion   Flexion: 4   Abduction: 4   External rotation at 0°: 4+   Internal rotation at 0°: 4+           Assessment & Plan       Assessment  Impairments: abnormal or restricted ROM, activity intolerance, impaired physical strength, lacks appropriate home exercise program and pain with function   Functional limitations: carrying objects, lifting, sleeping, pulling, uncomfortable because of pain, standing, reaching overhead and unable to perform repetitive tasks   Assessment details: Pt is a 78 year old female who presents to PT with symptoms consistent with post-operative deficits from an Anterior cervical partial corpectomy at cervical 5 for the purposes of cervical 4 cervical 5 and cervical 5 cervical 6 discectomy and fusion using allograft cadaver bone and metallic instrumentation. Upon initial evaluation he presents with the following deficits and impairments: decreased cervical ROM, decreased UE strength, and decreased cervical strength. He presents with postural deficits and decreased thoracic mobility. These deficits make it difficult for the patient to complete ADLS including cooking, house cleaning, yard work, grocery shopping, and other activities including reading, personal care, driving,and sleeping. Pt would benefit from skilled PT intervention to address the deficits noted and to improve overall quality of life.     Reached out to surgeon for any  post-op protocol.   Prognosis: good    Goals  Plan Goals: SHORT TERM GOALS: 6 Weeks  1. Pt will be compliant with HEP.  2. Pt to exhibit 50 degrees of cervical rotation to allow for viewing traffic without pain or limitations  3. Pt to report ability to sleep through the night without awakening  4. Pt able to perform ADL's and recreational activities without pain     LONG TERM GOALS: 12 Weeks  1.  Pt to score <10 perceived disability on Neck Index  2.  Pain level < 3/10 at worse with driving > 30 min. and ADL's  3.  Increased cervical AROM to WFL to allow for driving and household tasks with less restrictions.  4.  Pt able to job requirements and cleaning  activities without complaints of pain limiting function.     Plan  Therapy options: will be seen for skilled therapy services  Planned modality interventions: cryotherapy, electrical stimulation/Russian stimulation, iontophoresis, TENS, thermotherapy (hydrocollator packs), traction, ultrasound and dry needling  Planned therapy interventions: abdominal trunk stabilization, ADL retraining, body mechanics training, flexibility, functional ROM exercises, home exercise program, joint mobilization, manual therapy, neuromuscular re-education, postural training, soft tissue mobilization, spinal/joint mobilization, strengthening, stretching and therapeutic activities  Frequency: 2x week  Duration in weeks: 12  Treatment plan discussed with: patient        See flowsheets for treatment detail.  Education: Discussed surgery and precautions, HEP, and POC.     History # of Personal Factors and/or Comorbidities: LOW (0)  Examination of Body System(s): # of elements: LOW (1-2)  Clinical Presentation: STABLE   Clinical Decision Making: LOW       Timed:         Manual Therapy:    -     mins  11053;     Therapeutic Exercise:    10     mins  42555;     Neuromuscular Panchito:    -    mins  48479;    Therapeutic Activity:     -     mins  54387;     Gait Training:           mins  05029;      Ultrasound:          mins  86176;    Ionto                                   mins   31604  Self Care                       14     mins   52056      Un-Timed:  Electrical Stimulation:         mins  49041 ( );  Dry Needling          mins self-pay  Traction          mins 86594  Low Eval     20     Mins  15401  Mod Eval     -     Mins  05803  High Eval                       -     Mins  71597  Re-Eval                               mins  11252      Timed Treatment:   24   mins   Total Treatment:     44   mins    PT SIGNATURE: Melida Kilpatrick PT   [place] PT license #: [id number]  DATE TREATMENT INITIATED: 9/9/2024    Initial Certification  Certification Period: 12/7/2024  I certify that the therapy services are furnished while this patient is under my care.  The services outlined above are required by this patient, and will be reviewed every 90 days.    PHYSICIAN: Pedro Mancuso MD  NPI: [id number]                                      DATE:     Please sign and return via fax to [facility] - Fax #: [phone]. Thank you, [facility].

## 2024-09-10 ENCOUNTER — DOCUMENTATION (OUTPATIENT)
Dept: NEUROSURGERY | Facility: CLINIC | Age: 79
End: 2024-09-10
Payer: MEDICARE

## 2024-09-11 ENCOUNTER — TELEPHONE (OUTPATIENT)
Dept: NEUROSURGERY | Facility: CLINIC | Age: 79
End: 2024-09-11
Payer: MEDICARE

## 2024-09-11 ENCOUNTER — TREATMENT (OUTPATIENT)
Dept: PHYSICAL THERAPY | Facility: CLINIC | Age: 79
End: 2024-09-11
Payer: MEDICARE

## 2024-09-11 DIAGNOSIS — Z98.890 HISTORY OF NECK SURGERY: ICD-10-CM

## 2024-09-11 DIAGNOSIS — G89.29 CHRONIC NECK PAIN: Primary | ICD-10-CM

## 2024-09-11 DIAGNOSIS — M54.2 CHRONIC NECK PAIN: Primary | ICD-10-CM

## 2024-09-11 DIAGNOSIS — R29.898 DECREASED RANGE OF MOTION OF NECK: ICD-10-CM

## 2024-09-11 DIAGNOSIS — Z09 S/P NECK SURGERY, FOLLOW-UP EXAM: ICD-10-CM

## 2024-09-11 DIAGNOSIS — G95.20 CERVICAL CORD COMPRESSION WITH MYELOPATHY: ICD-10-CM

## 2024-09-11 DIAGNOSIS — M54.2 NECK PAIN: ICD-10-CM

## 2024-09-11 DIAGNOSIS — M48.02 CERVICAL STENOSIS OF SPINE: ICD-10-CM

## 2024-09-11 NOTE — PROGRESS NOTES
Physical Therapy Daily Treatment Note  [facility] [place]  [address], [address]  [place] [address]     Patient: Aj Salazar  : 1945  Referring practitioner:   Today's Date: 2024    VISIT#: 2    Visit Diagnoses:    ICD-10-CM ICD-9-CM   1. Chronic neck pain  M54.2 723.1    G89.29 338.29   2. History of neck surgery  Z98.890 V15.29   3. Decreased range of motion of neck  R29.898 723.8       Subjective   Aj Salazar reports: Patient reports that he is doing okay, exercises have been going well. He started meloxicam last Saturday and has not been having any problems with it but called his surgeon to make sure it was okay.       Objective       See Exercise, Manual, and Modality Logs for complete treatment.     Patient Education: HEP review  Exercise rationale/ pain free exercise performance  Alternate exercise positions  Verbal/Tactile cues to ensure correct exercise performance/technique       Assessment/Plan  Patient demonstrates good tolerance to continued and new therapeutic exercises. Continued with very gentle cervical ROM exercises and began shoulder and postural exercises to decrease stress on the cervical spin. Discussed the importance of working in a pain free ROM with all exercises. Seated side bending was added on this date with no sustained hold. He initially felt a stretch on the opposite side but was also feeling pain on the ipsilateral side. With no hold he reports feeling good. Patient has a follow up on , will continue to gently progress as tolerated.       Progress per Plan of Care and Progress strengthening /stabilization /functional activity          Timed:         Manual Therapy:    -     mins  60077;     Therapeutic Exercise:    24     mins  15679;     Neuromuscular Panchito:    6    mins  29824;    Therapeutic Activity:          mins  36299;     Gait Training:           mins  59291;     Ultrasound:          mins  23455;    Ionto:                                    mins  97554  Self Care:                            mins  16995    Un-Timed:  Electrical Stimulation:         mins  29005 ( );  Dry Needling          mins self-pay  Traction          mins 57311  Re-Eval                               mins  22599  Group Therapy           ___ mins 13267    Timed Treatment:   30   mins   Total Treatment:     35   mins    Melida Kilpatrick PT  Physical Therapist  [facility]EN[place] license #: [id number]

## 2024-09-11 NOTE — TELEPHONE ENCOUNTER
Sanjeev called and stated his [facility] and Dr. Amezcua are worried about him taking meloxicam with plavix and dabigatran, he wanting confirmation from Dr. Mancuso that it is safe to take all of these drugs together.     Please advise and thank you

## 2024-09-14 ENCOUNTER — PATIENT MESSAGE (OUTPATIENT)
Dept: INTERNAL MEDICINE | Age: 79
End: 2024-09-14
Payer: MEDICARE

## 2024-09-16 ENCOUNTER — TREATMENT (OUTPATIENT)
Dept: PHYSICAL THERAPY | Facility: CLINIC | Age: 79
End: 2024-09-16
Payer: MEDICARE

## 2024-09-16 DIAGNOSIS — Z98.890 HISTORY OF NECK SURGERY: ICD-10-CM

## 2024-09-16 DIAGNOSIS — G95.20 CERVICAL CORD COMPRESSION WITH MYELOPATHY: ICD-10-CM

## 2024-09-16 DIAGNOSIS — R29.898 DECREASED RANGE OF MOTION OF NECK: ICD-10-CM

## 2024-09-16 DIAGNOSIS — M48.02 CERVICAL STENOSIS OF SPINE: ICD-10-CM

## 2024-09-16 DIAGNOSIS — M54.2 CHRONIC NECK PAIN: Primary | ICD-10-CM

## 2024-09-16 DIAGNOSIS — G89.29 CHRONIC NECK PAIN: Primary | ICD-10-CM

## 2024-09-16 DIAGNOSIS — M54.2 NECK PAIN: ICD-10-CM

## 2024-09-16 DIAGNOSIS — Z09 S/P NECK SURGERY, FOLLOW-UP EXAM: ICD-10-CM

## 2024-09-16 PROCEDURE — 97112 NEUROMUSCULAR REEDUCATION: CPT

## 2024-09-16 PROCEDURE — 97140 MANUAL THERAPY 1/> REGIONS: CPT

## 2024-09-16 RX ORDER — CLOPIDOGREL BISULFATE 75 MG/1
75 TABLET ORAL DAILY
Qty: 90 TABLET | Refills: 1 | OUTPATIENT
Start: 2024-09-16

## 2024-09-16 RX ORDER — CLOPIDOGREL BISULFATE 75 MG/1
75 TABLET ORAL DAILY
Qty: 90 TABLET | Refills: 1 | Status: SHIPPED | OUTPATIENT
Start: 2024-09-16

## 2024-09-18 ENCOUNTER — APPOINTMENT (OUTPATIENT)
Dept: GENERAL RADIOLOGY | Facility: HOSPITAL | Age: 79
End: 2024-09-18
Payer: MEDICARE

## 2024-09-18 ENCOUNTER — HOSPITAL ENCOUNTER (OUTPATIENT)
Facility: HOSPITAL | Age: 79
Setting detail: OBSERVATION
Discharge: HOME | End: 2024-09-19
Attending: EMERGENCY MEDICINE | Admitting: EMERGENCY MEDICINE
Payer: MEDICARE

## 2024-09-18 ENCOUNTER — APPOINTMENT (OUTPATIENT)
Dept: CT IMAGING | Facility: HOSPITAL | Age: 79
End: 2024-09-18
Payer: MEDICARE

## 2024-09-18 DIAGNOSIS — N39.0 ACUTE UTI: ICD-10-CM

## 2024-09-18 DIAGNOSIS — I48.91 ATRIAL FIBRILLATION WITH TACHYCARDIC VENTRICULAR RATE: ICD-10-CM

## 2024-09-18 DIAGNOSIS — R79.89 TROPONIN LEVEL ELEVATED: ICD-10-CM

## 2024-09-18 DIAGNOSIS — R10.9 ACUTE ABDOMINAL PAIN: Primary | ICD-10-CM

## 2024-09-18 LAB
ALBUMIN SERPL-MCNC: 4 G/DL (ref 3.5–5.2)
ALBUMIN/GLOB SERPL: 1.4 G/DL
ALP SERPL-CCNC: 136 U/L (ref 39–117)
ALT SERPL W P-5'-P-CCNC: 10 U/L (ref 1–41)
ANION GAP SERPL CALCULATED.3IONS-SCNC: 11 MMOL/L (ref 5–15)
AST SERPL-CCNC: 14 U/L (ref 1–40)
BACTERIA UR QL AUTO: ABNORMAL /HPF
BASOPHILS # BLD AUTO: 0.06 10*3/MM3 (ref 0–0.2)
BASOPHILS NFR BLD AUTO: 0.7 % (ref 0–1.5)
BILIRUB SERPL-MCNC: 0.7 MG/DL (ref 0–1.2)
BILIRUB UR QL STRIP: NEGATIVE
BUN SERPL-MCNC: 11 MG/DL (ref 8–23)
BUN/CREAT SERPL: 11 (ref 7–25)
CALCIUM SPEC-SCNC: 9.2 MG/DL (ref 8.6–10.5)
CHLORIDE SERPL-SCNC: 106 MMOL/L (ref 98–107)
CLARITY UR: ABNORMAL
CO2 SERPL-SCNC: 25 MMOL/L (ref 22–29)
COLOR UR: YELLOW
CREAT SERPL-MCNC: 1 MG/DL (ref 0.76–1.27)
D-LACTATE SERPL-SCNC: 2 MMOL/L (ref 0.5–2)
DEPRECATED RDW RBC AUTO: 48.1 FL (ref 37–54)
EGFRCR SERPLBLD CKD-EPI 2021: 77.5 ML/MIN/1.73
EOSINOPHIL # BLD AUTO: 0.2 10*3/MM3 (ref 0–0.4)
EOSINOPHIL NFR BLD AUTO: 2.3 % (ref 0.3–6.2)
ERYTHROCYTE [DISTWIDTH] IN BLOOD BY AUTOMATED COUNT: 15.9 % (ref 12.3–15.4)
GEN 5 2HR TROPONIN T REFLEX: 27 NG/L
GLOBULIN UR ELPH-MCNC: 2.8 GM/DL
GLUCOSE SERPL-MCNC: 119 MG/DL (ref 65–99)
GLUCOSE UR STRIP-MCNC: ABNORMAL MG/DL
HCT VFR BLD AUTO: 41.7 % (ref 37.5–51)
HGB BLD-MCNC: 13.2 G/DL (ref 13–17.7)
HGB UR QL STRIP.AUTO: ABNORMAL
HOLD SPECIMEN: NORMAL
HOLD SPECIMEN: NORMAL
HYALINE CASTS UR QL AUTO: ABNORMAL /LPF
IMM GRANULOCYTES # BLD AUTO: 0.07 10*3/MM3 (ref 0–0.05)
IMM GRANULOCYTES NFR BLD AUTO: 0.8 % (ref 0–0.5)
KETONES UR QL STRIP: NEGATIVE
LEUKOCYTE ESTERASE UR QL STRIP.AUTO: ABNORMAL
LIPASE SERPL-CCNC: 25 U/L (ref 13–60)
LYMPHOCYTES # BLD AUTO: 1.43 10*3/MM3 (ref 0.7–3.1)
LYMPHOCYTES NFR BLD AUTO: 16.4 % (ref 19.6–45.3)
MAGNESIUM SERPL-MCNC: 1.8 MG/DL (ref 1.6–2.4)
MCH RBC QN AUTO: 26.3 PG (ref 26.6–33)
MCHC RBC AUTO-ENTMCNC: 31.7 G/DL (ref 31.5–35.7)
MCV RBC AUTO: 83.1 FL (ref 79–97)
MONOCYTES # BLD AUTO: 0.81 10*3/MM3 (ref 0.1–0.9)
MONOCYTES NFR BLD AUTO: 9.3 % (ref 5–12)
NEUTROPHILS NFR BLD AUTO: 6.17 10*3/MM3 (ref 1.7–7)
NEUTROPHILS NFR BLD AUTO: 70.5 % (ref 42.7–76)
NITRITE UR QL STRIP: NEGATIVE
NRBC BLD AUTO-RTO: 0 /100 WBC (ref 0–0.2)
NT-PROBNP SERPL-MCNC: 784 PG/ML (ref 0–1800)
PH UR STRIP.AUTO: 6.5 [PH] (ref 5–8)
PLATELET # BLD AUTO: 323 10*3/MM3 (ref 140–450)
PMV BLD AUTO: 8.5 FL (ref 6–12)
POTASSIUM SERPL-SCNC: 3.6 MMOL/L (ref 3.5–5.2)
PROT SERPL-MCNC: 6.8 G/DL (ref 6–8.5)
PROT UR QL STRIP: NEGATIVE
RBC # BLD AUTO: 5.02 10*6/MM3 (ref 4.14–5.8)
RBC # UR STRIP: ABNORMAL /HPF
REF LAB TEST METHOD: ABNORMAL
SODIUM SERPL-SCNC: 142 MMOL/L (ref 136–145)
SP GR UR STRIP: 1.01 (ref 1–1.03)
SQUAMOUS #/AREA URNS HPF: ABNORMAL /HPF
TROPONIN T DELTA: -4 NG/L
TROPONIN T SERPL HS-MCNC: 31 NG/L
UROBILINOGEN UR QL STRIP: ABNORMAL
WBC # UR STRIP: ABNORMAL /HPF
WBC NRBC COR # BLD AUTO: 8.74 10*3/MM3 (ref 3.4–10.8)
WHOLE BLOOD HOLD COAG: NORMAL
WHOLE BLOOD HOLD SPECIMEN: NORMAL

## 2024-09-18 PROCEDURE — 25010000002 CEFTRIAXONE PER 250 MG: Performed by: EMERGENCY MEDICINE

## 2024-09-18 PROCEDURE — 94799 UNLISTED PULMONARY SVC/PX: CPT

## 2024-09-18 PROCEDURE — 93005 ELECTROCARDIOGRAM TRACING: CPT | Performed by: EMERGENCY MEDICINE

## 2024-09-18 PROCEDURE — 96375 TX/PRO/DX INJ NEW DRUG ADDON: CPT

## 2024-09-18 PROCEDURE — 84484 ASSAY OF TROPONIN QUANT: CPT | Performed by: EMERGENCY MEDICINE

## 2024-09-18 PROCEDURE — 25010000002 DIGOXIN PER 500 MCG: Performed by: NURSE PRACTITIONER

## 2024-09-18 PROCEDURE — 99214 OFFICE O/P EST MOD 30 MIN: CPT | Performed by: NURSE PRACTITIONER

## 2024-09-18 PROCEDURE — 83880 ASSAY OF NATRIURETIC PEPTIDE: CPT | Performed by: NURSE PRACTITIONER

## 2024-09-18 PROCEDURE — 87186 SC STD MICRODIL/AGAR DIL: CPT | Performed by: EMERGENCY MEDICINE

## 2024-09-18 PROCEDURE — G0378 HOSPITAL OBSERVATION PER HR: HCPCS

## 2024-09-18 PROCEDURE — 36415 COLL VENOUS BLD VENIPUNCTURE: CPT | Performed by: EMERGENCY MEDICINE

## 2024-09-18 PROCEDURE — 83605 ASSAY OF LACTIC ACID: CPT | Performed by: EMERGENCY MEDICINE

## 2024-09-18 PROCEDURE — 93010 ELECTROCARDIOGRAM REPORT: CPT | Performed by: INTERNAL MEDICINE

## 2024-09-18 PROCEDURE — 83690 ASSAY OF LIPASE: CPT | Performed by: EMERGENCY MEDICINE

## 2024-09-18 PROCEDURE — 96365 THER/PROPH/DIAG IV INF INIT: CPT

## 2024-09-18 PROCEDURE — 25010000002 ONDANSETRON PER 1 MG: Performed by: EMERGENCY MEDICINE

## 2024-09-18 PROCEDURE — 83735 ASSAY OF MAGNESIUM: CPT | Performed by: EMERGENCY MEDICINE

## 2024-09-18 PROCEDURE — 71046 X-RAY EXAM CHEST 2 VIEWS: CPT

## 2024-09-18 PROCEDURE — 96376 TX/PRO/DX INJ SAME DRUG ADON: CPT

## 2024-09-18 PROCEDURE — 81001 URINALYSIS AUTO W/SCOPE: CPT | Performed by: EMERGENCY MEDICINE

## 2024-09-18 PROCEDURE — 94640 AIRWAY INHALATION TREATMENT: CPT

## 2024-09-18 PROCEDURE — 94761 N-INVAS EAR/PLS OXIMETRY MLT: CPT

## 2024-09-18 PROCEDURE — 80053 COMPREHEN METABOLIC PANEL: CPT | Performed by: EMERGENCY MEDICINE

## 2024-09-18 PROCEDURE — 25010000002 DIGOXIN PER 500 MCG: Performed by: EMERGENCY MEDICINE

## 2024-09-18 PROCEDURE — 25510000001 IOPAMIDOL 61 % SOLUTION: Performed by: EMERGENCY MEDICINE

## 2024-09-18 PROCEDURE — 99285 EMERGENCY DEPT VISIT HI MDM: CPT

## 2024-09-18 PROCEDURE — 74174 CTA ABD&PLVS W/CONTRAST: CPT

## 2024-09-18 PROCEDURE — 25810000003 LACTATED RINGERS SOLUTION: Performed by: EMERGENCY MEDICINE

## 2024-09-18 PROCEDURE — 87086 URINE CULTURE/COLONY COUNT: CPT | Performed by: EMERGENCY MEDICINE

## 2024-09-18 PROCEDURE — 85025 COMPLETE CBC W/AUTO DIFF WBC: CPT | Performed by: EMERGENCY MEDICINE

## 2024-09-18 RX ORDER — POLYETHYLENE GLYCOL 3350 17 G/17G
17 POWDER, FOR SOLUTION ORAL DAILY PRN
Status: DISCONTINUED | OUTPATIENT
Start: 2024-09-18 | End: 2024-09-18

## 2024-09-18 RX ORDER — BISACODYL 5 MG/1
5 TABLET, DELAYED RELEASE ORAL DAILY PRN
Status: DISCONTINUED | OUTPATIENT
Start: 2024-09-18 | End: 2024-09-19 | Stop reason: HOSPADM

## 2024-09-18 RX ORDER — ONDANSETRON 2 MG/ML
4 INJECTION INTRAMUSCULAR; INTRAVENOUS ONCE
Status: COMPLETED | OUTPATIENT
Start: 2024-09-18 | End: 2024-09-18

## 2024-09-18 RX ORDER — BRIMONIDINE TARTRATE 2 MG/ML
1 SOLUTION/ DROPS OPHTHALMIC 2 TIMES DAILY
Status: DISCONTINUED | OUTPATIENT
Start: 2024-09-18 | End: 2024-09-19 | Stop reason: HOSPADM

## 2024-09-18 RX ORDER — NITROGLYCERIN 0.4 MG/1
0.4 TABLET SUBLINGUAL
Status: DISCONTINUED | OUTPATIENT
Start: 2024-09-18 | End: 2024-09-19 | Stop reason: HOSPADM

## 2024-09-18 RX ORDER — POLYETHYLENE GLYCOL 3350 17 G/17G
17 POWDER, FOR SOLUTION ORAL DAILY
Status: DISCONTINUED | OUTPATIENT
Start: 2024-09-18 | End: 2024-09-19 | Stop reason: HOSPADM

## 2024-09-18 RX ORDER — LATANOPROST 50 UG/ML
1 SOLUTION/ DROPS OPHTHALMIC NIGHTLY
Status: DISCONTINUED | OUTPATIENT
Start: 2024-09-18 | End: 2024-09-19 | Stop reason: HOSPADM

## 2024-09-18 RX ORDER — ONDANSETRON 4 MG/1
4 TABLET, ORALLY DISINTEGRATING ORAL EVERY 6 HOURS PRN
Status: DISCONTINUED | OUTPATIENT
Start: 2024-09-18 | End: 2024-09-19 | Stop reason: HOSPADM

## 2024-09-18 RX ORDER — DABIGATRAN ETEXILATE 150 MG/1
150 CAPSULE ORAL 2 TIMES DAILY
Status: DISCONTINUED | OUTPATIENT
Start: 2024-09-18 | End: 2024-09-19 | Stop reason: HOSPADM

## 2024-09-18 RX ORDER — METOPROLOL SUCCINATE 25 MG/1
50 TABLET, EXTENDED RELEASE ORAL DAILY
Status: DISCONTINUED | OUTPATIENT
Start: 2024-09-18 | End: 2024-09-19 | Stop reason: HOSPADM

## 2024-09-18 RX ORDER — DIGOXIN 0.25 MG/ML
250 INJECTION INTRAMUSCULAR; INTRAVENOUS ONCE
Status: COMPLETED | OUTPATIENT
Start: 2024-09-18 | End: 2024-09-18

## 2024-09-18 RX ORDER — CILOSTAZOL 100 MG/1
100 TABLET ORAL 2 TIMES DAILY
COMMUNITY

## 2024-09-18 RX ORDER — ATORVASTATIN CALCIUM 80 MG/1
80 TABLET, FILM COATED ORAL NIGHTLY
Status: DISCONTINUED | OUTPATIENT
Start: 2024-09-18 | End: 2024-09-19 | Stop reason: HOSPADM

## 2024-09-18 RX ORDER — BISACODYL 10 MG
10 SUPPOSITORY, RECTAL RECTAL DAILY PRN
Status: DISCONTINUED | OUTPATIENT
Start: 2024-09-18 | End: 2024-09-18

## 2024-09-18 RX ORDER — CLOPIDOGREL BISULFATE 75 MG/1
75 TABLET ORAL DAILY
Status: DISCONTINUED | OUTPATIENT
Start: 2024-09-19 | End: 2024-09-19 | Stop reason: HOSPADM

## 2024-09-18 RX ORDER — IOPAMIDOL 612 MG/ML
100 INJECTION, SOLUTION INTRAVASCULAR
Status: COMPLETED | OUTPATIENT
Start: 2024-09-18 | End: 2024-09-18

## 2024-09-18 RX ORDER — BUDESONIDE AND FORMOTEROL FUMARATE DIHYDRATE 160; 4.5 UG/1; UG/1
2 AEROSOL RESPIRATORY (INHALATION)
Status: DISCONTINUED | OUTPATIENT
Start: 2024-09-18 | End: 2024-09-19 | Stop reason: HOSPADM

## 2024-09-18 RX ORDER — BISACODYL 5 MG/1
5 TABLET, DELAYED RELEASE ORAL DAILY PRN
Status: DISCONTINUED | OUTPATIENT
Start: 2024-09-18 | End: 2024-09-18

## 2024-09-18 RX ORDER — METOPROLOL SUCCINATE 25 MG/1
100 TABLET, EXTENDED RELEASE ORAL NIGHTLY
Status: DISCONTINUED | OUTPATIENT
Start: 2024-09-18 | End: 2024-09-19 | Stop reason: HOSPADM

## 2024-09-18 RX ORDER — SODIUM CHLORIDE 0.9 % (FLUSH) 0.9 %
10 SYRINGE (ML) INJECTION AS NEEDED
Status: DISCONTINUED | OUTPATIENT
Start: 2024-09-18 | End: 2024-09-19 | Stop reason: HOSPADM

## 2024-09-18 RX ORDER — TAMSULOSIN HYDROCHLORIDE 0.4 MG/1
0.8 CAPSULE ORAL NIGHTLY
Status: DISCONTINUED | OUTPATIENT
Start: 2024-09-18 | End: 2024-09-19 | Stop reason: HOSPADM

## 2024-09-18 RX ORDER — ONDANSETRON 2 MG/ML
4 INJECTION INTRAMUSCULAR; INTRAVENOUS EVERY 6 HOURS PRN
Status: DISCONTINUED | OUTPATIENT
Start: 2024-09-18 | End: 2024-09-19 | Stop reason: HOSPADM

## 2024-09-18 RX ORDER — AMOXICILLIN 250 MG
2 CAPSULE ORAL 2 TIMES DAILY
Status: DISCONTINUED | OUTPATIENT
Start: 2024-09-18 | End: 2024-09-19 | Stop reason: HOSPADM

## 2024-09-18 RX ORDER — BISACODYL 10 MG
10 SUPPOSITORY, RECTAL RECTAL DAILY PRN
Status: DISCONTINUED | OUTPATIENT
Start: 2024-09-18 | End: 2024-09-19 | Stop reason: HOSPADM

## 2024-09-18 RX ORDER — PANTOPRAZOLE SODIUM 40 MG/1
40 TABLET, DELAYED RELEASE ORAL DAILY
Status: DISCONTINUED | OUTPATIENT
Start: 2024-09-19 | End: 2024-09-19 | Stop reason: HOSPADM

## 2024-09-18 RX ORDER — BUMETANIDE 1 MG/1
1 TABLET ORAL DAILY
Status: DISCONTINUED | OUTPATIENT
Start: 2024-09-19 | End: 2024-09-19 | Stop reason: HOSPADM

## 2024-09-18 RX ORDER — PANTOPRAZOLE SODIUM 40 MG/1
40 TABLET, DELAYED RELEASE ORAL 2 TIMES DAILY
COMMUNITY

## 2024-09-18 RX ORDER — SPIRONOLACTONE 25 MG/1
25 TABLET ORAL DAILY
Status: DISCONTINUED | OUTPATIENT
Start: 2024-09-19 | End: 2024-09-19 | Stop reason: HOSPADM

## 2024-09-18 RX ORDER — DABIGATRAN ETEXILATE 150 MG/1
150 CAPSULE ORAL 2 TIMES DAILY
COMMUNITY

## 2024-09-18 RX ORDER — CILOSTAZOL 100 MG/1
100 TABLET ORAL 2 TIMES DAILY
Status: DISCONTINUED | OUTPATIENT
Start: 2024-09-18 | End: 2024-09-19 | Stop reason: HOSPADM

## 2024-09-18 RX ORDER — AMOXICILLIN 250 MG
2 CAPSULE ORAL 2 TIMES DAILY PRN
Status: DISCONTINUED | OUTPATIENT
Start: 2024-09-18 | End: 2024-09-18

## 2024-09-18 RX ADMIN — TAMSULOSIN HYDROCHLORIDE 0.8 MG: 0.4 CAPSULE ORAL at 21:17

## 2024-09-18 RX ADMIN — ATORVASTATIN CALCIUM 80 MG: 80 TABLET, FILM COATED ORAL at 21:17

## 2024-09-18 RX ADMIN — IOPAMIDOL 85 ML: 612 INJECTION, SOLUTION INTRAVENOUS at 11:17

## 2024-09-18 RX ADMIN — DIGOXIN 250 MCG: 0.25 INJECTION INTRAMUSCULAR; INTRAVENOUS at 20:03

## 2024-09-18 RX ADMIN — METOPROLOL SUCCINATE 50 MG: 25 TABLET, EXTENDED RELEASE ORAL at 16:06

## 2024-09-18 RX ADMIN — CILOSTAZOL 100 MG: 100 TABLET ORAL at 21:16

## 2024-09-18 RX ADMIN — BUDESONIDE AND FORMOTEROL FUMARATE DIHYDRATE 2 PUFF: 160; 4.5 AEROSOL RESPIRATORY (INHALATION) at 19:28

## 2024-09-18 RX ADMIN — DIGOXIN 250 MCG: 0.25 INJECTION INTRAMUSCULAR; INTRAVENOUS at 15:15

## 2024-09-18 RX ADMIN — BISACODYL 5 MG: 5 TABLET, COATED ORAL at 21:17

## 2024-09-18 RX ADMIN — CILOSTAZOL 100 MG: 100 TABLET ORAL at 16:06

## 2024-09-18 RX ADMIN — BRIMONIDINE TARTRATE 1 DROP: 2 SOLUTION OPHTHALMIC at 21:20

## 2024-09-18 RX ADMIN — CEFTRIAXONE 2000 MG: 2 INJECTION, POWDER, FOR SOLUTION INTRAMUSCULAR; INTRAVENOUS at 11:45

## 2024-09-18 RX ADMIN — SENNOSIDES AND DOCUSATE SODIUM 2 TABLET: 50; 8.6 TABLET ORAL at 16:06

## 2024-09-18 RX ADMIN — METOPROLOL SUCCINATE 100 MG: 25 TABLET, EXTENDED RELEASE ORAL at 21:17

## 2024-09-18 RX ADMIN — DABIGATRAN ETEXILATE MESYLATE 150 MG: 150 CAPSULE ORAL at 21:16

## 2024-09-18 RX ADMIN — ONDANSETRON 4 MG: 2 INJECTION, SOLUTION INTRAMUSCULAR; INTRAVENOUS at 10:40

## 2024-09-18 RX ADMIN — SODIUM CHLORIDE, POTASSIUM CHLORIDE, SODIUM LACTATE AND CALCIUM CHLORIDE 500 ML: 600; 310; 30; 20 INJECTION, SOLUTION INTRAVENOUS at 10:39

## 2024-09-18 RX ADMIN — LATANOPROST 1 DROP: 50 SOLUTION OPHTHALMIC at 21:20

## 2024-09-18 RX ADMIN — POLYETHYLENE GLYCOL 3350 17 G: 17 POWDER, FOR SOLUTION ORAL at 16:06

## 2024-09-18 RX ADMIN — EMPAGLIFLOZIN 10 MG: 10 TABLET, FILM COATED ORAL at 16:06

## 2024-09-18 RX ADMIN — METOPROLOL TARTRATE 5 MG: 1 INJECTION, SOLUTION INTRAVENOUS at 10:46

## 2024-09-19 ENCOUNTER — READMISSION MANAGEMENT (OUTPATIENT)
Dept: CALL CENTER | Facility: HOSPITAL | Age: 79
End: 2024-09-19
Payer: MEDICARE

## 2024-09-19 VITALS
OXYGEN SATURATION: 94 % | HEIGHT: 70 IN | SYSTOLIC BLOOD PRESSURE: 132 MMHG | HEART RATE: 111 BPM | WEIGHT: 162.7 LBS | TEMPERATURE: 97.7 F | RESPIRATION RATE: 16 BRPM | DIASTOLIC BLOOD PRESSURE: 83 MMHG | BODY MASS INDEX: 23.29 KG/M2

## 2024-09-19 PROBLEM — E78.5 HYPERLIPIDEMIA: Status: ACTIVE | Noted: 2023-12-20

## 2024-09-19 LAB
ANION GAP SERPL CALCULATED.3IONS-SCNC: 12.4 MMOL/L (ref 5–15)
BUN SERPL-MCNC: 10 MG/DL (ref 8–23)
BUN/CREAT SERPL: 11.5 (ref 7–25)
CALCIUM SPEC-SCNC: 9 MG/DL (ref 8.6–10.5)
CHLORIDE SERPL-SCNC: 106 MMOL/L (ref 98–107)
CO2 SERPL-SCNC: 23.6 MMOL/L (ref 22–29)
CREAT SERPL-MCNC: 0.87 MG/DL (ref 0.76–1.27)
DEPRECATED RDW RBC AUTO: 46.5 FL (ref 37–54)
EGFRCR SERPLBLD CKD-EPI 2021: 88.9 ML/MIN/1.73
ERYTHROCYTE [DISTWIDTH] IN BLOOD BY AUTOMATED COUNT: 16 % (ref 12.3–15.4)
GLUCOSE SERPL-MCNC: 107 MG/DL (ref 65–99)
HCT VFR BLD AUTO: 39.1 % (ref 37.5–51)
HGB BLD-MCNC: 12.5 G/DL (ref 13–17.7)
MCH RBC QN AUTO: 25.9 PG (ref 26.6–33)
MCHC RBC AUTO-ENTMCNC: 32 G/DL (ref 31.5–35.7)
MCV RBC AUTO: 81 FL (ref 79–97)
PLATELET # BLD AUTO: 298 10*3/MM3 (ref 140–450)
PMV BLD AUTO: 8.8 FL (ref 6–12)
POTASSIUM SERPL-SCNC: 3.4 MMOL/L (ref 3.5–5.2)
QT INTERVAL: 326 MS
QTC INTERVAL: 455 MS
RBC # BLD AUTO: 4.83 10*6/MM3 (ref 4.14–5.8)
SODIUM SERPL-SCNC: 142 MMOL/L (ref 136–145)
WBC NRBC COR # BLD AUTO: 8.02 10*3/MM3 (ref 3.4–10.8)

## 2024-09-19 PROCEDURE — 80048 BASIC METABOLIC PNL TOTAL CA: CPT | Performed by: STUDENT IN AN ORGANIZED HEALTH CARE EDUCATION/TRAINING PROGRAM

## 2024-09-19 PROCEDURE — 94664 DEMO&/EVAL PT USE INHALER: CPT

## 2024-09-19 PROCEDURE — 96376 TX/PRO/DX INJ SAME DRUG ADON: CPT

## 2024-09-19 PROCEDURE — 94761 N-INVAS EAR/PLS OXIMETRY MLT: CPT

## 2024-09-19 PROCEDURE — 85027 COMPLETE CBC AUTOMATED: CPT | Performed by: STUDENT IN AN ORGANIZED HEALTH CARE EDUCATION/TRAINING PROGRAM

## 2024-09-19 PROCEDURE — G0378 HOSPITAL OBSERVATION PER HR: HCPCS

## 2024-09-19 PROCEDURE — 94799 UNLISTED PULMONARY SVC/PX: CPT

## 2024-09-19 PROCEDURE — 99214 OFFICE O/P EST MOD 30 MIN: CPT | Performed by: INTERNAL MEDICINE

## 2024-09-19 PROCEDURE — 25010000002 DIGOXIN PER 500 MCG: Performed by: INTERNAL MEDICINE

## 2024-09-19 RX ORDER — BISACODYL 10 MG
10 SUPPOSITORY, RECTAL RECTAL DAILY PRN
Qty: 15 SUPPOSITORY | Refills: 0 | Status: SHIPPED | OUTPATIENT
Start: 2024-09-19

## 2024-09-19 RX ORDER — AMOXICILLIN 250 MG
2 CAPSULE ORAL 2 TIMES DAILY
Qty: 60 TABLET | Refills: 0 | Status: SHIPPED | OUTPATIENT
Start: 2024-09-19

## 2024-09-19 RX ORDER — BISACODYL 5 MG/1
5 TABLET, DELAYED RELEASE ORAL DAILY PRN
Qty: 10 TABLET | Refills: 0 | Status: SHIPPED | OUTPATIENT
Start: 2024-09-19

## 2024-09-19 RX ORDER — POLYETHYLENE GLYCOL 3350 17 G/17G
17 POWDER, FOR SOLUTION ORAL 2 TIMES DAILY PRN
Qty: 10 PACKET | Refills: 0 | Status: SHIPPED | OUTPATIENT
Start: 2024-09-19

## 2024-09-19 RX ORDER — DIGOXIN 0.25 MG/ML
500 INJECTION INTRAMUSCULAR; INTRAVENOUS ONCE
Status: COMPLETED | OUTPATIENT
Start: 2024-09-19 | End: 2024-09-19

## 2024-09-19 RX ADMIN — DABIGATRAN ETEXILATE MESYLATE 150 MG: 150 CAPSULE ORAL at 08:37

## 2024-09-19 RX ADMIN — SPIRONOLACTONE 25 MG: 25 TABLET, FILM COATED ORAL at 08:37

## 2024-09-19 RX ADMIN — CLOPIDOGREL BISULFATE 75 MG: 75 TABLET, FILM COATED ORAL at 08:37

## 2024-09-19 RX ADMIN — TIOTROPIUM BROMIDE INHALATION SPRAY 2 PUFF: 3.12 SPRAY, METERED RESPIRATORY (INHALATION) at 08:03

## 2024-09-19 RX ADMIN — BUDESONIDE AND FORMOTEROL FUMARATE DIHYDRATE 2 PUFF: 160; 4.5 AEROSOL RESPIRATORY (INHALATION) at 08:00

## 2024-09-19 RX ADMIN — EMPAGLIFLOZIN 10 MG: 10 TABLET, FILM COATED ORAL at 08:37

## 2024-09-19 RX ADMIN — BRIMONIDINE TARTRATE 1 DROP: 2 SOLUTION OPHTHALMIC at 08:36

## 2024-09-19 RX ADMIN — BISACODYL 10 MG: 10 SUPPOSITORY RECTAL at 08:46

## 2024-09-19 RX ADMIN — SENNOSIDES AND DOCUSATE SODIUM 2 TABLET: 50; 8.6 TABLET ORAL at 08:37

## 2024-09-19 RX ADMIN — CILOSTAZOL 100 MG: 100 TABLET ORAL at 08:36

## 2024-09-19 RX ADMIN — PANTOPRAZOLE SODIUM 40 MG: 40 TABLET, DELAYED RELEASE ORAL at 08:37

## 2024-09-19 RX ADMIN — BUMETANIDE 1 MG: 1 TABLET ORAL at 08:36

## 2024-09-19 RX ADMIN — DIGOXIN 500 MCG: 0.25 INJECTION INTRAMUSCULAR; INTRAVENOUS at 08:36

## 2024-09-19 RX ADMIN — POLYETHYLENE GLYCOL 3350 17 G: 17 POWDER, FOR SOLUTION ORAL at 08:37

## 2024-09-19 RX ADMIN — METOPROLOL SUCCINATE 50 MG: 25 TABLET, EXTENDED RELEASE ORAL at 08:37

## 2024-09-20 ENCOUNTER — TRANSITIONAL CARE MANAGEMENT TELEPHONE ENCOUNTER (OUTPATIENT)
Dept: CALL CENTER | Facility: HOSPITAL | Age: 79
End: 2024-09-20
Payer: MEDICARE

## 2024-09-20 LAB — BACTERIA SPEC AEROBE CULT: ABNORMAL

## 2024-09-21 DIAGNOSIS — G45.9 TIA (TRANSIENT ISCHEMIC ATTACK): Chronic | ICD-10-CM

## 2024-09-23 ENCOUNTER — OFFICE VISIT (OUTPATIENT)
Dept: INTERNAL MEDICINE | Age: 79
End: 2024-09-23
Payer: MEDICARE

## 2024-09-23 ENCOUNTER — TREATMENT (OUTPATIENT)
Dept: PHYSICAL THERAPY | Facility: CLINIC | Age: 79
End: 2024-09-23
Payer: MEDICARE

## 2024-09-23 VITALS
OXYGEN SATURATION: 97 % | SYSTOLIC BLOOD PRESSURE: 102 MMHG | HEART RATE: 106 BPM | WEIGHT: 158 LBS | BODY MASS INDEX: 22.62 KG/M2 | DIASTOLIC BLOOD PRESSURE: 66 MMHG | TEMPERATURE: 97.1 F | HEIGHT: 70 IN

## 2024-09-23 DIAGNOSIS — G89.29 CHRONIC NECK PAIN: Primary | ICD-10-CM

## 2024-09-23 DIAGNOSIS — M54.2 NECK PAIN: ICD-10-CM

## 2024-09-23 DIAGNOSIS — Z09 S/P NECK SURGERY, FOLLOW-UP EXAM: ICD-10-CM

## 2024-09-23 DIAGNOSIS — R82.79 POSITIVE URINE CULTURE: ICD-10-CM

## 2024-09-23 DIAGNOSIS — R10.30 LOWER ABDOMINAL PAIN: Primary | ICD-10-CM

## 2024-09-23 DIAGNOSIS — Z98.890 HISTORY OF NECK SURGERY: ICD-10-CM

## 2024-09-23 DIAGNOSIS — M48.02 CERVICAL STENOSIS OF SPINE: ICD-10-CM

## 2024-09-23 DIAGNOSIS — M54.2 CHRONIC NECK PAIN: Primary | ICD-10-CM

## 2024-09-23 DIAGNOSIS — G95.20 CERVICAL CORD COMPRESSION WITH MYELOPATHY: ICD-10-CM

## 2024-09-23 DIAGNOSIS — K59.00 CONSTIPATION, UNSPECIFIED CONSTIPATION TYPE: ICD-10-CM

## 2024-09-23 DIAGNOSIS — R29.898 DECREASED RANGE OF MOTION OF NECK: ICD-10-CM

## 2024-09-23 PROCEDURE — 3074F SYST BP LT 130 MM HG: CPT | Performed by: INTERNAL MEDICINE

## 2024-09-23 PROCEDURE — 1126F AMNT PAIN NOTED NONE PRSNT: CPT | Performed by: INTERNAL MEDICINE

## 2024-09-23 PROCEDURE — 3078F DIAST BP <80 MM HG: CPT | Performed by: INTERNAL MEDICINE

## 2024-09-23 PROCEDURE — 99213 OFFICE O/P EST LOW 20 MIN: CPT | Performed by: INTERNAL MEDICINE

## 2024-09-23 PROCEDURE — 97112 NEUROMUSCULAR REEDUCATION: CPT

## 2024-09-23 PROCEDURE — 97110 THERAPEUTIC EXERCISES: CPT

## 2024-09-23 RX ORDER — ATORVASTATIN CALCIUM 80 MG/1
80 TABLET, FILM COATED ORAL NIGHTLY
Qty: 90 TABLET | Refills: 1 | Status: SHIPPED | OUTPATIENT
Start: 2024-09-23

## 2024-09-23 RX ORDER — TRALOKINUMAB-LDRM 150 MG/ML
300 INJECTION, SOLUTION SUBCUTANEOUS
COMMUNITY
Start: 2024-09-18

## 2024-09-24 LAB
APPEARANCE UR: CLEAR
BACTERIA #/AREA URNS HPF: NORMAL /[HPF]
BILIRUB UR QL STRIP: NEGATIVE
CASTS URNS QL MICRO: NORMAL /LPF
COLOR UR: YELLOW
EPI CELLS #/AREA URNS HPF: NORMAL /HPF (ref 0–10)
GLUCOSE UR QL STRIP: ABNORMAL
HGB UR QL STRIP: NEGATIVE
KETONES UR QL STRIP: NEGATIVE
LEUKOCYTE ESTERASE UR QL STRIP: NEGATIVE
MICRO URNS: ABNORMAL
MICRO URNS: ABNORMAL
NITRITE UR QL STRIP: NEGATIVE
PH UR STRIP: 6 [PH] (ref 5–7.5)
PROT UR QL STRIP: NEGATIVE
RBC #/AREA URNS HPF: NORMAL /HPF (ref 0–2)
SP GR UR STRIP: 1.03 (ref 1–1.03)
URINALYSIS REFLEX: ABNORMAL
UROBILINOGEN UR STRIP-MCNC: 1 MG/DL (ref 0.2–1)
WBC #/AREA URNS HPF: NORMAL /HPF (ref 0–5)

## 2024-09-25 ENCOUNTER — TREATMENT (OUTPATIENT)
Dept: PHYSICAL THERAPY | Facility: CLINIC | Age: 79
End: 2024-09-25
Payer: MEDICARE

## 2024-09-25 DIAGNOSIS — G89.29 CHRONIC NECK PAIN: Primary | ICD-10-CM

## 2024-09-25 DIAGNOSIS — Z09 S/P NECK SURGERY, FOLLOW-UP EXAM: ICD-10-CM

## 2024-09-25 DIAGNOSIS — Z98.890 HISTORY OF NECK SURGERY: ICD-10-CM

## 2024-09-25 DIAGNOSIS — M54.2 NECK PAIN: ICD-10-CM

## 2024-09-25 DIAGNOSIS — M54.2 CHRONIC NECK PAIN: Primary | ICD-10-CM

## 2024-09-25 DIAGNOSIS — G95.20 CERVICAL CORD COMPRESSION WITH MYELOPATHY: ICD-10-CM

## 2024-09-25 DIAGNOSIS — M48.02 CERVICAL STENOSIS OF SPINE: ICD-10-CM

## 2024-09-25 DIAGNOSIS — R29.898 DECREASED RANGE OF MOTION OF NECK: ICD-10-CM

## 2024-09-30 ENCOUNTER — TREATMENT (OUTPATIENT)
Dept: PHYSICAL THERAPY | Facility: CLINIC | Age: 79
End: 2024-09-30
Payer: MEDICARE

## 2024-09-30 DIAGNOSIS — M54.2 CHRONIC NECK PAIN: Primary | ICD-10-CM

## 2024-09-30 DIAGNOSIS — Z98.890 HISTORY OF NECK SURGERY: ICD-10-CM

## 2024-09-30 DIAGNOSIS — G89.29 CHRONIC NECK PAIN: Primary | ICD-10-CM

## 2024-09-30 DIAGNOSIS — R29.898 DECREASED RANGE OF MOTION OF NECK: ICD-10-CM

## 2024-09-30 DIAGNOSIS — Z09 S/P NECK SURGERY, FOLLOW-UP EXAM: ICD-10-CM

## 2024-09-30 DIAGNOSIS — M54.2 NECK PAIN: ICD-10-CM

## 2024-09-30 DIAGNOSIS — M48.02 CERVICAL STENOSIS OF SPINE: ICD-10-CM

## 2024-09-30 DIAGNOSIS — G95.20 CERVICAL CORD COMPRESSION WITH MYELOPATHY: ICD-10-CM

## 2024-09-30 PROCEDURE — 97110 THERAPEUTIC EXERCISES: CPT

## 2024-09-30 PROCEDURE — 97112 NEUROMUSCULAR REEDUCATION: CPT

## 2024-09-30 NOTE — PROGRESS NOTES
Physical Therapy Daily Treatment Note  [facility] [place]  [address], [address]  [place] [address]     Patient: Aj Salazar  : 1945  Referring practitioner: Pedro Mancuso MD  Today's Date: 2024    VISIT#: 6    Visit Diagnoses:    ICD-10-CM ICD-9-CM   1. Chronic neck pain  M54.2 723.1    G89.29 338.29   2. History of neck surgery  Z98.890 V15.29   3. Decreased range of motion of neck  R29.898 723.8   4. S/P neck surgery, follow-up exam  Z09 V67.09   5. Cervical cord compression with myelopathy  G95.20 336.9   6. Neck pain  M54.2 723.1   7. Cervical stenosis of spine  M48.02 723.0       Subjective   Aj Salazar reports: Patient reports that he is doing well, a little sore in both of his upper traps. He reports that he feels like he is gaining range of motion and will see Dr. Mancuso on Friday. He did stop the the open book exercise due to increased lumbar pain.       Objective       See Exercise, Manual, and Modality Logs for complete treatment.     Patient Education: HEP review  Exercise rationale/ pain free exercise performance  Alternate exercise positions  Verbal/Tactile cues to ensure correct exercise performance/technique       Assessment/Plan  Patient continues to demonstrate good tolerance to therapeutic activities, with some discomfort reported throughout. Cervical side-bending and extension continue to be challenging. Continued to focus on gentle ROM and thoracic mobility along with scapular stabilizer strengthening. Will continue to progress slowly as tolerated.      Progress per Plan of Care and Progress strengthening /stabilization /functional activity          Timed:         Manual Therapy:    -     mins  01054;     Therapeutic Exercise:    26     mins  25820;     Neuromuscular Panchito:    15    mins  24751;    Therapeutic Activity:     -     mins  22675;     Gait Training:           mins  78130;     Ultrasound:          mins  17749;     Ionto:                                   mins  66928  Self Care:                       -     mins  11600    Un-Timed:  Electrical Stimulation:         mins  32340 ( );  Dry Needling          mins self-pay  Traction          mins 50796  Re-Eval                               mins  58745  Group Therapy           ___ mins 69225    Timed Treatment:   41   mins   Total Treatment:     41   mins    Melida Kilpatrick PT  Physical Therapist  [place] license #: [id number]

## 2024-10-01 ENCOUNTER — TELEPHONE (OUTPATIENT)
Dept: ORTHOPEDICS | Facility: OTHER | Age: 79
End: 2024-10-01
Payer: MEDICARE

## 2024-10-01 NOTE — TELEPHONE ENCOUNTER
HAD MORE SURGERY ON HIS NECK AND NEEDS TO WAIT ON PHYSICAL THERAPY. WILL CALL BACK TO RESCHEDULE.

## 2024-10-02 ENCOUNTER — OFFICE VISIT (OUTPATIENT)
Dept: INTERNAL MEDICINE | Age: 79
End: 2024-10-02
Payer: MEDICARE

## 2024-10-02 VITALS
HEIGHT: 70 IN | TEMPERATURE: 96.9 F | BODY MASS INDEX: 22.62 KG/M2 | OXYGEN SATURATION: 99 % | DIASTOLIC BLOOD PRESSURE: 72 MMHG | HEART RATE: 101 BPM | SYSTOLIC BLOOD PRESSURE: 104 MMHG | RESPIRATION RATE: 19 BRPM | WEIGHT: 158 LBS

## 2024-10-02 DIAGNOSIS — R10.30 LOWER ABDOMINAL PAIN: Primary | ICD-10-CM

## 2024-10-02 DIAGNOSIS — K59.00 CONSTIPATION, UNSPECIFIED CONSTIPATION TYPE: ICD-10-CM

## 2024-10-02 PROCEDURE — 1125F AMNT PAIN NOTED PAIN PRSNT: CPT | Performed by: PHYSICIAN ASSISTANT

## 2024-10-02 PROCEDURE — 3074F SYST BP LT 130 MM HG: CPT | Performed by: PHYSICIAN ASSISTANT

## 2024-10-02 PROCEDURE — 3078F DIAST BP <80 MM HG: CPT | Performed by: PHYSICIAN ASSISTANT

## 2024-10-02 PROCEDURE — 99214 OFFICE O/P EST MOD 30 MIN: CPT | Performed by: PHYSICIAN ASSISTANT

## 2024-10-02 RX ORDER — TAMSULOSIN HYDROCHLORIDE 0.4 MG/1
CAPSULE ORAL
COMMUNITY
Start: 2024-09-28

## 2024-10-02 NOTE — PROGRESS NOTES
"    I N T E R N A L  M E D I C I N E    HUMZA FREEMAN PA-C      ENCOUNTER DATE:  10/02/2024    Aj Salazar / 78 y.o. / male    CHIEF COMPLAINT / REASON FOR OFFICE VISIT     Abdominal Pain (Lower- still uncomfortable; pt taking miralax 4 to five days in a row. Pt seen Dr. Amezcua last appt. He did a urine sample and he going back to regular instead constipation. )      ASSESSMENT & PLAN     1. Lower abdominal pain    2. Constipation, unspecified constipation type      Orders Placed This Encounter   Procedures    Ambulatory Referral to Gastroenterology     No orders of the defined types were placed in this encounter.      SUMMARY/DISCUSSION  Referral to GI per patient request due to bowel movement changes following cervical spine surgery.   Dietary counseling performed to increase dietary fiber along with oral hydration with water.   Continue current therapies with Laxatives and stool softeners as needed.       TOTAL TIME OF ENCOUNTER:        Next Appointment:     Return in about 2 weeks (around 10/16/2024), or if symptoms worsen or fail to improve. And, for Follow-up with Dr. Amezcua.        VITAL SIGNS     Vitals:    10/02/24 1034   BP: 104/72   BP Location: Left arm   Patient Position: Sitting   Cuff Size: Adult   Pulse: 101   Resp: 19   Temp: 96.9 °F (36.1 °C)   TempSrc: Temporal   SpO2: 99%   Weight: 71.7 kg (158 lb)   Height: 177.8 cm (70\")       BP Readings from Last 3 Encounters:   10/02/24 104/72   09/23/24 102/66   09/19/24 132/83     Wt Readings from Last 3 Encounters:   10/02/24 71.7 kg (158 lb)   09/23/24 71.7 kg (158 lb)   09/19/24 73.8 kg (162 lb 11.2 oz)     Body mass index is 22.67 kg/m².    [facility] Blood Pressure Flowsheet Systolic Diastolic Pulse   4/29/2022   6:00  74 104   4/25/2022   6:00  73 81   4/22/2022   6:00  60 108   4/18/2022   6:00  61 97   4/15/2022   6:00  69 88   4/11/2022   6:00  76 85   4/8/2022   6:00 AM 98 62 87   4/4/2022   6:00  66 93 "   4/1/2022   6:00  61 91   3/28/2022   6:00  78 78        MEDICATIONS AT THE TIME OF OFFICE VISIT     Current Outpatient Medications on File Prior to Visit   Medication Sig    Adbry 150 MG/ML solution prefilled syringe Inject 300 mg under the skin into the appropriate area as directed Every 14 (Fourteen) Days.    ALPHAGAN P 0.1 % solution ophthalmic solution Administer 1 drop to both eyes 2 (two) times a day.    atorvastatin (LIPITOR) 80 MG tablet TAKE 1 TABLET EVERY NIGHT    bumetanide (BUMEX) 1 MG tablet Take 1 tablet by mouth Daily.    cilostazol (PLETAL) 100 MG tablet Take 1 tablet by mouth 2 (Two) Times a Day.    clopidogrel (PLAVIX) 75 MG tablet Take 1 tablet by mouth Daily.    dabigatran etexilate (PRADAXA) 150 MG capsu Take 1 capsule by mouth 2 (Two) Times a Day.    Fluticasone-Umeclidin-Vilant (Trelegy Ellipta) 200-62.5-25 MCG/ACT aerosol powder  Inhale 1 puff Daily.    glucose blood (Accu-Chek Guide) test strip Use to check FBS once daily . Dm2 with peripheral  neuropathy E11.51    glucose blood test strip Check FBS once daily .DM2 /E11.51    Jardiance 10 MG tablet tablet Take 1 tablet by mouth Daily.    Lancets Misc. (Accu-Chek Softclix Lancet Dev) kit Use to check FBS once daily . Dm2 with peripheral  neuropathy E11.51    metFORMIN ER (GLUCOPHAGE-XR) 500 MG 24 hr tablet TAKE 1 TABLET EVERY DAY WITH DINNER    metoprolol succinate XL (TOPROL-XL) 100 MG 24 hr tablet Take 1 tablet by mouth Every Night. Take 100 mg at night and 50 mg in the morning    metoprolol succinate XL (TOPROL-XL) 50 MG 24 hr tablet Take 1 tablet by mouth Daily. Take 50 mg every morning and 100 mg every night    Mirabegron ER (MYRBETRIQ) 25 MG tablet sustained-release 24 hour 24 hr tablet Take 1 tablet by mouth Every Night.    pantoprazole (PROTONIX) 40 MG EC tablet Take 1 tablet by mouth 2 (Two) Times a Day.    polyethylene glycol (MIRALAX) 17 g packet Take 17 g by mouth 2 (Two) Times a Day As Needed (constipation).     spironolactone (ALDACTONE) 25 MG tablet Take 1 tablet by mouth Daily.    Tafluprost, PF, (ZIOPTAN) 0.0015 % solution ophthalmic solution Administer 1 drop to both eyes Every Night.    tamsulosin (FLOMAX) 0.4 MG capsule 24 hr capsule      No current facility-administered medications on file prior to visit.          HISTORY OF PRESENT ILLNESS     Pt is a 77y/o wm with hypertension, hyperlipidemia, and diabetes who presents with complaint of ongoing lower abdominal discomfort.     He was recently seen by Dr. Amezcua on 9/23/24 for the same complaint and was treated for Constipation and asked to follow up. He was recently admitted 9/18/24 for same complaint and found to have a UTI. CTA radiology reading of abdomen/pelvis did not mention evidence of acute findings, diverticulitis, or bowel obstruction. Patient states that his abdominal/bowel symptoms seemed to begin following cervical disc C4-C5 disc herniation repair performed on 8/21/24. On today he states that he has not had a bowel movement yet on today despite daily use of stool softeners. He reports semi-firm bowel movements once daily when using the medication. His lower abdominal discomfort is dull and achy in quality. He has attempted an improved diet with increased fiber via fruits and vegetable. He denies nausea, vomiting, or hard stools. Though his symptoms have not worsened, they have not gotten better.     He is agreeable to referral for Colonoscopy.     REVIEW OF SYSTEMS     Review of Systems   All other systems reviewed and are negative.     As Per HPI.      PHYSICAL EXAMINATION     Physical Exam  Vitals and nursing note reviewed.   Constitutional:       General: He is not in acute distress.     Appearance: Normal appearance. He is not ill-appearing.   HENT:      Head: Normocephalic and atraumatic.   Cardiovascular:      Rate and Rhythm: Normal rate and regular rhythm.      Pulses: Normal pulses.      Heart sounds: Normal heart sounds. No murmur heard.     No  friction rub. No gallop.   Pulmonary:      Effort: Pulmonary effort is normal. No respiratory distress.      Breath sounds: Normal breath sounds. No stridor. No wheezing.   Abdominal:      General: Abdomen is flat. Bowel sounds are normal. There is no distension.      Palpations: Abdomen is soft. There is no mass.      Tenderness: There is abdominal tenderness (Lower mid epigastric). There is no right CVA tenderness, left CVA tenderness, guarding or rebound.      Hernia: No hernia is present.   Neurological:      Mental Status: He is alert.   Psychiatric:         Mood and Affect: Mood normal.         Behavior: Behavior normal.             REVIEWED DATA     Labs:     Lab Results   Component Value Date    TRIG 65 07/25/2024    HDL 51 07/25/2024    LDL 51 07/25/2024    VLDL 14 07/25/2024    HGBA1C 6.50 (H) 08/26/2024           Imaging:           Medical Tests:           Summary of old records / correspondence / consultant report:           Request outside records:

## 2024-10-03 ENCOUNTER — HOSPITAL ENCOUNTER (OUTPATIENT)
Facility: HOSPITAL | Age: 79
Discharge: HOME | End: 2024-10-03
Payer: MEDICARE

## 2024-10-03 ENCOUNTER — OFFICE VISIT (OUTPATIENT)
Age: 79
End: 2024-10-03
Payer: MEDICARE

## 2024-10-03 VITALS
SYSTOLIC BLOOD PRESSURE: 132 MMHG | WEIGHT: 158 LBS | DIASTOLIC BLOOD PRESSURE: 80 MMHG | HEIGHT: 70 IN | BODY MASS INDEX: 22.62 KG/M2

## 2024-10-03 DIAGNOSIS — I70.235 ATHEROSCLEROSIS OF NATIVE ARTERY OF RIGHT LOWER EXTREMITY WITH ULCERATION OF OTHER PART OF FOOT: ICD-10-CM

## 2024-10-03 DIAGNOSIS — G45.9 TIA (TRANSIENT ISCHEMIC ATTACK): Chronic | ICD-10-CM

## 2024-10-03 DIAGNOSIS — I73.00 RAYNAUD'S DISEASE WITHOUT GANGRENE: Chronic | ICD-10-CM

## 2024-10-03 DIAGNOSIS — I70.233 ATHEROSCLEROSIS OF NATIVE ARTERY OF RIGHT LOWER EXTREMITY WITH ULCERATION OF ANKLE: ICD-10-CM

## 2024-10-03 DIAGNOSIS — I65.23 BILATERAL CAROTID ARTERY STENOSIS: Primary | ICD-10-CM

## 2024-10-03 DIAGNOSIS — I73.9 CLAUDICATION: ICD-10-CM

## 2024-10-03 PROCEDURE — 93922 UPR/L XTREMITY ART 2 LEVELS: CPT

## 2024-10-03 PROCEDURE — 93971 EXTREMITY STUDY: CPT

## 2024-10-03 RX ORDER — CILOSTAZOL 100 MG/1
100 TABLET ORAL 2 TIMES DAILY
Qty: 180 TABLET | Refills: 3 | Status: SHIPPED | OUTPATIENT
Start: 2024-10-03

## 2024-10-03 NOTE — PROGRESS NOTES
Patient Name: Aj Salazar    MRN: [id number] Encounter Date: 10/03/2024      Consulting Service: Vascular Surgery    Referring Provider: No ref. provider found       CHIEF COMPLAINT:  Chief Complaint   Patient presents with    Claudication       Subjective    HPI: Aj Salazar is a 78 y.o. male is being seen for evaluation/management of peripheral vascular disease follow-up.  The patient has known peripheral vascular disease with symptoms of claudication.  Current symptoms of claudication at 3 blocks distance are noted without lifestyle limitation.  Prior interventions include none.  Currently the patient reports symptoms are improved.  Testing today includes ABIs.  Their current medical regimen includes antiplatelet and Pletal  medications.  The patient appears to have normal sensation.  Discussion as to the protection of feet and toes with prevention of injury including twice a day monitoring of feet and all digits, avoidance of walking barefoot, and the use of lightly colored socks to note any drainage were reviewed with the patient.  Plan moving forward will include continue to follow-up with noninvasive testing.    PAST MEDICAL HISTORY:   Past Medical History:   Diagnosis Date    Alcohol abuse, in remission     Asthma copd    BPH (benign prostatic hypertrophy)     see doctors at [facility]    Cancer     SKIN SEVERAL TIMES    Cataract     CHF (congestive heart failure)     COPD (chronic obstructive pulmonary disease)     Depression     Diabetes mellitus     DJD (degenerative joint disease) of cervical spine     ED (erectile dysfunction)     [facility]    GERD (gastroesophageal reflux disease)     Glaucoma     History of prediabetes     Hx of colonic polyps     followed by GI ([facility])    Hyperlipidemia     Hypertension     Influenza B 02/14/2013        Low back pain     Nocturnal hypoxia     Open wound of right lower leg 04/15/2024    *Managed by wound clinic       Osteoarthritis     HIPS,  HANDS, MULTIPLE SITES    Osteoarthritis 03/17/2016    Peripheral neuropathy     Permanent atrial fibrillation     Pneumonia     Pneumonia due to infectious organism 04/23/2023    Rectal bleeding 04/26/2017    Sleep apnea     USES CPAP    Tendonitis of shoulder 11/07/2007    RIGHT SHOULDER/DELTOID INSERTION    TIA (transient ischemic attack) 10/2020    Ulcer of the stomach and intestine       PAST SURGICAL HISTORY:   Past Surgical History:   Procedure Laterality Date    ANTERIOR CERVICAL DISCECTOMY W/ FUSION Bilateral 8/21/2024    Procedure: Anterior cervical partial corpectomy at cervical 5 for the purposes of cervical 4 cervical 5 and cervical 5 cervical 6 discectomy and fusion using allograft cadaver bone and metallic instrumentation;  Surgeon: Pedro Mancuso MD;  Location: [facility] OR;  Service: Neurosurgery;  Laterality: Bilateral;    APPENDECTOMY      CARDIOVASCULAR STRESS TEST N/A 10/20/2015    NML LEXISCAN CARDIOLITE PERFUSION STUDY, NO EVIDENCE OF ISCHEMIA, AREA OF HYPOPERFUSION OF THE INFERIOR WALL W/NORMAL WALL PERFUSION AND NML LEFT VENTRICULAR EJECTION FRACTION, MOST LIKELY ATTENUATION. DR.MICHAEL BOX    CATARACT EXTRACTION Bilateral 02/2023    COLONOSCOPY N/A 02/2017    COLONOSCOPY N/A 02/23/2011    4 POLYPS REMOVED, RESCOPE IN 5 YRS, DR. EAGLE    COLONOSCOPY N/A 03/08/2006    ERYTHEMOUS AND GRANULAR MUCOSA IN ILEOCECAL VALVE, NORMAL COLON, REPEAT IN 5 YRS,     ENDOSCOPY N/A 09/26/2018    normal esophagus, acute gastritis, multiple non-bleeding duodenal ulcers with pigmented material, H Pylori positive    HERNIA REPAIR Bilateral     INGUINAL    JOINT REPLACEMENT  11/2015    left hip    TOTAL HIP ARTHROPLASTY Left 11/06/2015    Dr Ankush Sky    TOTAL HIP ARTHROPLASTY Right 10/27/2014    DR.RIED SKY    VASECTOMY        FAMILY HISTORY:   Family History   Problem Relation Age of Onset    Cancer Mother         Bladder cancer    Colon cancer Mother     Ovarian cancer Mother      Alzheimer's disease Mother 70    Hyperlipidemia Father     Heart disease Father     Coronary artery disease Father     Hypertension Father     Stroke Sister         2016    Dementia Sister     Heart disease Brother     Alcohol abuse Brother     Heart disease Brother     Coronary artery disease Brother     Hypertension Brother     Stroke Brother     Arthritis Brother     Colon cancer Maternal Grandmother     Prostate cancer Neg Hx     Malig Hyperthermia Neg Hx       SOCIAL HISTORY:   Social History     Tobacco Use    Smoking status: Former     Current packs/day: 0.00     Average packs/day: 2.0 packs/day for 49.0 years (98.0 ttl pk-yrs)     Types: Cigarettes     Start date: 1961     Quit date: 2010     Years since quittin.7     Passive exposure: Past    Smokeless tobacco: Never    Tobacco comments:     long smoking history   Vaping Use    Vaping status: Never Used   Substance Use Topics    Alcohol use: No     Comment: 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; 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    Drug use: No     Comment: caffeine use       MEDICATIONS:   Current Outpatient Medications on File Prior to Visit   Medication Sig Dispense Refill    Adbry 150 MG/ML solution prefilled syringe Inject 300 mg under the skin into the appropriate area as directed Every 14 (Fourteen) Days.      ALPHAGAN P 0.1 % solution ophthalmic solution Administer 1 drop to both eyes 2 (two) times a day.  6    atorvastatin (LIPITOR) 80 MG tablet TAKE 1 TABLET EVERY NIGHT 90 tablet 1    bumetanide (BUMEX) 1 MG tablet Take 1 tablet by mouth Daily.      cilostazol (PLETAL) 100 MG tablet Take 1 tablet by mouth 2 (Two) Times a Day.      clopidogrel (PLAVIX) 75 MG tablet Take 1 tablet by mouth Daily. 90 tablet 1    dabigatran etexilate (PRADAXA) 150 MG capsu Take 1 capsule by mouth 2 (Two) Times a Day.      Fluticasone-Umeclidin-Vilant (Trelegy Ellipta) 200-62.5-25 MCG/ACT aerosol powder  Inhale 1 puff Daily.      glucose blood (Accu-Chek Guide)  "test strip Use to check FBS once daily . 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      Lancets Misc. (Accu-Chek Softclix Lancet Dev) kit Use to check FBS once daily . Dm2 with peripheral  neuropathy E11.51 1 kit 2    metFORMIN ER (GLUCOPHAGE-XR) 500 MG 24 hr tablet TAKE 1 TABLET EVERY DAY WITH DINNER 90 tablet 1    metoprolol succinate XL (TOPROL-XL) 100 MG 24 hr tablet Take 1 tablet by mouth Every Night. Take 100 mg at night and 50 mg in the morning 30 tablet 0    metoprolol succinate XL (TOPROL-XL) 50 MG 24 hr tablet Take 1 tablet by mouth Daily. Take 50 mg every morning and 100 mg every night 30 tablet 0    Mirabegron ER (MYRBETRIQ) 25 MG tablet sustained-release 24 hour 24 hr tablet Take 1 tablet by mouth Every Night.      pantoprazole (PROTONIX) 40 MG EC tablet Take 1 tablet by mouth 2 (Two) Times a Day.      polyethylene glycol (MIRALAX) 17 g packet Take 17 g by mouth 2 (Two) Times a Day As Needed (constipation). 10 packet 0    spironolactone (ALDACTONE) 25 MG tablet Take 1 tablet by mouth Daily. 30 tablet 0    Tafluprost, PF, (ZIOPTAN) 0.0015 % solution ophthalmic solution Administer 1 drop to both eyes Every Night.      tamsulosin (FLOMAX) 0.4 MG capsule 24 hr capsule        No current facility-administered medications on file prior to visit.       ALLERGIES: Bactrim [sulfamethoxazole-trimethoprim] and Sulfacetamide       Objective   Vitals:    10/03/24 1113   BP: 132/80   Weight: 71.7 kg (158 lb)   Height: 177.8 cm (70\")     Body mass index is 22.67 kg/m².  BMI is within normal parameters. No other follow-up for BMI required.      PHYSICAL EXAM:   Physical Exam  Constitutional:       Appearance: Normal appearance. He is normal weight.   HENT:      Head: Normocephalic and atraumatic.      Nose: Nose normal.   Eyes:      Extraocular Movements: Extraocular movements " intact.      Pupils: Pupils are equal, round, and reactive to light.   Cardiovascular:      Rate and Rhythm: Normal rate.      Pulses:           Carotid pulses are 2+ on the right side and 2+ on the left side.       Radial pulses are 2+ on the right side and 2+ on the left side.        Femoral pulses are 2+ on the right side and 2+ on the left side.       Popliteal pulses are 2+ on the right side and 2+ on the left side.        Dorsalis pedis pulses are 2+ on the right side and 2+ on the left side.        Posterior tibial pulses are 2+ on the right side and 2+ on the left side.      Heart sounds: Normal heart sounds.   Pulmonary:      Effort: Pulmonary effort is normal.      Breath sounds: Normal breath sounds.   Abdominal:      General: Abdomen is flat. Bowel sounds are normal.      Palpations: Abdomen is soft.   Musculoskeletal:         General: Normal range of motion.      Cervical back: Normal range of motion and neck supple.      Right lower leg: No edema.      Left lower leg: No edema.   Skin:     General: Skin is warm and dry.   Neurological:      General: No focal deficit present.      Mental Status: He is alert and oriented to person, place, and time. Mental status is at baseline.   Psychiatric:         Mood and Affect: Mood normal.         Thought Content: Thought content normal.          Result Review   LABS:    CBC          8/29/2024    03:34 9/18/2024    09:55 9/19/2024    05:09   CBC   WBC 10.56  8.74  8.02    RBC 5.01  5.02  4.83    Hemoglobin 13.0  13.2  12.5    Hematocrit 41.4  41.7  39.1    MCV 82.6  83.1  81.0    MCH 25.9  26.3  25.9    MCHC 31.4  31.7  32.0    RDW 16.0  15.9  16.0    Platelets 308  323  298      BMP          8/29/2024    03:34 8/29/2024    06:16 9/18/2024    09:55 9/19/2024    05:09   BMP   BUN 14   11  10    Creatinine 0.77   1.00  0.87    Sodium 140   142  142    Potassium 3.2  4.1  3.6  3.4    Chloride 104   106  106    CO2 23.5   25.0  23.6    Calcium 8.9   9.2  9.0       Lipid Panel          4/15/2024    14:29 7/25/2024    10:28   Lipid Panel   Total Cholesterol  116    Total Cholesterol 109     Triglycerides 115  65    HDL Cholesterol 41  51    VLDL Cholesterol 21  14    LDL Cholesterol  47  51    LDL/HDL Ratio  1.02       INR          7/27/2024    21:59   Common Labs   INR 1.33       A1C Last 3 Results          4/15/2024    14:29 8/26/2024    09:47   HGBA1C Last 3 Results   Hemoglobin A1C 6.50  6.50         Results Review:       I reviewed the patient's new clinical results.    The following radiologic or non-invasive studies have been reviewed: ankle-brachial index and right lower extremity venous mapping from bypass      Duplex Carotid Bilateral CAR 06/20/2024    Interpretation Summary    Right internal carotid artery demonstrates a less than 50% stenosis.    Left internal carotid artery demonstrates a less than 50% stenosis.     CT Angiogram Abdomen Pelvis    Result Date: 9/18/2024  1. No evidence for significant change compared to previous CT angiogram abdomen and pelvis 07/27/2024. Diffuse atherosclerotic disease with mild aneurysmal dilatation infrarenal abdominal aorta measuring 2.5 cm diameter.  Occlusion of the proximal right superficial femoral artery. 2. 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 Mild-to-moderate left and mild right renal artery origin stenoses. 3. Cardiomegaly. Pulmonary emphysema. 4. Bilateral total hip arthroplasties. 5. Degenerative disc disease at L4-5.  Radiation dose reduction techniques were utilized, including automated exposure control and exposure modulation based on body size.   This report was finalized on 9/18/2024 12:38 PM by Dante Waters M.D on Workstation: [id number]                   ASSESSMENT/PLAN:   Diagnoses and all orders for this visit:    1. Bilateral carotid artery stenosis (Primary)  -     Duplex Carotid Ultrasound CAR; Future    2. Atherosclerosis of native artery of right lower extremity with  ulceration of ankle  -     Doppler Ankle Brachial Index Single Level CAR; Future    3. Claudication  -     Doppler Ankle Brachial Index Single Level CAR; Future    4. Raynaud's disease without gangrene    5. TIA (transient ischemic attack)    Other orders  -     cilostazol (PLETAL) 100 MG tablet; Take 1 tablet by mouth 2 (Two) Times a Day.  Dispense: 180 tablet; Refill: 3       78 y.o. male with marked improvement in his ABIs going from 0.5-9.77 with 3 months of structured exercise program.  He has tripled his walking distance from 1 block to 3 blocks and looks really good.  He is recovering from a complex Mohs surgery and a back surgery so he has been a little weight laid from his walking recently but he still shows massive improvement and is committed and I think we will continue to do well.  Should something come to  we would need to bypass he does have good vein which will keep as are back failsafe.  Otherwise he is can to continue to walk aggressively.  He will stay on the Plavix and the Pletal for now.  We will see him back in 6 months and recheck ABIs and a carotid scan at that point in time.  If he is doing well enough at that point we may be able to wean him off the Pletal.  He will call us if there is any problems in the interim.    I discussed the plan with the patient and family who are agreeable to the plan of care at this point. Thank you for this consult.   Follow Up  Return in about 6 months (around 4/3/2025).    Pasha Garrison MD   10/03/24

## 2024-10-04 ENCOUNTER — OFFICE VISIT (OUTPATIENT)
Dept: NEUROSURGERY | Facility: CLINIC | Age: 79
End: 2024-10-04
Payer: MEDICARE

## 2024-10-04 VITALS
HEART RATE: 67 BPM | OXYGEN SATURATION: 98 % | HEIGHT: 70 IN | TEMPERATURE: 96.8 F | SYSTOLIC BLOOD PRESSURE: 124 MMHG | BODY MASS INDEX: 22.62 KG/M2 | DIASTOLIC BLOOD PRESSURE: 76 MMHG | WEIGHT: 158 LBS

## 2024-10-04 DIAGNOSIS — G95.20 CERVICAL CORD COMPRESSION WITH MYELOPATHY: ICD-10-CM

## 2024-10-04 DIAGNOSIS — M48.02 CERVICAL STENOSIS OF SPINE: ICD-10-CM

## 2024-10-04 DIAGNOSIS — Z09 S/P NECK SURGERY, FOLLOW-UP EXAM: Primary | ICD-10-CM

## 2024-10-04 DIAGNOSIS — M54.12 LEFT CERVICAL RADICULOPATHY: ICD-10-CM

## 2024-10-04 PROBLEM — M47.12 CERVICAL SPONDYLOSIS WITH MYELOPATHY: Status: RESOLVED | Noted: 2024-08-13 | Resolved: 2024-10-04

## 2024-10-04 LAB
BH CV LOWER ARTERIAL LEFT ABI RATIO: 1.1
BH CV LOWER ARTERIAL LEFT DORSALIS PEDIS SYS MAX: 150
BH CV LOWER ARTERIAL LEFT POST TIBIAL SYS MAX: 143
BH CV LOWER ARTERIAL RIGHT ABI RATIO: 0.77
BH CV LOWER ARTERIAL RIGHT DORSALIS PEDIS SYS MAX: 98
BH CV LOWER ARTERIAL RIGHT POST TIBIAL SYS MAX: 108
BH CV XLRA MEAS - DIST GSV CALF DIST RIGHT: 0.27 CM
BH CV XLRA MEAS - DIST GSV THIGH DIST RIGHT: 0.34 CM
BH CV XLRA MEAS - GSV ANKLE DIST RIGHT: 0.27 CM
BH CV XLRA MEAS - GSV KNEE DIST RIGHT: 0.31 CM
BH CV XLRA MEAS - GSV ORIGIN DIST RIGHT: 0.49 CM
BH CV XLRA MEAS - MID GSV CALF RIGHT: 0.25 CM
BH CV XLRA MEAS - MID GSV THIGH  RIGHT: 0.35 CM
BH CV XLRA MEAS - PROX GSV CALF DIST RIGHT: 0.31 CM
BH CV XLRA MEAS - PROX GSV THIGH  RIGHT: 0.36 CM
UPPER ARTERIAL LEFT ARM BRACHIAL SYS MAX: NORMAL
UPPER ARTERIAL RIGHT ARM BRACHIAL SYS MAX: NORMAL

## 2024-10-04 PROCEDURE — 99024 POSTOP FOLLOW-UP VISIT: CPT | Performed by: NEUROLOGICAL SURGERY

## 2024-10-16 ENCOUNTER — TREATMENT (OUTPATIENT)
Dept: PHYSICAL THERAPY | Facility: CLINIC | Age: 79
End: 2024-10-16
Payer: MEDICARE

## 2024-10-16 DIAGNOSIS — M54.2 CHRONIC NECK PAIN: Primary | ICD-10-CM

## 2024-10-16 DIAGNOSIS — Z09 S/P NECK SURGERY, FOLLOW-UP EXAM: ICD-10-CM

## 2024-10-16 DIAGNOSIS — R29.898 DECREASED RANGE OF MOTION OF NECK: ICD-10-CM

## 2024-10-16 DIAGNOSIS — M54.2 NECK PAIN: ICD-10-CM

## 2024-10-16 DIAGNOSIS — G89.29 CHRONIC NECK PAIN: Primary | ICD-10-CM

## 2024-10-16 DIAGNOSIS — Z98.890 HISTORY OF NECK SURGERY: ICD-10-CM

## 2024-10-16 NOTE — PROGRESS NOTES
Physical Therapy Daily Treatment and Progress Note  [facility] [place]  [address], [address]  [place] [address]     Patient: Aj Salazar  : 1945  Referring practitioner: Pedro Mancuso MD  Today's Date: 10/16/2024    VISIT#: 7    Visit Diagnoses:    ICD-10-CM ICD-9-CM   1. Chronic neck pain  M54.2 723.1    G89.29 338.29   2. History of neck surgery  Z98.890 V15.29   3. Decreased range of motion of neck  R29.898 723.8   4. S/P neck surgery, follow-up exam  Z09 V67.09   5. Neck pain  M54.2 723.1     Subjective Questionnaire: NDI:22% -14/50 (28%)    Subjective Evaluation    Pain  Current pain ratin  At best pain ratin  At worst pain ratin         Aj Salazar reports: Patient reports that he had some surgery to remove cancer from his right ear and then they used an area from his neck as a graft so he was not able to move his neck for a week. But overall neck is doing well and he is not experiencing any pain. He reports seeing about an 80% improvement since starting PT.      Objective   Active Range of Motion   Cervical/Thoracic Spine   Cervical     Flexion: 10 degrees with pain (20 degrees, slight pain)  Extension: 25 degrees (30 degrees, discomfort)  Left rotation: 35 degrees with pain (60 degrees)  Right rotation: 45 degrees (65 degrees)       See Exercise, Manual, and Modality Logs for complete treatment.     Patient Education: HEP review  Exercise rationale/ pain free exercise performance  Alternate exercise positions  Verbal/Tactile cues to ensure correct exercise performance/technique       Assessment/Plan  Patient has demonstrated good tolerance to continued therapeutic exercises. He has demonstrated good progress thus far with report of seeing at least an 80% improvement since beginning PT. Subjectively, he reports that everything around the house and driving is easier and he notices that he has increased ROM. He reports that pain  levels are much lower and better controlled. He reports that cervical extension is the most limited with difficulty putting in eye drops. However, if he lays supine he is able to complete this task. Discussed after a cervical fusion he will always be a little limited in ROM. He recently had asking graft remove from his R neck for his right ear so took a little time off of PT. However, objectively ROM continued to improve. Discussed continuing with PT for once a week for a couple more weeks and the will re-assess and discuss potential DC. He is doing well but would benefit from continued skilled therapy interventions.       Progress per Plan of Care and Progress strengthening /stabilization /functional activity          Timed:         Manual Therapy:    -     mins  43603;     Therapeutic Exercise:    25     mins  57438;     Neuromuscular Panchito:    10    mins  34835;    Therapeutic Activity:     -     mins  93772;     Gait Training:           mins  53703;     Ultrasound:          mins  37636;    Ionto:                                   mins  56042  Self Care:                       -     mins  64113    Un-Timed:  Electrical Stimulation:         mins  78900 ( );  Dry Needling          mins self-pay  Traction          mins 25619  Re-Eval                               mins  17810  Group Therapy           ___ mins 79535    Timed Treatment:   35   mins   Total Treatment:     40   mins    Melida Kilpatrick PT  Physical Therapist  [place] [facility] license #: [id number]

## 2024-10-21 ENCOUNTER — OFFICE VISIT (OUTPATIENT)
Dept: INTERNAL MEDICINE | Age: 79
End: 2024-10-21
Payer: MEDICARE

## 2024-10-21 ENCOUNTER — TREATMENT (OUTPATIENT)
Dept: PHYSICAL THERAPY | Facility: CLINIC | Age: 79
End: 2024-10-21
Payer: MEDICARE

## 2024-10-21 VITALS
SYSTOLIC BLOOD PRESSURE: 118 MMHG | HEART RATE: 100 BPM | TEMPERATURE: 96.4 F | WEIGHT: 160 LBS | HEIGHT: 70 IN | BODY MASS INDEX: 22.9 KG/M2 | DIASTOLIC BLOOD PRESSURE: 72 MMHG

## 2024-10-21 DIAGNOSIS — I73.00 RAYNAUD'S DISEASE WITHOUT GANGRENE: Chronic | ICD-10-CM

## 2024-10-21 DIAGNOSIS — G89.29 CHRONIC NECK PAIN: Primary | ICD-10-CM

## 2024-10-21 DIAGNOSIS — Z09 S/P NECK SURGERY, FOLLOW-UP EXAM: ICD-10-CM

## 2024-10-21 DIAGNOSIS — R10.31 BILATERAL LOWER ABDOMINAL PAIN: Primary | Chronic | ICD-10-CM

## 2024-10-21 DIAGNOSIS — M54.2 CHRONIC NECK PAIN: Primary | ICD-10-CM

## 2024-10-21 DIAGNOSIS — Z98.890 HISTORY OF NECK SURGERY: ICD-10-CM

## 2024-10-21 DIAGNOSIS — R14.1 FLATULENCE, ERUCTATION AND GAS PAIN: ICD-10-CM

## 2024-10-21 DIAGNOSIS — M54.2 NECK PAIN: ICD-10-CM

## 2024-10-21 DIAGNOSIS — R14.2 FLATULENCE, ERUCTATION AND GAS PAIN: ICD-10-CM

## 2024-10-21 DIAGNOSIS — R10.32 BILATERAL LOWER ABDOMINAL PAIN: Primary | Chronic | ICD-10-CM

## 2024-10-21 DIAGNOSIS — R29.898 DECREASED RANGE OF MOTION OF NECK: ICD-10-CM

## 2024-10-21 DIAGNOSIS — R14.3 FLATULENCE, ERUCTATION AND GAS PAIN: ICD-10-CM

## 2024-10-21 PROCEDURE — 99214 OFFICE O/P EST MOD 30 MIN: CPT | Performed by: PHYSICIAN ASSISTANT

## 2024-10-21 PROCEDURE — 97110 THERAPEUTIC EXERCISES: CPT

## 2024-10-21 PROCEDURE — 97112 NEUROMUSCULAR REEDUCATION: CPT

## 2024-10-21 PROCEDURE — 1126F AMNT PAIN NOTED NONE PRSNT: CPT | Performed by: PHYSICIAN ASSISTANT

## 2024-10-21 PROCEDURE — 3078F DIAST BP <80 MM HG: CPT | Performed by: PHYSICIAN ASSISTANT

## 2024-10-21 PROCEDURE — 3074F SYST BP LT 130 MM HG: CPT | Performed by: PHYSICIAN ASSISTANT

## 2024-10-21 RX ORDER — SIMETHICONE 80 MG
80 TABLET,CHEWABLE ORAL EVERY 6 HOURS PRN
Qty: 60 TABLET | Refills: 0 | Status: SHIPPED | OUTPATIENT
Start: 2024-10-21

## 2024-10-21 NOTE — PROGRESS NOTES
Physical Therapy Daily Treatment Note  [facility] [place]  [address], [address]  [place] [id number]     Patient: Aj Salazar  : 1945  Referring practitioner: Pedro Mancuso MD  Today's Date: 10/21/2024    VISIT#: 8    Visit Diagnoses:    ICD-10-CM ICD-9-CM   1. Chronic neck pain  M54.2 723.1    G89.29 338.29   2. History of neck surgery  Z98.890 V15.29   3. Decreased range of motion of neck  R29.898 723.8   4. S/P neck surgery, follow-up exam  Z09 V67.09   5. Neck pain  M54.2 723.1       Subjective   Aj aSlazar reports: Patient reports that he is having some shoulder and hip pain today, can't associate them with anything specific. Neck is feeling pretty good and no problems in the area where he had the skin removed for the graft.       Objective       See Exercise, Manual, and Modality Logs for complete treatment.     Patient Education: HEP review  Exercise rationale/ pain free exercise performance  Alternate exercise positions  Verbal/Tactile cues to ensure correct exercise performance/technique       Assessment/Plan  Patient demonstrated good tolerance to continued therapeutic exercises on this date with no increased pain in the shoulder or the cervical region. Added in some new thoracic mobility exercises to improve glenohumeral thoracic patterning. Will continue to progress cervical and thoracic mobility as tolerated. If patient continues to tolerate well we will progress to a more challenging neurological position.       Progress per Plan of Care and Progress strengthening /stabilization /functional activity          Timed:         Manual Therapy:    -     mins  61615;     Therapeutic Exercise:    15     mins  53614;     Neuromuscular Panchito:    10    mins  70980;    Therapeutic Activity:     -     mins  78772;     Gait Training:           mins  96392;     Ultrasound:          mins  94377;    Ionto:                                   mins   57419  Self Care:                       5     mins  04134    Un-Timed:  Electrical Stimulation:         mins  75153 ( );  Dry Needling          mins self-pay  Traction          mins 67746  Re-Eval                               mins  78900  Group Therapy           ___ mins 90392    Timed Treatment:   30   mins   Total Treatment:     46   mins    Melida Kilpatrick PT  Physical Therapist  David LENZ license #: [id number]

## 2024-10-21 NOTE — PROGRESS NOTES
"    I N T E R N A L  M E D I C I N E    HUMZA FREEMAN PA-C      ENCOUNTER DATE:  10/21/2024    Aj Dupontsamuelb / 78 y.o. / male    CHIEF COMPLAINT / REASON FOR OFFICE VISIT     Abdominal Pain (Lower abdominal pain; loose diarrhea off and on; he see a gastro appt this Friday; )      ASSESSMENT & PLAN     1. Bilateral lower abdominal pain    2. Flatulence, eructation and gas pain    3. Raynaud's disease without gangrene        No orders of the defined types were placed in this encounter.    New Medications Ordered This Visit   Medications    simethicone (MYLICON) 80 MG chewable tablet     Sig: Chew 1 tablet Every 6 (Six) Hours As Needed for Flatulence.     Dispense:  60 tablet     Refill:  0       SUMMARY/DISCUSSION  GI consultation scheduled for 10/25/2024 due to bowel movement changes following cervical spine surgery.  No acute or worsening pain on today therefore no need for acute imaging.  Flatulence and distention: Start trial of simethicone 80 mg tablet to be taken once every 6 hours as needed for symptomatic relief.    Dietary counseling performed to increase dietary fiber along with oral hydration with water.   Continue current therapies with Laxatives and stool softeners as needed.   Raynaud's disease: Continues with cold fingers and feet. Currently on beta blocker for his heart and Plavix.       Next Appointment:     Return if symptoms worsen or fail to improve.  And, for Follow-up with Dr. Amezcua in January 2025.        VITAL SIGNS     Vitals:    10/21/24 0818   BP: 118/72   BP Location: Left arm   Patient Position: Sitting   Cuff Size: Adult   Pulse: 100   Temp: 96.4 °F (35.8 °C)   TempSrc: Temporal   Weight: 72.6 kg (160 lb)   Height: 177.8 cm (70\")       BP Readings from Last 3 Encounters:   10/21/24 118/72   10/04/24 124/76   10/03/24 132/80     Wt Readings from Last 3 Encounters:   10/21/24 72.6 kg (160 lb)   10/04/24 71.7 kg (158 lb)   10/03/24 71.7 kg (158 lb)     Body mass index is 22.96 " kg/m².    [facility] Blood Pressure Flowsheet Systolic Diastolic Pulse   4/29/2022   6:00   74  104    4/25/2022   6:00   73  81    4/22/2022   6:00   60  108    4/18/2022   6:00   61  97    4/15/2022   6:00   69  88    4/11/2022   6:00   76  85    4/8/2022   6:00 AM 98  62  87    4/4/2022   6:00   66  93    4/1/2022   6:00   61  91    3/28/2022   6:00   78  78        Patient-reported        MEDICATIONS AT THE TIME OF OFFICE VISIT     Current Outpatient Medications on File Prior to Visit   Medication Sig    Adbry 150 MG/ML solution prefilled syringe Inject 300 mg under the skin into the appropriate area as directed Every 14 (Fourteen) Days.    ALPHAGAN P 0.1 % solution ophthalmic solution Administer 1 drop to both eyes 2 (two) times a day.    atorvastatin (LIPITOR) 80 MG tablet TAKE 1 TABLET EVERY NIGHT    bumetanide (BUMEX) 1 MG tablet Take 1 tablet by mouth Daily.    cilostazol (PLETAL) 100 MG tablet Take 1 tablet by mouth 2 (Two) Times a Day.    cilostazol (PLETAL) 100 MG tablet Take 1 tablet by mouth 2 (Two) Times a Day.    clopidogrel (PLAVIX) 75 MG tablet Take 1 tablet by mouth Daily.    dabigatran etexilate (PRADAXA) 150 MG capsu Take 1 capsule by mouth 2 (Two) Times a Day.    Fluticasone-Umeclidin-Vilant (Trelegy Ellipta) 200-62.5-25 MCG/ACT aerosol powder  Inhale 1 puff Daily.    glucose blood (Accu-Chek Guide) test strip Use to check FBS once daily . Dm2 with peripheral  neuropathy E11.51    glucose blood test strip Check FBS once daily .DM2 /E11.51    Jardiance 10 MG tablet tablet Take 1 tablet by mouth Daily.    Lancets Misc. (Accu-Chek Softclix Lancet Dev) kit Use to check FBS once daily . Dm2 with peripheral  neuropathy E11.51    metFORMIN ER (GLUCOPHAGE-XR) 500 MG 24 hr tablet TAKE 1 TABLET EVERY DAY WITH DINNER    metoprolol succinate XL (TOPROL-XL) 100 MG 24 hr tablet Take 1 tablet by mouth Every Night. Take 100 mg at night and 50 mg in the morning  "   metoprolol succinate XL (TOPROL-XL) 50 MG 24 hr tablet Take 1 tablet by mouth Daily. Take 50 mg every morning and 100 mg every night    Mirabegron ER (MYRBETRIQ) 25 MG tablet sustained-release 24 hour 24 hr tablet Take 1 tablet by mouth Every Night.    pantoprazole (PROTONIX) 40 MG EC tablet Take 1 tablet by mouth 2 (Two) Times a Day.    polyethylene glycol (MIRALAX) 17 g packet Take 17 g by mouth 2 (Two) Times a Day As Needed (constipation).    spironolactone (ALDACTONE) 25 MG tablet Take 1 tablet by mouth Daily.    Tafluprost, PF, (ZIOPTAN) 0.0015 % solution ophthalmic solution Administer 1 drop to both eyes Every Night.    tamsulosin (FLOMAX) 0.4 MG capsule 24 hr capsule      No current facility-administered medications on file prior to visit.          HISTORY OF PRESENT ILLNESS     Pt is a 77y/o wm with hypertension, hyperlipidemia, diabetes, and PAD who presents with ongoing complaint of lower abdominal discomfort.     He was recently seen by Dr. Amezcua on 9/23/24 and myself on 10/02/2024 for the same complaint and was treated for Constipation and asked to follow up. He was admitted 9/18/24 for same complaint and found to have a UTI. CTA radiology reading of abdomen/pelvis did not mention evidence of acute findings, diverticulitis, or bowel obstruction. Patient states that his abdominal/bowel symptoms seemed to begin following cervical disc C4-C5 disc herniation repair performed on 8/21/24. On today he states that he has not had a bowel movement yet on today despite intermittent use of stool softeners. He reports semi-firm bowel movements once daily when using the medication, but he has yet to experience what he describes as a \"normal\" bowel movement since his cervical surgery. His lower abdominal discomfort is dull and achy in quality. He also admits abdominal distension and \"rumbling\" sounds most often following meals, or in the middle of the night. Whenever awakened overnight due to the rumbling, he often " feels the need to go to the restroom, but usually only has to pass gas. He has attempted an improved diet with increased fiber via fruits and vegetables. He denies nausea, vomiting, hard stools, fever, or chills. Though his symptoms have not worsened, they have not gotten better.     He is scheduled for Gastroenterology consultation on this upcoming Friday, 10/25/2024.     He further reports some increased cold chills to his fingers or toes, leading to achy sensations.  He was previously diagnosed with Raynaud's disease.  He feels these sensations seem to largely improve once he gets up and gets to moving. He admits that circulatory issues to his fingers and hands are chronic, as there always seems to be difficulties obtaining reliable oxygenation levels via his fingers at medical appointments. His circulatory issues to his lower extremities only seemed to have begun several months ago. Right lower leg circulation seems worse than left. Doppler studies performed 10/03/24 demonstrated moderately reduced ROXANA of the right lower extremity and normal ROXANA of left lower extremity, which are large improvements from prior study. He is followed by Dr. Garrison of [facility] and has previously discussed possibility of surgical intervention for her lower extremity PAD if his symptoms and diagnostic measurements do not improve.     REVIEW OF SYSTEMS     Review of Systems   All other systems reviewed and are negative.     As Per HPI.      PHYSICAL EXAMINATION     Physical Exam  Vitals and nursing note reviewed.   Constitutional:       General: He is not in acute distress.     Appearance: Normal appearance. He is not ill-appearing.   HENT:      Head: Normocephalic and atraumatic.   Cardiovascular:      Rate and Rhythm: Normal rate and regular rhythm.      Pulses: Normal pulses.      Heart sounds: Normal heart sounds. No murmur heard.     No friction rub. No gallop.   Pulmonary:      Effort: Pulmonary effort is normal. No respiratory  distress.      Breath sounds: Normal breath sounds. No stridor. No wheezing.   Abdominal:      General: Abdomen is flat. Bowel sounds are normal. There is no distension.      Palpations: Abdomen is soft. There is no mass.      Tenderness: There is abdominal tenderness (Lower mid epigastric). There is no right CVA tenderness, left CVA tenderness, guarding or rebound.      Hernia: No hernia is present.   Neurological:      Mental Status: He is alert.   Psychiatric:         Mood and Affect: Mood normal.         Behavior: Behavior normal.             REVIEWED DATA     Labs:     Lab Results   Component Value Date    TRIG 65 07/25/2024    HDL 51 07/25/2024    LDL 51 07/25/2024    VLDL 14 07/25/2024    HGBA1C 6.50 (H) 08/26/2024           Imaging:           Medical Tests:           Summary of old records / correspondence / consultant report:           Request outside records:

## 2024-10-24 NOTE — PROGRESS NOTES
Chief Complaint  GI Problem, Heartburn, and Bloated    Subjective        History of Present Illness   Sujatha Pattonbs a 78-year-old male who presents today with concerns about changes in bowel habits. He is new to the practice. His past medical history includes Raynaud's disease, well controlled diabetes on hgb A1c reported in 8/2024 at 6.50% and managing this on Metformin only. He is on Plavix and has a history of atrial fibrillation, TIA, and peripheral vascular disease with claudication; congestive heart failure (CHF), chronic obstructive pulmonary disease (COPD),     He had a cervical surgery where a couple of disks were removed. He is still recovering his ability to swallow properly because they had to move various structures during the surgery. Has been taking Protonix 40mg twice daily for years, not sure why he was put on this. Per chart review, noted that patient had a history of duodenal ulcers confirmed with EGD in 2018. Otherwise, no complains of acid reflux.     In addition to the above, he reports a constant rumbling sensation in his stomach, accompanied by bloating, and gas. His bowel movements have changed from normal to almost diarrhea, with excessive flatulence. He experiences mild nausea but has not had any vomiting. He has not noticed any blood in his stool. His bowel movements are irregular, alternating between constipation and diarrhea, but mostly consist of loose stools. He still has his gallbladder.     His last colonoscopy was in 2011, during which a few polyps were found but were not considered concerning. He underwent a Cologuard study about 2 years ago, which did not raise any concerns. He is open to undergoing a colonoscopy screening if necessary.    Does not smoke or drink alcohol. Not taking any ibuprofen or NSAIDs, only occasional Tylenol    FAMILY HISTORY  He denies any family history of colon cancer or GI cancer.     9/23/24  CBC (hemoglobin 12.5), lipase normal    UPPER GI  "ENDOSCOPY (09/26/2018 13:19)   - Normal esophagus.   - Acute gastritis.   - Multiple non-bleeding duodenal ulcers with pigmented material.   - No specimens collected    [facility] COLONOSCOPY (02/23/2011) - report not retrievable    No recent change in weight or appetite.   Patient denies personal or family history of colorectal or upper GI cancer.    Objective   Vital Signs:  /64   Pulse 92   Temp 96.8 °F (36 °C)   Ht 177.8 cm (70\")   Wt 72.5 kg (159 lb 14.4 oz)   SpO2 98%   BMI 22.94 kg/m²   Estimated body mass index is 22.94 kg/m² as calculated from the following:    Height as of this encounter: 177.8 cm (70\").    Weight as of this encounter: 72.5 kg (159 lb 14.4 oz).       BMI is within normal parameters. No other follow-up for BMI required.    Physical Exam  Vitals and nursing note reviewed.   Constitutional:       General: He is not in acute distress.     Appearance: Normal appearance. He is normal weight. He is not ill-appearing, toxic-appearing or diaphoretic.   Eyes:      General: No scleral icterus.     Conjunctiva/sclera: Conjunctivae normal.   Cardiovascular:      Rate and Rhythm: Normal rate and regular rhythm.      Pulses: Normal pulses.      Heart sounds: Normal heart sounds.   Pulmonary:      Effort: Pulmonary effort is normal. No respiratory distress.      Breath sounds: Normal breath sounds. No stridor.   Abdominal:      General: Abdomen is flat. Bowel sounds are normal. There is no distension.      Palpations: Abdomen is soft. There is no mass.      Tenderness: There is no abdominal tenderness. There is no right CVA tenderness, left CVA tenderness, guarding or rebound.      Hernia: No hernia is present.   Skin:     Coloration: Skin is not jaundiced or pale.      Findings: No erythema or rash.   Neurological:      Mental Status: He is alert and oriented to person, place, and time. Mental status is at baseline.   Psychiatric:         Mood and Affect: Mood normal.            Assessment and Plan "     Diagnoses and all orders for this visit:    1. Gassiness (Primary)  -     Breath Hydrogen Test    2. Irregular bowel habits  -     Breath Hydrogen Test    3. Abdominal bloating  -     Breath Hydrogen Test    4. Flatulence  -     Breath Hydrogen Test    5. Nausea    6. Gastroesophageal reflux disease, unspecified whether esophagitis present    Other orders  -     riFAXIMin (Xifaxan) 550 MG tablet; Take 1 tablet by mouth 3 (Three) Times a Day.  Dispense: 42 tablet; Refill: 1    Discussion  Patient presents to our practice today with the following concerns:  Assessment & Plan  Irregular bowel habits, not a new findings  Bloating, gassiness, belching, and excessive flatulence  History of duodenal ulcers  GERD  His symptoms of bloating, irregular bowel habits, alternating constipation and diarrhea, gassiness, abdominal bloating, and excessive flatulence align with irritable bowel syndrome. There is also a strong suspicion of small intestinal bacterial overgrowth (SIBO).   He reports manageable nausea without vomiting, negating the need for Zofran.   Xifaxan will be prescribed, to be taken 1 tablet three times daily for 14 days.   A trio smart test will be conducted to confirm SIBO.   He is advised to discontinue Protonix 40 mg. If acid reflux symptoms recur, he may take over-the-counter Pepcid 20 to 40 mg at bedtime. Dietary modifications are recommended, including avoiding greasy foods, chocolate, mint, spicy foods, tomato-based products, citrus foods, alcohol, tobacco, and caffeine. He should avoid late-night eating and lying down immediately after meals. Elevating the head of his bed is also suggested.   If there is no improvement, an endoscopy workup may be considered. If bloating persists despite Xifaxan treatment and a negative SIBO test, a gastric emptying study may be considered to rule out gastroparesis.    5. Health Maintenance.  A colonoscopy screening is recommended due to his history of polyps and the  last colonoscopy being in 2011.     Follow-up  Return in 8 to 10 weeks.        Much of this encounter note is an electronic transcription/translation of spoken language to printed text. The electronic translation of spoken language may permit erroneous, or at times, nonsensical words or phrases to be inadvertently transcribed; Although I have reviewed the note for such errors, some may still exist.    Follow Up     Return for 8-10 weeks.    I have reviewed patient's current medications list, relevant clinical information necessary for today's encouter. I discussed the clinical impression and treatment plan with the patient, who verbalized understanding and in agreement.  All questions were answered and support was provided.    Patient or patient representative verbalized consent for the use of Ambient Listening during the visit with  SHANIQUA Odonnell for chart documentation. 10/25/2024  08:18 EDT

## 2024-10-25 ENCOUNTER — SPECIALTY PHARMACY (OUTPATIENT)
Dept: GASTROENTEROLOGY | Facility: CLINIC | Age: 79
End: 2024-10-25
Payer: MEDICARE

## 2024-10-25 ENCOUNTER — OFFICE VISIT (OUTPATIENT)
Dept: GASTROENTEROLOGY | Facility: CLINIC | Age: 79
End: 2024-10-25
Payer: MEDICARE

## 2024-10-25 VITALS
HEIGHT: 70 IN | OXYGEN SATURATION: 98 % | DIASTOLIC BLOOD PRESSURE: 64 MMHG | HEART RATE: 92 BPM | TEMPERATURE: 96.8 F | BODY MASS INDEX: 22.89 KG/M2 | WEIGHT: 159.9 LBS | SYSTOLIC BLOOD PRESSURE: 100 MMHG

## 2024-10-25 DIAGNOSIS — R14.0 GASSINESS: Primary | ICD-10-CM

## 2024-10-25 DIAGNOSIS — R14.3 FLATULENCE: ICD-10-CM

## 2024-10-25 DIAGNOSIS — K21.9 GASTROESOPHAGEAL REFLUX DISEASE, UNSPECIFIED WHETHER ESOPHAGITIS PRESENT: ICD-10-CM

## 2024-10-25 DIAGNOSIS — R14.0 ABDOMINAL BLOATING: ICD-10-CM

## 2024-10-25 DIAGNOSIS — R19.8 IRREGULAR BOWEL HABITS: ICD-10-CM

## 2024-10-25 DIAGNOSIS — R11.0 NAUSEA: ICD-10-CM

## 2024-10-25 RX ORDER — IMIQUIMOD 12.5 MG/.25G
CREAM TOPICAL
COMMUNITY
Start: 2024-10-23

## 2024-10-25 NOTE — PROGRESS NOTES
Xifaxan PA approved  Key: [id number]  PA Case: [id number], Status: Approved, Coverage Starts on: 1/1/2024 12:00:00 AM, Coverage Ends on: 12/31/2025 12:00:00 AM. Questions? Contact [phone].. Authorization Expiration Date: December 31, 2025.     Rebecca Del Valle  Specialty Pharmacy Technician

## 2024-10-25 NOTE — PATIENT INSTRUCTIONS
Stop pantoprazole. If symptoms of acid reflux return, you may try Pepcid over the counter, 20-40mg at bedtime.   Take Xifaxant antibiotic 1 tablet 3 times a day as instructed for highly suspicious symptoms for small intestine bacterial overgrowth (SIBO)  Proceed with TrioSmart/hydrogen test to confirm SIBO  Follow up with me in 8-10 weeks, if not improving, may consider endoscopy workups.     GERD is a chronic disease that occurs when stomach acid flows back into the tube that connects your mouth and stomach. Warning of worsening symptoms may include: Hoarseness, trouble swallowing, too much throat mucus, a lump in the throat, chronic cough, and heartburn. Some conservative measures or treatment may help, which include:  Diet and lifestyle modification: avoid greasy foods or any foods that will cause heartburn for example chocolate, mint, spicy foods, tomato based products, and citrus. Avoid alcohol, tobacco, and caffeine. Avoid late night heavy meals. Avoid bending forward or stooping after eating. Sleep with head of your bed raised 6-8 inches.  You may also try Apple Cider Vinegar, 2 teaspoons in 8 ounces of water 3 times a day. Take 30 minutes before meals or after meals and rinse mouth after each use.   If difficulty swallowing occur, I recommend other supportive care measures, including thicken liquids to avoid aspiration. Eat smaller meals at different time intervals and cut into smaller pieces, take sips of water in between. If the above symptoms do not improve, please contact our office for further evaluation or as plan has discussed.

## 2024-10-28 ENCOUNTER — TREATMENT (OUTPATIENT)
Dept: PHYSICAL THERAPY | Facility: CLINIC | Age: 79
End: 2024-10-28
Payer: MEDICARE

## 2024-10-28 DIAGNOSIS — M54.2 CHRONIC NECK PAIN: Primary | ICD-10-CM

## 2024-10-28 DIAGNOSIS — G89.29 CHRONIC NECK PAIN: Primary | ICD-10-CM

## 2024-10-28 DIAGNOSIS — Z09 S/P NECK SURGERY, FOLLOW-UP EXAM: ICD-10-CM

## 2024-10-28 DIAGNOSIS — R29.898 DECREASED RANGE OF MOTION OF NECK: ICD-10-CM

## 2024-10-28 DIAGNOSIS — Z98.890 HISTORY OF NECK SURGERY: ICD-10-CM

## 2024-10-28 DIAGNOSIS — M54.2 NECK PAIN: ICD-10-CM

## 2024-10-28 PROCEDURE — 97110 THERAPEUTIC EXERCISES: CPT

## 2024-10-28 PROCEDURE — 97112 NEUROMUSCULAR REEDUCATION: CPT

## 2024-10-28 NOTE — PROGRESS NOTES
Physical Therapy Daily Treatment Note  [facility] [place]  [address]  [place] [address]     Patient: Aj Salazar  : 1945  Referring practitioner: Pedro Mancuso MD  Today's Date: 10/28/2024    VISIT#: 9    Visit Diagnoses:    ICD-10-CM ICD-9-CM   1. Chronic neck pain  M54.2 723.1    G89.29 338.29   2. History of neck surgery  Z98.890 V15.29   3. Decreased range of motion of neck  R29.898 723.8   4. S/P neck surgery, follow-up exam  Z09 V67.09   5. Neck pain  M54.2 723.1       Subjective   Aj Salazar reports: Patient reports that he is doing well and is just a little sore especially with side bending and extension. He is feeling pretty good and is ready for DC. He reports he will continue with HEP.       Objective       See Exercise, Manual, and Modality Logs for complete treatment.     Patient Education: HEP review  Exercise rationale/ pain free exercise performance  Alternate exercise positions  Verbal/Tactile cues to ensure correct exercise performance/technique       Assessment/Plan  Patient has demonstrated great progress with skilled therapeutic interventions thus far. Subjectively he reports that pain levels and ROM has greatly improved and he is ready for Dc. He reports that he will continue with HEP and he has demonstrated good understanding and performance with the program. Discussed this being DC unless he has any other concerns. Answered any questions that he has at the time and provided him with my contact information for any further questions. Please see previous progress note for further details.       Progress per Plan of Care and Progress strengthening /stabilization /functional activity          Timed:         Manual Therapy:    -     mins  47203;     Therapeutic Exercise:    14     mins  97834;     Neuromuscular Panchito:    15    mins  53837;    Therapeutic Activity:     6     mins  17715;     Gait Training:           mins   80163;     Ultrasound:          mins  28518;    Ionto:                                   mins  28498  Self Care:                       -     mins  71321    Un-Timed:  Electrical Stimulation:         mins  26425 ( );  Dry Needling          mins self-pay  Traction          mins 20802  Re-Eval                               mins  72779  Group Therapy           ___ mins 89092    Timed Treatment:   35   mins   Total Treatment:     44   mins    Melida Kilpatrick PT  Physical Therapist  [place] LEN[place] license #: [id number]

## 2024-12-05 RX ORDER — BLOOD SUGAR DIAGNOSTIC
1 STRIP MISCELLANEOUS DAILY
Qty: 100 EACH | Refills: 3 | Status: SHIPPED | OUTPATIENT
Start: 2024-12-05

## 2024-12-16 ENCOUNTER — TELEPHONE (OUTPATIENT)
Dept: NEUROSURGERY | Facility: CLINIC | Age: 79
End: 2024-12-16

## 2024-12-16 NOTE — TELEPHONE ENCOUNTER
PATIENT CALLED TO GET SCHEDULED WITH DR. HAUSER; HE IS EXPERIENCING NECK PAIN AND LEFT SHOULDER PAIN FOR A WEEK     ATTEMPTED WT AND NOT SUCCESSFUL; HE JUST HAD SURGERY 08/21/24    PATIENT WAS SCHEDULED FOR  12/19/24 @ 9:30 AM     PLEASE CALL PATIENT WITH ANY ADVICE OR IF YOU NEED TO RESCHEDULE OR ADD IMAGING TO HIS VISIT     [phone] (home)     THANK YOU!

## 2024-12-16 NOTE — PROGRESS NOTES
"Subjective   History of Present Illness: Aj Salazar is a 79 y.o. male is here today for follow-up with neck and shoulder pain.  He says spontaneously over the last several weeks he has developed left-sided neck and shoulder pain that is not sharp kind of a dull constant aggravating pain.  He does not have any weakness or numbness associated with it.  He simply cannot get relief with the techniques he is used in the past following surgery.  He denies any specific event or injury that may have set off the pain    History of Present Illness    Tobacco Use: Medium Risk (12/19/2024)    Patient History     Smoking Tobacco Use: Former     Smokeless Tobacco Use: Never     Passive Exposure: Past        The following portions of the patient's history were reviewed and updated as appropriate: allergies, current medications, past family history, past medical history, past social history, past surgical history and problem list.    Review of Systems   Constitutional:  Negative for fever.   Musculoskeletal:  Positive for neck pain. Negative for neck stiffness.   Neurological:  Positive for numbness. Negative for weakness.   All other systems reviewed and are negative.      Objective     Vitals:    12/19/24 0929   BP: 134/62   BP Location: Left arm   Patient Position: Sitting   Cuff Size: Adult   Resp: 20   Weight: 72.1 kg (159 lb)   Height: 177.8 cm (70\")   PainSc:   7   PainLoc: Neck     Body mass index is 22.81 kg/m².      Physical Exam  Neurological Exam    Physical Exam:    CONSTITUTIONAL:  appears well developed, well-nourished and in no acute distress.    NECK: the neck is supple and symmetric. The trachea is midline with no masses.      PULMONARY: Respiratory effort is normal with no increased work of breathing or signs of respiratory distress.    CARDIOVASCULAR: Pedal pulses are +2/4 bilaterally. Examination of the extremities shows no edema or varicosities.    MUSCULOSKELETAL: Gait normal, no pain upon range of motion " of the shoulders bilaterally    SKIN: The skin is warm, dry and intact.  Anterior neck incision healed    NEUROLOGIC:   Normal motor strength noted in both upper extremities. Muscle bulk and tone are normal.  Sensory exam is normal to fine touch in all dermatomes of the upper extremities  Reflexes diminished but symmetrical at the bicep, tricep and brachioradialis  Elsa's Babinski and clonus are all negative  Cortical function is intact and without deficits. Speech is normal.    PSYCHIATRIC: oriented to person, place and time. Patient's mood and affect are normal.    Assessment & Plan   Independent Review of Radiographic Studies:      I personally reviewed the images from the following studies.    RADIOLOGY IMAGING: XR SPINE CERVICAL COMPLETE 4 OR 5 VW : completed on Date: 10/04/24 at [facility] Neurosurgery Office:  INDICATION: Routine postop evaluation.  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 Comparison:   August 29, 2024         Medical Decision Making:      Patient has some recurrent left neck and shoulder pain having had a prior cervical decompression and fusion from C4-C6.  Given his prior history of surgery with this recurrent pain I should get an MRI of the cervical spine to assess his anatomy.  It is unusual to develop recurrence at the level of surgery but he could have developed issues at the C3-C4 level.  I will see him back upon completion of the MRI cervical to determine a course of action whether we would consider therapy, pain management, or additional surgery.    Return in about 4 weeks (around 1/16/2025) for review of completed images.    Diagnoses and all orders for this visit:    1. Neck pain on left side (Primary)  -     MRI Cervical Spine Without Contrast; Future    2. Acute pain of left shoulder  -     MRI Cervical Spine Without Contrast; Future    3. History of fusion of cervical spine  -     MRI Cervical Spine Without Contrast; Future              Pedro Mancuso MD FACS [facility]  Neurological Surgery

## 2024-12-19 ENCOUNTER — HOSPITAL ENCOUNTER (OUTPATIENT)
Dept: CT IMAGING | Facility: HOSPITAL | Age: 79
Discharge: HOME | End: 2024-12-19
Admitting: INTERNAL MEDICINE
Payer: MEDICARE

## 2024-12-19 ENCOUNTER — OFFICE VISIT (OUTPATIENT)
Dept: NEUROSURGERY | Facility: CLINIC | Age: 79
End: 2024-12-19
Payer: MEDICARE

## 2024-12-19 VITALS
HEIGHT: 70 IN | SYSTOLIC BLOOD PRESSURE: 134 MMHG | RESPIRATION RATE: 20 BRPM | WEIGHT: 159 LBS | DIASTOLIC BLOOD PRESSURE: 62 MMHG | BODY MASS INDEX: 22.76 KG/M2

## 2024-12-19 DIAGNOSIS — R91.8 PULMONARY NODULES/LESIONS, MULTIPLE: ICD-10-CM

## 2024-12-19 DIAGNOSIS — Z98.1 HISTORY OF FUSION OF CERVICAL SPINE: ICD-10-CM

## 2024-12-19 DIAGNOSIS — M54.2 NECK PAIN ON LEFT SIDE: Primary | ICD-10-CM

## 2024-12-19 DIAGNOSIS — M25.512 ACUTE PAIN OF LEFT SHOULDER: ICD-10-CM

## 2024-12-19 PROCEDURE — 1160F RVW MEDS BY RX/DR IN RCRD: CPT | Performed by: NEUROLOGICAL SURGERY

## 2024-12-19 PROCEDURE — 99214 OFFICE O/P EST MOD 30 MIN: CPT | Performed by: NEUROLOGICAL SURGERY

## 2024-12-19 PROCEDURE — 71250 CT THORAX DX C-: CPT

## 2024-12-19 PROCEDURE — 3075F SYST BP GE 130 - 139MM HG: CPT | Performed by: NEUROLOGICAL SURGERY

## 2024-12-19 PROCEDURE — 3078F DIAST BP <80 MM HG: CPT | Performed by: NEUROLOGICAL SURGERY

## 2024-12-19 PROCEDURE — 1159F MED LIST DOCD IN RCRD: CPT | Performed by: NEUROLOGICAL SURGERY

## 2024-12-26 NOTE — PROGRESS NOTES
Chief Complaint  Bloated    Subjective         Aj Tai a 78-year-old male who presents today for follow-up from previous visit with this NP on 10/25/2024 with concerns about changes in bowel habits.   Office Visit with Josh Bergman APRN (10/25/2024)   His past medical history includes Raynaud's disease, well controlled diabetes on hgb A1c reported in 8/2024 at 6.50% and managing this on Metformin only. He is on Plavix and has a history of atrial fibrillation, TIA, and peripheral vascular disease with claudication; congestive heart failure (CHF), chronic obstructive pulmonary disease (COPD),     Previous visit, he presented with symptoms consistent for SIBO, Trio smart test was ordered and I have empirically treated him with Xifaxin 3 times daily x 14 days. Will follow up with this condition today    Does not smoke or drink alcohol. Not taking any ibuprofen or NSAIDs, only occasional Tylenol    FAMILY HISTORY  He denies any family history of colon cancer or GI cancer.    History of Present Illness  The patient presents today for a follow-up visit.    He reports no new changes in his bowel movements, presence of blood in the stool, or abdominal pain. He also does not experience any symptoms of acid reflux. He was last seen on 10/25/2024 for changes in bowel movements, bloating, and abdominal pain. His symptoms were consistent with small intestinal bacterial overgrowth (SIBO), so a hydrogen breath test was ordered, and he was treated with Xifaxan 3 times a day for 14 days. The test confirmed SIBO, and he completed the medication course, which has almost returned him to normal.    He is currently on Pletal and Plavix for atrial fibrillation.    FAMILY HISTORY  He reports no family history of colon cancer.    MEDICATIONS  Current: Pletal, Plavix, Tylenol  Past: Xifaxan    Results Reviewed:  10/27/24  Trio smart test: Hydrogen sulfide and CH4 both positive, indicative of intestinal methane and hydrogen  "sulfide, consistent with SIBO (treated with Xifaxant 550mg tid x14 days plus Bismuth).    9/23/24  CBC (hemoglobin 12.5), lipase normal    UPPER GI ENDOSCOPY (09/26/2018 13:19)   - Normal esophagus.   - Acute gastritis.   - Multiple non-bleeding duodenal ulcers with pigmented material.   - No specimens collected    [facility] COLONOSCOPY (02/23/2011) - report not retrievable    Objective   Vital Signs:  /70   Pulse 71   Temp 96.8 °F (36 °C)   Ht 177.8 cm (70\")   Wt 73.1 kg (161 lb 1.6 oz)   SpO2 96%   BMI 23.12 kg/m²   Estimated body mass index is 23.12 kg/m² as calculated from the following:    Height as of this encounter: 177.8 cm (70\").    Weight as of this encounter: 73.1 kg (161 lb 1.6 oz).       BMI is within normal parameters. No other follow-up for BMI required.    Physical Exam  Vitals and nursing note reviewed.   Constitutional:       General: He is not in acute distress.     Appearance: Normal appearance. He is normal weight. He is not ill-appearing, toxic-appearing or diaphoretic.   Eyes:      General: No scleral icterus.     Conjunctiva/sclera: Conjunctivae normal.   Cardiovascular:      Rate and Rhythm: Normal rate and regular rhythm.      Pulses: Normal pulses.      Heart sounds: Normal heart sounds.   Pulmonary:      Effort: Pulmonary effort is normal. No respiratory distress.      Breath sounds: Normal breath sounds. No stridor.   Abdominal:      General: Abdomen is flat. Bowel sounds are normal. There is no distension.      Palpations: Abdomen is soft. There is no mass.      Tenderness: There is no abdominal tenderness. There is no right CVA tenderness, left CVA tenderness, guarding or rebound.      Hernia: No hernia is present.   Skin:     Coloration: Skin is not jaundiced or pale.      Findings: No erythema or rash.   Neurological:      Mental Status: He is alert and oriented to person, place, and time. Mental status is at baseline.   Psychiatric:         Mood and Affect: Mood normal.       "      Assessment and Plan     Diagnoses and all orders for this visit:    1. Gastroesophageal reflux disease, unspecified whether esophagitis present (Primary)    2. Small intestinal bacterial overgrowth (SIBO)    3. Abdominal bloating      Assessment & Plan  1. Small Intestinal Bacterial Overgrowth (SIBO).  2. Abdominal bloating  He was treated with Xifaxan 3 times a day for 14 days, which has significantly improved his symptoms. He reports no current issues with bowel movements, blood in stool, or abdominal pain. He is advised to avoid spicy and greasy foods and to use Tylenol instead of NSAIDs like Mobic, ibuprofen, or Advil.   A colonoscopy is recommended as a screening measure for colorectal cancer, but he will consider this option and provide an update during his next visit. If bloating recurs, further discussion will be needed.    2. Atrial Fibrillation.  He is currently on Pletal and Plavix for atrial fibrillation.    Follow-up  The patient will follow up in 6 months.    PROCEDURE  Upper endoscopy in 2018 showed mild acute gastritis, normal esophagus, and nonbleeding ulcers. No specimens were collected. Colonoscopy was performed in 2011.    6 months or sooner as needed.    I have reviewed patient's current medications list, relevant clinical information necessary for today's encouter. I discussed the clinical impression and treatment plan with the patient, who verbalized understanding and in agreement.  All questions were answered and support was provided.    Patient or patient representative verbalized consent for the use of Ambient Listening during the visit with  SHANIQUA Odonnell for chart documentation. 12/30/2024  08:18 EDT

## 2024-12-27 ENCOUNTER — OFFICE VISIT (OUTPATIENT)
Dept: ONCOLOGY | Facility: CLINIC | Age: 79
End: 2024-12-27
Payer: MEDICARE

## 2024-12-27 ENCOUNTER — LAB (OUTPATIENT)
Dept: LAB | Facility: HOSPITAL | Age: 79
End: 2024-12-27
Payer: MEDICARE

## 2024-12-27 VITALS
HEART RATE: 99 BPM | DIASTOLIC BLOOD PRESSURE: 72 MMHG | TEMPERATURE: 97.7 F | WEIGHT: 160 LBS | OXYGEN SATURATION: 95 % | HEIGHT: 70 IN | BODY MASS INDEX: 22.9 KG/M2 | SYSTOLIC BLOOD PRESSURE: 144 MMHG

## 2024-12-27 DIAGNOSIS — R91.8 PULMONARY NODULES/LESIONS, MULTIPLE: ICD-10-CM

## 2024-12-27 DIAGNOSIS — R91.8 PULMONARY NODULES/LESIONS, MULTIPLE: Primary | Chronic | ICD-10-CM

## 2024-12-27 DIAGNOSIS — Z87.891 HISTORY OF SMOKING GREATER THAN 50 PACK YEARS: Chronic | ICD-10-CM

## 2024-12-27 LAB
ALBUMIN SERPL-MCNC: 4 G/DL (ref 3.5–5.2)
ALBUMIN/GLOB SERPL: 1.3 G/DL
ALP SERPL-CCNC: 125 U/L (ref 39–117)
ALT SERPL W P-5'-P-CCNC: 12 U/L (ref 1–41)
ANION GAP SERPL CALCULATED.3IONS-SCNC: 12.2 MMOL/L (ref 5–15)
AST SERPL-CCNC: 21 U/L (ref 1–40)
BASOPHILS # BLD AUTO: 0.05 10*3/MM3 (ref 0–0.2)
BASOPHILS NFR BLD AUTO: 0.6 % (ref 0–1.5)
BILIRUB SERPL-MCNC: 0.5 MG/DL (ref 0–1.2)
BUN SERPL-MCNC: 15 MG/DL (ref 8–23)
BUN/CREAT SERPL: 17.4 (ref 7–25)
CALCIUM SPEC-SCNC: 9.4 MG/DL (ref 8.6–10.5)
CHLORIDE SERPL-SCNC: 103 MMOL/L (ref 98–107)
CO2 SERPL-SCNC: 25.8 MMOL/L (ref 22–29)
CREAT SERPL-MCNC: 0.86 MG/DL (ref 0.76–1.27)
DEPRECATED RDW RBC AUTO: 46.5 FL (ref 37–54)
EGFRCR SERPLBLD CKD-EPI 2021: 88.6 ML/MIN/1.73
EOSINOPHIL # BLD AUTO: 0.12 10*3/MM3 (ref 0–0.4)
EOSINOPHIL NFR BLD AUTO: 1.4 % (ref 0.3–6.2)
ERYTHROCYTE [DISTWIDTH] IN BLOOD BY AUTOMATED COUNT: 15.3 % (ref 12.3–15.4)
GLOBULIN UR ELPH-MCNC: 3.2 GM/DL
GLUCOSE SERPL-MCNC: 119 MG/DL (ref 65–99)
HCT VFR BLD AUTO: 44.4 % (ref 37.5–51)
HGB BLD-MCNC: 13.3 G/DL (ref 13–17.7)
IMM GRANULOCYTES # BLD AUTO: 0.05 10*3/MM3 (ref 0–0.05)
IMM GRANULOCYTES NFR BLD AUTO: 0.6 % (ref 0–0.5)
LYMPHOCYTES # BLD AUTO: 1.28 10*3/MM3 (ref 0.7–3.1)
LYMPHOCYTES NFR BLD AUTO: 15.1 % (ref 19.6–45.3)
MCH RBC QN AUTO: 24.9 PG (ref 26.6–33)
MCHC RBC AUTO-ENTMCNC: 30 G/DL (ref 31.5–35.7)
MCV RBC AUTO: 83.1 FL (ref 79–97)
MONOCYTES # BLD AUTO: 0.7 10*3/MM3 (ref 0.1–0.9)
MONOCYTES NFR BLD AUTO: 8.2 % (ref 5–12)
NEUTROPHILS NFR BLD AUTO: 6.3 10*3/MM3 (ref 1.7–7)
NEUTROPHILS NFR BLD AUTO: 74.1 % (ref 42.7–76)
NRBC BLD AUTO-RTO: 0 /100 WBC (ref 0–0.2)
PLATELET # BLD AUTO: 305 10*3/MM3 (ref 140–450)
PMV BLD AUTO: 8.4 FL (ref 6–12)
POTASSIUM SERPL-SCNC: 3.3 MMOL/L (ref 3.5–5.2)
PROT SERPL-MCNC: 7.2 G/DL (ref 6–8.5)
RBC # BLD AUTO: 5.34 10*6/MM3 (ref 4.14–5.8)
SODIUM SERPL-SCNC: 141 MMOL/L (ref 136–145)
WBC NRBC COR # BLD AUTO: 8.5 10*3/MM3 (ref 3.4–10.8)

## 2024-12-27 PROCEDURE — 36415 COLL VENOUS BLD VENIPUNCTURE: CPT

## 2024-12-27 PROCEDURE — 85025 COMPLETE CBC W/AUTO DIFF WBC: CPT

## 2024-12-27 PROCEDURE — 80053 COMPREHEN METABOLIC PANEL: CPT

## 2024-12-27 NOTE — PROGRESS NOTES
.     REASON FOR FOLLOWUP:     Provide an opinion on any further workup or treatment of enlarging pulmonary nodules.                               HISTORY OF PRESENT ILLNESS:  The patient is a 79 y.o. year old male with hypertension, type 2 diabetes, coronary artery disease, benign prostate hypertrophy, who presented today for follow-up evaluation.      History of Present Illness  The patient presented today on 12/27/2024 for reevaluation after a recent CT scan examination.    He reports no new symptoms or concerns since his last visit.  Patient denies cough hemoptysis.  No weight losses.    He underwent a CT scan in 08/2024 during a hospital stay.  Chest angiogram study on 8/28/2024 reported no evidence for pulmonary emboli.  There was advanced pulmonary emphysema.  There was a 16 x 8 mm left upper lobe nodular density which was not on the previous exam on 7/27/2024.  In the right upper lobe apical area, there were two 6 mm opacities, slightly smaller than on the exam from 7/27/2024      Results  Imaging  Chest CT without contrast on 12/19/2024 showed resolution of the 16 x 8 mm ill-defined nodular density in the left upper lobe. Stable two tiny sub-5 mm right upper lobe nodules. No definitive new lung nodules. Emphysematous changes in the lungs. Aortic and coronary artery calcification. No pathological enlarged lymph nodes in the hilar or mediastinum.      Past Medical History:   Diagnosis Date    Alcohol abuse, in remission     Asthma copd    BPH (benign prostatic hypertrophy)     see doctors at [facility]    Cancer     SKIN SEVERAL TIMES    Cataract     CHF (congestive heart failure)     COPD (chronic obstructive pulmonary disease)     Depression     Diabetes mellitus     DJD (degenerative joint disease) of cervical spine     ED (erectile dysfunction)     [facility]    GERD (gastroesophageal reflux disease)     Glaucoma     History of prediabetes     Hx of colonic polyps     followed by GI (Kyle)     Hyperlipidemia     Hypertension     Influenza B 02/14/2013        Low back pain     Melanoma     Nocturnal hypoxia     Open wound of right lower leg 04/15/2024    *Managed by wound clinic       Osteoarthritis     HIPS, HANDS, MULTIPLE SITES    Osteoarthritis 03/17/2016    Peripheral neuropathy     Permanent atrial fibrillation     Pneumonia     Pneumonia due to infectious organism 04/23/2023    Rectal bleeding 04/26/2017    Sleep apnea     USES CPAP    Tendonitis of shoulder 11/07/2007    RIGHT SHOULDER/DELTOID INSERTION    TIA (transient ischemic attack) 10/2020    Ulcer of the stomach and intestine      Past Surgical History:   Procedure Laterality Date    ANTERIOR CERVICAL DISCECTOMY W/ FUSION Bilateral 08/21/2024    Procedure: Anterior cervical partial corpectomy at cervical 5 for the purposes of cervical 4 cervical 5 and cervical 5 cervical 6 discectomy and fusion using allograft cadaver bone and metallic instrumentation;  Surgeon: Pedro Mancuso MD;  Location: [facility];  Service: Neurosurgery;  Laterality: Bilateral;    APPENDECTOMY      CARDIOVASCULAR STRESS TEST N/A 10/20/2015    NML LEXISCAN CARDIOLITE PERFUSION STUDY, NO EVIDENCE OF ISCHEMIA, AREA OF HYPOPERFUSION OF THE INFERIOR WALL W/NORMAL WALL PERFUSION AND NML LEFT VENTRICULAR EJECTION FRACTION, MOST LIKELY ATTENUATION. DR.MICHAEL BOX    CATARACT EXTRACTION Bilateral 02/2023    COLONOSCOPY N/A 02/2017    COLONOSCOPY N/A 02/23/2011    4 POLYPS REMOVED, RESCOPE IN 5 YRS, DR. EAGLE    COLONOSCOPY N/A 03/08/2006    ERYTHEMOUS AND GRANULAR MUCOSA IN ILEOCECAL VALVE, NORMAL COLON, REPEAT IN 5 YRS,     ENDOSCOPY N/A 09/26/2018    normal esophagus, acute gastritis, multiple non-bleeding duodenal ulcers with pigmented material, H Pylori positive    HERNIA REPAIR Bilateral     INGUINAL    JOINT REPLACEMENT  11/2015    left hip    TOTAL HIP ARTHROPLASTY Left 11/06/2015    Dr Ankush Sky    TOTAL HIP ARTHROPLASTY  Right 10/27/2014    DR.RIED GRAY    UPPER GASTROINTESTINAL ENDOSCOPY      VASECTOMY       MEDICAL ONCOLOGY HISTORY: The patient is a 77 y.o. year old male who presented on 2/7/2023 to the multimodality lung clinic at the [facility] for initial evaluation because of worsening pulmonary nodules. The patient is accompanied by his wife today.     This patient has history of cigarette smoking for more than 50 years, 2 packs per day and he quit about 15 years ago. The patient reports dry cough. He also has COPD for more than 5 years, he has exertional dyspnea. He reports that since last month he has become more short of breath, however denies hemoptysis.  He denies weight loss, no headaches or vision changes.     The patient reports he is followed by pulmonologist Tahir Childs MD, and he has a scheduled PFT examination soon.     The patient reports he is diabetic, and has been on metformin with controlled glucose level. The patient also reports he has atrial fibrillation for quite some time, maybe started 8 years ago when he had hip surgery. He is on Plavix. He is not on any other blood thinner as I reviewed medicines. The patient is followed by Cardiology, Juan Colby MD. The patient also reports chronic trace edema in legs.     This patient had low dose CT scan for the chest due to risk for lung cancer with significant history of cigarette smoking. This study was done on 01/27/2023, and reported right upper lobe newly developed suspicious stellate 10 mm nodule. There is also a right upper lobe base lung nodule along the major fissure 15 mm spiculated nodule. There is also grouping of 4 sub-6 mm pulmonary nodules within the right upper lobe, and also another group of 5 sub-6 mm nodules within the right lower lobe. There was also benign calcified granuloma in the right lower lobe. Within the left upper lobe, there is a 6 mm pulmonary nodule.       I reviewed  his chest CT scan images with Thoracic Surgeon, Eduardo Mcmullen MD, and we recommended to obtain PET scan examination for further evaluation because those lesions are highly suspicious for lung cancer. If those lesions are hypermetabolic, we will try to purse CT guided core needle biopsy. However I discussed with the patient and his wife that the nodules are relatively small, and is not immediately close to chest wall. It will be difficult to biopsy and has high risk for pneumothorax because this patient has evidence of emphysema on CT scan of the chest.      Patient had PET scan examination on 2/15/2023 reported photopenic pulmonary nodules except a newly developed hypermetabolic lesion 1.8 cm with SUV 3.1 cm at the lingular lobe.    I presented his case at the multimodality conference on 2/16/2023, and we suspected the hypermetabolic lesion is infectious.  A conference recommended to treat him with antibiotics and then reassess with a chest CT scan in 5 to 6 weeks.  We started patient on Augmentin 875 mg twice a day for 10 days on 2/16/2023.    Today on 2/24/2023 patient reports no fever sweating chills.  No chest pains.  No cough no hemoptysis.     follow-up evaluation, after his chest CT scan examination on 03/29/2023.     The study reported a new irregular peripheral mass-like consolidation in the posterolateral right upper lobe measuring 2.1 x 2.0 cm. A 0.7 cm nodular opacity in the right upper lobe is less conspicuous compared to that from 02/15/2023 and has decreased in size from 01/27/2023. It was 1.1 cm. The previously noted focal consolidation in the posterior right upper lobe is smaller and less conspicuous. There was also a 0.4 cm solid nodule in the posterolateral right middle lobe which is also new. A 1.4 cm irregular ground-glass nodular opacity in the anterior left upper lobe has decreased in size and density compared to that from 02/15/2023. Previously was 1.8 cm.      The CT scan examination on  6/30/2023 reported interval resolution of the previous described area of mass-like consolidation in the right upper lobe as well as a previously described 4 mm nodule in the right middle lobe. However, 3 new irregular nodules are noted up to 1.8 cm, 1 in the right middle lobe and 2 in the superior segment of the left lower lobe.    The patient reports he has no productive cough, no hemoptysis.  Denies a fever sweating chills.  He does use CPAP machine at night. He has COPD/emphysema and sleep apnea.    Laboratory study today on 7/7/2023 reports slightly trending down hemoglobin 13.3 g/dL, normal WBC and the platelets.    The patient had a CT scan on 12/15/2023 and the study reported a new irregular density in the right upper lobe measuring about 2 cm. There is also a new small ground glass opacity in the anterior right upper lobe. Previously noted right middle lobe opacity has resolved. There is also a new tiny nodular density in the left apex. There is a new 10 mm nodular density with an air bronchogram in the left upper lobe. Also, a small new ground glass opacity in the left upper lobe and the 7 mm left lower lobe nodule has resolved. There was stable emphysema and a stable mediastinal and hilar lymph node. Radiologist commented that the new findings are favored to be infectious/inflammatory, however, follow up CT scan in 3 months recommended.    Laboratory study today on 12/22/2023 reported normal WBC 7,370, neutrophils 4,710, lymphocytes 1,700, hemoglobin 13.9 g/dL and platelets 244,000. Chemistry labor is pending. Last BMP was on 11/16/2023 reported glucose 119, otherwise unremarkable.      MEDICATIONS    Current Outpatient Medications:     Accu-Chek Guide Test test strip, TEST BLOOD SUGAR ONE TIME DAILY, Disp: 100 each, Rfl: 3    ALPHAGAN P 0.1 % solution ophthalmic solution, Administer 1 drop to both eyes 2 (two) times a day., Disp: , Rfl: 6    atorvastatin (LIPITOR) 80 MG tablet, TAKE 1 TABLET EVERY NIGHT,  Disp: 90 tablet, Rfl: 1    bumetanide (BUMEX) 1 MG tablet, Take 1 tablet by mouth Daily., Disp: , Rfl:     cilostazol (PLETAL) 100 MG tablet, Take 1 tablet by mouth 2 (Two) Times a Day., Disp: , Rfl:     clopidogrel (PLAVIX) 75 MG tablet, Take 1 tablet by mouth Daily., Disp: 90 tablet, Rfl: 1    dabigatran etexilate (PRADAXA) 150 MG capsu, Take 1 capsule by mouth 2 (Two) Times a Day., Disp: , Rfl:     Fluticasone-Umeclidin-Vilant (Trelegy Ellipta) 200-62.5-25 MCG/ACT aerosol powder , Inhale 1 puff Daily., Disp: , Rfl:     Jardiance 10 MG tablet tablet, Take 1 tablet by mouth Daily., Disp: , Rfl:     Lancets Misc. (Accu-Chek Softclix Lancet Dev) kit, Use to check FBS once daily . Dm2 with peripheral  neuropathy E11.51, Disp: 1 kit, Rfl: 2    metFORMIN ER (GLUCOPHAGE-XR) 500 MG 24 hr tablet, TAKE 1 TABLET EVERY DAY WITH DINNER, Disp: 90 tablet, Rfl: 1    metoprolol succinate XL (TOPROL-XL) 100 MG 24 hr tablet, Take 1 tablet by mouth Every Night. Take 100 mg at night and 50 mg in the morning, Disp: 30 tablet, Rfl: 0    metoprolol succinate XL (TOPROL-XL) 50 MG 24 hr tablet, Take 1 tablet by mouth Daily. Take 50 mg every morning and 100 mg every night, Disp: 30 tablet, Rfl: 0    Mirabegron ER (MYRBETRIQ) 25 MG tablet sustained-release 24 hour 24 hr tablet, Take 1 tablet by mouth Every Night., Disp: , Rfl:     pantoprazole (PROTONIX) 40 MG EC tablet, Take 1 tablet by mouth 2 (Two) Times a Day., Disp: , Rfl:     polyethylene glycol (MIRALAX) 17 g packet, Take 17 g by mouth 2 (Two) Times a Day As Needed (constipation)., Disp: 10 packet, Rfl: 0    spironolactone (ALDACTONE) 25 MG tablet, Take 1 tablet by mouth Daily., Disp: 30 tablet, Rfl: 0    Tafluprost, PF, (ZIOPTAN) 0.0015 % solution ophthalmic solution, Administer 1 drop to both eyes Every Night., Disp: , Rfl:     tamsulosin (FLOMAX) 0.4 MG capsule 24 hr capsule, , Disp: , Rfl:     Adbry 150 MG/ML solution prefilled syringe, Inject 300 mg under the skin into the  appropriate area as directed Every 14 (Fourteen) Days., Disp: , Rfl:     cilostazol (PLETAL) 100 MG tablet, Take 1 tablet by mouth 2 (Two) Times a Day., Disp: 180 tablet, Rfl: 3    glucose blood (Accu-Chek Guide) test strip, Use to check FBS once daily . Dm2 with peripheral  neuropathy E11.51, Disp: 100 each, Rfl: 1    glucose blood test strip, Check FBS once daily .DM2 /E11.51, Disp: 100 each, Rfl: 3    imiquimod (ALDARA) 5 % cream, APPLY TO AFFECTED AREAS DAILY FOR 2 WEEKS THEN 2 WEEKS OFF, Disp: , Rfl:     riFAXIMin (Xifaxan) 550 MG tablet, Take 1 tablet by mouth 3 (Three) Times a Day., Disp: 42 tablet, Rfl: 1    simethicone (MYLICON) 80 MG chewable tablet, Chew 1 tablet Every 6 (Six) Hours As Needed for Flatulence., Disp: 60 tablet, Rfl: 0    ALLERGIES:     Allergies   Allergen Reactions    Bactrim [Sulfamethoxazole-Trimethoprim] Rash    Sulfacetamide Rash       SOCIAL HISTORY:       Social History     Socioeconomic History    Marital status:      Spouse name: Ara*    Number of children: 3   Tobacco Use    Smoking status: Former     Current packs/day: 0.00     Average packs/day: 2.0 packs/day for 49.0 years (98.0 ttl pk-yrs)     Types: Cigarettes     Start date: 1961     Quit date: 2010     Years since quittin.9     Passive exposure: Past    Smokeless tobacco: Never    Tobacco comments:     long smoking history   Vaping Use    Vaping status: Never Used   Substance and Sexual Activity    Alcohol use: No     Comment: 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; 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    Drug use: No     Comment: caffeine use     Sexual activity: Not Currently     Partners: Female         FAMILY HISTORY:  Family History   Problem Relation Age of Onset    Cancer Mother         Bladder cancer    Colon cancer Mother     Ovarian cancer Mother     Alzheimer's disease Mother 70    Hyperlipidemia Father     Heart disease Father     Coronary artery disease Father     Hypertension Father     Stroke Sister          "October 2016    Dementia Sister     Heart disease Brother     Alcohol abuse Brother     Heart disease Brother     Coronary artery disease Brother     Hypertension Brother     Stroke Brother     Arthritis Brother     Colon cancer Maternal Grandmother     Prostate cancer Neg Hx     Malig Hyperthermia Neg Hx        REVIEW OF SYSTEMS:  Review of Systems see HPI           Vitals:    12/27/24 1127   BP: 144/72   Pulse: 99   Temp: 97.7 °F (36.5 °C)   TempSrc: Oral   SpO2: 95%   Weight: 72.6 kg (160 lb)   Height: 177.8 cm (70\")   PainSc:   6   PainLoc: Neck         12/27/2024    11:27 AM   Current Status   ECOG score 0     Physical Exam    GENERAL:  Well-developed, well-nourished male in no acute distress.    SKIN:  Warm, dry without rashes, purpura or petechiae.  HEENT:  Normocephalic.   LYMPHATICS:  No cervical adenopathy.  CHEST: Normal respiratory effort.  Lungs clear to auscultation. Good airflow.  CARDIAC: Irregular rhythm however normal rate. Normal S1,S2.  ABDOMEN:  Soft, no tender.  Bowel sounds normal.  EXTREMITIES:  No lower extremity edema.    RECENT LABS:  Lab Results   Component Value Date    WBC 8.50 12/27/2024    HGB 13.3 12/27/2024    HCT 44.4 12/27/2024    MCV 83.1 12/27/2024     12/27/2024     Lab Results   Component Value Date    NEUTROABS 6.30 12/27/2024     Lab Results   Component Value Date    GLUCOSE 119 (H) 12/27/2024    BUN 15 12/27/2024    CREATININE 0.86 12/27/2024    EGFRRESULT 89.4 10/24/2022    EGFR 88.6 12/27/2024    BCR 17.4 12/27/2024    K 3.3 (L) 12/27/2024    CO2 25.8 12/27/2024    CALCIUM 9.4 12/27/2024    PROTENTOTREF 6.1 10/24/2022    ALBUMIN 4.0 12/27/2024    BILITOT 0.5 12/27/2024    AST 21 12/27/2024    ALT 12 12/27/2024         IMAGING:  CT Chest Without Contrast Diagnostic  Narrative: CT OF THE CHEST WITHOUT CONTRAST 12/19/2024     HISTORY: Pulmonary nodules.     Axial images were obtained from the lung apices to the upper abdomen. No  intravenous contrast was given.   "   Previous CT of the chest dated 08/29/2024 is compared.     On the previous study there was an approximately 16 mm x 8 mm somewhat  ill-defined nodular density in the left upper lobe on previous study  image 69. This finding has largely resolved with only a vague area of  increased density in this region on the current study image 45 of series  3.     Two tiny sub-5 mm right upper lobe nodules are seen on images 31 and 32  and these appear stable when compared to the previous study.     No definite new lung nodules are seen. There are emphysematous changes  of the lungs.     There is aortic and coronary calcification. No pathologically enlarged  hilar or mediastinal lymph nodes are seen. A few small calcified right  hilar region lymph nodes are seen. Incidentally noted is an azygos lobe  in the right upper hemithorax.     No new infiltrates are seen.     Impression: 1. The ill-defined left upper lobe nodule seen on the previous study of  08/29/2024 has largely resolved with only some vague increased density  in this region.  2. Two tiny sub-5 mm nodules in the right upper lobe appear stable.  3. Emphysematous changes of the lungs.  4. No new nodules or new infiltrates are seen.  5. Additional follow-up CT of the chest without contrast in  approximately 1 year is suggested.     Radiation dose reduction techniques were utilized, including automated  exposure control and exposure modulation based on body size.        This report was finalized on 12/26/2024 6:37 AM by Dr. Stepan Montgomery M.D on Workstation: [id number]         Assessment & Plan   Assessment & Plan      ASSESSMENT:     1.  Pulmonary nodules.   the patient has high risk for lung cancer with significant history of cigarette smoking about 100 pack year although he quit about 15 years ago.   Nevertheless the patient had screening chest CT scan on 01/27/2023 which reported 2 pulmonary nodules up to 15 mm, spiculated highly suspicious for lung cancer.    We recommended to have PET scan examination first and then could choose the site of biopsy. The patient is high risk for pneumothorax, however there is no other way to bypass the biopsy.   PET scan examination on 2/15/2023 reported photopenic pulmonary nodules except a newly developed hypermetabolic lesion 1.8 cm with SUV 3.1 cm at the lingular lobe.  His case was presented at the multimodality conference on 2/16/2023, and suspected the hypermetabolic lesion is infectious.  We started patient on Augmentin 875 mg twice a day for 10 days on 2/16/2023.  On 2/24/2023 patient reports no fever sweating chills.  No chest pains.  No cough no hemoptysis.  We recommended to have repeated chest CT scan in about 6 weeks for reassessment, as recommended by the multimodality conference.  Patient is agreeable.  On 3/29/2023 patient had a repeating chest CT scan examination.  I reviewed the images with the patient and his wife today, 04/07/2023. Certainly, this right upper lobe consolidation is new, just developed in the past 6 to 8 weeks. He is symptomatic with cough and sweating, chills despite no fever. He does have some pleuritic chest pain also. I discussed with the patient and his wife, recommended to treat him as pneumonia with Augmentin for 10 days. I will obtain a CT scan in 3 months with IV contrast as recommended by radiologist. I do not think this is cancerous in nature, this is more of infection. Right now, there is no evidence for cancer recurrence.   CT scan examination on 6/30/2023 reported resolution of the previous documented small pulmonary nodules, however, now he has 3 new nodules up to 1.8 cm.  I reviewed the images with the patient and his wife, for both the new CT from 06/30/2023 and compared it to the previous CT scan from 03/29/2023. I explained to them that new pulmonary nodule most likely are inflammatory in nature, it was not there 3 months ago. In the meantime, the previous nodules including the  largest one in the right upper lobe pleural based has resolved without a trace. I recommended to have repeating CT of the chest with IV contrast in 3 months for reassessment. Patient and his wife voiced understanding and agreeable.  The patient had a CT scan examination of the chest on 12/15/2023 and had multiple new pulmonary nodules/ground glass density, and also had some resolution of previous nodules/densities. I reviewed the images with the patient together today on 12/22/2023 and it is probably inflammatory in nature. I do recommend having a chest CT scan in 3 months for follow-up evaluation as recommended by the radiologist. Patient voiced understanding.    His chest CT scan with IV contrast obtained on 4/1/2024 reported resolution and improvement of previous pulmonary densities, however there is a new right upper lobe pleural-based masslike parenchymal density measuring 2.0 x 3.1 cm.  I discussed with the patient on 4/5/2024 and recommended to repeat chest CT scan without contrast in 3 months for reassessment.    CT chest without contrast on 6/25/2024 reported resolution of the previous documented right upper lobe posterior pleural based pulmonary nodule, however, in the right upper lobe now there is new cluster of small pulmonary nodules.    The patient was informed today on 6/28/2024 that the new pulmonary nodules and resolution of the previous nodule are more indicative of inflammatory changes. Despite the absence of recent signs of infection such as fever, sweating, chills, or cough, it is unlikely that this is cancerous. A CT scan of the chest without contrast was recommended in 6 months for a follow-up evaluation.  Chest CT scan on 12/19/2024 reports resolution of the left upper lobe pulmonary nodule show on chest angiogram from 8/29/2024.  There is also stable sub-5 mm right upper lobe pulmonary nodules.      2. COPD/emphysema.   The patient will continue to follow up with his pulmonologist,   Naz.    3.  Hypertension-coronary artery disease.    /72 today on 12/27/2024.  Patient is on multiple medications.  We will continue follow-up with PCP for management.         PLAN:  Stable small sub-5 mm pulmonary nodules in the right upper lobe.  Patient will continue follow-up with pulmonologist, Dr. Childs.  We will dismiss patient from our clinic.    I personally reviewed the CT scan images from 12/19/2024 and compared to that from 8/29/2024.  Initiated those images with the patient today.    I discussed with the patient about laboratory results and further management plan.  Patient voiced understanding and agreeable.     More than 32 min were used for patient care, over half of that time were for counseling.        Adarsh Rangel MD PhD       CC:   MD Don Carrillo MD Mark Esterle, M.D.

## 2024-12-30 ENCOUNTER — OFFICE VISIT (OUTPATIENT)
Dept: GASTROENTEROLOGY | Facility: CLINIC | Age: 79
End: 2024-12-30
Payer: MEDICARE

## 2024-12-30 VITALS
WEIGHT: 161.1 LBS | BODY MASS INDEX: 23.06 KG/M2 | SYSTOLIC BLOOD PRESSURE: 120 MMHG | OXYGEN SATURATION: 96 % | TEMPERATURE: 96.8 F | DIASTOLIC BLOOD PRESSURE: 70 MMHG | HEART RATE: 71 BPM | HEIGHT: 70 IN

## 2024-12-30 DIAGNOSIS — K63.8219 SMALL INTESTINAL BACTERIAL OVERGROWTH (SIBO): ICD-10-CM

## 2024-12-30 DIAGNOSIS — R14.0 ABDOMINAL BLOATING: ICD-10-CM

## 2024-12-30 DIAGNOSIS — K21.9 GASTROESOPHAGEAL REFLUX DISEASE, UNSPECIFIED WHETHER ESOPHAGITIS PRESENT: Primary | ICD-10-CM

## 2024-12-30 PROCEDURE — 3074F SYST BP LT 130 MM HG: CPT | Performed by: NURSE PRACTITIONER

## 2024-12-30 PROCEDURE — 3078F DIAST BP <80 MM HG: CPT | Performed by: NURSE PRACTITIONER

## 2024-12-30 PROCEDURE — 99214 OFFICE O/P EST MOD 30 MIN: CPT | Performed by: NURSE PRACTITIONER

## 2024-12-30 RX ORDER — MELOXICAM 15 MG/1
TABLET ORAL
COMMUNITY

## 2025-01-03 ENCOUNTER — TELEPHONE (OUTPATIENT)
Dept: NEUROSURGERY | Facility: CLINIC | Age: 80
End: 2025-01-03
Payer: MEDICARE

## 2025-01-03 NOTE — TELEPHONE ENCOUNTER
01/03/24 airam. Pt has an MRI appt on 01/21/25 and office follow up appt  Same day.  I called Mr Salazar to rescheduled office appt.  Pt is now scheduled for 01/24/25 at 08:30 a.m.  Pt request appt reminder for MRI and Office to be mailed to address   On file.

## 2025-01-13 ENCOUNTER — OFFICE VISIT (OUTPATIENT)
Dept: INTERNAL MEDICINE | Age: 80
End: 2025-01-13
Payer: MEDICARE

## 2025-01-13 VITALS
BODY MASS INDEX: 22.62 KG/M2 | HEIGHT: 70 IN | WEIGHT: 158 LBS | OXYGEN SATURATION: 99 % | HEART RATE: 107 BPM | DIASTOLIC BLOOD PRESSURE: 64 MMHG | SYSTOLIC BLOOD PRESSURE: 106 MMHG | TEMPERATURE: 96.8 F

## 2025-01-13 DIAGNOSIS — I10 PRIMARY HYPERTENSION: Chronic | ICD-10-CM

## 2025-01-13 DIAGNOSIS — T14.8XXA SKIN AVULSION: ICD-10-CM

## 2025-01-13 DIAGNOSIS — I48.91 ATRIAL FIBRILLATION WITH RAPID VENTRICULAR RESPONSE: Chronic | ICD-10-CM

## 2025-01-13 DIAGNOSIS — E87.6 HYPOKALEMIA: ICD-10-CM

## 2025-01-13 DIAGNOSIS — J43.9 PULMONARY EMPHYSEMA, UNSPECIFIED EMPHYSEMA TYPE: Chronic | ICD-10-CM

## 2025-01-13 DIAGNOSIS — E11.51 TYPE 2 DIABETES MELLITUS WITH DIABETIC PERIPHERAL ANGIOPATHY WITHOUT GANGRENE, WITHOUT LONG-TERM CURRENT USE OF INSULIN: Primary | Chronic | ICD-10-CM

## 2025-01-13 PROCEDURE — 1125F AMNT PAIN NOTED PAIN PRSNT: CPT | Performed by: INTERNAL MEDICINE

## 2025-01-13 PROCEDURE — 3074F SYST BP LT 130 MM HG: CPT | Performed by: INTERNAL MEDICINE

## 2025-01-13 PROCEDURE — 1159F MED LIST DOCD IN RCRD: CPT | Performed by: INTERNAL MEDICINE

## 2025-01-13 PROCEDURE — 3078F DIAST BP <80 MM HG: CPT | Performed by: INTERNAL MEDICINE

## 2025-01-13 PROCEDURE — G2211 COMPLEX E/M VISIT ADD ON: HCPCS | Performed by: INTERNAL MEDICINE

## 2025-01-13 PROCEDURE — 1160F RVW MEDS BY RX/DR IN RCRD: CPT | Performed by: INTERNAL MEDICINE

## 2025-01-13 PROCEDURE — 99214 OFFICE O/P EST MOD 30 MIN: CPT | Performed by: INTERNAL MEDICINE

## 2025-01-13 NOTE — ASSESSMENT & PLAN NOTE
Lab Results   Component Value Date    HGBA1C 6.50 (H) 08/26/2024    HGBA1C 6.50 (H) 04/15/2024    HGBA1C 6.50 (H) 07/17/2023    CREATININE 0.86 12/27/2024    LDL 51 07/25/2024    MALBCRERATIO 71 (H) 08/26/2024      Check A1c and continue metformin.

## 2025-01-13 NOTE — ASSESSMENT & PLAN NOTE
Lab Results   Component Value Date    K 3.3 (L) 12/27/2024    K 3.4 (L) 09/19/2024    K 3.6 09/18/2024    K 4.1 08/29/2024    K 3.2 (L) 08/29/2024       Check potassium today. If low, advised him to contact his cardiologist for KCL replacement (on Bumex).

## 2025-01-13 NOTE — PROGRESS NOTES
"Subjective   History of Present Illness: Aj Salazar is a 79 y.o. male is here today for follow-up with a new cervical MRI that was ordered for left-sided neck and shoulder pain.    Today, Mr. Salazar reports he has L sided neck pain that starts, between the shoulder blades and radiates to the left shoulder blade and top of the left shoulder is also had some right shoulder blade pain recently as well.  He denies any radiating pain down the arm but he does have some dysesthesia intermittently in the left hand.  He denies any weakness.    History of Present Illness    Tobacco Use: Medium Risk (1/24/2025)    Patient History     Smoking Tobacco Use: Former     Smokeless Tobacco Use: Never     Passive Exposure: Past        The following portions of the patient's history were reviewed and updated as appropriate: allergies, current medications, past family history, past medical history, past social history, past surgical history and problem list.    Review of Systems   Constitutional:  Negative for chills and fever.   Musculoskeletal:  Positive for myalgias, neck pain and neck stiffness.   Neurological:  Negative for weakness.       Objective     Vitals:    01/24/25 0821   BP: 132/77   Cuff Size: Adult   Weight: 71.7 kg (158 lb)   Height: 177.8 cm (70\")     Body mass index is 22.67 kg/m².      Physical Exam  Neurological Exam    Physical Exam:    CONSTITUTIONAL:  appears well developed, well-nourished and in no acute distress.    NECK: Range of motion is markedly limited in the neck with only 10 to 15 degrees of lateral bending and essentially no extension of the neck although he has good rotation bilaterally.  With range of motion he does have accelerated neck pain.. The trachea is midline with no masses.      PULMONARY: Respiratory effort is normal with no increased work of breathing or signs of respiratory distress.    CARDIOVASCULAR: Pedal pulses are +2/4 bilaterally. Examination of the extremities shows no edema or " varicosities.    MUSCULOSKELETAL: Gait normal, no pain upon range of motion of the shoulders    SKIN: The skin is warm, dry and intact.  Anterior neck incision healed    NEUROLOGIC:   Normal motor strength noted. Muscle bulk and tone are normal.  Sensory exam is normal to fine touch  Reflexes 3/4 at the tricep 2/4 at the bicep and 2/4 at the brachioradialis and symmetrical  Very subtle Elsa's on the right and negative on the left  Cortical function is intact and without deficits. Speech is normal.    PSYCHIATRIC: oriented to person, place and time. Patient's mood and affect are normal.    Assessment & Plan   Independent Review of Radiographic Studies:      I personally reviewed the images from the following studies.    MRI of the cervical spine done at [facility] on January 21, 2025 reveals postop change with anterior instrumented fusion from C4-C6 with complete resolution of the central spinal stenosis and evidence of myelomalacia changes in the cord at that level.  There is some adjacent level disease with left neuroforaminal narrowing at C3-C4 and left neuroforaminal narrowing at C6-C7 both similar to preoperative study.          Medical Decision Making:      Patient still has some findings on clinical exam consistent with his preoperative myelopathy and he does have myelomalacia of the cord at the level of C5.  There is no residual central spinal stenosis although he does have some neuroforaminal narrowing in multiple areas but I do not detect a specific radiculopathy clinically nor is there is severe level of neuroforaminal stenosis to justify additional surgery.  His pattern of pain is primarily neck pain and proximal shoulder pain and he should be able to respond to interventional pain management techniques.  I am going to refer him to an interventional pain management specialist for options.  He has had epidural steroid injections in his lumbar spine in the past with a great deal of  success.    Return for any new or recurrent neurosurgical problems.    Diagnoses and all orders for this visit:    1. Neck pain (Primary)  -     Ambulatory Referral to Pain Management Clinic    2. History of fusion of cervical spine  -     Ambulatory Referral to Pain Management Clinic    3. Cervical cord compression with myelopathy             Pedro Mancuso MD FACS [facility]  Neurological Surgery

## 2025-01-13 NOTE — PROGRESS NOTES
J  U  N  O  H    K  I  M ,   M  D                  I  N  T  E  R  N  A  L    M  E  D  I  C  I  N  E         ENCOUNTER DATE:  01/13/2025    Aj Salazar / 79 y.o. / male    OFFICE VISIT ENCOUNTER       CHIEF COMPLAINT / REASON FOR OFFICE VISIT     Diabetes, COPD, Hypertension, and Atrial Fibrillation      ASSESSMENT & PLAN     Problem List Items Addressed This Visit          High    Type 2 diabetes mellitus with diabetic peripheral angiopathy without gangrene, without long-term current use of insulin - Primary (Chronic)    Overview     **Complications of diabetes: peripheral neuropathy, peripheral arterial disease, and right leg wound.      Continue metformin  mg daily with dinner.          Current Assessment & Plan     Lab Results   Component Value Date    HGBA1C 6.50 (H) 08/26/2024    HGBA1C 6.50 (H) 04/15/2024    HGBA1C 6.50 (H) 07/17/2023    CREATININE 0.86 12/27/2024    LDL 51 07/25/2024    MALBCRERATIO 71 (H) 08/26/2024      Check A1c and continue metformin.          Relevant Medications    Jardiance 10 MG tablet tablet    metFORMIN ER (GLUCOPHAGE-XR) 500 MG 24 hr tablet    Other Relevant Orders    Hemoglobin A1c    Hypokalemia    Current Assessment & Plan     Lab Results   Component Value Date    K 3.3 (L) 12/27/2024    K 3.4 (L) 09/19/2024    K 3.6 09/18/2024    K 4.1 08/29/2024    K 3.2 (L) 08/29/2024       Check potassium today. If low, advised him to contact his cardiologist for KCL replacement (on Bumex).          Relevant Orders    Potassium       Medium    COPD (chronic obstructive pulmonary disease) (Chronic)    Overview     *Followed by pulmonologist     PFT (5/2018): moderate obstruction with decreased diffusing capacity.          Relevant Medications    Fluticasone-Umeclidin-Vilant (Trelegy Ellipta) 200-62.5-25 MCG/ACT aerosol powder     Hypertension (Chronic)    Relevant Medications    metoprolol succinate XL (TOPROL-XL) 100 MG 24 hr tablet    spironolactone  "(ALDACTONE) 25 MG tablet    metoprolol succinate XL (TOPROL-XL) 50 MG 24 hr tablet    bumetanide (BUMEX) 1 MG tablet    Atrial fibrillation with rapid ventricular response (Chronic)    Overview     *Imburgia         Relevant Medications    metoprolol succinate XL (TOPROL-XL) 100 MG 24 hr tablet    metoprolol succinate XL (TOPROL-XL) 50 MG 24 hr tablet    clopidogrel (PLAVIX) 75 MG tablet    cilostazol (PLETAL) 100 MG tablet       Unprioritized    Skin avulsion    Current Assessment & Plan     Topical OTC antibiotic ointment with fresh gauze / wrap daily. Monitor for fever, increased redness/swelling/pain, drainage.   Tetanus shot current. Completed 5 days course of keflex for prophylaxis.           Orders Placed This Encounter   Procedures    Hemoglobin A1c     Order Specific Question:   Release to patient     Answer:   Routine Release [[id number]]    Potassium     Order Specific Question:   Release to patient     Answer:   Routine Release [[id number]]     No orders of the defined types were placed in this encounter.      SUMMARY/DISCUSSION        TOTAL TIME OF ENCOUNTER:        Next Appointment with me: Visit date not found    Return in about 4 months (around 5/13/2025) for Reassess chronic medical problems.      VITAL SIGNS     Vitals:    01/13/25 1328   BP: 106/64   Pulse: 107   Temp: 96.8 °F (36 °C)   SpO2: 99%   Weight: 71.7 kg (158 lb)   Height: 177.8 cm (70\")       BP Readings from Last 3 Encounters:   01/13/25 106/64   01/08/25 129/76   12/30/24 120/70     Wt Readings from Last 3 Encounters:   01/13/25 71.7 kg (158 lb)   01/08/25 72.6 kg (160 lb)   12/30/24 73.1 kg (161 lb 1.6 oz)     Body mass index is 22.67 kg/m².    Blood pressure readings recorded on patient flowsheet:  [employer] Blood Pressure Flowsheet Systolic Diastolic Pulse   4/29/2022   6:00   74  104    4/25/2022   6:00   73  81    4/22/2022   6:00   60  108    4/18/2022   6:00   61  97    4/15/2022   6:00   69  88  "   4/11/2022   6:00   76  85    4/8/2022   6:00 AM 98  62  87    4/4/2022   6:00   66  93    4/1/2022   6:00   61  91    3/28/2022   6:00   78  78        Patient-reported         MEDICATIONS AT THE TIME OF OFFICE VISIT     Current Outpatient Medications on File Prior to Visit   Medication Sig    ALPHAGAN P 0.1 % solution ophthalmic solution Administer 1 drop to both eyes 2 (two) times a day.    atorvastatin (LIPITOR) 80 MG tablet TAKE 1 TABLET EVERY NIGHT    bumetanide (BUMEX) 1 MG tablet Take 1 tablet by mouth Daily.    cilostazol (PLETAL) 100 MG tablet Take 1 tablet by mouth 2 (Two) Times a Day.    clopidogrel (PLAVIX) 75 MG tablet Take 1 tablet by mouth Daily.    dabigatran etexilate (PRADAXA) 150 MG capsu Take 1 capsule by mouth 2 (Two) Times a Day.    Fluticasone-Umeclidin-Vilant (Trelegy Ellipta) 200-62.5-25 MCG/ACT aerosol powder  Inhale 1 puff Daily.    Jardiance 10 MG tablet tablet Take 1 tablet by mouth Daily.    Lancets Misc. (Accu-Chek Softclix Lancet Dev) kit Use to check FBS once daily . Dm2 with peripheral  neuropathy E11.51    metFORMIN ER (GLUCOPHAGE-XR) 500 MG 24 hr tablet TAKE 1 TABLET EVERY DAY WITH DINNER    metoprolol succinate XL (TOPROL-XL) 100 MG 24 hr tablet Take 1 tablet by mouth Every Night. Take 100 mg at night and 50 mg in the morning    metoprolol succinate XL (TOPROL-XL) 50 MG 24 hr tablet Take 1 tablet by mouth Daily. Take 50 mg every morning and 100 mg every night    Mirabegron ER (MYRBETRIQ) 25 MG tablet sustained-release 24 hour 24 hr tablet Take 1 tablet by mouth Every Night.    pantoprazole (PROTONIX) 40 MG EC tablet Take 1 tablet by mouth 2 (Two) Times a Day.    spironolactone (ALDACTONE) 25 MG tablet Take 1 tablet by mouth Daily.    Tafluprost, PF, (ZIOPTAN) 0.0015 % solution ophthalmic solution Administer 1 drop to both eyes Every Night.    tamsulosin (FLOMAX) 0.4 MG capsule 24 hr capsule     [DISCONTINUED] cephalexin (KEFLEX) 500 MG capsule Take 1  capsule by mouth 3 (Three) Times a Day for 5 days.    [DISCONTINUED] meloxicam (MOBIC) 15 MG tablet  (Patient not taking: Reported on 1/13/2025)    [DISCONTINUED] polyethylene glycol (MIRALAX) 17 g packet Take 17 g by mouth 2 (Two) Times a Day As Needed (constipation). (Patient not taking: Reported on 1/13/2025)     No current facility-administered medications on file prior to visit.          HISTORY OF PRESENT ILLNESS     Diabetes remains well-controlled on metformin  mg daily and most recent A1c was 6.5.  He is on atorvastatin 80 mg daily for history of TIA.  He is on various cardiovascular meds including Pletal, Pradaxa, Plavix along with Jardiance 10 mg, bumetanide 1 mg, metoprolol succinate 150 mg daily and spironolactone 25 mg daily.  These meds are managed by his cardiology team.  He is noted to have persistent mild hypokalemia with most recent potassium being 3.3.  Dr. Colby is his main cardiologist.  Hypertension remains controlled on above meds.  COPD remains stable on Trelegy inhaler and he is followed by his pulmonologist.    He was recently directed to the urgent care center for evaluation of right forearm skin avulsion which he incurred when he hit a frozen garbage can with his forearm.  He was prescribed Keflex for 5 days for infection prophylaxis.  His tetanus shot is up-to-date.  He has been taking care of his wound with Vaseline and gauze with wraps.  He denies any fever or chills.  Denies any worsening redness or pain or warmth of the wound.    Patient Care Team:  Neftali Amezcua MD as PCP - General (Internal Medicine)  Tahir Childs MD as Consulting Physician (Pulmonary Disease)  Aura Perry OD (Optometry)  Juan Colby MD as Consulting Physician (Cardiology)  Rebel Escobar MD as Consulting Physician (Urology)  Danie Mcgrath MD as Consulting Physician (Ophthalmology)  John Queen MD as Consulting Physician (Wound Care)  Pedro Mancuso,  MD as Surgeon (Neurosurgery)  Pasha Garrison MD as Surgeon (Vascular Surgery)  Adarsh Rangel MD PhD as Consulting Physician (Hematology and Oncology)  Josh Bergman APRN as Nurse Practitioner (Gastroenterology)    REVIEW OF SYSTEMS     Review of Systems       PHYSICAL EXAMINATION     Physical Exam  Alert with normal thought and judgment.   Cardiovascular: Normal rate.   Pulm/Chest: Effort normal, breath sounds normal.   Right forearm skin avulsion without evidence of infection.       REVIEWED DATA     Labs:     Lab Results   Component Value Date     12/27/2024    K 3.3 (L) 12/27/2024    CALCIUM 9.4 12/27/2024    AST 21 12/27/2024    ALT 12 12/27/2024    BUN 15 12/27/2024    CREATININE 0.86 12/27/2024    CREATININE 0.87 09/19/2024    CREATININE 1.00 09/18/2024    EGFRRESULT 89.4 10/24/2022     Lab Results   Component Value Date    K 3.3 (L) 12/27/2024    K 3.4 (L) 09/19/2024    K 3.6 09/18/2024    K 4.1 08/29/2024    K 3.2 (L) 08/29/2024       Lab Results   Component Value Date    HGBA1C 6.50 (H) 08/26/2024    HGBA1C 6.50 (H) 04/15/2024    HGBA1C 6.50 (H) 07/17/2023     Lab Results   Component Value Date    LDL 51 07/25/2024    LDL 47 04/15/2024    LDL 54 07/17/2023    HDL 51 07/25/2024    HDL 41 04/15/2024    TRIG 65 07/25/2024    TRIG 115 04/15/2024     Lab Results   Component Value Date    TSH 1.880 03/08/2021    TSH 2.45 10/14/2015     Lab Results   Component Value Date    WBC 8.50 12/27/2024    HGB 13.3 12/27/2024     12/27/2024     Lab Results   Component Value Date    MALBCRERATIO 71 (H) 08/26/2024           Imaging:               Medical Tests:               Summary of old records / correspondence / consultant report:             Request outside records:

## 2025-01-13 NOTE — ASSESSMENT & PLAN NOTE
Topical OTC antibiotic ointment with fresh gauze / wrap daily. Monitor for fever, increased redness/swelling/pain, drainage.   Tetanus shot current. Completed 5 days course of keflex for prophylaxis.

## 2025-01-21 ENCOUNTER — HOSPITAL ENCOUNTER (OUTPATIENT)
Dept: MRI IMAGING | Facility: HOSPITAL | Age: 80
Discharge: HOME | End: 2025-01-21
Admitting: NEUROLOGICAL SURGERY
Payer: MEDICARE

## 2025-01-21 DIAGNOSIS — M54.2 NECK PAIN ON LEFT SIDE: ICD-10-CM

## 2025-01-21 DIAGNOSIS — M25.512 ACUTE PAIN OF LEFT SHOULDER: ICD-10-CM

## 2025-01-21 DIAGNOSIS — Z98.1 HISTORY OF FUSION OF CERVICAL SPINE: ICD-10-CM

## 2025-01-21 PROCEDURE — 72141 MRI NECK SPINE W/O DYE: CPT

## 2025-01-24 ENCOUNTER — OFFICE VISIT (OUTPATIENT)
Dept: NEUROSURGERY | Facility: CLINIC | Age: 80
End: 2025-01-24
Payer: MEDICARE

## 2025-01-24 VITALS
WEIGHT: 158 LBS | DIASTOLIC BLOOD PRESSURE: 77 MMHG | SYSTOLIC BLOOD PRESSURE: 132 MMHG | HEIGHT: 70 IN | BODY MASS INDEX: 22.62 KG/M2

## 2025-01-24 DIAGNOSIS — G95.20 CERVICAL CORD COMPRESSION WITH MYELOPATHY: ICD-10-CM

## 2025-01-24 DIAGNOSIS — M54.2 NECK PAIN: Primary | ICD-10-CM

## 2025-01-24 DIAGNOSIS — Z98.1 HISTORY OF FUSION OF CERVICAL SPINE: ICD-10-CM

## 2025-01-24 PROCEDURE — 1160F RVW MEDS BY RX/DR IN RCRD: CPT | Performed by: NEUROLOGICAL SURGERY

## 2025-01-24 PROCEDURE — 1159F MED LIST DOCD IN RCRD: CPT | Performed by: NEUROLOGICAL SURGERY

## 2025-01-24 PROCEDURE — 99213 OFFICE O/P EST LOW 20 MIN: CPT | Performed by: NEUROLOGICAL SURGERY

## 2025-01-24 PROCEDURE — 3075F SYST BP GE 130 - 139MM HG: CPT | Performed by: NEUROLOGICAL SURGERY

## 2025-01-24 PROCEDURE — 3078F DIAST BP <80 MM HG: CPT | Performed by: NEUROLOGICAL SURGERY

## 2025-01-28 ENCOUNTER — TELEPHONE (OUTPATIENT)
Age: 80
End: 2025-01-28
Payer: MEDICARE

## 2025-01-28 RX ORDER — CILOSTAZOL 100 MG/1
100 TABLET ORAL 2 TIMES DAILY
Qty: 60 TABLET | Refills: 6 | Status: SHIPPED | OUTPATIENT
Start: 2025-01-28

## 2025-01-28 NOTE — TELEPHONE ENCOUNTER
Patient stated that he spoke with his pharmacy and they are needing another prescription submitted for cilostazol.   Patient pharmacy is [facility]   Patient would like a call back when it is submitted

## 2025-01-31 ENCOUNTER — TELEPHONE (OUTPATIENT)
Age: 80
End: 2025-01-31
Payer: MEDICARE

## 2025-01-31 NOTE — TELEPHONE ENCOUNTER
Spoke with pt- I explained that per Dr. Garrison last note that he advised to continue the Pletal for now as it has been helping greatly with ambulation. RX was sent in to [facility] on 1/28/25.Pt verbalized understanding and knows to call if he has any issues with the rx.

## 2025-01-31 NOTE — TELEPHONE ENCOUNTER
Patient called wondering if he is still needing to be on his pletal medication. If so, he is needing a new order sent into the [facility] pharmacy. Patient is requesting a call back regarding whether or not he needs to be on it, and when it gets sent in.     Call back is [phone]

## 2025-02-05 ENCOUNTER — TRANSCRIBE ORDERS (OUTPATIENT)
Dept: PHYSICAL THERAPY | Facility: CLINIC | Age: 80
End: 2025-02-05
Payer: MEDICARE

## 2025-02-05 DIAGNOSIS — M47.812 CERVICAL SPONDYLOSIS WITHOUT MYELOPATHY: Primary | ICD-10-CM

## 2025-02-10 ENCOUNTER — TRANSCRIBE ORDERS (OUTPATIENT)
Dept: ADMINISTRATIVE | Facility: HOSPITAL | Age: 80
End: 2025-02-10
Payer: MEDICARE

## 2025-02-10 DIAGNOSIS — R91.8 LUNG NODULES: Primary | ICD-10-CM

## 2025-02-11 ENCOUNTER — TREATMENT (OUTPATIENT)
Dept: PHYSICAL THERAPY | Facility: CLINIC | Age: 80
End: 2025-02-11
Payer: MEDICARE

## 2025-02-11 DIAGNOSIS — M25.611 DECREASED RIGHT SHOULDER RANGE OF MOTION: ICD-10-CM

## 2025-02-11 DIAGNOSIS — G89.29 CHRONIC UPPER BACK PAIN: Primary | ICD-10-CM

## 2025-02-11 DIAGNOSIS — M54.9 CHRONIC UPPER BACK PAIN: Primary | ICD-10-CM

## 2025-02-11 DIAGNOSIS — R29.898 DECREASED STRENGTH OF LOWER EXTREMITY: ICD-10-CM

## 2025-02-11 PROCEDURE — 97161 PT EVAL LOW COMPLEX 20 MIN: CPT

## 2025-02-11 PROCEDURE — 97110 THERAPEUTIC EXERCISES: CPT

## 2025-02-11 PROCEDURE — 97535 SELF CARE MNGMENT TRAINING: CPT

## 2025-02-11 NOTE — PROGRESS NOTES
Physical Therapy Initial Evaluation and Plan of Care  [facility] [address], [address]  [place] [address]     Patient: Aj Salazar   : 1945  Referring practitioner: Arin Willingham MD  Date of Initial Visit: 2025  Today's Date: 2025  Patient seen for 1 sessions  PT Clinic location: [address], [address]  [address]          Visit Diagnoses:    ICD-10-CM ICD-9-CM   1. Chronic upper back pain  M54.9 724.5    G89.29 338.29   2. Decreased right shoulder range of motion  M25.611 719.51   3. Decreased strength of lower extremity  R29.898 729.89       Subjective Questionnaire: NDI:16/50 (32%)    Subjective Evaluation    History of Present Illness  Mechanism of injury: I have been having some shoulder blade pain around the end of Dec and the beginning of January. I went and saw Dr. Chopra at pain management per my neurosurgeon's request. We reviewed my imaging and she gave me a couple different options. She suggested some dry needling. She also suggested some pain patches. I still do my exercises from my cervical spine every other day and walk on the other days. I got a cream from the pharmacy and this seems to be helping some. I do have to apply it a couple times a day.   The pain is mostly on my right side in my shoulder blade region and radiates down. The pain is a dull and achy pain that won't go away. Nothing really seems to increase the pain but cleaning the floors, driving, and most ADLs are painful because of this region. I do not have any numbness or tingling in the area.   I do take Tylenol arthritis three times a day. With this and the cream I don' have any problems falling asleep but the pain does wake me up.      Patient Occupation: [employer]  Current pain ratin  At best pain ratin  At worst pain ratin  Quality: dull ache  Relieving factors: medications, rest and  relaxation  Aggravating factors: sleeping    Diagnostic Tests  MRI studies: normal (after cervical fusion)    Treatments  Previous treatment: physical therapy  Patient Goals  Patient goals for therapy: decreased pain, increased strength and independence with ADLs/IADLs  Patient goal: I want to decrease the pain so that i can get back to dong my normal daily activities.       Medical history: COPD, Hypertension, Osteoarthritis, A-fib, type 2 diabetes, TIA, CHF, Neuroforaminal stenosis of cervical spine, claudication, bruit of right carotid artery, cervical cor compression with myelopathy, hyperlipidemia. See chart for further detail.   Therapy Precautions: N/A      Objective          Static Posture     Shoulders  Rounded.    Scapulae  Left winging and right winging.    Palpation   Left   No palpable tenderness to the supraspinatus, teres major and teres minor.   Tenderness of the levator scapulae, lower trapezius, middle trapezius, rhomboids and thoracic paraspinals.     Right   No palpable tenderness to the supraspinatus, teres major and teres minor. Tenderness of the levator scapulae, lower trapezius, middle trapezius, rhomboids and thoracic paraspinals.     Tenderness   Cervical Spine   Tenderness in the spinous process and right transverse process.     Right Shoulder  Tenderness in the medial scapula. No tenderness in the lateral scapula and scapular spine.     Additional Tenderness Details  TTP  to thoracic spinous process   TTP costovertebral joints bilaterally     Active Range of Motion   Cervical/Thoracic Spine   Cervical    Flexion: 30 degrees   Left rotation: 60 (familiar pain in R shoulder blade region) degrees   Right rotation: 50 (pain in shoulder blade region) degrees   Left Shoulder   Flexion: 160 degrees   Abduction: 155 degrees   External rotation 0°: 80 degrees     Right Shoulder   Flexion: 120 degrees with pain  Abduction: 130 degrees   External rotation 0°: 50 degrees with pain    Additional  Active Range of Motion Details  Decreased bilateral thoracic mobility with no increase in pain    Strength/Myotome Testing     Left Shoulder     Planes of Motion   Flexion: 4+   Abduction: 4+   External rotation at 0°: 4+   Internal rotation at 0°: 4+     Right Shoulder     Planes of Motion   Flexion: 4   Abduction: 4   External rotation at 0°: 4   Internal rotation at 0°: 4+           Assessment & Plan       Assessment  Impairments: abnormal muscle firing, abnormal or restricted ROM, activity intolerance, impaired physical strength, lacks appropriate home exercise program and pain with function   Functional limitations: carrying objects, lifting, sleeping, pulling, pushing, uncomfortable because of pain, reaching behind back, reaching overhead and unable to perform repetitive tasks   Assessment details: Pt is a 79 year old male who presents to PT reporting chronic right shoulder pain that began at the end of December, he does have a history of cervical fusion.  Upon initial evaluation he presents to PT with the following deficits and impairments: decreased R shoulder ROM, decreased cervical ROM, decreased thoracic ROM and mobility at each joint, TTP surrounding musculature, decreased upper extremity strength and postural deficits. These deficits make it difficult for the patient to stay asleep, perform ADLs, cleaning chores, carrying objects, reading, driving, and other ADLs. He also reports having a difficult time lifting and engaging in recreational activities. Pt would benefit from skilled PT intervention to address the deficits noted and to improve overall quality of life.   Prognosis: good    Goals  Plan Goals: SHORT TERM GOALS: 4 weeks  1. Patient to be compliant with HEP and no difficulty with sleeping due to the shoulder blade pain  2. Increased (R) UE strength to 4+/5 with no pain> 2/10 to allow for household and work activities.   3. Pt to exhibit (R) shoulder active flexion / ABD to 135° in  standing/sitting to assist with reaching overhead with less pain to wash hair and change shirt.  4. Pt demonstrates improved posture in sitting and standing for 30 mins with minimal-no cues during treatment session to help decrease the stress on the shoulders.    5. Patient to report seeing at least a 50% improvement since beginning OPT.     LONG TERM GOALS: 8 weeks  1. Pt score <20% perceived disability on Neck Index.  2. Pt. to exhibit (R) shoulder AROM to WFL (> 165°) flex/abd. With less scapular winging to allow for reaching overhead and behind back without pain limiting function to perform dressing and reach higher shelves.  3. Pt to exhibit 5/5 UE strength to allow for pushing/pulling and lifting >5 #to occur with pain <2/10 to help with household cleaning, grocery shopping and recreational activities.   4. Pt able to reach overhead and lift 5# (B) x 10 to allow for return to doing work around home and help improve ability to return to exercise without restriction.    5. Patient to report seeing at least an 80% improvement since beginning OPT.       Plan  Therapy options: will be seen for skilled therapy services  Planned modality interventions: cryotherapy, electrical stimulation/Russian stimulation, TENS, thermotherapy (hydrocollator packs), ultrasound and dry needling  Planned therapy interventions: ADL retraining, flexibility, body mechanics training, home exercise program, functional ROM exercises, joint mobilization, manual therapy, neuromuscular re-education, postural training, soft tissue mobilization, spinal/joint mobilization, strengthening, stretching and therapeutic activities  Frequency: 2x week  Duration in weeks: 8  Treatment plan discussed with: patient        See flowsheets for treatment detail.  Education: Discussed underlying deficits, HEP, and POC.     History # of Personal Factors and/or Comorbidities: LOW (0)  Examination of Body System(s): # of elements: LOW (1-2)  Clinical Presentation:  STABLE   Clinical Decision Making: LOW       Timed:         Manual Therapy:    -     mins  83637;     Therapeutic Exercise:    15     mins  85331;     Neuromuscular Panchito:    -    mins  38982;    Therapeutic Activity:     -     mins  31161;     Gait Training:           mins  15344;     Ultrasound:          mins  61463;    Ionto                                   mins   56523  Self Care                       15     mins   75682      Un-Timed:  Electrical Stimulation:         mins  40584 (MC );  Dry Needling          mins self-pay  Traction          mins 21910  Low Eval     25     Mins  81273  Mod Eval     -     Mins  58557  High Eval                       -     Mins  84801  Re-Eval                               mins  73015      Timed Treatment:   30   mins   Total Treatment:     55   mins    PT SIGNATURE: Melida Kilpatrick PT   [place] PT license #: [id number]  DATE TREATMENT INITIATED: 2/11/2025    Initial Certification  Certification Period: 5/11/2025  I certify that the therapy services are furnished while this patient is under my care.  The services outlined above are required by this patient, and will be reviewed every 90 days.    PHYSICIAN: Arin Willingham MD  NPI: [id number]                                      DATE:     Please sign and return via fax to [facility] - Fax #: [phone]. Thank you, [facility].

## 2025-02-14 ENCOUNTER — TRANSCRIBE ORDERS (OUTPATIENT)
Dept: CARDIAC REHAB | Facility: HOSPITAL | Age: 80
End: 2025-02-14
Payer: MEDICARE

## 2025-02-14 ENCOUNTER — TREATMENT (OUTPATIENT)
Dept: PHYSICAL THERAPY | Facility: CLINIC | Age: 80
End: 2025-02-14
Payer: MEDICARE

## 2025-02-14 DIAGNOSIS — J44.9 COPD, MODERATE: Primary | ICD-10-CM

## 2025-02-14 DIAGNOSIS — R29.898 DECREASED STRENGTH OF LOWER EXTREMITY: ICD-10-CM

## 2025-02-14 DIAGNOSIS — G89.29 CHRONIC UPPER BACK PAIN: Primary | ICD-10-CM

## 2025-02-14 DIAGNOSIS — M54.9 CHRONIC UPPER BACK PAIN: Primary | ICD-10-CM

## 2025-02-14 DIAGNOSIS — M25.611 DECREASED RIGHT SHOULDER RANGE OF MOTION: ICD-10-CM

## 2025-02-14 NOTE — PROGRESS NOTES
Physical Therapy Daily Treatment Note  [facility] [place]  [address], [address]  [place] [address]     Patient: Aj Salazar  : 1945  Referring practitioner: Arin Willingham MD  Today's Date: 2025    VISIT#: 2    Visit Diagnoses:    ICD-10-CM ICD-9-CM   1. Chronic upper back pain  M54.9 724.5    G89.29 338.29   2. Decreased right shoulder range of motion  M25.611 719.51   3. Decreased strength of lower extremity  R29.898 729.89       Subjective   Aj Salazar reports: that he is doing well, no increase in pain with the exercises.      Objective       See Exercise, Manual, and Modality Logs for complete treatment.     Patient Education: HEP review  Exercise rationale/ pain free exercise performance  Alternate exercise positions  Verbal/Tactile cues to ensure correct exercise performance/technique       Assessment/Plan  Patient demonstrates good tolerance to continued and new therapeutic exercises and activities on this date with no report of increased thoracic pain during or at the end of the session. Continued to focus on scapular strengthening and scapular rhythmical patterning with verbal cues for scapular retraction and depression. Will continue to progress as tolerated.       Progress per Plan of Care and Progress strengthening /stabilization /functional activity          Timed:         Manual Therapy:    -     mins  38191;     Therapeutic Exercise:    15     mins  98211;     Neuromuscular Panchito:    15    mins  68261;    Therapeutic Activity:     -     mins  82516;     Gait Training:           mins  44921;     Ultrasound:          mins  44601;    Ionto:                                   mins  89406  Self Care:                       -     mins  98734    Un-Timed:  Electrical Stimulation:         mins  41667 ( );  Dry Needling          mins self-pay  Traction          mins 48780  Re-Eval                               mins  59405  Group  Therapy           ___ mins 83423    Timed Treatment:   30   mins   Total Treatment:     49   mins    Melida Kilpatrick PT  Physical Therapist  [place] license #: [id number]

## 2025-02-18 ENCOUNTER — TREATMENT (OUTPATIENT)
Dept: PHYSICAL THERAPY | Facility: CLINIC | Age: 80
End: 2025-02-18
Payer: MEDICARE

## 2025-02-18 DIAGNOSIS — G89.29 CHRONIC UPPER BACK PAIN: Primary | ICD-10-CM

## 2025-02-18 DIAGNOSIS — M25.611 DECREASED RIGHT SHOULDER RANGE OF MOTION: ICD-10-CM

## 2025-02-18 DIAGNOSIS — R29.898 DECREASED STRENGTH OF LOWER EXTREMITY: ICD-10-CM

## 2025-02-18 DIAGNOSIS — M54.9 CHRONIC UPPER BACK PAIN: Primary | ICD-10-CM

## 2025-02-18 NOTE — PROGRESS NOTES
Physical Therapy Daily Treatment Note  [facility] [place]  [address], [address]  [place] [id number]     Patient: Aj Salazar  : 1945  Referring practitioner: Arin Willingham MD  Today's Date: 2025    VISIT#: 3    Visit Diagnoses:    ICD-10-CM ICD-9-CM   1. Chronic upper back pain  M54.9 724.5    G89.29 338.29   2. Decreased right shoulder range of motion  M25.611 719.51   3. Decreased strength of lower extremity  R29.898 729.89       Subjective   Aj Salazar reports: that yesterday he experienced severe shoulder blade pain that started after he did his HEP. He associates this with the shoulder abduction exercise. However, he reports no increase in pain after last visit on Friday, no increase in pain on Saturday or  as well.       Objective       See Exercise, Manual, and Modality Logs for complete treatment.     Patient Education: HEP review  Exercise rationale/ pain free exercise performance  Alternate exercise positions  Verbal/Tactile cues to ensure correct exercise performance/technique       Assessment/Plan  Patient demonstrates good tolerance to continued and new therapeutic exercises on this date with no report of increased periscapular pain during or at the end of the session. Continued with strengthening postural musculature and shoulder stabilizers. Patient does not tolerate prone position well, will likely introduce scapular mobilizations in SLing position and attempt some costovertebral joint mobilizations as well. Discussed not  performing the open books as far and working in a pain free ROM and discussed lowering the reps as well. Will continue to progress as tolerated.       Progress per Plan of Care and Progress strengthening /stabilization /functional activity          Timed:         Manual Therapy:    -     mins  11049;     Therapeutic Exercise:    15     mins  12188;     Neuromuscular Panchito:    18    mins  42163;     Therapeutic Activity:     -     mins  67018;     Gait Training:           mins  64605;     Ultrasound:          mins  00766;    Ionto:                                   mins  15213  Self Care:                       -     mins  39598    Un-Timed:  Electrical Stimulation:         mins  04027 ( );  Dry Needling          mins self-pay  Traction          mins 28448  Re-Eval                               mins  36223  Group Therapy           ___ mins 47737    Timed Treatment:   33   mins   Total Treatment:     33   mins    Melida Kilpatrick PT  Physical Therapist  [place] license #: [id number]

## 2025-02-20 ENCOUNTER — TREATMENT (OUTPATIENT)
Dept: PHYSICAL THERAPY | Facility: CLINIC | Age: 80
End: 2025-02-20
Payer: MEDICARE

## 2025-02-20 ENCOUNTER — OFFICE VISIT (OUTPATIENT)
Dept: CARDIAC REHAB | Facility: HOSPITAL | Age: 80
End: 2025-02-20
Payer: MEDICARE

## 2025-02-20 VITALS
OXYGEN SATURATION: 95 % | HEIGHT: 70 IN | WEIGHT: 168 LBS | BODY MASS INDEX: 24.05 KG/M2 | DIASTOLIC BLOOD PRESSURE: 50 MMHG | HEART RATE: 105 BPM | SYSTOLIC BLOOD PRESSURE: 110 MMHG

## 2025-02-20 DIAGNOSIS — R29.898 DECREASED STRENGTH OF LOWER EXTREMITY: ICD-10-CM

## 2025-02-20 DIAGNOSIS — G89.29 CHRONIC UPPER BACK PAIN: Primary | ICD-10-CM

## 2025-02-20 DIAGNOSIS — M25.611 DECREASED RIGHT SHOULDER RANGE OF MOTION: ICD-10-CM

## 2025-02-20 DIAGNOSIS — J44.9 COPD, MODERATE: Primary | ICD-10-CM

## 2025-02-20 DIAGNOSIS — M54.9 CHRONIC UPPER BACK PAIN: Primary | ICD-10-CM

## 2025-02-20 PROCEDURE — 94625 PHY/QHP OP PULM RHB W/O MNTR: CPT

## 2025-02-20 NOTE — PROGRESS NOTES
"Pulmonary Rehab Initial Assessment      Name: Aj Salazar  :1945 Allergies:Bactrim [sulfamethoxazole-trimethoprim] and Sulfacetamide   MRN: [id number] 79 y.o. Physician: Neftali Amezcua MD   Primary Diagnosis:    Diagnosis Plan   1. COPD, moderate         Event Date: 2025 Specialist: Tahir Childs MD   Secondary Diagnosis: TOBY  Note Author: Ebony Mackenzie RN     Cardiovascular History: HTN, atrial fib, CHF, atherosclerotic peripheral vascular disease with ulceration, claudication, bilateral carotid artery stenosis, hyperlipidemia      EXERCISE AT HOME  yes  20min  7 days per week          Ambulatory Status:Independent  Ambulatory Fall Risk Assessed on Initial Visit: no 6 Minute Walk Pre- Pulmonary Rehab:  Distance:877 ft      RPE:3        RPD: 3              MPH: 1.7  Max. HR: 119        SPO2:90    MET: 2.3  Resting BP: 110/50     Peak BP: 114/60  Recovery BP: 110/50  Comments: Pt tolerated exercise well. No complaints noted. Sp02 was 90-94% with exercise.       NUTRITION  Lipids:yes If yes, labs as follows;  Total: No components found for: \"CHOLESTEROL\"  HDL:   HDL Cholesterol   Date Value Ref Range Status   2024 51 40 - 60 mg/dL Final    Lipids continued:  LDL:  LDL Cholesterol    Date Value Ref Range Status   2024 51 0 - 100 mg/dL Final     LDL Chol Calc ([facility])   Date Value Ref Range Status   04/15/2024 47 0 - 100 mg/dL Final     Triglyceride: No components found for: \"TRIGLYCERIDE\"   Weight Management:                 Weight: 168.0 lbs  Height: 69.5 inches                                   BMI: Body mass index is 24.45 kg/m².  Waist Circumference: n/a inches   Alcohol Use: none Diabetes:Yes,  Monitors BS at home- yes, Frequency: occasional , Random BS: n/a    Last HGBA1C with date if applicable:No components found for: \"A1C\"         SOCIAL HISTORY  Social History     Socioeconomic History    Marital status:      Spouse name: Ara*    Number of children: 3   Tobacco Use    " Smoking status: Former     Current packs/day: 0.00     Average packs/day: 2.0 packs/day for 49.0 years (98.0 ttl pk-yrs)     Types: Cigarettes     Start date: 1/1/1961     Quit date: 1/1/2010     Years since quitting: 15.1     Passive exposure: Past    Smokeless tobacco: Never    Tobacco comments:     long smoking history   Vaping Use    Vaping status: Never Used   Substance and Sexual Activity    Alcohol use: No     Comment: 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; 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 no    Are you being neglected by a caregiver? No Shoulder flexibility/Range of motion: Above average     Recommended arm activity: Any    Chair sit and reach within: 4 inches   Leg flexibility: Above average    Leg Strength/Balance/Five times sit to stand: 15 seconds.   Pt used upper body for balance    Recommended stretching: Standing   Balance: Average Assessment: Lung clear bilat, regular heart sounds noted.    Family attends [place]: no      COMORBIDITIES  Sleep Apnea: yes If yes, Choose: CPAP    Cancer: yes skin    Stroke: yes TIA   Pneumonia: yes If yes, how many times 1    Osteoporosis: no    GI Problems: yes GERD Frequent colds/allergies: no    Other illnesses, surgeries, or comments Raynaud's disease, BPH, hypokalemia, osteoarthritis, neuroforaminal stenosis of cervical spine, acute right shoulder pain, pulmonary nodules, peripheral neuropathy, anterior cervical discectomy with fusion, left total hip arthroplasty,  hernia repair   PAIN:  Are you having pain? yes  If yes where is pain?  Rt shoulder If yes, pain scale: 5      PULMONARY:  Do you use a nebulizer?: yes   If yes, rare    Do you use oxygen at home?: no  If yes, amount n/a    With rest: no  With activity: no Do you have a daily cough?: no  If yes, choose:  n/a    Do you every notice yourself wheezing?: no  If yes, when: n/a Other pulmonary/breathing problems?: no   OTHER:  Do you have physical limitations?: no  If yes, n/a    Do you need assistance with ADLs?: no  If yes, n/a Do you climb stairs at home?:no  If yes, how many times n/a    Have you ever attended a pulmonary rehab?: yes  If yes, 2018,2023 [facility] Dyspnea Scale: 0 - 4:  2 = walks slower than people of the same age on the level because of breathlessness, or has to stop for breath when walking at own pace on the level     Patient Goals: Pt would like to be able to breathe like he used too. He would like to at least maintain his breathing status if not improve.     DISCHARGE PLANNING:  Do you have any home exercise equipment?: no    What are you plans for continuing exercise after completion of pulmonary rehab? Pt plans to exercise at home.      EDUCATION:  Pursed - lip breathing, Diaphragmatic breathing, Relaxation techniques, and Program information folder       PRE-PROGRAM ASSESSMENT:  PFT Date:5-    FEV1/FVC: 56 FEV1: 73    FVC: 94 DLCO: n/a     Time of arrival: 11:00    Time of departure: 12:00           2/20/2025  11:12 AR Mackenzie RN

## 2025-02-20 NOTE — PROGRESS NOTES
Physical Therapy Daily Treatment Note  [facility] [place]  [address], [address]  [place] [id number]     Patient: Aj Salazar  : 1945  Referring practitioner: Arin Willingham MD  Today's Date: 2025    VISIT#: 4    Visit Diagnoses:    ICD-10-CM ICD-9-CM   1. Chronic upper back pain  M54.9 724.5    G89.29 338.29   2. Decreased right shoulder range of motion  M25.611 719.51   3. Decreased strength of lower extremity  R29.898 729.89       Subjective   Aj aSlazar reports: that he is seeing some improvements, he has been doing exercises even 10 of the open books and has not experienced the pain that he had been having. He is beginning pulmonary rehab today.       Objective       See Exercise, Manual, and Modality Logs for complete treatment.     Patient Education: HEP review  Exercise rationale/ pain free exercise performance  Alternate exercise positions  Verbal/Tactile cues to ensure correct exercise performance/technique       Assessment/Plan  Patient demonstrates good tolerance to continued therapeutic exercises and manual therapy techniques on this date. Introduced soft tissue mobilizations to the periscapular muscles with scapula in a protracted position and a retracted position. He does report some tenderness that subsided as we continued with the STM. Continued with exercise program and modified open books to 10 reps and discussed a pain free ROM. Will continue to progress as tolerated.       Progress per Plan of Care and Progress strengthening /stabilization /functional activity          Timed:         Manual Therapy:    8     mins  88031;     Therapeutic Exercise:    10     mins  21839;     Neuromuscular Panchito:    15    mins  53409;    Therapeutic Activity:     11     mins  76842;     Gait Training:           mins  79172;     Ultrasound:          mins  86096;    Ionto:                                   mins  69728  Self Care:                        -     mins  80782    Un-Timed:  Electrical Stimulation:         mins  07588 ( );  Dry Needling          mins self-pay  Traction          mins 96969  Re-Eval                               mins  00643  Group Therapy           ___ mins 64838    Timed Treatment:   44   mins   Total Treatment:     44   mins    Melida Kilpatrick PT  Physical Therapist  [place] license #: [id number]

## 2025-02-24 RX ORDER — PANTOPRAZOLE SODIUM 40 MG/1
40 TABLET, DELAYED RELEASE ORAL 2 TIMES DAILY
Qty: 180 TABLET | Refills: 1 | Status: SHIPPED | OUTPATIENT
Start: 2025-02-24

## 2025-02-25 ENCOUNTER — TREATMENT (OUTPATIENT)
Dept: PHYSICAL THERAPY | Facility: CLINIC | Age: 80
End: 2025-02-25
Payer: MEDICARE

## 2025-02-25 ENCOUNTER — TREATMENT (OUTPATIENT)
Dept: CARDIAC REHAB | Facility: HOSPITAL | Age: 80
End: 2025-02-25
Payer: MEDICARE

## 2025-02-25 DIAGNOSIS — R29.898 DECREASED STRENGTH OF LOWER EXTREMITY: ICD-10-CM

## 2025-02-25 DIAGNOSIS — J44.9 COPD, MODERATE: Primary | ICD-10-CM

## 2025-02-25 DIAGNOSIS — M25.611 DECREASED RIGHT SHOULDER RANGE OF MOTION: ICD-10-CM

## 2025-02-25 DIAGNOSIS — M54.9 CHRONIC UPPER BACK PAIN: Primary | ICD-10-CM

## 2025-02-25 DIAGNOSIS — G89.29 CHRONIC UPPER BACK PAIN: Primary | ICD-10-CM

## 2025-02-25 PROCEDURE — 94625 PHY/QHP OP PULM RHB W/O MNTR: CPT

## 2025-02-25 NOTE — PROGRESS NOTES
Physical Therapy Daily Treatment Note  [facility] [place]  [address], [address]  [place] [id number]     Patient: Aj Salazar  : 1945  Referring practitioner: Arin Willingham MD  Today's Date: 2025    VISIT#: 5    Visit Diagnoses:    ICD-10-CM ICD-9-CM   1. Chronic upper back pain  M54.9 724.5    G89.29 338.29   2. Decreased right shoulder range of motion  M25.611 719.51   3. Decreased strength of lower extremity  R29.898 729.89       Subjective   Aj Salazar reports: that he has seen little improvements but continues to have the periscapular pain daily.      Objective       See Exercise, Manual, and Modality Logs for complete treatment.     Patient Education: HEP review  Exercise rationale/ pain free exercise performance  Alternate exercise positions  Verbal/Tactile cues to ensure correct exercise performance/technique       Assessment/Plan  Patient demonstrates good tolerance to continued therapeutic exercises on this date with no report of increased pain during or at the end of the session. Continued with periscapular strength and focusing on rhythmical scapular patterning. Continued with R rhomboid STM with good tolerance. Added a seated levator scapulae stretch.   Will continue to progress as tolerated.     Progress per Plan of Care and Progress strengthening /stabilization /functional activity          Timed:         Manual Therapy:    8     mins  01557;     Therapeutic Exercise:    15     mins  69909;     Neuromuscular Panchito:    15    mins  82670;    Therapeutic Activity:     15     mins  93530;     Gait Training:           mins  26837;     Ultrasound:          mins  75001;    Ionto:                                   mins  26416  Self Care:                       -     mins  29890    Un-Timed:  Electrical Stimulation:         mins  28453 ( );  Dry Needling          mins self-pay  Traction          mins 27358  Re-Eval                                mins  85064  Group Therapy           ___ mins 40050    Timed Treatment:   53   mins   Total Treatment:     53   mins    Melida Kilpatrick PT  Physical Therapist  [place] license #: [id number]

## 2025-02-27 ENCOUNTER — TREATMENT (OUTPATIENT)
Dept: PHYSICAL THERAPY | Facility: CLINIC | Age: 80
End: 2025-02-27
Payer: MEDICARE

## 2025-02-27 ENCOUNTER — TREATMENT (OUTPATIENT)
Dept: CARDIAC REHAB | Facility: HOSPITAL | Age: 80
End: 2025-02-27
Payer: MEDICARE

## 2025-02-27 DIAGNOSIS — G89.29 CHRONIC UPPER BACK PAIN: Primary | ICD-10-CM

## 2025-02-27 DIAGNOSIS — M54.9 CHRONIC UPPER BACK PAIN: Primary | ICD-10-CM

## 2025-02-27 DIAGNOSIS — M25.611 DECREASED RIGHT SHOULDER RANGE OF MOTION: ICD-10-CM

## 2025-02-27 DIAGNOSIS — J44.9 COPD, MODERATE: Primary | ICD-10-CM

## 2025-02-27 DIAGNOSIS — R29.898 DECREASED STRENGTH OF LOWER EXTREMITY: ICD-10-CM

## 2025-02-27 PROCEDURE — 94625 PHY/QHP OP PULM RHB W/O MNTR: CPT

## 2025-02-27 NOTE — PROGRESS NOTES
Physical Therapy Daily Treatment Note  [facility] Physical Therapy [place]  [address], [address]  [place] [id number]     Patient: Aj Salazar  : 1945  Referring practitioner: Arin Willingham MD  Today's Date: 2025    VISIT#: 6    Visit Diagnoses:    ICD-10-CM ICD-9-CM   1. Chronic upper back pain  M54.9 724.5    G89.29 338.29   2. Decreased right shoulder range of motion  M25.611 719.51   3. Decreased strength of lower extremity  R29.898 729.89       Subjective   Aj Salazar reports: that he isn't as uncomfortable with the pain between his shoulder blades. He had pulmonary rehab this morning at it went well.       Objective       See Exercise, Manual, and Modality Logs for complete treatment.     Patient Education: HEP review  Exercise rationale/ pain free exercise performance  Alternate exercise positions  Verbal/Tactile cues to ensure correct exercise performance/technique       Assessment/Plan  Patient demonstrates good tolerance to continued and new therapeutic exercises on this date with no report of increased periscapular pain during or at the end of the session. Continued with STM to the rhomboids with report of no tenderness and fewer TTP were palpated, demonstrating good improvement. Continued with strengthening postural musculature and shoulder stabilizers. Discussed not  performing the open books as far and working in a pain free ROM and was able to increase res to 2x10. Will continue to progress as tolerated.       Progress per Plan of Care and Progress strengthening /stabilization /functional activity          Timed:         Manual Therapy:    8     mins  61095;     Therapeutic Exercise:    18     mins  81763;     Neuromuscular Panchito:    12    mins  67977;    Therapeutic Activity:     -     mins  80707;     Gait Training:           mins  60959;     Ultrasound:          mins  86706;    Ionto:                                   mins  57953  Self Care:                        -     mins  55311    Un-Timed:  Electrical Stimulation:         mins  98323 ( );  Dry Needling          mins self-pay  Traction          mins 03761  Re-Eval                               mins  21607  Group Therapy           ___ mins 77444    Timed Treatment:   38   mins   Total Treatment:     43   mins    Melida Kilpatrick PT  Physical Therapist  [place] license #: [id number]

## 2025-03-04 ENCOUNTER — TREATMENT (OUTPATIENT)
Dept: CARDIAC REHAB | Facility: HOSPITAL | Age: 80
End: 2025-03-04
Payer: MEDICARE

## 2025-03-04 DIAGNOSIS — J44.9 COPD, MODERATE: Primary | ICD-10-CM

## 2025-03-04 PROCEDURE — 94625 PHY/QHP OP PULM RHB W/O MNTR: CPT

## 2025-03-05 ENCOUNTER — TREATMENT (OUTPATIENT)
Dept: PHYSICAL THERAPY | Facility: CLINIC | Age: 80
End: 2025-03-05
Payer: MEDICARE

## 2025-03-05 DIAGNOSIS — G89.29 CHRONIC UPPER BACK PAIN: Primary | ICD-10-CM

## 2025-03-05 DIAGNOSIS — M25.611 DECREASED RIGHT SHOULDER RANGE OF MOTION: ICD-10-CM

## 2025-03-05 DIAGNOSIS — R29.898 DECREASED STRENGTH OF LOWER EXTREMITY: ICD-10-CM

## 2025-03-05 DIAGNOSIS — M54.9 CHRONIC UPPER BACK PAIN: Primary | ICD-10-CM

## 2025-03-05 NOTE — PROGRESS NOTES
Physical Therapy Daily Treatment Note  [facility] Physical Therapy [place]  [address], [address]  [place] [id number]     Patient: Aj Salazar  : 1945  Referring practitioner: Arin Willingham MD  Today's Date: 3/5/2025    VISIT#: 7    Visit Diagnoses:    ICD-10-CM ICD-9-CM   1. Chronic upper back pain  M54.9 724.5    G89.29 338.29   2. Decreased right shoulder range of motion  M25.611 719.51   3. Decreased strength of lower extremity  R29.898 729.89       Subjective   Aj Salazar reports: that his shoulder blade continues to feel much better, he did irritate his knee during pulmonary rehab but is pleased with how much improvement he has seen with the shoulder blade pain.       Objective       See Exercise, Manual, and Modality Logs for complete treatment.     Patient Education: HEP review  Exercise rationale/ pain free exercise performance  Alternate exercise positions  Verbal/Tactile cues to ensure correct exercise performance/technique       Assessment/Plan  Patient demonstrates good tolerance to continued therapeutic exercises and activities on this date with no report of increased pain during or at the end of the session. He reports less TTP to periscapular muscles with manual therapy, few TTP were palpated. Continued with thoracic and scapular mobility with good tolerance. Added in scapular strengthening with verbal cueing for scapular depression throughout. Will continue to progress as tolerated.      Progress per Plan of Care and Progress strengthening /stabilization /functional activity          Timed:         Manual Therapy:    8     mins  69798;     Therapeutic Exercise:    16     mins  31231;     Neuromuscular Panchito:    19    mins  27163;    Therapeutic Activity:     12     mins  60528;     Gait Training:           mins  05621;     Ultrasound:          mins  13917;    Ionto:                                   mins  09839  Self Care:                       -      mins  04581    Un-Timed:  Electrical Stimulation:         mins  97329 ( );  Dry Needling          mins self-pay  Traction          mins 81555  Re-Eval                               mins  64668  Group Therapy           ___ mins 09670    Timed Treatment:   55   mins   Total Treatment:     57   mins    Melida Kilpatrick PT  Physical Therapist  [facility] [place] license #: [id number]

## 2025-03-06 ENCOUNTER — TREATMENT (OUTPATIENT)
Dept: CARDIAC REHAB | Facility: HOSPITAL | Age: 80
End: 2025-03-06
Payer: MEDICARE

## 2025-03-06 DIAGNOSIS — J44.9 COPD, MODERATE: Primary | ICD-10-CM

## 2025-03-06 PROCEDURE — 94625 PHY/QHP OP PULM RHB W/O MNTR: CPT

## 2025-03-07 ENCOUNTER — TREATMENT (OUTPATIENT)
Dept: PHYSICAL THERAPY | Facility: CLINIC | Age: 80
End: 2025-03-07
Payer: MEDICARE

## 2025-03-07 DIAGNOSIS — R29.898 DECREASED STRENGTH OF LOWER EXTREMITY: ICD-10-CM

## 2025-03-07 DIAGNOSIS — G89.29 CHRONIC UPPER BACK PAIN: Primary | ICD-10-CM

## 2025-03-07 DIAGNOSIS — M25.611 DECREASED RIGHT SHOULDER RANGE OF MOTION: ICD-10-CM

## 2025-03-07 DIAGNOSIS — M54.9 CHRONIC UPPER BACK PAIN: Primary | ICD-10-CM

## 2025-03-07 NOTE — PROGRESS NOTES
Physical Therapy Daily Treatment Note  [facility] Physical Therapy [place]  [address], [address]  [place] [id number]     Patient: Aj Salazar  : 1945  Referring practitioner: Arin Willingham MD  Today's Date: 3/7/2025    VISIT#: 8    Visit Diagnoses:    ICD-10-CM ICD-9-CM   1. Chronic upper back pain  M54.9 724.5    G89.29 338.29   2. Decreased right shoulder range of motion  M25.611 719.51   3. Decreased strength of lower extremity  R29.898 729.89       Subjective   Aj aSlazar reports: that he is doing well, very little shoulder blade pain. Knee is feeling better than it was on Wednesday after pulmonary rehab.       Objective       See Exercise, Manual, and Modality Logs for complete treatment.     Patient Education: HEP review  Exercise rationale/ pain free exercise performance  Alternate exercise positions  Verbal/Tactile cues to ensure correct exercise performance/technique       Assessment/Plan  Patient demonstrates good tolerance to continued therapeutic exercises on this date with no report of increased periscapular pain during or at the end of the session. Introduced a self mobilization to the rhomboids so he can continue with the TP release at home. Discussed skipping next week with appointment to see how he does with returning to his daily routines including walking and pulmonary rehab. Will check in the following week. Will continue to progress as tolerated.       Progress per Plan of Care and Progress strengthening /stabilization /functional activity          Timed:         Manual Therapy:    -     mins  07763;     Therapeutic Exercise:    12     mins  97014;     Neuromuscular Panchito:    25    mins  94200;    Therapeutic Activity:     13     mins  87180;     Gait Training:           mins  22257;     Ultrasound:          mins  86319;    Ionto:                                   mins  10058  Self Care:                       8     mins   74956    Un-Timed:  Electrical Stimulation:         mins  09199 ( );  Dry Needling          mins self-pay  Traction          mins 68443  Re-Eval                               mins  98074  Group Therapy           ___ mins 73186    Timed Treatment:   58   mins   Total Treatment:     58   mins    Melida Kilpatrick PT  Physical Therapist  [place] license #: [id number]

## 2025-03-11 ENCOUNTER — TREATMENT (OUTPATIENT)
Dept: CARDIAC REHAB | Facility: HOSPITAL | Age: 80
End: 2025-03-11
Payer: MEDICARE

## 2025-03-11 DIAGNOSIS — J44.9 COPD, MODERATE: Primary | ICD-10-CM

## 2025-03-11 PROCEDURE — 94625 PHY/QHP OP PULM RHB W/O MNTR: CPT

## 2025-03-13 ENCOUNTER — TREATMENT (OUTPATIENT)
Dept: CARDIAC REHAB | Facility: HOSPITAL | Age: 80
End: 2025-03-13
Payer: MEDICARE

## 2025-03-13 DIAGNOSIS — J44.9 COPD, MODERATE: Primary | ICD-10-CM

## 2025-03-13 PROCEDURE — 94625 PHY/QHP OP PULM RHB W/O MNTR: CPT

## 2025-03-16 RX ORDER — CLOPIDOGREL BISULFATE 75 MG/1
75 TABLET ORAL DAILY
Qty: 90 TABLET | Refills: 1 | Status: SHIPPED | OUTPATIENT
Start: 2025-03-16

## 2025-03-17 ENCOUNTER — TREATMENT (OUTPATIENT)
Dept: PHYSICAL THERAPY | Facility: CLINIC | Age: 80
End: 2025-03-17
Payer: MEDICARE

## 2025-03-17 DIAGNOSIS — M25.611 DECREASED RIGHT SHOULDER RANGE OF MOTION: ICD-10-CM

## 2025-03-17 DIAGNOSIS — G89.29 CHRONIC UPPER BACK PAIN: Primary | ICD-10-CM

## 2025-03-17 DIAGNOSIS — R29.898 DECREASED STRENGTH OF LOWER EXTREMITY: ICD-10-CM

## 2025-03-17 DIAGNOSIS — M54.9 CHRONIC UPPER BACK PAIN: Primary | ICD-10-CM

## 2025-03-17 NOTE — PROGRESS NOTES
Physical Therapy Daily Treatment Note  [facility] Physical Therapy [place]  [address], [address]  [place] [id number]     Patient: Aj Salazar  : 1945  Referring practitioner: Arin Willingham MD  Today's Date: 3/17/2025    VISIT#: 9    Visit Diagnoses:    ICD-10-CM ICD-9-CM   1. Chronic upper back pain  M54.9 724.5    G89.29 338.29   2. Decreased right shoulder range of motion  M25.611 719.51   3. Decreased strength of lower extremity  R29.898 729.89       Subjective   Aj Salazar reports: that the last week was actually pretty decent. He continues to see improvements but still has some soreness in the shoulder blade region. He was able to get a small ball and has been able to work on the Sidewalk.       Objective       See Exercise, Manual, and Modality Logs for complete treatment.     Patient Education: HEP review  Exercise rationale/ pain free exercise performance  Alternate exercise positions  Verbal/Tactile cues to ensure correct exercise performance/technique       Assessment/Plan  Patient demonstrates good tolerance to continued therapeutic exercises on this date with no report of increased pain. Discussed contacting another office for a FDN consult as I am not DN certified. At this point he has shown great improvement with pain levels but continues to have some lingering soreness in the rhomboid region that would likely benefit from FDN as an additional tool if he is appropriate. Will continue to progress as tolerated.       Progress per Plan of Care and Progress strengthening /stabilization /functional activity          Timed:         Manual Therapy:    -     mins  06220;     Therapeutic Exercise:    15     mins  35039;     Neuromuscular Panchito:    10    mins  09661;    Therapeutic Activity:     5     mins  98577;     Gait Training:           mins  69432;     Ultrasound:          mins  90758;    Ionto:                                   mins  77722  Self Care:                        -     mins  76503    Un-Timed:  Electrical Stimulation:         mins  83499 ( );  Dry Needling          mins self-pay  Traction          mins 21482  Re-Eval                               mins  14763  Group Therapy           ___ mins 08687    Timed Treatment:   30   mins   Total Treatment:     47   mins    Melida Kilpatrick PT  Physical Therapist  [facility] [place] license #: [id number]

## 2025-03-18 ENCOUNTER — TREATMENT (OUTPATIENT)
Dept: CARDIAC REHAB | Facility: HOSPITAL | Age: 80
End: 2025-03-18
Payer: MEDICARE

## 2025-03-18 DIAGNOSIS — J44.9 COPD, MODERATE: Primary | ICD-10-CM

## 2025-03-18 DIAGNOSIS — E11.9 TYPE 2 DIABETES MELLITUS WITHOUT COMPLICATION, WITHOUT LONG-TERM CURRENT USE OF INSULIN: ICD-10-CM

## 2025-03-18 PROCEDURE — 94625 PHY/QHP OP PULM RHB W/O MNTR: CPT

## 2025-03-19 RX ORDER — METFORMIN HYDROCHLORIDE 500 MG/1
500 TABLET, EXTENDED RELEASE ORAL EVERY EVENING
Qty: 90 TABLET | Refills: 1 | Status: SHIPPED | OUTPATIENT
Start: 2025-03-19

## 2025-03-20 ENCOUNTER — TREATMENT (OUTPATIENT)
Dept: CARDIAC REHAB | Facility: HOSPITAL | Age: 80
End: 2025-03-20
Payer: MEDICARE

## 2025-03-20 DIAGNOSIS — J44.9 COPD, MODERATE: Primary | ICD-10-CM

## 2025-03-20 PROCEDURE — 94625 PHY/QHP OP PULM RHB W/O MNTR: CPT

## 2025-03-21 ENCOUNTER — TREATMENT (OUTPATIENT)
Dept: PHYSICAL THERAPY | Facility: CLINIC | Age: 80
End: 2025-03-21
Payer: MEDICARE

## 2025-03-21 DIAGNOSIS — R29.898 DECREASED STRENGTH OF LOWER EXTREMITY: ICD-10-CM

## 2025-03-21 DIAGNOSIS — G89.29 CHRONIC UPPER BACK PAIN: Primary | ICD-10-CM

## 2025-03-21 DIAGNOSIS — M25.611 DECREASED RIGHT SHOULDER RANGE OF MOTION: ICD-10-CM

## 2025-03-21 DIAGNOSIS — M54.9 CHRONIC UPPER BACK PAIN: Primary | ICD-10-CM

## 2025-03-21 NOTE — PROGRESS NOTES
Physical Therapy Daily Treatment and Progress Note  [facility] Physical Therapy [place]  [address], [address]  [place] [id number]     Patient: Aj Salazar  : 1945  Referring practitioner: Arin Willingham MD  Today's Date: 3/21/2025    VISIT#: 10    Visit Diagnoses:    ICD-10-CM ICD-9-CM   1. Chronic upper back pain  M54.9 724.5    G89.29 338.29   2. Decreased right shoulder range of motion  M25.611 719.51   3. Decreased strength of lower extremity  R29.898 729.89     Subjective Questionnaire: NDI:16/50 (32%) -17/50 (34%)    Subjective Evaluation    Pain  Current pain rating: 3  At best pain ratin  At worst pain ratin         Aj Salazar reports: that he has seen about a 50-60% improvement since beginning OPT. He still has a hard time doing things around the house including baking and other ADLs such as cleaning mirrors. It seems like the circular motions with arm OH increases pain.        Objective   Active Range of Motion   Cervical/Thoracic Spine   Cervical     Flexion: 30 degrees (35 degrees)  Left rotation: 60 (familiar pain in R shoulder blade region) degrees (75 degrees, no pain)  Right rotation: 50 (pain in shoulder blade region) degrees (70 degrees, no pain)    Right Shoulder   Flexion: 120 degrees with pain (150 degrees no pain)  Abduction: 130 degrees (140 degrees, no pain)  External rotation 0°: 50 degrees with pain (75 degrees)    Strength/Myotome Testing      Left Shoulder      Planes of Motion   Flexion: 4+ (5)  Abduction: 4+ (5)  External rotation at 0°: 4+ (5)  Internal rotation at 0°: 4+ (5)     Right Shoulder      Planes of Motion   Flexion: 4 (4+)  Abduction: 4 (4+)  External rotation at 0°: 4 (5)  Internal rotation at 0°: 4+  (5)    See Exercise, Manual, and Modality Logs for complete treatment.     Patient Education: HEP review  Exercise rationale/ pain free exercise performance  Alternate exercise positions  Verbal/Tactile cues to ensure  correct exercise performance/technique       Assessment/Plan  Patient has demonstrate good progress thus far with skilled therapeutic interventions. Subjectively he reports seeing a 50-60% improvement and pain levels are better controlled. His outcome measure score did not demonstrate a significant change in either direction, however he had cervical surgery in August which is also playing a factor in to his recovery. Objectively, both cervical and R shoulder ROM has improved along with UE strength. These improvements have made it easier for the patient to do some chores such as cleaning the floor and carrying grocery bags or the garbage outside. He reports that his breathing continues to be an issue as well but is continuing with pulmonary rehab. He has met 5/5 STGs and is progressing well towards his LTGs. I answered any questions that he had at the time and discussed continuing with the 2x a week as stated in the initial POC to continue to target the remaining deficits. He will greatly benefit from continued skilled therapeutic interventions to target the remaining deficits and reach his LTGs.      Goals  Plan Goals: SHORT TERM GOALS: 4 weeks  1. Patient to be compliant with HEP and no difficulty with sleeping due to the shoulder blade pain (MET)  2. Increased (R) UE strength to 4+/5 with no pain> 2/10 to allow for household and work activities. (MET)  3. Pt to exhibit (R) shoulder active flexion / ABD to 135° in standing/sitting to assist with reaching overhead with less pain to wash hair and change shirt. (MET)  4. Pt demonstrates improved posture in sitting and standing for 30 mins with minimal-no cues during treatment session to help decrease the stress on the shoulders.  (MET)  5. Patient to report seeing at least a 50% improvement since beginning OPT. (MET)     LONG TERM GOALS: 8 weeks  1. Pt score <20% perceived disability on Neck Index. (No significant change)  2. Pt. to exhibit (R) shoulder AROM to WFL (>  165°) flex/abd. With less scapular winging to allow for reaching overhead and behind back without pain limiting function to perform dressing and reach higher shelves. (Progressing)  3. Pt to exhibit 5/5 UE strength to allow for pushing/pulling and lifting >5 #to occur with pain <2/10 to help with household cleaning, grocery shopping and recreational activities. (Progressing)  4. Pt able to reach overhead and lift 5# (B) x 10 to allow for return to doing work around home and help improve ability to return to exercise without restriction.  (Progressing)  5. Patient to report seeing at least an 80% improvement since beginning OPT. (Progressing)     Progress per Plan of Care and Progress strengthening /stabilization /functional activity          Timed:         Manual Therapy:    -     mins  13675;     Therapeutic Exercise:    24     mins  87062;     Neuromuscular Panchito:    15    mins  76082;    Therapeutic Activity:     10     mins  27180;     Gait Training:           mins  78264;     Ultrasound:          mins  71095;    Ionto:                                   mins  22835  Self Care:                       5     mins  46657    Un-Timed:  Electrical Stimulation:         mins  14994 ( );  Dry Needling          mins self-pay  Traction          mins 58689  Re-Eval                               mins  44352  Group Therapy           ___ mins 72012    Timed Treatment:   54   mins   Total Treatment:     60   mins    Melida Kilpatrick PT  Physical Therapist  [place] license #: [id number]

## 2025-03-22 DIAGNOSIS — G45.9 TIA (TRANSIENT ISCHEMIC ATTACK): Chronic | ICD-10-CM

## 2025-03-24 RX ORDER — ATORVASTATIN CALCIUM 80 MG/1
80 TABLET, FILM COATED ORAL NIGHTLY
Qty: 90 TABLET | Refills: 1 | Status: SHIPPED | OUTPATIENT
Start: 2025-03-24

## 2025-03-25 ENCOUNTER — TREATMENT (OUTPATIENT)
Dept: CARDIAC REHAB | Facility: HOSPITAL | Age: 80
End: 2025-03-25
Payer: MEDICARE

## 2025-03-25 DIAGNOSIS — J44.9 COPD, MODERATE: Primary | ICD-10-CM

## 2025-03-25 PROCEDURE — 94625 PHY/QHP OP PULM RHB W/O MNTR: CPT

## 2025-03-26 ENCOUNTER — TREATMENT (OUTPATIENT)
Dept: PHYSICAL THERAPY | Facility: CLINIC | Age: 80
End: 2025-03-26
Payer: MEDICARE

## 2025-03-26 DIAGNOSIS — M25.611 DECREASED RIGHT SHOULDER RANGE OF MOTION: ICD-10-CM

## 2025-03-26 DIAGNOSIS — M54.9 CHRONIC UPPER BACK PAIN: Primary | ICD-10-CM

## 2025-03-26 DIAGNOSIS — G89.29 CHRONIC UPPER BACK PAIN: Primary | ICD-10-CM

## 2025-03-26 DIAGNOSIS — R29.898 DECREASED STRENGTH OF LOWER EXTREMITY: ICD-10-CM

## 2025-03-26 NOTE — PROGRESS NOTES
Physical Therapy Daily Treatment Note  [facility] Physical Therapy [place]  [address], [address]  [place] [id number]     Patient: Aj Salazar  : 1945  Referring practitioner: Arin Willingham MD  Today's Date: 3/26/2025    VISIT#: 11    Visit Diagnoses:    ICD-10-CM ICD-9-CM   1. Chronic upper back pain  M54.9 724.5    G89.29 338.29   2. Decreased right shoulder range of motion  M25.611 719.51   3. Decreased strength of lower extremity  R29.898 729.89       Subjective   Aj Salazar reports: that the pain levels are better controlled, he continues to see good improvements.       Objective       See Exercise, Manual, and Modality Logs for complete treatment.     Patient Education: HEP review  Exercise rationale/ pain free exercise performance  Alternate exercise positions  Verbal/Tactile cues to ensure correct exercise performance/technique       Assessment/Plan  Patient continues to demonstrate good tolerance to continued and new therapeutic exercises on this date with no report of increased periscapular pain. Introduced prone rows with verbal cues to target the rhomboids. Continued with periscapular strengthening exercises. Will continue to progress as tolerated, he is making great progress and would continue to benefit from skilled therapeutic interventions.       Progress per Plan of Care and Progress strengthening /stabilization /functional activity          Timed:         Manual Therapy:    -     mins  89355;     Therapeutic Exercise:    10     mins  68223;     Neuromuscular Panchito:    18    mins  88038;    Therapeutic Activity:          mins  53258;     Gait Training:           mins  23591;     Ultrasound:          mins  41383;    Ionto:                                   mins  77089  Self Care:                       -     mins  49194    Un-Timed:  Electrical Stimulation:         mins  23873 ( );  Dry Needling          mins self-pay  Traction          mins  54312  Re-Eval                               mins  65464  Group Therapy           ___ mins 12596    Timed Treatment:   28   mins   Total Treatment:     50   mins    Melida Kilpatrick PT  Physical Therapist  [place] license #: [id number]

## 2025-03-27 ENCOUNTER — TREATMENT (OUTPATIENT)
Dept: CARDIAC REHAB | Facility: HOSPITAL | Age: 80
End: 2025-03-27
Payer: MEDICARE

## 2025-03-27 DIAGNOSIS — J44.9 COPD, MODERATE: Primary | ICD-10-CM

## 2025-03-27 PROCEDURE — 94625 PHY/QHP OP PULM RHB W/O MNTR: CPT

## 2025-03-28 ENCOUNTER — TREATMENT (OUTPATIENT)
Dept: PHYSICAL THERAPY | Facility: CLINIC | Age: 80
End: 2025-03-28
Payer: MEDICARE

## 2025-03-28 DIAGNOSIS — M25.611 DECREASED RIGHT SHOULDER RANGE OF MOTION: ICD-10-CM

## 2025-03-28 DIAGNOSIS — M54.9 CHRONIC UPPER BACK PAIN: Primary | ICD-10-CM

## 2025-03-28 DIAGNOSIS — G89.29 CHRONIC UPPER BACK PAIN: Primary | ICD-10-CM

## 2025-03-28 DIAGNOSIS — R29.898 DECREASED STRENGTH OF LOWER EXTREMITY: ICD-10-CM

## 2025-03-28 NOTE — PROGRESS NOTES
Physical Therapy Daily Treatment Note  [facility] Physical Therapy [place]  [address], [address]  [place] [address]     Patient: Aj Salazar  : 1945  Referring practitioner: Arin Willingham MD  Today's Date: 3/28/2025    VISIT#: 12    Visit Diagnoses:    ICD-10-CM ICD-9-CM   1. Chronic upper back pain  M54.9 724.5    G89.29 338.29   2. Decreased strength of lower extremity  R29.898 729.89   3. Decreased right shoulder range of motion  M25.611 719.51       Subjective   Aj Salazar reports:   Doing well this AM, no new c/o vs last PT visit, 0/10 pain getting OOB and getting dressed this AM.      Objective   See Exercise, Manual, and Modality Logs for complete treatment.     Patient Education: HEP review  Exercise rationale/ pain free exercise performance  Alternate exercise positions  Verbal/Tactile cues to ensure correct exercise performance/technique       Assessment/Plan  Tolerated continued progression of therapeutic exercise/therapeutic activity/neuromuscular re-ed well today, no increased pain reported during or after session.        Progress per Plan of Care and Progress strengthening /stabilization /functional activity          Timed:         Manual Therapy:    -     mins  86853;     Therapeutic Exercise:    16     mins  96469;     Neuromuscular Panchito:    14    mins  20045;    Therapeutic Activity:          mins  73231;     Gait Training:           mins  63001;     Ultrasound:          mins  12711;    Ionto:                                   mins  53416  Self Care:                       -     mins  62269    Un-Timed:  Electrical Stimulation:         mins  21672 ( );  Dry Needling          mins self-pay  Traction          mins 42769  Re-Eval                               mins  09546  Group Therapy           ___ mins 14852    Timed Treatment:   30   mins   Total Treatment:     48   mins      Abdullahi Masterson PTA  [place] License #[id number]  Physical Therapist  Assistant

## 2025-04-01 ENCOUNTER — TREATMENT (OUTPATIENT)
Dept: CARDIAC REHAB | Facility: HOSPITAL | Age: 80
End: 2025-04-01
Payer: MEDICARE

## 2025-04-01 DIAGNOSIS — J44.9 COPD, MODERATE: Primary | ICD-10-CM

## 2025-04-01 PROCEDURE — 94625 PHY/QHP OP PULM RHB W/O MNTR: CPT

## 2025-04-02 ENCOUNTER — TREATMENT (OUTPATIENT)
Dept: PHYSICAL THERAPY | Facility: CLINIC | Age: 80
End: 2025-04-02
Payer: MEDICARE

## 2025-04-02 DIAGNOSIS — G89.29 CHRONIC UPPER BACK PAIN: Primary | ICD-10-CM

## 2025-04-02 DIAGNOSIS — M54.9 CHRONIC UPPER BACK PAIN: Primary | ICD-10-CM

## 2025-04-02 DIAGNOSIS — M25.611 DECREASED RIGHT SHOULDER RANGE OF MOTION: ICD-10-CM

## 2025-04-02 DIAGNOSIS — R29.898 DECREASED STRENGTH OF LOWER EXTREMITY: ICD-10-CM

## 2025-04-02 NOTE — PROGRESS NOTES
Physical Therapy Daily Treatment Note  [facility] Physical Therapy [place]  [address], [address]  [place] [address]     Patient: Aj Salazar  : 1945  Referring practitioner: Arin Willingham MD  Today's Date: 2025    VISIT#: 13    Visit Diagnoses:    ICD-10-CM ICD-9-CM   1. Chronic upper back pain  M54.9 724.5    G89.29 338.29   2. Decreased strength of lower extremity  R29.898 729.89   3. Decreased right shoulder range of motion  M25.611 719.51       Subjective   Aj Salazar reports: that he is doing well, no pain this morning but is feeling a little stiff. He has continued with pulmonary rehab and his HEP.       Objective       See Exercise, Manual, and Modality Logs for complete treatment.     Patient Education: HEP review  Exercise rationale/ pain free exercise performance  Alternate exercise positions  Verbal/Tactile cues to ensure correct exercise performance/technique       Assessment/Plan  Patient continues to demonstrate good tolerance to continued therapeutic exercises on this date with no periscapular pain reported during or at the end of the session. He reports general shoulder fatigue throughout. Progressed resistance with SL ER, openbooks, and scapular retraction. He is progressing well and will continue to benefit from continued progression with skilled therapeutic interventions.       Progress per Plan of Care and Progress strengthening /stabilization /functional activity          Timed:         Manual Therapy:    -     mins  31686;     Therapeutic Exercise:    15     mins  77461;     Neuromuscular Panchito:    15    mins  96278;    Therapeutic Activity:     25     mins  57182;     Gait Training:           mins  85989;     Ultrasound:          mins  64884;    Ionto:                                   mins  97991  Self Care:                       -     mins  92183    Un-Timed:  Electrical Stimulation:         mins  95184 ( );  Dry Needling           mins self-pay  Traction          mins 24302  Re-Eval                               mins  65365  Group Therapy           ___ mins 87943    Timed Treatment:   55   mins   Total Treatment:     55   mins    Melida Kilpatrick PT  Physical Therapist  [place] license #: [id number]

## 2025-04-03 ENCOUNTER — OFFICE VISIT (OUTPATIENT)
Age: 80
End: 2025-04-03
Payer: MEDICARE

## 2025-04-03 ENCOUNTER — HOSPITAL ENCOUNTER (OUTPATIENT)
Facility: HOSPITAL | Age: 80
Discharge: HOME | End: 2025-04-03
Payer: MEDICARE

## 2025-04-03 ENCOUNTER — TELEPHONE (OUTPATIENT)
Dept: GASTROENTEROLOGY | Facility: CLINIC | Age: 80
End: 2025-04-03
Payer: MEDICARE

## 2025-04-03 ENCOUNTER — APPOINTMENT (OUTPATIENT)
Dept: CARDIAC REHAB | Facility: HOSPITAL | Age: 80
End: 2025-04-03
Payer: MEDICARE

## 2025-04-03 VITALS
WEIGHT: 160.6 LBS | HEIGHT: 70 IN | BODY MASS INDEX: 22.99 KG/M2 | SYSTOLIC BLOOD PRESSURE: 110 MMHG | DIASTOLIC BLOOD PRESSURE: 62 MMHG

## 2025-04-03 DIAGNOSIS — I73.9 CLAUDICATION: ICD-10-CM

## 2025-04-03 DIAGNOSIS — I65.23 BILATERAL CAROTID ARTERY STENOSIS: Primary | ICD-10-CM

## 2025-04-03 DIAGNOSIS — I70.219 ATHEROSCLEROTIC PERIPHERAL VASCULAR DISEASE WITH INTERMITTENT CLAUDICATION: ICD-10-CM

## 2025-04-03 DIAGNOSIS — I70.233 ATHEROSCLEROSIS OF NATIVE ARTERY OF RIGHT LOWER EXTREMITY WITH ULCERATION OF ANKLE: ICD-10-CM

## 2025-04-03 DIAGNOSIS — I65.23 BILATERAL CAROTID ARTERY STENOSIS: ICD-10-CM

## 2025-04-03 DIAGNOSIS — R09.89 BRUIT OF RIGHT CAROTID ARTERY: ICD-10-CM

## 2025-04-03 DIAGNOSIS — I73.00 RAYNAUD'S DISEASE WITHOUT GANGRENE: Chronic | ICD-10-CM

## 2025-04-03 PROCEDURE — 93922 UPR/L XTREMITY ART 2 LEVELS: CPT

## 2025-04-03 PROCEDURE — 93880 EXTRACRANIAL BILAT STUDY: CPT

## 2025-04-03 NOTE — TELEPHONE ENCOUNTER
"Pt left the following [employer] message on 04/03/2025 at 1:35pm...    \"Reschedule my appointment with Miss Escobar. They called and said that she will not be in the office on the day I had the appointment, so I'm calling to get a new date. Thank you.\"    4X - [facility] to reschedule 6/30 appt with Luz.   "

## 2025-04-03 NOTE — PROGRESS NOTES
Patient Name: Aj Salazar    MRN: [id number] Encounter Date: 04/03/2025      Consulting Service: Vascular Surgery    Referring Provider: No ref. provider found       CHIEF COMPLAINT:  Chief Complaint   Patient presents with    Carotid Artery Disease       Subjective    HPI: Aj Salazar is a 79 y.o. male is being seen for evaluation/management of known carotid disease.  Patient is followed for bilateral carotid stenosis.  The patient's carotid disease appears to be primarily atherosclerotic based in nature.  The patient has not had intervention at this time.  Currently the patient denies symptoms of TIA or stroke.  The patient has been compliant in controlling risk factors including smoking cessation, cholesterol control, and hypertension control.  Their current medical therapy consists of Plavix.  The patient currently is on ongoing statin therapy.  At this point in time patient presents for follow-up of their carotid disease with review of testing including carotid duplex.  Findings indicate disease stability.                                                                                                                                                                                                                                                       Patient is also being seen for evaluation/management of peripheral vascular disease follow-up.  The patient has known peripheral vascular disease with symptoms of claudication.  Current symptoms of claudication at 20 minutes of walking but is really his lungs that are keeping him limited rather than leg pain at distance are noted without lifestyle limitation.  Prior interventions include none.  Currently the patient reports symptoms are stable.  Testing today includes ABIs.  Their current medical regimen includes antiplatelet and cholesterol medications.  The patient appears to have normal sensation.  Discussion as to the protection of feet and toes with  prevention of injury including twice a day monitoring of feet and all digits and avoidance of walking barefoot were reviewed with the patient.  Plan moving forward will include continue to follow-up with noninvasive testing.    PAST MEDICAL HISTORY:   Past Medical History:   Diagnosis Date    Alcohol abuse, in remission     Asthma copd    BPH (benign prostatic hypertrophy)     see doctors at [facility]    Cancer     SKIN SEVERAL TIMES    Cataract     CHF (congestive heart failure)     COPD (chronic obstructive pulmonary disease)     Depression     Diabetes mellitus     DJD (degenerative joint disease) of cervical spine     ED (erectile dysfunction)     [facility]    GERD (gastroesophageal reflux disease)     Glaucoma     History of prediabetes     Hx of colonic polyps     followed by GI ([facility])    Hyperlipidemia     Hypertension     Influenza B 02/14/2013        Low back pain     Melanoma     Nocturnal hypoxia     Open wound of right lower leg 04/15/2024    *Managed by wound clinic       Osteoarthritis     HIPS, HANDS, MULTIPLE SITES    Osteoarthritis 03/17/2016    Peripheral neuropathy     Permanent atrial fibrillation     Pneumonia     Pneumonia due to infectious organism 04/23/2023    Rectal bleeding 04/26/2017    Sleep apnea     USES CPAP    Tendonitis of shoulder 11/07/2007    RIGHT SHOULDER/DELTOID INSERTION    TIA (transient ischemic attack) 10/2020    Ulcer of the stomach and intestine       PAST SURGICAL HISTORY:   Past Surgical History:   Procedure Laterality Date    ANTERIOR CERVICAL DISCECTOMY W/ FUSION Bilateral 08/21/2024    Procedure: Anterior cervical partial corpectomy at cervical 5 for the purposes of cervical 4 cervical 5 and cervical 5 cervical 6 discectomy and fusion using allograft cadaver bone and metallic instrumentation;  Surgeon: Pedro Mancuso MD;  Location: [facility];  Service: Neurosurgery;  Laterality: Bilateral;    APPENDECTOMY      CARDIOVASCULAR  STRESS TEST N/A 10/20/2015    NML LEXISCAN CARDIOLITE PERFUSION STUDY, NO EVIDENCE OF ISCHEMIA, AREA OF HYPOPERFUSION OF THE INFERIOR WALL W/NORMAL WALL PERFUSION AND NML LEFT VENTRICULAR EJECTION FRACTION, MOST LIKELY ATTENUATION. DR.MICHAEL BOX    CATARACT EXTRACTION Bilateral 02/2023    COLONOSCOPY N/A 02/2017    COLONOSCOPY N/A 02/23/2011    4 POLYPS REMOVED, RESCOPE IN 5 YRS, DR. EAGLE    COLONOSCOPY N/A 03/08/2006    ERYTHEMOUS AND GRANULAR MUCOSA IN ILEOCECAL VALVE, NORMAL COLON, REPEAT IN 5 YRS,     ENDOSCOPY N/A 09/26/2018    normal esophagus, acute gastritis, multiple non-bleeding duodenal ulcers with pigmented material, H Pylori positive    HERNIA REPAIR Bilateral     INGUINAL    JOINT REPLACEMENT  11/2015    left hip    TOTAL HIP ARTHROPLASTY Left 11/06/2015    Dr Ankush Sky    TOTAL HIP ARTHROPLASTY Right 10/27/2014    DR.RIED SKY    UPPER GASTROINTESTINAL ENDOSCOPY      VASECTOMY        FAMILY HISTORY:   Family History   Problem Relation Age of Onset    Cancer Mother         Bladder cancer    Colon cancer Mother     Ovarian cancer Mother     Alzheimer's disease Mother 70    Hyperlipidemia Father     Heart disease Father     Coronary artery disease Father     Hypertension Father     Stroke Sister         October 2016    Dementia Sister     Heart disease Brother     Alcohol abuse Brother     Heart disease Brother     Coronary artery disease Brother     Hypertension Brother     Stroke Brother     Arthritis Brother     Colon cancer Maternal Grandmother     Prostate cancer Neg Hx     Malig Hyperthermia Neg Hx       SOCIAL HISTORY:   Social History     Tobacco Use    Smoking status: Former     Current packs/day: 0.00     Average packs/day: 2.0 packs/day for 49.0 years (98.0 ttl pk-yrs)     Types: Cigarettes     Start date: 1/1/1961     Quit date: 1/1/2010     Years since quitting: 15.2     Passive exposure: Past    Smokeless tobacco: Never    Tobacco comments:     long smoking history    Vaping Use    Vaping status: Never Used   Substance Use Topics    Alcohol use: No     Comment: 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; 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    Drug use: No     Comment: caffeine use       MEDICATIONS:   Current Outpatient Medications on File Prior to Visit   Medication Sig Dispense Refill    ALPHAGAN P 0.1 % solution ophthalmic solution Administer 1 drop to both eyes 2 (two) times a day.  6    atorvastatin (LIPITOR) 80 MG tablet TAKE 1 TABLET EVERY NIGHT 90 tablet 1    bumetanide (BUMEX) 1 MG tablet Take 1 tablet by mouth Daily.      cilostazol (PLETAL) 100 MG tablet Take 1 tablet by mouth 2 (Two) Times a Day. 60 tablet 6    clopidogrel (PLAVIX) 75 MG tablet TAKE 1 TABLET EVERY DAY 90 tablet 1    dabigatran etexilate (PRADAXA) 150 MG capsu Take 1 capsule by mouth 2 (Two) Times a Day.      Fluticasone-Umeclidin-Vilant (Trelegy Ellipta) 200-62.5-25 MCG/ACT aerosol powder  Inhale 1 puff Daily.      Jardiance 10 MG tablet tablet Take 1 tablet by mouth Daily.      Lancets Misc. (Accu-Chek Softclix Lancet Dev) kit Use to check FBS once daily . Dm2 with peripheral  neuropathy E11.51 1 kit 2    metFORMIN ER (GLUCOPHAGE-XR) 500 MG 24 hr tablet TAKE 1 TABLET EVERY DAY WITH DINNER 90 tablet 1    metoprolol succinate XL (TOPROL-XL) 100 MG 24 hr tablet Take 1 tablet by mouth Every Night. Take 100 mg at night and 50 mg in the morning 30 tablet 0    metoprolol succinate XL (TOPROL-XL) 50 MG 24 hr tablet Take 1 tablet by mouth Daily. Take 50 mg every morning and 100 mg every night 30 tablet 0    Mirabegron ER (MYRBETRIQ) 25 MG tablet sustained-release 24 hour 24 hr tablet Take 1 tablet by mouth Every Night.      pantoprazole (PROTONIX) 40 MG EC tablet TAKE 1 TABLET TWICE DAILY 180 tablet 1    spironolactone (ALDACTONE) 25 MG tablet Take 1 tablet by mouth Daily. 30 tablet 0    Tafluprost, PF, (ZIOPTAN) 0.0015 % solution ophthalmic solution Administer 1 drop to both eyes Every Night.      tamsulosin (FLOMAX) 0.4 MG capsule  "24 hr capsule        No current facility-administered medications on file prior to visit.       ALLERGIES: Bactrim [sulfamethoxazole-trimethoprim] and Sulfacetamide       Objective   Vitals:    04/03/25 0832   BP: 110/62   Weight: 72.8 kg (160 lb 9.6 oz)   Height: 176.5 cm (69.5\")     Body mass index is 23.38 kg/m².  BMI is within normal parameters. No other follow-up for BMI required.      PHYSICAL EXAM:   Physical Exam  Constitutional:       Appearance: Normal appearance. He is normal weight.   HENT:      Head: Normocephalic and atraumatic.      Nose: Nose normal.   Eyes:      Extraocular Movements: Extraocular movements intact.      Pupils: Pupils are equal, round, and reactive to light.   Cardiovascular:      Rate and Rhythm: Normal rate.      Pulses:           Carotid pulses are 2+ on the right side and 2+ on the left side.       Radial pulses are 2+ on the right side and 2+ on the left side.        Femoral pulses are 2+ on the right side and 2+ on the left side.       Popliteal pulses are 0 on the right side and 2+ on the left side.        Dorsalis pedis pulses are detected w/ Doppler on the right side and 2+ on the left side.        Posterior tibial pulses are detected w/ Doppler on the right side and 2+ on the left side.      Heart sounds: Normal heart sounds.   Pulmonary:      Effort: Pulmonary effort is normal.      Breath sounds: Normal breath sounds.   Abdominal:      General: Abdomen is flat. Bowel sounds are normal.      Palpations: Abdomen is soft.   Musculoskeletal:         General: Normal range of motion.      Cervical back: Normal range of motion and neck supple.      Right lower leg: No edema.      Left lower leg: No edema.   Skin:     General: Skin is warm and dry.   Neurological:      General: No focal deficit present.      Mental Status: He is alert and oriented to person, place, and time. Mental status is at baseline.   Psychiatric:         Mood and Affect: Mood normal.         Thought " Content: Thought content normal.          Result Review   LABS:    CBC          9/18/2024    09:55 9/19/2024    05:09 12/27/2024    10:58   CBC   WBC 8.74  8.02  8.50    RBC 5.02  4.83  5.34    Hemoglobin 13.2  12.5  13.3    Hematocrit 41.7  39.1  44.4    MCV 83.1  81.0  83.1    MCH 26.3  25.9  24.9    MCHC 31.7  32.0  30.0    RDW 15.9  16.0  15.3    Platelets 323  298  305      BMP          9/18/2024    09:55 9/19/2024    05:09 12/27/2024    10:58   BMP   BUN 11  10  15    Creatinine 1.00  0.87  0.86    Sodium 142  142  141    Potassium 3.6  3.4  3.3    Chloride 106  106  103    CO2 25.0  23.6  25.8    Calcium 9.2  9.0  9.4      Lipid Panel          4/15/2024    14:29 7/25/2024    10:28   Lipid Panel   Total Cholesterol  116    Total Cholesterol 109     Triglycerides 115  65    HDL Cholesterol 41  51    VLDL Cholesterol 21  14    LDL Cholesterol  47  51    LDL/HDL Ratio  1.02       INR          7/27/2024    21:59   Common Labs   INR 1.33       A1C Last 3 Results          4/15/2024    14:29 8/26/2024    09:47   HGBA1C Last 3 Results   Hemoglobin A1C 6.50  6.50         Results Review:       I reviewed the patient's new clinical results.    The following radiologic or non-invasive studies have been reviewed by me: ROXANA with moderate right 0.63.  Left is normal.  Doppler Ankle Brachial Index Single Level CAR 10/03/2024    Interpretation Summary    Right Conclusion: The right ROXANA is moderately reduced.    Left Conclusion: The left ROXANA is normal.     No radiology results for the last 30 days.                ASSESSMENT/PLAN:   Diagnoses and all orders for this visit:    1. Bilateral carotid artery stenosis (Primary)    2. Bruit of right carotid artery    3. Claudication  -     Doppler Ankle Brachial Index Single Level CAR; Future    4. Raynaud's disease without gangrene    5. Atherosclerotic peripheral vascular disease with intermittent claudication  -     Doppler Ankle Brachial Index Single Level CAR; Future       79 y.o.  male with stable lower extremity occlusive disease of 0.63 on the right with no rest pain and no decreased relative to where he has been.  He still moderate he is doing overall well from a walking standpoint as his lungs are really the rate limiting step.  Will continue to watch these on a 6-month basis and make sure he does not get below the 0.5 range.  His carotid disease is very stable less than 50% can be followed in a year.  He is currently on his Plavix only and seems to be handling that well.  He is obviously not smoking and his cholesterol controlled overall and pleased with his course as is he and he will let us know if anything changes.  Likely his sensation in his feet is actually pretty good.  His Raynaud's in his hands have been bothering him a little bit at times but hopefully going into the summer this will be relieved.    I discussed the plan with the patient who is agreeable to the plan of care at this point. Thank you for this consult.   Follow Up  Return in about 6 months (around 10/3/2025).    Pasha Garrison MD   04/03/25

## 2025-04-04 ENCOUNTER — TREATMENT (OUTPATIENT)
Dept: PHYSICAL THERAPY | Facility: CLINIC | Age: 80
End: 2025-04-04
Payer: MEDICARE

## 2025-04-04 DIAGNOSIS — R29.898 DECREASED STRENGTH OF LOWER EXTREMITY: ICD-10-CM

## 2025-04-04 DIAGNOSIS — G89.29 CHRONIC UPPER BACK PAIN: Primary | ICD-10-CM

## 2025-04-04 DIAGNOSIS — M54.9 CHRONIC UPPER BACK PAIN: Primary | ICD-10-CM

## 2025-04-04 DIAGNOSIS — M25.611 DECREASED RIGHT SHOULDER RANGE OF MOTION: ICD-10-CM

## 2025-04-04 LAB
BH CV LOWER ARTERIAL LEFT ABI RATIO: 1.02
BH CV LOWER ARTERIAL LEFT DORSALIS PEDIS SYS MAX: NORMAL
BH CV LOWER ARTERIAL LEFT POST TIBIAL SYS MAX: 114
BH CV LOWER ARTERIAL RIGHT ABI RATIO: 0.63
BH CV LOWER ARTERIAL RIGHT DORSALIS PEDIS SYS MAX: 68
BH CV LOWER ARTERIAL RIGHT POST TIBIAL SYS MAX: 70
BH CV XLRA MEAS LEFT CAROTID BULB EDV: -22.3 CM/SEC
BH CV XLRA MEAS LEFT CAROTID BULB PSV: -98.8 CM/SEC
BH CV XLRA MEAS LEFT DIST CCA EDV: 24.2 CM/SEC
BH CV XLRA MEAS LEFT DIST CCA PSV: 94.9 CM/SEC
BH CV XLRA MEAS LEFT DIST ICA EDV: -32 CM/SEC
BH CV XLRA MEAS LEFT DIST ICA PSV: -93 CM/SEC
BH CV XLRA MEAS LEFT ICA/CCA RATIO: 1.2
BH CV XLRA MEAS LEFT MID CCA EDV: 24.2 CM/SEC
BH CV XLRA MEAS LEFT MID CCA PSV: 97.8 CM/SEC
BH CV XLRA MEAS LEFT MID ICA EDV: -32 CM/SEC
BH CV XLRA MEAS LEFT MID ICA PSV: -114.3 CM/SEC
BH CV XLRA MEAS LEFT PROX CCA EDV: 28.1 CM/SEC
BH CV XLRA MEAS LEFT PROX CCA PSV: 103.6 CM/SEC
BH CV XLRA MEAS LEFT PROX ECA EDV: -18.4 CM/SEC
BH CV XLRA MEAS LEFT PROX ECA PSV: -102.7 CM/SEC
BH CV XLRA MEAS LEFT PROX ICA EDV: -32 CM/SEC
BH CV XLRA MEAS LEFT PROX ICA PSV: -97.8 CM/SEC
BH CV XLRA MEAS LEFT PROX SCLA PSV: 171.5 CM/SEC
BH CV XLRA MEAS LEFT VERTEBRAL A EDV: -18.4 CM/SEC
BH CV XLRA MEAS LEFT VERTEBRAL A PSV: -66.8 CM/SEC
BH CV XLRA MEAS RIGHT CAROTID BULB EDV: -15.4 CM/SEC
BH CV XLRA MEAS RIGHT CAROTID BULB PSV: -46.2 CM/SEC
BH CV XLRA MEAS RIGHT DIST CCA EDV: 25.2 CM/SEC
BH CV XLRA MEAS RIGHT DIST CCA PSV: 86.2 CM/SEC
BH CV XLRA MEAS RIGHT DIST ICA EDV: -28 CM/SEC
BH CV XLRA MEAS RIGHT DIST ICA PSV: -63.8 CM/SEC
BH CV XLRA MEAS RIGHT ICA/CCA RATIO: 1.48
BH CV XLRA MEAS RIGHT MID CCA EDV: 29.1 CM/SEC
BH CV XLRA MEAS RIGHT MID CCA PSV: 103.6 CM/SEC
BH CV XLRA MEAS RIGHT MID ICA EDV: -26.3 CM/SEC
BH CV XLRA MEAS RIGHT MID ICA PSV: -127.3 CM/SEC
BH CV XLRA MEAS RIGHT PROX CCA EDV: 22.3 CM/SEC
BH CV XLRA MEAS RIGHT PROX CCA PSV: 89.1 CM/SEC
BH CV XLRA MEAS RIGHT PROX ECA EDV: -21.3 CM/SEC
BH CV XLRA MEAS RIGHT PROX ECA PSV: -105.6 CM/SEC
BH CV XLRA MEAS RIGHT PROX ICA EDV: -16.8 CM/SEC
BH CV XLRA MEAS RIGHT PROX ICA PSV: -53.9 CM/SEC
BH CV XLRA MEAS RIGHT PROX SCLA PSV: 118.1 CM/SEC
BH CV XLRA MEAS RIGHT VERTEBRAL A EDV: -17.5 CM/SEC
BH CV XLRA MEAS RIGHT VERTEBRAL A PSV: -67.3 CM/SEC
LEFT ARM BP: NORMAL MMHG
RIGHT ARM BP: NORMAL MMHG
UPPER ARTERIAL LEFT ARM BRACHIAL SYS MAX: NORMAL
UPPER ARTERIAL RIGHT ARM BRACHIAL SYS MAX: NORMAL

## 2025-04-04 NOTE — PROGRESS NOTES
Physical Therapy Daily Treatment Note  [facility] Physical Therapy [place]  [address], [address]  [place] [address]     Patient: Aj Salazar  : 1945  Referring practitioner: Arin Willingham MD  Today's Date: 2025    VISIT#: 14    Visit Diagnoses:    ICD-10-CM ICD-9-CM   1. Chronic upper back pain  M54.9 724.5    G89.29 338.29   2. Decreased strength of lower extremity  R29.898 729.89   3. Decreased right shoulder range of motion  M25.611 719.51       Subjective   Aj Salazar reports: he is doing pretty good, no pain at the beginning of the session and overall is doing well.       Objective       See Exercise, Manual, and Modality Logs for complete treatment.     Patient Education: HEP review  Exercise rationale/ pain free exercise performance  Alternate exercise positions  Verbal/Tactile cues to ensure correct exercise performance/technique       Assessment/Plan  Patient demonstrates good tolerance to continued and new therapeutic exercises on this date with no report of increased periscapular pain during or at the end of the session. Progressed resistance throughout with good tolerance and continued to focus on periscapular strengthening in all different neurological positions. Shoulder taps were completed in a quadruped position on this date to target muscles and control in a more weightbearing position. Will continue to progress as tolerated.       Progress per Plan of Care and Progress strengthening /stabilization /functional activity          Timed:         Manual Therapy:    -     mins  85110;     Therapeutic Exercise:    15     mins  43083;     Neuromuscular Panchito:    26    mins  29285;    Therapeutic Activity:     13     mins  84517;     Gait Training:           mins  80750;     Ultrasound:          mins  22973;    Ionto:                                   mins  25101  Self Care:                       -     mins  47751    Un-Timed:  Electrical  Stimulation:         mins  16483 ( );  Dry Needling          mins self-pay  Traction          mins 59569  Re-Eval                               mins  00659  Group Therapy           ___ mins 02324    Timed Treatment:   54   mins   Total Treatment:     57   mins    Melida Kilpatrick PT  Physical Therapist  [place] [facility] license #: [id number]

## 2025-04-08 ENCOUNTER — TREATMENT (OUTPATIENT)
Dept: CARDIAC REHAB | Facility: HOSPITAL | Age: 80
End: 2025-04-08
Payer: MEDICARE

## 2025-04-08 DIAGNOSIS — J44.9 COPD, MODERATE: Primary | ICD-10-CM

## 2025-04-08 PROCEDURE — 94625 PHY/QHP OP PULM RHB W/O MNTR: CPT

## 2025-04-09 ENCOUNTER — TREATMENT (OUTPATIENT)
Dept: PHYSICAL THERAPY | Facility: CLINIC | Age: 80
End: 2025-04-09
Payer: MEDICARE

## 2025-04-09 DIAGNOSIS — R29.898 DECREASED STRENGTH OF LOWER EXTREMITY: ICD-10-CM

## 2025-04-09 DIAGNOSIS — M25.611 DECREASED RIGHT SHOULDER RANGE OF MOTION: ICD-10-CM

## 2025-04-09 DIAGNOSIS — G89.29 CHRONIC UPPER BACK PAIN: Primary | ICD-10-CM

## 2025-04-09 DIAGNOSIS — M54.9 CHRONIC UPPER BACK PAIN: Primary | ICD-10-CM

## 2025-04-09 NOTE — PROGRESS NOTES
"Physical Therapy Daily Treatment Note  [facility] Physical Therapy [place]  [address]  [place] [id number]     Patient: Aj Salazar  : 1945  Referring practitioner: Arin Willingham MD  Today's Date: 2025    VISIT#: 15    Visit Diagnoses:    ICD-10-CM ICD-9-CM   1. Chronic upper back pain  M54.9 724.5    G89.29 338.29   2. Decreased strength of lower extremity  R29.898 729.89   3. Decreased right shoulder range of motion  M25.611 719.51       Subjective   Aj Salazar reports: that he had a flare up in his lumbar spine that lasted a couple days. This has subsided. He also experienced some stiffness at the base of his neck but this is feeling \"pretty good\" today.       Objective       See Exercise, Manual, and Modality Logs for complete treatment.     Patient Education: HEP review  Exercise rationale/ pain free exercise performance  Alternate exercise positions  Verbal/Tactile cues to ensure correct exercise performance/technique       Assessment/Plan  Patient demonstrates good tolerance to continued therapeutic exercises on this date with no report of periscapular pain or lumbar pain. Deferred B shoulder flexion/extension exercise on this date. Very few verbal cues were required on this date, demonstrating good understanding of his HEP. Progressed standing rows to a cable column vs TB, subjectively there was no pain reported, objectively he demonstrates good form and activation of the periscapular muscles. He continues to progress well and would benefit from continued therapeutic interventions.       Progress per Plan of Care and Progress strengthening /stabilization /functional activity          Timed:         Manual Therapy:    -     mins  67085;     Therapeutic Exercise:    20     mins  99883;     Neuromuscular Panchito:    24    mins  80827;    Therapeutic Activity:     10     mins  90007;     Gait Training:           mins  66746;     Ultrasound:          " mins  98277;    Ionto:                                   mins  96305  Self Care:                       -     mins  05651    Un-Timed:  Electrical Stimulation:         mins  52963 ( );  Dry Needling          mins self-pay  Traction          mins 91338  Re-Eval                               mins  72868  Group Therapy           ___ mins 35350    Timed Treatment:   54   mins   Total Treatment:     56   mins    Melida Kilpatrick PT  Physical Therapist  [place] license #: [id number]

## 2025-04-10 ENCOUNTER — TREATMENT (OUTPATIENT)
Dept: CARDIAC REHAB | Facility: HOSPITAL | Age: 80
End: 2025-04-10
Payer: MEDICARE

## 2025-04-10 DIAGNOSIS — J44.9 COPD, MODERATE: Primary | ICD-10-CM

## 2025-04-10 PROCEDURE — 94625 PHY/QHP OP PULM RHB W/O MNTR: CPT

## 2025-04-11 ENCOUNTER — TREATMENT (OUTPATIENT)
Dept: PHYSICAL THERAPY | Facility: CLINIC | Age: 80
End: 2025-04-11
Payer: MEDICARE

## 2025-04-11 DIAGNOSIS — M25.611 DECREASED RIGHT SHOULDER RANGE OF MOTION: ICD-10-CM

## 2025-04-11 DIAGNOSIS — G89.29 CHRONIC UPPER BACK PAIN: Primary | ICD-10-CM

## 2025-04-11 DIAGNOSIS — R29.898 DECREASED STRENGTH OF LOWER EXTREMITY: ICD-10-CM

## 2025-04-11 DIAGNOSIS — M54.9 CHRONIC UPPER BACK PAIN: Primary | ICD-10-CM

## 2025-04-11 NOTE — PROGRESS NOTES
Physical Therapy Daily Treatment Note  [facility] Physical Therapy [place]  [address]  [place] [address]     Patient: Aj Salazar  : 1945  Referring practitioner: Arin Willingham MD  Today's Date: 2025    VISIT#: 16    Visit Diagnoses:    ICD-10-CM ICD-9-CM   1. Chronic upper back pain  M54.9 724.5    G89.29 338.29   2. Decreased strength of lower extremity  R29.898 729.89   3. Decreased right shoulder range of motion  M25.611 719.51       Subjective   Aj Salazar reports: that he had some elbow and lower back pain after last visit, it finally subsided this morning. The upper, thoracic area continues to feel better.       Objective       See Exercise, Manual, and Modality Logs for complete treatment.     Patient Education: HEP review  Exercise rationale/ pain free exercise performance  Alternate exercise positions  Verbal/Tactile cues to ensure correct exercise performance/technique       Assessment/Plan  Patient continues to demonstrate good tolerance to continued therapeutic interventions on this date with no report of increased thoracic pain during or at the end of the session. He is able to perform his exercise program well with good form and technique with few verbal cues required. Modified shoulder taps to a plank position on the wall vs in quadruped position. No other changes were made on this date. Will continue to progress as tolerated.       Progress per Plan of Care and Progress strengthening /stabilization /functional activity          Timed:         Manual Therapy:    -     mins  42433;     Therapeutic Exercise:    10     mins  31572;     Neuromuscular Panchito:    15    mins  82352;    Therapeutic Activity:     10     mins  30951;     Gait Training:           mins  80407;     Ultrasound:          mins  18713;    Ionto:                                   mins  04390  Self Care:                       -     mins  38484    Un-Timed:  Electrical  Stimulation:         mins  08167 ( );  Dry Needling          mins self-pay  Traction          mins 14457  Re-Eval                               mins  03198  Group Therapy           ___ mins 40036    Timed Treatment:   35   mins   Total Treatment:     48   mins    Melida Kilpatrick PT  Physical Therapist  [place] license #: [id number]

## 2025-04-15 ENCOUNTER — TREATMENT (OUTPATIENT)
Dept: CARDIAC REHAB | Facility: HOSPITAL | Age: 80
End: 2025-04-15
Payer: MEDICARE

## 2025-04-15 DIAGNOSIS — J44.9 COPD, MODERATE: Primary | ICD-10-CM

## 2025-04-15 PROCEDURE — 94625 PHY/QHP OP PULM RHB W/O MNTR: CPT

## 2025-04-16 ENCOUNTER — TREATMENT (OUTPATIENT)
Dept: PHYSICAL THERAPY | Facility: CLINIC | Age: 80
End: 2025-04-16
Payer: MEDICARE

## 2025-04-16 DIAGNOSIS — M25.611 DECREASED RIGHT SHOULDER RANGE OF MOTION: ICD-10-CM

## 2025-04-16 DIAGNOSIS — G89.29 CHRONIC UPPER BACK PAIN: Primary | ICD-10-CM

## 2025-04-16 DIAGNOSIS — M54.9 CHRONIC UPPER BACK PAIN: Primary | ICD-10-CM

## 2025-04-16 DIAGNOSIS — R29.898 DECREASED STRENGTH OF LOWER EXTREMITY: ICD-10-CM

## 2025-04-16 NOTE — PROGRESS NOTES
Physical Therapy Daily Treatment Note  [facility] Physical Therapy [place]  [address]  [place] [id number]     Patient: Aj Salazar  : 1945  Referring practitioner: Arin Willingham MD  Today's Date: 2025    VISIT#: 17    Visit Diagnoses:    ICD-10-CM ICD-9-CM   1. Chronic upper back pain  M54.9 724.5    G89.29 338.29   2. Decreased strength of lower extremity  R29.898 729.89   3. Decreased right shoulder range of motion  M25.611 719.51       Subjective   Aj Salazar reports: that the shoulder blade region has been feeling pretty good, he gets some popping and clicking in the neck which is occasionally painful. Lower back has also been irritated. Pulmonary rehab is going well.       Objective       See Exercise, Manual, and Modality Logs for complete treatment.     Patient Education: HEP review  Exercise rationale/ pain free exercise performance  Alternate exercise positions  Verbal/Tactile cues to ensure correct exercise performance/technique       Assessment/Plan  Patient demonstrates good tolerance to continued therapeutic exercises on this date with no lumbar or thoracic pain reported during or at the end of the session. Modified thoracic rotational activity to no resistance on this date. No other changes were made on this date. He continues to demonstrate good progress. Discussed scapular closed pack position during periscapular activities. Will continue to progress as tolerated.       Progress per Plan of Care and Progress strengthening /stabilization /functional activity          Timed:         Manual Therapy:    -     mins  67467;     Therapeutic Exercise:    15     mins  90426;     Neuromuscular Panchito:    15    mins  31743;    Therapeutic Activity:     -     mins  19054;     Gait Training:           mins  31867;     Ultrasound:          mins  11061;    Ionto:                                   mins  42891  Self Care:                       -      mins  82073    Un-Timed:  Electrical Stimulation:         mins  41711 ( );  Dry Needling          mins self-pay  Traction          mins 52924  Re-Eval                               mins  48230  Group Therapy           ___ mins 42136    Timed Treatment:   30   mins   Total Treatment:     56   mins    Melida Kilpatrick PT  Physical Therapist  [place] license #: [id number]

## 2025-04-17 ENCOUNTER — TREATMENT (OUTPATIENT)
Dept: CARDIAC REHAB | Facility: HOSPITAL | Age: 80
End: 2025-04-17
Payer: MEDICARE

## 2025-04-17 DIAGNOSIS — J44.9 COPD, MODERATE: Primary | ICD-10-CM

## 2025-04-17 PROCEDURE — 94625 PHY/QHP OP PULM RHB W/O MNTR: CPT

## 2025-04-22 ENCOUNTER — TREATMENT (OUTPATIENT)
Dept: CARDIAC REHAB | Facility: HOSPITAL | Age: 80
End: 2025-04-22
Payer: MEDICARE

## 2025-04-22 DIAGNOSIS — J44.9 COPD, MODERATE: Primary | ICD-10-CM

## 2025-04-22 PROCEDURE — 94625 PHY/QHP OP PULM RHB W/O MNTR: CPT

## 2025-04-23 ENCOUNTER — TREATMENT (OUTPATIENT)
Dept: PHYSICAL THERAPY | Facility: CLINIC | Age: 80
End: 2025-04-23
Payer: MEDICARE

## 2025-04-23 DIAGNOSIS — M25.611 DECREASED RIGHT SHOULDER RANGE OF MOTION: ICD-10-CM

## 2025-04-23 DIAGNOSIS — G89.29 CHRONIC UPPER BACK PAIN: Primary | ICD-10-CM

## 2025-04-23 DIAGNOSIS — M54.9 CHRONIC UPPER BACK PAIN: Primary | ICD-10-CM

## 2025-04-23 DIAGNOSIS — R29.898 DECREASED STRENGTH OF LOWER EXTREMITY: ICD-10-CM

## 2025-04-23 NOTE — PROGRESS NOTES
Physical Therapy Daily Treatment and DC Note  [facility] [place]  [address], [address]  [place] [address]     Patient: Aj Salazar  : 1945  Referring practitioner: Arin Willingham MD  Today's Date: 2025    VISIT#: 18    Visit Diagnoses:    ICD-10-CM ICD-9-CM   1. Chronic upper back pain  M54.9 724.5    G89.29 338.29   2. Decreased strength of lower extremity  R29.898 729.89   3. Decreased right shoulder range of motion  M25.611 719.51      NDI:16/50 (32%) -17/50 (34%) - 13/50 (26%)    Subjective Evaluation    Pain  Current pain ratin  At best pain ratin  At worst pain ratin  Quality: dull ache         Aj Salazar reports: that he is doing really well, he is ready for DC at this time and would like to review home exercises. He has seen about a 70% improvement since beginning OPT. When he started he would have to take 6 extra strength Tylenol but now he might only need 2.       Objective       See Exercise, Manual, and Modality Logs for complete treatment.     Patient Education: HEP review  Exercise rationale/ pain free exercise performance  Alternate exercise positions  Verbal/Tactile cues to ensure correct exercise performance/technique       Assessment/Plan  Patient has demonstrated good tolerance to continued and new therapeutic exercises on this date with no report of increased pain during or at the end of the session. He is progressing well and is ready for DC at this time. Answered any questions he had at the time and reviewed HEP. Provided him with my contact information for any further questions.       Other          Timed:         Manual Therapy:    -     mins  18236;     Therapeutic Exercise:    10     mins  06364;     Neuromuscular Panchito:    12    mins  66135;    Therapeutic Activity:     -     mins  06125;     Gait Training:           mins  81582;     Ultrasound:          mins  00924;    Ionto:                                    mins  13048  Self Care:                       8     mins  46312    Un-Timed:  Electrical Stimulation:         mins  80011 ( );  Dry Needling          mins self-pay  Traction          mins 16192  Re-Eval                               mins  74213  Group Therapy           ___ mins 15543    Timed Treatment:   30   mins   Total Treatment:     51   mins    Melida Kilpatrick PT  Physical Therapist  David LENZ license #: [id number]

## 2025-04-24 ENCOUNTER — TREATMENT (OUTPATIENT)
Dept: CARDIAC REHAB | Facility: HOSPITAL | Age: 80
End: 2025-04-24
Payer: MEDICARE

## 2025-04-24 DIAGNOSIS — J44.9 COPD, MODERATE: Primary | ICD-10-CM

## 2025-04-24 PROCEDURE — 94625 PHY/QHP OP PULM RHB W/O MNTR: CPT

## 2025-04-29 ENCOUNTER — TREATMENT (OUTPATIENT)
Dept: CARDIAC REHAB | Facility: HOSPITAL | Age: 80
End: 2025-04-29
Payer: MEDICARE

## 2025-04-29 DIAGNOSIS — J44.9 COPD, MODERATE: Primary | ICD-10-CM

## 2025-04-29 PROCEDURE — 94625 PHY/QHP OP PULM RHB W/O MNTR: CPT

## 2025-05-01 ENCOUNTER — TREATMENT (OUTPATIENT)
Dept: CARDIAC REHAB | Facility: HOSPITAL | Age: 80
End: 2025-05-01
Payer: MEDICARE

## 2025-05-01 DIAGNOSIS — J44.9 COPD, MODERATE: Primary | ICD-10-CM

## 2025-05-01 PROCEDURE — 94625 PHY/QHP OP PULM RHB W/O MNTR: CPT

## 2025-05-06 ENCOUNTER — TREATMENT (OUTPATIENT)
Dept: CARDIAC REHAB | Facility: HOSPITAL | Age: 80
End: 2025-05-06
Payer: MEDICARE

## 2025-05-06 ENCOUNTER — HOSPITAL ENCOUNTER (OUTPATIENT)
Dept: CT IMAGING | Facility: HOSPITAL | Age: 80
Discharge: HOME | End: 2025-05-06
Admitting: INTERNAL MEDICINE
Payer: MEDICARE

## 2025-05-06 DIAGNOSIS — J44.9 COPD, MODERATE: Primary | ICD-10-CM

## 2025-05-06 DIAGNOSIS — R91.8 LUNG NODULES: ICD-10-CM

## 2025-05-06 PROCEDURE — 94625 PHY/QHP OP PULM RHB W/O MNTR: CPT

## 2025-05-06 PROCEDURE — 71250 CT THORAX DX C-: CPT

## 2025-05-08 ENCOUNTER — TREATMENT (OUTPATIENT)
Dept: CARDIAC REHAB | Facility: HOSPITAL | Age: 80
End: 2025-05-08
Payer: MEDICARE

## 2025-05-08 DIAGNOSIS — J44.9 COPD, MODERATE: Primary | ICD-10-CM

## 2025-05-08 PROCEDURE — 94625 PHY/QHP OP PULM RHB W/O MNTR: CPT

## 2025-05-13 ENCOUNTER — OFFICE VISIT (OUTPATIENT)
Dept: INTERNAL MEDICINE | Age: 80
End: 2025-05-13
Payer: MEDICARE

## 2025-05-13 ENCOUNTER — TREATMENT (OUTPATIENT)
Dept: CARDIAC REHAB | Facility: HOSPITAL | Age: 80
End: 2025-05-13
Payer: MEDICARE

## 2025-05-13 VITALS
SYSTOLIC BLOOD PRESSURE: 118 MMHG | BODY MASS INDEX: 22.62 KG/M2 | HEIGHT: 70 IN | HEART RATE: 81 BPM | WEIGHT: 158 LBS | OXYGEN SATURATION: 94 % | DIASTOLIC BLOOD PRESSURE: 64 MMHG | TEMPERATURE: 97.1 F

## 2025-05-13 DIAGNOSIS — I10 PRIMARY HYPERTENSION: Chronic | ICD-10-CM

## 2025-05-13 DIAGNOSIS — J43.9 PULMONARY EMPHYSEMA, UNSPECIFIED EMPHYSEMA TYPE: Chronic | ICD-10-CM

## 2025-05-13 DIAGNOSIS — I48.91 ATRIAL FIBRILLATION WITH RAPID VENTRICULAR RESPONSE: Chronic | ICD-10-CM

## 2025-05-13 DIAGNOSIS — E11.51 TYPE 2 DIABETES MELLITUS WITH DIABETIC PERIPHERAL ANGIOPATHY WITHOUT GANGRENE, WITHOUT LONG-TERM CURRENT USE OF INSULIN: Primary | Chronic | ICD-10-CM

## 2025-05-13 DIAGNOSIS — J44.9 COPD, MODERATE: Primary | ICD-10-CM

## 2025-05-13 PROBLEM — Z86.73 HISTORY OF TRANSIENT ISCHEMIC ATTACK (TIA): Chronic | Status: ACTIVE | Noted: 2020-10-22

## 2025-05-13 PROCEDURE — 94625 PHY/QHP OP PULM RHB W/O MNTR: CPT

## 2025-05-13 NOTE — PROGRESS NOTES
J  U  N  O  H    K  I  M ,   M  D                               I  N  T  E  R  N  A  L    M  E  D  I  C  I  N  E         ENCOUNTER DATE:  05/13/2025    Aj Pattonb / 79 y.o. / male    OFFICE VISIT ENCOUNTER       CHIEF COMPLAINT / REASON FOR OFFICE VISIT     Diabetes, Hypokalemia, and COPD      ASSESSMENT & PLAN     Problem List Items Addressed This Visit          High    Type 2 diabetes mellitus with diabetic peripheral angiopathy without gangrene, without long-term current use of insulin - Primary (Chronic)    Overview   **Complications of diabetes: peripheral neuropathy, peripheral arterial disease, and right leg wound.      Continue metformin  mg daily with dinner.          Relevant Medications    Jardiance 10 MG tablet tablet    metFORMIN ER (GLUCOPHAGE-XR) 500 MG 24 hr tablet    Other Relevant Orders    Comprehensive Metabolic Panel    Hemoglobin A1c    Microalbumin / Creatinine Urine Ratio - Urine, Clean Catch    Lipid Panel With / Chol / HDL Ratio       Medium    COPD (chronic obstructive pulmonary disease) (Chronic)    Overview   *Followed by pulmonologist     PFT (5/2018): moderate obstruction with decreased diffusing capacity.          Relevant Medications    Fluticasone-Umeclidin-Vilant (Trelegy Ellipta) 200-62.5-25 MCG/ACT aerosol powder     Hypertension (Chronic)    Relevant Medications    metoprolol succinate XL (TOPROL-XL) 100 MG 24 hr tablet    spironolactone (ALDACTONE) 25 MG tablet    metoprolol succinate XL (TOPROL-XL) 50 MG 24 hr tablet    bumetanide (BUMEX) 1 MG tablet    Other Relevant Orders    Comprehensive Metabolic Panel    Atrial fibrillation with rapid ventricular response (Chronic)    Overview   *Imburgia         Relevant Medications    metoprolol succinate XL (TOPROL-XL) 100 MG 24 hr tablet    metoprolol succinate XL (TOPROL-XL) 50 MG 24 hr tablet    cilostazol (PLETAL) 100 MG tablet    clopidogrel (PLAVIX) 75 MG tablet     Orders  "Placed This Encounter   Procedures    Comprehensive Metabolic Panel     Release to patient:   Routine Release [[id number]]    Hemoglobin A1c     Release to patient:   Routine Release [[id number]]    Microalbumin / Creatinine Urine Ratio - Urine, Clean Catch     Release to patient:   Routine Release [[id number]]    Lipid Panel With / Chol / HDL Ratio     Release to patient:   Routine Release [[id number]]     No orders of the defined types were placed in this encounter.      SUMMARY/DISCUSSION    Advised him to discuss with his cardiologist about his lightheadedness symptoms.  His diuretic therapy may need to be curtailed a bit.  Maintain good hydration with water at all times while maintaining low-sodium diet.    TOTAL TIME OF ENCOUNTER:        Next Appointment with me: 8/28/2025     Return for Next scheduled followup for AWV.      VITAL SIGNS     Vitals:    05/13/25 1323   BP: 118/64   Pulse: 81   Temp: 97.1 °F (36.2 °C)   SpO2: 94%   Weight: 71.7 kg (158 lb)   Height: 176.5 cm (69.5\")       BP Readings from Last 3 Encounters:   05/13/25 118/64   04/03/25 110/62   02/20/25 110/50     Wt Readings from Last 3 Encounters:   05/13/25 71.7 kg (158 lb)   04/03/25 72.8 kg (160 lb 9.6 oz)   02/20/25 76.2 kg (168 lb)     Body mass index is 23 kg/m².    Blood pressure readings recorded on patient flowsheet:  [facility] Blood Pressure Flowsheet Systolic Diastolic Pulse   4/29/2022   6:00   74  104    4/25/2022   6:00   73  81    4/22/2022   6:00   60  108    4/18/2022   6:00   61  97    4/15/2022   6:00   69  88    4/11/2022   6:00   76  85    4/8/2022   6:00 AM 98  62  87    4/4/2022   6:00   66  93    4/1/2022   6:00   61  91    3/28/2022   6:00   78  78        Patient-reported         MEDICATIONS AT THE TIME OF OFFICE VISIT     Current Outpatient Medications on File Prior to Visit   Medication Sig    ALPHAGAN P 0.1 % solution ophthalmic solution Administer 1 drop to both " eyes 2 (two) times a day.    atorvastatin (LIPITOR) 80 MG tablet TAKE 1 TABLET EVERY NIGHT    bumetanide (BUMEX) 1 MG tablet Take 1 tablet by mouth Daily.    cilostazol (PLETAL) 100 MG tablet Take 1 tablet by mouth 2 (Two) Times a Day.    clopidogrel (PLAVIX) 75 MG tablet TAKE 1 TABLET EVERY DAY    dabigatran etexilate (PRADAXA) 150 MG capsu Take 1 capsule by mouth 2 (Two) Times a Day.    Fluticasone-Umeclidin-Vilant (Trelegy Ellipta) 200-62.5-25 MCG/ACT aerosol powder  Inhale 1 puff Daily.    Jardiance 10 MG tablet tablet Take 1 tablet by mouth Daily.    Lancets Misc. (Accu-Chek Softclix Lancet Dev) kit Use to check FBS once daily . Dm2 with peripheral  neuropathy E11.51    metFORMIN ER (GLUCOPHAGE-XR) 500 MG 24 hr tablet TAKE 1 TABLET EVERY DAY WITH DINNER    metoprolol succinate XL (TOPROL-XL) 100 MG 24 hr tablet Take 1 tablet by mouth Every Night. Take 100 mg at night and 50 mg in the morning    metoprolol succinate XL (TOPROL-XL) 50 MG 24 hr tablet Take 1 tablet by mouth Daily. Take 50 mg every morning and 100 mg every night    Mirabegron ER (MYRBETRIQ) 25 MG tablet sustained-release 24 hour 24 hr tablet Take 1 tablet by mouth Every Night.    pantoprazole (PROTONIX) 40 MG EC tablet TAKE 1 TABLET TWICE DAILY    spironolactone (ALDACTONE) 25 MG tablet Take 1 tablet by mouth Daily.    Tafluprost, PF, (ZIOPTAN) 0.0015 % solution ophthalmic solution Administer 1 drop to both eyes Every Night.    tamsulosin (FLOMAX) 0.4 MG capsule 24 hr capsule      No current facility-administered medications on file prior to visit.          HISTORY OF PRESENT ILLNESS     Patient complains of intermittent lightheadedness and dizziness symptoms.  He is on multiple medications for atrial fibrillation and chronic heart failure including bumetanide 1 mg daily, Jardiance 10 mg, metoprolol succinate 50 mg in the morning and 100 mg at night along with spironolactone 25 mg daily.  He is also on tamsulosin 0.4 mg nightly for BPH.  He reports  to drinking adequate amounts of water but reports frequent urination.  He is on Pradaxa 150 mg twice daily for anticoagulation along with clopidogrel 75 mg.  He is on atorvastatin 80 mg for cholesterol.  He takes pantoprazole 40 mg twice daily for GERD.  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  For diabetes he takes metformin  mg with dinner along with Jardiance 10 mg.  Most recent A1c was 6.5 back in August 2024.  He has history of low potassium and last potassium levels about 3.3 in December 2024.  At this time he is not taking any potassium supplement by prescription.    Patient Care Team:  Neftali Amezcua MD as PCP - General (Internal Medicine)  Tahir Childs MD as Consulting Physician (Pulmonary Disease)  Aura Perry OD (Optometry)  Juan Colby MD as Consulting Physician (Cardiology)  Rebel Escobar MD as Consulting Physician (Urology)  Danie Mcgrath MD as Consulting Physician (Ophthalmology)  John Queen MD as Consulting Physician (Wound Care)  Pedro Mancuso MD as Surgeon (Neurosurgery)  Pasha Garrison MD as Surgeon (Vascular Surgery)  Adarsh Rangel MD PhD as Consulting Physician (Hematology and Oncology)  Josh Bergman APRN as Nurse Practitioner (Gastroenterology)    REVIEW OF SYSTEMS     Review of Systems       PHYSICAL EXAMINATION     Physical Exam  Alert with normal thought and judgment.   Cardiovascular: Normal rate  Pulm/Chest: Effort normal, breath sounds normal.   No lower extremity edema   Poor skin turgor, appears dry       REVIEWED DATA     Labs:     Lab Results   Component Value Date     12/27/2024    K 3.3 (L) 12/27/2024    CALCIUM 9.4 12/27/2024    AST 21 12/27/2024    ALT 12 12/27/2024    BUN 15 12/27/2024    CREATININE 0.86 12/27/2024    CREATININE 0.87 09/19/2024    CREATININE 1.00 09/18/2024    EGFR 88.6 12/27/2024     Lab Results   Component Value Date     HGBA1C 6.50 (H) 08/26/2024    HGBA1C 6.50 (H) 04/15/2024    HGBA1C 6.50 (H) 07/17/2023     Lab Results   Component Value Date    MALBCRERATIO 71 (H) 08/26/2024     Lab Results   Component Value Date    LDL 51 07/25/2024    LDL 47 04/15/2024    LDL 54 07/17/2023    HDL 51 07/25/2024    HDL 41 04/15/2024    TRIG 65 07/25/2024    TRIG 115 04/15/2024     Lab Results   Component Value Date    TSH 1.880 03/08/2021    TSH 2.45 10/14/2015     Lab Results   Component Value Date    WBC 8.50 12/27/2024    HGB 13.3 12/27/2024     12/27/2024           Imaging:               Medical Tests:               Summary of old records / correspondence / consultant report:             Request outside records:

## 2025-05-14 ENCOUNTER — TRANSCRIBE ORDERS (OUTPATIENT)
Dept: ADMINISTRATIVE | Facility: HOSPITAL | Age: 80
End: 2025-05-14
Payer: MEDICARE

## 2025-05-14 DIAGNOSIS — R91.1 LUNG NODULE: Primary | ICD-10-CM

## 2025-05-14 LAB
ALBUMIN SERPL-MCNC: 4.3 G/DL (ref 3.5–5.2)
ALBUMIN/CREAT UR: 28 MG/G CREAT (ref 0–29)
ALBUMIN/GLOB SERPL: 1.7 G/DL
ALP SERPL-CCNC: 131 U/L (ref 39–117)
ALT SERPL-CCNC: 12 U/L (ref 1–41)
AST SERPL-CCNC: 19 U/L (ref 1–40)
BILIRUB SERPL-MCNC: 0.7 MG/DL (ref 0–1.2)
BUN SERPL-MCNC: 18 MG/DL (ref 8–23)
BUN/CREAT SERPL: 18.6 (ref 7–25)
CALCIUM SERPL-MCNC: 9.5 MG/DL (ref 8.6–10.5)
CHLORIDE SERPL-SCNC: 100 MMOL/L (ref 98–107)
CHOLEST SERPL-MCNC: 119 MG/DL (ref 0–200)
CHOLEST/HDLC SERPL: 2.33 {RATIO}
CO2 SERPL-SCNC: 28.2 MMOL/L (ref 22–29)
CREAT SERPL-MCNC: 0.97 MG/DL (ref 0.76–1.27)
CREAT UR-MCNC: 51.1 MG/DL
EGFRCR SERPLBLD CKD-EPI 2021: 79.9 ML/MIN/1.73
GLOBULIN SER CALC-MCNC: 2.5 GM/DL
GLUCOSE SERPL-MCNC: 100 MG/DL (ref 65–99)
HBA1C MFR BLD: 6.8 % (ref 4.8–5.6)
HDLC SERPL-MCNC: 51 MG/DL (ref 40–60)
LDLC SERPL CALC-MCNC: 50 MG/DL (ref 0–100)
MICROALBUMIN UR-MCNC: 14.2 UG/ML
POTASSIUM SERPL-SCNC: 3.7 MMOL/L (ref 3.5–5.2)
PROT SERPL-MCNC: 6.8 G/DL (ref 6–8.5)
SODIUM SERPL-SCNC: 140 MMOL/L (ref 136–145)
TRIGL SERPL-MCNC: 94 MG/DL (ref 0–150)
VLDLC SERPL CALC-MCNC: 18 MG/DL (ref 5–40)

## 2025-05-15 ENCOUNTER — TREATMENT (OUTPATIENT)
Dept: CARDIAC REHAB | Facility: HOSPITAL | Age: 80
End: 2025-05-15
Payer: MEDICARE

## 2025-05-15 DIAGNOSIS — J44.9 COPD, MODERATE: Primary | ICD-10-CM

## 2025-05-15 PROCEDURE — 94625 PHY/QHP OP PULM RHB W/O MNTR: CPT

## 2025-05-16 DIAGNOSIS — E11.51 TYPE 2 DIABETES MELLITUS WITH DIABETIC PERIPHERAL ANGIOPATHY WITHOUT GANGRENE, WITHOUT LONG-TERM CURRENT USE OF INSULIN: Primary | ICD-10-CM

## 2025-05-16 DIAGNOSIS — E11.9 TYPE 2 DIABETES MELLITUS WITHOUT COMPLICATION, WITHOUT LONG-TERM CURRENT USE OF INSULIN: ICD-10-CM

## 2025-05-16 RX ORDER — METFORMIN HYDROCHLORIDE 500 MG/1
500 TABLET, EXTENDED RELEASE ORAL 2 TIMES DAILY WITH MEALS
Qty: 180 TABLET | Refills: 1 | Status: SHIPPED | OUTPATIENT
Start: 2025-05-16

## 2025-05-16 NOTE — ASSESSMENT & PLAN NOTE
Lab Results   Component Value Date    HGBA1C 6.80 (H) 05/13/2025    HGBA1C 6.50 (H) 08/26/2024    HGBA1C 6.50 (H) 04/15/2024      A1c is higher. Increase metformin  mg BID.   A1c prior to follow-up.

## 2025-05-20 ENCOUNTER — APPOINTMENT (OUTPATIENT)
Dept: CARDIAC REHAB | Facility: HOSPITAL | Age: 80
End: 2025-05-20
Payer: MEDICARE

## 2025-05-22 ENCOUNTER — TREATMENT (OUTPATIENT)
Dept: CARDIAC REHAB | Facility: HOSPITAL | Age: 80
End: 2025-05-22
Payer: MEDICARE

## 2025-05-22 DIAGNOSIS — J44.9 COPD, MODERATE: Primary | ICD-10-CM

## 2025-05-22 PROCEDURE — 94625 PHY/QHP OP PULM RHB W/O MNTR: CPT

## 2025-05-27 ENCOUNTER — TREATMENT (OUTPATIENT)
Dept: CARDIAC REHAB | Facility: HOSPITAL | Age: 80
End: 2025-05-27
Payer: MEDICARE

## 2025-05-27 DIAGNOSIS — J44.9 COPD, MODERATE: Primary | ICD-10-CM

## 2025-05-27 PROCEDURE — 94625 PHY/QHP OP PULM RHB W/O MNTR: CPT

## 2025-05-29 ENCOUNTER — APPOINTMENT (OUTPATIENT)
Dept: CARDIAC REHAB | Facility: HOSPITAL | Age: 80
End: 2025-05-29
Payer: MEDICARE

## 2025-06-02 ENCOUNTER — TELEPHONE (OUTPATIENT)
Dept: GASTROENTEROLOGY | Facility: CLINIC | Age: 80
End: 2025-06-02

## 2025-06-02 NOTE — TELEPHONE ENCOUNTER
"  Hub staff attempted to follow warm transfer process and was unsuccessful     Caller: Aj Salazar \"AMRIT\"    Relationship to patient: Self    Best call back number:   Telephone Information:   Mobile [phone]         Patient is needing: PT CALLED RETURNING BLANKA CALL REGARDING RESCHEDULING APPT. PLEASE ADVISE.       "

## 2025-06-16 ENCOUNTER — TRANSCRIBE ORDERS (OUTPATIENT)
Dept: ADMINISTRATIVE | Facility: HOSPITAL | Age: 80
End: 2025-06-16
Payer: MEDICARE

## 2025-06-17 ENCOUNTER — TREATMENT (OUTPATIENT)
Dept: CARDIAC REHAB | Facility: HOSPITAL | Age: 80
End: 2025-06-17
Payer: MEDICARE

## 2025-06-17 ENCOUNTER — TRANSCRIBE ORDERS (OUTPATIENT)
Dept: ADMINISTRATIVE | Facility: HOSPITAL | Age: 80
End: 2025-06-17
Payer: MEDICARE

## 2025-06-17 DIAGNOSIS — C44.42: Primary | ICD-10-CM

## 2025-06-17 DIAGNOSIS — J44.9 COPD, MODERATE: Primary | ICD-10-CM

## 2025-06-17 PROCEDURE — 94625 PHY/QHP OP PULM RHB W/O MNTR: CPT

## 2025-06-19 ENCOUNTER — TREATMENT (OUTPATIENT)
Dept: CARDIAC REHAB | Facility: HOSPITAL | Age: 80
End: 2025-06-19
Payer: MEDICARE

## 2025-06-19 DIAGNOSIS — J44.9 COPD, MODERATE: Primary | ICD-10-CM

## 2025-06-19 PROCEDURE — 94625 PHY/QHP OP PULM RHB W/O MNTR: CPT

## 2025-06-24 ENCOUNTER — TREATMENT (OUTPATIENT)
Dept: CARDIAC REHAB | Facility: HOSPITAL | Age: 80
End: 2025-06-24
Payer: MEDICARE

## 2025-06-24 DIAGNOSIS — J44.9 COPD, MODERATE: Primary | ICD-10-CM

## 2025-06-24 PROCEDURE — 94625 PHY/QHP OP PULM RHB W/O MNTR: CPT

## 2025-06-26 ENCOUNTER — TREATMENT (OUTPATIENT)
Dept: CARDIAC REHAB | Facility: HOSPITAL | Age: 80
End: 2025-06-26
Payer: MEDICARE

## 2025-06-26 DIAGNOSIS — J44.9 COPD, MODERATE: Primary | ICD-10-CM

## 2025-06-26 PROCEDURE — 94625 PHY/QHP OP PULM RHB W/O MNTR: CPT

## 2025-07-01 ENCOUNTER — APPOINTMENT (OUTPATIENT)
Dept: CARDIAC REHAB | Facility: HOSPITAL | Age: 80
End: 2025-07-01
Payer: MEDICARE

## 2025-07-03 ENCOUNTER — TREATMENT (OUTPATIENT)
Dept: CARDIAC REHAB | Facility: HOSPITAL | Age: 80
End: 2025-07-03
Payer: MEDICARE

## 2025-07-03 DIAGNOSIS — J44.9 COPD, MODERATE: Primary | ICD-10-CM

## 2025-07-03 PROCEDURE — 94625 PHY/QHP OP PULM RHB W/O MNTR: CPT

## 2025-07-08 ENCOUNTER — TREATMENT (OUTPATIENT)
Dept: CARDIAC REHAB | Facility: HOSPITAL | Age: 80
End: 2025-07-08
Payer: MEDICARE

## 2025-07-08 DIAGNOSIS — J44.9 COPD, MODERATE: Primary | ICD-10-CM

## 2025-07-08 PROCEDURE — 94625 PHY/QHP OP PULM RHB W/O MNTR: CPT

## 2025-07-10 ENCOUNTER — HOSPITAL ENCOUNTER (OUTPATIENT)
Dept: CT IMAGING | Facility: HOSPITAL | Age: 80
Discharge: HOME | End: 2025-07-10
Payer: MEDICARE

## 2025-07-10 ENCOUNTER — TREATMENT (OUTPATIENT)
Dept: CARDIAC REHAB | Facility: HOSPITAL | Age: 80
End: 2025-07-10
Payer: MEDICARE

## 2025-07-10 DIAGNOSIS — J44.9 COPD, MODERATE: Primary | ICD-10-CM

## 2025-07-10 DIAGNOSIS — C44.42: ICD-10-CM

## 2025-07-10 LAB — CREAT BLDA-MCNC: 0.8 MG/DL (ref 0.6–1.3)

## 2025-07-10 PROCEDURE — 82565 ASSAY OF CREATININE: CPT

## 2025-07-10 PROCEDURE — 25510000001 IOPAMIDOL 61 % SOLUTION: Performed by: DERMATOLOGY

## 2025-07-10 PROCEDURE — 70491 CT SOFT TISSUE NECK W/DYE: CPT

## 2025-07-10 PROCEDURE — 70470 CT HEAD/BRAIN W/O & W/DYE: CPT

## 2025-07-10 PROCEDURE — 94625 PHY/QHP OP PULM RHB W/O MNTR: CPT

## 2025-07-10 RX ORDER — IOPAMIDOL 612 MG/ML
100 INJECTION, SOLUTION INTRAVASCULAR
Status: COMPLETED | OUTPATIENT
Start: 2025-07-10 | End: 2025-07-10

## 2025-07-10 RX ADMIN — IOPAMIDOL 75 ML: 612 INJECTION, SOLUTION INTRAVENOUS at 11:01

## 2025-07-10 NOTE — NURSING NOTE
Patient here for outpatient CT Scans.  Gave patient an orange denture cup to remove his partial dentures that have metal before the CT Scan.

## 2025-07-15 ENCOUNTER — APPOINTMENT (OUTPATIENT)
Dept: CARDIAC REHAB | Facility: HOSPITAL | Age: 80
End: 2025-07-15
Payer: MEDICARE

## 2025-07-17 ENCOUNTER — APPOINTMENT (OUTPATIENT)
Dept: CARDIAC REHAB | Facility: HOSPITAL | Age: 80
End: 2025-07-17
Payer: MEDICARE

## 2025-08-04 RX ORDER — CILOSTAZOL 100 MG/1
100 TABLET ORAL 2 TIMES DAILY
Qty: 180 TABLET | Refills: 3 | Status: SHIPPED | OUTPATIENT
Start: 2025-08-04

## 2025-08-05 ENCOUNTER — HOSPITAL ENCOUNTER (OUTPATIENT)
Dept: CT IMAGING | Facility: HOSPITAL | Age: 80
Discharge: HOME | End: 2025-08-05
Admitting: INTERNAL MEDICINE
Payer: MEDICARE

## 2025-08-05 DIAGNOSIS — R91.1 LUNG NODULE: ICD-10-CM

## 2025-08-05 PROCEDURE — 71250 CT THORAX DX C-: CPT

## 2025-08-08 ENCOUNTER — LAB (OUTPATIENT)
Dept: LAB | Facility: HOSPITAL | Age: 80
End: 2025-08-08
Payer: MEDICARE

## 2025-08-08 ENCOUNTER — OFFICE VISIT (OUTPATIENT)
Dept: GASTROENTEROLOGY | Facility: CLINIC | Age: 80
End: 2025-08-08
Payer: MEDICARE

## 2025-08-08 VITALS
BODY MASS INDEX: 23.02 KG/M2 | HEIGHT: 69 IN | SYSTOLIC BLOOD PRESSURE: 118 MMHG | WEIGHT: 155.4 LBS | DIASTOLIC BLOOD PRESSURE: 80 MMHG | OXYGEN SATURATION: 99 % | TEMPERATURE: 97.4 F | HEART RATE: 83 BPM

## 2025-08-08 DIAGNOSIS — R19.7 DIARRHEA, UNSPECIFIED TYPE: ICD-10-CM

## 2025-08-08 DIAGNOSIS — E11.51 TYPE 2 DIABETES MELLITUS WITH DIABETIC PERIPHERAL ANGIOPATHY WITHOUT GANGRENE, WITHOUT LONG-TERM CURRENT USE OF INSULIN: Primary | Chronic | ICD-10-CM

## 2025-08-08 DIAGNOSIS — R19.8 IRREGULAR BOWEL HABITS: ICD-10-CM

## 2025-08-08 DIAGNOSIS — K63.8219 SMALL INTESTINAL BACTERIAL OVERGROWTH (SIBO): Primary | ICD-10-CM

## 2025-08-08 DIAGNOSIS — R74.8 ELEVATED ALKALINE PHOSPHATASE LEVEL: ICD-10-CM

## 2025-08-08 DIAGNOSIS — R10.30 LOWER ABDOMINAL PAIN: ICD-10-CM

## 2025-08-08 LAB
25(OH)D3 SERPL-MCNC: 22.2 NG/ML (ref 30–100)
ALBUMIN SERPL-MCNC: 4.1 G/DL (ref 3.5–5.2)
ALBUMIN/GLOB SERPL: 1.3 G/DL
ALP SERPL-CCNC: 114 U/L (ref 39–117)
ALT SERPL W P-5'-P-CCNC: 13 U/L (ref 1–41)
ANION GAP SERPL CALCULATED.3IONS-SCNC: 8.4 MMOL/L (ref 5–15)
AST SERPL-CCNC: 18 U/L (ref 1–40)
BASOPHILS # BLD AUTO: 0.06 10*3/MM3 (ref 0–0.2)
BASOPHILS NFR BLD AUTO: 0.6 % (ref 0–1.5)
BILIRUB SERPL-MCNC: 0.6 MG/DL (ref 0–1.2)
BUN SERPL-MCNC: 12.4 MG/DL (ref 8–23)
BUN/CREAT SERPL: 16.5 (ref 7–25)
CALCIUM SPEC-SCNC: 9 MG/DL (ref 8.6–10.5)
CHLORIDE SERPL-SCNC: 106 MMOL/L (ref 98–107)
CO2 SERPL-SCNC: 24.6 MMOL/L (ref 22–29)
CREAT SERPL-MCNC: 0.75 MG/DL (ref 0.76–1.27)
CRP SERPL-MCNC: <0.3 MG/DL (ref 0–0.5)
DEPRECATED RDW RBC AUTO: 50.1 FL (ref 37–54)
EGFRCR SERPLBLD CKD-EPI 2021: 92.4 ML/MIN/1.73
EOSINOPHIL # BLD AUTO: 0.11 10*3/MM3 (ref 0–0.4)
EOSINOPHIL NFR BLD AUTO: 1.2 % (ref 0.3–6.2)
ERYTHROCYTE [DISTWIDTH] IN BLOOD BY AUTOMATED COUNT: 17.9 % (ref 12.3–15.4)
ERYTHROCYTE [SEDIMENTATION RATE] IN BLOOD: 4 MM/HR (ref 0–20)
GLOBULIN UR ELPH-MCNC: 3.2 GM/DL
GLUCOSE SERPL-MCNC: 117 MG/DL (ref 65–99)
HCT VFR BLD AUTO: 42.4 % (ref 37.5–51)
HGB BLD-MCNC: 12.4 G/DL (ref 13–17.7)
IGA1 MFR SER: 286 MG/DL (ref 70–400)
IGG1 SER-MCNC: 1078 MG/DL (ref 700–1600)
IGM SERPL-MCNC: 90 MG/DL (ref 40–230)
IMM GRANULOCYTES # BLD AUTO: 0.08 10*3/MM3 (ref 0–0.05)
IMM GRANULOCYTES NFR BLD AUTO: 0.8 % (ref 0–0.5)
LYMPHOCYTES # BLD AUTO: 1.25 10*3/MM3 (ref 0.7–3.1)
LYMPHOCYTES NFR BLD AUTO: 13.2 % (ref 19.6–45.3)
MCH RBC QN AUTO: 22.9 PG (ref 26.6–33)
MCHC RBC AUTO-ENTMCNC: 29.2 G/DL (ref 31.5–35.7)
MCV RBC AUTO: 78.4 FL (ref 79–97)
MONOCYTES # BLD AUTO: 0.81 10*3/MM3 (ref 0.1–0.9)
MONOCYTES NFR BLD AUTO: 8.5 % (ref 5–12)
NEUTROPHILS NFR BLD AUTO: 7.19 10*3/MM3 (ref 1.7–7)
NEUTROPHILS NFR BLD AUTO: 75.7 % (ref 42.7–76)
NRBC BLD AUTO-RTO: 0 /100 WBC (ref 0–0.2)
PLATELET # BLD AUTO: 366 10*3/MM3 (ref 140–450)
PMV BLD AUTO: 8.3 FL (ref 6–12)
POTASSIUM SERPL-SCNC: 4.4 MMOL/L (ref 3.5–5.2)
PROT SERPL-MCNC: 7.3 G/DL (ref 6–8.5)
RBC # BLD AUTO: 5.41 10*6/MM3 (ref 4.14–5.8)
SODIUM SERPL-SCNC: 139 MMOL/L (ref 136–145)
TSH SERPL DL<=0.05 MIU/L-ACNC: 2.41 UIU/ML (ref 0.27–4.2)
WBC NRBC COR # BLD AUTO: 9.5 10*3/MM3 (ref 3.4–10.8)

## 2025-08-08 PROCEDURE — 85652 RBC SED RATE AUTOMATED: CPT | Performed by: NURSE PRACTITIONER

## 2025-08-08 PROCEDURE — 99214 OFFICE O/P EST MOD 30 MIN: CPT | Performed by: NURSE PRACTITIONER

## 2025-08-08 PROCEDURE — 82306 VITAMIN D 25 HYDROXY: CPT | Performed by: NURSE PRACTITIONER

## 2025-08-08 PROCEDURE — 86364 TISS TRNSGLTMNASE EA IG CLAS: CPT | Performed by: NURSE PRACTITIONER

## 2025-08-08 PROCEDURE — 36415 COLL VENOUS BLD VENIPUNCTURE: CPT | Performed by: NURSE PRACTITIONER

## 2025-08-08 PROCEDURE — 85025 COMPLETE CBC W/AUTO DIFF WBC: CPT | Performed by: NURSE PRACTITIONER

## 2025-08-08 PROCEDURE — 82784 ASSAY IGA/IGD/IGG/IGM EACH: CPT | Performed by: NURSE PRACTITIONER

## 2025-08-08 PROCEDURE — 3074F SYST BP LT 130 MM HG: CPT | Performed by: NURSE PRACTITIONER

## 2025-08-08 PROCEDURE — 86231 EMA EACH IG CLASS: CPT | Performed by: NURSE PRACTITIONER

## 2025-08-08 PROCEDURE — 84443 ASSAY THYROID STIM HORMONE: CPT | Performed by: NURSE PRACTITIONER

## 2025-08-08 PROCEDURE — 80053 COMPREHEN METABOLIC PANEL: CPT | Performed by: NURSE PRACTITIONER

## 2025-08-08 PROCEDURE — 3079F DIAST BP 80-89 MM HG: CPT | Performed by: NURSE PRACTITIONER

## 2025-08-08 PROCEDURE — 86381 MITOCHONDRIAL ANTIBODY EACH: CPT | Performed by: NURSE PRACTITIONER

## 2025-08-08 PROCEDURE — 86140 C-REACTIVE PROTEIN: CPT | Performed by: NURSE PRACTITIONER

## 2025-08-08 RX ORDER — DICYCLOMINE HCL 20 MG
20 TABLET ORAL EVERY 8 HOURS PRN
Qty: 30 TABLET | Refills: 0 | Status: SHIPPED | OUTPATIENT
Start: 2025-08-08 | End: 2025-09-07

## 2025-08-09 LAB — MITOCHONDRIA M2 IGG SER-ACNC: <20 UNITS (ref 0–20)

## 2025-08-11 ENCOUNTER — RESULTS FOLLOW-UP (OUTPATIENT)
Dept: GASTROENTEROLOGY | Facility: CLINIC | Age: 80
End: 2025-08-11
Payer: MEDICARE

## 2025-08-11 ENCOUNTER — LAB (OUTPATIENT)
Dept: LAB | Facility: HOSPITAL | Age: 80
End: 2025-08-11
Payer: MEDICARE

## 2025-08-11 LAB
ENDOMYSIUM IGA SER QL: NEGATIVE
IGA SERPL-MCNC: 282 MG/DL (ref 61–437)
TTG IGA SER-ACNC: <2 U/ML (ref 0–3)

## 2025-08-11 PROCEDURE — 87205 SMEAR GRAM STAIN: CPT | Performed by: NURSE PRACTITIONER

## 2025-08-11 PROCEDURE — 83993 ASSAY FOR CALPROTECTIN FECAL: CPT | Performed by: NURSE PRACTITIONER

## 2025-08-11 PROCEDURE — 87507 IADNA-DNA/RNA PROBE TQ 12-25: CPT | Performed by: NURSE PRACTITIONER

## 2025-08-11 PROCEDURE — 82705 FATS/LIPIDS FECES QUAL: CPT | Performed by: NURSE PRACTITIONER

## 2025-08-11 PROCEDURE — 87493 C DIFF AMPLIFIED PROBE: CPT | Performed by: NURSE PRACTITIONER

## 2025-08-11 PROCEDURE — 82653 EL-1 FECAL QUANTITATIVE: CPT | Performed by: NURSE PRACTITIONER

## 2025-08-12 LAB
ADV 40+41 DNA STL QL NAA+NON-PROBE: NOT DETECTED
ASTRO TYP 1-8 RNA STL QL NAA+NON-PROBE: NOT DETECTED
C CAYETANENSIS DNA STL QL NAA+NON-PROBE: NOT DETECTED
C COLI+JEJ+UPSA DNA STL QL NAA+NON-PROBE: NOT DETECTED
C DIF TOX TCDA+TCDB STL QL NAA+NON-PROBE: NOT DETECTED
C DIFF TOX GENS STL QL NAA+PROBE: NEGATIVE
CRYPTOSP DNA STL QL NAA+NON-PROBE: NOT DETECTED
E COLI O157 DNA STL QL NAA+NON-PROBE: NORMAL
E HISTOLYT DNA STL QL NAA+NON-PROBE: NOT DETECTED
EAEC PAA PLAS AGGR+AATA ST NAA+NON-PRB: NOT DETECTED
EC STX1+STX2 GENES STL QL NAA+NON-PROBE: NOT DETECTED
EPEC EAE GENE STL QL NAA+NON-PROBE: NOT DETECTED
ETEC LTA+ST1A+ST1B TOX ST NAA+NON-PROBE: NOT DETECTED
FA STL QL: NORMAL
G LAMBLIA DNA STL QL NAA+NON-PROBE: NOT DETECTED
NEUTRAL FAT STL QL: NORMAL
NOROVIRUS GI+II RNA STL QL NAA+NON-PROBE: NOT DETECTED
P SHIGELLOIDES DNA STL QL NAA+NON-PROBE: NOT DETECTED
RVA RNA STL QL NAA+NON-PROBE: NOT DETECTED
S ENT+BONG DNA STL QL NAA+NON-PROBE: NOT DETECTED
SAPO I+II+IV+V RNA STL QL NAA+NON-PROBE: NOT DETECTED
SHIGELLA SP+EIEC IPAH ST NAA+NON-PROBE: NOT DETECTED
V CHOL+PARA+VUL DNA STL QL NAA+NON-PROBE: NOT DETECTED
V CHOLERAE DNA STL QL NAA+NON-PROBE: NOT DETECTED
Y ENTEROCOL DNA STL QL NAA+NON-PROBE: NOT DETECTED

## 2025-08-13 LAB
CALPROTECTIN STL-MCNT: 49 UG/G (ref 0–120)
ELASTASE PANC STL-MCNT: 142 UG ELAST./G

## 2025-08-18 ENCOUNTER — TRANSCRIBE ORDERS (OUTPATIENT)
Dept: ADMINISTRATIVE | Facility: HOSPITAL | Age: 80
End: 2025-08-18
Payer: MEDICARE

## 2025-08-18 DIAGNOSIS — R91.1 LUNG NODULE: Primary | ICD-10-CM

## 2025-08-19 ENCOUNTER — HOSPITAL ENCOUNTER (OUTPATIENT)
Dept: CT IMAGING | Facility: HOSPITAL | Age: 80
Discharge: HOME | End: 2025-08-19
Admitting: NURSE PRACTITIONER
Payer: MEDICARE

## 2025-08-19 LAB
WBC STL QL MICRO: NORMAL
WBC STL QL MICRO: NORMAL

## 2025-08-19 PROCEDURE — 25510000001 IOPAMIDOL 61 % SOLUTION: Performed by: NURSE PRACTITIONER

## 2025-08-19 PROCEDURE — 74177 CT ABD & PELVIS W/CONTRAST: CPT

## 2025-08-19 PROCEDURE — 25510000002 DIATRIZOATE MEGLUMINE & SODIUM PER 1 ML: Performed by: NURSE PRACTITIONER

## 2025-08-19 RX ORDER — IOPAMIDOL 612 MG/ML
100 INJECTION, SOLUTION INTRAVASCULAR
Status: COMPLETED | OUTPATIENT
Start: 2025-08-19 | End: 2025-08-19

## 2025-08-19 RX ORDER — DIATRIZOATE MEGLUMINE AND DIATRIZOATE SODIUM 660; 100 MG/ML; MG/ML
30 SOLUTION ORAL; RECTAL
Status: COMPLETED | OUTPATIENT
Start: 2025-08-19 | End: 2025-08-19

## 2025-08-19 RX ADMIN — DIATRIZOATE MEGLUMINE AND DIATRIZOATE SODIUM 30 ML: 660; 100 LIQUID ORAL; RECTAL at 08:02

## 2025-08-19 RX ADMIN — IOPAMIDOL 85 ML: 612 INJECTION, SOLUTION INTRAVENOUS at 09:21

## 2025-08-20 ENCOUNTER — TELEPHONE (OUTPATIENT)
Dept: GASTROENTEROLOGY | Facility: CLINIC | Age: 80
End: 2025-08-20
Payer: MEDICARE

## 2025-08-26 DIAGNOSIS — R10.9 ABDOMINAL CRAMPING: Primary | ICD-10-CM

## 2025-08-26 RX ORDER — DICYCLOMINE HCL 20 MG
20 TABLET ORAL EVERY 8 HOURS PRN
Qty: 90 TABLET | Refills: 1 | Status: SHIPPED | OUTPATIENT
Start: 2025-08-26

## 2025-08-27 ENCOUNTER — PREP FOR SURGERY (OUTPATIENT)
Dept: SURGERY | Facility: SURGERY CENTER | Age: 80
End: 2025-08-27
Payer: MEDICARE

## 2025-08-27 ENCOUNTER — OFFICE VISIT (OUTPATIENT)
Dept: GASTROENTEROLOGY | Facility: CLINIC | Age: 80
End: 2025-08-27
Payer: MEDICARE

## 2025-08-27 VITALS
DIASTOLIC BLOOD PRESSURE: 74 MMHG | BODY MASS INDEX: 23.22 KG/M2 | TEMPERATURE: 98.1 F | OXYGEN SATURATION: 96 % | HEIGHT: 69 IN | SYSTOLIC BLOOD PRESSURE: 114 MMHG | HEART RATE: 98 BPM | WEIGHT: 156.8 LBS

## 2025-08-27 DIAGNOSIS — R19.7 DIARRHEA, UNSPECIFIED TYPE: ICD-10-CM

## 2025-08-27 DIAGNOSIS — Z87.19 HISTORY OF DUODENAL ULCER: Primary | ICD-10-CM

## 2025-08-27 DIAGNOSIS — R10.10 UPPER ABDOMINAL PAIN: ICD-10-CM

## 2025-08-27 DIAGNOSIS — K29.70 GASTRITIS WITHOUT BLEEDING, UNSPECIFIED CHRONICITY, UNSPECIFIED GASTRITIS TYPE: ICD-10-CM

## 2025-08-27 DIAGNOSIS — Z80.0 FAMILY HISTORY OF COLON CANCER IN MOTHER: ICD-10-CM

## 2025-08-27 DIAGNOSIS — R93.3 ABNORMAL CT SCAN, STOMACH: ICD-10-CM

## 2025-08-27 RX ORDER — LANCETS
EACH MISCELLANEOUS
COMMUNITY
Start: 2025-05-11

## 2025-08-27 RX ORDER — POTASSIUM CHLORIDE 750 MG/1
1 TABLET, EXTENDED RELEASE ORAL DAILY
COMMUNITY
Start: 2025-07-07

## 2025-08-27 RX ORDER — BUDESONIDE, GLYCOPYRROLATE, AND FORMOTEROL FUMARATE 160; 9; 4.8 UG/1; UG/1; UG/1
2 AEROSOL, METERED RESPIRATORY (INHALATION) 2 TIMES DAILY
COMMUNITY
Start: 2025-08-18

## (undated) DEVICE — ANTIBACTERIAL UNDYED BRAIDED (POLYGLACTIN 910), SYNTHETIC ABSORBABLE SUTURE: Brand: COATED VICRYL

## (undated) DEVICE — TOOL MR8-14MH30 MR8 14CM MATCH 3MM: Brand: MIDAS REX MR8

## (undated) DEVICE — SUT SILK 2/0 TIES 18IN A185H

## (undated) DEVICE — PK NEURO SPINE 40

## (undated) DEVICE — BITEBLOCK OMNI BLOC

## (undated) DEVICE — GLV SURG BIOGEL LTX PF 7 1/2

## (undated) DEVICE — COTTON BALLS, DOUBLE STRUNG: Brand: DEROYAL

## (undated) DEVICE — SUT VIC 3/0 X1 27IN J458H

## (undated) DEVICE — ELECTRD BLD EZ CLN MOD XLNG 2.75IN

## (undated) DEVICE — SPONGE,DISSECTOR,K,XRAY,9/16"X1/4",STRL: Brand: MEDLINE

## (undated) DEVICE — GLV SURG BIOGEL LTX PF 8

## (undated) DEVICE — TIGHTNER SHFT SKY LK

## (undated) DEVICE — SKYLINE ANTERIOR CERVICAL PLATE SYSTEM DRILL 2.2 X 14MM: Brand: SKYLINE

## (undated) DEVICE — GLV SURG SENSICARE PI LF PF 7.5 GRN STRL

## (undated) DEVICE — GLV SURG SIGNATURE ESSENTIAL PF LTX SZ7.5

## (undated) DEVICE — TOOL MR8-14HM126 MR8 14CM HM 12.6MM: Brand: MIDAS REX MR8

## (undated) DEVICE — DISPOSABLE BIPOLAR CABLE 12FT. (3.6M): Brand: KIRWAN

## (undated) DEVICE — PIN DISTRACT TI 14MM STRL

## (undated) DEVICE — NEEDLE, QUINCKE, 18GX3.5": Brand: MEDLINE

## (undated) DEVICE — DRSNG WND BORDR/ADHS NONADHR/GZ LF 4X4IN STRL

## (undated) DEVICE — DRP MICROSCP LEICA 137X381CM

## (undated) DEVICE — SKIN PREP TRAY W/CHG: Brand: MEDLINE INDUSTRIES, INC.

## (undated) DEVICE — ACCY PA700 LUBRICANT DIFFUSER MR7 4 PACK: Brand: MIDAS REX

## (undated) DEVICE — LIMB HOLDER, WRIST/ANKLE: Brand: DEROYAL

## (undated) DEVICE — CANN NASL CO2 TRULINK W/O2 A/

## (undated) DEVICE — MARKER,SKIN,WI/RULER AND LABELS: Brand: MEDLINE

## (undated) DEVICE — 3M™ STERI-STRIP™ ANTIMICROBIAL SKIN CLOSURES 1/2 INCH X 4 INCHES 50/CARTON 4 CARTONS/CASE A1847: Brand: 3M™ STERI-STRIP™

## (undated) DEVICE — COVER,C-ARM,41X74: Brand: MEDLINE

## (undated) DEVICE — TUBING, SUCTION, 1/4" X 10', STRAIGHT: Brand: MEDLINE

## (undated) DEVICE — Device: Brand: DEFENDO AIR/WATER/SUCTION AND BIOPSY VALVE

## (undated) DEVICE — Device

## (undated) DEVICE — CONN TBG Y 5 IN 1 LF STRL

## (undated) DEVICE — LABEL SHEET CUSTOM 2X2 YELLOW: Brand: MEDLINE INDUSTRIES, INC.